# Patient Record
Sex: FEMALE | Race: BLACK OR AFRICAN AMERICAN | NOT HISPANIC OR LATINO | Employment: OTHER | URBAN - METROPOLITAN AREA
[De-identification: names, ages, dates, MRNs, and addresses within clinical notes are randomized per-mention and may not be internally consistent; named-entity substitution may affect disease eponyms.]

---

## 2020-06-01 ENCOUNTER — TELEPHONE (OUTPATIENT)
Dept: HEMATOLOGY/ONCOLOGY | Facility: CLINIC | Age: 75
End: 2020-06-01

## 2020-06-01 NOTE — TELEPHONE ENCOUNTER
Spoke with Ms. Kevin.  Informed her that I would review the referral received and work on obtaining medical records and imaging prior to scheduling an appointment.    ----- Message from Lalita Bird RN sent at 6/1/2020  4:17 PM CDT -----  Contact: Marizol  tel:   [phone]        ----- Message -----  From: Kristen Altman  Sent: 6/1/2020   3:22 PM CDT  To: [facility]        ----- Message -----  From: Kisha Gage MD  Sent: 6/1/2020   3:20 PM CDT  To: Kristen Altman    I was not  Can you loop in the navigators?    ----- Message -----  From: Kristen Altman  Sent: 6/1/2020   2:10 PM CDT  To: Kisha Gage MD    Will loop navigators in- not sure if you were aware of this pt?    ----- Message -----  From: Daya Miller  Sent: 6/1/2020   1:48 PM CDT  To: Merari SHELLEY Staff    Caller says she is waiting for an appt. To come in to see Dr. Gage, would prefer an afternoon appt..    Dr. Alexander Hubbard  Ofc:  [phone] and [phone] at [facility] referring her.   Asking for someone to pls give her an update as to how soon she can be seen.

## 2020-06-01 NOTE — TELEPHONE ENCOUNTER
Message fwd to MD.     ----- Message from Daya Miller sent at 6/1/2020  1:48 PM CDT -----  Contact: Marizol  tel:   [phone]    Caller says she is waiting for an appt. To come in to see Dr. Gage, would prefer an afternoon appt..    Dr. Alexander Hubbard  Ofc:  [phone] and [phone] at [facility] referring her.   Asking for someone to pls give her an update as to how soon she can be seen.

## 2020-06-15 ENCOUNTER — TELEPHONE (OUTPATIENT)
Dept: HEMATOLOGY/ONCOLOGY | Facility: CLINIC | Age: 75
End: 2020-06-15

## 2020-06-15 NOTE — TELEPHONE ENCOUNTER
Called x2, no answer. Unable to leave VM. Message forward to [facility] Schedulers to schedule patient with available provider for appt today.

## 2020-06-15 NOTE — TELEPHONE ENCOUNTER
----- Message from Daya Miller sent at 6/15/2020 10:14 AM CDT -----  Contact: Marizol: tel:  [phone]  Caller says she wants to come in to see you  TODAY.     Pls call to discuss this.   Tel:  [phone].     Has a boil under her breast.   Wants to see you TODAY.   Also need to have her port flushed.   Says she has left ms. Previously w/ no return call.   Will be waiting by the phone today.  Says she is in a lot of pain with the boil.

## 2020-06-17 ENCOUNTER — TELEPHONE (OUTPATIENT)
Dept: HEMATOLOGY/ONCOLOGY | Facility: CLINIC | Age: 75
End: 2020-06-17

## 2020-06-17 NOTE — TELEPHONE ENCOUNTER
Patient notified of the scheduled appointment with Dr. Alcantara on 07/06/20 at 2pm.  Imaging received on 06/16/20.  Records uploaded into Media.  Reminder mailed.

## 2020-06-22 ENCOUNTER — TELEPHONE (OUTPATIENT)
Dept: HEMATOLOGY/ONCOLOGY | Facility: CLINIC | Age: 75
End: 2020-06-22

## 2020-06-22 NOTE — TELEPHONE ENCOUNTER
----- Message from Raymond Cook sent at 6/22/2020  9:47 AM CDT -----  Regarding: patient advice  Contact: ESTEBAN JIMENEZ [[id number]]  Name of Who is Calling: ESTEBAN JIMENEZ [[id number]]      What is the request in detail: Would like to speak with staff in regards to knowing does she still come into the appointment tomorrow. Please advise      Can the clinic reply by [facility]: no    What Number to [facility]: [phone]

## 2020-06-22 NOTE — TELEPHONE ENCOUNTER
Returned call and spoke with Ms Herberth. Ms Kevin questioning her scheduled appointment with Dr Alcantara. Ms Enriquetasusie scheduled for two appointment with Dr Alcantara, tomorrow and on July 7th. She's calling to ask which appointment should she keep. Appointment discussed and Ms Kevin will come into the clinic on July 7th. Appointment information mailed to Ms Kevin home.

## 2020-07-02 ENCOUNTER — OFFICE VISIT (OUTPATIENT)
Dept: URGENT CARE | Facility: CLINIC | Age: 75
End: 2020-07-02
Payer: MEDICARE

## 2020-07-02 VITALS
HEART RATE: 106 BPM | TEMPERATURE: 98 F | SYSTOLIC BLOOD PRESSURE: 111 MMHG | OXYGEN SATURATION: 98 % | DIASTOLIC BLOOD PRESSURE: 74 MMHG

## 2020-07-02 DIAGNOSIS — L02.91 ABSCESS: Primary | ICD-10-CM

## 2020-07-02 PROCEDURE — 99203 OFFICE O/P NEW LOW 30 MIN: CPT | Mod: S$GLB,,, | Performed by: FAMILY MEDICINE

## 2020-07-02 PROCEDURE — 99203 PR OFFICE/OUTPT VISIT, NEW, LEVL III, 30-44 MIN: ICD-10-PCS | Mod: S$GLB,,, | Performed by: FAMILY MEDICINE

## 2020-07-02 RX ORDER — MUPIROCIN 20 MG/G
OINTMENT TOPICAL
Qty: 22 G | Refills: 1 | Status: SHIPPED | OUTPATIENT
Start: 2020-07-02 | End: 2021-03-12

## 2020-07-02 RX ORDER — AMLODIPINE BESYLATE 5 MG/1
5 TABLET ORAL
COMMUNITY
Start: 2020-01-10 | End: 2020-09-02 | Stop reason: SDUPTHER

## 2020-07-02 RX ORDER — ALBUTEROL SULFATE 90 UG/1
2 AEROSOL, METERED RESPIRATORY (INHALATION)
COMMUNITY
Start: 2019-11-21 | End: 2021-01-12 | Stop reason: SDUPTHER

## 2020-07-02 RX ORDER — METFORMIN HYDROCHLORIDE 500 MG/1
500 TABLET ORAL 2 TIMES DAILY
COMMUNITY
End: 2020-09-02 | Stop reason: SDUPTHER

## 2020-07-02 RX ORDER — ATORVASTATIN CALCIUM 40 MG/1
40 TABLET, FILM COATED ORAL
COMMUNITY
Start: 2018-04-09 | End: 2020-09-02 | Stop reason: SDUPTHER

## 2020-07-02 RX ORDER — ALPRAZOLAM 0.5 MG/1
0.5 TABLET ORAL
COMMUNITY
Start: 2019-11-06 | End: 2021-10-01

## 2020-07-02 RX ORDER — TRIAMCINOLONE ACETONIDE 0.25 MG/G
CREAM TOPICAL
COMMUNITY
Start: 2020-01-10 | End: 2021-01-12 | Stop reason: SDUPTHER

## 2020-07-02 RX ORDER — PANTOPRAZOLE SODIUM 40 MG/1
40 TABLET, DELAYED RELEASE ORAL
COMMUNITY
Start: 2020-01-10 | End: 2020-09-02 | Stop reason: SDUPTHER

## 2020-07-02 RX ORDER — MONTELUKAST SODIUM 10 MG/1
10 TABLET ORAL
COMMUNITY
Start: 2018-05-08 | End: 2021-06-01 | Stop reason: SDUPTHER

## 2020-07-02 RX ORDER — ACETAMINOPHEN 500 MG
TABLET ORAL
COMMUNITY
End: 2024-01-25 | Stop reason: SDUPTHER

## 2020-07-02 RX ORDER — MELOXICAM 15 MG/1
15 TABLET ORAL
COMMUNITY
Start: 2018-03-07 | End: 2021-01-12 | Stop reason: SDUPTHER

## 2020-07-02 RX ORDER — RALOXIFENE HYDROCHLORIDE 60 MG/1
60 TABLET, FILM COATED ORAL
COMMUNITY
Start: 2018-05-08 | End: 2021-01-12 | Stop reason: SDUPTHER

## 2020-07-02 RX ORDER — DULOXETIN HYDROCHLORIDE 30 MG/1
30 CAPSULE, DELAYED RELEASE ORAL
COMMUNITY
End: 2021-01-12 | Stop reason: SDUPTHER

## 2020-07-02 RX ORDER — HYDROXYZINE HYDROCHLORIDE 25 MG/1
25 TABLET, FILM COATED ORAL
COMMUNITY
Start: 2018-04-09 | End: 2021-01-12 | Stop reason: SDUPTHER

## 2020-07-02 RX ORDER — FLUTICASONE PROPIONATE 50 MCG
1 SPRAY, SUSPENSION (ML) NASAL
COMMUNITY
End: 2020-09-02 | Stop reason: SDUPTHER

## 2020-07-02 NOTE — PATIENT INSTRUCTIONS
Abscess (Incision & Drainage)  An abscess is sometimes called a boil. It happens when bacteria get trapped under the skin and start to grow. Pus forms inside the abscess as the body responds to the bacteria. An abscess can happen with an insect bite, ingrown hair, blocked oil gland, pimple, cyst, or puncture wound.  Your healthcare provider has drained the pus from your abscess. If the abscess pocket was large, your healthcare provider may have put in gauze packing. Your provider will need to remove it on your next visit. He or she may also replace it at that time. You may not need antibiotics to treat a simple abscess, unless the infection is spreading into the skin around the wound (cellulitis).  The wound will take about 1 to 2 weeks to heal, depending on the size of the abscess. Healthy tissue will grow from the bottom and sides of the opening until it seals over.  Home care  These tips can help your wound heal:  · The wound may drain for the first 2 days. Cover the wound with a clean dry dressing. Change the dressing if it becomes soaked with blood or pus.  · If a gauze packing was placed inside the abscess pocket, you may be told to remove it yourself. You may do this in the shower. Once the packing is removed, you should wash the area in the shower, or clean the area as directed by your provider. Continue to do this until the skin opening has closed. Make sure you wash your hands after changing the packing or cleaning the wound.  · If you were prescribed antibiotics, take them as directed until they are all gone.  · You may use acetaminophen or ibuprofen to control pain, unless another pain medicine was prescribed. If you have liver disease or ever had a stomach ulcer, talk with your doctor before using these medicines.  Follow-up care  Follow up with your healthcare provider, or as advised. If a gauze packing was put in your wound, it should be removed in 1 to 2 days. Check your wound every day for any  signs that the infection is getting worse. The signs are listed below.  When to seek medical advice  Call your healthcare provider right away if any of these occur:  · Increasing redness or swelling  · Red streaks in the skin leading away from the wound  · Increasing local pain or swelling  · Continued pus draining from the wound 2 days after treatment  · Fever of 100.4ºF (38ºC) or higher, or as directed by your healthcare provider  · Boil returns when you are at home  Date Last Reviewed: 9/1/2016  © 4084-9372 [employer]. [address] [place] [address]. All rights reserved. This information is not intended as a substitute for professional medical care. Always follow your healthcare professional's instructions.      WASH THE WOUND TWICE DAILY WITH ANTIBACTERIAL SOAP AND WATER THEN ANTIBIOTIC OINTMENT, THEN COVER.  OTHERWISE TRY TO KEEP THE AREA AS DRY AND CLEAN AS POSSIBLE.    RECHECK IF NOT IMPROVING

## 2020-07-02 NOTE — PROGRESS NOTES
"Subjective:       Patient ID: Marizol Kevin is a 74 y.o. female.    Vitals:  temperature is 97.6 °F (36.4 °C). Her blood pressure is 111/74 and her pulse is 106. Her oxygen saturation is 98%.     Chief Complaint: Abscess    74-year-old female with a draining lesion under her left breast over the past month.  She thought it had finished draining, but this morning noticed more drainage again.  Some associated pain, although not severe.  No fever or systemic symptoms.  History of colon cancer, although last chemo 2014.  Diagnosis of diabetes noted on chart, although she denies full blown diagnosis" although "I'm on metformin to keep my A1c down".  Blood glucose 3 months ago 144.          Abscess  Chronicity:  New  Incident onset: 1 month.  Progression Since Onset: worsening  Associated Symptoms: no fever, no chills  Treatments Tried:  Warm compresses and draining/squeezing  Relieved by:  Warm compresses      Constitution: Negative for chills and fever.   HENT: Negative for facial swelling and sore throat.    Neck: Negative for painful lymph nodes.   Eyes: Negative for eye itching and eyelid swelling.   Respiratory: Negative for cough.    Musculoskeletal: Negative for joint pain and joint swelling.   Skin: Negative for color change, pale, rash, wound, abrasion, laceration, lesion, skin thickening/induration, puncture wound, erythema, bruising, abscess, avulsion and hives.   Allergic/Immunologic: Positive for itching. Negative for environmental allergies, immunocompromised state and hives.   Hematologic/Lymphatic: Negative for swollen lymph nodes.       Objective:      Physical Exam   Constitutional: She is oriented to person, place, and time.  Non-toxic appearance. She does not appear ill. No distress.   HENT:   Head: Normocephalic and atraumatic.   Cardiovascular: Normal rate, regular rhythm and normal heart sounds.   Pulmonary/Chest: Breath sounds normal.   Abdominal: Normal appearance.   Neurological: She is alert " and oriented to person, place, and time.   Skin: Skin is not diaphoretic and no rash.   Under left breast, on left upper abdominal wall is a small superficial abscess that has come to head.  I can see where a was previously draining and now there is a surrounding bulge.    After informed consent verbally, surrounding area was cleansed well with Betadine, area was anesthetized with 1% xylocaine with epinephrine,and #11 blade used to incise the lesion with serous return and also a small amount of purulent return.  Area was nontender with no surrounding erythema.  Area of bulge resolved.  Culture not submitted. erythema   Comments: Under left breast, on left upper abdominal wall is a small superficial abscess that has come to head.  I can see where a was previously draining and now there is a surrounding bulge.    After informed consent verbally, surrounding area was cleansed well with Betadine, area was anesthetized with 1% xylocaine with epinephrine,and #11 blade used to incise the lesion with serous return and also a small amount of purulent return.  Area was nontender with no surrounding erythema.  Area of bulge resolved.  Culture not submitted.     Psychiatric: Her behavior is normal. Mood normal.         Assessment:       1. Abscess        Plan:         Abscess  -     mupirocin (BACTROBAN) 2 % ointment; Apply to affected area 2 times daily  Dispense: 22 g; Refill: 1    WASH THE WOUND TWICE DAILY WITH ANTIBACTERIAL SOAP AND WATER THEN ANTIBIOTIC OINTMENT, THEN COVER.  OTHERWISE TRY TO KEEP THE AREA AS DRY AND CLEAN AS POSSIBLE.    RECHECK IF NOT IMPROVING

## 2020-07-07 ENCOUNTER — TELEPHONE (OUTPATIENT)
Dept: HEMATOLOGY/ONCOLOGY | Facility: CLINIC | Age: 75
End: 2020-07-07

## 2020-07-07 NOTE — TELEPHONE ENCOUNTER
Called and spoke with Ms Kevin. Informed Ms Kvein her appointment scheduled today with Dr Alcantara will have to be rescheduled to another day. Done. Her appointment rescheduled to July 20, 2020.

## 2020-07-07 NOTE — TELEPHONE ENCOUNTER
----- Message from Roxy Fiore RN sent at 7/7/2020 12:53 PM CDT -----  Contact: [phone]    ----- Message -----  From: Roxy Fiore RN  Sent: 7/7/2020  12:17 PM CDT  To: Fariba Campbell consult patient for today.     See below.   ----- Message -----  From: Daya Miller  Sent: 7/7/2020  12:05 PM CDT  To: Quinton Cole Staff    Caller says she needs to come in and get her port flushed, it is raining heavily by her house.  Asking if you can get her in this week for another day and time.    Having problems trying to get transportation to take her today in the heavy rains.     Pls call asap.

## 2020-07-17 NOTE — PROGRESS NOTES
CC:  Follow up for colon cancer    HPI:  Ms Kevin is a 73 yo woman who presents today to establish care for her history of colon cancer. She was followed by Dr Hubbard previously. She underwent a right hemicolectomy on 8/15/2014 for cancer of the ascending colon. Four out of 17 lymph nodes were positive. She had a PET on 1/15/2014 which was negative for liver mets. She did have a hypermetabolic parotid lesion on the PET scan. She underwent adjuvant FOLFOX for 6 months from 2014 to 2015. She had elevated CEA and was last seen by Dr Hubbard on 2020. According to Dr Rosales note, the last colonoscopy was in 2019 at [facility]. As per patient the colonoscopy was good. She was told she is due for colonoscopy in 5 years. She is followed by Dr Cabrera for the parotid lesion. Her last CEA was 7.8 on 2019. She is a chronic smoker. 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 Occasional muscle cramps.       ECO    Past Medical History:   Diagnosis Date    Allergy     Anxiety     Cancer     colon    Cataract     Diabetes mellitus, type 2     Glaucoma     Hyperlipidemia     Hypertension     Insomnia         Past Surgical History:   Procedure Laterality Date    CHOLECYSTECTOMY      HYSTERECTOMY      KNEE SURGERY      LAPAROSCOPIC LEFT COLON RESECTION         No family history on file.    Social History     Socioeconomic History    Marital status:      Spouse name: Not on file    Number of children: Not on file    Years of education: Not on file    Highest education level: Not on file   Occupational History    Not on file   Social Needs    Financial resource strain: Not on file    Food insecurity     Worry: Not on file     Inability: Not on file    Transportation needs     Medical: Not on file     Non-medical: Not on file   Tobacco Use    Smoking status: Current Some Day Smoker    Smokeless tobacco: Never Used   Substance and Sexual Activity    Alcohol use: Yes     Comment: occ     Drug use: Not on file    Sexual activity: Not on file   Lifestyle    Physical activity     Days per week: Not on file     Minutes per session: Not on file    Stress: Not on file   Relationships    Social connections     Talks on phone: Not on file     Gets together: Not on file     Attends Spiritism service: Not on file     Active member of club or organization: Not on file     Attends meetings of clubs or organizations: Not on file     Relationship status: Not on file   Other Topics Concern    Not on file   Social History Narrative    Not on file       Review of Systems   Constitutional: Negative for appetite change, chills, fatigue, fever and unexpected weight change.   HENT: Negative for mouth sores, nosebleeds, tinnitus, trouble swallowing and voice change.    Eyes: Negative for pain, redness and visual disturbance.   Respiratory: Negative for cough, shortness of breath and wheezing.    Cardiovascular: Negative for chest pain, palpitations and leg swelling.   Gastrointestinal: Negative for abdominal distention, abdominal pain, blood in stool, constipation, diarrhea, nausea and vomiting.   Endocrine: Negative for polydipsia, polyphagia and polyuria.   Genitourinary: Negative for flank pain, frequency and hematuria.   Musculoskeletal: Negative for arthralgias, back pain, gait problem, joint swelling, myalgias, neck pain and neck stiffness.   Skin: Negative for color change, pallor, rash and wound.   Neurological: Positive for numbness (neuropathy from oxaliplatin). Negative for tremors, seizures, syncope, speech difficulty, weakness, light-headedness and headaches.   Hematological: Negative for adenopathy. Does not bruise/bleed easily.   Psychiatric/Behavioral: Negative for confusion, dysphoric mood and self-injury. The patient is not nervous/anxious.    All other systems reviewed and are negative.      Objective:  Physical Exam   Constitutional: She is oriented to person, place, and time. She appears  well-developed and well-nourished. No distress.   HENT:   Head: Normocephalic and atraumatic.   Mouth/Throat: Oropharynx is clear and moist. No oropharyngeal exudate.   Eyes: Pupils are equal, round, and reactive to light. Conjunctivae and EOM are normal. No scleral icterus.   Neck: Normal range of motion. Neck supple. No JVD present. No thyromegaly present.   Cardiovascular: Normal rate, regular rhythm, normal heart sounds and intact distal pulses. Exam reveals no gallop and no friction rub.   No murmur heard.  Pulmonary/Chest: Effort normal and breath sounds normal. No respiratory distress. She has no wheezes. She has no rales.   Port site clean   Abdominal: Soft. Bowel sounds are normal. She exhibits no distension and no mass. There is no hepatosplenomegaly. There is no abdominal tenderness. There is no rebound. No hernia.   Musculoskeletal: Normal range of motion.         General: No tenderness, deformity or edema.   Lymphadenopathy:     She has no cervical adenopathy.        Right: No supraclavicular adenopathy present.        Left: No supraclavicular adenopathy present.   Neurological: She is alert and oriented to person, place, and time. No cranial nerve deficit. She exhibits normal muscle tone.   Skin: Skin is warm and dry. No rash noted. She is not diaphoretic. No erythema. No pallor.   Psychiatric: She has a normal mood and affect. Her behavior is normal. Judgment and thought content normal.   Vitals reviewed.      Labs:  Her last CEA was 7.8 on 11/1/2019  Today's labs pending    Assessment and plan:  1. Malignant neoplasm of ascending colon    2. Elevated tumor markers    3. Lesion of parotid gland    4. 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 Four out of 17 lymph nodes were positive. She underwent adjuvant FOLFOX for 6 months from 11/4/2014 to 4/29/2015.  Her CEA was elevated without  radiographic evidence of recurrence. She is a current smoker  - check CBC, CMP, CEA today  - check CT C/A/P given elevated CEA  - flush port today  - had colonoscopy at [facility] in 2019. Next due in 5 years (2024) as per patient  - We discussed colon cancer survivorship, including daily baby aspirin with food, daily OTC vit D and calcium, fresh fruits and vegetables.  - RTC in 6 months for follow up    3.  - followed by Dr Cabrera    4.  - on cymbalta 30 mg daily. Continue    Her multiple questions were answered in the clinic. Encouraged to call should issues arise.

## 2020-07-20 ENCOUNTER — INFUSION (OUTPATIENT)
Dept: INFUSION THERAPY | Facility: OTHER | Age: 75
End: 2020-07-20
Attending: INTERNAL MEDICINE
Payer: MEDICARE

## 2020-07-20 ENCOUNTER — OFFICE VISIT (OUTPATIENT)
Dept: HEMATOLOGY/ONCOLOGY | Facility: CLINIC | Age: 75
End: 2020-07-20
Payer: MEDICARE

## 2020-07-20 VITALS
SYSTOLIC BLOOD PRESSURE: 121 MMHG | WEIGHT: 164.88 LBS | OXYGEN SATURATION: 98 % | TEMPERATURE: 99 F | HEIGHT: 57 IN | RESPIRATION RATE: 12 BRPM | DIASTOLIC BLOOD PRESSURE: 71 MMHG | HEART RATE: 104 BPM | BODY MASS INDEX: 35.57 KG/M2

## 2020-07-20 DIAGNOSIS — C18.2 MALIGNANT NEOPLASM OF ASCENDING COLON: Primary | ICD-10-CM

## 2020-07-20 DIAGNOSIS — G62.9 NEUROPATHY: ICD-10-CM

## 2020-07-20 DIAGNOSIS — K11.9 LESION OF PAROTID GLAND: ICD-10-CM

## 2020-07-20 DIAGNOSIS — R97.8 ELEVATED TUMOR MARKERS: ICD-10-CM

## 2020-07-20 DIAGNOSIS — C18.8 OVERLAPPING MALIGNANT NEOPLASM OF COLON: Primary | ICD-10-CM

## 2020-07-20 PROCEDURE — 99204 OFFICE O/P NEW MOD 45 MIN: CPT | Mod: HCNC,S$GLB,, | Performed by: INTERNAL MEDICINE

## 2020-07-20 PROCEDURE — 1126F PR PAIN SEVERITY QUANTIFIED, NO PAIN PRESENT: ICD-10-PCS | Mod: HCNC,S$GLB,, | Performed by: INTERNAL MEDICINE

## 2020-07-20 PROCEDURE — 99999 PR PBB SHADOW E&M-EST. PATIENT-LVL IV: ICD-10-PCS | Mod: PBBFAC,HCNC,, | Performed by: INTERNAL MEDICINE

## 2020-07-20 PROCEDURE — 99999 PR PBB SHADOW E&M-EST. PATIENT-LVL IV: CPT | Mod: PBBFAC,HCNC,, | Performed by: INTERNAL MEDICINE

## 2020-07-20 PROCEDURE — A4216 STERILE WATER/SALINE, 10 ML: HCPCS | Mod: HCNC | Performed by: INTERNAL MEDICINE

## 2020-07-20 PROCEDURE — 63600175 PHARM REV CODE 636 W HCPCS: Mod: HCNC | Performed by: INTERNAL MEDICINE

## 2020-07-20 PROCEDURE — 99499 RISK ADDL DX/OHS AUDIT: ICD-10-PCS | Mod: HCNC,S$GLB,, | Performed by: INTERNAL MEDICINE

## 2020-07-20 PROCEDURE — 99499 UNLISTED E&M SERVICE: CPT | Mod: HCNC,S$GLB,, | Performed by: INTERNAL MEDICINE

## 2020-07-20 PROCEDURE — 3008F PR BODY MASS INDEX (BMI) DOCUMENTED: ICD-10-PCS | Mod: HCNC,CPTII,S$GLB, | Performed by: INTERNAL MEDICINE

## 2020-07-20 PROCEDURE — 1126F AMNT PAIN NOTED NONE PRSNT: CPT | Mod: HCNC,S$GLB,, | Performed by: INTERNAL MEDICINE

## 2020-07-20 PROCEDURE — 99204 PR OFFICE/OUTPT VISIT, NEW, LEVL IV, 45-59 MIN: ICD-10-PCS | Mod: HCNC,S$GLB,, | Performed by: INTERNAL MEDICINE

## 2020-07-20 PROCEDURE — 1101F PT FALLS ASSESS-DOCD LE1/YR: CPT | Mod: HCNC,CPTII,S$GLB, | Performed by: INTERNAL MEDICINE

## 2020-07-20 PROCEDURE — 1159F PR MEDICATION LIST DOCUMENTED IN MEDICAL RECORD: ICD-10-PCS | Mod: HCNC,S$GLB,, | Performed by: INTERNAL MEDICINE

## 2020-07-20 PROCEDURE — 96523 IRRIG DRUG DELIVERY DEVICE: CPT | Mod: HCNC

## 2020-07-20 PROCEDURE — 1101F PR PT FALLS ASSESS DOC 0-1 FALLS W/OUT INJ PAST YR: ICD-10-PCS | Mod: HCNC,CPTII,S$GLB, | Performed by: INTERNAL MEDICINE

## 2020-07-20 PROCEDURE — 3008F BODY MASS INDEX DOCD: CPT | Mod: HCNC,CPTII,S$GLB, | Performed by: INTERNAL MEDICINE

## 2020-07-20 PROCEDURE — 25000003 PHARM REV CODE 250: Mod: HCNC | Performed by: INTERNAL MEDICINE

## 2020-07-20 PROCEDURE — 1159F MED LIST DOCD IN RCRD: CPT | Mod: HCNC,S$GLB,, | Performed by: INTERNAL MEDICINE

## 2020-07-20 RX ORDER — HEPARIN 100 UNIT/ML
500 SYRINGE INTRAVENOUS
Status: COMPLETED | OUTPATIENT
Start: 2020-07-20 | End: 2020-07-20

## 2020-07-20 RX ORDER — SODIUM CHLORIDE 0.9 % (FLUSH) 0.9 %
10 SYRINGE (ML) INJECTION
Status: CANCELLED | OUTPATIENT
Start: 2020-07-20

## 2020-07-20 RX ORDER — HEPARIN 100 UNIT/ML
500 SYRINGE INTRAVENOUS
Status: CANCELLED | OUTPATIENT
Start: 2020-07-20

## 2020-07-20 RX ORDER — SODIUM CHLORIDE 0.9 % (FLUSH) 0.9 %
10 SYRINGE (ML) INJECTION
Status: COMPLETED | OUTPATIENT
Start: 2020-07-20 | End: 2020-07-20

## 2020-07-20 RX ADMIN — HEPARIN 500 UNITS: 100 SYRINGE at 10:07

## 2020-07-20 RX ADMIN — SODIUM CHLORIDE, PRESERVATIVE FREE 10 ML: 5 INJECTION INTRAVENOUS at 10:07

## 2020-07-20 NOTE — Clinical Note
Check CBC, CMP, CEA today. Schedule for CT C/A/P this week. RTC in 6 months with CBC, CMP, CEA, see me, then flush port

## 2020-07-20 NOTE — PLAN OF CARE
Port A Cath flushed and heparin locked. Port A Cath 20 gauge 3/4 inch needle deaccessed/ removed.  Patient tolerated well. No apparent distress noted. Discharge instructions given to patient. Patient understands instructions. Follow up appointment scheduled.

## 2020-07-20 NOTE — LETTER
July 20, 2020      Alexander Hubbard MD  [address]  [address]  [facility] [address]           [facility] [place] [address]  [address], [address]  [facility] [address]  Phone: [phone]          Patient: Marizol Kevin   MR Number: [id number]   YOB: 1945   Date of Visit: 7/20/2020       Dear Dr. Alexander Hubbard:    Thank you for referring Marizol Kevin to me for evaluation. Attached you will find relevant portions of my assessment and plan of care.    If you have questions, please do not hesitate to call me. I look forward to following Marizol Kevin along with you.    Sincerely,    Kelsey Alcantara MD    Enclosure  CC:  No Recipients    If you would like to receive this communication electronically, please contact [email or web address] or [phone] to request more information on [facility] Link access.    For providers and/or their staff who would like to refer a patient to [facility], please contact us through our one-stop-shop provider referral line, [facility], at [phone].    If you feel you have received this communication in error or would no longer like to receive these types of communications, please e-mail [email or web address]

## 2020-07-29 ENCOUNTER — HOSPITAL ENCOUNTER (OUTPATIENT)
Dept: RADIOLOGY | Facility: OTHER | Age: 75
Discharge: HOME | End: 2020-07-29
Attending: INTERNAL MEDICINE
Payer: MEDICARE

## 2020-07-29 ENCOUNTER — OFFICE VISIT (OUTPATIENT)
Dept: OPTOMETRY | Facility: CLINIC | Age: 75
End: 2020-07-29
Payer: MEDICARE

## 2020-07-29 DIAGNOSIS — Z96.1 PSEUDOPHAKIA OF BOTH EYES: ICD-10-CM

## 2020-07-29 DIAGNOSIS — R97.8 ELEVATED TUMOR MARKERS: ICD-10-CM

## 2020-07-29 DIAGNOSIS — C18.2 MALIGNANT NEOPLASM OF ASCENDING COLON: ICD-10-CM

## 2020-07-29 DIAGNOSIS — E11.9 TYPE 2 DIABETES MELLITUS WITHOUT OPHTHALMIC MANIFESTATIONS: Primary | ICD-10-CM

## 2020-07-29 PROCEDURE — 74177 CT ABD & PELVIS W/CONTRAST: CPT | Mod: 26,HCNC,, | Performed by: RADIOLOGY

## 2020-07-29 PROCEDURE — A9698 NON-RAD CONTRAST MATERIALNOC: HCPCS | Mod: HCNC | Performed by: INTERNAL MEDICINE

## 2020-07-29 PROCEDURE — 25500020 PHARM REV CODE 255: Mod: HCNC | Performed by: INTERNAL MEDICINE

## 2020-07-29 PROCEDURE — 74177 CT ABD & PELVIS W/CONTRAST: CPT | Mod: TC,HCNC

## 2020-07-29 PROCEDURE — 74177 CT CHEST ABDOMEN PELVIS WITH CONTRAST (XPD): ICD-10-PCS | Mod: 26,HCNC,, | Performed by: RADIOLOGY

## 2020-07-29 PROCEDURE — 92004 PR EYE EXAM, NEW PATIENT,COMPREHESV: ICD-10-PCS | Mod: HCNC,S$GLB,, | Performed by: OPTOMETRIST

## 2020-07-29 PROCEDURE — 71260 CT THORAX DX C+: CPT | Mod: 26,HCNC,, | Performed by: RADIOLOGY

## 2020-07-29 PROCEDURE — 92004 COMPRE OPH EXAM NEW PT 1/>: CPT | Mod: HCNC,S$GLB,, | Performed by: OPTOMETRIST

## 2020-07-29 PROCEDURE — 71260 CT CHEST ABDOMEN PELVIS WITH CONTRAST (XPD): ICD-10-PCS | Mod: 26,HCNC,, | Performed by: RADIOLOGY

## 2020-07-29 PROCEDURE — 99999 PR PBB SHADOW E&M-EST. PATIENT-LVL II: CPT | Mod: PBBFAC,HCNC,, | Performed by: OPTOMETRIST

## 2020-07-29 PROCEDURE — 99999 PR PBB SHADOW E&M-EST. PATIENT-LVL II: ICD-10-PCS | Mod: PBBFAC,HCNC,, | Performed by: OPTOMETRIST

## 2020-07-29 RX ADMIN — IOHEXOL 1000 ML: 9 SOLUTION ORAL at 11:07

## 2020-07-29 RX ADMIN — IOHEXOL 75 ML: 350 INJECTION, SOLUTION INTRAVENOUS at 12:07

## 2020-07-29 NOTE — PROGRESS NOTES
HPI     Last eye exam was in march 2020.  Pt here for routine eye exam.    Pt states she may need a new glasses rx, she is having trouble reading and   far away.   Patient denies diplopia, headaches, flashes, and pain.  Pt does have floaters.  Pt had cataract sx.    Last edited by Emeli Zacarias on 7/29/2020  1:20 PM. (History)            Assessment /Plan     For exam results, see Encounter Report.    Type 2 diabetes mellitus without ophthalmic manifestations    Pseudophakia of both eyes            1.  No retinopathy--monitor yearly.  BS control.  Eye health normal OU.  2.  Pt doing well since surgery.  Bifocal rx given.        RTC 1 year for dm exam.

## 2020-09-01 ENCOUNTER — TELEPHONE (OUTPATIENT)
Dept: INTERNAL MEDICINE | Facility: CLINIC | Age: 75
End: 2020-09-01

## 2020-09-01 NOTE — TELEPHONE ENCOUNTER
----- Message from Yue Martinez sent at 9/1/2020 12:29 PM CDT -----  Pt had an appointment on today and it was canceled pt stated no one called to tell her and she needs to be seen today because she got a ride to the appointment please reach out to pt at [phone]

## 2020-09-01 NOTE — TELEPHONE ENCOUNTER
Patient showed up for her appointment today that was previous canceled. Patient stated that no one called her about canceling. Rescheduled appointment with Dr. Fajardo to [facility].

## 2020-09-02 ENCOUNTER — TELEPHONE (OUTPATIENT)
Dept: INTERNAL MEDICINE | Facility: CLINIC | Age: 75
End: 2020-09-02

## 2020-09-02 ENCOUNTER — OFFICE VISIT (OUTPATIENT)
Dept: INTERNAL MEDICINE | Facility: CLINIC | Age: 75
End: 2020-09-02
Payer: MEDICARE

## 2020-09-02 DIAGNOSIS — J31.0 RHINITIS, UNSPECIFIED TYPE: ICD-10-CM

## 2020-09-02 DIAGNOSIS — T45.1X5A NEUROPATHY DUE TO CHEMOTHERAPEUTIC DRUG: ICD-10-CM

## 2020-09-02 DIAGNOSIS — K21.9 GASTROESOPHAGEAL REFLUX DISEASE, ESOPHAGITIS PRESENCE NOT SPECIFIED: ICD-10-CM

## 2020-09-02 DIAGNOSIS — Z85.038 HISTORY OF COLON CANCER IN ADULTHOOD: ICD-10-CM

## 2020-09-02 DIAGNOSIS — G62.0 NEUROPATHY DUE TO CHEMOTHERAPEUTIC DRUG: ICD-10-CM

## 2020-09-02 DIAGNOSIS — F17.210 LIGHT TOBACCO SMOKER <10 CIGARETTES PER DAY: ICD-10-CM

## 2020-09-02 DIAGNOSIS — I10 ESSENTIAL HYPERTENSION: ICD-10-CM

## 2020-09-02 DIAGNOSIS — Z11.59 ENCOUNTER FOR HEPATITIS C SCREENING TEST FOR LOW RISK PATIENT: ICD-10-CM

## 2020-09-02 DIAGNOSIS — J45.20 MILD INTERMITTENT ASTHMA WITHOUT COMPLICATION: ICD-10-CM

## 2020-09-02 DIAGNOSIS — Z00.00 HEALTHCARE MAINTENANCE: ICD-10-CM

## 2020-09-02 DIAGNOSIS — E11.9 TYPE 2 DIABETES MELLITUS WITHOUT COMPLICATION, WITHOUT LONG-TERM CURRENT USE OF INSULIN: Primary | ICD-10-CM

## 2020-09-02 PROCEDURE — 99442 PR PHYSICIAN TELEPHONE EVALUATION 11-20 MIN: CPT | Mod: HCNC,95,, | Performed by: FAMILY MEDICINE

## 2020-09-02 PROCEDURE — 99442 PR PHYSICIAN TELEPHONE EVALUATION 11-20 MIN: ICD-10-PCS | Mod: HCNC,95,, | Performed by: FAMILY MEDICINE

## 2020-09-02 RX ORDER — ATORVASTATIN CALCIUM 40 MG/1
40 TABLET, FILM COATED ORAL DAILY
Qty: 90 TABLET | Refills: 1 | Status: SHIPPED | OUTPATIENT
Start: 2020-09-02 | End: 2021-01-12 | Stop reason: SDUPTHER

## 2020-09-02 RX ORDER — METFORMIN HYDROCHLORIDE 500 MG/1
500 TABLET ORAL 2 TIMES DAILY
Qty: 180 TABLET | Refills: 1 | Status: SHIPPED | OUTPATIENT
Start: 2020-09-02 | End: 2020-09-16 | Stop reason: SDUPTHER

## 2020-09-02 RX ORDER — PANTOPRAZOLE SODIUM 40 MG/1
40 TABLET, DELAYED RELEASE ORAL DAILY
Qty: 90 TABLET | Refills: 1 | Status: SHIPPED | OUTPATIENT
Start: 2020-09-02 | End: 2021-01-12 | Stop reason: SDUPTHER

## 2020-09-02 RX ORDER — AMLODIPINE BESYLATE 5 MG/1
5 TABLET ORAL DAILY
Qty: 90 TABLET | Refills: 1 | Status: SHIPPED | OUTPATIENT
Start: 2020-09-02 | End: 2021-01-12 | Stop reason: SDUPTHER

## 2020-09-02 RX ORDER — FLUTICASONE PROPIONATE 50 MCG
2 SPRAY, SUSPENSION (ML) NASAL DAILY
Qty: 16 G | Refills: 1 | Status: SHIPPED | OUTPATIENT
Start: 2020-09-02 | End: 2021-01-12 | Stop reason: SDUPTHER

## 2020-09-02 NOTE — TELEPHONE ENCOUNTER
Please assist this patient with scheduling fasting blood work and urine to do on September 10th, the day of her appointment with Dr. Fajardo.    Thanks!

## 2020-09-02 NOTE — PROGRESS NOTES
Established Patient - Audio Only Telehealth Visit     The patient location is:  Patient's home in [place]  The chief complaint leading to consultation is:  Medication refills, diabetes follow-up  Visit type: Virtual visit with audio only (telephone)  Total time spent with patient: 18 mins     The reason for the audio only service rather than synchronous audio and video virtual visit was related to technical difficulties or patient preference/necessity.     Each patient to whom I provide medical services by telemedicine is:  (1) informed of the relationship between the physician and patient and the respective role of any other health care provider with respect to management of the patient; and (2) notified that they may decline to receive medical services by telemedicine and may withdraw from such care at any time. Patient verbally consented to receive this service via voice-only telephone call.       HPI: Ms. 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    The patient was previously seen by Dr. Paul at [facility].  Patient is establishing care here at [facility] next week.  In the meantime she reports needing refills of her medications and DM follow-up.     Diabetes mellitus type 2 - reports compliant with metformin 500 mg twice daily.  Unclear what her last hemoglobin A1c was.  No results found for: LABA1C, HGBA1C  Recently had eye exam that revealed no diabetic retinopathy.    History of colon cancer - follows with oncology.  Diagnosed in 2014. S/p right hemicolectomy.  She underwent adjuvent chemotherapy.  Last colonoscopy in 2019 with [facility], per patient.  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  She currently takes Cymbalta, which minimally helps with the neuropathy.  Says she tried gabapentin 300 mg in the past, but it did not help (she is  unsure how many times a day she took 300 mg).     Hypertension - compliant with amlodipine 5 mg daily    Asthma - uses albuterol as needed (a few times/week).  Compliant with Singulair and Flonase.    Patient takes alprazolam as needed when she cannot sleep or with anxiety.  Says on average she takes about 3 tablets per month.    Patient currently takes raloxifene for what sounds like osteoporosis?  She reports never being told that she has osteoporosis, but says that this medicine is for her bones.    Tobacco smoker - has been smoking since age 19.  Currently smokes between 5 and 10 cigarettes a day.    Physical exam:  Speaking in complete sentences, no acute distress    Assessment and plan:      Type 2 diabetes mellitus without complication, without long-term current use of insulin  Labs ordered to evaluate.  Continue metformin at this time.  -     metFORMIN (GLUCOPHAGE) 500 MG tablet; Take 1 tablet (500 mg total) by mouth 2 (two) times a day.  Dispense: 180 tablet; Refill: 1  -     atorvastatin (LIPITOR) 40 MG tablet; Take 1 tablet (40 mg total) by mouth once daily.  Dispense: 90 tablet; Refill: 1  -     CBC auto differential; Future; Expected date: 09/02/2020  -     Comprehensive metabolic panel; Future; Expected date: 09/02/2020  -     Lipid Panel; Future; Expected date: 09/02/2020  -     TSH; Future; Expected date: 09/02/2020  -     Hemoglobin A1C; Future; Expected date: 09/02/2020  -     Microalbumin/creatinine urine ratio; Future; Expected date: 09/02/2020    Essential hypertension  Unable to check blood pressure today.  Patient is establishing care soon and will have blood pressure checked at that time.  Continue current medication.  -     amLODIPine (NORVASC) 5 MG tablet; Take 1 tablet (5 mg total) by mouth once daily.  Dispense: 90 tablet; Refill: 1  -     CBC auto differential; Future; Expected date: 09/02/2020  -     Comprehensive metabolic panel; Future; Expected date: 09/02/2020  -     Lipid Panel;  Future; Expected date: 09/02/2020  -     TSH; Future; Expected date: 09/02/2020  -     Microalbumin/creatinine urine ratio; Future; Expected date: 09/02/2020    Mild intermittent asthma without complication  Continue current medication regimen.    History of colon cancer in adulthood  Neuropathy due to chemotherapeutic drug  Continue current management her oncology and gastroenterology.  Continue duloxetine for neuropathy.  Consider Lyrica versus higher gabapentin dose.    Rhinitis, unspecified type  -     fluticasone propionate (FLONASE) 50 mcg/actuation nasal spray; 2 sprays (100 mcg total) by Each Nostril route once daily.  Dispense: 16 g; Refill: 1    Gastroesophageal reflux disease, esophagitis presence not specified  -     pantoprazole (PROTONIX) 40 MG tablet; Take 1 tablet (40 mg total) by mouth once daily.  Dispense: 90 tablet; Refill: 1    Encounter for hepatitis C screening test for low risk patient  -     Hepatitis C Antibody; Future; Expected date: 09/02/2020    Light tobacco smoker <10 cigarettes per day  Strongly recommended cessation.    Healthcare maintenance  Patient will discuss healthcare maintenance including bone density scan, mammogram, vaccines at her establish care visit.        This service was not originating from a related E/M service provided within the previous 7 days nor will  to an E/M service or procedure within the next 24 hours or my soonest available appointment.  Prevailing standard of care was able to be met in this audio-only visit.

## 2020-09-03 PROBLEM — G62.0 NEUROPATHY DUE TO CHEMOTHERAPEUTIC DRUG: Status: ACTIVE | Noted: 2020-09-03

## 2020-09-03 PROBLEM — K21.9 GASTROESOPHAGEAL REFLUX DISEASE: Status: ACTIVE | Noted: 2020-09-03

## 2020-09-03 PROBLEM — J45.20 MILD INTERMITTENT ASTHMA WITHOUT COMPLICATION: Status: ACTIVE | Noted: 2020-09-03

## 2020-09-03 PROBLEM — F17.210 LIGHT TOBACCO SMOKER <10 CIGARETTES PER DAY: Status: ACTIVE | Noted: 2020-09-03

## 2020-09-03 PROBLEM — Z85.038 HISTORY OF COLON CANCER IN ADULTHOOD: Status: ACTIVE | Noted: 2020-09-03

## 2020-09-03 PROBLEM — E11.9 TYPE 2 DIABETES MELLITUS WITHOUT COMPLICATION, WITHOUT LONG-TERM CURRENT USE OF INSULIN: Status: ACTIVE | Noted: 2020-09-03

## 2020-09-03 PROBLEM — I10 ESSENTIAL HYPERTENSION: Status: ACTIVE | Noted: 2020-09-03

## 2020-09-03 PROBLEM — T45.1X5A NEUROPATHY DUE TO CHEMOTHERAPEUTIC DRUG: Status: ACTIVE | Noted: 2020-09-03

## 2020-09-10 ENCOUNTER — PATIENT OUTREACH (OUTPATIENT)
Dept: ADMINISTRATIVE | Facility: HOSPITAL | Age: 75
End: 2020-09-10

## 2020-09-10 ENCOUNTER — OFFICE VISIT (OUTPATIENT)
Dept: OBSTETRICS AND GYNECOLOGY | Facility: CLINIC | Age: 75
End: 2020-09-10
Payer: MEDICARE

## 2020-09-10 ENCOUNTER — OFFICE VISIT (OUTPATIENT)
Dept: INTERNAL MEDICINE | Facility: CLINIC | Age: 75
End: 2020-09-10
Payer: MEDICARE

## 2020-09-10 ENCOUNTER — LAB VISIT (OUTPATIENT)
Dept: LAB | Facility: OTHER | Age: 75
End: 2020-09-10
Attending: FAMILY MEDICINE
Payer: MEDICARE

## 2020-09-10 VITALS
TEMPERATURE: 99 F | OXYGEN SATURATION: 96 % | HEART RATE: 101 BPM | DIASTOLIC BLOOD PRESSURE: 66 MMHG | HEIGHT: 57 IN | WEIGHT: 162.25 LBS | BODY MASS INDEX: 35.01 KG/M2 | SYSTOLIC BLOOD PRESSURE: 121 MMHG

## 2020-09-10 VITALS
BODY MASS INDEX: 34.91 KG/M2 | DIASTOLIC BLOOD PRESSURE: 62 MMHG | SYSTOLIC BLOOD PRESSURE: 104 MMHG | HEIGHT: 57 IN | WEIGHT: 161.81 LBS

## 2020-09-10 DIAGNOSIS — G62.0 NEUROPATHY DUE TO CHEMOTHERAPEUTIC DRUG: ICD-10-CM

## 2020-09-10 DIAGNOSIS — I10 ESSENTIAL HYPERTENSION: ICD-10-CM

## 2020-09-10 DIAGNOSIS — M81.0 OSTEOPOROSIS, UNSPECIFIED OSTEOPOROSIS TYPE, UNSPECIFIED PATHOLOGICAL FRACTURE PRESENCE: ICD-10-CM

## 2020-09-10 DIAGNOSIS — T45.1X5A NEUROPATHY DUE TO CHEMOTHERAPEUTIC DRUG: ICD-10-CM

## 2020-09-10 DIAGNOSIS — N76.6 GENITAL ULCER, FEMALE: Primary | ICD-10-CM

## 2020-09-10 DIAGNOSIS — E78.2 MIXED HYPERLIPIDEMIA: ICD-10-CM

## 2020-09-10 DIAGNOSIS — F17.210 LIGHT CIGARETTE SMOKER (1-9 CIGS/DAY): ICD-10-CM

## 2020-09-10 DIAGNOSIS — E04.1 THYROID NODULE: ICD-10-CM

## 2020-09-10 DIAGNOSIS — Z11.59 ENCOUNTER FOR HEPATITIS C SCREENING TEST FOR LOW RISK PATIENT: ICD-10-CM

## 2020-09-10 DIAGNOSIS — J45.20 MILD INTERMITTENT ASTHMA WITHOUT COMPLICATION: ICD-10-CM

## 2020-09-10 DIAGNOSIS — E11.9 TYPE 2 DIABETES MELLITUS WITHOUT COMPLICATION, WITHOUT LONG-TERM CURRENT USE OF INSULIN: ICD-10-CM

## 2020-09-10 DIAGNOSIS — J00 ACUTE NASOPHARYNGITIS: Primary | ICD-10-CM

## 2020-09-10 PROBLEM — E78.5 DYSLIPIDEMIA: Status: ACTIVE | Noted: 2018-05-09

## 2020-09-10 PROBLEM — H25.042 POSTERIOR SUBCAPSULAR POLAR AGE-RELATED CATARACT OF LEFT EYE: Status: ACTIVE | Noted: 2020-01-29

## 2020-09-10 PROBLEM — G47.00 INSOMNIA: Status: ACTIVE | Noted: 2020-09-10

## 2020-09-10 PROBLEM — R97.0 ELEVATED CEA: Status: ACTIVE | Noted: 2019-06-24

## 2020-09-10 PROBLEM — H25.12 CATARACT, NUCLEAR SCLEROTIC, LEFT EYE: Status: ACTIVE | Noted: 2020-03-11

## 2020-09-10 PROBLEM — K11.8 MASS OF PAROTID GLAND: Status: ACTIVE | Noted: 2018-05-09

## 2020-09-10 PROBLEM — F41.9 ANXIETY: Status: ACTIVE | Noted: 2020-09-10

## 2020-09-10 PROBLEM — E55.9 VITAMIN D DEFICIENCY: Status: ACTIVE | Noted: 2020-09-10

## 2020-09-10 LAB
ALBUMIN SERPL BCP-MCNC: 3.5 G/DL (ref 3.5–5.2)
ALP SERPL-CCNC: 130 U/L (ref 55–135)
ALT SERPL W/O P-5'-P-CCNC: 15 U/L (ref 10–44)
ANION GAP SERPL CALC-SCNC: 13 MMOL/L (ref 8–16)
AST SERPL-CCNC: 20 U/L (ref 10–40)
BASOPHILS # BLD AUTO: 0.06 K/UL (ref 0–0.2)
BASOPHILS NFR BLD: 0.5 % (ref 0–1.9)
BILIRUB SERPL-MCNC: 0.3 MG/DL (ref 0.1–1)
BUN SERPL-MCNC: 15 MG/DL (ref 8–23)
CALCIUM SERPL-MCNC: 9.5 MG/DL (ref 8.7–10.5)
CHLORIDE SERPL-SCNC: 105 MMOL/L (ref 95–110)
CO2 SERPL-SCNC: 21 MMOL/L (ref 23–29)
CREAT SERPL-MCNC: 0.9 MG/DL (ref 0.5–1.4)
DIFFERENTIAL METHOD: ABNORMAL
EOSINOPHIL # BLD AUTO: 0.2 K/UL (ref 0–0.5)
EOSINOPHIL NFR BLD: 1.2 % (ref 0–8)
ERYTHROCYTE [DISTWIDTH] IN BLOOD BY AUTOMATED COUNT: 14.7 % (ref 11.5–14.5)
EST. GFR  (AFRICAN AMERICAN): >60 ML/MIN/1.73 M^2
EST. GFR  (NON AFRICAN AMERICAN): >60 ML/MIN/1.73 M^2
GLUCOSE SERPL-MCNC: 113 MG/DL (ref 70–110)
HCT VFR BLD AUTO: 43.9 % (ref 37–48.5)
HGB BLD-MCNC: 13.5 G/DL (ref 12–16)
IMM GRANULOCYTES # BLD AUTO: 0.09 K/UL (ref 0–0.04)
IMM GRANULOCYTES NFR BLD AUTO: 0.7 % (ref 0–0.5)
LYMPHOCYTES # BLD AUTO: 2.6 K/UL (ref 1–4.8)
LYMPHOCYTES NFR BLD: 20.6 % (ref 18–48)
MCH RBC QN AUTO: 25.9 PG (ref 27–31)
MCHC RBC AUTO-ENTMCNC: 30.8 G/DL (ref 32–36)
MCV RBC AUTO: 84 FL (ref 82–98)
MONOCYTES # BLD AUTO: 0.7 K/UL (ref 0.3–1)
MONOCYTES NFR BLD: 5.3 % (ref 4–15)
NEUTROPHILS # BLD AUTO: 9 K/UL (ref 1.8–7.7)
NEUTROPHILS NFR BLD: 71.7 % (ref 38–73)
NRBC BLD-RTO: 0 /100 WBC
PLATELET # BLD AUTO: 316 K/UL (ref 150–350)
PMV BLD AUTO: 9.6 FL (ref 9.2–12.9)
POTASSIUM SERPL-SCNC: 4.2 MMOL/L (ref 3.5–5.1)
PROT SERPL-MCNC: 7.4 G/DL (ref 6–8.4)
RBC # BLD AUTO: 5.21 M/UL (ref 4–5.4)
SODIUM SERPL-SCNC: 139 MMOL/L (ref 136–145)
TSH SERPL DL<=0.005 MIU/L-ACNC: 1.06 UIU/ML (ref 0.4–4)
WBC # BLD AUTO: 12.56 K/UL (ref 3.9–12.7)

## 2020-09-10 PROCEDURE — 99999 PR PBB SHADOW E&M-EST. PATIENT-LVL V: CPT | Mod: PBBFAC,HCNC,, | Performed by: INTERNAL MEDICINE

## 2020-09-10 PROCEDURE — U0003 INFECTIOUS AGENT DETECTION BY NUCLEIC ACID (DNA OR RNA); SEVERE ACUTE RESPIRATORY SYNDROME CORONAVIRUS 2 (SARS-COV-2) (CORONAVIRUS DISEASE [COVID-19]), AMPLIFIED PROBE TECHNIQUE, MAKING USE OF HIGH THROUGHPUT TECHNOLOGIES AS DESCRIBED BY CMS-2020-01-R: HCPCS | Mod: HCNC

## 2020-09-10 PROCEDURE — 87529 HSV DNA AMP PROBE: CPT | Mod: HCNC

## 2020-09-10 PROCEDURE — 99999 PR PBB SHADOW E&M-EST. PATIENT-LVL V: ICD-10-PCS | Mod: PBBFAC,HCNC,, | Performed by: INTERNAL MEDICINE

## 2020-09-10 PROCEDURE — 1101F PT FALLS ASSESS-DOCD LE1/YR: CPT | Mod: HCNC,CPTII,S$GLB, | Performed by: INTERNAL MEDICINE

## 2020-09-10 PROCEDURE — 1101F PR PT FALLS ASSESS DOC 0-1 FALLS W/OUT INJ PAST YR: ICD-10-PCS | Mod: HCNC,CPTII,S$GLB, | Performed by: OBSTETRICS & GYNECOLOGY

## 2020-09-10 PROCEDURE — 1126F AMNT PAIN NOTED NONE PRSNT: CPT | Mod: HCNC,S$GLB,, | Performed by: OBSTETRICS & GYNECOLOGY

## 2020-09-10 PROCEDURE — 1126F PR PAIN SEVERITY QUANTIFIED, NO PAIN PRESENT: ICD-10-PCS | Mod: HCNC,S$GLB,, | Performed by: INTERNAL MEDICINE

## 2020-09-10 PROCEDURE — 1126F AMNT PAIN NOTED NONE PRSNT: CPT | Mod: HCNC,S$GLB,, | Performed by: INTERNAL MEDICINE

## 2020-09-10 PROCEDURE — 84443 ASSAY THYROID STIM HORMONE: CPT | Mod: HCNC

## 2020-09-10 PROCEDURE — 80053 COMPREHEN METABOLIC PANEL: CPT | Mod: HCNC

## 2020-09-10 PROCEDURE — 3008F BODY MASS INDEX DOCD: CPT | Mod: HCNC,CPTII,S$GLB, | Performed by: INTERNAL MEDICINE

## 2020-09-10 PROCEDURE — 1101F PR PT FALLS ASSESS DOC 0-1 FALLS W/OUT INJ PAST YR: ICD-10-PCS | Mod: HCNC,CPTII,S$GLB, | Performed by: INTERNAL MEDICINE

## 2020-09-10 PROCEDURE — 99499 UNLISTED E&M SERVICE: CPT | Mod: S$GLB,,, | Performed by: INTERNAL MEDICINE

## 2020-09-10 PROCEDURE — 3008F BODY MASS INDEX DOCD: CPT | Mod: HCNC,CPTII,S$GLB, | Performed by: OBSTETRICS & GYNECOLOGY

## 2020-09-10 PROCEDURE — 80061 LIPID PANEL: CPT | Mod: HCNC

## 2020-09-10 PROCEDURE — 1159F MED LIST DOCD IN RCRD: CPT | Mod: HCNC,S$GLB,, | Performed by: OBSTETRICS & GYNECOLOGY

## 2020-09-10 PROCEDURE — 1159F PR MEDICATION LIST DOCUMENTED IN MEDICAL RECORD: ICD-10-PCS | Mod: HCNC,S$GLB,, | Performed by: OBSTETRICS & GYNECOLOGY

## 2020-09-10 PROCEDURE — 83036 HEMOGLOBIN GLYCOSYLATED A1C: CPT | Mod: HCNC

## 2020-09-10 PROCEDURE — 85025 COMPLETE CBC W/AUTO DIFF WBC: CPT | Mod: HCNC

## 2020-09-10 PROCEDURE — 86803 HEPATITIS C AB TEST: CPT | Mod: HCNC

## 2020-09-10 PROCEDURE — 1126F PR PAIN SEVERITY QUANTIFIED, NO PAIN PRESENT: ICD-10-PCS | Mod: HCNC,S$GLB,, | Performed by: OBSTETRICS & GYNECOLOGY

## 2020-09-10 PROCEDURE — 99999 PR PBB SHADOW E&M-EST. PATIENT-LVL IV: ICD-10-PCS | Mod: PBBFAC,HCNC,, | Performed by: OBSTETRICS & GYNECOLOGY

## 2020-09-10 PROCEDURE — 99406 BEHAV CHNG SMOKING 3-10 MIN: CPT | Mod: HCNC,S$GLB,, | Performed by: INTERNAL MEDICINE

## 2020-09-10 PROCEDURE — 99215 PR OFFICE/OUTPT VISIT, EST, LEVL V, 40-54 MIN: ICD-10-PCS | Mod: HCNC,25,S$GLB, | Performed by: INTERNAL MEDICINE

## 2020-09-10 PROCEDURE — 3008F PR BODY MASS INDEX (BMI) DOCUMENTED: ICD-10-PCS | Mod: HCNC,CPTII,S$GLB, | Performed by: OBSTETRICS & GYNECOLOGY

## 2020-09-10 PROCEDURE — 99203 OFFICE O/P NEW LOW 30 MIN: CPT | Mod: HCNC,S$GLB,, | Performed by: OBSTETRICS & GYNECOLOGY

## 2020-09-10 PROCEDURE — 99203 PR OFFICE/OUTPT VISIT, NEW, LEVL III, 30-44 MIN: ICD-10-PCS | Mod: HCNC,S$GLB,, | Performed by: OBSTETRICS & GYNECOLOGY

## 2020-09-10 PROCEDURE — 1101F PT FALLS ASSESS-DOCD LE1/YR: CPT | Mod: HCNC,CPTII,S$GLB, | Performed by: OBSTETRICS & GYNECOLOGY

## 2020-09-10 PROCEDURE — 99999 PR PBB SHADOW E&M-EST. PATIENT-LVL IV: CPT | Mod: PBBFAC,HCNC,, | Performed by: OBSTETRICS & GYNECOLOGY

## 2020-09-10 PROCEDURE — 99499 RISK ADDL DX/OHS AUDIT: ICD-10-PCS | Mod: S$GLB,,, | Performed by: INTERNAL MEDICINE

## 2020-09-10 PROCEDURE — 3008F PR BODY MASS INDEX (BMI) DOCUMENTED: ICD-10-PCS | Mod: HCNC,CPTII,S$GLB, | Performed by: INTERNAL MEDICINE

## 2020-09-10 PROCEDURE — 1159F PR MEDICATION LIST DOCUMENTED IN MEDICAL RECORD: ICD-10-PCS | Mod: HCNC,S$GLB,, | Performed by: INTERNAL MEDICINE

## 2020-09-10 PROCEDURE — 99406 PR TOBACCO USE CESSATION INTERMEDIATE 3-10 MINUTES: ICD-10-PCS | Mod: HCNC,S$GLB,, | Performed by: INTERNAL MEDICINE

## 2020-09-10 PROCEDURE — 99215 OFFICE O/P EST HI 40 MIN: CPT | Mod: HCNC,25,S$GLB, | Performed by: INTERNAL MEDICINE

## 2020-09-10 PROCEDURE — 1159F MED LIST DOCD IN RCRD: CPT | Mod: HCNC,S$GLB,, | Performed by: INTERNAL MEDICINE

## 2020-09-10 PROCEDURE — 36415 COLL VENOUS BLD VENIPUNCTURE: CPT | Mod: HCNC

## 2020-09-10 RX ORDER — BENZONATATE 100 MG/1
100 CAPSULE ORAL 3 TIMES DAILY PRN
Qty: 30 CAPSULE | Refills: 1 | Status: SHIPPED | OUTPATIENT
Start: 2020-09-10 | End: 2020-09-20

## 2020-09-10 RX ORDER — BETAMETHASONE VALERATE 1.2 MG/G
OINTMENT TOPICAL DAILY
Qty: 15 G | Refills: 1 | Status: SHIPPED | OUTPATIENT
Start: 2020-09-10 | End: 2021-01-12 | Stop reason: SDUPTHER

## 2020-09-10 RX ORDER — CODEINE PHOSPHATE AND GUAIFENESIN 10; 100 MG/5ML; MG/5ML
5-10 SOLUTION ORAL NIGHTLY PRN
Qty: 118 ML | Refills: 0 | Status: SHIPPED | OUTPATIENT
Start: 2020-09-10 | End: 2020-09-20

## 2020-09-10 RX ORDER — ASPIRIN 81 MG/1
81 TABLET ORAL DAILY
COMMUNITY

## 2020-09-10 RX ORDER — AZITHROMYCIN 500 MG/1
1000 TABLET, FILM COATED ORAL ONCE
Qty: 2 TABLET | Refills: 0 | Status: SHIPPED | OUTPATIENT
Start: 2020-09-10 | End: 2020-09-10

## 2020-09-10 NOTE — PROGRESS NOTES
Subjective:       Patient ID: Marizol Kevin is a 74 y.o. female.    Chief Complaint:  Vaginal Itching (for a long time)      History of Present Illness  HPI  Pt is 74 y.o. with hx of hysterectomy for AUB years ago.  Has been having vaginal itching for some time, but got very bad recently.  No d/c.  No trauma.  No new soaps.  Has hx of fever blisters.  Tried some otc medications without relief.    GYN & OB History  No LMP recorded. Patient has had a hysterectomy.   Date of Last Pap: No result found    OB History    Para Term  AB Living   2 2 2     2   SAB TAB Ectopic Multiple Live Births           2      # Outcome Date GA Lbr Harry/2nd Weight Sex Delivery Anes PTL Lv   2 Term     F Vag-Spont      1 Term     M Vag-Spont          Review of Systems  Review of Systems   Constitutional: Negative for activity change, appetite change and fatigue.   HENT: Negative.  Negative for tinnitus.    Eyes: Negative.    Respiratory: Negative for cough and shortness of breath.    Cardiovascular: Negative for chest pain and palpitations.   Gastrointestinal: Negative.  Negative for abdominal pain, blood in stool, constipation, diarrhea and nausea.   Endocrine: Negative.  Negative for hot flashes.   Genitourinary: Positive for genital sores and vaginal pain. Negative for dysmenorrhea, dyspareunia, dysuria, frequency, menstrual problem, pelvic pain, vaginal discharge, urinary incontinence and postcoital bleeding.   Musculoskeletal: Negative for back pain and joint swelling.   Integumentary:  Negative for rash, breast mass, nipple discharge and breast skin changes.   Neurological: Negative.  Negative for headaches.   Hematological: Negative.  Does not bruise/bleed easily.   Psychiatric/Behavioral: The patient is not nervous/anxious.    Breast: negative.  Negative for mass, nipple discharge and skin changes          Objective:    Physical Exam:   Constitutional: She is oriented to person, place, and time. She appears  well-developed and well-nourished. No distress.    HENT:   Head: Normocephalic and atraumatic.    Eyes: Pupils are equal, round, and reactive to light. Conjunctivae and lids are normal.    Neck: Normal range of motion and full passive range of motion without pain. Neck supple.    Cardiovascular: Normal rate and regular rhythm.  Exam reveals no edema.     Pulmonary/Chest: Effort normal and breath sounds normal. She has no wheezes.        Abdominal: Soft. Normal appearance and bowel sounds are normal. She exhibits no distension. There is no abdominal tenderness. There is no rebound and no guarding.     Genitourinary:    Vagina normal.            Pelvic exam was performed with patient supine.   There is no tenderness or lesion on the right labia. There is no tenderness or lesion on the left labia. Uterus is absent. Right adnexum displays no mass and no tenderness. Left adnexum displays no mass and no tenderness. No rectocele, cystocele or unspecified prolapse of vaginal walls in the vagina. Labial bartholins normal.Cervix exhibits absence.    Genitourinary Comments: 2 opposing lesions.  No bleeding/ulceration.  susp for chancroid   negative for vaginal discharge          Musculoskeletal: Normal range of motion and moves all extremeties.       Neurological: She is alert and oriented to person, place, and time. She has normal strength.    Skin: Skin is warm and dry.    Psychiatric: She has a normal mood and affect. Her speech is normal and behavior is normal. Thought content normal. Her mood appears not anxious. She does not exhibit a depressed mood. She expresses no suicidal plans and no homicidal plans.          Assessment:        1. Genital ulcer, female    2. Light cigarette smoker (1-9 cigs/day)                Plan:      Marizol was seen today for vaginal itching.    Diagnoses and all orders for this visit:    Genital ulcer, female  -     HSV by Rapid PCR, Non-Blood [facility]; Vagina; Future  -     azithromycin  (ZITHROMAX) 500 MG tablet; Take 2 tablets (1,000 mg total) by mouth once. for 1 dose  -     betamethasone valerate 0.1% (VALISONE) 0.1 % Oint; Apply topically once daily.  -     HSV by Rapid PCR, Non-Blood [facility]; Vagina  Long discussion today discussing the different etiologies of these vulvar ulcerations.  It does look suspicious for showing choroid versus bechets.  Will try treatment and re-assess in 2-3 weeks.  If not improved, would rec bx +/- intralesion steroids.    Light cigarette smoker (1-9 cigs/day)  Comments:  Counseled for 5 min on tobacco cessation. Tobacco cessation clinic.  Orders:  -     Ambulatory referral/consult to Smoking Cessation Program

## 2020-09-10 NOTE — PROGRESS NOTES
Subjective:       Patient ID: Marizol Kevin is a 74 y.o. female who  has a past medical history of Allergy, Anxiety, Cancer, Cataract, Diabetes mellitus, type 2, Hyperlipidemia, Hypertension, and Insomnia.    Chief Complaint: Establish Care, Diabetes (type 2), and Cough (off and on for 2 weeks)     History was obtained from the patient and supplemented through chart review  She was previously seen by Dr. Ward last week for DM 2. Was previously seeing Dr. Paul at [facility].  -We are requesting records from [facility] for cscope    HPI     Cough:  Intermittent cough x 2 weeks mainly at night.  Cough with difficulty expectorating sputum. Has coughing spells, difficulty sleeping at night.  Sometimes with sputum.  Has postnasal drip after using Flonase.  No LAD, fever, myalgias.  No CP, SOB. Slight chest tightness.  No change in smell, taste, diarrhea.  No sick contacts, recent travel.    Has been on PPI since colectomy in 2014. Has recurrent sx off PPI.    Asthma:    On singular, Flonase.  Uses albuterol p.r.n. 2-3 times a week.  The patient reports a history of asthma since she was a teenager.  She has 0 nocturnal awakenings.      Tobacco use:    She smokes 5-10 cigs/day.  Started smoking since age 19. Quit x 1 year before.  Patches caused hallucinations.     HTN:    Pt's BP is controlled on Norvasc 5. Tolerating meds well. Pt denies CP, SOB, lightheadedness, dizziness, leg edema.    Exercise: walks around the block in the backyard.    DM2:  Unknown prior A1c  Currently pt is taking metformin 500 b.i.d. Denies GI side effects, such as abd cramping, loose stool, nausea.    Ordered microalbumin creatinine ratio.  Not on an ACE/ARB.   Retinal exams: 7/2020 w/o retinopathy  Foot exams:    No results found for: HGBA1C    HLD:  Is currently taking Lipitor 40 and ASA 81 daily.  No results found for: LDLCALC  The ASCVD Risk score (Akash FELIPE Jr., et al., 2013) failed to calculate for the following reasons:    Cannot find a  previous HDL lab    Cannot find a previous total cholesterol lab    Unable to determine if patient is Non-     Thyroid Nodule:    Incidental finding on CT for cancer surveillence with small R thyroid nodule   No results found for: TSH, H1VMOXY, W7ZAJVQ, THYROIDAB, FREET4    ?Osteoporosis, Vitamin D deficiency:   Unclear history.  Is on raloxifene.  +Tobacco.    Exercise: as above  No results found for: [id number]    Polyneuropathy:    Fingertips, toes. H/o chemo.  On Cymbalta with some relief.  Gabapentin 300 did not help in the past.  No results found for: LABA1C, HGBA1C  No results found for: [id number]               Not addressed today.  Sweats every other day.    Anxiety, Insomnia:    Takes about 3 tablets a month.  On Xanax.  reviewed.  She feels this every few months.  Has been prescribed by Dr. Paul, PCP, and Dr. Hubbard, Heme Onc in the past.      Colon cancer:  Dx in 2014. S/p R hemicolectomy.  S/p adjuvant chemotherapy.  C scope in 2019 with [facility]; was told to repeat in 5 years in 2024.  Follows with [facility].     GERD:  On Protonix daily ever since her hemicolectomy.  Has recurrent symptoms off of Protonix.    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  Follows with [facility], Dr. Cabrera.    Review of Systems   Constitutional: Negative for fever and unexpected weight change.   HENT: Positive for postnasal drip. Negative for ear discharge, ear pain and rhinorrhea.    Eyes: Negative for redness and itching.   Respiratory: Positive for cough. Negative for shortness of breath and wheezing.    Cardiovascular: Negative for chest pain and leg swelling.   Gastrointestinal: Negative for abdominal pain and vomiting.   Genitourinary: Negative for dysuria and hematuria.   Musculoskeletal: Negative for gait problem and joint swelling.   Skin: Negative for color change and rash.   Neurological: Positive for numbness. Negative for dizziness and  light-headedness.   Hematological: Negative for adenopathy.   Psychiatric/Behavioral: Negative for confusion. The patient is not nervous/anxious.          Past Medical History:   Diagnosis Date    Allergy     Anxiety     Cancer     colon    Cataract     Diabetes mellitus, type 2     Hyperlipidemia     Hypertension     Insomnia      Past Surgical History:   Procedure Laterality Date    CATARACT EXTRACTION, BILATERAL  03/2020    CHOLECYSTECTOMY      HYSTERECTOMY      KNEE SURGERY      LAPAROSCOPIC LEFT COLON RESECTION       Family History   Problem Relation Age of Onset    Heart attack Mother     Colon cancer Father     Cancer Sister         unknown     Social History     Socioeconomic History    Marital status:      Spouse name: Not on file    Number of children: Not on file    Years of education: Not on file    Highest education level: Not on file   Occupational History    Not on file   Social Needs    Financial resource strain: Not on file    Food insecurity     Worry: Not on file     Inability: Not on file    Transportation needs     Medical: Not on file     Non-medical: Not on file   Tobacco Use    Smoking status: Current Every Day Smoker     Packs/day: 0.50     Types: Cigarettes    Smokeless tobacco: Never Used   Substance and Sexual Activity    Alcohol use: Yes     Comment: occ    Drug use: Never    Sexual activity: Not Currently   Lifestyle    Physical activity     Days per week: Not on file     Minutes per session: Not on file    Stress: Not on file   Relationships    Social connections     Talks on phone: Not on file     Gets together: Not on file     Attends Holiness service: Not on file     Active member of club or organization: Not on file     Attends meetings of clubs or organizations: Not on file     Relationship status: Not on file   Other Topics Concern    Not on file   Social History Narrative    Not on file     Objective:      Vitals:    09/10/20 1238   BP:  "121/66   Pulse: 101   Temp: 98.7 °F (37.1 °C)   SpO2: 96%   Weight: 73.6 kg (162 lb 4.1 oz)   Height: 4' 9" (1.448 m)      Physical Exam  Constitutional:       General: She is not in acute distress.     Appearance: She is well-developed. She is not diaphoretic.   HENT:      Head: Normocephalic and atraumatic.      Right Ear: Tympanic membrane, ear canal and external ear normal.      Left Ear: Tympanic membrane, ear canal and external ear normal.      Nose: Mucosal edema and rhinorrhea present.      Right Sinus: No maxillary sinus tenderness or frontal sinus tenderness.      Left Sinus: No maxillary sinus tenderness or frontal sinus tenderness.      Mouth/Throat:      Pharynx: Uvula midline. Posterior oropharyngeal erythema present. No oropharyngeal exudate.   Eyes:      General: No scleral icterus.        Right eye: No discharge.         Left eye: No discharge.      Conjunctiva/sclera:      Right eye: Right conjunctiva is not injected.      Left eye: Left conjunctiva is not injected.   Neck:      Musculoskeletal: Neck supple.      Thyroid: No thyromegaly.      Trachea: No tracheal deviation.   Cardiovascular:      Rate and Rhythm: Normal rate and regular rhythm.      Heart sounds: Normal heart sounds. No murmur.   Pulmonary:      Effort: Pulmonary effort is normal. No respiratory distress.      Breath sounds: Normal breath sounds. No stridor. No wheezing.   Abdominal:      General: Bowel sounds are normal. There is no distension.      Palpations: Abdomen is soft.      Tenderness: There is no abdominal tenderness.   Musculoskeletal:         General: No deformity.      Right lower leg: No edema.      Left lower leg: No edema.   Lymphadenopathy:      Cervical: No cervical adenopathy.   Skin:     General: Skin is warm and dry.      Findings: No erythema.   Neurological:      Mental Status: She is alert.      Gait: Gait normal.   Psychiatric:         Behavior: Behavior normal.           Lab Results   Component Value Date "    WBC 11.71 07/20/2020    HGB 13.1 07/20/2020    HCT 42.7 07/20/2020     07/20/2020    ALT 19 07/20/2020    AST 19 07/20/2020     07/20/2020    K 4.4 07/20/2020     07/20/2020    CREATININE 1.0 07/20/2020    BUN 11 07/20/2020    CO2 22 (L) 07/20/2020       The ASCVD Risk score (Akash DESTINEE Jr., et al., 2013) failed to calculate for the following reasons:    Cannot find a previous HDL lab    Cannot find a previous total cholesterol lab    Unable to determine if patient is Non-     (Imaging have been independently reviewed)  CT chest w/o evidence of mets.    Assessment:       1. Acute nasopharyngitis    2. Mild intermittent asthma without complication    3. Light cigarette smoker (1-9 cigs/day)    4. Essential hypertension    5. Type 2 diabetes mellitus without complication, without long-term current use of insulin    6. Mixed hyperlipidemia    7. Thyroid nodule    8. Osteoporosis, unspecified osteoporosis type, unspecified pathological fracture presence    9. Neuropathy due to chemotherapeutic drug          Plan:       Marizol was seen today for establish care, diabetes and cough.    Diagnoses and all orders for this visit:    Acute nasopharyngitis  Comments:  Likely viral, but check COVID to be complete. Discussed correct use of Flonase daily. Rx Tessalon during the day, codeine-guaifenesin qHS PRN; discussed SE  Orders:  -     guaifenesin-codeine 100-10 mg/5 ml (TUSSI-ORGANIDIN NR)  mg/5 mL syrup; Take 5-10 mLs by mouth nightly as needed for Cough. Max 60mL/24 hours (Patient not taking: Reported on 9/10/2020)  -     benzonatate (TESSALON) 100 MG capsule; Take 1 capsule (100 mg total) by mouth 3 (three) times daily as needed. (Patient not taking: Reported on 9/10/2020)  -     COVID-19 Routine Screening    Mild intermittent asthma without complication  Comments:  Doing well. Cont Singulair daily, albuterol p.r.n..  Discussed correct use of Flonase.    Light cigarette  smoker (1-9 cigs/day)  Comments:  Counseled for 5 min on tobacco cessation. Tobacco cessation clinic.  Orders:  -     Ambulatory referral/consult to Smoking Cessation Program; Future    Essential hypertension  Comments:  Controlled.  Continue Norvasc 5.  Encouraged exercise.    Type 2 diabetes mellitus without complication, without long-term current use of insulin  Comments:  A1c pending. Schedule foot exams. Cont metformin 500 b.i.d..  Check B12.  Encouraged exercise.  Orders:  -     Vitamin B12; Future  -     Ambulatory referral/consult to Podiatry; Future    Mixed hyperlipidemia  Comments:  FLP pending.  Continue Lipitor 40.    Thyroid nodule  Comments:  Incidental finding.  Check TSH, thyroid ultrasound.  Orders:  -     US Soft Tissue Head Neck Thyroid; Future    Osteoporosis, unspecified osteoporosis type, unspecified pathological fracture presence  Comments:  Unclear history.  Check DEXA, vitamin-D level.  Orders:  -     Vitamin D; Future  -     DXA Bone Density Spine And Hip; Future    Neuropathy due to chemotherapeutic drug  Comments:  2/2 chemo.  Continue Cymbalta.  Check A1C, B12. Could consider Lyrica versus titrating gabapentin  Orders:  -     Vitamin B12; Future         Side effects of medication(s) were discussed in detail and patient voiced understanding.  Patient will call back for any issues or complications.     RTC in 1 month(s) or sooner PRN to discuss results, neuropathy. In person.

## 2020-09-11 ENCOUNTER — TELEPHONE (OUTPATIENT)
Dept: INTERNAL MEDICINE | Facility: CLINIC | Age: 75
End: 2020-09-11

## 2020-09-11 DIAGNOSIS — E11.9 TYPE 2 DIABETES MELLITUS WITHOUT COMPLICATION: ICD-10-CM

## 2020-09-11 LAB
CHOLEST SERPL-MCNC: 130 MG/DL (ref 120–199)
CHOLEST/HDLC SERPL: 3.4 {RATIO} (ref 2–5)
ESTIMATED AVG GLUCOSE: 157 MG/DL (ref 68–131)
HBA1C MFR BLD HPLC: 7.1 % (ref 4–5.6)
HCV AB SERPL QL IA: NEGATIVE
HDLC SERPL-MCNC: 38 MG/DL (ref 40–75)
HDLC SERPL: 29.2 % (ref 20–50)
LDLC SERPL CALC-MCNC: 67.8 MG/DL (ref 63–159)
NONHDLC SERPL-MCNC: 92 MG/DL
TRIGL SERPL-MCNC: 121 MG/DL (ref 30–150)

## 2020-09-13 LAB
HSV1 DNA SPEC QL NAA+PROBE: NEGATIVE
HSV2 DNA SPEC QL NAA+PROBE: NEGATIVE
SPECIMEN SOURCE: NORMAL

## 2020-09-15 ENCOUNTER — TELEPHONE (OUTPATIENT)
Dept: INTERNAL MEDICINE | Facility: CLINIC | Age: 75
End: 2020-09-15

## 2020-09-15 DIAGNOSIS — R05.9 COUGH: ICD-10-CM

## 2020-09-15 NOTE — TELEPHONE ENCOUNTER
Spoke to Pt and told her lab lost her covid test  Pt was very understanding and does not have transportation  Will do covid test for 2nd time at [facility] lab during nurse visit  Pt was told I will do it after she completes her ultrasound

## 2020-09-15 NOTE — TELEPHONE ENCOUNTER
Re: Missing COVID routine specimen test  Called [facility] lab back and they never received the covid specimen  Told her it was collected by me during her visit and I followed the protocol with double bagging it with collect specimen order and placing in a paperbag to be handed off to the   [facility] was told I will call [facility] but by now the specimen is invalid if they have it since it was done on 09/10/2020    [facility] is cancelling the order and states the Pt will have to repeat the test  Called [facility] and they looked it up for tracking and said it was checked in on 09/10 at 1802     Called back [facility] molecular and they confirmed a 2nd time that they did not receive the tube    COVID testing specimen is not found at both locations- [facility] and [facility]     msg forward to PCP

## 2020-09-15 NOTE — TELEPHONE ENCOUNTER
----- Message from Raymond Cook sent at 9/15/2020 10:40 AM CDT -----  Regarding: Covid test  Contact: Mirella (Mariesajit )  Name of Who is Calling: Mirella ([facility] )      What is the request in detail: Would like to speak with staff in regards to never receiving patient's Covid specimen. Please advise      Can the clinic reply by [facility]: no      [facility] to [facility] Back [facility] [phone]

## 2020-09-16 DIAGNOSIS — E11.9 TYPE 2 DIABETES MELLITUS WITHOUT COMPLICATION, WITHOUT LONG-TERM CURRENT USE OF INSULIN: ICD-10-CM

## 2020-09-16 RX ORDER — METFORMIN HYDROCHLORIDE 1000 MG/1
1000 TABLET ORAL 2 TIMES DAILY
Qty: 180 TABLET | Refills: 3 | Status: SHIPPED | OUTPATIENT
Start: 2020-09-16 | End: 2020-11-10 | Stop reason: SDUPTHER

## 2020-09-17 ENCOUNTER — CLINICAL SUPPORT (OUTPATIENT)
Dept: INTERNAL MEDICINE | Facility: CLINIC | Age: 75
End: 2020-09-17
Payer: MEDICARE

## 2020-09-17 ENCOUNTER — HOSPITAL ENCOUNTER (OUTPATIENT)
Dept: RADIOLOGY | Facility: OTHER | Age: 75
Discharge: HOME | End: 2020-09-17
Attending: INTERNAL MEDICINE
Payer: MEDICARE

## 2020-09-17 DIAGNOSIS — E04.1 THYROID NODULE: Primary | ICD-10-CM

## 2020-09-17 DIAGNOSIS — M81.0 OSTEOPOROSIS, UNSPECIFIED OSTEOPOROSIS TYPE, UNSPECIFIED PATHOLOGICAL FRACTURE PRESENCE: ICD-10-CM

## 2020-09-17 DIAGNOSIS — E04.1 THYROID NODULE: ICD-10-CM

## 2020-09-17 DIAGNOSIS — R05.9 COUGH: ICD-10-CM

## 2020-09-17 PROCEDURE — 77080 DXA BONE DENSITY AXIAL: CPT | Mod: TC,HCNC

## 2020-09-17 PROCEDURE — U0003 INFECTIOUS AGENT DETECTION BY NUCLEIC ACID (DNA OR RNA); SEVERE ACUTE RESPIRATORY SYNDROME CORONAVIRUS 2 (SARS-COV-2) (CORONAVIRUS DISEASE [COVID-19]), AMPLIFIED PROBE TECHNIQUE, MAKING USE OF HIGH THROUGHPUT TECHNOLOGIES AS DESCRIBED BY CMS-2020-01-R: HCPCS | Mod: HCNC

## 2020-09-17 PROCEDURE — 76536 US EXAM OF HEAD AND NECK: CPT | Mod: 26,HCNC,, | Performed by: RADIOLOGY

## 2020-09-17 PROCEDURE — 76536 US SOFT TISSUE HEAD NECK THYROID: ICD-10-PCS | Mod: 26,HCNC,, | Performed by: RADIOLOGY

## 2020-09-17 PROCEDURE — 76536 US EXAM OF HEAD AND NECK: CPT | Mod: TC,HCNC

## 2020-09-17 PROCEDURE — 77080 DXA BONE DENSITY AXIAL: CPT | Mod: 26,HCNC,, | Performed by: RADIOLOGY

## 2020-09-17 PROCEDURE — 77080 DEXA BONE DENSITY SPINE HIP: ICD-10-PCS | Mod: 26,HCNC,, | Performed by: RADIOLOGY

## 2020-09-17 NOTE — PROGRESS NOTES
Provider ordered COVID swab to be collected in clinic. PPE was donned and collection supplies were gathered. The procedure was explained to the patient and the patient was given the opportunity to ask any questions they had before the procedure was done. Swab was advanced to the nasopharyngeal area and a specimen was collected, placed in universal transport media and labeled in front of the patient. The patient tolerated the procedure well with no complications, and they were discharged in stable condition.

## 2020-09-18 DIAGNOSIS — E04.1 THYROID NODULE: Primary | ICD-10-CM

## 2020-09-18 LAB — SARS-COV-2 RNA RESP QL NAA+PROBE: NOT DETECTED

## 2020-09-22 ENCOUNTER — TELEPHONE (OUTPATIENT)
Dept: INTERNAL MEDICINE | Facility: CLINIC | Age: 75
End: 2020-09-22

## 2020-09-23 ENCOUNTER — PATIENT MESSAGE (OUTPATIENT)
Dept: INTERNAL MEDICINE | Facility: CLINIC | Age: 75
End: 2020-09-23

## 2020-09-23 ENCOUNTER — PATIENT OUTREACH (OUTPATIENT)
Dept: INTERNAL MEDICINE | Facility: CLINIC | Age: 75
End: 2020-09-23

## 2020-09-29 ENCOUNTER — PATIENT MESSAGE (OUTPATIENT)
Dept: OTHER | Facility: OTHER | Age: 75
End: 2020-09-29

## 2020-10-08 ENCOUNTER — PATIENT OUTREACH (OUTPATIENT)
Dept: ADMINISTRATIVE | Facility: OTHER | Age: 75
End: 2020-10-08

## 2020-10-09 NOTE — PROGRESS NOTES
Health Maintenance Due   Topic Date Due    Foot Exam  12/07/1957    TETANUS VACCINE  12/07/1965    Shingles Vaccine (1 of 2) 12/07/1997    Pneumococcal Vaccine (65+ High/Highest Risk) (1 of 2 - PCV13) 12/07/2012     Updates were requested from care everywhere.  Chart was reviewed for overdue Proactive [facility] Encounters (RUSSELL) topics (CRS, Breast Cancer Screening, Eye exam)  Health Maintenance has been updated.  LINKS immunization registry triggered.  Immunizations were reconciled.

## 2020-10-12 ENCOUNTER — OFFICE VISIT (OUTPATIENT)
Dept: ENDOCRINOLOGY | Facility: CLINIC | Age: 75
End: 2020-10-12
Attending: INTERNAL MEDICINE
Payer: MEDICARE

## 2020-10-12 ENCOUNTER — APPOINTMENT (OUTPATIENT)
Dept: RADIOLOGY | Facility: CLINIC | Age: 75
End: 2020-10-12
Attending: INTERNAL MEDICINE
Payer: MEDICARE

## 2020-10-12 VITALS
HEIGHT: 57 IN | WEIGHT: 164.88 LBS | DIASTOLIC BLOOD PRESSURE: 85 MMHG | BODY MASS INDEX: 35.57 KG/M2 | SYSTOLIC BLOOD PRESSURE: 139 MMHG | HEART RATE: 96 BPM

## 2020-10-12 DIAGNOSIS — E04.1 THYROID NODULE: ICD-10-CM

## 2020-10-12 PROCEDURE — 1126F AMNT PAIN NOTED NONE PRSNT: CPT | Mod: S$GLB,,, | Performed by: INTERNAL MEDICINE

## 2020-10-12 PROCEDURE — 10005 FNA BX W/US GDN 1ST LES: CPT | Mod: S$GLB,,, | Performed by: INTERNAL MEDICINE

## 2020-10-12 PROCEDURE — 1126F PR PAIN SEVERITY QUANTIFIED, NO PAIN PRESENT: ICD-10-PCS | Mod: S$GLB,,, | Performed by: INTERNAL MEDICINE

## 2020-10-12 PROCEDURE — 88173 PR  INTERPRETATION OF FNA SMEAR: ICD-10-PCS | Mod: 26,,, | Performed by: PATHOLOGY

## 2020-10-12 PROCEDURE — 1101F PT FALLS ASSESS-DOCD LE1/YR: CPT | Mod: CPTII,S$GLB,, | Performed by: INTERNAL MEDICINE

## 2020-10-12 PROCEDURE — 1101F PR PT FALLS ASSESS DOC 0-1 FALLS W/OUT INJ PAST YR: ICD-10-PCS | Mod: CPTII,S$GLB,, | Performed by: INTERNAL MEDICINE

## 2020-10-12 PROCEDURE — 99204 PR OFFICE/OUTPT VISIT, NEW, LEVL IV, 45-59 MIN: ICD-10-PCS | Mod: S$GLB,,, | Performed by: INTERNAL MEDICINE

## 2020-10-12 PROCEDURE — 99204 OFFICE O/P NEW MOD 45 MIN: CPT | Mod: S$GLB,,, | Performed by: INTERNAL MEDICINE

## 2020-10-12 PROCEDURE — 88173 CYTOPATH EVAL FNA REPORT: CPT | Mod: 26,,, | Performed by: PATHOLOGY

## 2020-10-12 PROCEDURE — 1159F MED LIST DOCD IN RCRD: CPT | Mod: S$GLB,,, | Performed by: INTERNAL MEDICINE

## 2020-10-12 PROCEDURE — 3008F PR BODY MASS INDEX (BMI) DOCUMENTED: ICD-10-PCS | Mod: CPTII,S$GLB,, | Performed by: INTERNAL MEDICINE

## 2020-10-12 PROCEDURE — 3008F BODY MASS INDEX DOCD: CPT | Mod: CPTII,S$GLB,, | Performed by: INTERNAL MEDICINE

## 2020-10-12 PROCEDURE — 10005 US FINE NEEDLE ASPIRATION THYROID, FIRST LESION: ICD-10-PCS | Mod: S$GLB,,, | Performed by: INTERNAL MEDICINE

## 2020-10-12 PROCEDURE — 1159F PR MEDICATION LIST DOCUMENTED IN MEDICAL RECORD: ICD-10-PCS | Mod: S$GLB,,, | Performed by: INTERNAL MEDICINE

## 2020-10-12 PROCEDURE — 88173 CYTOPATH EVAL FNA REPORT: CPT | Mod: HCNC | Performed by: PATHOLOGY

## 2020-10-12 NOTE — ASSESSMENT & PLAN NOTE
--Patient found to have multiple thyroid nodules in 2020  --No risk factors for thyroid cancer  --She does have some dysphagia to solids which has been present since at least 2012, and less likely related to the thyroid  --TSH WNL  --Independently reviewed ultrasound images with the patient  --Has right superior thyroid nodule that meets FNA criteria  --Discussed FNA procedure in detail with the patient and possible cytology outcomes including those that may necessitate repeat FNA (I.e. FLUS, non-diagnostic)  --Discussed indications for surgery  --Will proceed with FNA of right superior thyroid nodule

## 2020-10-12 NOTE — LETTER
October 12, 2020      Belinda Fajardo MD  [address]  [address]  [facility] [address]           [facility] [address]  [address], [address]  [facility] [address]  Phone: [phone]  Fax: [phone]          Patient: Marizol Kevin   MR Number: [id number]   YOB: 1945   Date of Visit: 10/12/2020       Dear Dr. Belinda Fajardo:    Thank you for referring Marizol Kevin to me for evaluation. Attached you will find relevant portions of my assessment and plan of care.    If you have questions, please do not hesitate to call me. I look forward to following Marizol Kevin along with you.    Sincerely,    Brian Gaines MD    Enclosure  CC:  No Recipients    If you would like to receive this communication electronically, please contact [email or web address] or [phone] to request more information on [facility] [employer] access.    For providers and/or their staff who would like to refer a patient to [facility], please contact us through our one-stop-shop provider referral line, [facility], at [phone].    If you feel you have received this communication in error or would no longer like to receive these types of communications, please e-mail [email or web address]

## 2020-10-12 NOTE — PROGRESS NOTES
Subjective:      Patient ID: Esteban Jimenez is a 74 y.o. female.    Chief Complaint:  Thyroid Nodule    Referral from Dr. Fajardo    History of Present Illness  Ms. Jimenez presents for evaluation and management of thyroid nodules.       Has active history of DMII, osteopenia, HTN, HLP, GERD, asthma and anxiety. Has hx of colon cancer.     First noted to have thyroid nodules on CT scan in 7/2020. Nodules were then confirmed on thyroid ultrasound in 9/2020.     Denies family history of thyroid cancer. Sister with thyroidectomy for nodules but benign.    No personal history of head or neck irradiation.     Results for ESTEBAN JIMENEZ (MRN [id number]) as of 10/12/2020 07:45   Ref. Range 9/10/2020 12:15   TSH Latest Ref Range: 0.400 - 4.000 uIU/mL 1.065     Some intermittent dysphagia since 2012. With solids only.  No hoarseness or voice changes.     Previous thyroid FNA results:  None    Most recent thyroid USS dated 9/17/2020:  Right lobe measures 3.7 x 1.5 x 1.3 cm.  Solid hypoechoic nodule in the upper pole measures 1.3 cm.  Nodule is wider than tall with smooth margins and no internal echogenicities.  Two cystic nodules measuring up to 0.8 cm.     Left lobe measures 3.7 x 1.5 x 1.4 cm.  No solid or cystic nodules identified.     The isthmus measures 0.2 cm.  Hypoechoic nodule with circumscribed margins measures 0.5 cm.  Nodule may be cystic.      For type 2 diabetes she is on single agent metformin at 1000 mg PO BID.   Results for ESTEBAN JIMENEZ (MRN [id number]) as of 10/12/2020 07:45   Ref. Range 9/10/2020 12:15   Hemoglobin A1C External Latest Ref Range: 4.0 - 5.6 % 7.1 (H)       Has osteopenia on recent BMD dated 9/17/2020.  L spine -2.2  R total -1.0  FRAX major 7.2%, hip 2.1%  No history of fragility fracture  On calcium, vitamin D and raloxifene    Review of Systems   Constitutional: Negative for unexpected weight change.   HENT: Negative for voice change.    Eyes: Negative for visual disturbance.   Respiratory: Negative  for shortness of breath.    Cardiovascular: Negative for chest pain.   Gastrointestinal: Negative for abdominal pain.   Endocrine: Negative for cold intolerance.   Genitourinary: Negative for frequency.   Musculoskeletal: Negative for myalgias.   Skin: Negative for rash.       Objective:   Physical Exam  Vitals signs reviewed.   Constitutional:       Appearance: She is well-developed.   HENT:      Head: Normocephalic and atraumatic.      Right Ear: External ear normal.      Left Ear: External ear normal.      Nose: Nose normal.   Neck:      Thyroid: No thyromegaly.      Trachea: No tracheal deviation.   Cardiovascular:      Rate and Rhythm: Normal rate and regular rhythm.      Heart sounds: Normal heart sounds.      Comments: No edema  Pulmonary:      Effort: Pulmonary effort is normal.      Breath sounds: Normal breath sounds.   Abdominal:      General: Bowel sounds are normal.      Palpations: Abdomen is soft.      Tenderness: There is no abdominal tenderness.   Musculoskeletal:      Comments: Normal gait, no cyanosis or clubbing   Skin:     General: Skin is warm and dry.      Findings: No rash.      Comments: No nodules, no ulcers   Neurological:      Mental Status: She is alert and oriented to person, place, and time.      Deep Tendon Reflexes: Reflexes are normal and symmetric.      Comments: Vibration sense intact   Psychiatric:         Judgment: Judgment normal.       BP Readings from Last 3 Encounters:   10/12/20 139/85   09/10/20 104/62   09/10/20 121/66     Wt Readings from Last 1 Encounters:   10/12/20 0952 74.8 kg (164 lb 14.5 oz)       Body mass index is 35.68 kg/m².    Lab Review:   Lab Results   Component Value Date    HGBA1C 7.1 (H) 09/10/2020     Lab Results   Component Value Date    CHOL 130 09/10/2020    HDL 38 (L) 09/10/2020    LDLCALC 67.8 09/10/2020    TRIG 121 09/10/2020    CHOLHDL 29.2 09/10/2020     Lab Results   Component Value Date     09/10/2020    K 4.2 09/10/2020      09/10/2020    CO2 21 (L) 09/10/2020     (H) 09/10/2020    BUN 15 09/10/2020    CREATININE 0.9 09/10/2020    CALCIUM 9.5 09/10/2020    PROT 7.4 09/10/2020    ALBUMIN 3.5 09/10/2020    BILITOT 0.3 09/10/2020    ALKPHOS 130 09/10/2020    AST 20 09/10/2020    ALT 15 09/10/2020    ANIONGAP 13 09/10/2020    ESTGFRAFRICA >60 09/10/2020    EGFRNONAA >60 09/10/2020    TSH 1.065 09/10/2020         Assessment and Plan     Thyroid nodule  --Patient found to have multiple thyroid nodules in 2020  --No risk factors for thyroid cancer  --She does have some dysphagia to solids which has been present since at least 2012, and less likely related to the thyroid  --TSH WNL  --Independently reviewed ultrasound images with the patient  --Has right superior thyroid nodule that meets FNA criteria  --Discussed FNA procedure in detail with the patient and possible cytology outcomes including those that may necessitate repeat FNA (I.e. FLUS, non-diagnostic)  --Discussed indications for surgery  --Will proceed with FNA of right superior thyroid nodule      Brian Gaines M.D. Staff Endocrinology

## 2020-10-14 LAB — FINAL PATHOLOGIC DIAGNOSIS: NORMAL

## 2020-10-15 ENCOUNTER — TELEPHONE (OUTPATIENT)
Dept: ENDOCRINOLOGY | Facility: CLINIC | Age: 75
End: 2020-10-15

## 2020-10-15 DIAGNOSIS — E04.1 THYROID NODULE: Primary | ICD-10-CM

## 2020-10-15 NOTE — TELEPHONE ENCOUNTER
Please advise patient that I reviewed her thyroid biopsy result. The result returned benign. I recommend she follow up with me in one year with an ultrasound prior to her appt.

## 2020-10-15 NOTE — TELEPHONE ENCOUNTER
I have spoke to patient and let them know that Dr Gaines has reviewed her thyroid biopsy result. The result returned benign. Dr Gaines recommend she follow up with him in one year with an ultrasound prior to her appt.

## 2020-10-19 ENCOUNTER — TELEPHONE (OUTPATIENT)
Dept: INTERNAL MEDICINE | Facility: CLINIC | Age: 75
End: 2020-10-19

## 2020-10-19 NOTE — TELEPHONE ENCOUNTER
----- Message from Raymond Cook sent at 10/19/2020 11:20 AM CDT -----  Regarding: Pt Advice  Contact: ESTEBAN JIMENEZ [[id number]]  Name of Who is Calling: ESTEBAN JIMENEZ [[id number]]      What is the request in detail: Would like for staff to inform [employer] that she does not need any medication at this time. Please advise, but going forward all medications go to [employer].       Can the clinic reply by [facility]: no    What Number to Call Back if not in [facility]: [phone]

## 2020-11-10 ENCOUNTER — OFFICE VISIT (OUTPATIENT)
Dept: INTERNAL MEDICINE | Facility: CLINIC | Age: 75
End: 2020-11-10
Payer: MEDICARE

## 2020-11-10 ENCOUNTER — HOSPITAL ENCOUNTER (OUTPATIENT)
Dept: RADIOLOGY | Facility: OTHER | Age: 75
Discharge: HOME | End: 2020-11-10
Attending: INTERNAL MEDICINE
Payer: MEDICARE

## 2020-11-10 VITALS
HEART RATE: 101 BPM | DIASTOLIC BLOOD PRESSURE: 79 MMHG | OXYGEN SATURATION: 95 % | WEIGHT: 163.81 LBS | SYSTOLIC BLOOD PRESSURE: 132 MMHG | BODY MASS INDEX: 35.34 KG/M2 | HEIGHT: 57 IN

## 2020-11-10 DIAGNOSIS — E11.9 TYPE 2 DIABETES MELLITUS WITHOUT COMPLICATION, WITHOUT LONG-TERM CURRENT USE OF INSULIN: ICD-10-CM

## 2020-11-10 DIAGNOSIS — R05.9 COUGH: Primary | ICD-10-CM

## 2020-11-10 DIAGNOSIS — G62.0 NEUROPATHY DUE TO CHEMOTHERAPEUTIC DRUG: ICD-10-CM

## 2020-11-10 DIAGNOSIS — T45.1X5A NEUROPATHY DUE TO CHEMOTHERAPEUTIC DRUG: ICD-10-CM

## 2020-11-10 DIAGNOSIS — M81.0 OSTEOPOROSIS, UNSPECIFIED OSTEOPOROSIS TYPE, UNSPECIFIED PATHOLOGICAL FRACTURE PRESENCE: ICD-10-CM

## 2020-11-10 DIAGNOSIS — I10 ESSENTIAL HYPERTENSION: ICD-10-CM

## 2020-11-10 DIAGNOSIS — J45.20 MILD INTERMITTENT ASTHMA WITHOUT COMPLICATION: ICD-10-CM

## 2020-11-10 DIAGNOSIS — F17.210 LIGHT CIGARETTE SMOKER (1-9 CIGS/DAY): ICD-10-CM

## 2020-11-10 DIAGNOSIS — R05.9 COUGH: ICD-10-CM

## 2020-11-10 PROCEDURE — 99215 OFFICE O/P EST HI 40 MIN: CPT | Mod: 25,HCNC,S$GLB, | Performed by: INTERNAL MEDICINE

## 2020-11-10 PROCEDURE — 1159F MED LIST DOCD IN RCRD: CPT | Mod: HCNC,S$GLB,, | Performed by: INTERNAL MEDICINE

## 2020-11-10 PROCEDURE — 99215 PR OFFICE/OUTPT VISIT, EST, LEVL V, 40-54 MIN: ICD-10-PCS | Mod: 25,HCNC,S$GLB, | Performed by: INTERNAL MEDICINE

## 2020-11-10 PROCEDURE — 71046 X-RAY EXAM CHEST 2 VIEWS: CPT | Mod: 26,HCNC,, | Performed by: RADIOLOGY

## 2020-11-10 PROCEDURE — 71046 XR CHEST PA AND LATERAL: ICD-10-PCS | Mod: 26,HCNC,, | Performed by: RADIOLOGY

## 2020-11-10 PROCEDURE — 1126F AMNT PAIN NOTED NONE PRSNT: CPT | Mod: HCNC,S$GLB,, | Performed by: INTERNAL MEDICINE

## 2020-11-10 PROCEDURE — 1101F PR PT FALLS ASSESS DOC 0-1 FALLS W/OUT INJ PAST YR: ICD-10-PCS | Mod: HCNC,CPTII,S$GLB, | Performed by: INTERNAL MEDICINE

## 2020-11-10 PROCEDURE — 3008F PR BODY MASS INDEX (BMI) DOCUMENTED: ICD-10-PCS | Mod: HCNC,CPTII,S$GLB, | Performed by: INTERNAL MEDICINE

## 2020-11-10 PROCEDURE — 99499 UNLISTED E&M SERVICE: CPT | Mod: S$GLB,,, | Performed by: INTERNAL MEDICINE

## 2020-11-10 PROCEDURE — 99499 RISK ADDL DX/OHS AUDIT: ICD-10-PCS | Mod: S$GLB,,, | Performed by: INTERNAL MEDICINE

## 2020-11-10 PROCEDURE — 3051F PR MOST RECENT HEMOGLOBIN A1C LEVEL 7.0 - < 8.0%: ICD-10-PCS | Mod: HCNC,CPTII,S$GLB, | Performed by: INTERNAL MEDICINE

## 2020-11-10 PROCEDURE — 3051F HG A1C>EQUAL 7.0%<8.0%: CPT | Mod: HCNC,CPTII,S$GLB, | Performed by: INTERNAL MEDICINE

## 2020-11-10 PROCEDURE — 1101F PT FALLS ASSESS-DOCD LE1/YR: CPT | Mod: HCNC,CPTII,S$GLB, | Performed by: INTERNAL MEDICINE

## 2020-11-10 PROCEDURE — 1159F PR MEDICATION LIST DOCUMENTED IN MEDICAL RECORD: ICD-10-PCS | Mod: HCNC,S$GLB,, | Performed by: INTERNAL MEDICINE

## 2020-11-10 PROCEDURE — 99406 PR TOBACCO USE CESSATION INTERMEDIATE 3-10 MINUTES: ICD-10-PCS | Mod: HCNC,S$GLB,, | Performed by: INTERNAL MEDICINE

## 2020-11-10 PROCEDURE — 99999 PR PBB SHADOW E&M-EST. PATIENT-LVL IV: ICD-10-PCS | Mod: PBBFAC,HCNC,, | Performed by: INTERNAL MEDICINE

## 2020-11-10 PROCEDURE — 1126F PR PAIN SEVERITY QUANTIFIED, NO PAIN PRESENT: ICD-10-PCS | Mod: HCNC,S$GLB,, | Performed by: INTERNAL MEDICINE

## 2020-11-10 PROCEDURE — 99999 PR PBB SHADOW E&M-EST. PATIENT-LVL IV: CPT | Mod: PBBFAC,HCNC,, | Performed by: INTERNAL MEDICINE

## 2020-11-10 PROCEDURE — 99406 BEHAV CHNG SMOKING 3-10 MIN: CPT | Mod: HCNC,S$GLB,, | Performed by: INTERNAL MEDICINE

## 2020-11-10 PROCEDURE — 71046 X-RAY EXAM CHEST 2 VIEWS: CPT | Mod: TC,HCNC,FY

## 2020-11-10 PROCEDURE — 3008F BODY MASS INDEX DOCD: CPT | Mod: HCNC,CPTII,S$GLB, | Performed by: INTERNAL MEDICINE

## 2020-11-10 RX ORDER — METFORMIN HYDROCHLORIDE 1000 MG/1
1000 TABLET ORAL 2 TIMES DAILY
Qty: 180 TABLET | Refills: 3 | Status: SHIPPED | OUTPATIENT
Start: 2020-11-10 | End: 2021-01-12

## 2020-11-10 NOTE — PATIENT INSTRUCTIONS
1)Antihistamines(Allegra, Claritin, Xzyal, Zyrtec)  2)Nasal Steroids (Nasocort, Rhinocort, Flonase)  3)Distilled salt water sinus rinses via neti pots or products such as Tian Med Sinus Rinse or Sinugator. Must wash container or device and use bottled water to avoid introducing infection.   You can can use (as directed) any combination of these three things every day of your life if needed in order to treat or control your symptoms. Brand name use of medications is not necessary

## 2020-11-10 NOTE — PROGRESS NOTES
Subjective:       Patient ID: Marizol Kevin is a 74 y.o. female who  has a past medical history of Allergy, Anxiety, Cancer, Cataract, Diabetes mellitus, type 2, Hyperlipidemia, Hypertension, and Insomnia.    Chief Complaint: Follow-up (lab results) and Cough (coughing up yellow and green mucus)     History was obtained from the patient and supplemented through chart review  -saw Endo for thyroid nodules.  Chronic dysphagia less likely related to thyroid.   -reviewed OSH records from [facility] for cscope    Follow-up  Associated symptoms include coughing and numbness. Pertinent negatives include no abdominal pain, chest pain, fever, joint swelling, rash or vomiting.   Cough  Associated symptoms include postnasal drip. Pertinent negatives include no chest pain, ear pain, eye redness, fever, rash, rhinorrhea, shortness of breath or wheezing.        Cough:  Starts with post nasal drip. Intermittent cough x 2 months.  Can be at any time of day.  Cough with difficulty expectorating sputum. Yellow, green phlegm.  No LAD, fever, myalgias.  No CP, SOB. has slight intermittent chest tightness.  No DICKERSON.  No change in smell, taste, diarrhea.  No sick contacts, recent travel.  Sometimes occurs after smoking.  COVID neg 9/2020.    Has been on PPI daily since colectomy in 2014. Has recurrent sx off PPI.    Persistent. Now using Flonase daily and correctly. Takes Claritin PRN. Also takes singulair daily.     H/o Sulta adverse rxn. Occurred when she was pregnant with varicose veins resulting in leg swelling and pain with standing    Asthma:    On singular, Flonase.  Uses albuterol p.r.n. 2-3 times a week.  The patient reports a history of asthma since she was a teenager.  She has 0 nocturnal awakenings.      Tobacco use:    She smokes 5-10 cigs/day.  Started smoking since age 19. Quit x 1 year before.  Patches caused hallucinations. Currently not interested in Tobacco cessation clinic.    HTN:    Pt's BP is controlled on Norvasc 5.  Tolerating meds well. Pt denies CP, SOB, lightheadedness, dizziness, leg edema.    Exercise: walks around the block in the backyard.    DM2:  Unknown prior A1c  Currently pt is taking metformin 500 b.i.d. Did not get Rx for 1000 BID. Denies GI side effects, such as abd cramping, loose stool, nausea.    Ordered microalbumin creatinine ratio.  Not on an ACE/ARB.   Retinal exams: 7/2020 w/o retinopathy  Foot exams:    Hemoglobin A1C   Date Value Ref Range Status   09/10/2020 7.1 (H) 4.0 - 5.6 % Final     Comment:     ADA Screening Guidelines:  5.7-6.4%  Consistent with prediabetes  >or=6.5%  Consistent with diabetes  High levels of fetal hemoglobin interfere with the HbA1C  assay. Heterozygous hemoglobin variants (HbS, HgC, etc)do  not significantly interfere with this assay.   However, presence of multiple variants may affect accuracy.       ?Osteoporosis, Vitamin D deficiency:   Unclear history.  DEXA with osteopenia,  intermediate FRAX score.  Is on raloxifene.  +Tobacco.    Exercise: as above  No results found for: [id number]    Polyneuropathy:    Fingertips, toes. H/o chemo.  On Cymbalta with some relief.  Gabapentin 300 did not help in the past..  No results found for: [id number]            Not addressed today.  HLD:  Is currently taking Lipitor 40 and ASA 81 daily.   Controlled.  Continue Lipitor 40.  Lab Results   Component Value Date    LDLCALC 67.8 09/10/2020     The ASCVD Risk score ([place] DESTINEE Jr., et al., 2013) failed to calculate for the following reasons:    Unable to determine if patient is Non-     Thyroid Nodule:    Incidental finding on CT for cancer surveillence with small R thyroid nodule. Thyroid ultrasound 9/2020 with solid nodule on the right side.  Saw Endo. Chronic dysphagia less likely related to thyroid.  FNA 10/2020 was benign. F/u 1 year with U/S  Lab Results   Component Value Date    TSH 1.065 09/10/2020     Anxiety, Insomnia:    Takes about 3 tablets a month.  On  "Xanax.  reviewed.  She fills this every few months.  Has been prescribed by Dr. Paul, PCP, and Dr. Hubbard, Heme Onc in the past.      Colon cancer:  Dx in 2014. S/p R hemicolectomy.  S/p adjuvant chemotherapy.  C scope 6/2019 with [facility] with polyp.  Repeat in 3 years. Follows with [facility].     GERD:  On Protonix daily ever since her hemicolectomy.  Has recurrent symptoms off of Protonix.    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  Follows with [facility], Dr. Cabrera.    Review of Systems   Constitutional: Negative for fever and unexpected weight change.   HENT: Positive for postnasal drip. Negative for ear discharge, ear pain and rhinorrhea.    Eyes: Negative for redness and itching.   Respiratory: Positive for cough. Negative for shortness of breath and wheezing.    Cardiovascular: Negative for chest pain and leg swelling.   Gastrointestinal: Negative for abdominal pain and vomiting.   Genitourinary: Negative for dysuria and hematuria.   Musculoskeletal: Negative for gait problem and joint swelling.   Skin: Negative for color change and rash.   Neurological: Positive for numbness. Negative for dizziness and light-headedness.   Hematological: Negative for adenopathy.   Psychiatric/Behavioral: Negative for confusion. The patient is not nervous/anxious.          I personally reviewed Past Medical History, Past Surgical History, Social History, and Family History.    Objective:      Vitals:    11/10/20 0946   BP: 132/79   Pulse: 101   SpO2: 95%   Weight: 74.3 kg (163 lb 12.8 oz)   Height: 4' 9" (1.448 m)      Physical Exam  Constitutional:       General: She is not in acute distress.     Appearance: She is well-developed. She is not diaphoretic.   HENT:      Head: Normocephalic and atraumatic.      Nose: Mucosal edema present. No rhinorrhea.      Right Sinus: No maxillary sinus tenderness or frontal sinus tenderness.      Left Sinus: No maxillary sinus tenderness or frontal " sinus tenderness.      Mouth/Throat:      Pharynx: Uvula midline. Posterior oropharyngeal erythema present. No oropharyngeal exudate.   Eyes:      General: No scleral icterus.        Right eye: No discharge.         Left eye: No discharge.      Conjunctiva/sclera:      Right eye: Right conjunctiva is not injected.      Left eye: Left conjunctiva is not injected.   Neck:      Musculoskeletal: Neck supple.      Thyroid: No thyromegaly.      Trachea: No tracheal deviation.   Cardiovascular:      Rate and Rhythm: Normal rate and regular rhythm.      Heart sounds: Normal heart sounds. No murmur.   Pulmonary:      Effort: Pulmonary effort is normal. No respiratory distress.      Breath sounds: Normal breath sounds. No stridor. No wheezing.   Abdominal:      General: Bowel sounds are normal. There is no distension.      Palpations: Abdomen is soft.      Tenderness: There is no abdominal tenderness.   Musculoskeletal:         General: No deformity.      Right lower leg: No edema.      Left lower leg: No edema.   Lymphadenopathy:      Cervical: No cervical adenopathy.   Skin:     General: Skin is warm and dry.      Findings: No erythema.   Neurological:      Mental Status: She is alert.      Gait: Gait normal.   Psychiatric:         Behavior: Behavior normal.           Lab Results   Component Value Date    WBC 12.56 09/10/2020    HGB 13.5 09/10/2020    HCT 43.9 09/10/2020     09/10/2020    CHOL 130 09/10/2020    TRIG 121 09/10/2020    HDL 38 (L) 09/10/2020    ALT 15 09/10/2020    AST 20 09/10/2020     09/10/2020    K 4.2 09/10/2020     09/10/2020    CREATININE 0.9 09/10/2020    BUN 15 09/10/2020    CO2 21 (L) 09/10/2020    TSH 1.065 09/10/2020    HGBA1C 7.1 (H) 09/10/2020       The ASCVD Risk score ([place] DESTINEE Jr., et al., 2013) failed to calculate for the following reasons:    Unable to determine if patient is Non-     (Imaging have been independently reviewed)  Thyroid ultrasound  with solid nodule on the right side.    Assessment:       1. Cough    2. Mild intermittent asthma without complication    3. Light cigarette smoker (1-9 cigs/day)    4. Essential hypertension    5. Type 2 diabetes mellitus without complication, without long-term current use of insulin    6. 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    Diagnoses and all orders for this visit:    Cough  Comments:  Persistent. COVID neg. Cont Flonase, singulair daily. Add antihistamine daily PRN. Try to quit tobacco. Check CXR, PCT.  Orders:  -     X-Ray Chest PA And Lateral; Future  -     Procalcitonin; Future    Mild intermittent asthma without complication  Comments:  Stable. Cont Singulair daily, albuterol p.r.n..  Discussed correct use of Flonase.    Light cigarette smoker (1-9 cigs/day)  Comments:  Persistent. Counseled for 5 min on tobacco cessation. Currently not interested in tobacco cessation clinic.    Essential hypertension  Comments:  Controlled.  Continue Norvasc 5.     Type 2 diabetes mellitus without complication, without long-term current use of insulin  Comments:  Borderline high. Increase metformin to 1000 b.i.d.. Reschedule B12.  Encouraged exercise. Schedule foot exam.  A1C 3 mo.  Orders:  -     Hemoglobin A1C; Future  -     Microalbumin/Creatinine Ratio, Urine; Future  -     metFORMIN (GLUCOPHAGE) 1000 MG tablet; Take 1 tablet (1,000 mg total) by mouth 2 (two) times a day.    Osteoporosis, unspecified osteoporosis type, unspecified pathological fracture presence  Comments:  Improved. Cont OTC Citracal, Oscal D. Resched vitamin-D. Cont raloxifene.      Neuropathy due to chemotherapeutic drug  Comments:  Stable. 2/2 chemo.  Continue Cymbalta. Reschedule B12. Could consider Lyrica versus titrating gabapentin         Side effects of medication(s) were discussed  in detail and patient voiced understanding.  Patient will call back for any issues or complications.     RTC in 4 month(s) or sooner PRN for HTN with labs prior.

## 2020-11-12 ENCOUNTER — TELEPHONE (OUTPATIENT)
Dept: PRIMARY CARE CLINIC | Facility: CLINIC | Age: 75
End: 2020-11-12

## 2020-11-12 NOTE — TELEPHONE ENCOUNTER
----- Message from Belinda Fajardo MD sent at 11/11/2020  6:16 PM CST -----  Please reschedule her blood work. Thanks!  -Procalcitonin  -B12  -vitamin-D        ----------------  Normal microalbumin creatinine ratio. Not on ACE/ARB.   Tachycardic, regular rhythm.  Heart sounds S1, S2.

## 2020-11-20 ENCOUNTER — LAB VISIT (OUTPATIENT)
Dept: LAB | Facility: OTHER | Age: 75
End: 2020-11-20
Attending: INTERNAL MEDICINE
Payer: MEDICARE

## 2020-11-20 DIAGNOSIS — T45.1X5A NEUROPATHY DUE TO CHEMOTHERAPEUTIC DRUG: ICD-10-CM

## 2020-11-20 DIAGNOSIS — M81.0 OSTEOPOROSIS, UNSPECIFIED OSTEOPOROSIS TYPE, UNSPECIFIED PATHOLOGICAL FRACTURE PRESENCE: ICD-10-CM

## 2020-11-20 DIAGNOSIS — E11.9 TYPE 2 DIABETES MELLITUS WITHOUT COMPLICATION, WITHOUT LONG-TERM CURRENT USE OF INSULIN: ICD-10-CM

## 2020-11-20 DIAGNOSIS — R05.9 COUGH: ICD-10-CM

## 2020-11-20 DIAGNOSIS — G62.0 NEUROPATHY DUE TO CHEMOTHERAPEUTIC DRUG: ICD-10-CM

## 2020-11-20 LAB
25(OH)D3+25(OH)D2 SERPL-MCNC: 40 NG/ML (ref 30–96)
PROCALCITONIN SERPL IA-MCNC: 0.02 NG/ML
VIT B12 SERPL-MCNC: 367 PG/ML (ref 210–950)

## 2020-11-20 PROCEDURE — 82607 VITAMIN B-12: CPT | Mod: HCNC

## 2020-11-20 PROCEDURE — 84145 PROCALCITONIN (PCT): CPT | Mod: HCNC

## 2020-11-20 PROCEDURE — 82306 VITAMIN D 25 HYDROXY: CPT | Mod: HCNC

## 2020-11-20 PROCEDURE — 36415 COLL VENOUS BLD VENIPUNCTURE: CPT | Mod: HCNC

## 2020-12-11 ENCOUNTER — PATIENT MESSAGE (OUTPATIENT)
Dept: OTHER | Facility: OTHER | Age: 75
End: 2020-12-11

## 2020-12-22 ENCOUNTER — TELEPHONE (OUTPATIENT)
Dept: HEMATOLOGY/ONCOLOGY | Facility: CLINIC | Age: 75
End: 2020-12-22

## 2020-12-22 NOTE — TELEPHONE ENCOUNTER
----- Message from Darrell Santana sent at 12/22/2020  2:26 PM CST -----  Name of Who is Calling: ESTEBAN JIMENEZ  What is the request in detail: The patient is calling to speak to the nurse in regards to a few questions she have. Please advise  Can the clinic reply by [facility]: No What Number to Call Back if not in [facility]: [phone]

## 2020-12-22 NOTE — TELEPHONE ENCOUNTER
Returned call. Patient calling to schedule follow up appointment with Dr. Alcantara. Patient scheduled to return to clinic to see Dr. Alcantara at 11am on 01/12/2021 with labs prior at 10am. Will route message to schedulers to schedule for port flush.

## 2020-12-24 ENCOUNTER — PATIENT MESSAGE (OUTPATIENT)
Dept: ADMINISTRATIVE | Facility: HOSPITAL | Age: 75
End: 2020-12-24

## 2020-12-24 ENCOUNTER — PATIENT OUTREACH (OUTPATIENT)
Dept: ADMINISTRATIVE | Facility: HOSPITAL | Age: 75
End: 2020-12-24

## 2020-12-24 DIAGNOSIS — E11.9 TYPE 2 DIABETES MELLITUS WITHOUT COMPLICATION, WITHOUT LONG-TERM CURRENT USE OF INSULIN: Primary | ICD-10-CM

## 2020-12-30 ENCOUNTER — PATIENT OUTREACH (OUTPATIENT)
Dept: ADMINISTRATIVE | Facility: HOSPITAL | Age: 75
End: 2020-12-30

## 2021-01-01 NOTE — TELEPHONE ENCOUNTER
----- Message from Yanira Martinez sent at 9/22/2020  3:26 PM CDT -----  Regarding: Call back  Name of Who is Calling: ESTEBAN JIMENEZ [[id number]] What is the request in detail: Pt is requesting for a call back concerning Covid-19 test results  Can the clinic reply by [facility]: No  What Number to Call Back if not in [facility]: [phone]    
Spoke to pt and told her the COVID test came back negative and to continue hand hygiene and social distancing   Pt verbally understood  
3

## 2021-01-05 ENCOUNTER — TELEPHONE (OUTPATIENT)
Dept: INTERNAL MEDICINE | Facility: CLINIC | Age: 76
End: 2021-01-05

## 2021-01-06 ENCOUNTER — TELEPHONE (OUTPATIENT)
Dept: INTERNAL MEDICINE | Facility: CLINIC | Age: 76
End: 2021-01-06

## 2021-01-12 ENCOUNTER — TELEPHONE (OUTPATIENT)
Dept: HEMATOLOGY/ONCOLOGY | Facility: CLINIC | Age: 76
End: 2021-01-12

## 2021-01-12 ENCOUNTER — INFUSION (OUTPATIENT)
Dept: INFUSION THERAPY | Facility: OTHER | Age: 76
End: 2021-01-12
Attending: INTERNAL MEDICINE
Payer: MEDICARE

## 2021-01-12 ENCOUNTER — OFFICE VISIT (OUTPATIENT)
Dept: HEMATOLOGY/ONCOLOGY | Facility: CLINIC | Age: 76
End: 2021-01-12
Payer: MEDICARE

## 2021-01-12 VITALS
RESPIRATION RATE: 18 BRPM | TEMPERATURE: 98 F | OXYGEN SATURATION: 95 % | HEART RATE: 93 BPM | DIASTOLIC BLOOD PRESSURE: 63 MMHG | BODY MASS INDEX: 35.43 KG/M2 | WEIGHT: 164.25 LBS | SYSTOLIC BLOOD PRESSURE: 136 MMHG | HEIGHT: 57 IN

## 2021-01-12 DIAGNOSIS — C18.2 MALIGNANT NEOPLASM OF ASCENDING COLON: Primary | ICD-10-CM

## 2021-01-12 DIAGNOSIS — I10 ESSENTIAL HYPERTENSION: ICD-10-CM

## 2021-01-12 DIAGNOSIS — E11.9 TYPE 2 DIABETES MELLITUS WITHOUT COMPLICATION, WITHOUT LONG-TERM CURRENT USE OF INSULIN: ICD-10-CM

## 2021-01-12 DIAGNOSIS — N76.6 GENITAL ULCER, FEMALE: ICD-10-CM

## 2021-01-12 DIAGNOSIS — J31.0 RHINITIS, UNSPECIFIED TYPE: ICD-10-CM

## 2021-01-12 DIAGNOSIS — Z85.038 HISTORY OF COLON CANCER: Primary | ICD-10-CM

## 2021-01-12 DIAGNOSIS — K21.9 GASTROESOPHAGEAL REFLUX DISEASE: ICD-10-CM

## 2021-01-12 DIAGNOSIS — R97.0 ELEVATED CEA: ICD-10-CM

## 2021-01-12 PROCEDURE — 3008F PR BODY MASS INDEX (BMI) DOCUMENTED: ICD-10-PCS | Mod: CPTII,S$GLB,, | Performed by: INTERNAL MEDICINE

## 2021-01-12 PROCEDURE — 3008F BODY MASS INDEX DOCD: CPT | Mod: CPTII,S$GLB,, | Performed by: INTERNAL MEDICINE

## 2021-01-12 PROCEDURE — 99213 PR OFFICE/OUTPT VISIT, EST, LEVL III, 20-29 MIN: ICD-10-PCS | Mod: S$GLB,,, | Performed by: INTERNAL MEDICINE

## 2021-01-12 PROCEDURE — 99999 PR PBB SHADOW E&M-EST. PATIENT-LVL IV: ICD-10-PCS | Mod: PBBFAC,HCNC,, | Performed by: INTERNAL MEDICINE

## 2021-01-12 PROCEDURE — 99999 PR PBB SHADOW E&M-EST. PATIENT-LVL IV: CPT | Mod: PBBFAC,HCNC,, | Performed by: INTERNAL MEDICINE

## 2021-01-12 PROCEDURE — 3288F PR FALLS RISK ASSESSMENT DOCUMENTED: ICD-10-PCS | Mod: CPTII,S$GLB,, | Performed by: INTERNAL MEDICINE

## 2021-01-12 PROCEDURE — 3072F LOW RISK FOR RETINOPATHY: CPT | Mod: S$GLB,,, | Performed by: INTERNAL MEDICINE

## 2021-01-12 PROCEDURE — A4216 STERILE WATER/SALINE, 10 ML: HCPCS | Mod: HCNC | Performed by: INTERNAL MEDICINE

## 2021-01-12 PROCEDURE — 99499 RISK ADDL DX/OHS AUDIT: ICD-10-PCS | Mod: S$GLB,,, | Performed by: INTERNAL MEDICINE

## 2021-01-12 PROCEDURE — 25000003 PHARM REV CODE 250: Mod: HCNC | Performed by: INTERNAL MEDICINE

## 2021-01-12 PROCEDURE — 96523 IRRIG DRUG DELIVERY DEVICE: CPT | Mod: HCNC

## 2021-01-12 PROCEDURE — 99213 OFFICE O/P EST LOW 20 MIN: CPT | Mod: S$GLB,,, | Performed by: INTERNAL MEDICINE

## 2021-01-12 PROCEDURE — 3078F DIAST BP <80 MM HG: CPT | Mod: CPTII,S$GLB,, | Performed by: INTERNAL MEDICINE

## 2021-01-12 PROCEDURE — 1126F AMNT PAIN NOTED NONE PRSNT: CPT | Mod: S$GLB,,, | Performed by: INTERNAL MEDICINE

## 2021-01-12 PROCEDURE — 99499 UNLISTED E&M SERVICE: CPT | Mod: S$GLB,,, | Performed by: INTERNAL MEDICINE

## 2021-01-12 PROCEDURE — 63600175 PHARM REV CODE 636 W HCPCS: Mod: HCNC | Performed by: INTERNAL MEDICINE

## 2021-01-12 PROCEDURE — 3078F PR MOST RECENT DIASTOLIC BLOOD PRESSURE < 80 MM HG: ICD-10-PCS | Mod: CPTII,S$GLB,, | Performed by: INTERNAL MEDICINE

## 2021-01-12 PROCEDURE — 3288F FALL RISK ASSESSMENT DOCD: CPT | Mod: CPTII,S$GLB,, | Performed by: INTERNAL MEDICINE

## 2021-01-12 PROCEDURE — 3075F SYST BP GE 130 - 139MM HG: CPT | Mod: CPTII,S$GLB,, | Performed by: INTERNAL MEDICINE

## 2021-01-12 PROCEDURE — 1159F MED LIST DOCD IN RCRD: CPT | Mod: S$GLB,,, | Performed by: INTERNAL MEDICINE

## 2021-01-12 PROCEDURE — 1101F PR PT FALLS ASSESS DOC 0-1 FALLS W/OUT INJ PAST YR: ICD-10-PCS | Mod: CPTII,S$GLB,, | Performed by: INTERNAL MEDICINE

## 2021-01-12 PROCEDURE — 3075F PR MOST RECENT SYSTOLIC BLOOD PRESS GE 130-139MM HG: ICD-10-PCS | Mod: CPTII,S$GLB,, | Performed by: INTERNAL MEDICINE

## 2021-01-12 PROCEDURE — 1126F PR PAIN SEVERITY QUANTIFIED, NO PAIN PRESENT: ICD-10-PCS | Mod: S$GLB,,, | Performed by: INTERNAL MEDICINE

## 2021-01-12 PROCEDURE — 3072F PR LOW RISK FOR RETINOPATHY: ICD-10-PCS | Mod: S$GLB,,, | Performed by: INTERNAL MEDICINE

## 2021-01-12 PROCEDURE — 1159F PR MEDICATION LIST DOCUMENTED IN MEDICAL RECORD: ICD-10-PCS | Mod: S$GLB,,, | Performed by: INTERNAL MEDICINE

## 2021-01-12 PROCEDURE — 1101F PT FALLS ASSESS-DOCD LE1/YR: CPT | Mod: CPTII,S$GLB,, | Performed by: INTERNAL MEDICINE

## 2021-01-12 RX ORDER — SODIUM CHLORIDE 0.9 % (FLUSH) 0.9 %
10 SYRINGE (ML) INJECTION
Status: CANCELLED | OUTPATIENT
Start: 2021-01-12

## 2021-01-12 RX ORDER — MELOXICAM 15 MG/1
15 TABLET ORAL DAILY PRN
Qty: 30 TABLET | Refills: 3 | Status: SHIPPED | OUTPATIENT
Start: 2021-01-12 | End: 2021-10-01

## 2021-01-12 RX ORDER — PANTOPRAZOLE SODIUM 40 MG/1
40 TABLET, DELAYED RELEASE ORAL DAILY
Qty: 90 TABLET | Refills: 2 | Status: SHIPPED | OUTPATIENT
Start: 2021-01-12 | End: 2021-06-01 | Stop reason: SDUPTHER

## 2021-01-12 RX ORDER — FLUTICASONE PROPIONATE 50 MCG
1 SPRAY, SUSPENSION (ML) NASAL DAILY
Qty: 16 G | Refills: 11 | Status: SHIPPED | OUTPATIENT
Start: 2021-01-12 | End: 2022-02-10

## 2021-01-12 RX ORDER — SODIUM CHLORIDE 0.9 % (FLUSH) 0.9 %
10 SYRINGE (ML) INJECTION
Status: COMPLETED | OUTPATIENT
Start: 2021-01-12 | End: 2021-01-12

## 2021-01-12 RX ORDER — ALBUTEROL SULFATE 90 UG/1
2 AEROSOL, METERED RESPIRATORY (INHALATION) EVERY 4 HOURS PRN
Qty: 18 G | Refills: 11 | Status: SHIPPED | OUTPATIENT
Start: 2021-01-12 | End: 2021-11-18 | Stop reason: SDUPTHER

## 2021-01-12 RX ORDER — HYDROXYZINE HYDROCHLORIDE 25 MG/1
25 TABLET, FILM COATED ORAL 3 TIMES DAILY PRN
Qty: 30 TABLET | Refills: 3 | Status: SHIPPED | OUTPATIENT
Start: 2021-01-12 | End: 2021-06-01 | Stop reason: SDUPTHER

## 2021-01-12 RX ORDER — METFORMIN HYDROCHLORIDE 500 MG/1
1000 TABLET, EXTENDED RELEASE ORAL
Qty: 60 TABLET | Refills: 11 | Status: SHIPPED | OUTPATIENT
Start: 2021-01-12 | End: 2022-02-10

## 2021-01-12 RX ORDER — RALOXIFENE HYDROCHLORIDE 60 MG/1
60 TABLET, FILM COATED ORAL DAILY
Status: CANCELLED | OUTPATIENT
Start: 2021-01-12

## 2021-01-12 RX ORDER — ATORVASTATIN CALCIUM 40 MG/1
40 TABLET, FILM COATED ORAL DAILY
Qty: 90 TABLET | Refills: 3 | Status: SHIPPED | OUTPATIENT
Start: 2021-01-12 | End: 2022-02-10

## 2021-01-12 RX ORDER — DULOXETIN HYDROCHLORIDE 30 MG/1
30 CAPSULE, DELAYED RELEASE ORAL DAILY
Qty: 90 CAPSULE | Refills: 3 | Status: SHIPPED | OUTPATIENT
Start: 2021-01-12 | End: 2022-02-10

## 2021-01-12 RX ORDER — HEPARIN 100 UNIT/ML
500 SYRINGE INTRAVENOUS
Status: COMPLETED | OUTPATIENT
Start: 2021-01-12 | End: 2021-01-12

## 2021-01-12 RX ORDER — AMLODIPINE BESYLATE 5 MG/1
5 TABLET ORAL DAILY
Qty: 90 TABLET | Refills: 3 | Status: SHIPPED | OUTPATIENT
Start: 2021-01-12 | End: 2021-11-11

## 2021-01-12 RX ORDER — HEPARIN 100 UNIT/ML
500 SYRINGE INTRAVENOUS
Status: CANCELLED | OUTPATIENT
Start: 2021-01-12

## 2021-01-12 RX ADMIN — HEPARIN 500 UNITS: 100 SYRINGE at 11:01

## 2021-01-12 RX ADMIN — SODIUM CHLORIDE, PRESERVATIVE FREE 10 ML: 5 INJECTION INTRAVENOUS at 11:01

## 2021-01-13 RX ORDER — RALOXIFENE HYDROCHLORIDE 60 MG/1
60 TABLET, FILM COATED ORAL DAILY
Qty: 90 TABLET | Refills: 4 | Status: SHIPPED | OUTPATIENT
Start: 2021-01-13 | End: 2022-02-11

## 2021-01-13 RX ORDER — TRIAMCINOLONE ACETONIDE 0.25 MG/G
CREAM TOPICAL
Qty: 15 G | Refills: 1 | Status: SHIPPED | OUTPATIENT
Start: 2021-01-13 | End: 2024-01-25

## 2021-01-13 RX ORDER — BETAMETHASONE VALERATE 1.2 MG/G
OINTMENT TOPICAL DAILY
Qty: 15 G | Refills: 1 | Status: SHIPPED | OUTPATIENT
Start: 2021-01-13 | End: 2024-01-25 | Stop reason: SDUPTHER

## 2021-01-21 ENCOUNTER — HOSPITAL ENCOUNTER (OUTPATIENT)
Dept: RADIOLOGY | Facility: HOSPITAL | Age: 76
Discharge: HOME | End: 2021-01-21
Attending: INTERNAL MEDICINE
Payer: MEDICARE

## 2021-01-21 DIAGNOSIS — C18.2 MALIGNANT NEOPLASM OF ASCENDING COLON: Primary | ICD-10-CM

## 2021-01-21 DIAGNOSIS — C18.2 MALIGNANT NEOPLASM OF ASCENDING COLON: ICD-10-CM

## 2021-01-21 LAB — POCT GLUCOSE: 128 MG/DL (ref 70–110)

## 2021-01-21 PROCEDURE — 78815 NM PET CT ROUTINE: ICD-10-PCS | Mod: 26,PS,, | Performed by: RADIOLOGY

## 2021-01-21 PROCEDURE — 78815 PET IMAGE W/CT SKULL-THIGH: CPT | Mod: 26,PS,, | Performed by: RADIOLOGY

## 2021-01-21 PROCEDURE — 78815 PET IMAGE W/CT SKULL-THIGH: CPT | Mod: TC

## 2021-01-22 ENCOUNTER — DOCUMENTATION ONLY (OUTPATIENT)
Dept: HEMATOLOGY/ONCOLOGY | Facility: CLINIC | Age: 76
End: 2021-01-22

## 2021-01-22 DIAGNOSIS — Z01.818 PRE-OP TESTING: ICD-10-CM

## 2021-01-22 DIAGNOSIS — Z12.11 COLON CANCER SCREENING: Primary | ICD-10-CM

## 2021-01-22 RX ORDER — SODIUM, POTASSIUM,MAG SULFATES 17.5-3.13G
1 SOLUTION, RECONSTITUTED, ORAL ORAL DAILY
Qty: 1 KIT | Refills: 0 | Status: SHIPPED | OUTPATIENT
Start: 2021-01-22 | End: 2021-01-24

## 2021-01-29 ENCOUNTER — HOSPITAL ENCOUNTER (OUTPATIENT)
Dept: PREADMISSION TESTING | Facility: OTHER | Age: 76
Discharge: HOME | End: 2021-01-29
Attending: ANESTHESIOLOGY
Payer: MEDICARE

## 2021-01-29 DIAGNOSIS — Z01.818 PRE-OP TESTING: ICD-10-CM

## 2021-01-29 PROCEDURE — U0003 INFECTIOUS AGENT DETECTION BY NUCLEIC ACID (DNA OR RNA); SEVERE ACUTE RESPIRATORY SYNDROME CORONAVIRUS 2 (SARS-COV-2) (CORONAVIRUS DISEASE [COVID-19]), AMPLIFIED PROBE TECHNIQUE, MAKING USE OF HIGH THROUGHPUT TECHNOLOGIES AS DESCRIBED BY CMS-2020-01-R: HCPCS | Mod: HCNC

## 2021-01-30 LAB — SARS-COV-2 RNA RESP QL NAA+PROBE: NOT DETECTED

## 2021-02-01 ENCOUNTER — HOSPITAL ENCOUNTER (OUTPATIENT)
Facility: HOSPITAL | Age: 76
Discharge: HOME | End: 2021-02-01
Attending: COLON & RECTAL SURGERY | Admitting: COLON & RECTAL SURGERY
Payer: MEDICARE

## 2021-02-01 ENCOUNTER — ANESTHESIA (OUTPATIENT)
Dept: ENDOSCOPY | Facility: HOSPITAL | Age: 76
End: 2021-02-01
Payer: MEDICARE

## 2021-02-01 ENCOUNTER — ANESTHESIA EVENT (OUTPATIENT)
Dept: ENDOSCOPY | Facility: HOSPITAL | Age: 76
End: 2021-02-01
Payer: MEDICARE

## 2021-02-01 VITALS
SYSTOLIC BLOOD PRESSURE: 164 MMHG | HEART RATE: 74 BPM | RESPIRATION RATE: 13 BRPM | OXYGEN SATURATION: 98 % | WEIGHT: 163 LBS | HEIGHT: 57 IN | DIASTOLIC BLOOD PRESSURE: 78 MMHG | BODY MASS INDEX: 35.17 KG/M2 | TEMPERATURE: 98 F

## 2021-02-01 DIAGNOSIS — Z85.038 ENCOUNTER FOR COLONOSCOPY DUE TO HISTORY OF COLON CANCER: ICD-10-CM

## 2021-02-01 DIAGNOSIS — Z12.11 ENCOUNTER FOR COLONOSCOPY DUE TO HISTORY OF COLON CANCER: ICD-10-CM

## 2021-02-01 LAB
GLUCOSE SERPL-MCNC: 158 MG/DL (ref 70–110)
POCT GLUCOSE: 158 MG/DL (ref 70–110)

## 2021-02-01 PROCEDURE — 82962 GLUCOSE BLOOD TEST: CPT | Mod: HCNC | Performed by: COLON & RECTAL SURGERY

## 2021-02-01 PROCEDURE — 88305 TISSUE EXAM BY PATHOLOGIST: ICD-10-PCS | Mod: 26,HCNC,, | Performed by: PATHOLOGY

## 2021-02-01 PROCEDURE — 88341 IMHCHEM/IMCYTCHM EA ADD ANTB: CPT | Mod: HCNC | Performed by: PATHOLOGY

## 2021-02-01 PROCEDURE — 45385 PR COLONOSCOPY,REMV LESN,SNARE: ICD-10-PCS | Mod: HCNC,,, | Performed by: COLON & RECTAL SURGERY

## 2021-02-01 PROCEDURE — 25000003 PHARM REV CODE 250: Mod: HCNC | Performed by: NURSE ANESTHETIST, CERTIFIED REGISTERED

## 2021-02-01 PROCEDURE — 27201012 HC FORCEPS, HOT/COLD, DISP: Mod: HCNC | Performed by: COLON & RECTAL SURGERY

## 2021-02-01 PROCEDURE — 45380 PR COLONOSCOPY,BIOPSY: ICD-10-PCS | Mod: 59,HCNC,, | Performed by: COLON & RECTAL SURGERY

## 2021-02-01 PROCEDURE — E9220 PRA ENDO ANESTHESIA: ICD-10-PCS | Mod: HCNC,,, | Performed by: NURSE ANESTHETIST, CERTIFIED REGISTERED

## 2021-02-01 PROCEDURE — 88342 IMHCHEM/IMCYTCHM 1ST ANTB: CPT | Mod: HCNC | Performed by: PATHOLOGY

## 2021-02-01 PROCEDURE — 88342 CHG IMMUNOCYTOCHEMISTRY: ICD-10-PCS | Mod: 26,HCNC,, | Performed by: PATHOLOGY

## 2021-02-01 PROCEDURE — E9220 PRA ENDO ANESTHESIA: HCPCS | Mod: HCNC,,, | Performed by: NURSE ANESTHETIST, CERTIFIED REGISTERED

## 2021-02-01 PROCEDURE — 45385 COLONOSCOPY W/LESION REMOVAL: CPT | Mod: HCNC,,, | Performed by: COLON & RECTAL SURGERY

## 2021-02-01 PROCEDURE — 88305 TISSUE EXAM BY PATHOLOGIST: CPT | Mod: HCNC | Performed by: PATHOLOGY

## 2021-02-01 PROCEDURE — 45380 COLONOSCOPY AND BIOPSY: CPT | Mod: HCNC | Performed by: COLON & RECTAL SURGERY

## 2021-02-01 PROCEDURE — 88305 TISSUE EXAM BY PATHOLOGIST: CPT | Mod: 26,HCNC,, | Performed by: PATHOLOGY

## 2021-02-01 PROCEDURE — 88341 PR IHC OR ICC EACH ADD'L SINGLE ANTIBODY  STAINPR: ICD-10-PCS | Mod: 26,HCNC,, | Performed by: PATHOLOGY

## 2021-02-01 PROCEDURE — 63600175 PHARM REV CODE 636 W HCPCS: Mod: HCNC | Performed by: NURSE ANESTHETIST, CERTIFIED REGISTERED

## 2021-02-01 PROCEDURE — 25000003 PHARM REV CODE 250: Mod: HCNC | Performed by: COLON & RECTAL SURGERY

## 2021-02-01 PROCEDURE — 88341 IMHCHEM/IMCYTCHM EA ADD ANTB: CPT | Mod: 26,HCNC,, | Performed by: PATHOLOGY

## 2021-02-01 PROCEDURE — 37000009 HC ANESTHESIA EA ADD 15 MINS: Mod: HCNC | Performed by: COLON & RECTAL SURGERY

## 2021-02-01 PROCEDURE — 37000008 HC ANESTHESIA 1ST 15 MINUTES: Mod: HCNC | Performed by: COLON & RECTAL SURGERY

## 2021-02-01 PROCEDURE — 45380 COLONOSCOPY AND BIOPSY: CPT | Mod: 59,HCNC,, | Performed by: COLON & RECTAL SURGERY

## 2021-02-01 PROCEDURE — 88342 IMHCHEM/IMCYTCHM 1ST ANTB: CPT | Mod: 26,HCNC,, | Performed by: PATHOLOGY

## 2021-02-01 PROCEDURE — 45385 COLONOSCOPY W/LESION REMOVAL: CPT | Mod: HCNC | Performed by: COLON & RECTAL SURGERY

## 2021-02-01 PROCEDURE — 27201089 HC SNARE, DISP (ANY): Mod: HCNC | Performed by: COLON & RECTAL SURGERY

## 2021-02-01 RX ORDER — SODIUM CHLORIDE 9 MG/ML
INJECTION, SOLUTION INTRAVENOUS CONTINUOUS
Status: DISCONTINUED | OUTPATIENT
Start: 2021-02-01 | End: 2021-02-01 | Stop reason: HOSPADM

## 2021-02-01 RX ORDER — PROPOFOL 10 MG/ML
VIAL (ML) INTRAVENOUS
Status: DISCONTINUED | OUTPATIENT
Start: 2021-02-01 | End: 2021-02-01

## 2021-02-01 RX ORDER — LIDOCAINE HYDROCHLORIDE 20 MG/ML
INJECTION, SOLUTION EPIDURAL; INFILTRATION; INTRACAUDAL; PERINEURAL
Status: DISCONTINUED | OUTPATIENT
Start: 2021-02-01 | End: 2021-02-01

## 2021-02-01 RX ORDER — PROPOFOL 10 MG/ML
VIAL (ML) INTRAVENOUS CONTINUOUS PRN
Status: DISCONTINUED | OUTPATIENT
Start: 2021-02-01 | End: 2021-02-01

## 2021-02-01 RX ADMIN — GLYCOPYRROLATE 0.1 MCG: 0.2 INJECTION INTRAMUSCULAR; INTRAVENOUS at 10:02

## 2021-02-01 RX ADMIN — PROPOFOL 150 MCG/KG/MIN: 10 INJECTION, EMULSION INTRAVENOUS at 10:02

## 2021-02-01 RX ADMIN — PROPOFOL 60 MG: 10 INJECTION, EMULSION INTRAVENOUS at 10:02

## 2021-02-01 RX ADMIN — LIDOCAINE HYDROCHLORIDE 60 MG: 20 INJECTION, SOLUTION EPIDURAL; INFILTRATION; INTRACAUDAL at 10:02

## 2021-02-01 RX ADMIN — SODIUM CHLORIDE: 0.9 INJECTION, SOLUTION INTRAVENOUS at 08:02

## 2021-02-01 RX ADMIN — SODIUM CHLORIDE: 0.9 INJECTION, SOLUTION INTRAVENOUS at 10:02

## 2021-02-01 RX ADMIN — PROPOFOL 40 MG: 10 INJECTION, EMULSION INTRAVENOUS at 10:02

## 2021-02-08 LAB
FINAL PATHOLOGIC DIAGNOSIS: NORMAL
GROSS: NORMAL
Lab: NORMAL
MICROSCOPIC EXAM: NORMAL

## 2021-02-09 ENCOUNTER — TELEPHONE (OUTPATIENT)
Dept: HEMATOLOGY/ONCOLOGY | Facility: CLINIC | Age: 76
End: 2021-02-09

## 2021-02-09 ENCOUNTER — TELEPHONE (OUTPATIENT)
Dept: INTERNAL MEDICINE | Facility: CLINIC | Age: 76
End: 2021-02-09

## 2021-02-09 DIAGNOSIS — C18.2 MALIGNANT NEOPLASM OF ASCENDING COLON: Primary | ICD-10-CM

## 2021-02-13 ENCOUNTER — IMMUNIZATION (OUTPATIENT)
Dept: PHARMACY | Facility: CLINIC | Age: 76
End: 2021-02-13
Payer: MEDICARE

## 2021-02-13 DIAGNOSIS — Z23 NEED FOR VACCINATION: Primary | ICD-10-CM

## 2021-03-12 ENCOUNTER — OFFICE VISIT (OUTPATIENT)
Dept: INTERNAL MEDICINE | Facility: CLINIC | Age: 76
End: 2021-03-12
Payer: MEDICARE

## 2021-03-12 ENCOUNTER — LAB VISIT (OUTPATIENT)
Dept: LAB | Facility: OTHER | Age: 76
End: 2021-03-12
Attending: INTERNAL MEDICINE
Payer: MEDICARE

## 2021-03-12 VITALS
SYSTOLIC BLOOD PRESSURE: 134 MMHG | OXYGEN SATURATION: 97 % | HEART RATE: 96 BPM | DIASTOLIC BLOOD PRESSURE: 84 MMHG | WEIGHT: 163.81 LBS | BODY MASS INDEX: 35.34 KG/M2 | HEIGHT: 57 IN

## 2021-03-12 DIAGNOSIS — E04.1 THYROID NODULE: ICD-10-CM

## 2021-03-12 DIAGNOSIS — M81.0 OSTEOPOROSIS, UNSPECIFIED OSTEOPOROSIS TYPE, UNSPECIFIED PATHOLOGICAL FRACTURE PRESENCE: ICD-10-CM

## 2021-03-12 DIAGNOSIS — E78.2 MIXED HYPERLIPIDEMIA: ICD-10-CM

## 2021-03-12 DIAGNOSIS — F17.210 LIGHT CIGARETTE SMOKER (1-9 CIGS/DAY): ICD-10-CM

## 2021-03-12 DIAGNOSIS — E11.9 TYPE 2 DIABETES MELLITUS WITHOUT COMPLICATION, WITHOUT LONG-TERM CURRENT USE OF INSULIN: ICD-10-CM

## 2021-03-12 DIAGNOSIS — I10 ESSENTIAL HYPERTENSION: ICD-10-CM

## 2021-03-12 DIAGNOSIS — J45.20 MILD INTERMITTENT ASTHMA WITHOUT COMPLICATION: Primary | ICD-10-CM

## 2021-03-12 PROBLEM — Z85.038 ENCOUNTER FOR COLONOSCOPY DUE TO HISTORY OF COLON CANCER: Status: RESOLVED | Noted: 2021-02-01 | Resolved: 2021-03-12

## 2021-03-12 PROBLEM — Z12.11 ENCOUNTER FOR COLONOSCOPY DUE TO HISTORY OF COLON CANCER: Status: RESOLVED | Noted: 2021-02-01 | Resolved: 2021-03-12

## 2021-03-12 LAB
ESTIMATED AVG GLUCOSE: 169 MG/DL (ref 68–131)
HBA1C MFR BLD: 7.5 % (ref 4–5.6)

## 2021-03-12 PROCEDURE — 1159F PR MEDICATION LIST DOCUMENTED IN MEDICAL RECORD: ICD-10-PCS | Mod: S$GLB,,, | Performed by: INTERNAL MEDICINE

## 2021-03-12 PROCEDURE — 99999 PR PBB SHADOW E&M-EST. PATIENT-LVL V: CPT | Mod: PBBFAC,,, | Performed by: INTERNAL MEDICINE

## 2021-03-12 PROCEDURE — 3079F DIAST BP 80-89 MM HG: CPT | Mod: CPTII,S$GLB,, | Performed by: INTERNAL MEDICINE

## 2021-03-12 PROCEDURE — 3288F PR FALLS RISK ASSESSMENT DOCUMENTED: ICD-10-PCS | Mod: CPTII,S$GLB,, | Performed by: INTERNAL MEDICINE

## 2021-03-12 PROCEDURE — 1159F MED LIST DOCD IN RCRD: CPT | Mod: S$GLB,,, | Performed by: INTERNAL MEDICINE

## 2021-03-12 PROCEDURE — 99406 BEHAV CHNG SMOKING 3-10 MIN: CPT | Mod: S$GLB,,, | Performed by: INTERNAL MEDICINE

## 2021-03-12 PROCEDURE — 1126F PR PAIN SEVERITY QUANTIFIED, NO PAIN PRESENT: ICD-10-PCS | Mod: S$GLB,,, | Performed by: INTERNAL MEDICINE

## 2021-03-12 PROCEDURE — 3051F PR MOST RECENT HEMOGLOBIN A1C LEVEL 7.0 - < 8.0%: ICD-10-PCS | Mod: CPTII,S$GLB,, | Performed by: INTERNAL MEDICINE

## 2021-03-12 PROCEDURE — 1101F PT FALLS ASSESS-DOCD LE1/YR: CPT | Mod: CPTII,S$GLB,, | Performed by: INTERNAL MEDICINE

## 2021-03-12 PROCEDURE — 3075F PR MOST RECENT SYSTOLIC BLOOD PRESS GE 130-139MM HG: ICD-10-PCS | Mod: CPTII,S$GLB,, | Performed by: INTERNAL MEDICINE

## 2021-03-12 PROCEDURE — 99499 UNLISTED E&M SERVICE: CPT | Mod: S$GLB,,, | Performed by: INTERNAL MEDICINE

## 2021-03-12 PROCEDURE — 1126F AMNT PAIN NOTED NONE PRSNT: CPT | Mod: S$GLB,,, | Performed by: INTERNAL MEDICINE

## 2021-03-12 PROCEDURE — 99406 PR TOBACCO USE CESSATION INTERMEDIATE 3-10 MINUTES: ICD-10-PCS | Mod: S$GLB,,, | Performed by: INTERNAL MEDICINE

## 2021-03-12 PROCEDURE — 3288F FALL RISK ASSESSMENT DOCD: CPT | Mod: CPTII,S$GLB,, | Performed by: INTERNAL MEDICINE

## 2021-03-12 PROCEDURE — 3079F PR MOST RECENT DIASTOLIC BLOOD PRESSURE 80-89 MM HG: ICD-10-PCS | Mod: CPTII,S$GLB,, | Performed by: INTERNAL MEDICINE

## 2021-03-12 PROCEDURE — 3072F PR LOW RISK FOR RETINOPATHY: ICD-10-PCS | Mod: S$GLB,,, | Performed by: INTERNAL MEDICINE

## 2021-03-12 PROCEDURE — 3051F HG A1C>EQUAL 7.0%<8.0%: CPT | Mod: CPTII,S$GLB,, | Performed by: INTERNAL MEDICINE

## 2021-03-12 PROCEDURE — 3072F LOW RISK FOR RETINOPATHY: CPT | Mod: S$GLB,,, | Performed by: INTERNAL MEDICINE

## 2021-03-12 PROCEDURE — 99215 PR OFFICE/OUTPT VISIT, EST, LEVL V, 40-54 MIN: ICD-10-PCS | Mod: 25,S$GLB,, | Performed by: INTERNAL MEDICINE

## 2021-03-12 PROCEDURE — 3008F PR BODY MASS INDEX (BMI) DOCUMENTED: ICD-10-PCS | Mod: CPTII,S$GLB,, | Performed by: INTERNAL MEDICINE

## 2021-03-12 PROCEDURE — 99499 RISK ADDL DX/OHS AUDIT: ICD-10-PCS | Mod: S$GLB,,, | Performed by: INTERNAL MEDICINE

## 2021-03-12 PROCEDURE — 1101F PR PT FALLS ASSESS DOC 0-1 FALLS W/OUT INJ PAST YR: ICD-10-PCS | Mod: CPTII,S$GLB,, | Performed by: INTERNAL MEDICINE

## 2021-03-12 PROCEDURE — 36415 COLL VENOUS BLD VENIPUNCTURE: CPT | Performed by: INTERNAL MEDICINE

## 2021-03-12 PROCEDURE — 99215 OFFICE O/P EST HI 40 MIN: CPT | Mod: 25,S$GLB,, | Performed by: INTERNAL MEDICINE

## 2021-03-12 PROCEDURE — 3008F BODY MASS INDEX DOCD: CPT | Mod: CPTII,S$GLB,, | Performed by: INTERNAL MEDICINE

## 2021-03-12 PROCEDURE — 99999 PR PBB SHADOW E&M-EST. PATIENT-LVL V: ICD-10-PCS | Mod: PBBFAC,,, | Performed by: INTERNAL MEDICINE

## 2021-03-12 PROCEDURE — 83036 HEMOGLOBIN GLYCOSYLATED A1C: CPT | Performed by: INTERNAL MEDICINE

## 2021-03-12 PROCEDURE — 3075F SYST BP GE 130 - 139MM HG: CPT | Mod: CPTII,S$GLB,, | Performed by: INTERNAL MEDICINE

## 2021-03-13 ENCOUNTER — IMMUNIZATION (OUTPATIENT)
Dept: PHARMACY | Facility: CLINIC | Age: 76
End: 2021-03-13
Payer: MEDICARE

## 2021-03-13 DIAGNOSIS — Z23 NEED FOR VACCINATION: Primary | ICD-10-CM

## 2021-03-18 DIAGNOSIS — E11.9 TYPE 2 DIABETES MELLITUS WITHOUT COMPLICATION, WITHOUT LONG-TERM CURRENT USE OF INSULIN: Primary | ICD-10-CM

## 2021-03-19 ENCOUNTER — TELEPHONE (OUTPATIENT)
Dept: DIABETES | Facility: CLINIC | Age: 76
End: 2021-03-19

## 2021-03-25 ENCOUNTER — TELEPHONE (OUTPATIENT)
Dept: INTERNAL MEDICINE | Facility: CLINIC | Age: 76
End: 2021-03-25

## 2021-04-07 RX ORDER — ALPRAZOLAM 0.5 MG/1
0.5 TABLET ORAL 3 TIMES DAILY PRN
OUTPATIENT
Start: 2021-04-07 | End: 2022-08-12

## 2021-04-09 ENCOUNTER — TELEPHONE (OUTPATIENT)
Dept: INTERNAL MEDICINE | Facility: CLINIC | Age: 76
End: 2021-04-09

## 2021-06-01 ENCOUNTER — TELEPHONE (OUTPATIENT)
Dept: DIABETES | Facility: CLINIC | Age: 76
End: 2021-06-01

## 2021-06-01 ENCOUNTER — HOSPITAL ENCOUNTER (OUTPATIENT)
Dept: RADIOLOGY | Facility: OTHER | Age: 76
Discharge: HOME | End: 2021-06-01
Attending: INTERNAL MEDICINE
Payer: MEDICARE

## 2021-06-01 ENCOUNTER — OFFICE VISIT (OUTPATIENT)
Dept: INTERNAL MEDICINE | Facility: CLINIC | Age: 76
End: 2021-06-01
Payer: MEDICARE

## 2021-06-01 VITALS
HEART RATE: 99 BPM | DIASTOLIC BLOOD PRESSURE: 78 MMHG | WEIGHT: 157.19 LBS | HEIGHT: 57 IN | BODY MASS INDEX: 33.91 KG/M2 | SYSTOLIC BLOOD PRESSURE: 132 MMHG | OXYGEN SATURATION: 93 %

## 2021-06-01 DIAGNOSIS — J45.20 MILD INTERMITTENT ASTHMA WITHOUT COMPLICATION: Primary | ICD-10-CM

## 2021-06-01 DIAGNOSIS — F17.210 LIGHT CIGARETTE SMOKER (1-9 CIGS/DAY): ICD-10-CM

## 2021-06-01 DIAGNOSIS — F41.9 ANXIETY: ICD-10-CM

## 2021-06-01 DIAGNOSIS — E11.65 TYPE 2 DIABETES MELLITUS WITH HYPERGLYCEMIA, WITHOUT LONG-TERM CURRENT USE OF INSULIN: ICD-10-CM

## 2021-06-01 DIAGNOSIS — Z12.31 ENCOUNTER FOR SCREENING MAMMOGRAM FOR MALIGNANT NEOPLASM OF BREAST: ICD-10-CM

## 2021-06-01 DIAGNOSIS — K21.9 GASTROESOPHAGEAL REFLUX DISEASE: ICD-10-CM

## 2021-06-01 DIAGNOSIS — I10 ESSENTIAL HYPERTENSION: ICD-10-CM

## 2021-06-01 PROCEDURE — 99999 PR PBB SHADOW E&M-EST. PATIENT-LVL IV: ICD-10-PCS | Mod: PBBFAC,,, | Performed by: INTERNAL MEDICINE

## 2021-06-01 PROCEDURE — 1159F MED LIST DOCD IN RCRD: CPT | Mod: S$GLB,,, | Performed by: INTERNAL MEDICINE

## 2021-06-01 PROCEDURE — 99999 PR PBB SHADOW E&M-EST. PATIENT-LVL IV: CPT | Mod: PBBFAC,,, | Performed by: INTERNAL MEDICINE

## 2021-06-01 PROCEDURE — 77067 SCR MAMMO BI INCL CAD: CPT | Mod: 26,,, | Performed by: RADIOLOGY

## 2021-06-01 PROCEDURE — 99406 BEHAV CHNG SMOKING 3-10 MIN: CPT | Mod: S$GLB,,, | Performed by: INTERNAL MEDICINE

## 2021-06-01 PROCEDURE — 99406 PR TOBACCO USE CESSATION INTERMEDIATE 3-10 MINUTES: ICD-10-PCS | Mod: S$GLB,,, | Performed by: INTERNAL MEDICINE

## 2021-06-01 PROCEDURE — 3051F HG A1C>EQUAL 7.0%<8.0%: CPT | Mod: CPTII,S$GLB,, | Performed by: INTERNAL MEDICINE

## 2021-06-01 PROCEDURE — 99499 RISK ADDL DX/OHS AUDIT: ICD-10-PCS | Mod: S$GLB,,, | Performed by: INTERNAL MEDICINE

## 2021-06-01 PROCEDURE — 99215 PR OFFICE/OUTPT VISIT, EST, LEVL V, 40-54 MIN: ICD-10-PCS | Mod: 25,S$GLB,, | Performed by: INTERNAL MEDICINE

## 2021-06-01 PROCEDURE — 3072F PR LOW RISK FOR RETINOPATHY: ICD-10-PCS | Mod: S$GLB,,, | Performed by: INTERNAL MEDICINE

## 2021-06-01 PROCEDURE — 3008F PR BODY MASS INDEX (BMI) DOCUMENTED: ICD-10-PCS | Mod: CPTII,S$GLB,, | Performed by: INTERNAL MEDICINE

## 2021-06-01 PROCEDURE — 3008F BODY MASS INDEX DOCD: CPT | Mod: CPTII,S$GLB,, | Performed by: INTERNAL MEDICINE

## 2021-06-01 PROCEDURE — 3072F LOW RISK FOR RETINOPATHY: CPT | Mod: S$GLB,,, | Performed by: INTERNAL MEDICINE

## 2021-06-01 PROCEDURE — 77067 SCR MAMMO BI INCL CAD: CPT | Mod: TC

## 2021-06-01 PROCEDURE — 99499 UNLISTED E&M SERVICE: CPT | Mod: S$GLB,,, | Performed by: INTERNAL MEDICINE

## 2021-06-01 PROCEDURE — 77063 MAMMO DIGITAL SCREENING BILAT WITH TOMO: ICD-10-PCS | Mod: 26,,, | Performed by: RADIOLOGY

## 2021-06-01 PROCEDURE — 77067 MAMMO DIGITAL SCREENING BILAT WITH TOMO: ICD-10-PCS | Mod: 26,,, | Performed by: RADIOLOGY

## 2021-06-01 PROCEDURE — 99215 OFFICE O/P EST HI 40 MIN: CPT | Mod: 25,S$GLB,, | Performed by: INTERNAL MEDICINE

## 2021-06-01 PROCEDURE — 77063 BREAST TOMOSYNTHESIS BI: CPT | Mod: 26,,, | Performed by: RADIOLOGY

## 2021-06-01 PROCEDURE — 3051F PR MOST RECENT HEMOGLOBIN A1C LEVEL 7.0 - < 8.0%: ICD-10-PCS | Mod: CPTII,S$GLB,, | Performed by: INTERNAL MEDICINE

## 2021-06-01 PROCEDURE — 1159F PR MEDICATION LIST DOCUMENTED IN MEDICAL RECORD: ICD-10-PCS | Mod: S$GLB,,, | Performed by: INTERNAL MEDICINE

## 2021-06-01 RX ORDER — AMLODIPINE BESYLATE 5 MG/1
5 TABLET ORAL DAILY
Qty: 90 TABLET | Refills: 3 | Status: CANCELLED | OUTPATIENT
Start: 2021-06-01

## 2021-06-01 RX ORDER — HYDROXYZINE HYDROCHLORIDE 25 MG/1
25 TABLET, FILM COATED ORAL 3 TIMES DAILY PRN
Qty: 30 TABLET | Refills: 3 | Status: SHIPPED | OUTPATIENT
Start: 2021-06-01 | End: 2022-09-15

## 2021-06-01 RX ORDER — METFORMIN HYDROCHLORIDE 500 MG/1
1000 TABLET, EXTENDED RELEASE ORAL
Qty: 60 TABLET | Refills: 11 | Status: CANCELLED | OUTPATIENT
Start: 2021-06-01 | End: 2022-06-01

## 2021-06-01 RX ORDER — BENZONATATE 100 MG/1
100 CAPSULE ORAL 3 TIMES DAILY PRN
Qty: 30 CAPSULE | Refills: 2 | Status: SHIPPED | OUTPATIENT
Start: 2021-06-01 | End: 2022-03-25 | Stop reason: SDUPTHER

## 2021-06-01 RX ORDER — MONTELUKAST SODIUM 10 MG/1
10 TABLET ORAL NIGHTLY
Qty: 90 TABLET | Refills: 3 | Status: SHIPPED | OUTPATIENT
Start: 2021-06-01 | End: 2022-06-01

## 2021-06-01 RX ORDER — ALBUTEROL SULFATE 90 UG/1
2 AEROSOL, METERED RESPIRATORY (INHALATION) EVERY 4 HOURS PRN
Qty: 18 G | Refills: 11 | Status: CANCELLED | OUTPATIENT
Start: 2021-06-01

## 2021-06-01 RX ORDER — PANTOPRAZOLE SODIUM 40 MG/1
40 TABLET, DELAYED RELEASE ORAL DAILY
Qty: 90 TABLET | Refills: 3 | Status: SHIPPED | OUTPATIENT
Start: 2021-06-01 | End: 2022-07-15

## 2021-06-01 RX ORDER — DULOXETIN HYDROCHLORIDE 30 MG/1
30 CAPSULE, DELAYED RELEASE ORAL DAILY
Qty: 90 CAPSULE | Refills: 3 | Status: CANCELLED | OUTPATIENT
Start: 2021-06-01

## 2021-06-02 ENCOUNTER — TELEPHONE (OUTPATIENT)
Dept: RADIOLOGY | Facility: OTHER | Age: 76
End: 2021-06-02

## 2021-06-04 ENCOUNTER — TELEPHONE (OUTPATIENT)
Dept: ADMINISTRATIVE | Facility: OTHER | Age: 76
End: 2021-06-04

## 2021-06-07 ENCOUNTER — HOSPITAL ENCOUNTER (OUTPATIENT)
Dept: RADIOLOGY | Facility: OTHER | Age: 76
Discharge: HOME | End: 2021-06-07
Attending: INTERNAL MEDICINE
Payer: MEDICARE

## 2021-06-07 DIAGNOSIS — R92.8 ABNORMAL MAMMOGRAM: ICD-10-CM

## 2021-06-07 PROCEDURE — 77062 MAMMO DIGITAL DIAGNOSTIC BILAT WITH TOMO: ICD-10-PCS | Mod: 26,,, | Performed by: RADIOLOGY

## 2021-06-07 PROCEDURE — 77066 MAMMO DIGITAL DIAGNOSTIC BILAT WITH TOMO: ICD-10-PCS | Mod: 26,,, | Performed by: RADIOLOGY

## 2021-06-07 PROCEDURE — 77062 BREAST TOMOSYNTHESIS BI: CPT | Mod: 26,,, | Performed by: RADIOLOGY

## 2021-06-07 PROCEDURE — 77066 DX MAMMO INCL CAD BI: CPT | Mod: 26,,, | Performed by: RADIOLOGY

## 2021-06-07 PROCEDURE — 77062 BREAST TOMOSYNTHESIS BI: CPT | Mod: TC

## 2021-06-15 ENCOUNTER — PATIENT OUTREACH (OUTPATIENT)
Dept: ADMINISTRATIVE | Facility: OTHER | Age: 76
End: 2021-06-15

## 2021-06-16 ENCOUNTER — CLINICAL SUPPORT (OUTPATIENT)
Dept: DIABETES | Facility: CLINIC | Age: 76
End: 2021-06-16
Payer: MEDICARE

## 2021-06-16 VITALS — BODY MASS INDEX: 34.29 KG/M2 | WEIGHT: 158.94 LBS | HEIGHT: 57 IN

## 2021-06-16 DIAGNOSIS — E11.65 TYPE 2 DIABETES MELLITUS WITH HYPERGLYCEMIA, WITHOUT LONG-TERM CURRENT USE OF INSULIN: ICD-10-CM

## 2021-06-16 DIAGNOSIS — E11.69 TYPE 2 DIABETES MELLITUS WITH OTHER SPECIFIED COMPLICATION, UNSPECIFIED WHETHER LONG TERM INSULIN USE: Primary | ICD-10-CM

## 2021-06-16 PROCEDURE — G0108 DIAB MANAGE TRN  PER INDIV: HCPCS | Mod: S$GLB,,, | Performed by: FAMILY MEDICINE

## 2021-06-16 PROCEDURE — G0108 PR DIAB MANAGE TRN  PER INDIV: ICD-10-PCS | Mod: S$GLB,,, | Performed by: FAMILY MEDICINE

## 2021-06-16 PROCEDURE — 99999 PR PBB SHADOW E&M-EST. PATIENT-LVL III: ICD-10-PCS | Mod: PBBFAC,,,

## 2021-06-16 PROCEDURE — 99999 PR PBB SHADOW E&M-EST. PATIENT-LVL III: CPT | Mod: PBBFAC,,,

## 2021-06-17 RX ORDER — INSULIN PUMP SYRINGE, 3 ML
EACH MISCELLANEOUS
Qty: 1 EACH | Refills: 0 | Status: SHIPPED | OUTPATIENT
Start: 2021-06-17

## 2021-06-17 RX ORDER — LANCETS
1 EACH MISCELLANEOUS 2 TIMES DAILY
Qty: 200 EACH | Refills: 11 | Status: SHIPPED | OUTPATIENT
Start: 2021-06-17 | End: 2023-02-08 | Stop reason: SDUPTHER

## 2021-07-01 ENCOUNTER — PATIENT MESSAGE (OUTPATIENT)
Dept: ADMINISTRATIVE | Facility: OTHER | Age: 76
End: 2021-07-01

## 2021-07-14 ENCOUNTER — TELEPHONE (OUTPATIENT)
Dept: INTERNAL MEDICINE | Facility: CLINIC | Age: 76
End: 2021-07-14

## 2021-07-27 ENCOUNTER — PATIENT OUTREACH (OUTPATIENT)
Dept: ADMINISTRATIVE | Facility: OTHER | Age: 76
End: 2021-07-27

## 2021-08-09 ENCOUNTER — TELEPHONE (OUTPATIENT)
Dept: INTERNAL MEDICINE | Facility: CLINIC | Age: 76
End: 2021-08-09
Payer: MEDICARE

## 2021-08-18 ENCOUNTER — OFFICE VISIT (OUTPATIENT)
Dept: INTERNAL MEDICINE | Facility: CLINIC | Age: 76
End: 2021-08-18
Attending: INTERNAL MEDICINE
Payer: MEDICARE

## 2021-08-18 VITALS
OXYGEN SATURATION: 96 % | HEART RATE: 107 BPM | BODY MASS INDEX: 34.1 KG/M2 | DIASTOLIC BLOOD PRESSURE: 78 MMHG | SYSTOLIC BLOOD PRESSURE: 136 MMHG | HEIGHT: 57 IN | WEIGHT: 158.06 LBS

## 2021-08-18 DIAGNOSIS — E04.1 THYROID NODULE: ICD-10-CM

## 2021-08-18 DIAGNOSIS — E78.2 MIXED HYPERLIPIDEMIA: ICD-10-CM

## 2021-08-18 DIAGNOSIS — G62.0 NEUROPATHY DUE TO CHEMOTHERAPEUTIC DRUG: ICD-10-CM

## 2021-08-18 DIAGNOSIS — Z85.038 HISTORY OF COLON CANCER IN ADULTHOOD: ICD-10-CM

## 2021-08-18 DIAGNOSIS — E11.65 TYPE 2 DIABETES MELLITUS WITH HYPERGLYCEMIA, WITHOUT LONG-TERM CURRENT USE OF INSULIN: ICD-10-CM

## 2021-08-18 DIAGNOSIS — T45.1X5A NEUROPATHY DUE TO CHEMOTHERAPEUTIC DRUG: ICD-10-CM

## 2021-08-18 DIAGNOSIS — M81.0 OSTEOPOROSIS, UNSPECIFIED OSTEOPOROSIS TYPE, UNSPECIFIED PATHOLOGICAL FRACTURE PRESENCE: ICD-10-CM

## 2021-08-18 DIAGNOSIS — I10 ESSENTIAL HYPERTENSION: Primary | ICD-10-CM

## 2021-08-18 PROCEDURE — 3075F SYST BP GE 130 - 139MM HG: CPT | Mod: CPTII,S$GLB,, | Performed by: INTERNAL MEDICINE

## 2021-08-18 PROCEDURE — 99999 PR PBB SHADOW E&M-EST. PATIENT-LVL IV: ICD-10-PCS | Mod: PBBFAC,,, | Performed by: INTERNAL MEDICINE

## 2021-08-18 PROCEDURE — 3072F LOW RISK FOR RETINOPATHY: CPT | Mod: CPTII,S$GLB,, | Performed by: INTERNAL MEDICINE

## 2021-08-18 PROCEDURE — 1160F PR REVIEW ALL MEDS BY PRESCRIBER/CLIN PHARMACIST DOCUMENTED: ICD-10-PCS | Mod: CPTII,S$GLB,, | Performed by: INTERNAL MEDICINE

## 2021-08-18 PROCEDURE — 3008F PR BODY MASS INDEX (BMI) DOCUMENTED: ICD-10-PCS | Mod: CPTII,S$GLB,, | Performed by: INTERNAL MEDICINE

## 2021-08-18 PROCEDURE — 1159F MED LIST DOCD IN RCRD: CPT | Mod: CPTII,S$GLB,, | Performed by: INTERNAL MEDICINE

## 2021-08-18 PROCEDURE — 3075F PR MOST RECENT SYSTOLIC BLOOD PRESS GE 130-139MM HG: ICD-10-PCS | Mod: CPTII,S$GLB,, | Performed by: INTERNAL MEDICINE

## 2021-08-18 PROCEDURE — 3066F NEPHROPATHY DOC TX: CPT | Mod: CPTII,S$GLB,, | Performed by: INTERNAL MEDICINE

## 2021-08-18 PROCEDURE — 3078F DIAST BP <80 MM HG: CPT | Mod: CPTII,S$GLB,, | Performed by: INTERNAL MEDICINE

## 2021-08-18 PROCEDURE — 1126F PR PAIN SEVERITY QUANTIFIED, NO PAIN PRESENT: ICD-10-PCS | Mod: CPTII,S$GLB,, | Performed by: INTERNAL MEDICINE

## 2021-08-18 PROCEDURE — 4010F PR ACE/ARB THEARPY RXD/TAKEN: ICD-10-PCS | Mod: CPTII,S$GLB,, | Performed by: INTERNAL MEDICINE

## 2021-08-18 PROCEDURE — 3051F PR MOST RECENT HEMOGLOBIN A1C LEVEL 7.0 - < 8.0%: ICD-10-PCS | Mod: CPTII,S$GLB,, | Performed by: INTERNAL MEDICINE

## 2021-08-18 PROCEDURE — 99397 PER PM REEVAL EST PAT 65+ YR: CPT | Mod: S$GLB,,, | Performed by: INTERNAL MEDICINE

## 2021-08-18 PROCEDURE — 3078F PR MOST RECENT DIASTOLIC BLOOD PRESSURE < 80 MM HG: ICD-10-PCS | Mod: CPTII,S$GLB,, | Performed by: INTERNAL MEDICINE

## 2021-08-18 PROCEDURE — 4010F ACE/ARB THERAPY RXD/TAKEN: CPT | Mod: CPTII,S$GLB,, | Performed by: INTERNAL MEDICINE

## 2021-08-18 PROCEDURE — 3051F HG A1C>EQUAL 7.0%<8.0%: CPT | Mod: CPTII,S$GLB,, | Performed by: INTERNAL MEDICINE

## 2021-08-18 PROCEDURE — 3072F PR LOW RISK FOR RETINOPATHY: ICD-10-PCS | Mod: CPTII,S$GLB,, | Performed by: INTERNAL MEDICINE

## 2021-08-18 PROCEDURE — 3066F PR DOCUMENTATION OF TREATMENT FOR NEPHROPATHY: ICD-10-PCS | Mod: CPTII,S$GLB,, | Performed by: INTERNAL MEDICINE

## 2021-08-18 PROCEDURE — 3061F PR NEG MICROALBUMINURIA RESULT DOCUMENTED/REVIEW: ICD-10-PCS | Mod: CPTII,S$GLB,, | Performed by: INTERNAL MEDICINE

## 2021-08-18 PROCEDURE — 1160F RVW MEDS BY RX/DR IN RCRD: CPT | Mod: CPTII,S$GLB,, | Performed by: INTERNAL MEDICINE

## 2021-08-18 PROCEDURE — 3008F BODY MASS INDEX DOCD: CPT | Mod: CPTII,S$GLB,, | Performed by: INTERNAL MEDICINE

## 2021-08-18 PROCEDURE — 99397 PR PREVENTIVE VISIT,EST,65 & OVER: ICD-10-PCS | Mod: S$GLB,,, | Performed by: INTERNAL MEDICINE

## 2021-08-18 PROCEDURE — 1126F AMNT PAIN NOTED NONE PRSNT: CPT | Mod: CPTII,S$GLB,, | Performed by: INTERNAL MEDICINE

## 2021-08-18 PROCEDURE — 1159F PR MEDICATION LIST DOCUMENTED IN MEDICAL RECORD: ICD-10-PCS | Mod: CPTII,S$GLB,, | Performed by: INTERNAL MEDICINE

## 2021-08-18 PROCEDURE — 3061F NEG MICROALBUMINURIA REV: CPT | Mod: CPTII,S$GLB,, | Performed by: INTERNAL MEDICINE

## 2021-08-18 PROCEDURE — 99999 PR PBB SHADOW E&M-EST. PATIENT-LVL IV: CPT | Mod: PBBFAC,,, | Performed by: INTERNAL MEDICINE

## 2021-08-18 RX ORDER — LOSARTAN POTASSIUM 50 MG/1
50 TABLET ORAL DAILY
Qty: 90 TABLET | Refills: 3 | Status: SHIPPED | OUTPATIENT
Start: 2021-08-18 | End: 2022-06-09

## 2021-09-30 ENCOUNTER — OFFICE VISIT (OUTPATIENT)
Dept: OPTOMETRY | Facility: CLINIC | Age: 76
End: 2021-09-30
Payer: MEDICARE

## 2021-09-30 DIAGNOSIS — Z96.1 PSEUDOPHAKIA OF BOTH EYES: ICD-10-CM

## 2021-09-30 DIAGNOSIS — H01.02B SQUAMOUS BLEPHARITIS OF UPPER AND LOWER EYELIDS OF BOTH EYES: ICD-10-CM

## 2021-09-30 DIAGNOSIS — E11.9 TYPE 2 DIABETES MELLITUS WITHOUT OPHTHALMIC MANIFESTATIONS: Primary | ICD-10-CM

## 2021-09-30 DIAGNOSIS — H01.02A SQUAMOUS BLEPHARITIS OF UPPER AND LOWER EYELIDS OF BOTH EYES: ICD-10-CM

## 2021-09-30 DIAGNOSIS — H04.123 DRY EYE SYNDROME OF BOTH EYES: ICD-10-CM

## 2021-09-30 PROCEDURE — 92014 COMPRE OPH EXAM EST PT 1/>: CPT | Mod: HCNC,S$GLB,, | Performed by: OPTOMETRIST

## 2021-09-30 PROCEDURE — 2023F DILAT RTA XM W/O RTNOPTHY: CPT | Mod: HCNC,CPTII,S$GLB, | Performed by: OPTOMETRIST

## 2021-09-30 PROCEDURE — 1159F PR MEDICATION LIST DOCUMENTED IN MEDICAL RECORD: ICD-10-PCS | Mod: HCNC,CPTII,S$GLB, | Performed by: OPTOMETRIST

## 2021-09-30 PROCEDURE — 3051F HG A1C>EQUAL 7.0%<8.0%: CPT | Mod: HCNC,CPTII,S$GLB, | Performed by: OPTOMETRIST

## 2021-09-30 PROCEDURE — 3061F NEG MICROALBUMINURIA REV: CPT | Mod: HCNC,CPTII,S$GLB, | Performed by: OPTOMETRIST

## 2021-09-30 PROCEDURE — 3066F PR DOCUMENTATION OF TREATMENT FOR NEPHROPATHY: ICD-10-PCS | Mod: HCNC,CPTII,S$GLB, | Performed by: OPTOMETRIST

## 2021-09-30 PROCEDURE — 3061F PR NEG MICROALBUMINURIA RESULT DOCUMENTED/REVIEW: ICD-10-PCS | Mod: HCNC,CPTII,S$GLB, | Performed by: OPTOMETRIST

## 2021-09-30 PROCEDURE — 1126F AMNT PAIN NOTED NONE PRSNT: CPT | Mod: HCNC,CPTII,S$GLB, | Performed by: OPTOMETRIST

## 2021-09-30 PROCEDURE — 99499 UNLISTED E&M SERVICE: CPT | Mod: HCNC,S$GLB,, | Performed by: OPTOMETRIST

## 2021-09-30 PROCEDURE — 99499 RISK ADDL DX/OHS AUDIT: ICD-10-PCS | Mod: HCNC,S$GLB,, | Performed by: OPTOMETRIST

## 2021-09-30 PROCEDURE — 2023F PR DILATED RETINAL EXAM W/O EVID OF RETINOPATHY: ICD-10-PCS | Mod: HCNC,CPTII,S$GLB, | Performed by: OPTOMETRIST

## 2021-09-30 PROCEDURE — 3051F PR MOST RECENT HEMOGLOBIN A1C LEVEL 7.0 - < 8.0%: ICD-10-PCS | Mod: HCNC,CPTII,S$GLB, | Performed by: OPTOMETRIST

## 2021-09-30 PROCEDURE — 3288F FALL RISK ASSESSMENT DOCD: CPT | Mod: HCNC,CPTII,S$GLB, | Performed by: OPTOMETRIST

## 2021-09-30 PROCEDURE — 1101F PT FALLS ASSESS-DOCD LE1/YR: CPT | Mod: HCNC,CPTII,S$GLB, | Performed by: OPTOMETRIST

## 2021-09-30 PROCEDURE — 4010F PR ACE/ARB THEARPY RXD/TAKEN: ICD-10-PCS | Mod: HCNC,CPTII,S$GLB, | Performed by: OPTOMETRIST

## 2021-09-30 PROCEDURE — 4010F ACE/ARB THERAPY RXD/TAKEN: CPT | Mod: HCNC,CPTII,S$GLB, | Performed by: OPTOMETRIST

## 2021-09-30 PROCEDURE — 1101F PR PT FALLS ASSESS DOC 0-1 FALLS W/OUT INJ PAST YR: ICD-10-PCS | Mod: HCNC,CPTII,S$GLB, | Performed by: OPTOMETRIST

## 2021-09-30 PROCEDURE — 3066F NEPHROPATHY DOC TX: CPT | Mod: HCNC,CPTII,S$GLB, | Performed by: OPTOMETRIST

## 2021-09-30 PROCEDURE — 3288F PR FALLS RISK ASSESSMENT DOCUMENTED: ICD-10-PCS | Mod: HCNC,CPTII,S$GLB, | Performed by: OPTOMETRIST

## 2021-09-30 PROCEDURE — 1126F PR PAIN SEVERITY QUANTIFIED, NO PAIN PRESENT: ICD-10-PCS | Mod: HCNC,CPTII,S$GLB, | Performed by: OPTOMETRIST

## 2021-09-30 PROCEDURE — 99999 PR PBB SHADOW E&M-EST. PATIENT-LVL III: CPT | Mod: PBBFAC,HCNC,, | Performed by: OPTOMETRIST

## 2021-09-30 PROCEDURE — 99999 PR PBB SHADOW E&M-EST. PATIENT-LVL III: ICD-10-PCS | Mod: PBBFAC,HCNC,, | Performed by: OPTOMETRIST

## 2021-09-30 PROCEDURE — 92014 PR EYE EXAM, EST PATIENT,COMPREHESV: ICD-10-PCS | Mod: HCNC,S$GLB,, | Performed by: OPTOMETRIST

## 2021-09-30 PROCEDURE — 1159F MED LIST DOCD IN RCRD: CPT | Mod: HCNC,CPTII,S$GLB, | Performed by: OPTOMETRIST

## 2021-09-30 RX ORDER — ERYTHROMYCIN 5 MG/G
OINTMENT OPHTHALMIC NIGHTLY
Qty: 1 TUBE | Refills: 3 | Status: SHIPPED | OUTPATIENT
Start: 2021-09-30 | End: 2021-10-10

## 2021-09-30 RX ORDER — LIDOCAINE HYDROCHLORIDE 20 MG/ML
SOLUTION ORAL; TOPICAL
Status: ON HOLD | COMMUNITY
Start: 2021-08-25 | End: 2022-06-22 | Stop reason: HOSPADM

## 2021-09-30 RX ORDER — HYDROCODONE BITARTRATE AND ACETAMINOPHEN 5; 325 MG/1; MG/1
TABLET ORAL
COMMUNITY
Start: 2021-09-20 | End: 2021-10-01

## 2021-10-01 ENCOUNTER — LAB VISIT (OUTPATIENT)
Dept: LAB | Facility: OTHER | Age: 76
End: 2021-10-01
Attending: INTERNAL MEDICINE
Payer: MEDICARE

## 2021-10-01 ENCOUNTER — OFFICE VISIT (OUTPATIENT)
Dept: INTERNAL MEDICINE | Facility: CLINIC | Age: 76
End: 2021-10-01
Payer: MEDICARE

## 2021-10-01 VITALS
DIASTOLIC BLOOD PRESSURE: 72 MMHG | HEIGHT: 57 IN | HEART RATE: 96 BPM | WEIGHT: 156.06 LBS | BODY MASS INDEX: 33.67 KG/M2 | OXYGEN SATURATION: 99 % | SYSTOLIC BLOOD PRESSURE: 115 MMHG

## 2021-10-01 DIAGNOSIS — R10.13 EPIGASTRIC PAIN: Primary | ICD-10-CM

## 2021-10-01 DIAGNOSIS — E11.65 TYPE 2 DIABETES MELLITUS WITH HYPERGLYCEMIA, WITHOUT LONG-TERM CURRENT USE OF INSULIN: ICD-10-CM

## 2021-10-01 DIAGNOSIS — E04.1 THYROID NODULE: ICD-10-CM

## 2021-10-01 DIAGNOSIS — I10 ESSENTIAL HYPERTENSION: ICD-10-CM

## 2021-10-01 LAB
ESTIMATED AVG GLUCOSE: 160 MG/DL (ref 68–131)
HBA1C MFR BLD: 7.2 % (ref 4–5.6)

## 2021-10-01 PROCEDURE — 3051F HG A1C>EQUAL 7.0%<8.0%: CPT | Mod: HCNC,CPTII,S$GLB, | Performed by: INTERNAL MEDICINE

## 2021-10-01 PROCEDURE — 3074F PR MOST RECENT SYSTOLIC BLOOD PRESSURE < 130 MM HG: ICD-10-PCS | Mod: HCNC,CPTII,S$GLB, | Performed by: INTERNAL MEDICINE

## 2021-10-01 PROCEDURE — 4010F PR ACE/ARB THEARPY RXD/TAKEN: ICD-10-PCS | Mod: HCNC,CPTII,S$GLB, | Performed by: INTERNAL MEDICINE

## 2021-10-01 PROCEDURE — 3008F PR BODY MASS INDEX (BMI) DOCUMENTED: ICD-10-PCS | Mod: HCNC,CPTII,S$GLB, | Performed by: INTERNAL MEDICINE

## 2021-10-01 PROCEDURE — 99215 OFFICE O/P EST HI 40 MIN: CPT | Mod: HCNC,S$GLB,, | Performed by: INTERNAL MEDICINE

## 2021-10-01 PROCEDURE — 3008F BODY MASS INDEX DOCD: CPT | Mod: HCNC,CPTII,S$GLB, | Performed by: INTERNAL MEDICINE

## 2021-10-01 PROCEDURE — 99499 RISK ADDL DX/OHS AUDIT: ICD-10-PCS | Mod: S$GLB,,, | Performed by: INTERNAL MEDICINE

## 2021-10-01 PROCEDURE — 3074F SYST BP LT 130 MM HG: CPT | Mod: HCNC,CPTII,S$GLB, | Performed by: INTERNAL MEDICINE

## 2021-10-01 PROCEDURE — 3072F LOW RISK FOR RETINOPATHY: CPT | Mod: HCNC,CPTII,S$GLB, | Performed by: INTERNAL MEDICINE

## 2021-10-01 PROCEDURE — 3066F NEPHROPATHY DOC TX: CPT | Mod: HCNC,CPTII,S$GLB, | Performed by: INTERNAL MEDICINE

## 2021-10-01 PROCEDURE — 3078F PR MOST RECENT DIASTOLIC BLOOD PRESSURE < 80 MM HG: ICD-10-PCS | Mod: HCNC,CPTII,S$GLB, | Performed by: INTERNAL MEDICINE

## 2021-10-01 PROCEDURE — 1160F PR REVIEW ALL MEDS BY PRESCRIBER/CLIN PHARMACIST DOCUMENTED: ICD-10-PCS | Mod: HCNC,CPTII,S$GLB, | Performed by: INTERNAL MEDICINE

## 2021-10-01 PROCEDURE — 3072F PR LOW RISK FOR RETINOPATHY: ICD-10-PCS | Mod: HCNC,CPTII,S$GLB, | Performed by: INTERNAL MEDICINE

## 2021-10-01 PROCEDURE — 1126F AMNT PAIN NOTED NONE PRSNT: CPT | Mod: HCNC,CPTII,S$GLB, | Performed by: INTERNAL MEDICINE

## 2021-10-01 PROCEDURE — 3288F PR FALLS RISK ASSESSMENT DOCUMENTED: ICD-10-PCS | Mod: HCNC,CPTII,S$GLB, | Performed by: INTERNAL MEDICINE

## 2021-10-01 PROCEDURE — 1101F PR PT FALLS ASSESS DOC 0-1 FALLS W/OUT INJ PAST YR: ICD-10-PCS | Mod: HCNC,CPTII,S$GLB, | Performed by: INTERNAL MEDICINE

## 2021-10-01 PROCEDURE — 3078F DIAST BP <80 MM HG: CPT | Mod: HCNC,CPTII,S$GLB, | Performed by: INTERNAL MEDICINE

## 2021-10-01 PROCEDURE — 3288F FALL RISK ASSESSMENT DOCD: CPT | Mod: HCNC,CPTII,S$GLB, | Performed by: INTERNAL MEDICINE

## 2021-10-01 PROCEDURE — 1159F MED LIST DOCD IN RCRD: CPT | Mod: HCNC,CPTII,S$GLB, | Performed by: INTERNAL MEDICINE

## 2021-10-01 PROCEDURE — 83036 HEMOGLOBIN GLYCOSYLATED A1C: CPT | Mod: HCNC | Performed by: INTERNAL MEDICINE

## 2021-10-01 PROCEDURE — 1101F PT FALLS ASSESS-DOCD LE1/YR: CPT | Mod: HCNC,CPTII,S$GLB, | Performed by: INTERNAL MEDICINE

## 2021-10-01 PROCEDURE — 99499 UNLISTED E&M SERVICE: CPT | Mod: S$GLB,,, | Performed by: INTERNAL MEDICINE

## 2021-10-01 PROCEDURE — 3066F PR DOCUMENTATION OF TREATMENT FOR NEPHROPATHY: ICD-10-PCS | Mod: HCNC,CPTII,S$GLB, | Performed by: INTERNAL MEDICINE

## 2021-10-01 PROCEDURE — 1126F PR PAIN SEVERITY QUANTIFIED, NO PAIN PRESENT: ICD-10-PCS | Mod: HCNC,CPTII,S$GLB, | Performed by: INTERNAL MEDICINE

## 2021-10-01 PROCEDURE — 3061F NEG MICROALBUMINURIA REV: CPT | Mod: HCNC,CPTII,S$GLB, | Performed by: INTERNAL MEDICINE

## 2021-10-01 PROCEDURE — 36415 COLL VENOUS BLD VENIPUNCTURE: CPT | Mod: HCNC | Performed by: INTERNAL MEDICINE

## 2021-10-01 PROCEDURE — 1160F RVW MEDS BY RX/DR IN RCRD: CPT | Mod: HCNC,CPTII,S$GLB, | Performed by: INTERNAL MEDICINE

## 2021-10-01 PROCEDURE — 3051F PR MOST RECENT HEMOGLOBIN A1C LEVEL 7.0 - < 8.0%: ICD-10-PCS | Mod: HCNC,CPTII,S$GLB, | Performed by: INTERNAL MEDICINE

## 2021-10-01 PROCEDURE — 99999 PR PBB SHADOW E&M-EST. PATIENT-LVL III: ICD-10-PCS | Mod: PBBFAC,HCNC,, | Performed by: INTERNAL MEDICINE

## 2021-10-01 PROCEDURE — 4010F ACE/ARB THERAPY RXD/TAKEN: CPT | Mod: HCNC,CPTII,S$GLB, | Performed by: INTERNAL MEDICINE

## 2021-10-01 PROCEDURE — 1159F PR MEDICATION LIST DOCUMENTED IN MEDICAL RECORD: ICD-10-PCS | Mod: HCNC,CPTII,S$GLB, | Performed by: INTERNAL MEDICINE

## 2021-10-01 PROCEDURE — 99999 PR PBB SHADOW E&M-EST. PATIENT-LVL III: CPT | Mod: PBBFAC,HCNC,, | Performed by: INTERNAL MEDICINE

## 2021-10-01 PROCEDURE — 3061F PR NEG MICROALBUMINURIA RESULT DOCUMENTED/REVIEW: ICD-10-PCS | Mod: HCNC,CPTII,S$GLB, | Performed by: INTERNAL MEDICINE

## 2021-10-01 PROCEDURE — 99215 PR OFFICE/OUTPT VISIT, EST, LEVL V, 40-54 MIN: ICD-10-PCS | Mod: HCNC,S$GLB,, | Performed by: INTERNAL MEDICINE

## 2021-11-01 ENCOUNTER — IMMUNIZATION (OUTPATIENT)
Dept: PHARMACY | Facility: CLINIC | Age: 76
End: 2021-11-01
Payer: MEDICARE

## 2021-11-01 DIAGNOSIS — Z23 NEED FOR VACCINATION: Primary | ICD-10-CM

## 2021-11-16 ENCOUNTER — TELEPHONE (OUTPATIENT)
Dept: INTERNAL MEDICINE | Facility: CLINIC | Age: 76
End: 2021-11-16
Payer: MEDICARE

## 2021-11-17 RX ORDER — TIOTROPIUM BROMIDE AND OLODATEROL 3.124; 2.736 UG/1; UG/1
2 SPRAY, METERED RESPIRATORY (INHALATION) DAILY
Qty: 4 G | Refills: 11 | Status: SHIPPED | OUTPATIENT
Start: 2021-11-17 | End: 2021-11-18 | Stop reason: SDUPTHER

## 2021-11-18 ENCOUNTER — OFFICE VISIT (OUTPATIENT)
Dept: INTERNAL MEDICINE | Facility: CLINIC | Age: 76
End: 2021-11-18
Payer: MEDICARE

## 2021-11-18 DIAGNOSIS — Z76.0 ENCOUNTER FOR MEDICATION REFILL: Primary | ICD-10-CM

## 2021-11-18 DIAGNOSIS — Z09 FOLLOW UP: ICD-10-CM

## 2021-11-18 PROCEDURE — 3066F NEPHROPATHY DOC TX: CPT | Mod: HCNC,CPTII,95, | Performed by: PHYSICIAN ASSISTANT

## 2021-11-18 PROCEDURE — 1159F MED LIST DOCD IN RCRD: CPT | Mod: HCNC,CPTII,95, | Performed by: PHYSICIAN ASSISTANT

## 2021-11-18 PROCEDURE — 1101F PR PT FALLS ASSESS DOC 0-1 FALLS W/OUT INJ PAST YR: ICD-10-PCS | Mod: HCNC,CPTII,95, | Performed by: PHYSICIAN ASSISTANT

## 2021-11-18 PROCEDURE — 99442 PR PHYSICIAN TELEPHONE EVALUATION 11-20 MIN: CPT | Mod: HCNC,95,, | Performed by: PHYSICIAN ASSISTANT

## 2021-11-18 PROCEDURE — 1159F PR MEDICATION LIST DOCUMENTED IN MEDICAL RECORD: ICD-10-PCS | Mod: HCNC,CPTII,95, | Performed by: PHYSICIAN ASSISTANT

## 2021-11-18 PROCEDURE — 3051F PR MOST RECENT HEMOGLOBIN A1C LEVEL 7.0 - < 8.0%: ICD-10-PCS | Mod: HCNC,CPTII,95, | Performed by: PHYSICIAN ASSISTANT

## 2021-11-18 PROCEDURE — 3072F LOW RISK FOR RETINOPATHY: CPT | Mod: HCNC,CPTII,95, | Performed by: PHYSICIAN ASSISTANT

## 2021-11-18 PROCEDURE — 3288F PR FALLS RISK ASSESSMENT DOCUMENTED: ICD-10-PCS | Mod: HCNC,CPTII,95, | Performed by: PHYSICIAN ASSISTANT

## 2021-11-18 PROCEDURE — 3288F FALL RISK ASSESSMENT DOCD: CPT | Mod: HCNC,CPTII,95, | Performed by: PHYSICIAN ASSISTANT

## 2021-11-18 PROCEDURE — 3051F HG A1C>EQUAL 7.0%<8.0%: CPT | Mod: HCNC,CPTII,95, | Performed by: PHYSICIAN ASSISTANT

## 2021-11-18 PROCEDURE — 3061F PR NEG MICROALBUMINURIA RESULT DOCUMENTED/REVIEW: ICD-10-PCS | Mod: HCNC,CPTII,95, | Performed by: PHYSICIAN ASSISTANT

## 2021-11-18 PROCEDURE — 3066F PR DOCUMENTATION OF TREATMENT FOR NEPHROPATHY: ICD-10-PCS | Mod: HCNC,CPTII,95, | Performed by: PHYSICIAN ASSISTANT

## 2021-11-18 PROCEDURE — 1101F PT FALLS ASSESS-DOCD LE1/YR: CPT | Mod: HCNC,CPTII,95, | Performed by: PHYSICIAN ASSISTANT

## 2021-11-18 PROCEDURE — 3061F NEG MICROALBUMINURIA REV: CPT | Mod: HCNC,CPTII,95, | Performed by: PHYSICIAN ASSISTANT

## 2021-11-18 PROCEDURE — 4010F ACE/ARB THERAPY RXD/TAKEN: CPT | Mod: HCNC,CPTII,95, | Performed by: PHYSICIAN ASSISTANT

## 2021-11-18 PROCEDURE — 4010F PR ACE/ARB THEARPY RXD/TAKEN: ICD-10-PCS | Mod: HCNC,CPTII,95, | Performed by: PHYSICIAN ASSISTANT

## 2021-11-18 PROCEDURE — 99442 PR PHYSICIAN TELEPHONE EVALUATION 11-20 MIN: ICD-10-PCS | Mod: HCNC,95,, | Performed by: PHYSICIAN ASSISTANT

## 2021-11-18 PROCEDURE — 1160F RVW MEDS BY RX/DR IN RCRD: CPT | Mod: HCNC,CPTII,95, | Performed by: PHYSICIAN ASSISTANT

## 2021-11-18 PROCEDURE — 1160F PR REVIEW ALL MEDS BY PRESCRIBER/CLIN PHARMACIST DOCUMENTED: ICD-10-PCS | Mod: HCNC,CPTII,95, | Performed by: PHYSICIAN ASSISTANT

## 2021-11-18 PROCEDURE — 3072F PR LOW RISK FOR RETINOPATHY: ICD-10-PCS | Mod: HCNC,CPTII,95, | Performed by: PHYSICIAN ASSISTANT

## 2021-11-18 RX ORDER — ALBUTEROL SULFATE 90 UG/1
2 AEROSOL, METERED RESPIRATORY (INHALATION) EVERY 4 HOURS PRN
Qty: 18 G | Refills: 11 | Status: SHIPPED | OUTPATIENT
Start: 2021-11-18 | End: 2023-01-06

## 2021-11-18 RX ORDER — TIOTROPIUM BROMIDE AND OLODATEROL 3.124; 2.736 UG/1; UG/1
2 SPRAY, METERED RESPIRATORY (INHALATION) DAILY
Qty: 4 G | Refills: 11 | Status: ON HOLD | OUTPATIENT
Start: 2021-11-18 | End: 2022-06-22 | Stop reason: HOSPADM

## 2022-01-11 ENCOUNTER — LAB VISIT (OUTPATIENT)
Dept: LAB | Facility: OTHER | Age: 77
End: 2022-01-11
Attending: INTERNAL MEDICINE
Payer: MEDICARE

## 2022-01-11 DIAGNOSIS — C18.2 MALIGNANT NEOPLASM OF ASCENDING COLON: ICD-10-CM

## 2022-01-11 LAB
ALBUMIN SERPL BCP-MCNC: 3.6 G/DL (ref 3.5–5.2)
ALP SERPL-CCNC: 95 U/L (ref 55–135)
ALT SERPL W/O P-5'-P-CCNC: 21 U/L (ref 10–44)
ANION GAP SERPL CALC-SCNC: 8 MMOL/L (ref 8–16)
AST SERPL-CCNC: 19 U/L (ref 10–40)
BASOPHILS # BLD AUTO: 0.04 K/UL (ref 0–0.2)
BASOPHILS NFR BLD: 0.4 % (ref 0–1.9)
BILIRUB SERPL-MCNC: 0.4 MG/DL (ref 0.1–1)
BUN SERPL-MCNC: 12 MG/DL (ref 8–23)
CALCIUM SERPL-MCNC: 9.7 MG/DL (ref 8.7–10.5)
CEA SERPL-MCNC: 14 NG/ML (ref 0–5)
CHLORIDE SERPL-SCNC: 109 MMOL/L (ref 95–110)
CO2 SERPL-SCNC: 23 MMOL/L (ref 23–29)
CREAT SERPL-MCNC: 1.1 MG/DL (ref 0.5–1.4)
DIFFERENTIAL METHOD: ABNORMAL
EOSINOPHIL # BLD AUTO: 0.1 K/UL (ref 0–0.5)
EOSINOPHIL NFR BLD: 1.1 % (ref 0–8)
ERYTHROCYTE [DISTWIDTH] IN BLOOD BY AUTOMATED COUNT: 13.2 % (ref 11.5–14.5)
EST. GFR  (AFRICAN AMERICAN): 57 ML/MIN/1.73 M^2
EST. GFR  (NON AFRICAN AMERICAN): 50 ML/MIN/1.73 M^2
GLUCOSE SERPL-MCNC: 155 MG/DL (ref 70–110)
HCT VFR BLD AUTO: 41.8 % (ref 37–48.5)
HGB BLD-MCNC: 12.9 G/DL (ref 12–16)
IMM GRANULOCYTES # BLD AUTO: 0.04 K/UL (ref 0–0.04)
IMM GRANULOCYTES NFR BLD AUTO: 0.4 % (ref 0–0.5)
LYMPHOCYTES # BLD AUTO: 2.5 K/UL (ref 1–4.8)
LYMPHOCYTES NFR BLD: 23.7 % (ref 18–48)
MCH RBC QN AUTO: 27.4 PG (ref 27–31)
MCHC RBC AUTO-ENTMCNC: 30.9 G/DL (ref 32–36)
MCV RBC AUTO: 89 FL (ref 82–98)
MONOCYTES # BLD AUTO: 0.5 K/UL (ref 0.3–1)
MONOCYTES NFR BLD: 4.9 % (ref 4–15)
NEUTROPHILS # BLD AUTO: 7.4 K/UL (ref 1.8–7.7)
NEUTROPHILS NFR BLD: 69.5 % (ref 38–73)
NRBC BLD-RTO: 0 /100 WBC
PLATELET # BLD AUTO: 286 K/UL (ref 150–450)
PMV BLD AUTO: 9.6 FL (ref 9.2–12.9)
POTASSIUM SERPL-SCNC: 4.5 MMOL/L (ref 3.5–5.1)
PROT SERPL-MCNC: 7 G/DL (ref 6–8.4)
RBC # BLD AUTO: 4.71 M/UL (ref 4–5.4)
SODIUM SERPL-SCNC: 140 MMOL/L (ref 136–145)
WBC # BLD AUTO: 10.66 K/UL (ref 3.9–12.7)

## 2022-01-11 PROCEDURE — 85025 COMPLETE CBC W/AUTO DIFF WBC: CPT | Performed by: INTERNAL MEDICINE

## 2022-01-11 PROCEDURE — 36415 COLL VENOUS BLD VENIPUNCTURE: CPT | Performed by: INTERNAL MEDICINE

## 2022-01-11 PROCEDURE — 80053 COMPREHEN METABOLIC PANEL: CPT | Performed by: INTERNAL MEDICINE

## 2022-01-11 PROCEDURE — 82378 CARCINOEMBRYONIC ANTIGEN: CPT | Performed by: INTERNAL MEDICINE

## 2022-01-13 ENCOUNTER — OFFICE VISIT (OUTPATIENT)
Dept: HEMATOLOGY/ONCOLOGY | Facility: CLINIC | Age: 77
End: 2022-01-13
Payer: MEDICARE

## 2022-01-13 ENCOUNTER — INFUSION (OUTPATIENT)
Dept: INFUSION THERAPY | Facility: OTHER | Age: 77
End: 2022-01-13
Attending: STUDENT IN AN ORGANIZED HEALTH CARE EDUCATION/TRAINING PROGRAM
Payer: MEDICARE

## 2022-01-13 VITALS
DIASTOLIC BLOOD PRESSURE: 59 MMHG | BODY MASS INDEX: 33.67 KG/M2 | WEIGHT: 156.06 LBS | RESPIRATION RATE: 16 BRPM | HEIGHT: 57 IN | OXYGEN SATURATION: 95 % | TEMPERATURE: 99 F | HEART RATE: 98 BPM | SYSTOLIC BLOOD PRESSURE: 122 MMHG

## 2022-01-13 DIAGNOSIS — Z87.891 PERSONAL HISTORY OF NICOTINE DEPENDENCE: ICD-10-CM

## 2022-01-13 DIAGNOSIS — R97.0 ELEVATED CEA: ICD-10-CM

## 2022-01-13 DIAGNOSIS — I10 ESSENTIAL HYPERTENSION: ICD-10-CM

## 2022-01-13 DIAGNOSIS — Z85.038 HISTORY OF COLON CANCER: Primary | ICD-10-CM

## 2022-01-13 DIAGNOSIS — C18.2 MALIGNANT NEOPLASM OF ASCENDING COLON: Primary | ICD-10-CM

## 2022-01-13 DIAGNOSIS — F17.200 NICOTINE DEPENDENCE WITH CURRENT USE: ICD-10-CM

## 2022-01-13 DIAGNOSIS — Z12.2 ENCOUNTER FOR SCREENING FOR LUNG CANCER: ICD-10-CM

## 2022-01-13 PROCEDURE — 99499 UNLISTED E&M SERVICE: CPT | Mod: S$GLB,,, | Performed by: STUDENT IN AN ORGANIZED HEALTH CARE EDUCATION/TRAINING PROGRAM

## 2022-01-13 PROCEDURE — 25000003 PHARM REV CODE 250: Performed by: STUDENT IN AN ORGANIZED HEALTH CARE EDUCATION/TRAINING PROGRAM

## 2022-01-13 PROCEDURE — 1126F AMNT PAIN NOTED NONE PRSNT: CPT | Mod: CPTII,S$GLB,, | Performed by: STUDENT IN AN ORGANIZED HEALTH CARE EDUCATION/TRAINING PROGRAM

## 2022-01-13 PROCEDURE — 3008F PR BODY MASS INDEX (BMI) DOCUMENTED: ICD-10-PCS | Mod: CPTII,S$GLB,, | Performed by: STUDENT IN AN ORGANIZED HEALTH CARE EDUCATION/TRAINING PROGRAM

## 2022-01-13 PROCEDURE — 3078F DIAST BP <80 MM HG: CPT | Mod: CPTII,S$GLB,, | Performed by: STUDENT IN AN ORGANIZED HEALTH CARE EDUCATION/TRAINING PROGRAM

## 2022-01-13 PROCEDURE — 1126F PR PAIN SEVERITY QUANTIFIED, NO PAIN PRESENT: ICD-10-PCS | Mod: CPTII,S$GLB,, | Performed by: STUDENT IN AN ORGANIZED HEALTH CARE EDUCATION/TRAINING PROGRAM

## 2022-01-13 PROCEDURE — 3288F PR FALLS RISK ASSESSMENT DOCUMENTED: ICD-10-PCS | Mod: CPTII,S$GLB,, | Performed by: STUDENT IN AN ORGANIZED HEALTH CARE EDUCATION/TRAINING PROGRAM

## 2022-01-13 PROCEDURE — 63600175 PHARM REV CODE 636 W HCPCS: Performed by: STUDENT IN AN ORGANIZED HEALTH CARE EDUCATION/TRAINING PROGRAM

## 2022-01-13 PROCEDURE — 1159F PR MEDICATION LIST DOCUMENTED IN MEDICAL RECORD: ICD-10-PCS | Mod: CPTII,S$GLB,, | Performed by: STUDENT IN AN ORGANIZED HEALTH CARE EDUCATION/TRAINING PROGRAM

## 2022-01-13 PROCEDURE — 1101F PT FALLS ASSESS-DOCD LE1/YR: CPT | Mod: CPTII,S$GLB,, | Performed by: STUDENT IN AN ORGANIZED HEALTH CARE EDUCATION/TRAINING PROGRAM

## 2022-01-13 PROCEDURE — 1101F PR PT FALLS ASSESS DOC 0-1 FALLS W/OUT INJ PAST YR: ICD-10-PCS | Mod: CPTII,S$GLB,, | Performed by: STUDENT IN AN ORGANIZED HEALTH CARE EDUCATION/TRAINING PROGRAM

## 2022-01-13 PROCEDURE — 3008F BODY MASS INDEX DOCD: CPT | Mod: CPTII,S$GLB,, | Performed by: STUDENT IN AN ORGANIZED HEALTH CARE EDUCATION/TRAINING PROGRAM

## 2022-01-13 PROCEDURE — 3074F PR MOST RECENT SYSTOLIC BLOOD PRESSURE < 130 MM HG: ICD-10-PCS | Mod: CPTII,S$GLB,, | Performed by: STUDENT IN AN ORGANIZED HEALTH CARE EDUCATION/TRAINING PROGRAM

## 2022-01-13 PROCEDURE — 99999 PR PBB SHADOW E&M-EST. PATIENT-LVL V: ICD-10-PCS | Mod: PBBFAC,,, | Performed by: STUDENT IN AN ORGANIZED HEALTH CARE EDUCATION/TRAINING PROGRAM

## 2022-01-13 PROCEDURE — 3288F FALL RISK ASSESSMENT DOCD: CPT | Mod: CPTII,S$GLB,, | Performed by: STUDENT IN AN ORGANIZED HEALTH CARE EDUCATION/TRAINING PROGRAM

## 2022-01-13 PROCEDURE — 99214 PR OFFICE/OUTPT VISIT, EST, LEVL IV, 30-39 MIN: ICD-10-PCS | Mod: S$GLB,,, | Performed by: STUDENT IN AN ORGANIZED HEALTH CARE EDUCATION/TRAINING PROGRAM

## 2022-01-13 PROCEDURE — 99999 PR PBB SHADOW E&M-EST. PATIENT-LVL V: CPT | Mod: PBBFAC,,, | Performed by: STUDENT IN AN ORGANIZED HEALTH CARE EDUCATION/TRAINING PROGRAM

## 2022-01-13 PROCEDURE — 1160F PR REVIEW ALL MEDS BY PRESCRIBER/CLIN PHARMACIST DOCUMENTED: ICD-10-PCS | Mod: CPTII,S$GLB,, | Performed by: STUDENT IN AN ORGANIZED HEALTH CARE EDUCATION/TRAINING PROGRAM

## 2022-01-13 PROCEDURE — 99214 OFFICE O/P EST MOD 30 MIN: CPT | Mod: S$GLB,,, | Performed by: STUDENT IN AN ORGANIZED HEALTH CARE EDUCATION/TRAINING PROGRAM

## 2022-01-13 PROCEDURE — 1160F RVW MEDS BY RX/DR IN RCRD: CPT | Mod: CPTII,S$GLB,, | Performed by: STUDENT IN AN ORGANIZED HEALTH CARE EDUCATION/TRAINING PROGRAM

## 2022-01-13 PROCEDURE — 3072F PR LOW RISK FOR RETINOPATHY: ICD-10-PCS | Mod: CPTII,S$GLB,, | Performed by: STUDENT IN AN ORGANIZED HEALTH CARE EDUCATION/TRAINING PROGRAM

## 2022-01-13 PROCEDURE — 99499 RISK ADDL DX/OHS AUDIT: ICD-10-PCS | Mod: S$GLB,,, | Performed by: STUDENT IN AN ORGANIZED HEALTH CARE EDUCATION/TRAINING PROGRAM

## 2022-01-13 PROCEDURE — 96523 IRRIG DRUG DELIVERY DEVICE: CPT

## 2022-01-13 PROCEDURE — 3074F SYST BP LT 130 MM HG: CPT | Mod: CPTII,S$GLB,, | Performed by: STUDENT IN AN ORGANIZED HEALTH CARE EDUCATION/TRAINING PROGRAM

## 2022-01-13 PROCEDURE — 3078F PR MOST RECENT DIASTOLIC BLOOD PRESSURE < 80 MM HG: ICD-10-PCS | Mod: CPTII,S$GLB,, | Performed by: STUDENT IN AN ORGANIZED HEALTH CARE EDUCATION/TRAINING PROGRAM

## 2022-01-13 PROCEDURE — A4216 STERILE WATER/SALINE, 10 ML: HCPCS | Performed by: STUDENT IN AN ORGANIZED HEALTH CARE EDUCATION/TRAINING PROGRAM

## 2022-01-13 PROCEDURE — 1159F MED LIST DOCD IN RCRD: CPT | Mod: CPTII,S$GLB,, | Performed by: STUDENT IN AN ORGANIZED HEALTH CARE EDUCATION/TRAINING PROGRAM

## 2022-01-13 PROCEDURE — 3072F LOW RISK FOR RETINOPATHY: CPT | Mod: CPTII,S$GLB,, | Performed by: STUDENT IN AN ORGANIZED HEALTH CARE EDUCATION/TRAINING PROGRAM

## 2022-01-13 RX ORDER — SODIUM CHLORIDE 0.9 % (FLUSH) 0.9 %
10 SYRINGE (ML) INJECTION
Status: CANCELLED | OUTPATIENT
Start: 2022-01-13

## 2022-01-13 RX ORDER — HEPARIN 100 UNIT/ML
500 SYRINGE INTRAVENOUS
Status: CANCELLED | OUTPATIENT
Start: 2022-01-13

## 2022-01-13 RX ORDER — HEPARIN 100 UNIT/ML
500 SYRINGE INTRAVENOUS
Status: COMPLETED | OUTPATIENT
Start: 2022-01-13 | End: 2022-01-13

## 2022-01-13 RX ORDER — SODIUM CHLORIDE 0.9 % (FLUSH) 0.9 %
10 SYRINGE (ML) INJECTION
Status: COMPLETED | OUTPATIENT
Start: 2022-01-13 | End: 2022-01-13

## 2022-01-13 RX ADMIN — HEPARIN 500 UNITS: 100 SYRINGE at 10:01

## 2022-01-13 RX ADMIN — Medication 10 ML: at 10:01

## 2022-01-13 NOTE — PLAN OF CARE
Port A Cath accessed and saline flushed, blood return noted. Patient tolerated well. Port A Cath 20 gauge 3/4 inch needle deaccessed/ removed, heparin locked. No apparent distress noted. Discharge instructions given to patient. Patient understands instructions.

## 2022-01-13 NOTE — PROGRESS NOTES
PROGRESS NOTE    Subjective:       Patient ID: Marizol Kevin is a 76 y.o. female.    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 Ms Kevin is a 75 yo woman who initially saw Dr GORDON on 2020 to establish care for her history of colon cancer. She was followed by Dr Hubbard previously. She underwent a right hemicolectomy on 8/15/2014 for cancer of the ascending colon. Four out of 17 lymph nodes were positive. She had a PET on 1/15/2014 which was negative for liver mets. She did have a hypermetabolic parotid lesion on the PET scan. She underwent adjuvant FOLFOX for 6 months from 2014 to 2015. She had elevated CEA and was last seen by Dr Hubbard on 2020. According to Dr Rosales note, the last colonoscopy was in 2019 at [facility]. As per patient the colonoscopy was good. She was told she is due for colonoscopy in 5 years. She is followed by Dr Cabrera for the parotid lesion. Her last CEA was 7.8 on 2019. She is a chronic smoker. 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 Occasional muscle cramps.   2. CEA on 20 elevated at 14.4. CT C/A/P 20: No evidence of metastatic disease. Partially evaluated small right thyroid nodule.  Dedicated thyroid ultrasound may be obtained for further evaluation if there is concern.  3. Biopsy of thyroid lesion on 10/12/20 showed benign pathology.    Interval History:   Ms Kevin returns for follow up. Reports to be in usual state of health. Continues to smoke. No abd pain, change in bowel habits, no weight loss, no n/v or blood in stool.     ECO    ROS:   A ten-point system review is obtained and negative except for what was stated in the Interval History.     Physical Examination:   Vital signs reviewed.   General:  well hydrated, well developed, in no acute distress  HEENT: normocephalic, PERRLA, EOMI, anicteric sclerae, oropharynx clear  Neck: supple, no JVD, thyromegaly, cervical or supraclavicular lymphadenopathy  Lungs: clear breath sounds bilaterally, no wheezing, rales, or rhonchi  Chest: port site clean  Heart: RRR, no M/R/G  Abdomen: soft, no tenderness, non-distended, no hepatosplenomegaly, mass, or hernia. BS present  Extremities: no clubbing, cyanosis, or edema  Skin: no rash, ulcer, or open wounds  Neuro: alert and oriented x 4, no focal neuro deficit  Psych: pleasant and appropriate mood and affect    Objective:     Laboratory Data:  Labs reviewed.   Lab Results   Component Value Date    WBC 10.66 01/11/2022    HGB 12.9 01/11/2022    HCT 41.8 01/11/2022    MCV 89 01/11/2022     01/11/2022     01/11/2022    K 4.5 01/11/2022     01/11/2022    CO2 23 01/11/2022    BUN 12 01/11/2022    CREATININE 1.1 01/11/2022    ALT 21 01/11/2022    AST 19 01/11/2022    ALKPHOS 95 01/11/2022    BILITOT 0.4 01/11/2022           Imaging Data:  CT C/A/P 7/29/20: No evidence of metastatic disease. Partially evaluated small right thyroid nodule.  Dedicated thyroid ultrasound may be obtained for further evaluation if there is concern.    Assessment and Plan:     1. History of colon cancer    2. Elevated CEA    3. Nicotine dependence with current use    4. Encounter for screening for lung cancer    5. Personal history of nicotine dependence     6. Essential hypertension      Colon cancer- history of stage III adenocarcinoma of the ascending colon s/p right hemicolectomy on 8/15/2014. Four out of 17 lymph nodes were positive. She underwent adjuvant FOLFOX for 6 months from 11/4/2014 to 4/29/2015.  Her CEA has been elevated without radiographic evidence of recurrence. She is a smoker  -cea chronically elevated but stable   -pt is worried about financial cost of further imaging for surveillance. US liver vs CT scan .  Financial dept contact info provided.   -CT lung screening ordered    HTN  - BP good  - c/w current meds and f/u with pcp    Her multiple questions were answered in the clinic. Encouraged to call should issues arise.         Electronically signed by Juanita Lebron

## 2022-01-13 NOTE — Clinical Note
US and CT scan ordered Give financial services number  Please schedule port flush per schedule RTC 1 year with cbc, cmp, cea prior

## 2022-01-24 ENCOUNTER — TELEPHONE (OUTPATIENT)
Dept: HEMATOLOGY/ONCOLOGY | Facility: CLINIC | Age: 77
End: 2022-01-24
Payer: MEDICARE

## 2022-01-24 NOTE — TELEPHONE ENCOUNTER
"----- Message from Monica Smith sent at 1/24/2022  9:46 AM CST -----  Regarding: Consult/Advisory:  Name Of Caller: Marizol    Contact Preference?: [phone]    What is the nature of the call?: calling in regards to CT scan and US that she missed           Additional Notes:  "Thank you for all that you do for our patients'"     "

## 2022-01-24 NOTE — TELEPHONE ENCOUNTER
Returned call and spoke with Ms Kevin. Ms Kevin calling to rescheduled her missed appointments for her U/S and her Ct scan. Ms Kevin will have to give her transportation service a three day notice prior. Informed Ms Kevin we will attempt to reschedule her appointment on Friday. Done. Appointments scheduled, patient given appointment information and it is also being mailed to Ms Kevin.

## 2022-01-28 ENCOUNTER — OFFICE VISIT (OUTPATIENT)
Dept: INTERNAL MEDICINE | Facility: CLINIC | Age: 77
End: 2022-01-28
Attending: INTERNAL MEDICINE
Payer: MEDICARE

## 2022-01-28 ENCOUNTER — HOSPITAL ENCOUNTER (OUTPATIENT)
Dept: RADIOLOGY | Facility: OTHER | Age: 77
Discharge: HOME | End: 2022-01-28
Attending: STUDENT IN AN ORGANIZED HEALTH CARE EDUCATION/TRAINING PROGRAM
Payer: MEDICARE

## 2022-01-28 VITALS
HEIGHT: 57 IN | OXYGEN SATURATION: 96 % | HEART RATE: 61 BPM | DIASTOLIC BLOOD PRESSURE: 86 MMHG | SYSTOLIC BLOOD PRESSURE: 118 MMHG | BODY MASS INDEX: 33.29 KG/M2 | WEIGHT: 154.31 LBS

## 2022-01-28 DIAGNOSIS — L50.9 HIVES: Primary | ICD-10-CM

## 2022-01-28 DIAGNOSIS — Z12.2 ENCOUNTER FOR SCREENING FOR LUNG CANCER: ICD-10-CM

## 2022-01-28 DIAGNOSIS — Z87.891 PERSONAL HISTORY OF NICOTINE DEPENDENCE: ICD-10-CM

## 2022-01-28 DIAGNOSIS — E11.65 TYPE 2 DIABETES MELLITUS WITH HYPERGLYCEMIA, WITHOUT LONG-TERM CURRENT USE OF INSULIN: ICD-10-CM

## 2022-01-28 DIAGNOSIS — Z85.038 HISTORY OF COLON CANCER IN ADULTHOOD: ICD-10-CM

## 2022-01-28 DIAGNOSIS — F17.200 NICOTINE DEPENDENCE WITH CURRENT USE: ICD-10-CM

## 2022-01-28 DIAGNOSIS — Z85.038 HISTORY OF COLON CANCER: ICD-10-CM

## 2022-01-28 DIAGNOSIS — I10 ESSENTIAL HYPERTENSION: ICD-10-CM

## 2022-01-28 DIAGNOSIS — R97.0 ELEVATED CEA: ICD-10-CM

## 2022-01-28 PROCEDURE — 1126F AMNT PAIN NOTED NONE PRSNT: CPT | Mod: CPTII,S$GLB,, | Performed by: INTERNAL MEDICINE

## 2022-01-28 PROCEDURE — 1159F MED LIST DOCD IN RCRD: CPT | Mod: CPTII,S$GLB,, | Performed by: INTERNAL MEDICINE

## 2022-01-28 PROCEDURE — 1160F RVW MEDS BY RX/DR IN RCRD: CPT | Mod: CPTII,S$GLB,, | Performed by: INTERNAL MEDICINE

## 2022-01-28 PROCEDURE — 3072F LOW RISK FOR RETINOPATHY: CPT | Mod: CPTII,S$GLB,, | Performed by: INTERNAL MEDICINE

## 2022-01-28 PROCEDURE — 3008F PR BODY MASS INDEX (BMI) DOCUMENTED: ICD-10-PCS | Mod: CPTII,S$GLB,, | Performed by: INTERNAL MEDICINE

## 2022-01-28 PROCEDURE — 1160F PR REVIEW ALL MEDS BY PRESCRIBER/CLIN PHARMACIST DOCUMENTED: ICD-10-PCS | Mod: CPTII,S$GLB,, | Performed by: INTERNAL MEDICINE

## 2022-01-28 PROCEDURE — 3288F FALL RISK ASSESSMENT DOCD: CPT | Mod: CPTII,S$GLB,, | Performed by: INTERNAL MEDICINE

## 2022-01-28 PROCEDURE — 1126F PR PAIN SEVERITY QUANTIFIED, NO PAIN PRESENT: ICD-10-PCS | Mod: CPTII,S$GLB,, | Performed by: INTERNAL MEDICINE

## 2022-01-28 PROCEDURE — 99213 PR OFFICE/OUTPT VISIT, EST, LEVL III, 20-29 MIN: ICD-10-PCS | Mod: S$GLB,,, | Performed by: INTERNAL MEDICINE

## 2022-01-28 PROCEDURE — 99999 PR PBB SHADOW E&M-EST. PATIENT-LVL III: ICD-10-PCS | Mod: PBBFAC,,, | Performed by: INTERNAL MEDICINE

## 2022-01-28 PROCEDURE — 3008F BODY MASS INDEX DOCD: CPT | Mod: CPTII,S$GLB,, | Performed by: INTERNAL MEDICINE

## 2022-01-28 PROCEDURE — 3051F HG A1C>EQUAL 7.0%<8.0%: CPT | Mod: CPTII,S$GLB,, | Performed by: INTERNAL MEDICINE

## 2022-01-28 PROCEDURE — 76705 ECHO EXAM OF ABDOMEN: CPT | Mod: TC

## 2022-01-28 PROCEDURE — 71271 CT CHEST LUNG SCREENING LOW DOSE: ICD-10-PCS | Mod: 26,,, | Performed by: STUDENT IN AN ORGANIZED HEALTH CARE EDUCATION/TRAINING PROGRAM

## 2022-01-28 PROCEDURE — 99213 OFFICE O/P EST LOW 20 MIN: CPT | Mod: S$GLB,,, | Performed by: INTERNAL MEDICINE

## 2022-01-28 PROCEDURE — 76705 US ABDOMEN LIMITED_LIVER: ICD-10-PCS | Mod: 26,,, | Performed by: RADIOLOGY

## 2022-01-28 PROCEDURE — 1101F PR PT FALLS ASSESS DOC 0-1 FALLS W/OUT INJ PAST YR: ICD-10-PCS | Mod: CPTII,S$GLB,, | Performed by: INTERNAL MEDICINE

## 2022-01-28 PROCEDURE — 3072F PR LOW RISK FOR RETINOPATHY: ICD-10-PCS | Mod: CPTII,S$GLB,, | Performed by: INTERNAL MEDICINE

## 2022-01-28 PROCEDURE — 3288F PR FALLS RISK ASSESSMENT DOCUMENTED: ICD-10-PCS | Mod: CPTII,S$GLB,, | Performed by: INTERNAL MEDICINE

## 2022-01-28 PROCEDURE — 71271 CT THORAX LUNG CANCER SCR C-: CPT | Mod: 26,,, | Performed by: STUDENT IN AN ORGANIZED HEALTH CARE EDUCATION/TRAINING PROGRAM

## 2022-01-28 PROCEDURE — 3079F DIAST BP 80-89 MM HG: CPT | Mod: CPTII,S$GLB,, | Performed by: INTERNAL MEDICINE

## 2022-01-28 PROCEDURE — 76705 ECHO EXAM OF ABDOMEN: CPT | Mod: 26,,, | Performed by: RADIOLOGY

## 2022-01-28 PROCEDURE — 1159F PR MEDICATION LIST DOCUMENTED IN MEDICAL RECORD: ICD-10-PCS | Mod: CPTII,S$GLB,, | Performed by: INTERNAL MEDICINE

## 2022-01-28 PROCEDURE — 1101F PT FALLS ASSESS-DOCD LE1/YR: CPT | Mod: CPTII,S$GLB,, | Performed by: INTERNAL MEDICINE

## 2022-01-28 PROCEDURE — 99999 PR PBB SHADOW E&M-EST. PATIENT-LVL III: CPT | Mod: PBBFAC,,, | Performed by: INTERNAL MEDICINE

## 2022-01-28 PROCEDURE — 71271 CT THORAX LUNG CANCER SCR C-: CPT | Mod: TC

## 2022-01-28 PROCEDURE — 3051F PR MOST RECENT HEMOGLOBIN A1C LEVEL 7.0 - < 8.0%: ICD-10-PCS | Mod: CPTII,S$GLB,, | Performed by: INTERNAL MEDICINE

## 2022-01-28 PROCEDURE — 3074F SYST BP LT 130 MM HG: CPT | Mod: CPTII,S$GLB,, | Performed by: INTERNAL MEDICINE

## 2022-01-28 PROCEDURE — 3074F PR MOST RECENT SYSTOLIC BLOOD PRESSURE < 130 MM HG: ICD-10-PCS | Mod: CPTII,S$GLB,, | Performed by: INTERNAL MEDICINE

## 2022-01-28 PROCEDURE — 3079F PR MOST RECENT DIASTOLIC BLOOD PRESSURE 80-89 MM HG: ICD-10-PCS | Mod: CPTII,S$GLB,, | Performed by: INTERNAL MEDICINE

## 2022-01-28 RX ORDER — ERYTHROMYCIN 5 MG/G
OINTMENT OPHTHALMIC
COMMUNITY
Start: 2022-01-27 | End: 2022-05-26 | Stop reason: ALTCHOICE

## 2022-01-28 NOTE — PROGRESS NOTES
"Subjective:       Patient ID: Marizol Kevin is a 76 y.o. female.    Chief Complaint: No chief complaint on file.      ithcing before Thanksgiving. Brother gaver her a cream and it stopped symptoms. 3-4 days prior symptoms started again. Symptoms have improved since onset 3 days prior. Less red and less itching and occurring less often. Has occurred on neck, torso, arms, and legs. No hands or feet involvement. No new lotions or creams. NO new meds ot OTC products. Stress is up. No swelling of lips/tongue/throat, SOB, N/V, F/C, new arthralgias, URI symptoms.       Review of Systems   Constitutional: Negative for chills, fatigue, fever and unexpected weight change.   HENT: Negative for ear pain, hearing loss, postnasal drip, tinnitus, trouble swallowing and voice change.    Respiratory: Negative for cough, chest tightness, shortness of breath and wheezing.    Cardiovascular: Negative for chest pain, palpitations and leg swelling.   Gastrointestinal: Negative for abdominal pain, blood in stool, diarrhea, nausea and vomiting.   Endocrine: Negative for polydipsia, polyphagia and polyuria.   Genitourinary: Negative for difficulty urinating, dysuria, hematuria and vaginal bleeding.   Skin: Positive for rash.   Allergic/Immunologic: Negative for food allergies.   Neurological: Negative for dizziness, numbness and headaches.   Hematological: Does not bruise/bleed easily.   Psychiatric/Behavioral: The patient is not nervous/anxious.        Objective:      Vitals:    01/28/22 1249   BP: 118/86   Pulse: 61   SpO2: 96%   Weight: 70 kg (154 lb 5.2 oz)   Height: 4' 9" (1.448 m)      Physical Exam  Constitutional:       General: She is not in acute distress.     Appearance: She is well-developed and well-nourished.   HENT:      Head: Normocephalic and atraumatic.      Mouth/Throat:      Mouth: Oropharynx is clear and moist.      Pharynx: No oropharyngeal exudate.   Eyes:      General: No scleral icterus.     Extraocular " Movements: EOM normal.      Conjunctiva/sclera: Conjunctivae normal.      Pupils: Pupils are equal, round, and reactive to light.   Neck:      Thyroid: No thyromegaly.   Cardiovascular:      Rate and Rhythm: Normal rate and regular rhythm.      Heart sounds: Normal heart sounds. No murmur heard.      Pulmonary:      Effort: Pulmonary effort is normal.      Breath sounds: Normal breath sounds. No wheezing or rales.   Abdominal:      General: There is no distension.      Palpations: Abdomen is soft.      Tenderness: There is no abdominal tenderness.   Musculoskeletal:         General: No tenderness or edema.   Lymphadenopathy:      Cervical: No cervical adenopathy.   Skin:     General: Skin is warm and dry.   Neurological:      Mental Status: She is alert and oriented to person, place, and time.   Psychiatric:         Mood and Affect: Mood and affect normal.         Behavior: Behavior normal.         Assessment:       1. Pruritus        Plan:       Diagnoses and all orders for this visit:    Pruritus   1)Antihistamines(Allegra, Claritin, Xzyal, Zyrtec) and can use hydroxyzine she already has prn/qhs. Start with hypoallergenic lotions and detergents and drier sheets and go from there. Office and Emergency Department prompts discussed.             Pedro Luis Ferrell MD  Internal Medicine-[facility]        Side effects of medication(s) were discussed in detail and patient voiced understanding.  Patient will call back for any issues or complications.

## 2022-01-31 ENCOUNTER — TELEPHONE (OUTPATIENT)
Dept: HEMATOLOGY/ONCOLOGY | Facility: CLINIC | Age: 77
End: 2022-01-31
Payer: MEDICARE

## 2022-01-31 NOTE — TELEPHONE ENCOUNTER
Called and spoke with Ms Kevin. Discussed about her US abd/liver and her CT of the lungs results per Dr. Lebron. No changes will be need. Treatment plan will remain the same. Verbalized understanding. No other questions at this time.     ----- Message from Juanita Lebron MD sent at 1/28/2022  4:46 PM CST -----  CT scan of lung looks ok, repeat would be needed in 1 year. Please call the patient and let them know.

## 2022-02-09 DIAGNOSIS — E11.9 TYPE 2 DIABETES MELLITUS WITHOUT COMPLICATION, WITHOUT LONG-TERM CURRENT USE OF INSULIN: ICD-10-CM

## 2022-02-09 DIAGNOSIS — J31.0 RHINITIS, UNSPECIFIED TYPE: ICD-10-CM

## 2022-02-09 NOTE — TELEPHONE ENCOUNTER
No new care gaps identified.  Powered by [employer] by [employer]. Reference number: [id number].   2/09/2022 5:12:45 PM CST

## 2022-02-10 RX ORDER — DULOXETIN HYDROCHLORIDE 30 MG/1
CAPSULE, DELAYED RELEASE ORAL
Qty: 90 CAPSULE | Refills: 3 | Status: SHIPPED | OUTPATIENT
Start: 2022-02-10 | End: 2022-11-25

## 2022-02-10 RX ORDER — METFORMIN HYDROCHLORIDE 500 MG/1
TABLET, EXTENDED RELEASE ORAL
Qty: 180 TABLET | Refills: 3 | Status: SHIPPED | OUTPATIENT
Start: 2022-02-10 | End: 2023-02-01

## 2022-02-10 RX ORDER — ATORVASTATIN CALCIUM 40 MG/1
TABLET, FILM COATED ORAL
Qty: 90 TABLET | Refills: 3 | Status: SHIPPED | OUTPATIENT
Start: 2022-02-10 | End: 2022-11-25

## 2022-02-10 RX ORDER — FLUTICASONE PROPIONATE 50 MCG
SPRAY, SUSPENSION (ML) NASAL
Qty: 16 G | Refills: 11 | Status: SHIPPED | OUTPATIENT
Start: 2022-02-10 | End: 2023-04-25

## 2022-02-11 RX ORDER — RALOXIFENE HYDROCHLORIDE 60 MG/1
TABLET, FILM COATED ORAL
Qty: 90 TABLET | Refills: 4 | Status: SHIPPED | OUTPATIENT
Start: 2022-02-11

## 2022-03-24 ENCOUNTER — TELEPHONE (OUTPATIENT)
Dept: INTERNAL MEDICINE | Facility: CLINIC | Age: 77
End: 2022-03-24
Payer: MEDICARE

## 2022-03-24 NOTE — TELEPHONE ENCOUNTER
What is the request in detail:  patient request call back in reference to  last  3 weeks she has been having cough /body aches and weak      Please contact to further discuss and advise        Called and spoke with the patient. States feels bad and her body hurts. States this has been off and on for about 3 weeks. Advised to go to ER or Urgent Care if her condition worsens today. Given appointment with PA in AM at 0730

## 2022-03-25 ENCOUNTER — HOSPITAL ENCOUNTER (OUTPATIENT)
Dept: RADIOLOGY | Facility: OTHER | Age: 77
Discharge: HOME | End: 2022-03-25
Attending: PHYSICIAN ASSISTANT
Payer: MEDICARE

## 2022-03-25 ENCOUNTER — OFFICE VISIT (OUTPATIENT)
Dept: INTERNAL MEDICINE | Facility: CLINIC | Age: 77
End: 2022-03-25
Payer: MEDICARE

## 2022-03-25 ENCOUNTER — TELEPHONE (OUTPATIENT)
Dept: INTERNAL MEDICINE | Facility: CLINIC | Age: 77
End: 2022-03-25
Payer: MEDICARE

## 2022-03-25 VITALS
OXYGEN SATURATION: 97 % | WEIGHT: 155 LBS | SYSTOLIC BLOOD PRESSURE: 104 MMHG | HEART RATE: 94 BPM | DIASTOLIC BLOOD PRESSURE: 60 MMHG | HEIGHT: 57 IN | BODY MASS INDEX: 33.44 KG/M2

## 2022-03-25 DIAGNOSIS — J45.20 MILD INTERMITTENT ASTHMA WITHOUT COMPLICATION: ICD-10-CM

## 2022-03-25 DIAGNOSIS — M79.10 MUSCLE PAIN: ICD-10-CM

## 2022-03-25 DIAGNOSIS — E11.65 TYPE 2 DIABETES MELLITUS WITH HYPERGLYCEMIA, WITHOUT LONG-TERM CURRENT USE OF INSULIN: ICD-10-CM

## 2022-03-25 DIAGNOSIS — R05.9 COUGH: ICD-10-CM

## 2022-03-25 DIAGNOSIS — R53.81 MALAISE: ICD-10-CM

## 2022-03-25 DIAGNOSIS — R53.81 MALAISE: Primary | ICD-10-CM

## 2022-03-25 DIAGNOSIS — F17.210 LIGHT CIGARETTE SMOKER (1-9 CIGS/DAY): ICD-10-CM

## 2022-03-25 LAB
CTP QC/QA: YES
FLUAV AG NPH QL: NEGATIVE
FLUBV AG NPH QL: NEGATIVE

## 2022-03-25 PROCEDURE — 1159F MED LIST DOCD IN RCRD: CPT | Mod: CPTII,S$GLB,, | Performed by: PHYSICIAN ASSISTANT

## 2022-03-25 PROCEDURE — 1160F PR REVIEW ALL MEDS BY PRESCRIBER/CLIN PHARMACIST DOCUMENTED: ICD-10-PCS | Mod: CPTII,S$GLB,, | Performed by: PHYSICIAN ASSISTANT

## 2022-03-25 PROCEDURE — 71046 XR CHEST PA AND LATERAL: ICD-10-PCS | Mod: 26,,, | Performed by: RADIOLOGY

## 2022-03-25 PROCEDURE — 3051F HG A1C>EQUAL 7.0%<8.0%: CPT | Mod: CPTII,S$GLB,, | Performed by: PHYSICIAN ASSISTANT

## 2022-03-25 PROCEDURE — 3078F PR MOST RECENT DIASTOLIC BLOOD PRESSURE < 80 MM HG: ICD-10-PCS | Mod: CPTII,S$GLB,, | Performed by: PHYSICIAN ASSISTANT

## 2022-03-25 PROCEDURE — 3288F PR FALLS RISK ASSESSMENT DOCUMENTED: ICD-10-PCS | Mod: CPTII,S$GLB,, | Performed by: PHYSICIAN ASSISTANT

## 2022-03-25 PROCEDURE — 1160F RVW MEDS BY RX/DR IN RCRD: CPT | Mod: CPTII,S$GLB,, | Performed by: PHYSICIAN ASSISTANT

## 2022-03-25 PROCEDURE — U0003 INFECTIOUS AGENT DETECTION BY NUCLEIC ACID (DNA OR RNA); SEVERE ACUTE RESPIRATORY SYNDROME CORONAVIRUS 2 (SARS-COV-2) (CORONAVIRUS DISEASE [COVID-19]), AMPLIFIED PROBE TECHNIQUE, MAKING USE OF HIGH THROUGHPUT TECHNOLOGIES AS DESCRIBED BY CMS-2020-01-R: HCPCS | Performed by: PHYSICIAN ASSISTANT

## 2022-03-25 PROCEDURE — 1101F PR PT FALLS ASSESS DOC 0-1 FALLS W/OUT INJ PAST YR: ICD-10-PCS | Mod: CPTII,S$GLB,, | Performed by: PHYSICIAN ASSISTANT

## 2022-03-25 PROCEDURE — 3074F SYST BP LT 130 MM HG: CPT | Mod: CPTII,S$GLB,, | Performed by: PHYSICIAN ASSISTANT

## 2022-03-25 PROCEDURE — 1126F AMNT PAIN NOTED NONE PRSNT: CPT | Mod: CPTII,S$GLB,, | Performed by: PHYSICIAN ASSISTANT

## 2022-03-25 PROCEDURE — 4010F ACE/ARB THERAPY RXD/TAKEN: CPT | Mod: CPTII,S$GLB,, | Performed by: PHYSICIAN ASSISTANT

## 2022-03-25 PROCEDURE — 1126F PR PAIN SEVERITY QUANTIFIED, NO PAIN PRESENT: ICD-10-PCS | Mod: CPTII,S$GLB,, | Performed by: PHYSICIAN ASSISTANT

## 2022-03-25 PROCEDURE — 87804 INFLUENZA ASSAY W/OPTIC: CPT | Mod: QW,S$GLB,, | Performed by: PHYSICIAN ASSISTANT

## 2022-03-25 PROCEDURE — 3288F FALL RISK ASSESSMENT DOCD: CPT | Mod: CPTII,S$GLB,, | Performed by: PHYSICIAN ASSISTANT

## 2022-03-25 PROCEDURE — 1159F PR MEDICATION LIST DOCUMENTED IN MEDICAL RECORD: ICD-10-PCS | Mod: CPTII,S$GLB,, | Performed by: PHYSICIAN ASSISTANT

## 2022-03-25 PROCEDURE — U0005 INFEC AGEN DETEC AMPLI PROBE: HCPCS | Performed by: PHYSICIAN ASSISTANT

## 2022-03-25 PROCEDURE — 71046 X-RAY EXAM CHEST 2 VIEWS: CPT | Mod: 26,,, | Performed by: RADIOLOGY

## 2022-03-25 PROCEDURE — 99214 PR OFFICE/OUTPT VISIT, EST, LEVL IV, 30-39 MIN: ICD-10-PCS | Mod: S$GLB,,, | Performed by: PHYSICIAN ASSISTANT

## 2022-03-25 PROCEDURE — 3008F BODY MASS INDEX DOCD: CPT | Mod: CPTII,S$GLB,, | Performed by: PHYSICIAN ASSISTANT

## 2022-03-25 PROCEDURE — 87804 POCT INFLUENZA A/B: ICD-10-PCS | Mod: QW,S$GLB,, | Performed by: PHYSICIAN ASSISTANT

## 2022-03-25 PROCEDURE — 4010F PR ACE/ARB THEARPY RXD/TAKEN: ICD-10-PCS | Mod: CPTII,S$GLB,, | Performed by: PHYSICIAN ASSISTANT

## 2022-03-25 PROCEDURE — 71046 X-RAY EXAM CHEST 2 VIEWS: CPT | Mod: TC,FY

## 2022-03-25 PROCEDURE — 3072F PR LOW RISK FOR RETINOPATHY: ICD-10-PCS | Mod: CPTII,S$GLB,, | Performed by: PHYSICIAN ASSISTANT

## 2022-03-25 PROCEDURE — 3072F LOW RISK FOR RETINOPATHY: CPT | Mod: CPTII,S$GLB,, | Performed by: PHYSICIAN ASSISTANT

## 2022-03-25 PROCEDURE — 3074F PR MOST RECENT SYSTOLIC BLOOD PRESSURE < 130 MM HG: ICD-10-PCS | Mod: CPTII,S$GLB,, | Performed by: PHYSICIAN ASSISTANT

## 2022-03-25 PROCEDURE — 3078F DIAST BP <80 MM HG: CPT | Mod: CPTII,S$GLB,, | Performed by: PHYSICIAN ASSISTANT

## 2022-03-25 PROCEDURE — 99999 PR PBB SHADOW E&M-EST. PATIENT-LVL V: CPT | Mod: PBBFAC,,, | Performed by: PHYSICIAN ASSISTANT

## 2022-03-25 PROCEDURE — 99999 PR PBB SHADOW E&M-EST. PATIENT-LVL V: ICD-10-PCS | Mod: PBBFAC,,, | Performed by: PHYSICIAN ASSISTANT

## 2022-03-25 PROCEDURE — 99214 OFFICE O/P EST MOD 30 MIN: CPT | Mod: S$GLB,,, | Performed by: PHYSICIAN ASSISTANT

## 2022-03-25 PROCEDURE — 3051F PR MOST RECENT HEMOGLOBIN A1C LEVEL 7.0 - < 8.0%: ICD-10-PCS | Mod: CPTII,S$GLB,, | Performed by: PHYSICIAN ASSISTANT

## 2022-03-25 PROCEDURE — 3008F PR BODY MASS INDEX (BMI) DOCUMENTED: ICD-10-PCS | Mod: CPTII,S$GLB,, | Performed by: PHYSICIAN ASSISTANT

## 2022-03-25 PROCEDURE — 1101F PT FALLS ASSESS-DOCD LE1/YR: CPT | Mod: CPTII,S$GLB,, | Performed by: PHYSICIAN ASSISTANT

## 2022-03-25 RX ORDER — AMOXICILLIN AND CLAVULANATE POTASSIUM 875; 125 MG/1; MG/1
1 TABLET, FILM COATED ORAL 2 TIMES DAILY
Qty: 20 TABLET | Refills: 0 | Status: SHIPPED | OUTPATIENT
Start: 2022-03-25 | End: 2022-04-04

## 2022-03-25 RX ORDER — GUAIFENESIN 600 MG/1
600 TABLET, EXTENDED RELEASE ORAL EVERY MORNING
Qty: 10 TABLET | Refills: 0 | Status: SHIPPED | OUTPATIENT
Start: 2022-03-25 | End: 2022-04-04

## 2022-03-25 RX ORDER — BENZONATATE 100 MG/1
100 CAPSULE ORAL 3 TIMES DAILY PRN
Qty: 30 CAPSULE | Refills: 2 | Status: SHIPPED | OUTPATIENT
Start: 2022-03-25 | End: 2022-05-18 | Stop reason: SDUPTHER

## 2022-03-25 RX ORDER — PROMETHAZINE HYDROCHLORIDE AND DEXTROMETHORPHAN HYDROBROMIDE 6.25; 15 MG/5ML; MG/5ML
5 SYRUP ORAL EVERY 4 HOURS PRN
Qty: 118 ML | Refills: 0 | Status: SHIPPED | OUTPATIENT
Start: 2022-03-25 | End: 2022-04-04

## 2022-03-25 NOTE — TELEPHONE ENCOUNTER
----- Message from Lauren Winkler sent at 3/25/2022  7:28 AM CDT -----  Regarding: Running late  Who called:ESTEBAN JIMENEZ [[id number]]    What is the request in detail: Patient is requesting a call back. Patient states she has been calling her cab since 6:45 and they are still not there. She would like to know if there is anyway she can be seen today. She will likely be too late for her 7:30 appt. Next appt is Monday, she declined and states she needs to be seen today.   Please advise.    Can the clinic reply by [facility]? No    Best call back number: [phone]    Additional Information: N/A

## 2022-03-25 NOTE — MEDICAL/APP STUDENT
Subjective:       Patient ID: Marizol Kevin is a 76 y.o. female.    Chief Complaint: Dizziness, Fatigue, Generalized Body Aches, Cough, and Shortness of Breath    75 y/o female is an established pt w/ a significant PMH of allergies, asthma,       2 wks ago - today is 3rd week  myalagia, fatigue,  bilateral otalagia, dizziness, balance issues, purulent productive cough, N/d, smoking, postnasal drip, congestion    Yesterday was worse - voice was hoarse.    She did not try anything for her sxs. Pt smokes cigarettes. She uses an inhaler for her lungs. Denies  CP, SOB, fever, chills, vomiting, melena/hematochezia, dysuria, hematuria, or weakness.         Occasionally checks glucose levels - takes metformin      Review of Systems   Constitutional: Positive for fatigue.   HENT: Positive for nasal congestion, ear pain (bilateral R>L), postnasal drip and sinus pressure/congestion.    Respiratory: Positive for cough (Purulent, productive). Negative for shortness of breath and wheezing.    Cardiovascular: Negative for chest pain and palpitations.   Gastrointestinal: Positive for diarrhea and nausea. Negative for blood in stool, constipation and vomiting.   Genitourinary: Negative for dysuria and hematuria.   Musculoskeletal: Negative for leg pain and neck stiffness.   Integumentary:  Negative for pallor, rash and wound.   Neurological: Positive for dizziness. Negative for weakness.   Psychiatric/Behavioral: Negative for agitation, behavioral problems, confusion and decreased concentration.         Objective:      Physical Exam  Vitals reviewed.   Constitutional:       General: She is awake. She is not in acute distress.     Appearance: Normal appearance. She is obese. She is not ill-appearing, toxic-appearing or diaphoretic.   HENT:      Head: Normocephalic and atraumatic.      Right Ear: Tympanic membrane is erythematous and bulging.      Left Ear: Tympanic membrane is erythematous and bulging.      Ears:      Comments:  Purulent effusion behind right TM     Nose: Congestion present.      Right Turbinates: Enlarged and swollen.      Left Turbinates: Enlarged and swollen.      Comments: Turbinates erythematous     Mouth/Throat:      Pharynx: Posterior oropharyngeal erythema present. No oropharyngeal exudate.      Tonsils: No tonsillar exudate.      Comments: Posterior cobblestoning   Eyes:      General: Lids are normal. Vision grossly intact.      Conjunctiva/sclera: Conjunctivae normal.   Cardiovascular:      Rate and Rhythm: Normal rate and regular rhythm.      Pulses: Normal pulses.      Heart sounds: Normal heart sounds.   Pulmonary:      Effort: Pulmonary effort is normal. No respiratory distress.      Breath sounds: Normal breath sounds. No wheezing.   Musculoskeletal:         General: No swelling.      Cervical back: No rigidity.      Right lower leg: No edema.      Left lower leg: No edema.   Skin:     General: Skin is warm and dry.      Coloration: Skin is not jaundiced or pale.      Findings: No erythema.   Neurological:      General: No focal deficit present.      Mental Status: She is alert and oriented to person, place, and time. Mental status is at baseline.      Motor: No weakness.      Gait: Gait is intact.   Psychiatric:         Attention and Perception: Attention and perception normal.         Mood and Affect: Mood and affect normal.         Speech: Speech normal.         Behavior: Behavior normal. Behavior is cooperative.         Thought Content: Thought content normal.         Cognition and Memory: Cognition normal.         Judgment: Judgment normal.         Assessment:       Problem List Items Addressed This Visit    None     Visit Diagnoses     Malaise    -  Primary    Relevant Orders    POCT Influenza A/B          Plan:     CXR to r/o pneumonia  Augmentin and promethazine  Mucinex  R/o COVID and flu  Probiotic- activia yogurt while on abx  Check glucose regularly   F/u in a wk - next fri   Educated on sxs if  needed to go to ER and not wait on f/u

## 2022-03-25 NOTE — TELEPHONE ENCOUNTER
----- Message from Ashwini Devan sent at 3/25/2022  7:37 AM CDT -----  Contact: ESTEBAN JIMENEZ [[id number]]  Type: Call Back    Who called:ESTEBAN JIMENEZ [[id number]]    What is the request in detail: Patient is requesting a call back. She states that she is in the parking garage and is on her way up to the office. Please advise.     Can the clinic reply by [facility]? No    Would the patient rather a call back or a response via [facility]? Call back     Best call back number: [phone] (mobile)    Additional Information:

## 2022-03-25 NOTE — PROGRESS NOTES
"INTERNAL MEDICINE URGENT VISIT NOTE    CHIEF COMPLAINT     Chief Complaint   Patient presents with    Dizziness    Fatigue    Generalized Body Aches    Cough    Shortness of Breath       HPI     Marizol Kevin is a 76 y.o. female who presents for an urgent visit today.    PCP is Belinda Fajardo MD, patient is known to me.     Patient presents with complaints of malaise "feels bad and my body hurts". This has been going on for about 3 weeks (symptoms wax and wane).     2 wks ago - today is 3rd week  myalagia, fatigue,  bilateral otalagia, dizziness, balance issues, purulent productive cough, N/d, smoking, postnasal drip, congestion     Yesterday was worse - voice was hoarse.     She did not try anything for her sxs. Pt smokes cigarettes. She uses an inhaler for her lungs. Denies  CP, SOB, fever, chills, vomiting, melena/hematochezia, dysuria, hematuria, or weakness    Occasionally checks glucose levels - takes metformin    Past Medical History:  Past Medical History:   Diagnosis Date    Allergy     Anxiety     Cancer     colon    Cataract     Colon cancer     Diabetes mellitus, type 2     Hyperlipidemia     Hypertension     Insomnia        Home Medications:  Prior to Admission medications    Medication Sig Start Date End Date Taking? Authorizing Provider   albuterol (VENTOLIN HFA) 90 mcg/actuation inhaler Inhale 2 puffs into the lungs every 4 (four) hours as needed for Wheezing. 11/18/21   Stacy Arguello PA-C   amLODIPine (NORVASC) 5 MG tablet TAKE 1 TABLET EVERY DAY 11/11/21   Belinda Fajardo MD   aspirin (ECOTRIN) 81 MG EC tablet Take 81 mg by mouth once daily.    Historical Provider   atorvastatin (LIPITOR) 40 MG tablet TAKE 1 TABLET EVERY DAY 2/10/22   Belinda Fajardo MD   benzonatate (TESSALON) 100 MG capsule Take 1 capsule (100 mg total) by mouth 3 (three) times daily as needed for Cough.  Patient not taking: Reported on 1/28/2022 6/1/21   Belinda Fajardo MD   betamethasone valerate 0.1% " (VALISONE) 0.1 % Oint Apply topically once daily. 1/13/21   Glynn Jensen MD   blood sugar diagnostic Strp 1 strip by Misc.(Non-Drug; Combo Route) route 2 (two) times daily. 6/17/21   Belinda Fajardo MD   blood-glucose meter kit Please provide with insurance covered meter 6/17/21   Belinda Fajardo MD   cholecalciferol, vitamin D3, (VITAMIN D3) 50 mcg (2,000 unit) Cap Take by mouth.    Historical Provider   DULoxetine (CYMBALTA) 30 MG capsule TAKE 1 CAPSULE EVERY DAY 2/10/22   Belinda Fjaardo MD   erythromycin (ROMYCIN) ophthalmic ointment  1/27/22   Historical Provider   fluticasone propionate (FLONASE) 50 mcg/actuation nasal spray USE 1 SPRAY IN EACH NOSTRIL ONE TIME DAILY 2/10/22   Belinda Fajardo MD   hydrOXYzine HCL (ATARAX) 25 MG tablet Take 1 tablet (25 mg total) by mouth 3 (three) times daily as needed for Itching or Anxiety. 6/1/21   Belinda Fajardo MD   lancets Misc 1 Device by Misc.(Non-Drug; Combo Route) route 2 (two) times daily. 6/17/21   Belinda Fajardo MD   LIDOCAINE VISCOUS 2 % solution  8/25/21   Historical Provider   losartan (COZAAR) 50 MG tablet Take 1 tablet (50 mg total) by mouth once daily. 8/18/21 8/18/22  Pedro Luis Butterfield MD   metFORMIN (GLUCOPHAGE-XR) 500 MG ER 24hr tablet TAKE 2 TABLETS EVERY DAY WITH BREAKFAST 2/10/22   Belinda Fajardo MD   montelukast (SINGULAIR) 10 mg tablet Take 1 tablet (10 mg total) by mouth every evening.  Patient not taking: Reported on 1/28/2022 6/1/21 6/1/22  Belinda Fajardo MD   multivitamin capsule Take 1 capsule by mouth once daily.    Historical Provider   pantoprazole (PROTONIX) 40 MG tablet Take 1 tablet (40 mg total) by mouth once daily. 6/1/21   Belnida Fajardo MD   raloxifene (EVISTA) 60 mg tablet TAKE 1 TABLET EVERY DAY 2/11/22   Glynn Jensen MD   sars-cov-2, covid-19, (MODERNA COVID-19) 100 mcg/0.5 ml injection Inject into the muscle. 11/1/21   Susan Otero PharmTRISTA   tiotropium-olodateroL (STIOLTO RESPIMAT) 2.5-2.5 mcg/actuation Mist Inhale 2 puffs into  "the lungs once daily. 11/18/21   Stacy Arguello PA-C   triamcinolone acetonide 0.025% (KENALOG) 0.025 % cream APPLY DAILY TO AFFECTED AREA AS NEEDED FOR ITCHING. MAXIMUM 2 TIMES A WEEK 1/13/21   Glynn Jensen MD       Review of Systems:  Review of Systems   Constitutional: Positive for fatigue.   HENT: Positive for nasal congestion, ear pain (bilateral R>L), postnasal drip and sinus pressure/congestion.    Respiratory: Positive for cough (Purulent, productive). Negative for shortness of breath and wheezing.    Cardiovascular: Negative for chest pain and palpitations.   Gastrointestinal: Positive for diarrhea and nausea. Negative for blood in stool, constipation and vomiting.   Genitourinary: Negative for dysuria and hematuria.   Musculoskeletal: Negative for leg pain and neck stiffness.   Integumentary:  Negative for pallor, rash and wound.   Neurological: Positive for dizziness. Negative for weakness.   Psychiatric/Behavioral: Negative for agitation, behavioral problems, confusion and decreased concentration.   Health Maintainence:   Immunizations:  Health Maintenance       Date Due Completion Date    Pneumococcal Vaccines (Age 65+) (1 of 4 - PCV13) Never done ---    TETANUS VACCINE Never done ---    Shingles Vaccine (1 of 2) Never done ---    Hemoglobin A1c 04/01/2022 10/1/2021    Foot Exam 06/01/2022 6/1/2021    Override on 3/12/2021: Done    Lipid Panel 06/01/2022 6/1/2021    Mammogram 06/07/2022 6/7/2021    Eye Exam 09/30/2022 9/30/2021    Diabetes Urine Screening 10/01/2022 10/1/2021    Low Dose Statin 02/10/2023 2/10/2022    DEXA Scan 09/17/2023 9/17/2020    Colorectal Cancer Screening 02/01/2024 2/1/2021           PHYSICAL EXAM     /60   Pulse 94   Ht 4' 9" (1.448 m)   Wt 70.3 kg (154 lb 15.7 oz)   SpO2 97%   BMI 33.54 kg/m²     Physical Exam  Vitals reviewed.   Constitutional:       General: She is awake. She is not in acute distress.     Appearance: Normal appearance. She is obese. She is " not ill-appearing, toxic-appearing or diaphoretic.   HENT:      Head: Normocephalic and atraumatic.      Right Ear: Tympanic membrane is erythematous and bulging.      Left Ear: Tympanic membrane is erythematous and bulging.      Ears:      Comments: Purulent effusion behind right TM     Nose: Congestion present.      Right Turbinates: Enlarged and swollen.      Left Turbinates: Enlarged and swollen.      Comments: Turbinates erythematous     Mouth/Throat:      Pharynx: Posterior oropharyngeal erythema present. No oropharyngeal exudate.      Tonsils: No tonsillar exudate.      Comments: Posterior cobblestoning   Eyes:      General: Lids are normal. Vision grossly intact.      Conjunctiva/sclera: Conjunctivae normal.   Cardiovascular:      Rate and Rhythm: Normal rate and regular rhythm.      Pulses: Normal pulses.      Heart sounds: Normal heart sounds.   Pulmonary:      Effort: Pulmonary effort is normal. No respiratory distress.      Breath sounds: Normal breath sounds. No wheezing.   Musculoskeletal:         General: No swelling.      Cervical back: No rigidity.      Right lower leg: No edema.      Left lower leg: No edema.   Skin:     General: Skin is warm and dry.      Coloration: Skin is not jaundiced or pale.      Findings: No erythema.   Neurological:      General: No focal deficit present.      Mental Status: She is alert and oriented to person, place, and time. Mental status is at baseline.      Motor: No weakness.      Gait: Gait is intact.   Psychiatric:         Attention and Perception: Attention and perception normal.         Mood and Affect: Mood and affect normal.         Speech: Speech normal.         Behavior: Behavior normal. Behavior is cooperative.         Thought Content: Thought content normal.         Cognition and Memory: Cognition normal.         Judgment: Judgment normal.        LABS     Lab Results   Component Value Date    HGBA1C 7.2 (H) 10/01/2021     CMP  Sodium   Date Value Ref Range  Status   01/11/2022 140 136 - 145 mmol/L Final     Potassium   Date Value Ref Range Status   01/11/2022 4.5 3.5 - 5.1 mmol/L Final     Chloride   Date Value Ref Range Status   01/11/2022 109 95 - 110 mmol/L Final     CO2   Date Value Ref Range Status   01/11/2022 23 23 - 29 mmol/L Final     Glucose   Date Value Ref Range Status   01/11/2022 155 (H) 70 - 110 mg/dL Final     BUN   Date Value Ref Range Status   01/11/2022 12 8 - 23 mg/dL Final     Creatinine   Date Value Ref Range Status   01/11/2022 1.1 0.5 - 1.4 mg/dL Final     Calcium   Date Value Ref Range Status   01/11/2022 9.7 8.7 - 10.5 mg/dL Final     Total Protein   Date Value Ref Range Status   01/11/2022 7.0 6.0 - 8.4 g/dL Final     Albumin   Date Value Ref Range Status   01/11/2022 3.6 3.5 - 5.2 g/dL Final     Total Bilirubin   Date Value Ref Range Status   01/11/2022 0.4 0.1 - 1.0 mg/dL Final     Comment:     For infants and newborns, interpretation of results should be based  on gestational age, weight and in agreement with clinical  observations.    Premature Infant recommended reference ranges:  Up to 24 hours.............<8.0 mg/dL  Up to 48 hours............<12.0 mg/dL  3-5 days..................<15.0 mg/dL  6-29 days.................<15.0 mg/dL       Alkaline Phosphatase   Date Value Ref Range Status   01/11/2022 95 55 - 135 U/L Final     AST   Date Value Ref Range Status   01/11/2022 19 10 - 40 U/L Final     ALT   Date Value Ref Range Status   01/11/2022 21 10 - 44 U/L Final     Anion Gap   Date Value Ref Range Status   01/11/2022 8 8 - 16 mmol/L Final     eGFR if    Date Value Ref Range Status   01/11/2022 57 (A) >60 mL/min/1.73 m^2 Final     eGFR if non    Date Value Ref Range Status   01/11/2022 50 (A) >60 mL/min/1.73 m^2 Final     Comment:     Calculation used to obtain the estimated glomerular filtration  rate (eGFR) is the CKD-EPI equation.        Lab Results   Component Value Date    WBC 10.66 01/11/2022     HGB 12.9 01/11/2022    HCT 41.8 01/11/2022    MCV 89 01/11/2022     01/11/2022     Lab Results   Component Value Date    CHOL 138 06/01/2021    CHOL 130 09/10/2020     Lab Results   Component Value Date    HDL 36 (L) 06/01/2021    HDL 38 (L) 09/10/2020     Lab Results   Component Value Date    LDLCALC 81.2 06/01/2021    LDLCALC 67.8 09/10/2020     Lab Results   Component Value Date    TRIG 104 06/01/2021    TRIG 121 09/10/2020     Lab Results   Component Value Date    CHOLHDL 26.1 06/01/2021    CHOLHDL 29.2 09/10/2020     Lab Results   Component Value Date    TSH 0.997 06/01/2021       ASSESSMENT/PLAN     Marizol Kevin is a 76 y.o. female     Marizol was seen today for dizziness, fatigue, generalized body aches, cough and shortness of breath. Will cover with Abx and educate on sx mgmt. CXR pending. ED prompts discussed. Rapid Flu test is negative. Routine COVID test is negative.     Diagnoses and all orders for this visit:    Malaise  -     POCT Influenza A/B  -     X-Ray Chest PA And Lateral; Future    Cough  -     COVID-19 Routine Screening  -     X-Ray Chest PA And Lateral; Future    Muscle pain  -     COVID-19 Routine Screening    Mild intermittent asthma without complication  Comments:  Controlled. Cont Singulair and Flonase daily, albuterol PRN.  Try to quit smoking. GI eval for GERD d/t chronic cough.  Orders:  -     benzonatate (TESSALON) 100 MG capsule; Take 1 capsule (100 mg total) by mouth 3 (three) times daily as needed for Cough.  -     X-Ray Chest PA And Lateral; Future    Light cigarette smoker (1-9 cigs/day)    Type 2 diabetes mellitus with hyperglycemia, without long-term current use of insulin    Other orders  -     promethazine-dextromethorphan (PROMETHAZINE-DM) 6.25-15 mg/5 mL Syrp; Take 5 mLs by mouth every 4 (four) hours as needed (cough).  -     guaiFENesin (MUCINEX) 600 mg 12 hr tablet; Take 1 tablet (600 mg total) by mouth every morning. for 10 days  -     amoxicillin-clavulanate  875-125mg (AUGMENTIN) 875-125 mg per tablet; Take 1 tablet by mouth 2 (two) times daily. for 10 days  -     SARS-COV-2- Cycle Number       Patient was counseled on when and how to seek emergent care.       Stacy Arguello PA-C   Department of Internal Medicine - [facility]   7:25 AM     UPDATE:   CXR shows subsegmental atelectasis or scarring at the lung bases -lungs are otherwise clear.

## 2022-03-26 LAB
SARS-COV-2 RNA RESP QL NAA+PROBE: NOT DETECTED
SARS-COV-2- CYCLE NUMBER: NORMAL

## 2022-03-28 ENCOUNTER — TELEPHONE (OUTPATIENT)
Dept: INTERNAL MEDICINE | Facility: CLINIC | Age: 77
End: 2022-03-28
Payer: MEDICARE

## 2022-03-28 NOTE — TELEPHONE ENCOUNTER
3:19 PM called the patient to check on symptoms. She reports only minimal improvement in symptoms. She is still copugin. No fever. No SOB. No GI symptoms. She is taking meds as directed. We went over COVID Flu and CXR results. She is scheduled to see me on Friday for a re-check. She is aware of ED prompts. STERLING DANIELLE

## 2022-03-28 NOTE — TELEPHONE ENCOUNTER
----- Message from Ashwini Hartman sent at 3/28/2022  2:08 PM CDT -----  Contact: ESTEBAN JIMENEZ [[id number]]  Type: Call Back    Who called: ESTEBAN JIMENEZ [[id number]]    What is the request in detail: Patient is requesting a call back. She would like to discuss the results of her chest xray from 03/25. Please advise.    Can the clinic reply by [facility]? No    Would the patient rather a call back or a response via [facility]? Call back     Best call back number: [phone] (mobile)    Additional Information:

## 2022-04-01 ENCOUNTER — OFFICE VISIT (OUTPATIENT)
Dept: INTERNAL MEDICINE | Facility: CLINIC | Age: 77
End: 2022-04-01
Payer: MEDICARE

## 2022-04-01 VITALS
HEART RATE: 101 BPM | DIASTOLIC BLOOD PRESSURE: 64 MMHG | BODY MASS INDEX: 33.44 KG/M2 | WEIGHT: 154.56 LBS | SYSTOLIC BLOOD PRESSURE: 102 MMHG | OXYGEN SATURATION: 96 %

## 2022-04-01 DIAGNOSIS — J06.9 RECENT URI: ICD-10-CM

## 2022-04-01 DIAGNOSIS — Z09 FOLLOW UP: Primary | ICD-10-CM

## 2022-04-01 DIAGNOSIS — M54.9 UPPER BACK PAIN: ICD-10-CM

## 2022-04-01 PROCEDURE — 1101F PR PT FALLS ASSESS DOC 0-1 FALLS W/OUT INJ PAST YR: ICD-10-PCS | Mod: CPTII,S$GLB,, | Performed by: PHYSICIAN ASSISTANT

## 2022-04-01 PROCEDURE — 1101F PT FALLS ASSESS-DOCD LE1/YR: CPT | Mod: CPTII,S$GLB,, | Performed by: PHYSICIAN ASSISTANT

## 2022-04-01 PROCEDURE — 1126F PR PAIN SEVERITY QUANTIFIED, NO PAIN PRESENT: ICD-10-PCS | Mod: CPTII,S$GLB,, | Performed by: PHYSICIAN ASSISTANT

## 2022-04-01 PROCEDURE — 1159F PR MEDICATION LIST DOCUMENTED IN MEDICAL RECORD: ICD-10-PCS | Mod: CPTII,S$GLB,, | Performed by: PHYSICIAN ASSISTANT

## 2022-04-01 PROCEDURE — 3072F PR LOW RISK FOR RETINOPATHY: ICD-10-PCS | Mod: CPTII,S$GLB,, | Performed by: PHYSICIAN ASSISTANT

## 2022-04-01 PROCEDURE — 3078F DIAST BP <80 MM HG: CPT | Mod: CPTII,S$GLB,, | Performed by: PHYSICIAN ASSISTANT

## 2022-04-01 PROCEDURE — 4010F PR ACE/ARB THEARPY RXD/TAKEN: ICD-10-PCS | Mod: CPTII,S$GLB,, | Performed by: PHYSICIAN ASSISTANT

## 2022-04-01 PROCEDURE — 3008F PR BODY MASS INDEX (BMI) DOCUMENTED: ICD-10-PCS | Mod: CPTII,S$GLB,, | Performed by: PHYSICIAN ASSISTANT

## 2022-04-01 PROCEDURE — 3008F BODY MASS INDEX DOCD: CPT | Mod: CPTII,S$GLB,, | Performed by: PHYSICIAN ASSISTANT

## 2022-04-01 PROCEDURE — 1126F AMNT PAIN NOTED NONE PRSNT: CPT | Mod: CPTII,S$GLB,, | Performed by: PHYSICIAN ASSISTANT

## 2022-04-01 PROCEDURE — 3074F SYST BP LT 130 MM HG: CPT | Mod: CPTII,S$GLB,, | Performed by: PHYSICIAN ASSISTANT

## 2022-04-01 PROCEDURE — 4010F ACE/ARB THERAPY RXD/TAKEN: CPT | Mod: CPTII,S$GLB,, | Performed by: PHYSICIAN ASSISTANT

## 2022-04-01 PROCEDURE — 1159F MED LIST DOCD IN RCRD: CPT | Mod: CPTII,S$GLB,, | Performed by: PHYSICIAN ASSISTANT

## 2022-04-01 PROCEDURE — 99999 PR PBB SHADOW E&M-EST. PATIENT-LVL V: ICD-10-PCS | Mod: PBBFAC,,, | Performed by: PHYSICIAN ASSISTANT

## 2022-04-01 PROCEDURE — 3072F LOW RISK FOR RETINOPATHY: CPT | Mod: CPTII,S$GLB,, | Performed by: PHYSICIAN ASSISTANT

## 2022-04-01 PROCEDURE — 99214 OFFICE O/P EST MOD 30 MIN: CPT | Mod: S$GLB,,, | Performed by: PHYSICIAN ASSISTANT

## 2022-04-01 PROCEDURE — 1160F RVW MEDS BY RX/DR IN RCRD: CPT | Mod: CPTII,S$GLB,, | Performed by: PHYSICIAN ASSISTANT

## 2022-04-01 PROCEDURE — 99999 PR PBB SHADOW E&M-EST. PATIENT-LVL V: CPT | Mod: PBBFAC,,, | Performed by: PHYSICIAN ASSISTANT

## 2022-04-01 PROCEDURE — 1160F PR REVIEW ALL MEDS BY PRESCRIBER/CLIN PHARMACIST DOCUMENTED: ICD-10-PCS | Mod: CPTII,S$GLB,, | Performed by: PHYSICIAN ASSISTANT

## 2022-04-01 PROCEDURE — 3288F PR FALLS RISK ASSESSMENT DOCUMENTED: ICD-10-PCS | Mod: CPTII,S$GLB,, | Performed by: PHYSICIAN ASSISTANT

## 2022-04-01 PROCEDURE — 3074F PR MOST RECENT SYSTOLIC BLOOD PRESSURE < 130 MM HG: ICD-10-PCS | Mod: CPTII,S$GLB,, | Performed by: PHYSICIAN ASSISTANT

## 2022-04-01 PROCEDURE — 99214 PR OFFICE/OUTPT VISIT, EST, LEVL IV, 30-39 MIN: ICD-10-PCS | Mod: S$GLB,,, | Performed by: PHYSICIAN ASSISTANT

## 2022-04-01 PROCEDURE — 3288F FALL RISK ASSESSMENT DOCD: CPT | Mod: CPTII,S$GLB,, | Performed by: PHYSICIAN ASSISTANT

## 2022-04-01 PROCEDURE — 3078F PR MOST RECENT DIASTOLIC BLOOD PRESSURE < 80 MM HG: ICD-10-PCS | Mod: CPTII,S$GLB,, | Performed by: PHYSICIAN ASSISTANT

## 2022-04-01 NOTE — PROGRESS NOTES
INTERNAL MEDICINE PROGRESS NOTE    CHIEF COMPLAINT     Chief Complaint   Patient presents with    Follow-up       HPI     Marizol Kevin is a 76 y.o. female who presents for a follow-up visit today.    PCP is Belinda Fajardo MD, patient is known to me.     Pt was seen by me in clinic on 3/25/2022 for malaise, asthma flare and URI symptoms. She was started on liquid cough suppressant, Augmentin and mucinex. CXR at that time showed some atalectasis.     Today she is feeling much better but is a little fatigued. She reports still has a minor cough. She denies nausea or vomiting of bowel changes. No bleeding. She has been compliant with the medication regimen started last week. She does report that she has some upper back discomfort that is described as stinging and burning -this only is present some of the time. No skin rashes. No painful breathing. She admits it feels like a muscle strain.     Past Medical History:  Past Medical History:   Diagnosis Date    Allergy     Anxiety     Cancer     colon    Cataract     Colon cancer     Diabetes mellitus, type 2     Hyperlipidemia     Hypertension     Insomnia        Home Medications:  Prior to Admission medications    Medication Sig Start Date End Date Taking? Authorizing Provider   albuterol (VENTOLIN HFA) 90 mcg/actuation inhaler Inhale 2 puffs into the lungs every 4 (four) hours as needed for Wheezing. 11/18/21   Stacy Arguello PA-C   amLODIPine (NORVASC) 5 MG tablet TAKE 1 TABLET EVERY DAY 11/11/21   Belinda Fajardo MD   amoxicillin-clavulanate 875-125mg (AUGMENTIN) 875-125 mg per tablet Take 1 tablet by mouth 2 (two) times daily. for 10 days 3/25/22 4/4/22  Stacy Arguello PA-C   aspirin (ECOTRIN) 81 MG EC tablet Take 81 mg by mouth once daily.    Historical Provider   atorvastatin (LIPITOR) 40 MG tablet TAKE 1 TABLET EVERY DAY 2/10/22   Belinda Fajardo MD   benzonatate (TESSALON) 100 MG capsule Take 1 capsule (100 mg total) by mouth 3 (three) times  daily as needed for Cough. 3/25/22   Stacy Arguello PA-C   betamethasone valerate 0.1% (VALISONE) 0.1 % Oint Apply topically once daily. 1/13/21   Glynn Jensen MD   blood sugar diagnostic Strp 1 strip by Misc.(Non-Drug; Combo Route) route 2 (two) times daily. 6/17/21   Belinda Fajardo MD   blood-glucose meter kit Please provide with insurance covered meter 6/17/21   Belinda Fajardo MD   cholecalciferol, vitamin D3, (VITAMIN D3) 50 mcg (2,000 unit) Cap Take by mouth.    Historical Provider   DULoxetine (CYMBALTA) 30 MG capsule TAKE 1 CAPSULE EVERY DAY 2/10/22   Belinda Fajardo MD   erythromycin (ROMYCIN) ophthalmic ointment  1/27/22   Historical Provider   fluticasone propionate (FLONASE) 50 mcg/actuation nasal spray USE 1 SPRAY IN EACH NOSTRIL ONE TIME DAILY 2/10/22   Belinda Fajardo MD   guaiFENesin (MUCINEX) 600 mg 12 hr tablet Take 1 tablet (600 mg total) by mouth every morning. for 10 days 3/25/22 4/4/22  Stacy Arguello PA-C   hydrOXYzine HCL (ATARAX) 25 MG tablet Take 1 tablet (25 mg total) by mouth 3 (three) times daily as needed for Itching or Anxiety. 6/1/21   Belinda Fajardo MD   lancets Misc 1 Device by Misc.(Non-Drug; Combo Route) route 2 (two) times daily. 6/17/21   Belinda Fajardo MD   LIDOCAINE VISCOUS 2 % solution  8/25/21   Historical Provider   losartan (COZAAR) 50 MG tablet Take 1 tablet (50 mg total) by mouth once daily. 8/18/21 8/18/22  Pedro Luis Butterfield MD   metFORMIN (GLUCOPHAGE-XR) 500 MG ER 24hr tablet TAKE 2 TABLETS EVERY DAY WITH BREAKFAST 2/10/22   Belinda Fajardo MD   montelukast (SINGULAIR) 10 mg tablet Take 1 tablet (10 mg total) by mouth every evening.  Patient not taking: No sig reported 6/1/21 6/1/22  Belinda Fajardo MD   multivitamin capsule Take 1 capsule by mouth once daily.    Historical Provider   pantoprazole (PROTONIX) 40 MG tablet Take 1 tablet (40 mg total) by mouth once daily. 6/1/21   Belinda Fajardo MD   promethazine-dextromethorphan (PROMETHAZINE-DM) 6.25-15 mg/5 mL  Syrp Take 5 mLs by mouth every 4 (four) hours as needed (cough). 3/25/22 4/4/22  Stacy Arguello PA-C   raloxifene (EVISTA) 60 mg tablet TAKE 1 TABLET EVERY DAY 2/11/22   Glynn Jensen MD   tiotropium-olodateroL (STIOLTO RESPIMAT) 2.5-2.5 mcg/actuation Mist Inhale 2 puffs into the lungs once daily. 11/18/21   Stacy Arguello PA-C   triamcinolone acetonide 0.025% (KENALOG) 0.025 % cream APPLY DAILY TO AFFECTED AREA AS NEEDED FOR ITCHING. MAXIMUM 2 TIMES A WEEK  Patient not taking: Reported on 3/25/2022 1/13/21   Glynn Jensen MD       Review of Systems:  Review of Systems   Constitutional: Negative for chills and fever.   HENT: Negative for sore throat and trouble swallowing.    Eyes: Negative for visual disturbance.   Respiratory: Positive for cough. Negative for shortness of breath.    Cardiovascular: Negative for chest pain.   Gastrointestinal: Negative for abdominal pain, constipation, diarrhea, nausea and vomiting.   Genitourinary: Negative for dysuria and flank pain.   Musculoskeletal: Negative for back pain, neck pain and neck stiffness.   Skin: Negative for rash.   Neurological: Negative for dizziness, syncope, weakness and headaches.   Psychiatric/Behavioral: Negative for confusion.       Health Maintainence:   Immunizations:  Health Maintenance       Date Due Completion Date    Pneumococcal Vaccines (Age 65+) (1 of 4 - PCV13) Never done ---    TETANUS VACCINE Never done ---    Shingles Vaccine (1 of 2) Never done ---    Hemoglobin A1c 04/01/2022 10/1/2021    Foot Exam 06/01/2022 6/1/2021    Override on 3/12/2021: Done    Lipid Panel 06/01/2022 6/1/2021    Mammogram 06/07/2022 6/7/2021    Eye Exam 09/30/2022 9/30/2021    Diabetes Urine Screening 10/01/2022 10/1/2021    DEXA Scan 09/17/2023 9/17/2020    Colorectal Cancer Screening 02/01/2024 2/1/2021           PHYSICAL EXAM     /64 (BP Location: Right arm, Patient Position: Sitting)   Pulse 101   Wt 70.1 kg (154 lb 8.7 oz)   SpO2 96%   BMI  33.44 kg/m²     Physical Exam  Vitals and nursing note reviewed.   Constitutional:       Appearance: Normal appearance.      Comments: Healthy appearing female in NAD or apparent pain. She makes good eye contact, speaks in clear full sentences and ambulates with ease.      HENT:      Head: Normocephalic and atraumatic.      Nose: Nose normal.      Mouth/Throat:      Pharynx: Oropharynx is clear.   Eyes:      Conjunctiva/sclera: Conjunctivae normal.   Cardiovascular:      Rate and Rhythm: Normal rate and regular rhythm.      Pulses: Normal pulses.   Pulmonary:      Effort: No respiratory distress.      Comments: Lungs are CTA b/l   Abdominal:      Tenderness: There is no abdominal tenderness.   Musculoskeletal:         General: Normal range of motion.      Cervical back: No rigidity.      Comments: There is no C T or L midline bony TTP crepitus or step-offs. There is no overlying skin changes -no rash.    Skin:     General: Skin is warm and dry.      Capillary Refill: Capillary refill takes less than 2 seconds.      Findings: No rash.   Neurological:      General: No focal deficit present.      Mental Status: She is alert.      Gait: Gait normal.   Psychiatric:         Mood and Affect: Mood normal.         LABS     Lab Results   Component Value Date    HGBA1C 7.2 (H) 10/01/2021     CMP  Sodium   Date Value Ref Range Status   01/11/2022 140 136 - 145 mmol/L Final     Potassium   Date Value Ref Range Status   01/11/2022 4.5 3.5 - 5.1 mmol/L Final     Chloride   Date Value Ref Range Status   01/11/2022 109 95 - 110 mmol/L Final     CO2   Date Value Ref Range Status   01/11/2022 23 23 - 29 mmol/L Final     Glucose   Date Value Ref Range Status   01/11/2022 155 (H) 70 - 110 mg/dL Final     BUN   Date Value Ref Range Status   01/11/2022 12 8 - 23 mg/dL Final     Creatinine   Date Value Ref Range Status   01/11/2022 1.1 0.5 - 1.4 mg/dL Final     Calcium   Date Value Ref Range Status   01/11/2022 9.7 8.7 - 10.5 mg/dL Final      Total Protein   Date Value Ref Range Status   01/11/2022 7.0 6.0 - 8.4 g/dL Final     Albumin   Date Value Ref Range Status   01/11/2022 3.6 3.5 - 5.2 g/dL Final     Total Bilirubin   Date Value Ref Range Status   01/11/2022 0.4 0.1 - 1.0 mg/dL Final     Comment:     For infants and newborns, interpretation of results should be based  on gestational age, weight and in agreement with clinical  observations.    Premature Infant recommended reference ranges:  Up to 24 hours.............<8.0 mg/dL  Up to 48 hours............<12.0 mg/dL  3-5 days..................<15.0 mg/dL  6-29 days.................<15.0 mg/dL       Alkaline Phosphatase   Date Value Ref Range Status   01/11/2022 95 55 - 135 U/L Final     AST   Date Value Ref Range Status   01/11/2022 19 10 - 40 U/L Final     ALT   Date Value Ref Range Status   01/11/2022 21 10 - 44 U/L Final     Anion Gap   Date Value Ref Range Status   01/11/2022 8 8 - 16 mmol/L Final     eGFR if    Date Value Ref Range Status   01/11/2022 57 (A) >60 mL/min/1.73 m^2 Final     eGFR if non    Date Value Ref Range Status   01/11/2022 50 (A) >60 mL/min/1.73 m^2 Final     Comment:     Calculation used to obtain the estimated glomerular filtration  rate (eGFR) is the CKD-EPI equation.        Lab Results   Component Value Date    WBC 10.66 01/11/2022    HGB 12.9 01/11/2022    HCT 41.8 01/11/2022    MCV 89 01/11/2022     01/11/2022     Lab Results   Component Value Date    CHOL 138 06/01/2021    CHOL 130 09/10/2020     Lab Results   Component Value Date    HDL 36 (L) 06/01/2021    HDL 38 (L) 09/10/2020     Lab Results   Component Value Date    LDLCALC 81.2 06/01/2021    LDLCALC 67.8 09/10/2020     Lab Results   Component Value Date    TRIG 104 06/01/2021    TRIG 121 09/10/2020     Lab Results   Component Value Date    CHOLHDL 26.1 06/01/2021    CHOLHDL 29.2 09/10/2020     Lab Results   Component Value Date    TSH 0.997 06/01/2021        ASSESSMENT/PLAN     Marizol Kevin is a 76 y.o. female      Marizol was seen today for follow-up. She is doing better, has some upper back pain that is described as a pulled muscle but could be early shingles flare? Will prescribe topical Lidoderm and encourage pt to monitor closely for signs of rash development -if rash develops will treat for shingles. Otherwise continue symptom mgmt and RTC as needed.     Diagnoses and all orders for this visit:    Follow up    Upper back pain  -     LIDOcaine (LIDODERM) 5 %; Place 1 patch onto the skin once daily. Remove & Discard patch within 12 hours or as directed by MD    Recent URI            Follow up with PCP in 3-6 months     Stacy Arguello PA-C   Department of Internal Medicine - [facility]   7:47 AM

## 2022-04-05 ENCOUNTER — TELEPHONE (OUTPATIENT)
Dept: INTERNAL MEDICINE | Facility: CLINIC | Age: 77
End: 2022-04-05
Payer: MEDICARE

## 2022-04-05 RX ORDER — LIDOCAINE 50 MG/G
1 PATCH TOPICAL DAILY
Qty: 15 PATCH | Refills: 0 | Status: ON HOLD | OUTPATIENT
Start: 2022-04-05 | End: 2022-06-22 | Stop reason: HOSPADM

## 2022-04-05 NOTE — TELEPHONE ENCOUNTER
Prior Authorization request initiated  for Lidocaine 5% patch    Aaliyah Kevin (Munoz: [id number]) - [id number]  Lidocaine 5% patches       Status: PA Request    Created: April 5th, 2022 [phone]    Sent: April 5th, 2022

## 2022-04-06 ENCOUNTER — TELEPHONE (OUTPATIENT)
Dept: INTERNAL MEDICINE | Facility: CLINIC | Age: 77
End: 2022-04-06
Payer: MEDICARE

## 2022-04-06 NOTE — TELEPHONE ENCOUNTER
Spoke to Pharmacy and informed that Rx Lidoderm was approved by patient insurance.  States they will inform patient when Rx is ready.

## 2022-04-13 DIAGNOSIS — E11.9 TYPE 2 DIABETES MELLITUS WITHOUT COMPLICATION: ICD-10-CM

## 2022-04-15 ENCOUNTER — HOSPITAL ENCOUNTER (OUTPATIENT)
Facility: HOSPITAL | Age: 77
Discharge: HOME | End: 2022-04-16
Attending: EMERGENCY MEDICINE | Admitting: STUDENT IN AN ORGANIZED HEALTH CARE EDUCATION/TRAINING PROGRAM
Payer: MEDICARE
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
## 2022-04-15 DIAGNOSIS — R55 SYNCOPE: ICD-10-CM

## 2022-04-15 DIAGNOSIS — S09.90XA TRAUMATIC INJURY OF HEAD, INITIAL ENCOUNTER: Primary | ICD-10-CM

## 2022-04-15 LAB
ALBUMIN SERPL BCP-MCNC: 3.3 G/DL (ref 3.5–5.2)
ALP SERPL-CCNC: 87 U/L (ref 55–135)
ALT SERPL W/O P-5'-P-CCNC: 16 U/L (ref 10–44)
ANION GAP SERPL CALC-SCNC: 13 MMOL/L (ref 8–16)
AST SERPL-CCNC: 19 U/L (ref 10–40)
BASOPHILS # BLD AUTO: ABNORMAL K/UL (ref 0–0.2)
BASOPHILS NFR BLD: 0 % (ref 0–1.9)
BILIRUB SERPL-MCNC: 0.2 MG/DL (ref 0.1–1)
BUN SERPL-MCNC: 10 MG/DL (ref 8–23)
CALCIUM SERPL-MCNC: 9.4 MG/DL (ref 8.7–10.5)
CHLORIDE SERPL-SCNC: 106 MMOL/L (ref 95–110)
CO2 SERPL-SCNC: 17 MMOL/L (ref 23–29)
CREAT SERPL-MCNC: 1 MG/DL (ref 0.5–1.4)
DIFFERENTIAL METHOD: ABNORMAL
EOSINOPHIL # BLD AUTO: ABNORMAL K/UL (ref 0–0.5)
EOSINOPHIL NFR BLD: 1.2 % (ref 0–8)
ERYTHROCYTE [DISTWIDTH] IN BLOOD BY AUTOMATED COUNT: 13.5 % (ref 11.5–14.5)
EST. GFR  (AFRICAN AMERICAN): >60 ML/MIN/1.73 M^2
EST. GFR  (NON AFRICAN AMERICAN): 56 ML/MIN/1.73 M^2
ETHANOL SERPL-MCNC: 218 MG/DL
GLUCOSE SERPL-MCNC: 110 MG/DL (ref 70–110)
HCT VFR BLD AUTO: 40.5 % (ref 37–48.5)
HGB BLD-MCNC: 12.7 G/DL (ref 12–16)
IMM GRANULOCYTES # BLD AUTO: ABNORMAL K/UL (ref 0–0.04)
IMM GRANULOCYTES NFR BLD AUTO: ABNORMAL % (ref 0–0.5)
LIPASE SERPL-CCNC: 30 U/L (ref 4–60)
LYMPHOCYTES # BLD AUTO: ABNORMAL K/UL (ref 1–4.8)
LYMPHOCYTES NFR BLD: 38.8 % (ref 18–48)
MCH RBC QN AUTO: 28 PG (ref 27–31)
MCHC RBC AUTO-ENTMCNC: 31.4 G/DL (ref 32–36)
MCV RBC AUTO: 89 FL (ref 82–98)
MONOCYTES # BLD AUTO: ABNORMAL K/UL (ref 0.3–1)
MONOCYTES NFR BLD: 1.2 % (ref 4–15)
NEUTROPHILS NFR BLD: 56.5 % (ref 38–73)
NEUTS BAND NFR BLD MANUAL: 2.3 %
NRBC BLD-RTO: 0 /100 WBC
OVALOCYTES BLD QL SMEAR: ABNORMAL
PLATELET # BLD AUTO: 274 K/UL (ref 150–450)
PLATELET BLD QL SMEAR: ABNORMAL
PMV BLD AUTO: 9.2 FL (ref 9.2–12.9)
POCT GLUCOSE: 96 MG/DL (ref 70–110)
POIKILOCYTOSIS BLD QL SMEAR: SLIGHT
POTASSIUM SERPL-SCNC: 3.6 MMOL/L (ref 3.5–5.1)
PROT SERPL-MCNC: 7 G/DL (ref 6–8.4)
RBC # BLD AUTO: 4.53 M/UL (ref 4–5.4)
SODIUM SERPL-SCNC: 136 MMOL/L (ref 136–145)
WBC # BLD AUTO: 16.3 K/UL (ref 3.9–12.7)

## 2022-04-15 PROCEDURE — 82077 ASSAY SPEC XCP UR&BREATH IA: CPT | Performed by: EMERGENCY MEDICINE

## 2022-04-15 PROCEDURE — 80053 COMPREHEN METABOLIC PANEL: CPT | Performed by: EMERGENCY MEDICINE

## 2022-04-15 PROCEDURE — 83690 ASSAY OF LIPASE: CPT | Performed by: EMERGENCY MEDICINE

## 2022-04-15 PROCEDURE — 93010 ELECTROCARDIOGRAM REPORT: CPT | Mod: ,,, | Performed by: SPECIALIST

## 2022-04-15 PROCEDURE — 25000003 PHARM REV CODE 250: Performed by: EMERGENCY MEDICINE

## 2022-04-15 PROCEDURE — 85007 BL SMEAR W/DIFF WBC COUNT: CPT | Performed by: EMERGENCY MEDICINE

## 2022-04-15 PROCEDURE — 93010 EKG 12-LEAD: ICD-10-PCS | Mod: ,,, | Performed by: SPECIALIST

## 2022-04-15 PROCEDURE — 93005 ELECTROCARDIOGRAM TRACING: CPT

## 2022-04-15 PROCEDURE — 96374 THER/PROPH/DIAG INJ IV PUSH: CPT

## 2022-04-15 PROCEDURE — 96361 HYDRATE IV INFUSION ADD-ON: CPT

## 2022-04-15 PROCEDURE — 82962 GLUCOSE BLOOD TEST: CPT

## 2022-04-15 PROCEDURE — 99285 EMERGENCY DEPT VISIT HI MDM: CPT | Mod: 25,CS

## 2022-04-15 PROCEDURE — U0002 COVID-19 LAB TEST NON-CDC: HCPCS | Performed by: EMERGENCY MEDICINE

## 2022-04-15 PROCEDURE — 36415 COLL VENOUS BLD VENIPUNCTURE: CPT | Performed by: EMERGENCY MEDICINE

## 2022-04-15 PROCEDURE — 85027 COMPLETE CBC AUTOMATED: CPT | Performed by: EMERGENCY MEDICINE

## 2022-04-15 RX ORDER — ACETAMINOPHEN 325 MG/1
650 TABLET ORAL
Status: ACTIVE | OUTPATIENT
Start: 2022-04-15 | End: 2022-04-16

## 2022-04-15 RX ADMIN — SODIUM CHLORIDE 500 ML: 0.9 INJECTION, SOLUTION INTRAVENOUS at 10:04

## 2022-04-16 VITALS
TEMPERATURE: 96 F | HEART RATE: 87 BPM | SYSTOLIC BLOOD PRESSURE: 128 MMHG | DIASTOLIC BLOOD PRESSURE: 72 MMHG | OXYGEN SATURATION: 97 % | BODY MASS INDEX: 33.22 KG/M2 | RESPIRATION RATE: 18 BRPM | WEIGHT: 154 LBS | HEIGHT: 57 IN

## 2022-04-16 PROBLEM — R55 SYNCOPE: Status: ACTIVE | Noted: 2022-04-16

## 2022-04-16 LAB
ALBUMIN SERPL BCP-MCNC: 3 G/DL (ref 3.5–5.2)
ALP SERPL-CCNC: 80 U/L (ref 55–135)
ALT SERPL W/O P-5'-P-CCNC: 15 U/L (ref 10–44)
ANION GAP SERPL CALC-SCNC: 11 MMOL/L (ref 8–16)
AST SERPL-CCNC: 17 U/L (ref 10–40)
BASOPHILS # BLD AUTO: 0.04 K/UL (ref 0–0.2)
BASOPHILS NFR BLD: 0.4 % (ref 0–1.9)
BILIRUB SERPL-MCNC: 0.2 MG/DL (ref 0.1–1)
BUN SERPL-MCNC: 10 MG/DL (ref 8–23)
CALCIUM SERPL-MCNC: 9.1 MG/DL (ref 8.7–10.5)
CHLORIDE SERPL-SCNC: 109 MMOL/L (ref 95–110)
CO2 SERPL-SCNC: 21 MMOL/L (ref 23–29)
CREAT SERPL-MCNC: 0.9 MG/DL (ref 0.5–1.4)
DIFFERENTIAL METHOD: ABNORMAL
EOSINOPHIL # BLD AUTO: 0.3 K/UL (ref 0–0.5)
EOSINOPHIL NFR BLD: 2.6 % (ref 0–8)
ERYTHROCYTE [DISTWIDTH] IN BLOOD BY AUTOMATED COUNT: 13.4 % (ref 11.5–14.5)
EST. GFR  (AFRICAN AMERICAN): >60 ML/MIN/1.73 M^2
EST. GFR  (NON AFRICAN AMERICAN): >60 ML/MIN/1.73 M^2
GLUCOSE SERPL-MCNC: 104 MG/DL (ref 70–110)
HCT VFR BLD AUTO: 38.5 % (ref 37–48.5)
HGB BLD-MCNC: 12.1 G/DL (ref 12–16)
IMM GRANULOCYTES # BLD AUTO: 0.1 K/UL (ref 0–0.04)
IMM GRANULOCYTES NFR BLD AUTO: 0.9 % (ref 0–0.5)
LYMPHOCYTES # BLD AUTO: 3.2 K/UL (ref 1–4.8)
LYMPHOCYTES NFR BLD: 28.1 % (ref 18–48)
MAGNESIUM SERPL-MCNC: 1.5 MG/DL (ref 1.6–2.6)
MCH RBC QN AUTO: 27.8 PG (ref 27–31)
MCHC RBC AUTO-ENTMCNC: 31.4 G/DL (ref 32–36)
MCV RBC AUTO: 89 FL (ref 82–98)
MONOCYTES # BLD AUTO: 0.7 K/UL (ref 0.3–1)
MONOCYTES NFR BLD: 6.1 % (ref 4–15)
NEUTROPHILS # BLD AUTO: 7.1 K/UL (ref 1.8–7.7)
NEUTROPHILS NFR BLD: 61.9 % (ref 38–73)
NRBC BLD-RTO: 0 /100 WBC
PHOSPHATE SERPL-MCNC: 4.1 MG/DL (ref 2.7–4.5)
PLATELET # BLD AUTO: 259 K/UL (ref 150–450)
PMV BLD AUTO: 9 FL (ref 9.2–12.9)
POTASSIUM SERPL-SCNC: 3.9 MMOL/L (ref 3.5–5.1)
PROT SERPL-MCNC: 6.5 G/DL (ref 6–8.4)
RBC # BLD AUTO: 4.35 M/UL (ref 4–5.4)
SARS-COV-2 RDRP RESP QL NAA+PROBE: NEGATIVE
SODIUM SERPL-SCNC: 141 MMOL/L (ref 136–145)
TROPONIN I SERPL DL<=0.01 NG/ML-MCNC: <0.006 NG/ML (ref 0–0.03)
TROPONIN I SERPL DL<=0.01 NG/ML-MCNC: <0.006 NG/ML (ref 0–0.03)
WBC # BLD AUTO: 11.37 K/UL (ref 3.9–12.7)

## 2022-04-16 PROCEDURE — 80053 COMPREHEN METABOLIC PANEL: CPT | Performed by: NURSE PRACTITIONER

## 2022-04-16 PROCEDURE — 84100 ASSAY OF PHOSPHORUS: CPT | Performed by: NURSE PRACTITIONER

## 2022-04-16 PROCEDURE — 36415 COLL VENOUS BLD VENIPUNCTURE: CPT | Performed by: NURSE PRACTITIONER

## 2022-04-16 PROCEDURE — 84484 ASSAY OF TROPONIN QUANT: CPT | Performed by: NURSE PRACTITIONER

## 2022-04-16 PROCEDURE — 63600175 PHARM REV CODE 636 W HCPCS: Performed by: EMERGENCY MEDICINE

## 2022-04-16 PROCEDURE — 25000003 PHARM REV CODE 250: Performed by: NURSE PRACTITIONER

## 2022-04-16 PROCEDURE — 85025 COMPLETE CBC W/AUTO DIFF WBC: CPT | Performed by: NURSE PRACTITIONER

## 2022-04-16 PROCEDURE — G0378 HOSPITAL OBSERVATION PER HR: HCPCS

## 2022-04-16 PROCEDURE — 97161 PT EVAL LOW COMPLEX 20 MIN: CPT

## 2022-04-16 PROCEDURE — 97165 OT EVAL LOW COMPLEX 30 MIN: CPT

## 2022-04-16 PROCEDURE — 83735 ASSAY OF MAGNESIUM: CPT | Performed by: NURSE PRACTITIONER

## 2022-04-16 RX ORDER — NALOXONE HCL 0.4 MG/ML
0.02 VIAL (ML) INJECTION
Status: DISCONTINUED | OUTPATIENT
Start: 2022-04-16 | End: 2022-04-16 | Stop reason: HOSPADM

## 2022-04-16 RX ORDER — LOSARTAN POTASSIUM 25 MG/1
50 TABLET ORAL DAILY
Status: DISCONTINUED | OUTPATIENT
Start: 2022-04-16 | End: 2022-04-16 | Stop reason: HOSPADM

## 2022-04-16 RX ORDER — DULOXETIN HYDROCHLORIDE 30 MG/1
30 CAPSULE, DELAYED RELEASE ORAL DAILY
Status: DISCONTINUED | OUTPATIENT
Start: 2022-04-16 | End: 2022-04-16 | Stop reason: HOSPADM

## 2022-04-16 RX ORDER — SODIUM,POTASSIUM PHOSPHATES 280-250MG
2 POWDER IN PACKET (EA) ORAL
Status: DISCONTINUED | OUTPATIENT
Start: 2022-04-16 | End: 2022-04-16 | Stop reason: HOSPADM

## 2022-04-16 RX ORDER — ONDANSETRON 2 MG/ML
4 INJECTION INTRAMUSCULAR; INTRAVENOUS EVERY 8 HOURS PRN
Status: DISCONTINUED | OUTPATIENT
Start: 2022-04-16 | End: 2022-04-16 | Stop reason: HOSPADM

## 2022-04-16 RX ORDER — ACETAMINOPHEN 500 MG
1000 TABLET ORAL EVERY 6 HOURS PRN
Status: DISCONTINUED | OUTPATIENT
Start: 2022-04-16 | End: 2022-04-16 | Stop reason: HOSPADM

## 2022-04-16 RX ORDER — MAG HYDROX/ALUMINUM HYD/SIMETH 200-200-20
30 SUSPENSION, ORAL (FINAL DOSE FORM) ORAL 4 TIMES DAILY PRN
Status: DISCONTINUED | OUTPATIENT
Start: 2022-04-16 | End: 2022-04-16 | Stop reason: HOSPADM

## 2022-04-16 RX ORDER — SODIUM CHLORIDE 0.9 % (FLUSH) 0.9 %
10 SYRINGE (ML) INJECTION EVERY 8 HOURS PRN
Status: DISCONTINUED | OUTPATIENT
Start: 2022-04-16 | End: 2022-04-16 | Stop reason: HOSPADM

## 2022-04-16 RX ORDER — AMOXICILLIN 250 MG
1 CAPSULE ORAL 2 TIMES DAILY
Status: DISCONTINUED | OUTPATIENT
Start: 2022-04-16 | End: 2022-04-16 | Stop reason: HOSPADM

## 2022-04-16 RX ORDER — INSULIN ASPART 100 [IU]/ML
1-10 INJECTION, SOLUTION INTRAVENOUS; SUBCUTANEOUS
Status: DISCONTINUED | OUTPATIENT
Start: 2022-04-16 | End: 2022-04-16 | Stop reason: HOSPADM

## 2022-04-16 RX ORDER — TALC
9 POWDER (GRAM) TOPICAL NIGHTLY PRN
Status: DISCONTINUED | OUTPATIENT
Start: 2022-04-16 | End: 2022-04-16 | Stop reason: HOSPADM

## 2022-04-16 RX ORDER — IPRATROPIUM BROMIDE AND ALBUTEROL SULFATE 2.5; .5 MG/3ML; MG/3ML
3 SOLUTION RESPIRATORY (INHALATION) EVERY 4 HOURS PRN
Status: DISCONTINUED | OUTPATIENT
Start: 2022-04-16 | End: 2022-04-16 | Stop reason: HOSPADM

## 2022-04-16 RX ORDER — IBUPROFEN 200 MG
24 TABLET ORAL
Status: DISCONTINUED | OUTPATIENT
Start: 2022-04-16 | End: 2022-04-16 | Stop reason: HOSPADM

## 2022-04-16 RX ORDER — MAG HYDROX/ALUMINUM HYD/SIMETH 200-200-20
30 SUSPENSION, ORAL (FINAL DOSE FORM) ORAL
Status: DISCONTINUED | OUTPATIENT
Start: 2022-04-16 | End: 2022-04-16 | Stop reason: HOSPADM

## 2022-04-16 RX ORDER — ACETAMINOPHEN 325 MG/1
650 TABLET ORAL EVERY 6 HOURS PRN
Status: DISCONTINUED | OUTPATIENT
Start: 2022-04-16 | End: 2022-04-16 | Stop reason: HOSPADM

## 2022-04-16 RX ORDER — IBUPROFEN 200 MG
16 TABLET ORAL
Status: DISCONTINUED | OUTPATIENT
Start: 2022-04-16 | End: 2022-04-16 | Stop reason: HOSPADM

## 2022-04-16 RX ORDER — GLUCAGON 1 MG
1 KIT INJECTION
Status: DISCONTINUED | OUTPATIENT
Start: 2022-04-16 | End: 2022-04-16 | Stop reason: HOSPADM

## 2022-04-16 RX ORDER — LANOLIN ALCOHOL/MO/W.PET/CERES
800 CREAM (GRAM) TOPICAL
Status: DISCONTINUED | OUTPATIENT
Start: 2022-04-16 | End: 2022-04-16 | Stop reason: HOSPADM

## 2022-04-16 RX ORDER — ASPIRIN 81 MG/1
81 TABLET ORAL DAILY
Status: DISCONTINUED | OUTPATIENT
Start: 2022-04-16 | End: 2022-04-16 | Stop reason: HOSPADM

## 2022-04-16 RX ORDER — PANTOPRAZOLE SODIUM 40 MG/1
40 TABLET, DELAYED RELEASE ORAL DAILY
Status: DISCONTINUED | OUTPATIENT
Start: 2022-04-16 | End: 2022-04-16 | Stop reason: HOSPADM

## 2022-04-16 RX ORDER — MORPHINE SULFATE 2 MG/ML
2 INJECTION, SOLUTION INTRAMUSCULAR; INTRAVENOUS
Status: COMPLETED | OUTPATIENT
Start: 2022-04-16 | End: 2022-04-16

## 2022-04-16 RX ORDER — SIMETHICONE 80 MG
1 TABLET,CHEWABLE ORAL 4 TIMES DAILY PRN
Status: DISCONTINUED | OUTPATIENT
Start: 2022-04-16 | End: 2022-04-16 | Stop reason: HOSPADM

## 2022-04-16 RX ORDER — ATORVASTATIN CALCIUM 40 MG/1
40 TABLET, FILM COATED ORAL DAILY
Status: DISCONTINUED | OUTPATIENT
Start: 2022-04-16 | End: 2022-04-16 | Stop reason: HOSPADM

## 2022-04-16 RX ORDER — AMLODIPINE BESYLATE 5 MG/1
5 TABLET ORAL DAILY
Status: DISCONTINUED | OUTPATIENT
Start: 2022-04-16 | End: 2022-04-16 | Stop reason: HOSPADM

## 2022-04-16 RX ORDER — ACETAMINOPHEN 325 MG/1
650 TABLET ORAL EVERY 4 HOURS PRN
Status: DISCONTINUED | OUTPATIENT
Start: 2022-04-16 | End: 2022-04-16 | Stop reason: HOSPADM

## 2022-04-16 RX ADMIN — ASPIRIN 81 MG: 81 TABLET, COATED ORAL at 09:04

## 2022-04-16 RX ADMIN — DULOXETINE HYDROCHLORIDE 30 MG: 30 CAPSULE, DELAYED RELEASE ORAL at 09:04

## 2022-04-16 RX ADMIN — AMLODIPINE BESYLATE 5 MG: 5 TABLET ORAL at 09:04

## 2022-04-16 RX ADMIN — PANTOPRAZOLE SODIUM 40 MG: 40 TABLET, DELAYED RELEASE ORAL at 09:04

## 2022-04-16 RX ADMIN — ALUMINUM HYDROXIDE, MAGNESIUM HYDROXIDE, AND SIMETHICONE 30 ML: 200; 200; 20 SUSPENSION ORAL at 11:04

## 2022-04-16 RX ADMIN — LOSARTAN POTASSIUM 50 MG: 25 TABLET, FILM COATED ORAL at 09:04

## 2022-04-16 RX ADMIN — MORPHINE SULFATE 2 MG: 2 INJECTION, SOLUTION INTRAMUSCULAR; INTRAVENOUS at 12:04

## 2022-04-16 RX ADMIN — ATORVASTATIN CALCIUM 40 MG: 40 TABLET, FILM COATED ORAL at 09:04

## 2022-04-16 NOTE — ED PROVIDER NOTES
Encounter Date: 4/15/2022       History     Chief Complaint   Patient presents with    Loss of Consciousness     Patient was dancing and fell, hit her head. Family reports that patient had LOC for 10 minutes.      76-year-old female past medical history of colon cancer, type 2 diabetes, hypertension, hyperlipidemia anxiety presents today with syncope.  Per family patient had a couple margaritas and was dancing earlier this evening.  She then sat down for rest. When she got up from being seated, she passed out and hit her head. Per family she was passed out for at least 10 minutes. No seizure like activity. No bowel or bladder incontinence. Family reports bump to the back of her head but no bleeding. No anticoagulation. Patient denies any slurred speech, facial asymmetry, vision changes, difficulty walking, unilateral weakness, numbness. Denies fevers, chills, cough, CP, SOB, N/V/D, abdominal pain, dysuria, hematuria or any other complaints.          Review of patient's allergies indicates:   Allergen Reactions    Sulfa (sulfonamide antibiotics)      Occurred when she was pregnant with varicose veins resulting in leg swelling and pain with standing     Past Medical History:   Diagnosis Date    Allergy     Anxiety     Cancer     colon    Cataract     Colon cancer     Diabetes mellitus, type 2     Hyperlipidemia     Hypertension     Insomnia      Past Surgical History:   Procedure Laterality Date    CATARACT EXTRACTION, BILATERAL  03/2020    CHOLECYSTECTOMY      COLONOSCOPY N/A 2/1/2021    Procedure: COLONOSCOPY;  Surgeon: Ashwini Duarte MD;  Location: [address]);  Service: Endoscopy;  Laterality: N/A;  medical transportation  covid test 1/29 [facility], instructions mailed-KPvt    HYSTERECTOMY      KNEE SURGERY      LAPAROSCOPIC LEFT COLON RESECTION       Family History   Problem Relation Age of Onset    Heart attack Mother     Colon cancer Father     Cancer Sister         unknown     Hypothyroidism Sister     Thyroid cancer Neg Hx      Social History     Tobacco Use    Smoking status: Current Every Day Smoker     Packs/day: 1.00     Types: Cigarettes    Smokeless tobacco: Never Used    Tobacco comment: smoking cessation information given    Substance Use Topics    Alcohol use: Yes     Alcohol/week: 1.0 standard drink     Types: 1 Shots of liquor per week     Comment: occ    Drug use: Never     Review of Systems   Constitutional: Negative for activity change, diaphoresis and fever.   HENT: Negative for ear pain, rhinorrhea, sore throat and trouble swallowing.    Eyes: Negative for pain and visual disturbance.   Respiratory: Negative for cough, shortness of breath and stridor.    Cardiovascular: Negative for chest pain.   Gastrointestinal: Negative for abdominal pain, blood in stool, diarrhea, nausea and vomiting.   Genitourinary: Negative for dysuria, hematuria, vaginal bleeding and vaginal discharge.   Musculoskeletal: Negative for gait problem.   Skin: Negative for rash and wound.   Neurological: Positive for syncope. Negative for dizziness, tremors, seizures, facial asymmetry, speech difficulty, weakness, light-headedness, numbness and headaches.   Psychiatric/Behavioral: Negative for hallucinations and suicidal ideas.       Physical Exam     Initial Vitals [04/15/22 2158]   BP Pulse Resp Temp SpO2   (!) 97/51 79 16 98 °F (36.7 °C) 100 %      MAP       --         Physical Exam    Nursing note and vitals reviewed.  Constitutional: She is not diaphoretic. No distress.   Elderly appearing.   HENT:   Head: Normocephalic and atraumatic.   Right Ear: External ear normal.   Left Ear: External ear normal.   Nose: Nose normal.   Left parietal scalp hematoma.  No Mitchell signs raccoon eyes or hemotympanum.  No mid face instability.   Eyes: EOM are normal. Pupils are equal, round, and reactive to light. No scleral icterus.   Neck: Neck supple.   Normal range of motion.  Cardiovascular: Normal rate,  regular rhythm, normal heart sounds and intact distal pulses. Exam reveals no gallop and no friction rub.    No murmur heard.  Pulmonary/Chest: Breath sounds normal. No stridor. No respiratory distress. She has no wheezes. She has no rhonchi. She has no rales.   Abdominal: Abdomen is soft. Bowel sounds are normal. She exhibits no distension. There is no abdominal tenderness. There is no rebound and no guarding.   Musculoskeletal:         General: No tenderness or edema. Normal range of motion.      Cervical back: Normal range of motion and neck supple.      Comments: No CT or L-spine tenderness.  No step-offs or deformities.     Neurological: She is alert and oriented to person, place, and time. She has normal strength. No cranial nerve deficit or sensory deficit. GCS score is 15. GCS eye subscore is 4. GCS verbal subscore is 5. GCS motor subscore is 6.   Cranial nerves II through XII grossly intact. Finger to nose normal. Tone normal. Sens intact to light touch. No drift. No disdiadochokinesia. Strength 5/5 bilaterally upper and lower. Gait and romberg deferred. Speech and cognition is normal.  No focal neurologic deficit.     Skin: Skin is warm and dry. Capillary refill takes less than 2 seconds. No rash noted.   Psychiatric: She has a normal mood and affect.         ED Course   Procedures  Labs Reviewed   CBC W/ AUTO DIFFERENTIAL - Abnormal; Notable for the following components:       Result Value    WBC 16.30 (*)     MCHC 31.4 (*)     Mono % 1.2 (*)     All other components within normal limits   COMPREHENSIVE METABOLIC PANEL - Abnormal; Notable for the following components:    CO2 17 (*)     Albumin 3.3 (*)     eGFR if non  56 (*)     All other components within normal limits   ALCOHOL,MEDICAL (ETHANOL) - Abnormal; Notable for the following components:    Alcohol, Serum 218 (*)     All other components within normal limits   LIPASE   SARS-COV-2 RNA AMPLIFICATION, QUAL   POCT GLUCOSE           Imaging Results           MRI Cervical Spine Without Contrast (Final result)  Result time 04/16/22 10:16:58    Final result by Ban Adamson MD (04/16/22 10:16:58)                 Impression:      Grossly negative study for acute findings but note is made that limited evaluation with the amount artifact and if concern for acute cervical spine trauma correlation with CT is recommended and note is made the patient did have CT 04/15/2022 please refer to that report.  Subtle findings as suggested on that CT scan likely would not be apparent on the MRI with the degree of artifact.    If further evaluation or follow-up is to be obtained it is recommended to be by CT    This report was flagged in Epic as abnormal.    Final read      Electronically signed by: Ban Adamson MD  Date:    04/16/2022  Time:    10:16             Narrative:    EXAMINATION:  MRI CERVICAL SPINE WITHOUT CONTRAST    CLINICAL HISTORY:  Neck trauma, ligament injury suspected (Age >= 16y);.    TECHNIQUE:  Multiplanar, multisequence MR images of the cervical spine were acquired without the administration of contrast.    COMPARISON:  None.  Note is made patient had CT cervical spine earlier the same day please refer to that report .    FINDINGS:  large amount of image degradation from artifact.  Straightening of the usual cervical lordosis. Degenerative changes. Disc space narrowing C4-C5, C5-C6. Vertebral body heights and alignment appear maintained without acute compression or subluxation evident.  Although no obvious fracture cannot exclude subtle findings including marrow edema with the large amount of image degradation. Further follow-up or evaluation is to be obtained is suggested by CT. There do appear to be small posterior disc bulges multiple levels, C3 through C 6 without appreciable significant spinal canal narrowing                               X-Ray Chest AP Portable (Final result)  Result time 04/15/22 23:52:02    Final result by  Neymar Mason MD (04/15/22 23:52:02)                 Impression:      No acute abnormality.      Electronically signed by: Neymar Mason  Date:    04/15/2022  Time:    23:52             Narrative:    EXAMINATION:  XR CHEST AP PORTABLE    CLINICAL HISTORY:  Unspecified fall, initial encounter    TECHNIQUE:  Single frontal view of the chest was performed.    COMPARISON:  None    FINDINGS:  The lungs are clear, with normal appearance of pulmonary vasculature and no pleural effusion or pneumothorax.    The cardiac silhouette is normal in size. The hilar and mediastinal contours are unremarkable.    Bones are intact.  Right Port-A-Cath appears stable.                               CT Cervical Spine Without Contrast (Final result)  Result time 04/15/22 23:14:20    Final result by Diana Mason MD (04/15/22 23:14:20)                 Impression:      Subtle lucency involving  the C3 left lateral vertebral body concerning for an acute nondisplaced fracture as described above.  Confirmation can be made with MRI to assess for associated acute edema.    Spondylosis involving the cervical spine as discussed above in detail at each disc level.  No subluxation or evidence of acute fracture otherwise.    Dependent atelectasis versus changes related to pulmonary edema in the lung apices.  Correlate clinically.      Electronically signed by: Diana Mason  Date:    04/15/2022  Time:    23:14             Narrative:    EXAMINATION:  CT CERVICAL SPINE WITHOUT CONTRAST    CLINICAL HISTORY:  Neck trauma (Age >= 65y);    TECHNIQUE:  Low dose axial images, sagittal and coronal reformations were performed though the cervical spine.  Contrast was not administered.    COMPARISON:  None    FINDINGS:  There is normal cervical spine alignment.    There is a faint irregular lucency extending through the left lateral C3 posterior vertebral body in an oblique fashion as seen only on the reformatted coronal image 44 series 601 and  sagittal image 94 series 602 raising question of a nondisplaced acute fracture.    There is no evidence of an acute fracture of the cervical spine otherwise.    There is mild to moderate spondylosis the cervical spine.    C2-C3 level appears normal as imaged.    C3-C4 demonstrates minor dorsal annular bulging and facet hypertrophic changes with mild left neural foraminal stenosis.    C4-C5 demonstrates moderate loss of disc height and dorsal osteophytic ridging with no convincing bony canal stenosis and moderate left neural foraminal stenosis.    C5-C6 level demonstrates moderate loss of disc height and circumferential osteophyte formation.  No convincing disc protrusion or canal or foraminal stenosis.    C6-C7 and C7-T1: No convincing bony canal or neural foraminal stenosis.  Soft tissue evaluation is limited by significant artifact over the canal.    Incidental note is made of medial displacement of both carotid arteries in the oropharynx with heavy calcifications seen involving the carotid arteries.    The paraspinous soft tissues and structures otherwise appear intact.    No suspicious nodules are seen in the lung apices.  There is dependent atelectasis or possibly mild dependent edematous changes in the posterior lungs.                               CT Head Without Contrast (Final result)  Result time 04/15/22 22:33:34    Final result by Diana Mason MD (04/15/22 22:33:34)                 Impression:      No evidence of acute intracranial hemorrhage/hematoma or major arterial infarct in this patient with trauma.    Small focal scalp hematoma over the left parietal bone with no evidence of acute fracture.    Moderate patchy white matter low density as seen with chronic microvascular ischemic changes.      Electronically signed by: Diana Mason  Date:    04/15/2022  Time:    22:33             Narrative:    EXAMINATION:  CT HEAD WITHOUT CONTRAST    CLINICAL HISTORY:  Head trauma, minor (Age >=  65y);    TECHNIQUE:  Low dose axial images were obtained through the head.  Coronal and sagittal reformations were also performed. Contrast was not administered.    COMPARISON:  None.    FINDINGS:  There is no acute intra or extra-axial hemorrhage or hematoma.  Patchy moderate low density in the periventricular white matter is noted as seen with microvascular chronic ischemic changes.    The gray-white matter junction differentiation appears to be intact with no evidence of acute major arterial infarct.    The ventricles, cisterns and sulci are mildly prominent consistent with patient age.    There is a scalp hematoma over the left parietal bone without evidence of overlying scalp foreign body or adjacent bony calvarial fracture.    A few likely incidental opacified ethmoid air cells noted bilaterally.  Otherwise the imaged paranasal sinuses as well as the mastoid air cells and middle ears bilaterally appear clear.    No evidence of suspicious lesion involving the bony calvarium or acute fracture involving the facial bones.  Posterior fossa structures grossly appear to be intact.  Orbits as imaged grossly appear intact.                                 Medications   acetaminophen tablet 650 mg (has no administration in time range)   sodium chloride 0.9% bolus 500 mL (0 mLs Intravenous Stopped 4/16/22 0013)   morphine injection 2 mg (2 mg Intravenous Given 4/16/22 0015)     Medical Decision Making:   ED Management:  Patient without any prodromal symptoms of SOB and no h/o CHF.  No family history of sudden cardiac death.   Pt otherwise in normal state of health.  Do not believe this was a seizure given hx.  Currently at baseline mental status with no cardiac murmurs on exam.  Neuro exam benign and nonfocal.  Low suspicion at this time for ACS, dissection, or malignant arrhythmia.  Unlikely GI bleed.   Pt does admit to drinking alcohol which likely contributed to syncope.        ED Workup:   Will check labs for  electrolyte disturbances.  Will obtain EKG for initial cardiac eval and reassess.  CTH as above discussed in detail with pt and family.     Findings:  EKG: No e/o STEMI. No evidence of Brugadas sign, delta wave, epsilon wave, significantly prolonged QTc, or malignant arrhythmia.  Labs reassuring.  Neg Crawford syncope rules (no hx of CHF, Hct >30%, EKG sinus rhythm with no significant change, no SOB, SBP > 90).               ED Course as of 04/16/22 2357   Fri Apr 15, 2022   2301 CT Head Without Contrast  Impression:     No evidence of acute intracranial hemorrhage/hematoma or major arterial infarct in this patient with trauma.     Small focal scalp hematoma over the left parietal bone with no evidence of acute fracture.     Moderate patchy white matter low density as seen with chronic microvascular ischemic changes.        Electronically signed by: Diana Mason  Date:                                            04/15/2022  Time:                                           22:33 [BD]   2320 Impression:     Subtle lucency involving  the C3 left lateral vertebral body concerning for an acute nondisplaced fracture as described above.  Confirmation can be made with MRI to assess for associated acute edema.     Spondylosis involving the cervical spine as discussed above in detail at each disc level.  No subluxation or evidence of acute fracture otherwise.     Dependent atelectasis versus changes related to pulmonary edema in the lung apices.  Correlate clinically.        Electronically signed by: Diana Mason  Date:                                            04/15/2022  Time:                                           23:14 [BD]   2328 CT C-spine concerning for possible C3 vertebral body fracture.  The patient placed in C-collar and repeat exam with full range of motion all extremities 5/5 strength.  Will get MRI to assess for associated edema or cord injury.  Patient signed out pending MRI and further labs. [BD]    Sat Apr 16, 2022   0300 Patient received in sign-out from Dr. Boyd.  He placed an order for MRI of the cervical spine which is negative for convincing evidence of vertebral fracture or evidence of acute cord compression.  The patient is neuro intact but was markedly hypotensive on arrival with some improvement after IV hydration.  She is acutely intoxicated with a C-collar in place.  I do think she warrants observation until sober to fully assess her neck and to receive further workup for prolonged syncopal episode.  Case discussed with and accepted by the on-call nurse practitioner hospitalist.  The patient and his son are in agreement with this disposition.  She will be transported to an observation guarded condition. [ST]      ED Course User Index  [BD] Steve Boyd MD  [ST] Alexander Gonzalez MD             Clinical Impression:   Final diagnoses:  [R55] Syncope  [W19.XXXA] Fall  [S09.90XA] Traumatic injury of head, initial encounter (Primary)          ED Disposition Condition    Observation               Steve Boyd MD  04/16/22 3153

## 2022-04-16 NOTE — PLAN OF CARE
Pt has been cleared by [facility] for DC.          04/16/22 1334   Final Note   Assessment Type Final Discharge Note   Anticipated Discharge Disposition Home   What phone number can be called within the next 1-3 days to see how you are doing after discharge? [phone]   Post-Acute Status   Discharge Delays None known at this time

## 2022-04-16 NOTE — DISCHARGE SUMMARY
[facility] Medicine  Discharge Summary      Patient Name: Marizol Kevin  MRN: [id number]  Patient Class: OP- Observation  Admission Date: 4/15/2022  Hospital Length of Stay: 0 days  Discharge Date and Time:  04/16/2022 3:03 PM  Attending Physician: No att. providers found   Discharging Provider: Ute Espinosa NP  Primary Care Provider: Belinda Fajardo MD      HPI:   Marizol Kevin is a 76-year-old female who presents emergency room for evaluation of syncope and fall.  She reports while at a party she was dancing.  She sustained a fall and syncope.  She has no independent recollection of the events.  Complains of headache and mild cervical pain.  She admits to drinking several alcoholic beverages tonight.  Denies fever or chills.  No known sick contacts or travel.  She is vaccinated for COVID.  Previous medical history includes anxiety, hyperlipidemia, hypertension, GERD, active smoker, colon cancer, diabetes.  ER workup:  CBC with leukocytosis of 16,000.  CMP with bicarb of 17 otherwise unremarkable.  ETOH level was 218.  CT of the head was negative for any acute findings.  CT the cervical spine demonstrated a lucency involving C3 left lateral vertebral body concerning for acute nondisplaced fracture with recommendations for MRI.  MRI was performed in ER and was negative for any acute fracture involving C3.  Patient be admitted to Hospital Medicine for observation and management.      * No surgery found *      Hospital Course:   Marizol Kevin was is a 76 year old  female who was admitted on 4/16/2022 to Hospital Medicine and followed closely. Pt presented to ED after a syncopal episode. She also had LOC x 10 minutes. ETOH level was 218 on adm.    CT Head wo contrast    No evidence of acute intracranial hemorrhage/hematoma or major arterial infarct in this patient with trauma.  Small focal scalp hematoma over the left parietal bone with no evidence of acute  fracture.  Moderate patchy white matter low density as seen with chronic microvascular ischemic changes.    CT c-spine wo contrast    Subtle lucency involving  the C3 left lateral vertebral body concerning for an acute nondisplaced fracture as described above.  Confirmation can be made with MRI to assess for associated acute edema.  Spondylosis involving the cervical spine as discussed above in detail at each disc level.  No subluxation or evidence of acute fracture otherwise.  Dependent atelectasis versus changes related to pulmonary edema in the lung apices.  Correlate clinically.    MRI cervical spine wo contrast    Grossly negative study for acute findings but note is made that limited evaluation with the amount artifact and if concern for acute cervical spine trauma correlation with CT is recommended and note is made the patient did have CT 04/15/2022 please refer to that report.  Subtle findings as suggested on that CT scan likely would not be apparent on the MRI with the degree of artifact.   If further evaluation or follow-up is to be obtained it is recommended to be by CT  This report was flagged in Epic as abnormal.  Final read    Because of inconsistencies neurosurgery was consulted and recommendations were followed. Pt was cleared by neurosurgery for dc. Pt was instructed to return to an ED immediately if she experiences any changes in LOC, or has AMS, or HA. She verbalized she will do this. Explained the risk of subdural bleed after fall and hitting head in elderly patients and need to watch closely over next week for neuro changes mentioned above.        Goals of Care Treatment Preferences:  Code Status: Full Code      Consults:   Consults (From admission, onward)        Status Ordering Provider     Inpatient consult to Neurosurgery  Once        Provider:  (Not yet assigned)    Acknowledged MONALISA HURLEY     IP consult to dietary  Once        Provider:  (Not yet assigned)    Completed SANTY  ARIANA NESBITT          No new Assessment & Plan notes have been filed under this hospital service since the last note was generated.  Service: Hospital Medicine    Final Active Diagnoses:    Diagnosis Date Noted POA    PRINCIPAL PROBLEM:  Syncope [R55] 04/16/2022 Yes    Type 2 diabetes mellitus with hyperglycemia, without long-term current use of insulin [E11.65] 09/03/2020 Yes    Essential hypertension [I10] 09/03/2020 Yes    Gastroesophageal reflux disease [K21.9] 09/03/2020 Yes    Dependence on nicotine from cigarettes [F17.210] 09/03/2020 Yes      Problems Resolved During this Admission:       Discharged Condition: stable    Disposition: Home or Self Care    Follow Up:   Follow-up Information     Belinda Fajardo MD. Go in 1 week(s).    Specialty: Internal Medicine  Why: Call and schedule a PCP appt in 1 week  Contact information:  [address]  [address]  [facility] [address]  [phone]                       Patient Instructions:      Diet diabetic     Activity as tolerated       Significant Diagnostic Studies: Labs:   CMP   Recent Labs   Lab 04/15/22  2302 04/16/22  0541    141   K 3.6 3.9    109   CO2 17* 21*    104   BUN 10 10   CREATININE 1.0 0.9   CALCIUM 9.4 9.1   PROT 7.0 6.5   ALBUMIN 3.3* 3.0*   BILITOT 0.2 0.2   ALKPHOS 87 80   AST 19 17   ALT 16 15   ANIONGAP 13 11   ESTGFRAFRICA >60 >60   EGFRNONAA 56* >60   , CBC   Recent Labs   Lab 04/15/22  2302 04/16/22  0541   WBC 16.30* 11.37   HGB 12.7 12.1   HCT 40.5 38.5    259    and Troponin   Recent Labs   Lab 04/16/22  0541 04/16/22  0939   TROPONINI <0.006 <0.006       Pending Diagnostic Studies:     None         Medications:  Reconciled Home Medications:      Medication List      CONTINUE taking these medications    albuterol 90 mcg/actuation inhaler  Commonly known as: VENTOLIN HFA  Inhale 2 puffs into the lungs every 4 (four) hours as needed for Wheezing.     amLODIPine 5 MG tablet  Commonly known as: NORVASC  TAKE 1  TABLET EVERY DAY     aspirin 81 MG EC tablet  Commonly known as: ECOTRIN  Take 81 mg by mouth once daily.     atorvastatin 40 MG tablet  Commonly known as: LIPITOR  TAKE 1 TABLET EVERY DAY     benzonatate 100 MG capsule  Commonly known as: TESSALON  Take 1 capsule (100 mg total) by mouth 3 (three) times daily as needed for Cough.     betamethasone valerate 0.1% 0.1 % Oint  Commonly known as: VALISONE  Apply topically once daily.     blood sugar diagnostic Strp  1 strip by Misc.(Non-Drug; Combo Route) route 2 (two) times daily.     blood-glucose meter kit  Please provide with insurance covered meter     cholecalciferol (vitamin D3) 50 mcg (2,000 unit) Cap capsule  Commonly known as: VITAMIN D3  Take by mouth.     DULoxetine 30 MG capsule  Commonly known as: CYMBALTA  TAKE 1 CAPSULE EVERY DAY     erythromycin ophthalmic ointment  Commonly known as: ROMYCIN     fluticasone propionate 50 mcg/actuation nasal spray  Commonly known as: FLONASE  USE 1 SPRAY IN EACH NOSTRIL ONE TIME DAILY     hydrOXYzine HCL 25 MG tablet  Commonly known as: ATARAX  Take 1 tablet (25 mg total) by mouth 3 (three) times daily as needed for Itching or Anxiety.     lancets Misc  1 Device by Misc.(Non-Drug; Combo Route) route 2 (two) times daily.     LIDOcaine 5 %  Commonly known as: LIDODERM  Place 1 patch onto the skin once daily. Remove & Discard patch within 12 hours or as directed by MD     LIDOcaine VISCOUS 2 % Soln  Generic drug: LIDOcaine HCl 2%     losartan 50 MG tablet  Commonly known as: COZAAR  Take 1 tablet (50 mg total) by mouth once daily.     metFORMIN 500 MG ER 24hr tablet  Commonly known as: GLUCOPHAGE-XR  TAKE 2 TABLETS EVERY DAY WITH BREAKFAST     montelukast 10 mg tablet  Commonly known as: SINGULAIR  Take 1 tablet (10 mg total) by mouth every evening.     multivitamin capsule  Take 1 capsule by mouth once daily.     pantoprazole 40 MG tablet  Commonly known as: PROTONIX  Take 1 tablet (40 mg total) by mouth once daily.      raloxifene 60 mg tablet  Commonly known as: EVISTA  TAKE 1 TABLET EVERY DAY     STIOLTO RESPIMAT 2.5-2.5 mcg/actuation Mist  Generic drug: tiotropium-olodateroL  Inhale 2 puffs into the lungs once daily.     triamcinolone acetonide 0.025% 0.025 % cream  Commonly known as: KENALOG  APPLY DAILY TO AFFECTED AREA AS NEEDED FOR ITCHING. MAXIMUM 2 TIMES A WEEK            Indwelling Lines/Drains at time of discharge:   Lines/Drains/Airways     Central Venous Catheter Line  Duration           Port A Cath Single Lumen -- days                Time spent on the discharge of patient: 45 minutes         Ute Espinosa NP  Department of Hospital Medicine  [facility]

## 2022-04-16 NOTE — H&P
[facility] Medicine  History & Physical    Patient Name: Marizol Kevin  MRN: [id number]  Patient Class: OP- Observation  Admission Date: 4/15/2022  Attending Physician: Pat Schultz MD  Primary Care Provider: Belinda Fajardo MD         Patient information was obtained from patient, relative(s), past medical records and ER records.     Subjective:     Principal Problem:Syncope    Chief Complaint:   Chief Complaint   Patient presents with    Loss of Consciousness     Patient was dancing and fell, hit her head. Family reports that patient had LOC for 10 minutes.         HPI: Marizol Kevin is a 76-year-old female who presents emergency room for evaluation of syncope and fall.  She reports while at a party she was dancing.  She sustained a fall and syncope.  She has no independent recollection of the events.  Complains of headache and mild cervical pain.  She admits to drinking several alcoholic beverages tonight.  Denies fever or chills.  No known sick contacts or travel.  She is vaccinated for COVID.  Previous medical history includes anxiety, hyperlipidemia, hypertension, GERD, active smoker, colon cancer, diabetes.  ER workup:  CBC with leukocytosis of 16,000.  CMP with bicarb of 17 otherwise unremarkable.  ETOH level was 218.  CT of the head was negative for any acute findings.  CT the cervical spine demonstrated a lucency involving C3 left lateral vertebral body concerning for acute nondisplaced fracture with recommendations for MRI.  MRI was performed in ER and was negative for any acute fracture involving C3.  Patient be admitted to Hospital Medicine for observation and management.      Past Medical History:   Diagnosis Date    Allergy     Anxiety     Cancer     colon    Cataract     Colon cancer     Diabetes mellitus, type 2     Hyperlipidemia     Hypertension     Insomnia        Past Surgical History:   Procedure Laterality Date    CATARACT EXTRACTION, BILATERAL  03/2020     CHOLECYSTECTOMY      COLONOSCOPY N/A 2/1/2021    Procedure: COLONOSCOPY;  Surgeon: Ashwini Duarte MD;  Location: [facility] ([address] [place] [address]);  Service: Endoscopy;  Laterality: N/A;  medical transportation  covid test 1/29 Margy, enrique mailed-KPvt    HYSTERECTOMY      KNEE SURGERY      LAPAROSCOPIC LEFT COLON RESECTION         Review of patient's allergies indicates:   Allergen Reactions    Sulfa (sulfonamide antibiotics)      Occurred when she was pregnant with varicose veins resulting in leg swelling and pain with standing       No current facility-administered medications on file prior to encounter.     Current Outpatient Medications on File Prior to Encounter   Medication Sig    albuterol (VENTOLIN HFA) 90 mcg/actuation inhaler Inhale 2 puffs into the lungs every 4 (four) hours as needed for Wheezing.    amLODIPine (NORVASC) 5 MG tablet TAKE 1 TABLET EVERY DAY    aspirin (ECOTRIN) 81 MG EC tablet Take 81 mg by mouth once daily.    atorvastatin (LIPITOR) 40 MG tablet TAKE 1 TABLET EVERY DAY    benzonatate (TESSALON) 100 MG capsule Take 1 capsule (100 mg total) by mouth 3 (three) times daily as needed for Cough.    betamethasone valerate 0.1% (VALISONE) 0.1 % Oint Apply topically once daily.    blood sugar diagnostic Strp 1 strip by Misc.(Non-Drug; Combo Route) route 2 (two) times daily.    blood-glucose meter kit Please provide with insurance covered meter    cholecalciferol, vitamin D3, (VITAMIN D3) 50 mcg (2,000 unit) Cap Take by mouth.    DULoxetine (CYMBALTA) 30 MG capsule TAKE 1 CAPSULE EVERY DAY    erythromycin (ROMYCIN) ophthalmic ointment     fluticasone propionate (FLONASE) 50 mcg/actuation nasal spray USE 1 SPRAY IN EACH NOSTRIL ONE TIME DAILY    hydrOXYzine HCL (ATARAX) 25 MG tablet Take 1 tablet (25 mg total) by mouth 3 (three) times daily as needed for Itching or Anxiety.    lancets Misc 1 Device by Misc.(Non-Drug; Combo Route) route 2 (two) times daily.    LIDOcaine  (LIDODERM) 5 % Place 1 patch onto the skin once daily. Remove & Discard patch within 12 hours or as directed by MD    LIDOCAINE VISCOUS 2 % solution     losartan (COZAAR) 50 MG tablet Take 1 tablet (50 mg total) by mouth once daily.    metFORMIN (GLUCOPHAGE-XR) 500 MG ER 24hr tablet TAKE 2 TABLETS EVERY DAY WITH BREAKFAST    montelukast (SINGULAIR) 10 mg tablet Take 1 tablet (10 mg total) by mouth every evening. (Patient not taking: No sig reported)    multivitamin capsule Take 1 capsule by mouth once daily.    pantoprazole (PROTONIX) 40 MG tablet Take 1 tablet (40 mg total) by mouth once daily.    raloxifene (EVISTA) 60 mg tablet TAKE 1 TABLET EVERY DAY    tiotropium-olodateroL (STIOLTO RESPIMAT) 2.5-2.5 mcg/actuation Mist Inhale 2 puffs into the lungs once daily.    triamcinolone acetonide 0.025% (KENALOG) 0.025 % cream APPLY DAILY TO AFFECTED AREA AS NEEDED FOR ITCHING. MAXIMUM 2 TIMES A WEEK (Patient not taking: No sig reported)     Family History       Problem Relation (Age of Onset)    Cancer Sister    Colon cancer Father    Heart attack Mother    Hypothyroidism Sister          Tobacco Use    Smoking status: Current Every Day Smoker     Packs/day: 0.50     Types: Cigarettes    Smokeless tobacco: Never Used    Tobacco comment: smoking cessation information given    Substance and Sexual Activity    Alcohol use: Yes     Comment: occ    Drug use: Never    Sexual activity: Not Currently     Review of Systems   Constitutional:  Negative for activity change, chills, diaphoresis and fever.   HENT:  Negative for congestion, nosebleeds and tinnitus.    Eyes:  Negative for photophobia and visual disturbance.   Respiratory:  Negative for cough, chest tightness, shortness of breath and wheezing.    Cardiovascular:  Negative for chest pain, palpitations and leg swelling.   Gastrointestinal:  Negative for abdominal distention, abdominal pain, constipation, diarrhea, nausea and vomiting.   Endocrine: Negative  for cold intolerance and heat intolerance.   Genitourinary:  Negative for difficulty urinating, dysuria, frequency, hematuria and urgency.   Musculoskeletal:  Positive for arthralgias and neck pain. Negative for back pain and myalgias.   Skin:  Negative for pallor, rash and wound.   Allergic/Immunologic: Negative for immunocompromised state.   Neurological:  Positive for syncope, weakness and headaches. Negative for dizziness, tremors, facial asymmetry and speech difficulty.   Hematological:  Negative for adenopathy. Does not bruise/bleed easily.   Psychiatric/Behavioral:  Negative for confusion and sleep disturbance. The patient is not nervous/anxious.    Objective:     Vital Signs (Most Recent):  Temp: 98 °F (36.7 °C) (04/15/22 2158)  Pulse: 78 (04/15/22 2333)  Resp: 20 (04/16/22 0014)  BP: (!) 121/58 (04/15/22 2333)  SpO2: 98 % (04/15/22 2333)   Vital Signs (24h Range):  Temp:  [98 °F (36.7 °C)] 98 °F (36.7 °C)  Pulse:  [76-79] 78  Resp:  [16-20] 20  SpO2:  [98 %-100 %] 98 %  BP: ()/(51-58) 121/58        Body mass index is 33.44 kg/m².    Physical Exam  Vitals and nursing note reviewed.   Constitutional:       General: She is not in acute distress.     Appearance: She is well-developed. She is not diaphoretic.   HENT:      Head: Normocephalic.      Nose: Nose normal.      Mouth/Throat:      Mouth: Mucous membranes are moist.      Pharynx: Oropharynx is clear.   Eyes:      General: No scleral icterus.     Conjunctiva/sclera: Conjunctivae normal.      Pupils: Pupils are equal, round, and reactive to light.   Neck:      Vascular: No JVD.   Cardiovascular:      Rate and Rhythm: Normal rate and regular rhythm.      Heart sounds: Normal heart sounds. No murmur heard.    No friction rub. No gallop.   Pulmonary:      Effort: Pulmonary effort is normal. No respiratory distress.      Breath sounds: Normal breath sounds. No wheezing or rales.   Abdominal:      General: Bowel sounds are normal. There is no distension.       Palpations: Abdomen is soft.      Tenderness: There is no abdominal tenderness. There is no guarding or rebound.   Musculoskeletal:         General: No tenderness. Normal range of motion.      Cervical back: Normal range of motion and neck supple.   Lymphadenopathy:      Cervical: No cervical adenopathy.   Skin:     General: Skin is warm and dry.      Capillary Refill: Capillary refill takes less than 2 seconds.      Coloration: Skin is not pale.      Findings: No erythema or rash.   Neurological:      Mental Status: She is alert and oriented to person, place, and time.      Cranial Nerves: No cranial nerve deficit.      Sensory: No sensory deficit.      Coordination: Coordination normal.      Deep Tendon Reflexes: Reflexes normal.   Psychiatric:         Behavior: Behavior normal.         Thought Content: Thought content normal.         Judgment: Judgment normal.         CRANIAL NERVES     CN III, IV, VI   Pupils are equal, round, and reactive to light.     Significant Labs: All pertinent labs within the past 24 hours have been reviewed.  CBC:   Recent Labs   Lab 04/15/22  2302   WBC 16.30*   HGB 12.7   HCT 40.5        CMP:   Recent Labs   Lab 04/15/22  2302      K 3.6      CO2 17*      BUN 10   CREATININE 1.0   CALCIUM 9.4   PROT 7.0   ALBUMIN 3.3*   BILITOT 0.2   ALKPHOS 87   AST 19   ALT 16   ANIONGAP 13   EGFRNONAA 56*     Cardiac Markers: No results for input(s): CKMB, MYOGLOBIN, BNP, TROPISTAT in the last 48 hours.    Significant Imaging: I have reviewed all pertinent imaging results/findings within the past 24 hours.    CT head:    Impression:     No evidence of acute intracranial hemorrhage/hematoma or major arterial infarct in this patient with trauma.     Small focal scalp hematoma over the left parietal bone with no evidence of acute fracture.     Moderate patchy white matter low density as seen with chronic microvascular ischemic changes    CT cervical  spine:    Impression:     Subtle lucency involving  the C3 left lateral vertebral body concerning for an acute nondisplaced fracture as described above.  Confirmation can be made with MRI to assess for associated acute edema.     Spondylosis involving the cervical spine as discussed above in detail at each disc level.  No subluxation or evidence of acute fracture otherwise.     Dependent atelectasis versus changes related to pulmonary edema in the lung apices.  Correlate clinically.    MRI cervical spine:  FINDINGS:  Limitations: Study is limited secondary to motion on multiple sequences.  Vertebrae: Vertebral body heights are preserved. No marrow edema or infiltrative change. No acute  fracture. There is straightening of normal cervical lordosis which may be secondary to patient  positioning versus muscle spasm.  Spinal cord: Normal signal. No cord compression.  C2-C3: No significant disc disease. No significant spinal stenosis.  C3-C4: Mild disc osteophyte complex causes mild effacement of the ventral CSF space without  significant adverse effect upon the cervical cord. No high-grade neural foraminal stenosis, motion  limits evaluation for mild stenosis.  C4-C5: Intervertebral disc height loss with disc osteophyte complex. Motion limits evaluation for mild  central canal stenosis, no high-grade stenosis. No high-grade neural foraminal stenosis, motion limits  evaluation for mild stenosis.  C5-C6: Mild intervertebral disc height loss with disc osteophyte complex. Motion limits evaluation for  mild central canal stenosis, no high-grade stenosis. No high-grade neural foraminal stenosis, motion  limits evaluation for mild stenosis.  C6-C7: No significant disc disease. No significant spinal stenosis.  C7-T1: No significant disc disease. No significant spinal stenosis.  Soft tissues: Unremarkable.  Vertebral arteries: Expected flow voids in the vertebral arteries.  IMPRESSION:  1. Motion limited  exam.          Assessment/Plan:     * Syncope  Acute problem  Continuous cardiac monitor  Neurochecks q.4  Monitor closely      Dependence on nicotine from cigarettes  Chronic  Dangers of cigarette smoking were reviewed with patient in detail for 10 minutes and patient was encouraged to quit. Nicotine replacement options were discussed.      Gastroesophageal reflux disease  Chronic  Stable  Protonix      Essential hypertension  Chronic   Chronic, controlled.  Latest blood pressure and vitals reviewed-   Temp:  [98 °F (36.7 °C)]   Pulse:  [76-79]   Resp:  [16-20]   BP: ()/(51-58)   SpO2:  [98 %-100 %] .   Home meds for hypertension were reviewed and noted below.   Hypertension Medications             amLODIPine (NORVASC) 5 MG tablet TAKE 1 TABLET EVERY DAY    losartan (COZAAR) 50 MG tablet Take 1 tablet (50 mg total) by mouth once daily.          While in the hospital, will manage blood pressure as follows; Continue home antihypertensive regimen    Will utilize p.r.n. blood pressure medication only if patient's blood pressure greater than  180/110 and she develops symptoms such as worsening chest pain or shortness of breath.      Type 2 diabetes mellitus with hyperglycemia, without long-term current use of insulin  Chronic problem  Patient's FSGs are controlled on current medication regimen.  Last A1c reviewed-   Lab Results   Component Value Date    HGBA1C 7.2 (H) 10/01/2021     Most recent fingerstick glucose reviewed-   Recent Labs   Lab 04/15/22  2210   POCTGLUCOSE 96     Current correctional scale  Medium  Maintain anti-hyperglycemic dose as follows-   Antihyperglycemics (From admission, onward)            Start     Stop Route Frequency Ordered    04/16/22 0431  insulin aspart U-100 pen 1-10 Units         -- SubQ Before meals & nightly PRN 04/16/22 0332        Hold Oral hypoglycemics while patient is in the hospital.        VTE Risk Mitigation (From admission, onward)         Ordered     Place ROSA hose   Until discontinued         04/16/22 0332     IP VTE HIGH RISK PATIENT  Once         04/16/22 0332     Place sequential compression device  Until discontinued         04/16/22 0332                   Cricket Barraza NP  Department of Hospital Medicine   [facility] - Emergency Dept

## 2022-04-16 NOTE — SUBJECTIVE & OBJECTIVE
Past Medical History:   Diagnosis Date    Allergy     Anxiety     Cancer     colon    Cataract     Colon cancer     Diabetes mellitus, type 2     Hyperlipidemia     Hypertension     Insomnia        Past Surgical History:   Procedure Laterality Date    CATARACT EXTRACTION, BILATERAL  03/2020    CHOLECYSTECTOMY      COLONOSCOPY N/A 2/1/2021    Procedure: COLONOSCOPY;  Surgeon: Ashwini Duarte MD;  Location: [address];  Service: Endoscopy;  Laterality: N/A;  medical transportation  covid test 1/29 [employer], instructions mailed-KPvt    HYSTERECTOMY      KNEE SURGERY      LAPAROSCOPIC LEFT COLON RESECTION         Review of patient's allergies indicates:   Allergen Reactions    Sulfa (sulfonamide antibiotics)      Occurred when she was pregnant with varicose veins resulting in leg swelling and pain with standing       No current facility-administered medications on file prior to encounter.     Current Outpatient Medications on File Prior to Encounter   Medication Sig    albuterol (VENTOLIN HFA) 90 mcg/actuation inhaler Inhale 2 puffs into the lungs every 4 (four) hours as needed for Wheezing.    amLODIPine (NORVASC) 5 MG tablet TAKE 1 TABLET EVERY DAY    aspirin (ECOTRIN) 81 MG EC tablet Take 81 mg by mouth once daily.    atorvastatin (LIPITOR) 40 MG tablet TAKE 1 TABLET EVERY DAY    benzonatate (TESSALON) 100 MG capsule Take 1 capsule (100 mg total) by mouth 3 (three) times daily as needed for Cough.    betamethasone valerate 0.1% (VALISONE) 0.1 % Oint Apply topically once daily.    blood sugar diagnostic Strp 1 strip by Misc.(Non-Drug; Combo Route) route 2 (two) times daily.    blood-glucose meter kit Please provide with insurance covered meter    cholecalciferol, vitamin D3, (VITAMIN D3) 50 mcg (2,000 unit) Cap Take by mouth.    DULoxetine (CYMBALTA) 30 MG capsule TAKE 1 CAPSULE EVERY DAY    erythromycin (ROMYCIN) ophthalmic ointment     fluticasone propionate (FLONASE) 50 mcg/actuation nasal spray USE 1  SPRAY IN EACH NOSTRIL ONE TIME DAILY    hydrOXYzine HCL (ATARAX) 25 MG tablet Take 1 tablet (25 mg total) by mouth 3 (three) times daily as needed for Itching or Anxiety.    lancets Misc 1 Device by Misc.(Non-Drug; Combo Route) route 2 (two) times daily.    LIDOcaine (LIDODERM) 5 % Place 1 patch onto the skin once daily. Remove & Discard patch within 12 hours or as directed by MD    LIDOCAINE VISCOUS 2 % solution     losartan (COZAAR) 50 MG tablet Take 1 tablet (50 mg total) by mouth once daily.    metFORMIN (GLUCOPHAGE-XR) 500 MG ER 24hr tablet TAKE 2 TABLETS EVERY DAY WITH BREAKFAST    montelukast (SINGULAIR) 10 mg tablet Take 1 tablet (10 mg total) by mouth every evening. (Patient not taking: No sig reported)    multivitamin capsule Take 1 capsule by mouth once daily.    pantoprazole (PROTONIX) 40 MG tablet Take 1 tablet (40 mg total) by mouth once daily.    raloxifene (EVISTA) 60 mg tablet TAKE 1 TABLET EVERY DAY    tiotropium-olodateroL (STIOLTO RESPIMAT) 2.5-2.5 mcg/actuation Mist Inhale 2 puffs into the lungs once daily.    triamcinolone acetonide 0.025% (KENALOG) 0.025 % cream APPLY DAILY TO AFFECTED AREA AS NEEDED FOR ITCHING. MAXIMUM 2 TIMES A WEEK (Patient not taking: No sig reported)     Family History       Problem Relation (Age of Onset)    Cancer Sister    Colon cancer Father    Heart attack Mother    Hypothyroidism Sister          Tobacco Use    Smoking status: Current Every Day Smoker     Packs/day: 0.50     Types: Cigarettes    Smokeless tobacco: Never Used    Tobacco comment: smoking cessation information given    Substance and Sexual Activity    Alcohol use: Yes     Comment: occ    Drug use: Never    Sexual activity: Not Currently     Review of Systems   Constitutional:  Negative for activity change, chills, diaphoresis and fever.   HENT:  Negative for congestion, nosebleeds and tinnitus.    Eyes:  Negative for photophobia and visual disturbance.   Respiratory:  Negative for cough, chest  tightness, shortness of breath and wheezing.    Cardiovascular:  Negative for chest pain, palpitations and leg swelling.   Gastrointestinal:  Negative for abdominal distention, abdominal pain, constipation, diarrhea, nausea and vomiting.   Endocrine: Negative for cold intolerance and heat intolerance.   Genitourinary:  Negative for difficulty urinating, dysuria, frequency, hematuria and urgency.   Musculoskeletal:  Positive for arthralgias and neck pain. Negative for back pain and myalgias.   Skin:  Negative for pallor, rash and wound.   Allergic/Immunologic: Negative for immunocompromised state.   Neurological:  Positive for syncope, weakness and headaches. Negative for dizziness, tremors, facial asymmetry and speech difficulty.   Hematological:  Negative for adenopathy. Does not bruise/bleed easily.   Psychiatric/Behavioral:  Negative for confusion and sleep disturbance. The patient is not nervous/anxious.    Objective:     Vital Signs (Most Recent):  Temp: 98 °F (36.7 °C) (04/15/22 2158)  Pulse: 78 (04/15/22 2333)  Resp: 20 (04/16/22 0014)  BP: (!) 121/58 (04/15/22 2333)  SpO2: 98 % (04/15/22 2333)   Vital Signs (24h Range):  Temp:  [98 °F (36.7 °C)] 98 °F (36.7 °C)  Pulse:  [76-79] 78  Resp:  [16-20] 20  SpO2:  [98 %-100 %] 98 %  BP: ()/(51-58) 121/58        Body mass index is 33.44 kg/m².    Physical Exam  Vitals and nursing note reviewed.   Constitutional:       General: She is not in acute distress.     Appearance: She is well-developed. She is not diaphoretic.   HENT:      Head: Normocephalic.      Nose: Nose normal.      Mouth/Throat:      Mouth: Mucous membranes are moist.      Pharynx: Oropharynx is clear.   Eyes:      General: No scleral icterus.     Conjunctiva/sclera: Conjunctivae normal.      Pupils: Pupils are equal, round, and reactive to light.   Neck:      Vascular: No JVD.   Cardiovascular:      Rate and Rhythm: Normal rate and regular rhythm.      Heart sounds: Normal heart sounds. No  murmur heard.    No friction rub. No gallop.   Pulmonary:      Effort: Pulmonary effort is normal. No respiratory distress.      Breath sounds: Normal breath sounds. No wheezing or rales.   Abdominal:      General: Bowel sounds are normal. There is no distension.      Palpations: Abdomen is soft.      Tenderness: There is no abdominal tenderness. There is no guarding or rebound.   Musculoskeletal:         General: No tenderness. Normal range of motion.      Cervical back: Normal range of motion and neck supple.   Lymphadenopathy:      Cervical: No cervical adenopathy.   Skin:     General: Skin is warm and dry.      Capillary Refill: Capillary refill takes less than 2 seconds.      Coloration: Skin is not pale.      Findings: No erythema or rash.   Neurological:      Mental Status: She is alert and oriented to person, place, and time.      Cranial Nerves: No cranial nerve deficit.      Sensory: No sensory deficit.      Coordination: Coordination normal.      Deep Tendon Reflexes: Reflexes normal.   Psychiatric:         Behavior: Behavior normal.         Thought Content: Thought content normal.         Judgment: Judgment normal.         CRANIAL NERVES     CN III, IV, VI   Pupils are equal, round, and reactive to light.     Significant Labs: All pertinent labs within the past 24 hours have been reviewed.  CBC:   Recent Labs   Lab 04/15/22  2302   WBC 16.30*   HGB 12.7   HCT 40.5        CMP:   Recent Labs   Lab 04/15/22  2302      K 3.6      CO2 17*      BUN 10   CREATININE 1.0   CALCIUM 9.4   PROT 7.0   ALBUMIN 3.3*   BILITOT 0.2   ALKPHOS 87   AST 19   ALT 16   ANIONGAP 13   EGFRNONAA 56*     Cardiac Markers: No results for input(s): CKMB, MYOGLOBIN, BNP, TROPISTAT in the last 48 hours.    Significant Imaging: I have reviewed all pertinent imaging results/findings within the past 24 hours.    CT head:    Impression:     No evidence of acute intracranial hemorrhage/hematoma or major arterial  infarct in this patient with trauma.     Small focal scalp hematoma over the left parietal bone with no evidence of acute fracture.     Moderate patchy white matter low density as seen with chronic microvascular ischemic changes    CT cervical spine:    Impression:     Subtle lucency involving  the C3 left lateral vertebral body concerning for an acute nondisplaced fracture as described above.  Confirmation can be made with MRI to assess for associated acute edema.     Spondylosis involving the cervical spine as discussed above in detail at each disc level.  No subluxation or evidence of acute fracture otherwise.     Dependent atelectasis versus changes related to pulmonary edema in the lung apices.  Correlate clinically.    MRI cervical spine:  FINDINGS:  Limitations: Study is limited secondary to motion on multiple sequences.  Vertebrae: Vertebral body heights are preserved. No marrow edema or infiltrative change. No acute  fracture. There is straightening of normal cervical lordosis which may be secondary to patient  positioning versus muscle spasm.  Spinal cord: Normal signal. No cord compression.  C2-C3: No significant disc disease. No significant spinal stenosis.  C3-C4: Mild disc osteophyte complex causes mild effacement of the ventral CSF space without  significant adverse effect upon the cervical cord. No high-grade neural foraminal stenosis, motion  limits evaluation for mild stenosis.  C4-C5: Intervertebral disc height loss with disc osteophyte complex. Motion limits evaluation for mild  central canal stenosis, no high-grade stenosis. No high-grade neural foraminal stenosis, motion limits  evaluation for mild stenosis.  C5-C6: Mild intervertebral disc height loss with disc osteophyte complex. Motion limits evaluation for  mild central canal stenosis, no high-grade stenosis. No high-grade neural foraminal stenosis, motion  limits evaluation for mild stenosis.  C6-C7: No significant disc disease. No  significant spinal stenosis.  C7-T1: No significant disc disease. No significant spinal stenosis.  Soft tissues: Unremarkable.  Vertebral arteries: Expected flow voids in the vertebral arteries.  IMPRESSION:  1. Motion limited exam.

## 2022-04-16 NOTE — CONSULTS
"  [facility]  Adult Nutrition  Consult Note    SUMMARY     Recommendations     1. Consider diet order modification:cardiac consistent carbohydrate 1500 calories   2. RD to follow up to monitor intake, tolerance, and labs.    *Please send consult if acute nutrition needs arise.    Goals:  Pt will tolerate >65% estimated energy and protein needs by RD follow up.    Nutrition Goal Status: new   Communication of RD recs: POC and sticky note    Assessment and Plan  No PES warranted at this time    Reason for Assessment  Reason For Assessment: consult (malnutrition)  Diagnosis: Syncope   Relevant Medical History: DM2, HLD, HTN, GERD, colon cancer (2020)m osteoporosis    General information comments:   04/16/2022: RD assessment completed remotely, consulted for malnutrition. Pt admitted yesterday after fall. Recent weights appear stable, ~ 5 lb wt loss x 6 months, obese with current wt 69.9 kg, BMI=33.33. Labs unremarkable. Will continue to monitor.     Nutrition Discharge Planning - pending medical course, cardiac diabetic diet    Nutrition Risk Screen        Nutrition Risk Screen: no indicators present    Physical Findings/Malnutrition Assessment  Patient does not meet at least 2 ASPEN criteria for malnutrition at this time.   Will continue to monitor.    N/V:   not indicated   Wounds: not indicated   Edema:   not indicated   Last Bowel Movement: 04/15/22      Mouth/Teeth WDL: WDL except, teeth    O2 Device (Oxygen Therapy): room air                                           Priyank Score: 19  NFPE not performed, RD working remotely    Nutrition/Diet History           Food Allergies: NKFA  Factors Affecting Nutritional Intake: None identified at this time     Spiritual, Cultural Beliefs, Anab[employer]ist Practices, Values that Affect Care: no    Anthropometrics  Temp: 96.9 °F (36.1 °C)  Height Method: Stated  Height: 4' 9" (144.8 cm)  Height (inches): 57 in  Weight Method: Stated  Weight: 69.9 kg (154 " lb)  Weight (lb): 154 lb  Ideal Body Weight (IBW), Female: 85 lb  % Ideal Body Weight, Female (lb): 181.18 %  BMI (Calculated): 33.3  BMI Grade: 30 - 34.9- obesity - grade I        Labs  Pertinent Labs: reviewed  Lab Results   Component Value Date    HGB 12.1 04/16/2022    HCT 38.5 04/16/2022     04/16/2022    CALCIUM 9.1 04/16/2022    K 3.9 04/16/2022    PHOS 4.1 04/16/2022    BUN 10 04/16/2022    CREATININE 0.9 04/16/2022    ESTGFRAFRICA >60 04/16/2022    EGFRNONAA >60 04/16/2022    ALBUMIN 3.0 (L) 04/16/2022     Recent Labs   Lab 04/15/22  2210   POCTGLUCOSE 96     Meds  Pertinent Medications: reviewed    acetaminophen  650 mg Oral ED 1 Time    aluminum-magnesium hydroxide-simethicone  30 mL Oral QID (AC & HS)    amLODIPine  5 mg Oral Daily    aspirin  81 mg Oral Daily    atorvastatin  40 mg Oral Daily    DULoxetine  30 mg Oral Daily    losartan  50 mg Oral Daily    pantoprazole  40 mg Oral Daily    senna-docusate 8.6-50 mg  1 tablet Oral BID    sodium chloride 0.9%  500 mL Intravenous ED 1 Time     Continuous Infusions:  PRN Meds:acetaminophen, acetaminophen, acetaminophen, albuterol-ipratropium, aluminum-magnesium hydroxide-simethicone, dextrose 10%, dextrose 10%, glucagon (human recombinant), glucose, glucose, insulin aspart U-100, magnesium oxide, magnesium oxide, melatonin, naloxone, ondansetron, potassium bicarbonate, potassium bicarbonate, potassium bicarbonate, potassium, sodium phosphates, potassium, sodium phosphates, potassium, sodium phosphates, simethicone, sodium chloride 0.9%     Estimated/Assessed Needs  Weight Used For Calorie Calculations: 69.9 kg (154 lb 1.6 oz)  Energy Calorie Requirements (kcal): 9303-3315 (20-25 kcal/kg, obese but age)  Energy Need Method: Kcal/kg  Weight Used For Protein Calculations: 45.4 kg (100 lb) (IBW)  Protein Requirements: 45-54  RDA Method (mL): 1398ml fluid or per MD/NP  CHO Requirement: 174-218g (50% CHO)    Nutrition Prescription Ordered  Current  Diet Order: dm 2000                   Evaluation of Received Nutrients  Energy Calories Required: PARMJIT  Protein Required: PARMJIT  Tolerance: PARMJIT  % Intake of Estimated Energy Needs: Other: PARMJIT  % Meal Intake: PARMJIT    Monitor and Evaluation  Food and Nutrient Intake: food and beverage intake  Food and Nutrient Administration: diet order  Anthropometric Measurements: weight, weight change, body mass index  Biochemical Data, Medical Tests and Procedures: electrolyte and renal panel, lipid profile, gastrointestinal profile, glucose/endocrine profile, Inflammatory profile  Nutrition-Focused Physical Findings: overall appearance     Nutrition Follow-Up  Level of Risk/Frequency of Follow-up: low  Frequency of follow-up: Once weekly   Tentative Next Date to be Seen by RD: 04/22/22  Kassandra Cameron, MS, RD, LDN

## 2022-04-16 NOTE — ASSESSMENT & PLAN NOTE
Chronic  Dangers of cigarette smoking were reviewed with patient in detail for 10 minutes and patient was encouraged to quit. Nicotine replacement options were discussed.

## 2022-04-16 NOTE — PLAN OF CARE
04/16/22 1338   RIBEIRO Message   Medicare Outpatient and Observation Notification regarding financial responsibility Given to patient/caregiver;Explained to patient/caregiver;Signed/date by patient/caregiver   Date RIBEIRO was signed 04/16/22   Time RIBEIRO was signed 1100

## 2022-04-16 NOTE — NURSING
IV and tele removed. Orders reviewed. No new meds. Cleared by neurosurgeon . Pt ride here. Pt escorted to car via w/c. Pt d/c home

## 2022-04-16 NOTE — PLAN OF CARE
Problem: Adult Inpatient Plan of Care  Goal: Absence of Hospital-Acquired Illness or Injury  Outcome: Ongoing, Progressing  Goal: Readiness for Transition of Care  Outcome: Ongoing, Progressing

## 2022-04-16 NOTE — ASSESSMENT & PLAN NOTE
Chronic problem  Patient's FSGs are controlled on current medication regimen.  Last A1c reviewed-   Lab Results   Component Value Date    HGBA1C 7.2 (H) 10/01/2021     Most recent fingerstick glucose reviewed-   Recent Labs   Lab 04/15/22  2210   POCTGLUCOSE 96     Current correctional scale  Medium  Maintain anti-hyperglycemic dose as follows-   Antihyperglycemics (From admission, onward)            Start     Stop Route Frequency Ordered    04/16/22 0431  insulin aspart U-100 pen 1-10 Units         -- SubQ Before meals & nightly PRN 04/16/22 0332        Hold Oral hypoglycemics while patient is in the hospital.

## 2022-04-16 NOTE — ASSESSMENT & PLAN NOTE
Chronic   Chronic, controlled.  Latest blood pressure and vitals reviewed-   Temp:  [98 °F (36.7 °C)]   Pulse:  [76-79]   Resp:  [16-20]   BP: ()/(51-58)   SpO2:  [98 %-100 %] .   Home meds for hypertension were reviewed and noted below.   Hypertension Medications             amLODIPine (NORVASC) 5 MG tablet TAKE 1 TABLET EVERY DAY    losartan (COZAAR) 50 MG tablet Take 1 tablet (50 mg total) by mouth once daily.          While in the hospital, will manage blood pressure as follows; Continue home antihypertensive regimen    Will utilize p.r.n. blood pressure medication only if patient's blood pressure greater than  180/110 and she develops symptoms such as worsening chest pain or shortness of breath.

## 2022-04-16 NOTE — HOSPITAL COURSE
Marizol Kevin was is a 76 year old  female who was admitted on 4/16/2022 to Hospital Medicine and followed closely. Pt presented to ED after a syncopal episode. She also had LOC x 10 minutes. ETOH level was 218 on adm.    CT Head wo contrast    No evidence of acute intracranial hemorrhage/hematoma or major arterial infarct in this patient with trauma.  Small focal scalp hematoma over the left parietal bone with no evidence of acute fracture.  Moderate patchy white matter low density as seen with chronic microvascular ischemic changes.    CT c-spine wo contrast    Subtle lucency involving  the C3 left lateral vertebral body concerning for an acute nondisplaced fracture as described above.  Confirmation can be made with MRI to assess for associated acute edema.  Spondylosis involving the cervical spine as discussed above in detail at each disc level.  No subluxation or evidence of acute fracture otherwise.  Dependent atelectasis versus changes related to pulmonary edema in the lung apices.  Correlate clinically.    MRI cervical spine wo contrast    Grossly negative study for acute findings but note is made that limited evaluation with the amount artifact and if concern for acute cervical spine trauma correlation with CT is recommended and note is made the patient did have CT 04/15/2022 please refer to that report.  Subtle findings as suggested on that CT scan likely would not be apparent on the MRI with the degree of artifact.   If further evaluation or follow-up is to be obtained it is recommended to be by CT  This report was flagged in Epic as abnormal.  Final read    Because of inconsistencies neurosurgery was consulted and recommendations were followed. Pt was cleared by neurosurgery for dc. Pt was instructed to return to an ED immediately if she experiences any changes in LOC, or has AMS, or HA. She verbalized she will do this. Explained the risk of subdural bleed after fall and hitting head in  elderly patients and need to watch closely over next week for neuro changes mentioned above.

## 2022-04-16 NOTE — PT/OT/SLP EVAL
"Physical Therapy Evaluation and Discharge Note    Patient Name:  Marizol Kevin   MRN:  [id number]    Recommendations:     Discharge Recommendations:  home   Discharge Equipment Recommendations: none   Barriers to discharge: None    Assessment:     Marizol Kevin is a 76 y.o. female admitted with a medical diagnosis of Syncope. Patient is agreeable to participation with PT evaluation. She reports left hip/thigh soreness at rest. She requires SBA for supine <> sit <> stand transfer with no AD. She ambulated 250' with no AD, CGA, and no LOB noted. She is agreeable to sit up in chair upon return to room with chair alarm on and RN notified. Patient reports ambulation, balance, and endurance are at baseline. Her only complaint is soreness and being ready for discharge. At this time, patient is functioning at their prior level of function and does not require further acute PT services.     Recent Surgery: * No surgery found *      Plan:     During this hospitalization, patient does not require further acute PT services.  Please re-consult if situation changes.      Subjective     Chief Complaint: sore  Patient/Family Comments/goals: ready to go home   Pain/Comfort:  · Pain Rating 1:  ("soreness")  · Location - Side 1: Left  · Location 1: hip  · Pain Addressed 1: Reposition, Distraction    Patients cultural, spiritual, Confucianism conflicts given the current situation:      Living Environment:  Patient lives in a [facility] with 4-5 MARI and B rails   Prior to admission, patients level of function was independent. She denies any other recent falls other than the one PTA in which patient attributes to drinking at a Good Friday party. She denies any recent PT. She does not drive and uses transportation arranged through her insurance.  Equipment used at home: none.  DME owned (not currently used): rolling walker and single point cane.  Upon discharge, patient will have assistance from daughter.    Objective:     Communicated " with RN Santiago prior to session.  Patient found right sidelying with telemetry upon PT entry to room.    General Precautions: Standard, fall   Orthopedic Precautions:N/A   Braces: N/A   Respiratory Status: Room air    Exams:  · RLE Strength: WFL  · LLE Strength: WFL    Functional Mobility:  · Bed Mobility:     · Supine to Sit: stand by assistance  · Transfers:     · Sit to Stand:  stand by assistance with no AD  · Gait: 250' with no AD, CGA, and no LOB noted    AM-PAC 6 CLICK MOBILITY  Total Score:24       Therapeutic Activities and Exercises:   Patient was educated on the importance of OOB activity and functional mobility to negate negative effects of prolonged bed rest during hospitalization, safe transfers and ambulation, and D/C planning     AM-PAC 6 CLICK MOBILITY  Total Score:24     Patient left up in chair with all lines intact, call button in reach, chair alarm on and RN notified.    GOALS:   Multidisciplinary Problems     Physical Therapy Goals     Not on file                History:     Past Medical History:   Diagnosis Date    Allergy     Anxiety     Cancer     colon    Cataract     Colon cancer     Diabetes mellitus, type 2     Hyperlipidemia     Hypertension     Insomnia        Past Surgical History:   Procedure Laterality Date    CATARACT EXTRACTION, BILATERAL  03/2020    CHOLECYSTECTOMY      COLONOSCOPY N/A 2/1/2021    Procedure: COLONOSCOPY;  Surgeon: Ashwini Duarte MD;  Location: [address]);  Service: Endoscopy;  Laterality: N/A;  medical transportation  covid test 1/29 [employer], instructions mailed-KPvt    HYSTERECTOMY      KNEE SURGERY      LAPAROSCOPIC LEFT COLON RESECTION         Time Tracking:     PT Received On: 04/16/22  PT Start Time: 0939     PT Stop Time: 0958  PT Total Time (min): 19 min     Billable Minutes: Evaluation 19      04/16/2022

## 2022-04-16 NOTE — NURSING
"Report rec'd from ED RN "Lisa" at approx 0345. Pt brought to Room 220 and admitted at approx 0430. Pt brought via wc and one attendant. Pt assisted to bed and made comfortable. Oriented to room and call light system. Answered questions and reviewed POC. Pt in agreement and verblizes understanding. Head to toe assessment complete. Pt is a/ox4 and answers all questions appropriately. Pt is a 75 yo F. She does not recall the events that lead up to hospitalization but remembers she was out celebrating Good Friday with family and that she was "drinking heavily and took several shots of liquor". States her only allergy is Sulfa. Confirms health hx of HTN, HLD, GERD< Colon CA, and DM type 2. Pt endorses being a ppd smoker but declines a nicotine patch. Pt endorses being a social drinker. States that she lives alone. Pt denies pain and SOB at this time. Pt is continent to bathroom. UA ordered but pt "forgets" to leave specimen when she went to bathroom, and flushes toilet prior to nurse entry. Pt sleeps t/o remainder of shift. Neuro check complete with no acute findings. Tele box 8614 in place. Pt states her last BM was yest 4/15/22. Refuses SCD's. Refuses morning maalox. Up and sitting on edge of bed upon shift change, holding purse and States "what time do I get to go home?". Pt expresses "guilt" for what happened in front of her family. Emotional support provided. Safety measures in place. Weight and height obtained. Bed locked in lowest position with call light in reach. VSS Bed alarm activated. Bedside report and all cares transferred at approx 0700 to [facility] nurse. No distress noted at this time.  "

## 2022-04-16 NOTE — PLAN OF CARE
[facility]  Initial Discharge Assessment       Primary Care Provider: Belinda Fajardo MD    Admission Diagnosis: Syncope [R55]  Fall [W19.XXXA]  Traumatic injury of head, initial encounter [S09.90XA]    Admission Date: 4/15/2022  Expected Discharge Date: 4/16/2022    Discharge Barriers Identified: None    Payor: HU[employer] MANAGED MEDICARE / Plan: HU[employer] MEDICARE HMO / Product Type: Capitation /     Extended Emergency Contact Information  Primary Emergency Contact: karri riley  Mobile Phone: [phone]  Relation: Daughter  Preferred language: English   needed? No  Secondary Emergency Contact: tenisha cook  Home Phone: [phone]  Mobile Phone: [phone]  Relation: Sister  Preferred language: English    Discharge Plan A: Home  Discharge Plan B: Home with family      [facility] - [place], [place] - [address]  [address] [facility] [address] [address]  Phone: [phone] Fax: [phone]    [id number] - [place], [place] - [address] [facility] [address]  [address]  [facility] [place] [place] [address]  Phone: [phone] Fax: [phone]    [facility]  [address]  [place] [place] [address]  Phone: [phone] Fax: [phone]      DC Planning assessment completed at bedside with pt. Address and emergency contacts verified.  PCP and Pharmacy verified. Pt states she uses [facility] mostly.  Pt states normally independent with all ADL's. Pt denies any in home services or DME needs. DC plan is to return home, no needs voiced at this time.   CM will follow and update DC needs as needed.     Initial Assessment (most recent)     Adult Discharge Assessment - 04/16/22 1241        Discharge Assessment    Assessment Type Discharge Planning Assessment     Confirmed/corrected address, phone number and insurance Yes     Confirmed Demographics Correct on Facesheet     Source of Information patient     When  was your last doctors appointment? --   2 weeks ago    Does patient/caregiver understand observation status Yes     Communicated ALICE with patient/caregiver Yes     Reason For Admission syncope     Lives With alone     Prior to hospitilization cognitive status: Alert/Oriented     Current cognitive status: Alert/Oriented     Walking or Climbing Stairs Difficulty none     Dressing/Bathing Difficulty none     Home Layout Able to live on 1st floor     Equipment Currently Used at Home none   has cane and walker, but does not use    Readmission within 30 days? No     Patient currently being followed by outpatient case management? No     Do you currently have service(s) that help you manage your care at home? No     Do you take prescription medications? Yes     Do you have prescription coverage? Yes     Coverage [employer]     Do you have any problems affording any of your prescribed medications? TBD     Is the patient taking medications as prescribed? yes     Who is going to help you get home at discharge? daughter     How do you get to doctors appointments? family or friend will provide     Are you on dialysis? No     Do you take coumadin? No     Discharge Plan A Home     Discharge Plan B Home with family     DME Needed Upon Discharge  none     Discharge Plan discussed with: Patient     Discharge Barriers Identified None

## 2022-04-16 NOTE — HPI
Marizol Kevin is a 76-year-old female who presents emergency room for evaluation of syncope and fall.  She reports while at a party she was dancing.  She sustained a fall and syncope.  She has no independent recollection of the events.  Complains of headache and mild cervical pain.  She admits to drinking several alcoholic beverages tonight.  Denies fever or chills.  No known sick contacts or travel.  She is vaccinated for COVID.  Previous medical history includes anxiety, hyperlipidemia, hypertension, GERD, active smoker, colon cancer, diabetes.  ER workup:  CBC with leukocytosis of 16,000.  CMP with bicarb of 17 otherwise unremarkable.  ETOH level was 218.  CT of the head was negative for any acute findings.  CT the cervical spine demonstrated a lucency involving C3 left lateral vertebral body concerning for acute nondisplaced fracture with recommendations for MRI.  MRI was performed in ER and was negative for any acute fracture involving C3.  Patient be admitted to Hospital Medicine for observation and management.

## 2022-04-16 NOTE — PLAN OF CARE
RD Recommendations     1. Consider diet order modification:cardiac consistent carbohydrate 1500 calories   2. RD to follow up to monitor intake, tolerance, and labs.    *Please send consult if acute nutrition needs arise.    Goals:  Pt will tolerate >65% estimated energy and protein needs by RD follow up.      Kassandra Cameron MS, RD, LDN

## 2022-04-19 ENCOUNTER — TELEPHONE (OUTPATIENT)
Dept: MEDSURG UNIT | Facility: HOSPITAL | Age: 77
End: 2022-04-19
Payer: MEDICARE

## 2022-05-09 ENCOUNTER — PATIENT OUTREACH (OUTPATIENT)
Dept: ADMINISTRATIVE | Facility: HOSPITAL | Age: 77
End: 2022-05-09
Payer: MEDICARE

## 2022-05-09 DIAGNOSIS — E11.9 DIABETES MELLITUS WITHOUT COMPLICATION: Primary | ICD-10-CM

## 2022-05-12 ENCOUNTER — PATIENT MESSAGE (OUTPATIENT)
Dept: SMOKING CESSATION | Facility: CLINIC | Age: 77
End: 2022-05-12
Payer: MEDICARE

## 2022-05-18 DIAGNOSIS — J45.20 MILD INTERMITTENT ASTHMA WITHOUT COMPLICATION: ICD-10-CM

## 2022-05-18 RX ORDER — BENZONATATE 100 MG/1
100 CAPSULE ORAL 3 TIMES DAILY PRN
Qty: 30 CAPSULE | Refills: 0 | Status: SHIPPED | OUTPATIENT
Start: 2022-05-18 | End: 2022-11-17

## 2022-05-18 NOTE — TELEPHONE ENCOUNTER
----- Message from Rachael Aguayo sent at 5/18/2022 10:12 AM CDT -----  Regarding: self [phone]  Type: RX Refill Request    Who Called:  self    Have you contacted your pharmacy: yes    Refill or New Rx: refill    RX Name and Strength:benzonatate (TESSALON) 100 MG capsule    Preferred Pharmacy with phone number:   [facility] - [facility] [address]  [address] [address]  Phone: [phone] Fax: [phone]    Local or Mail Order: mail order      Would the patient rather a call back or a response via [facility]? Call back    Best Call Back Number: [phone]

## 2022-05-18 NOTE — TELEPHONE ENCOUNTER
Requested medication has been pended and routed to Dr. Fajardo for approval. LOV 4/1/2022 Upcoming Visits 5/23/2022

## 2022-05-19 ENCOUNTER — LAB VISIT (OUTPATIENT)
Dept: LAB | Facility: OTHER | Age: 77
End: 2022-05-19
Attending: INTERNAL MEDICINE
Payer: MEDICARE

## 2022-05-19 DIAGNOSIS — E11.9 TYPE 2 DIABETES MELLITUS WITHOUT COMPLICATION: ICD-10-CM

## 2022-05-19 LAB
ESTIMATED AVG GLUCOSE: 137 MG/DL (ref 68–131)
HBA1C MFR BLD: 6.4 % (ref 4–5.6)

## 2022-05-19 PROCEDURE — 83036 HEMOGLOBIN GLYCOSYLATED A1C: CPT | Performed by: INTERNAL MEDICINE

## 2022-05-19 PROCEDURE — 36415 COLL VENOUS BLD VENIPUNCTURE: CPT | Performed by: INTERNAL MEDICINE

## 2022-05-24 ENCOUNTER — TELEPHONE (OUTPATIENT)
Dept: OPHTHALMOLOGY | Facility: CLINIC | Age: 77
End: 2022-05-24
Payer: MEDICARE

## 2022-05-24 ENCOUNTER — TELEPHONE (OUTPATIENT)
Dept: OPTOMETRY | Facility: CLINIC | Age: 77
End: 2022-05-24
Payer: MEDICARE

## 2022-05-24 NOTE — TELEPHONE ENCOUNTER
----- Message from Lu Mckeon MA sent at 5/24/2022 12:49 PM CDT -----  Regarding: FW: Appt  Contact: Marizol King pt. Really wants to see Dr Li she has to arrange transportation she has 2 appt. At [facility]. Thurs. I booked her for 5/30/22 with Dr Escudero. I told her I would send this message to you. To see if you could help her out.  ----- Message -----  From: Ishaan Farrell  Sent: 5/24/2022  12:18 PM CDT  To: [facility] Triage  Subject: Appt                                             Pt is trying to see Dr. Li this Thursday.. Pt right eye is constantly running and is red. Pt also states she has thin piece of crust on her eye.      [phone]

## 2022-05-24 NOTE — TELEPHONE ENCOUNTER
Appointment booked 5/30/22 Dr Escudero.  Pt. Requested Dr Li this Thurs. Because she has to arrange transportation, and has 2 other appointments scheduled. I sent the message to Dr Li's staff.

## 2022-05-24 NOTE — TELEPHONE ENCOUNTER
----- Message from Ishaan Farrell sent at 5/24/2022 12:15 PM CDT -----  Regarding: Appt  Contact: Marizol Nava is trying to see Dr. Li this Thursday.. Pt right eye is constantly running and is red. Pt also states she has thin piece of crust on her eye.      [phone]

## 2022-05-26 ENCOUNTER — TELEPHONE (OUTPATIENT)
Dept: INTERNAL MEDICINE | Facility: CLINIC | Age: 77
End: 2022-05-26
Payer: MEDICARE

## 2022-05-26 ENCOUNTER — OFFICE VISIT (OUTPATIENT)
Dept: OPTOMETRY | Facility: CLINIC | Age: 77
End: 2022-05-26
Payer: MEDICARE

## 2022-05-26 DIAGNOSIS — H04.123 DRY EYE SYNDROME OF BOTH EYES: ICD-10-CM

## 2022-05-26 DIAGNOSIS — H01.02B SQUAMOUS BLEPHARITIS OF UPPER AND LOWER EYELIDS OF BOTH EYES: Primary | ICD-10-CM

## 2022-05-26 DIAGNOSIS — H01.02A SQUAMOUS BLEPHARITIS OF UPPER AND LOWER EYELIDS OF BOTH EYES: Primary | ICD-10-CM

## 2022-05-26 PROCEDURE — 92012 INTRM OPH EXAM EST PATIENT: CPT | Mod: S$GLB,,, | Performed by: OPTOMETRIST

## 2022-05-26 PROCEDURE — 3288F PR FALLS RISK ASSESSMENT DOCUMENTED: ICD-10-PCS | Mod: CPTII,S$GLB,, | Performed by: OPTOMETRIST

## 2022-05-26 PROCEDURE — 1159F PR MEDICATION LIST DOCUMENTED IN MEDICAL RECORD: ICD-10-PCS | Mod: CPTII,S$GLB,, | Performed by: OPTOMETRIST

## 2022-05-26 PROCEDURE — 1126F PR PAIN SEVERITY QUANTIFIED, NO PAIN PRESENT: ICD-10-PCS | Mod: CPTII,S$GLB,, | Performed by: OPTOMETRIST

## 2022-05-26 PROCEDURE — 4010F PR ACE/ARB THEARPY RXD/TAKEN: ICD-10-PCS | Mod: CPTII,S$GLB,, | Performed by: OPTOMETRIST

## 2022-05-26 PROCEDURE — 1159F MED LIST DOCD IN RCRD: CPT | Mod: CPTII,S$GLB,, | Performed by: OPTOMETRIST

## 2022-05-26 PROCEDURE — 3044F HG A1C LEVEL LT 7.0%: CPT | Mod: CPTII,S$GLB,, | Performed by: OPTOMETRIST

## 2022-05-26 PROCEDURE — 99999 PR PBB SHADOW E&M-EST. PATIENT-LVL III: ICD-10-PCS | Mod: PBBFAC,,, | Performed by: OPTOMETRIST

## 2022-05-26 PROCEDURE — 1126F AMNT PAIN NOTED NONE PRSNT: CPT | Mod: CPTII,S$GLB,, | Performed by: OPTOMETRIST

## 2022-05-26 PROCEDURE — 3044F PR MOST RECENT HEMOGLOBIN A1C LEVEL <7.0%: ICD-10-PCS | Mod: CPTII,S$GLB,, | Performed by: OPTOMETRIST

## 2022-05-26 PROCEDURE — 1101F PT FALLS ASSESS-DOCD LE1/YR: CPT | Mod: CPTII,S$GLB,, | Performed by: OPTOMETRIST

## 2022-05-26 PROCEDURE — 3288F FALL RISK ASSESSMENT DOCD: CPT | Mod: CPTII,S$GLB,, | Performed by: OPTOMETRIST

## 2022-05-26 PROCEDURE — 92012 PR EYE EXAM, EST PATIENT,INTERMED: ICD-10-PCS | Mod: S$GLB,,, | Performed by: OPTOMETRIST

## 2022-05-26 PROCEDURE — 99999 PR PBB SHADOW E&M-EST. PATIENT-LVL III: CPT | Mod: PBBFAC,,, | Performed by: OPTOMETRIST

## 2022-05-26 PROCEDURE — 4010F ACE/ARB THERAPY RXD/TAKEN: CPT | Mod: CPTII,S$GLB,, | Performed by: OPTOMETRIST

## 2022-05-26 PROCEDURE — 1101F PR PT FALLS ASSESS DOC 0-1 FALLS W/OUT INJ PAST YR: ICD-10-PCS | Mod: CPTII,S$GLB,, | Performed by: OPTOMETRIST

## 2022-05-26 RX ORDER — NEOMYCIN SULFATE, POLYMYXIN B SULFATE, AND DEXAMETHASONE 3.5; 10000; 1 MG/G; [USP'U]/G; MG/G
OINTMENT OPHTHALMIC 3 TIMES DAILY
Qty: 3.5 G | Refills: 2 | Status: SHIPPED | OUTPATIENT
Start: 2022-05-26 | End: 2022-06-02

## 2022-05-26 NOTE — PROGRESS NOTES
HPI     Last eye exam was 9/30/21 with Dr. Li.  Patient states for the past 2 days the skin around OD as well as BUL have   been red, swollen, itchy, and scaly. OS is starting to become irritated as   well. Was given an ointment a while ago but only helps with itching   temporarily-hasn't use it this time. Pataday is also not helpful.    Pataday prn OU      Last edited by Mili Bridges MA on 5/26/2022  2:10 PM. (History)            Assessment /Plan     For exam results, see Encounter Report.    Squamous blepharitis of upper and lower eyelids of both eyes  -     neomycin-polymyxin-dexamethasone (DEXACINE) 3.5 mg/g-10,000 unit/g-0.1 % Oint; Place into both eyes 3 (three) times daily. for 7 days  Dispense: 3.5 g; Refill: 2    Dry eye syndrome of both eyes  -     neomycin-polymyxin-dexamethasone (DEXACINE) 3.5 mg/g-10,000 unit/g-0.1 % Oint; Place into both eyes 3 (three) times daily. for 7 days  Dispense: 3.5 g; Refill: 2            1-2.  Apply Maxitrol ointment to eyelids tid x 1 week.  Patient took some Zyrtec which helped.  Most likely a contact dermatitis--redness, scaly skin.  RTC 1 month for glasses check.

## 2022-05-30 ENCOUNTER — TELEPHONE (OUTPATIENT)
Dept: INTERNAL MEDICINE | Facility: CLINIC | Age: 77
End: 2022-05-30
Payer: MEDICARE

## 2022-05-30 NOTE — TELEPHONE ENCOUNTER
----- Message from Tea Cordova sent at 5/30/2022 12:33 PM CDT -----  Who Called: PT        Refill or New Rx:REFILL        RX Name and Strength:meloxicam (MOBIC) 15 MG tablet         How is the patient currently taking it? (ex. 1XDay):1X DAILY        Is this a 30 day or 90 day RX:30 day        Preferred Pharmacy with phone number:[facility] [address]   Phone:  [phone]  Fax:  [phone]        Local or Mail Order:Local        Ordering Provider:Belinda Fajardo MD        Best Call Back Number:[phone]        Additional Information:

## 2022-05-31 RX ORDER — MELOXICAM 15 MG/1
TABLET ORAL
Qty: 30 TABLET | Refills: 3 | OUTPATIENT
Start: 2022-05-31

## 2022-06-01 DIAGNOSIS — E11.9 TYPE 2 DIABETES MELLITUS WITHOUT COMPLICATION: ICD-10-CM

## 2022-06-09 ENCOUNTER — OFFICE VISIT (OUTPATIENT)
Dept: INTERNAL MEDICINE | Facility: CLINIC | Age: 77
End: 2022-06-09
Payer: MEDICARE

## 2022-06-09 ENCOUNTER — HOSPITAL ENCOUNTER (OUTPATIENT)
Dept: RADIOLOGY | Facility: OTHER | Age: 77
Discharge: HOME | End: 2022-06-09
Attending: INTERNAL MEDICINE
Payer: MEDICARE

## 2022-06-09 VITALS
HEART RATE: 89 BPM | OXYGEN SATURATION: 95 % | BODY MASS INDEX: 32.63 KG/M2 | SYSTOLIC BLOOD PRESSURE: 122 MMHG | WEIGHT: 150.81 LBS | DIASTOLIC BLOOD PRESSURE: 82 MMHG

## 2022-06-09 DIAGNOSIS — Z76.0 MEDICATION REFILL: ICD-10-CM

## 2022-06-09 DIAGNOSIS — J32.9 RHINOSINUSITIS: ICD-10-CM

## 2022-06-09 DIAGNOSIS — R92.8 ABNORMAL MAMMOGRAM: ICD-10-CM

## 2022-06-09 DIAGNOSIS — R09.82 POST-NASAL DRIP: ICD-10-CM

## 2022-06-09 DIAGNOSIS — L98.9 SKIN LESION: Primary | ICD-10-CM

## 2022-06-09 PROCEDURE — 1100F PTFALLS ASSESS-DOCD GE2>/YR: CPT | Mod: CPTII,S$GLB,, | Performed by: PHYSICIAN ASSISTANT

## 2022-06-09 PROCEDURE — 1126F AMNT PAIN NOTED NONE PRSNT: CPT | Mod: CPTII,S$GLB,, | Performed by: PHYSICIAN ASSISTANT

## 2022-06-09 PROCEDURE — 1160F PR REVIEW ALL MEDS BY PRESCRIBER/CLIN PHARMACIST DOCUMENTED: ICD-10-PCS | Mod: CPTII,S$GLB,, | Performed by: PHYSICIAN ASSISTANT

## 2022-06-09 PROCEDURE — 3079F DIAST BP 80-89 MM HG: CPT | Mod: CPTII,S$GLB,, | Performed by: PHYSICIAN ASSISTANT

## 2022-06-09 PROCEDURE — 4010F ACE/ARB THERAPY RXD/TAKEN: CPT | Mod: CPTII,S$GLB,, | Performed by: PHYSICIAN ASSISTANT

## 2022-06-09 PROCEDURE — 99999 PR PBB SHADOW E&M-EST. PATIENT-LVL IV: ICD-10-PCS | Mod: PBBFAC,,, | Performed by: PHYSICIAN ASSISTANT

## 2022-06-09 PROCEDURE — 3044F PR MOST RECENT HEMOGLOBIN A1C LEVEL <7.0%: ICD-10-PCS | Mod: CPTII,S$GLB,, | Performed by: PHYSICIAN ASSISTANT

## 2022-06-09 PROCEDURE — 3072F PR LOW RISK FOR RETINOPATHY: ICD-10-PCS | Mod: CPTII,S$GLB,, | Performed by: PHYSICIAN ASSISTANT

## 2022-06-09 PROCEDURE — 1159F MED LIST DOCD IN RCRD: CPT | Mod: CPTII,S$GLB,, | Performed by: PHYSICIAN ASSISTANT

## 2022-06-09 PROCEDURE — 1159F PR MEDICATION LIST DOCUMENTED IN MEDICAL RECORD: ICD-10-PCS | Mod: CPTII,S$GLB,, | Performed by: PHYSICIAN ASSISTANT

## 2022-06-09 PROCEDURE — 1160F RVW MEDS BY RX/DR IN RCRD: CPT | Mod: CPTII,S$GLB,, | Performed by: PHYSICIAN ASSISTANT

## 2022-06-09 PROCEDURE — 77062 BREAST TOMOSYNTHESIS BI: CPT | Mod: TC

## 2022-06-09 PROCEDURE — 1100F PR PT FALLS ASSESS DOC 2+ FALLS/FALL W/INJURY/YR: ICD-10-PCS | Mod: CPTII,S$GLB,, | Performed by: PHYSICIAN ASSISTANT

## 2022-06-09 PROCEDURE — 99214 PR OFFICE/OUTPT VISIT, EST, LEVL IV, 30-39 MIN: ICD-10-PCS | Mod: S$GLB,,, | Performed by: PHYSICIAN ASSISTANT

## 2022-06-09 PROCEDURE — 3008F BODY MASS INDEX DOCD: CPT | Mod: CPTII,S$GLB,, | Performed by: PHYSICIAN ASSISTANT

## 2022-06-09 PROCEDURE — 77066 DX MAMMO INCL CAD BI: CPT | Mod: 26,,, | Performed by: RADIOLOGY

## 2022-06-09 PROCEDURE — 4010F PR ACE/ARB THEARPY RXD/TAKEN: ICD-10-PCS | Mod: CPTII,S$GLB,, | Performed by: PHYSICIAN ASSISTANT

## 2022-06-09 PROCEDURE — 3288F FALL RISK ASSESSMENT DOCD: CPT | Mod: CPTII,S$GLB,, | Performed by: PHYSICIAN ASSISTANT

## 2022-06-09 PROCEDURE — 3072F LOW RISK FOR RETINOPATHY: CPT | Mod: CPTII,S$GLB,, | Performed by: PHYSICIAN ASSISTANT

## 2022-06-09 PROCEDURE — 99214 OFFICE O/P EST MOD 30 MIN: CPT | Mod: S$GLB,,, | Performed by: PHYSICIAN ASSISTANT

## 2022-06-09 PROCEDURE — 3074F SYST BP LT 130 MM HG: CPT | Mod: CPTII,S$GLB,, | Performed by: PHYSICIAN ASSISTANT

## 2022-06-09 PROCEDURE — 3008F PR BODY MASS INDEX (BMI) DOCUMENTED: ICD-10-PCS | Mod: CPTII,S$GLB,, | Performed by: PHYSICIAN ASSISTANT

## 2022-06-09 PROCEDURE — 99999 PR PBB SHADOW E&M-EST. PATIENT-LVL IV: CPT | Mod: PBBFAC,,, | Performed by: PHYSICIAN ASSISTANT

## 2022-06-09 PROCEDURE — 3074F PR MOST RECENT SYSTOLIC BLOOD PRESSURE < 130 MM HG: ICD-10-PCS | Mod: CPTII,S$GLB,, | Performed by: PHYSICIAN ASSISTANT

## 2022-06-09 PROCEDURE — 1126F PR PAIN SEVERITY QUANTIFIED, NO PAIN PRESENT: ICD-10-PCS | Mod: CPTII,S$GLB,, | Performed by: PHYSICIAN ASSISTANT

## 2022-06-09 PROCEDURE — 77062 MAMMO DIGITAL DIAGNOSTIC BILAT WITH TOMO: ICD-10-PCS | Mod: 26,,, | Performed by: RADIOLOGY

## 2022-06-09 PROCEDURE — 3044F HG A1C LEVEL LT 7.0%: CPT | Mod: CPTII,S$GLB,, | Performed by: PHYSICIAN ASSISTANT

## 2022-06-09 PROCEDURE — 77062 BREAST TOMOSYNTHESIS BI: CPT | Mod: 26,,, | Performed by: RADIOLOGY

## 2022-06-09 PROCEDURE — 3079F PR MOST RECENT DIASTOLIC BLOOD PRESSURE 80-89 MM HG: ICD-10-PCS | Mod: CPTII,S$GLB,, | Performed by: PHYSICIAN ASSISTANT

## 2022-06-09 PROCEDURE — 77066 MAMMO DIGITAL DIAGNOSTIC BILAT WITH TOMO: ICD-10-PCS | Mod: 26,,, | Performed by: RADIOLOGY

## 2022-06-09 PROCEDURE — 3288F PR FALLS RISK ASSESSMENT DOCUMENTED: ICD-10-PCS | Mod: CPTII,S$GLB,, | Performed by: PHYSICIAN ASSISTANT

## 2022-06-09 RX ORDER — MELOXICAM 15 MG/1
15 TABLET ORAL DAILY
Qty: 30 TABLET | Refills: 0 | Status: SHIPPED | OUTPATIENT
Start: 2022-06-09 | End: 2022-06-20

## 2022-06-09 NOTE — PROGRESS NOTES
INTERNAL MEDICINE URGENT VISIT NOTE    CHIEF COMPLAINT     Chief Complaint   Patient presents with    Otalgia       HPI     Marizol Kevin is a 76 y.o. female who presents for an urgent visit today.    PCP is Belinda Fajardo MD, patient is known to me.     Patient presents with complaints of right ear fullness for the past few weeks, slightly improving. Just wants to get her ear checked out.     She also has skin lesion on her left sided lower back that she is concerned about. Finally a second skin lesion on the right hand that has been present for years and is starting to enlarge. There is some TTP associated with the lesion on the hand.     The lesion on the left lower back is dry and scaly. Hyperpigmented and irregularly shaped. She has not had this evaluated by a dermatologist.       Past Medical History:  Past Medical History:   Diagnosis Date    Allergy     Anxiety     Cancer     colon    Cataract     Colon cancer     Diabetes mellitus, type 2     Dry eye syndrome     Hyperlipidemia     Hypertension     Insomnia     Squamous blepharitis        Home Medications:  Prior to Admission medications    Medication Sig Start Date End Date Taking? Authorizing Provider   albuterol (VENTOLIN HFA) 90 mcg/actuation inhaler Inhale 2 puffs into the lungs every 4 (four) hours as needed for Wheezing. 11/18/21  Yes Stacy Arguello PA-C   amLODIPine (NORVASC) 5 MG tablet TAKE 1 TABLET EVERY DAY 11/11/21  Yes Belinda Fajardo MD   aspirin (ECOTRIN) 81 MG EC tablet Take 81 mg by mouth once daily.   Yes Historical Provider   atorvastatin (LIPITOR) 40 MG tablet TAKE 1 TABLET EVERY DAY 2/10/22  Yes Belinda Fajardo MD   benzonatate (TESSALON) 100 MG capsule Take 1 capsule (100 mg total) by mouth 3 (three) times daily as needed for Cough. 5/18/22  Yes Belinda Fajardo MD   betamethasone valerate 0.1% (VALISONE) 0.1 % Oint Apply topically once daily. 1/13/21  Yes Glynn Jensen MD   blood sugar diagnostic Strp 1 strip by  Misc.(Non-Drug; Combo Route) route 2 (two) times daily. 6/17/21  Yes Belinda Fajardo MD   blood-glucose meter kit Please provide with insurance covered meter 6/17/21  Yes Belinda Fajardo MD   cholecalciferol, vitamin D3, (VITAMIN D3) 50 mcg (2,000 unit) Cap Take by mouth.   Yes Historical Provider   DULoxetine (CYMBALTA) 30 MG capsule TAKE 1 CAPSULE EVERY DAY 2/10/22  Yes Bleinda Fajardo MD   fluticasone propionate (FLONASE) 50 mcg/actuation nasal spray USE 1 SPRAY IN EACH NOSTRIL ONE TIME DAILY 2/10/22  Yes Belinda Fajardo MD   hydrOXYzine HCL (ATARAX) 25 MG tablet Take 1 tablet (25 mg total) by mouth 3 (three) times daily as needed for Itching or Anxiety. 6/1/21  Yes Belinda Fajardo MD   lancets Misc 1 Device by Misc.(Non-Drug; Combo Route) route 2 (two) times daily. 6/17/21  Yes Belinda Fajardo MD   LIDOcaine (LIDODERM) 5 % Place 1 patch onto the skin once daily. Remove & Discard patch within 12 hours or as directed by MD 4/5/22  Yes Stacy Arguello PA-C   LIDOCAINE VISCOUS 2 % solution  8/25/21  Yes Historical Provider   metFORMIN (GLUCOPHAGE-XR) 500 MG ER 24hr tablet TAKE 2 TABLETS EVERY DAY WITH BREAKFAST 2/10/22  Yes Belinda Fajardo MD   multivitamin capsule Take 1 capsule by mouth once daily.   Yes Historical Provider   pantoprazole (PROTONIX) 40 MG tablet Take 1 tablet (40 mg total) by mouth once daily. 6/1/21  Yes Belinda Fajardo MD   raloxifene (EVISTA) 60 mg tablet TAKE 1 TABLET EVERY DAY 2/11/22  Yes Glynn Jensen MD   tiotropium-olodateroL (STIOLTO RESPIMAT) 2.5-2.5 mcg/actuation Mist Inhale 2 puffs into the lungs once daily. 11/18/21  Yes Stacy Arguello PA-C   triamcinolone acetonide 0.025% (KENALOG) 0.025 % cream APPLY DAILY TO AFFECTED AREA AS NEEDED FOR ITCHING. MAXIMUM 2 TIMES A WEEK 1/13/21  Yes Glynn Jensen MD   meloxicam (MOBIC) 15 MG tablet Take 1 tablet (15 mg total) by mouth once daily. 6/9/22 7/9/22  Stacy Arguello PA-C   sodium chloride 0.9 % SprA 1 spray by Each Nostril route every  evening. 6/9/22   Stacy Arguello PA-C   losartan (COZAAR) 50 MG tablet Take 1 tablet (50 mg total) by mouth once daily.  Patient not taking: Reported on 6/9/2022 8/18/21 6/9/22  Pedro Luis Butterfield MD       Review of Systems:  Review of Systems   Constitutional: Negative for chills and fever.   HENT: Negative for sore throat and trouble swallowing.         Ear fullness     Eyes: Negative for visual disturbance.   Respiratory: Negative for cough and shortness of breath.    Cardiovascular: Negative for chest pain.   Gastrointestinal: Negative for abdominal pain, constipation, diarrhea, nausea and vomiting.   Genitourinary: Negative for dysuria and flank pain.   Musculoskeletal: Negative for back pain, neck pain and neck stiffness.   Skin: Negative for rash.        Skin lesion      Neurological: Negative for dizziness, syncope, weakness and headaches.   Psychiatric/Behavioral: Negative for confusion.       Health Maintainence:   Immunizations:  Health Maintenance       Date Due Completion Date    Shingles Vaccine (1 of 2) Never done ---    COVID-19 Vaccine (4 - Booster for Moderna series) 03/01/2022 11/1/2021    Foot Exam 06/01/2022 6/1/2021    Override on 3/12/2021: Done    Lipid Panel 06/01/2022 6/1/2021    Mammogram 06/07/2022 6/7/2021    Diabetes Urine Screening 10/01/2022 10/1/2021    Hemoglobin A1c 11/19/2022 5/19/2022    Eye Exam 05/26/2023 5/26/2022    DEXA Scan 09/17/2023 9/17/2020    Colorectal Cancer Screening 02/01/2024 2/1/2021    TETANUS VACCINE 05/26/2032 5/26/2022           PHYSICAL EXAM     /82 (BP Location: Right arm, Patient Position: Sitting)   Pulse 89   Wt 68.4 kg (150 lb 12.7 oz)   SpO2 95%   BMI 32.63 kg/m²     Physical Exam  Vitals and nursing note reviewed.   Constitutional:       Appearance: Normal appearance.      Comments: Healthy appearing female in NAD or apparent pain. She makes good eye contact, speaks in clear full sentences and ambulates with ease.      HENT:      Head:  Normocephalic and atraumatic.      Nose: Nose normal.      Mouth/Throat:      Pharynx: Oropharynx is clear.   Eyes:      Conjunctiva/sclera: Conjunctivae normal.   Cardiovascular:      Rate and Rhythm: Normal rate and regular rhythm.      Pulses: Normal pulses.   Pulmonary:      Effort: No respiratory distress.   Abdominal:      Tenderness: There is no abdominal tenderness.   Musculoskeletal:         General: Normal range of motion.      Cervical back: No rigidity.   Skin:     General: Skin is warm and dry.      Capillary Refill: Capillary refill takes less than 2 seconds.      Findings: No rash.   Neurological:      General: No focal deficit present.      Mental Status: She is alert.      Gait: Gait normal.   Psychiatric:         Mood and Affect: Mood normal.         LABS     Lab Results   Component Value Date    HGBA1C 6.4 (H) 05/19/2022     CMP  Sodium   Date Value Ref Range Status   04/16/2022 141 136 - 145 mmol/L Final     Potassium   Date Value Ref Range Status   04/16/2022 3.9 3.5 - 5.1 mmol/L Final     Chloride   Date Value Ref Range Status   04/16/2022 109 95 - 110 mmol/L Final     CO2   Date Value Ref Range Status   04/16/2022 21 (L) 23 - 29 mmol/L Final     Glucose   Date Value Ref Range Status   04/16/2022 104 70 - 110 mg/dL Final     BUN   Date Value Ref Range Status   04/16/2022 10 8 - 23 mg/dL Final     Creatinine   Date Value Ref Range Status   04/16/2022 0.9 0.5 - 1.4 mg/dL Final     Calcium   Date Value Ref Range Status   04/16/2022 9.1 8.7 - 10.5 mg/dL Final     Total Protein   Date Value Ref Range Status   04/16/2022 6.5 6.0 - 8.4 g/dL Final     Albumin   Date Value Ref Range Status   04/16/2022 3.0 (L) 3.5 - 5.2 g/dL Final     Total Bilirubin   Date Value Ref Range Status   04/16/2022 0.2 0.1 - 1.0 mg/dL Final     Comment:     For infants and newborns, interpretation of results should be based  on gestational age, weight and in agreement with clinical  observations.    Premature Infant  recommended reference ranges:  Up to 24 hours.............<8.0 mg/dL  Up to 48 hours............<12.0 mg/dL  3-5 days..................<15.0 mg/dL  6-29 days.................<15.0 mg/dL       Alkaline Phosphatase   Date Value Ref Range Status   04/16/2022 80 55 - 135 U/L Final     AST   Date Value Ref Range Status   04/16/2022 17 10 - 40 U/L Final     ALT   Date Value Ref Range Status   04/16/2022 15 10 - 44 U/L Final     Anion Gap   Date Value Ref Range Status   04/16/2022 11 8 - 16 mmol/L Final     eGFR if    Date Value Ref Range Status   04/16/2022 >60 >60 mL/min/1.73 m^2 Final     eGFR if non    Date Value Ref Range Status   04/16/2022 >60 >60 mL/min/1.73 m^2 Final     Comment:     Calculation used to obtain the estimated glomerular filtration  rate (eGFR) is the CKD-EPI equation.        Lab Results   Component Value Date    WBC 11.37 04/16/2022    HGB 12.1 04/16/2022    HCT 38.5 04/16/2022    MCV 89 04/16/2022     04/16/2022     Lab Results   Component Value Date    CHOL 138 06/01/2021    CHOL 130 09/10/2020     Lab Results   Component Value Date    HDL 36 (L) 06/01/2021    HDL 38 (L) 09/10/2020     Lab Results   Component Value Date    LDLCALC 81.2 06/01/2021    LDLCALC 67.8 09/10/2020     Lab Results   Component Value Date    TRIG 104 06/01/2021    TRIG 121 09/10/2020     Lab Results   Component Value Date    CHOLHDL 26.1 06/01/2021    CHOLHDL 29.2 09/10/2020     Lab Results   Component Value Date    TSH 0.997 06/01/2021       ASSESSMENT/PLAN     Marizol Kevin is a 76 y.o. female     Marizol was seen today for otalgia, medication refill (meloxicam) and skin lesions. Will encourage nightly saline nasal spray and daily zyrtec. Continue meloxicam PRN - most recent renal function is in normal range. Will RTC as needed. Will refer to derm for eval of atypical skin lesions on left lower back and right hand.     Diagnoses and all orders for this visit:    Skin lesion  -      Ambulatory referral/consult to Dermatology; Future    Rhinosinusitis    Post-nasal drip    Other orders  -     meloxicam (MOBIC) 15 MG tablet; Take 1 tablet (15 mg total) by mouth once daily.  -     sodium chloride 0.9 % SprA; 1 spray by Each Nostril route every evening.          Patient was counseled on when and how to seek emergent care.       Stacy Arguello PA-C   Department of Internal Medicine - [facility]   10:52 AM

## 2022-06-20 ENCOUNTER — HOSPITAL ENCOUNTER (INPATIENT)
Facility: OTHER | Age: 77
LOS: 1 days | Discharge: HOME | DRG: 189 | End: 2022-06-22
Attending: EMERGENCY MEDICINE | Admitting: HOSPITALIST
Payer: MEDICARE

## 2022-06-20 ENCOUNTER — NURSE TRIAGE (OUTPATIENT)
Dept: ADMINISTRATIVE | Facility: CLINIC | Age: 77
End: 2022-06-20
Payer: MEDICARE

## 2022-06-20 DIAGNOSIS — J44.1 COPD EXACERBATION: Primary | ICD-10-CM

## 2022-06-20 DIAGNOSIS — R06.02 SOB (SHORTNESS OF BREATH): ICD-10-CM

## 2022-06-20 DIAGNOSIS — R09.02 HYPOXIA: ICD-10-CM

## 2022-06-20 DIAGNOSIS — J96.21 ACUTE ON CHRONIC RESPIRATORY FAILURE WITH HYPOXIA AND HYPERCAPNIA: ICD-10-CM

## 2022-06-20 DIAGNOSIS — J96.22 ACUTE ON CHRONIC RESPIRATORY FAILURE WITH HYPOXIA AND HYPERCAPNIA: ICD-10-CM

## 2022-06-20 LAB
ALBUMIN SERPL BCP-MCNC: 3 G/DL (ref 3.5–5.2)
ALLENS TEST: ABNORMAL
ALP SERPL-CCNC: 125 U/L (ref 55–135)
ALT SERPL W/O P-5'-P-CCNC: 19 U/L (ref 10–44)
ANION GAP SERPL CALC-SCNC: 13 MMOL/L (ref 8–16)
AST SERPL-CCNC: 23 U/L (ref 10–40)
BASOPHILS # BLD AUTO: 0.03 K/UL (ref 0–0.2)
BASOPHILS NFR BLD: 0.2 % (ref 0–1.9)
BILIRUB SERPL-MCNC: 0.7 MG/DL (ref 0.1–1)
BUN SERPL-MCNC: 13 MG/DL (ref 8–23)
CALCIUM SERPL-MCNC: 10.5 MG/DL (ref 8.7–10.5)
CHLORIDE SERPL-SCNC: 104 MMOL/L (ref 95–110)
CO2 SERPL-SCNC: 23 MMOL/L (ref 23–29)
CREAT SERPL-MCNC: 0.9 MG/DL (ref 0.5–1.4)
CTP QC/QA: YES
CTP QC/QA: YES
DELSYS: ABNORMAL
DIFFERENTIAL METHOD: ABNORMAL
EOSINOPHIL # BLD AUTO: 0.1 K/UL (ref 0–0.5)
EOSINOPHIL NFR BLD: 0.5 % (ref 0–8)
ERYTHROCYTE [DISTWIDTH] IN BLOOD BY AUTOMATED COUNT: 13.2 % (ref 11.5–14.5)
EST. GFR  (AFRICAN AMERICAN): >60 ML/MIN/1.73 M^2
EST. GFR  (NON AFRICAN AMERICAN): >60 ML/MIN/1.73 M^2
FIO2: 36
FLOW: 4
GLUCOSE SERPL-MCNC: 129 MG/DL (ref 70–110)
HCO3 UR-SCNC: 26 MMOL/L (ref 24–28)
HCT VFR BLD AUTO: 46 % (ref 37–48.5)
HGB BLD-MCNC: 14.5 G/DL (ref 12–16)
IMM GRANULOCYTES # BLD AUTO: 0.09 K/UL (ref 0–0.04)
IMM GRANULOCYTES NFR BLD AUTO: 0.5 % (ref 0–0.5)
LACTATE SERPL-SCNC: 1 MMOL/L (ref 0.5–2.2)
LACTATE SERPL-SCNC: 3.1 MMOL/L (ref 0.5–2.2)
LACTATE SERPL-SCNC: 3.8 MMOL/L (ref 0.5–2.2)
LYMPHOCYTES # BLD AUTO: 2.6 K/UL (ref 1–4.8)
LYMPHOCYTES NFR BLD: 15.3 % (ref 18–48)
MCH RBC QN AUTO: 27.5 PG (ref 27–31)
MCHC RBC AUTO-ENTMCNC: 31.5 G/DL (ref 32–36)
MCV RBC AUTO: 87 FL (ref 82–98)
MODE: ABNORMAL
MONOCYTES # BLD AUTO: 1.1 K/UL (ref 0.3–1)
MONOCYTES NFR BLD: 6.5 % (ref 4–15)
NEUTROPHILS # BLD AUTO: 13 K/UL (ref 1.8–7.7)
NEUTROPHILS NFR BLD: 77 % (ref 38–73)
NRBC BLD-RTO: 0 /100 WBC
PCO2 BLDA: 48.2 MMHG (ref 35–45)
PH SMN: 7.34 [PH] (ref 7.35–7.45)
PLATELET # BLD AUTO: 258 K/UL (ref 150–450)
PMV BLD AUTO: 9.4 FL (ref 9.2–12.9)
PO2 BLDA: 25 MMHG (ref 40–60)
POC BE: 0 MMOL/L
POC MOLECULAR INFLUENZA A AGN: NEGATIVE
POC MOLECULAR INFLUENZA B AGN: NEGATIVE
POC SATURATED O2: 41 % (ref 95–100)
POC TCO2: 27 MMOL/L (ref 24–29)
POCT GLUCOSE: 141 MG/DL (ref 70–110)
POCT GLUCOSE: 295 MG/DL (ref 70–110)
POTASSIUM SERPL-SCNC: 4.2 MMOL/L (ref 3.5–5.1)
PROCALCITONIN SERPL IA-MCNC: 0.04 NG/ML
PROT SERPL-MCNC: 8.2 G/DL (ref 6–8.4)
RBC # BLD AUTO: 5.28 M/UL (ref 4–5.4)
SAMPLE: ABNORMAL
SARS-COV-2 RDRP RESP QL NAA+PROBE: NEGATIVE
SITE: ABNORMAL
SODIUM SERPL-SCNC: 140 MMOL/L (ref 136–145)
SP02: 93
WBC # BLD AUTO: 16.85 K/UL (ref 3.9–12.7)

## 2022-06-20 PROCEDURE — 99285 EMERGENCY DEPT VISIT HI MDM: CPT | Mod: 25

## 2022-06-20 PROCEDURE — 99220 PR INITIAL OBSERVATION CARE,LEVL III: CPT | Mod: ,,, | Performed by: NURSE PRACTITIONER

## 2022-06-20 PROCEDURE — 99220 PR INITIAL OBSERVATION CARE,LEVL III: ICD-10-PCS | Mod: ,,, | Performed by: NURSE PRACTITIONER

## 2022-06-20 PROCEDURE — 36415 COLL VENOUS BLD VENIPUNCTURE: CPT | Performed by: NURSE PRACTITIONER

## 2022-06-20 PROCEDURE — 96366 THER/PROPH/DIAG IV INF ADDON: CPT

## 2022-06-20 PROCEDURE — 82962 GLUCOSE BLOOD TEST: CPT

## 2022-06-20 PROCEDURE — 96375 TX/PRO/DX INJ NEW DRUG ADDON: CPT

## 2022-06-20 PROCEDURE — 80053 COMPREHEN METABOLIC PANEL: CPT | Performed by: PHYSICIAN ASSISTANT

## 2022-06-20 PROCEDURE — 96365 THER/PROPH/DIAG IV INF INIT: CPT

## 2022-06-20 PROCEDURE — 25000242 PHARM REV CODE 250 ALT 637 W/ HCPCS: Performed by: PHYSICIAN ASSISTANT

## 2022-06-20 PROCEDURE — 87040 BLOOD CULTURE FOR BACTERIA: CPT | Performed by: PHYSICIAN ASSISTANT

## 2022-06-20 PROCEDURE — G0378 HOSPITAL OBSERVATION PER HR: HCPCS

## 2022-06-20 PROCEDURE — 25000003 PHARM REV CODE 250: Performed by: PHYSICIAN ASSISTANT

## 2022-06-20 PROCEDURE — 85025 COMPLETE CBC W/AUTO DIFF WBC: CPT | Performed by: PHYSICIAN ASSISTANT

## 2022-06-20 PROCEDURE — 93005 ELECTROCARDIOGRAM TRACING: CPT

## 2022-06-20 PROCEDURE — 83605 ASSAY OF LACTIC ACID: CPT | Performed by: PHYSICIAN ASSISTANT

## 2022-06-20 PROCEDURE — U0002 COVID-19 LAB TEST NON-CDC: HCPCS | Performed by: PHYSICIAN ASSISTANT

## 2022-06-20 PROCEDURE — 93010 EKG 12-LEAD: ICD-10-PCS | Mod: ,,, | Performed by: INTERNAL MEDICINE

## 2022-06-20 PROCEDURE — 96368 THER/DIAG CONCURRENT INF: CPT

## 2022-06-20 PROCEDURE — 84145 PROCALCITONIN (PCT): CPT | Performed by: PHYSICIAN ASSISTANT

## 2022-06-20 PROCEDURE — 94761 N-INVAS EAR/PLS OXIMETRY MLT: CPT

## 2022-06-20 PROCEDURE — 94640 AIRWAY INHALATION TREATMENT: CPT

## 2022-06-20 PROCEDURE — 93010 ELECTROCARDIOGRAM REPORT: CPT | Mod: ,,, | Performed by: INTERNAL MEDICINE

## 2022-06-20 PROCEDURE — 63600175 PHARM REV CODE 636 W HCPCS: Performed by: PHYSICIAN ASSISTANT

## 2022-06-20 PROCEDURE — 27000221 HC OXYGEN, UP TO 24 HOURS

## 2022-06-20 PROCEDURE — 83605 ASSAY OF LACTIC ACID: CPT | Mod: 91 | Performed by: NURSE PRACTITIONER

## 2022-06-20 RX ORDER — IBUPROFEN 200 MG
16 TABLET ORAL
Status: DISCONTINUED | OUTPATIENT
Start: 2022-06-20 | End: 2022-06-22 | Stop reason: HOSPADM

## 2022-06-20 RX ORDER — LIDOCAINE 50 MG/G
1 PATCH TOPICAL
Status: DISCONTINUED | OUTPATIENT
Start: 2022-06-20 | End: 2022-06-22 | Stop reason: HOSPADM

## 2022-06-20 RX ORDER — DULOXETIN HYDROCHLORIDE 30 MG/1
30 CAPSULE, DELAYED RELEASE ORAL DAILY
Status: DISCONTINUED | OUTPATIENT
Start: 2022-06-21 | End: 2022-06-22 | Stop reason: HOSPADM

## 2022-06-20 RX ORDER — BENZONATATE 100 MG/1
100 CAPSULE ORAL 3 TIMES DAILY PRN
Status: DISCONTINUED | OUTPATIENT
Start: 2022-06-20 | End: 2022-06-22 | Stop reason: HOSPADM

## 2022-06-20 RX ORDER — GLUCAGON 1 MG
1 KIT INJECTION
Status: DISCONTINUED | OUTPATIENT
Start: 2022-06-20 | End: 2022-06-22 | Stop reason: HOSPADM

## 2022-06-20 RX ORDER — ASPIRIN 81 MG/1
81 TABLET ORAL DAILY
Status: DISCONTINUED | OUTPATIENT
Start: 2022-06-21 | End: 2022-06-22 | Stop reason: HOSPADM

## 2022-06-20 RX ORDER — ACETAMINOPHEN 325 MG/1
650 TABLET ORAL EVERY 6 HOURS PRN
Status: DISCONTINUED | OUTPATIENT
Start: 2022-06-20 | End: 2022-06-22 | Stop reason: HOSPADM

## 2022-06-20 RX ORDER — AMLODIPINE BESYLATE 5 MG/1
5 TABLET ORAL DAILY
Status: DISCONTINUED | OUTPATIENT
Start: 2022-06-20 | End: 2022-06-22 | Stop reason: HOSPADM

## 2022-06-20 RX ORDER — METHYLPREDNISOLONE SOD SUCC 125 MG
125 VIAL (EA) INJECTION
Status: COMPLETED | OUTPATIENT
Start: 2022-06-20 | End: 2022-06-20

## 2022-06-20 RX ORDER — MELOXICAM 7.5 MG/1
15 TABLET ORAL DAILY
Status: DISCONTINUED | OUTPATIENT
Start: 2022-06-21 | End: 2022-06-22 | Stop reason: HOSPADM

## 2022-06-20 RX ORDER — ONDANSETRON 2 MG/ML
4 INJECTION INTRAMUSCULAR; INTRAVENOUS EVERY 6 HOURS PRN
Status: DISCONTINUED | OUTPATIENT
Start: 2022-06-20 | End: 2022-06-22 | Stop reason: HOSPADM

## 2022-06-20 RX ORDER — FLUTICASONE PROPIONATE 50 MCG
1 SPRAY, SUSPENSION (ML) NASAL DAILY
Status: DISCONTINUED | OUTPATIENT
Start: 2022-06-21 | End: 2022-06-22 | Stop reason: HOSPADM

## 2022-06-20 RX ORDER — PANTOPRAZOLE SODIUM 40 MG/1
40 TABLET, DELAYED RELEASE ORAL DAILY
Status: DISCONTINUED | OUTPATIENT
Start: 2022-06-21 | End: 2022-06-22 | Stop reason: HOSPADM

## 2022-06-20 RX ORDER — IBUPROFEN 200 MG
24 TABLET ORAL
Status: DISCONTINUED | OUTPATIENT
Start: 2022-06-20 | End: 2022-06-22 | Stop reason: HOSPADM

## 2022-06-20 RX ORDER — IPRATROPIUM BROMIDE AND ALBUTEROL SULFATE 2.5; .5 MG/3ML; MG/3ML
3 SOLUTION RESPIRATORY (INHALATION)
Status: COMPLETED | OUTPATIENT
Start: 2022-06-20 | End: 2022-06-20

## 2022-06-20 RX ORDER — MELOXICAM 15 MG/1
15 TABLET ORAL DAILY
Status: ON HOLD | COMMUNITY
End: 2022-06-22 | Stop reason: HOSPADM

## 2022-06-20 RX ORDER — ATORVASTATIN CALCIUM 20 MG/1
40 TABLET, FILM COATED ORAL DAILY
Status: DISCONTINUED | OUTPATIENT
Start: 2022-06-21 | End: 2022-06-22 | Stop reason: HOSPADM

## 2022-06-20 RX ORDER — INSULIN ASPART 100 [IU]/ML
0-5 INJECTION, SOLUTION INTRAVENOUS; SUBCUTANEOUS
Status: DISCONTINUED | OUTPATIENT
Start: 2022-06-20 | End: 2022-06-22 | Stop reason: HOSPADM

## 2022-06-20 RX ORDER — PROMETHAZINE HYDROCHLORIDE AND CODEINE PHOSPHATE 6.25; 1 MG/5ML; MG/5ML
5 SOLUTION ORAL EVERY 8 HOURS PRN
Status: DISCONTINUED | OUTPATIENT
Start: 2022-06-20 | End: 2022-06-22 | Stop reason: HOSPADM

## 2022-06-20 RX ORDER — IPRATROPIUM BROMIDE AND ALBUTEROL SULFATE 2.5; .5 MG/3ML; MG/3ML
3 SOLUTION RESPIRATORY (INHALATION) EVERY 4 HOURS PRN
Status: DISCONTINUED | OUTPATIENT
Start: 2022-06-20 | End: 2022-06-21

## 2022-06-20 RX ORDER — PREDNISONE 20 MG/1
40 TABLET ORAL DAILY
Status: DISCONTINUED | OUTPATIENT
Start: 2022-06-21 | End: 2022-06-22 | Stop reason: HOSPADM

## 2022-06-20 RX ORDER — AZITHROMYCIN 250 MG/1
500 TABLET, FILM COATED ORAL DAILY
Status: DISCONTINUED | OUTPATIENT
Start: 2022-06-21 | End: 2022-06-22 | Stop reason: HOSPADM

## 2022-06-20 RX ORDER — FLUTICASONE FUROATE AND VILANTEROL 200; 25 UG/1; UG/1
1 POWDER RESPIRATORY (INHALATION) DAILY
Status: DISCONTINUED | OUTPATIENT
Start: 2022-06-21 | End: 2022-06-22 | Stop reason: HOSPADM

## 2022-06-20 RX ORDER — HYDROXYZINE HYDROCHLORIDE 25 MG/1
25 TABLET, FILM COATED ORAL 3 TIMES DAILY PRN
Status: DISCONTINUED | OUTPATIENT
Start: 2022-06-20 | End: 2022-06-22 | Stop reason: HOSPADM

## 2022-06-20 RX ORDER — ENOXAPARIN SODIUM 100 MG/ML
40 INJECTION SUBCUTANEOUS EVERY 24 HOURS
Status: DISCONTINUED | OUTPATIENT
Start: 2022-06-21 | End: 2022-06-22 | Stop reason: HOSPADM

## 2022-06-20 RX ADMIN — SODIUM CHLORIDE, SODIUM LACTATE, POTASSIUM CHLORIDE, AND CALCIUM CHLORIDE 1365 ML: .6; .31; .03; .02 INJECTION, SOLUTION INTRAVENOUS at 01:06

## 2022-06-20 RX ADMIN — CEFTRIAXONE 2 G: 2 INJECTION, SOLUTION INTRAVENOUS at 02:06

## 2022-06-20 RX ADMIN — METHYLPREDNISOLONE SODIUM SUCCINATE 125 MG: 125 INJECTION, POWDER, LYOPHILIZED, FOR SOLUTION INTRAMUSCULAR; INTRAVENOUS at 01:06

## 2022-06-20 RX ADMIN — AZITHROMYCIN MONOHYDRATE 500 MG: 500 INJECTION, POWDER, LYOPHILIZED, FOR SOLUTION INTRAVENOUS at 02:06

## 2022-06-20 RX ADMIN — IPRATROPIUM BROMIDE AND ALBUTEROL SULFATE 3 ML: 2.5; .5 SOLUTION RESPIRATORY (INHALATION) at 01:06

## 2022-06-20 NOTE — ED NOTES
Pt awake and alert; resting quietly on stretcher.  Pt remains on continuous cardiac and pulse ox monitoring with non-invasive blood pressure to cycle every 30 minutes. Pt slightly hypoxic, placed on 3L O2 via nasal cannula. Pt denies pain at this time; no acute distress or discomfort reported or observed.  Pt denies restroom needs at this time; is able to reposition self on stretcher. Bed locked in lowest position; side rails up and locked x 2; call light, bedside table, and personal belongings within reach. Room assessed for safety measures and cleanliness; no action needed at this time. Plan of care discussed. Pt instructed to alert nurse for assistance and before attempting to get out of bed; verbalizes understanding. Pt denies needs or complaints at this time; will continue to monitor. Visitor at bedside.

## 2022-06-20 NOTE — ED NOTES
Pt to ED c/o Sob worsening since Saturday. States productive cough. Denies fever at home. Pt diaphoretic, unable to speak in full sentences. Tachypnea noted. 94% on 4L NC at this time. Sinus tach on CM. BP stable. Side rails raised x 2 with call light within reach.

## 2022-06-20 NOTE — TELEPHONE ENCOUNTER
[place]    PCP:  Dr. Belinda Fajardo    Pt C/O confusion, severe SOB, sweating profusely, and unable to walk too far.  Per protocol, care advised is call  now.  Pt refused care advice.  Advice reinforced but pt continues to refuse care advised.  NOC offered to call EMS for pt but she refused that as well.  Pt does not want to call for ambulance d/t cost.  She reports that she will get someone to take her to Dr. Fajardo's office.  Instructed pt that she does not need to go to Dr. Fajardo's office that if she refuses to call for an ambulance then she needs to go to the ED now.  Pt VU of all instruction.  NOC is concerned d/t symptoms and confusion therefore  called to go evaluate the pt.  Pt contacted and notified that EMS is en route.  She has already used her Albuterol inhaler as directed and reports that it is not effective.  She is alone.  NOC stayed on the phone with the pt until EMS arrived.  Care deferred to EMS.  Instructed to call for questions/concerns.  VU.    Reason for Disposition   SEVERE difficulty breathing (e.g., struggling for each breath, speaks in single words, pulse > 120)    Protocols used: BREATHING DIFFICULTY-A-OH

## 2022-06-20 NOTE — ED PROVIDER NOTES
Encounter Date: 6/20/2022       History     Chief Complaint   Patient presents with    Shortness of Breath     Sob with a productive cough for several days. Original O2 was 89%     Afebrile 76-year-old female with past medical history of allergy, anxiety, previous colon cancer, cataract, DM type 2, dry eye syndrome, HLD, hypertension, insomnia, blepharitis and daily smoker presents to the ED for evaluation of shortness of breath.  Patient states that symptoms began over the past several days.  She reports occasionally productive cough.  She does report wheezing at home.  She has tried inhaler with no improvement symptoms.  She also reports some right-sided chest pain that is exacerbated with coughing and deep inspiration.  Denies any fever, vomiting, diarrhea rash.  Denies any known sick contacts or COVID exposure.    The history is provided by the patient.     Review of patient's allergies indicates:   Allergen Reactions    Sulfa (sulfonamide antibiotics)      Occurred when she was pregnant with varicose veins resulting in leg swelling and pain with standing     Past Medical History:   Diagnosis Date    Allergy     Anxiety     Cancer     colon    Cataract     Colon cancer     Diabetes mellitus, type 2     Dry eye syndrome     Hyperlipidemia     Hypertension     Insomnia     Squamous blepharitis      Past Surgical History:   Procedure Laterality Date    CATARACT EXTRACTION, BILATERAL  03/2020    CHOLECYSTECTOMY      COLONOSCOPY N/A 2/1/2021    Procedure: COLONOSCOPY;  Surgeon: Ashwini Duarte MD;  Location: [facility] ([address] [place] [address]);  Service: Endoscopy;  Laterality: N/A;  medical transportation  covid test 1/29 enrique Ji mailed-KPvt    HYSTERECTOMY      KNEE SURGERY      LAPAROSCOPIC LEFT COLON RESECTION       Family History   Problem Relation Age of Onset    Heart attack Mother     Colon cancer Father     Cancer Sister         unknown    Hypothyroidism Sister     Thyroid cancer  Neg Hx      Social History     Tobacco Use    Smoking status: Current Every Day Smoker     Packs/day: 1.00     Types: Cigarettes    Smokeless tobacco: Never Used    Tobacco comment: smoking cessation information given    Substance Use Topics    Alcohol use: Yes     Alcohol/week: 1.0 standard drink     Types: 1 Shots of liquor per week     Comment: occ    Drug use: Never     Review of Systems   Constitutional: Positive for activity change, appetite change and fatigue. Negative for fever.   HENT: Negative for congestion and sore throat.    Respiratory: Positive for chest tightness, shortness of breath and wheezing.    Cardiovascular: Positive for chest pain. Negative for palpitations.   Gastrointestinal: Negative for abdominal pain, nausea and vomiting.   Genitourinary: Negative for dysuria and flank pain.   Musculoskeletal: Negative for arthralgias, back pain and myalgias.   Skin: Negative for rash.   Neurological: Positive for weakness. Negative for headaches.   Hematological: Does not bruise/bleed easily.       Physical Exam     Initial Vitals   BP Pulse Resp Temp SpO2   06/20/22 1251 06/20/22 1251 06/20/22 1251 06/20/22 1258 06/20/22 1251   106/64 101 18 98.5 °F (36.9 °C) (!) 93 %      MAP       --                Physical Exam    Nursing note and vitals reviewed.  Constitutional: Vital signs are normal. She appears well-developed and well-nourished. She is cooperative.  Non-toxic appearance. She does not appear ill. She appears distressed.   HENT:   Head: Normocephalic and atraumatic.   Eyes: Conjunctivae and lids are normal.   Neck: Neck supple.   Cardiovascular: Normal rate and regular rhythm.   Pulmonary/Chest: Accessory muscle usage present. Tachypnea noted. No respiratory distress. She has wheezes. She has no rhonchi.   Abdominal: Abdomen is soft. Bowel sounds are normal. There is no abdominal tenderness. There is no rigidity and no guarding.   Musculoskeletal:         General: Normal range of motion.       Cervical back: Neck supple. No rigidity.     Neurological: She is alert and oriented to person, place, and time. She has normal strength. No sensory deficit. GCS score is 15. GCS eye subscore is 4. GCS verbal subscore is 5. GCS motor subscore is 6.   Skin: Skin is warm, dry and intact. No rash noted.   Psychiatric: She has a normal mood and affect. Her speech is normal and behavior is normal. Thought content normal.         ED Course   Procedures  Labs Reviewed   CBC W/ AUTO DIFFERENTIAL - Abnormal; Notable for the following components:       Result Value    WBC 16.85 (*)     MCHC 31.5 (*)     Gran # (ANC) 13.0 (*)     Immature Grans (Abs) 0.09 (*)     Mono # 1.1 (*)     Gran % 77.0 (*)     Lymph % 15.3 (*)     All other components within normal limits   POCT GLUCOSE - Abnormal; Notable for the following components:    POCT Glucose 141 (*)     All other components within normal limits   ISTAT PROCEDURE - Abnormal; Notable for the following components:    POC PH 7.340 (*)     POC PCO2 48.2 (*)     POC PO2 25 (*)     POC SATURATED O2 41 (*)     All other components within normal limits   CULTURE, BLOOD   CULTURE, BLOOD   COMPREHENSIVE METABOLIC PANEL   LACTIC ACID, PLASMA   PROCALCITONIN   SARS-COV-2 RDRP GENE     EKG Readings: (Independently Interpreted)   Initial Reading: No STEMI. Previous EKG: Compared with most recent EKG Rhythm: Sinus Tachycardia. Heart Rate: 102. Other Impression: nonspecific ST changes       Imaging Results          X-Ray Chest AP Portable (SOB) (Final result)  Result time 06/20/22 14:16:40    Final result by Pillo Billingsley MD (06/20/22 14:16:40)                 Impression:      No convincing evidence of acute detrimental change relative to prior examination performed 04/15/2022.      Electronically signed by: Pillo Billingsley  Date:    06/20/2022  Time:    14:16             Narrative:    EXAMINATION:  XR CHEST AP PORTABLE    CLINICAL HISTORY:  SOB;    TECHNIQUE:  Single frontal view of  the chest was performed.    COMPARISON:  Chest radiograph 04/15/2022.    FINDINGS:  Monitoring leads are noted.  Grossly unchanged position of tunneled right port catheter.  Cardiomediastinal contours grossly unchanged, noting limitations imposed by differences in patient positioning and rotation.    Chronic interstitial coarsening, as before.  No definite pneumothorax or large volume pleural effusion.  No acute findings in the visualized abdomen osseous and soft tissue structures appear without definite acute change.                                 Medications   albuterol-ipratropium 2.5 mg-0.5 mg/3 mL nebulizer solution 3 mL (3 mLs Nebulization Given 6/20/22 1340)   albuterol-ipratropium 2.5 mg-0.5 mg/3 mL nebulizer solution 3 mL (3 mLs Nebulization Given 6/20/22 1320)   methylPREDNISolone sodium succinate injection 125 mg (125 mg Intravenous Given 6/20/22 1315)     Medical Decision Making:   History:   Old Medical Records: I decided to obtain old medical records.  Initial Assessment:   Emergent evaluation 76-year-old female presenting with shortness of breath.  Symptoms began few days ago gradually worsening.  She appears in acute distress.  Noted tachypnea.  Noted accessory muscle usage.  Diffuse wheezing throughout.  Mucous membranes mildly dry.  Heart regular rate rhythm.  Abdomen soft and nontender.  TTP of the right lateral ribs with no ecchymosis or crepitus  Differential Diagnosis:   Differential Diagnosis includes, but is not limited to:  PE, MI/ACS, pneumothorax, pericardial effusion/tamonade, pneumonia, lung abscess, pericarditis/myocarditis, pleural effusion, lung mass, CHF exacerbation, asthma exacerbation, COPD exacerbation, aspirated/ingested foreign body, airway obstruction, CO poisoning, anemia, metabolic derangement, allergy/atopy, influenza, viral URI, viral syndrome.    Clinical Tests:   Lab Tests: Reviewed and Ordered  Radiological Study: Reviewed and Ordered  Medical Tests: Reviewed and  Ordered  ED Management:  Plan for EKG, DuoNeb, VBG, labs and chest x-ray.  VBG with mild acidosis and hypercapnia.  Chest x-ray with no focal consolidation.  All E modest improvement after initial DuoNeb.  Solu-Medrol and to subsequent DuoNebs ordered.  Labs notable for leukocytosis at 16 with left shift.  Given this finding and suspicion of lower respiratory source sepsis workup initiated including fluid bolus so she had some mild hypotension as per her ideal body weight.  Will also obtain cultures, lactic and procalcitonin.  Initial lactic normal.  Procalcitonin unremarkable.  Discuss risk of MDR however lower suspicion for myself and my attending and hence will start on empiric Rocephin and azithromycin.  Patient does have some mild improvement shortness of breath however was continuing to need 3 L nasal cannula.  Still continues with some mild tachypnea.  Believe she would benefit from hospitalization with continued nebulizer, steroids and antibiotics.  Discussed with patient she is agreeable.                      Clinical Impression:   Final diagnoses:  [R06.02] SOB (shortness of breath)  [J44.1] COPD exacerbation (Primary)  [R09.02] Hypoxia                      RYANN Caceres  06/20/22 [id number]

## 2022-06-21 PROBLEM — J44.1 COPD EXACERBATION: Status: ACTIVE | Noted: 2022-06-21

## 2022-06-21 PROBLEM — Z72.0 TOBACCO USE: Status: ACTIVE | Noted: 2022-06-21

## 2022-06-21 LAB
ANION GAP SERPL CALC-SCNC: 14 MMOL/L (ref 8–16)
BUN SERPL-MCNC: 16 MG/DL (ref 8–23)
CALCIUM SERPL-MCNC: 10.1 MG/DL (ref 8.7–10.5)
CHLORIDE SERPL-SCNC: 104 MMOL/L (ref 95–110)
CO2 SERPL-SCNC: 22 MMOL/L (ref 23–29)
CREAT SERPL-MCNC: 0.9 MG/DL (ref 0.5–1.4)
ERYTHROCYTE [DISTWIDTH] IN BLOOD BY AUTOMATED COUNT: 13.3 % (ref 11.5–14.5)
EST. GFR  (AFRICAN AMERICAN): >60 ML/MIN/1.73 M^2
EST. GFR  (NON AFRICAN AMERICAN): >60 ML/MIN/1.73 M^2
GLUCOSE SERPL-MCNC: 222 MG/DL (ref 70–110)
HCT VFR BLD AUTO: 41.5 % (ref 37–48.5)
HGB BLD-MCNC: 12.7 G/DL (ref 12–16)
LACTATE SERPL-SCNC: 2.5 MMOL/L (ref 0.5–2.2)
MCH RBC QN AUTO: 27.2 PG (ref 27–31)
MCHC RBC AUTO-ENTMCNC: 30.6 G/DL (ref 32–36)
MCV RBC AUTO: 89 FL (ref 82–98)
PLATELET # BLD AUTO: 242 K/UL (ref 150–450)
PMV BLD AUTO: 9.6 FL (ref 9.2–12.9)
POCT GLUCOSE: 139 MG/DL (ref 70–110)
POCT GLUCOSE: 188 MG/DL (ref 70–110)
POCT GLUCOSE: 241 MG/DL (ref 70–110)
POCT GLUCOSE: 256 MG/DL (ref 70–110)
POTASSIUM SERPL-SCNC: 5 MMOL/L (ref 3.5–5.1)
RBC # BLD AUTO: 4.67 M/UL (ref 4–5.4)
SODIUM SERPL-SCNC: 140 MMOL/L (ref 136–145)
WBC # BLD AUTO: 15.88 K/UL (ref 3.9–12.7)

## 2022-06-21 PROCEDURE — 11000001 HC ACUTE MED/SURG PRIVATE ROOM

## 2022-06-21 PROCEDURE — 83605 ASSAY OF LACTIC ACID: CPT | Performed by: NURSE PRACTITIONER

## 2022-06-21 PROCEDURE — 25000003 PHARM REV CODE 250: Performed by: NURSE PRACTITIONER

## 2022-06-21 PROCEDURE — 94761 N-INVAS EAR/PLS OXIMETRY MLT: CPT

## 2022-06-21 PROCEDURE — 99233 PR SUBSEQUENT HOSPITAL CARE,LEVL III: ICD-10-PCS | Mod: ,,, | Performed by: INTERNAL MEDICINE

## 2022-06-21 PROCEDURE — 25000242 PHARM REV CODE 250 ALT 637 W/ HCPCS: Performed by: INTERNAL MEDICINE

## 2022-06-21 PROCEDURE — 94640 AIRWAY INHALATION TREATMENT: CPT

## 2022-06-21 PROCEDURE — 63600175 PHARM REV CODE 636 W HCPCS: Performed by: NURSE PRACTITIONER

## 2022-06-21 PROCEDURE — 87070 CULTURE OTHR SPECIMN AEROBIC: CPT | Performed by: NURSE PRACTITIONER

## 2022-06-21 PROCEDURE — 87205 SMEAR GRAM STAIN: CPT | Performed by: NURSE PRACTITIONER

## 2022-06-21 PROCEDURE — 80048 BASIC METABOLIC PNL TOTAL CA: CPT | Performed by: NURSE PRACTITIONER

## 2022-06-21 PROCEDURE — 99900035 HC TECH TIME PER 15 MIN (STAT)

## 2022-06-21 PROCEDURE — 96372 THER/PROPH/DIAG INJ SC/IM: CPT | Performed by: NURSE PRACTITIONER

## 2022-06-21 PROCEDURE — 85027 COMPLETE CBC AUTOMATED: CPT | Performed by: NURSE PRACTITIONER

## 2022-06-21 PROCEDURE — 25000242 PHARM REV CODE 250 ALT 637 W/ HCPCS: Performed by: NURSE PRACTITIONER

## 2022-06-21 PROCEDURE — 25000003 PHARM REV CODE 250: Performed by: INTERNAL MEDICINE

## 2022-06-21 PROCEDURE — 63700000 PHARM REV CODE 250 ALT 637 W/O HCPCS: Performed by: NURSE PRACTITIONER

## 2022-06-21 PROCEDURE — 36415 COLL VENOUS BLD VENIPUNCTURE: CPT | Performed by: NURSE PRACTITIONER

## 2022-06-21 PROCEDURE — 99233 SBSQ HOSP IP/OBS HIGH 50: CPT | Mod: ,,, | Performed by: INTERNAL MEDICINE

## 2022-06-21 PROCEDURE — 25000003 PHARM REV CODE 250: Performed by: PHYSICIAN ASSISTANT

## 2022-06-21 PROCEDURE — 27000221 HC OXYGEN, UP TO 24 HOURS

## 2022-06-21 RX ORDER — IPRATROPIUM BROMIDE AND ALBUTEROL SULFATE 2.5; .5 MG/3ML; MG/3ML
3 SOLUTION RESPIRATORY (INHALATION)
Status: DISCONTINUED | OUTPATIENT
Start: 2022-06-21 | End: 2022-06-22 | Stop reason: HOSPADM

## 2022-06-21 RX ORDER — SODIUM CHLORIDE 9 MG/ML
INJECTION, SOLUTION INTRAVENOUS
Status: DISCONTINUED | OUTPATIENT
Start: 2022-06-21 | End: 2022-06-22 | Stop reason: HOSPADM

## 2022-06-21 RX ORDER — BUPROPION HYDROCHLORIDE 150 MG/1
150 TABLET, EXTENDED RELEASE ORAL DAILY
Status: DISCONTINUED | OUTPATIENT
Start: 2022-06-22 | End: 2022-06-22 | Stop reason: HOSPADM

## 2022-06-21 RX ADMIN — DULOXETINE 30 MG: 30 CAPSULE, DELAYED RELEASE ORAL at 09:06

## 2022-06-21 RX ADMIN — AZITHROMYCIN MONOHYDRATE 500 MG: 250 TABLET ORAL at 09:06

## 2022-06-21 RX ADMIN — ENOXAPARIN SODIUM 40 MG: 100 INJECTION SUBCUTANEOUS at 09:06

## 2022-06-21 RX ADMIN — FLUTICASONE PROPIONATE 50 MCG: 50 SPRAY, METERED NASAL at 09:06

## 2022-06-21 RX ADMIN — ASPIRIN 81 MG: 81 TABLET, COATED ORAL at 09:06

## 2022-06-21 RX ADMIN — CEFTRIAXONE 1 G: 1 INJECTION, SOLUTION INTRAVENOUS at 03:06

## 2022-06-21 RX ADMIN — INSULIN ASPART 1 UNITS: 100 INJECTION, SOLUTION INTRAVENOUS; SUBCUTANEOUS at 09:06

## 2022-06-21 RX ADMIN — ATORVASTATIN CALCIUM 40 MG: 20 TABLET, FILM COATED ORAL at 09:06

## 2022-06-21 RX ADMIN — LIDOCAINE 1 PATCH: 50 PATCH CUTANEOUS at 03:06

## 2022-06-21 RX ADMIN — PANTOPRAZOLE SODIUM 40 MG: 40 TABLET, DELAYED RELEASE ORAL at 09:06

## 2022-06-21 RX ADMIN — AMLODIPINE BESYLATE 5 MG: 5 TABLET ORAL at 09:06

## 2022-06-21 RX ADMIN — INSULIN ASPART 3 UNITS: 100 INJECTION, SOLUTION INTRAVENOUS; SUBCUTANEOUS at 06:06

## 2022-06-21 RX ADMIN — MELOXICAM 15 MG: 7.5 TABLET ORAL at 09:06

## 2022-06-21 RX ADMIN — IPRATROPIUM BROMIDE AND ALBUTEROL SULFATE 3 ML: 2.5; .5 SOLUTION RESPIRATORY (INHALATION) at 07:06

## 2022-06-21 RX ADMIN — SODIUM CHLORIDE: 0.9 INJECTION, SOLUTION INTRAVENOUS at 03:06

## 2022-06-21 RX ADMIN — PREDNISONE 40 MG: 20 TABLET ORAL at 09:06

## 2022-06-21 RX ADMIN — FLUTICASONE FUROATE AND VILANTEROL TRIFENATATE 1 PUFF: 200; 25 POWDER RESPIRATORY (INHALATION) at 07:06

## 2022-06-21 NOTE — H&P
[facility] Medicine  History & Physical    Patient Name: Marizol Kevin  MRN: [id number]  Patient Class: OP- Observation  Admission Date: 6/20/2022  Attending Physician: Chloe Covarrubias MD   Primary Care Provider: Belinda Fajardo MD         Patient information was obtained from patient and ER records.     Subjective:     Principal Problem:Acute on chronic respiratory failure with hypoxia and hypercapnia    Chief Complaint:   Chief Complaint   Patient presents with    Shortness of Breath     Sob with a productive cough for several days. Original O2 was 89%        HPI: Ms Fontana is a 76 yr old female with a PMH that includes colon cancer, COPD, tobacco use, and DB2.  She came to the ED for dyspnea that began Saturday. Pt reports having congestions and upper respiratory symptoms over the past week. She began having difficulty breathing on Saturday. Pt does not wear oxygen at home. She does smoke cigarettes. Pt denies chest pain and fevers.  Daughter at bedside to contribute to history. Pt admitted to hospital medicine for further management.       Past Medical History:   Diagnosis Date    Allergy     Anxiety     Cancer     colon    Cataract     Colon cancer     Diabetes mellitus, type 2     Dry eye syndrome     Hyperlipidemia     Hypertension     Insomnia     Squamous blepharitis        Past Surgical History:   Procedure Laterality Date    CATARACT EXTRACTION, BILATERAL  03/2020    CHOLECYSTECTOMY      COLONOSCOPY N/A 2/1/2021    Procedure: COLONOSCOPY;  Surgeon: Ashwini Duarte MD;  Location: [address]);  Service: Endoscopy;  Laterality: N/A;  medical transportation  covid test 1/29 Margy, enrique mailed-KPvt    HYSTERECTOMY      KNEE SURGERY      LAPAROSCOPIC LEFT COLON RESECTION         Review of patient's allergies indicates:   Allergen Reactions    Sulfa (sulfonamide antibiotics)      Occurred when she was pregnant with varicose veins resulting in  leg swelling and pain with standing       No current facility-administered medications on file prior to encounter.     Current Outpatient Medications on File Prior to Encounter   Medication Sig    albuterol (VENTOLIN HFA) 90 mcg/actuation inhaler Inhale 2 puffs into the lungs every 4 (four) hours as needed for Wheezing.    amLODIPine (NORVASC) 5 MG tablet TAKE 1 TABLET EVERY DAY    aspirin (ECOTRIN) 81 MG EC tablet Take 81 mg by mouth once daily.    atorvastatin (LIPITOR) 40 MG tablet TAKE 1 TABLET EVERY DAY    benzonatate (TESSALON) 100 MG capsule Take 1 capsule (100 mg total) by mouth 3 (three) times daily as needed for Cough.    betamethasone valerate 0.1% (VALISONE) 0.1 % Oint Apply topically once daily.    blood sugar diagnostic Strp 1 strip by Misc.(Non-Drug; Combo Route) route 2 (two) times daily.    blood-glucose meter kit Please provide with insurance covered meter    cholecalciferol, vitamin D3, (VITAMIN D3) 50 mcg (2,000 unit) Cap Take by mouth.    DULoxetine (CYMBALTA) 30 MG capsule TAKE 1 CAPSULE EVERY DAY    fluticasone propionate (FLONASE) 50 mcg/actuation nasal spray USE 1 SPRAY IN EACH NOSTRIL ONE TIME DAILY    hydrOXYzine HCL (ATARAX) 25 MG tablet Take 1 tablet (25 mg total) by mouth 3 (three) times daily as needed for Itching or Anxiety.    lancets Misc 1 Device by Misc.(Non-Drug; Combo Route) route 2 (two) times daily.    meloxicam (MOBIC) 15 MG tablet Take 15 mg by mouth once daily.    metFORMIN (GLUCOPHAGE-XR) 500 MG ER 24hr tablet TAKE 2 TABLETS EVERY DAY WITH BREAKFAST    multivitamin capsule Take 1 capsule by mouth once daily.    pantoprazole (PROTONIX) 40 MG tablet Take 1 tablet (40 mg total) by mouth once daily.    raloxifene (EVISTA) 60 mg tablet TAKE 1 TABLET EVERY DAY    tiotropium-olodateroL (STIOLTO RESPIMAT) 2.5-2.5 mcg/actuation Mist Inhale 2 puffs into the lungs once daily.    LIDOcaine (LIDODERM) 5 % Place 1 patch onto the skin once daily. Remove & Discard  patch within 12 hours or as directed by MD    LIDOCAINE VISCOUS 2 % solution     sodium chloride 0.9 % SprA 1 spray by Each Nostril route every evening.    triamcinolone acetonide 0.025% (KENALOG) 0.025 % cream APPLY DAILY TO AFFECTED AREA AS NEEDED FOR ITCHING. MAXIMUM 2 TIMES A WEEK    [DISCONTINUED] meloxicam (MOBIC) 15 MG tablet Take 1 tablet (15 mg total) by mouth once daily.     Family History       Problem Relation (Age of Onset)    Cancer Sister    Colon cancer Father    Heart attack Mother    Hypothyroidism Sister          Tobacco Use    Smoking status: Current Every Day Smoker     Packs/day: 1.00     Types: Cigarettes    Smokeless tobacco: Never Used    Tobacco comment: smoking cessation information given    Substance and Sexual Activity    Alcohol use: Yes     Alcohol/week: 1.0 standard drink     Types: 1 Shots of liquor per week     Comment: occ    Drug use: Never    Sexual activity: Not Currently     Birth control/protection: Abstinence     Review of Systems   Constitutional:  Negative for activity change, chills and fever.   HENT:  Positive for congestion. Negative for trouble swallowing.    Eyes:  Negative for photophobia and visual disturbance.   Respiratory:  Positive for cough, shortness of breath and wheezing.    Cardiovascular:  Negative for chest pain, palpitations and leg swelling.   Gastrointestinal:  Negative for abdominal pain, diarrhea, nausea and vomiting.   Endocrine: Negative for polydipsia and polyuria.   Genitourinary:  Negative for dysuria and frequency.   Musculoskeletal:  Negative for arthralgias and gait problem.   Skin:  Negative for rash and wound.   Neurological:  Negative for dizziness, speech difficulty, weakness and headaches.        Chronic neuropathy   Psychiatric/Behavioral:  Negative for confusion and sleep disturbance.    Objective:     Vital Signs (Most Recent):  Temp: 98 °F (36.7 °C) (06/20/22 2030)  Pulse: 97 (06/20/22 2030)  Resp: 18 (06/20/22 2030)  BP:  137/85 (06/20/22 2030)  SpO2: (!) 94 % (06/20/22 2030)   Vital Signs (24h Range):  Temp:  [98 °F (36.7 °C)-99.3 °F (37.4 °C)] 98 °F (36.7 °C)  Pulse:  [] 97  Resp:  [18-32] 18  SpO2:  [91 %-95 %] 94 %  BP: (106-137)/(53-85) 137/85     Weight: 68 kg (150 lb)  Body mass index is 32.46 kg/m².    Physical Exam  Vitals reviewed.   Constitutional:       General: She is not in acute distress.     Appearance: She is not ill-appearing.   HENT:      Head: Normocephalic and atraumatic.      Nose: No congestion.      Mouth/Throat:      Mouth: Mucous membranes are moist.      Pharynx: Oropharynx is clear.   Eyes:      General:         Right eye: No discharge.         Left eye: No discharge.      Extraocular Movements: Extraocular movements intact.      Pupils: Pupils are equal, round, and reactive to light.   Cardiovascular:      Rate and Rhythm: Normal rate and regular rhythm.      Heart sounds: Normal heart sounds. No murmur heard.  Pulmonary:      Effort: No respiratory distress.      Breath sounds: Wheezing present. No rhonchi.      Comments: tachypnea  Abdominal:      General: Bowel sounds are normal. There is no distension.      Palpations: Abdomen is soft.      Tenderness: There is no abdominal tenderness. There is no guarding.   Musculoskeletal:         General: No swelling or tenderness. Normal range of motion.      Cervical back: Normal range of motion. No rigidity or tenderness.   Skin:     General: Skin is warm.      Findings: No lesion or rash.   Neurological:      General: No focal deficit present.      Mental Status: She is alert and oriented to person, place, and time.      Sensory: No sensory deficit.      Gait: Gait normal.   Psychiatric:         Mood and Affect: Mood normal.         Behavior: Behavior normal.         CRANIAL NERVES     CN III, IV, VI   Pupils are equal, round, and reactive to light.     Significant Labs: All pertinent labs within the past 24 hours have been reviewed.  A1C:   Recent Labs    Lab 05/19/22  1017   HGBA1C 6.4*     ABGs:   Recent Labs   Lab 06/20/22  1330   PH 7.340*   PCO2 48.2*   HCO3 26.0   POCSATURATED 41*   BE 0   PO2 25*     Blood Culture: No results for input(s): LABBLOO in the last 48 hours.  BMP:   Recent Labs   Lab 06/20/22  1328   *      K 4.2      CO2 23   BUN 13   CREATININE 0.9   CALCIUM 10.5     CBC:   Recent Labs   Lab 06/20/22  1328   WBC 16.85*   HGB 14.5   HCT 46.0        CMP:   Recent Labs   Lab 06/20/22  1328      K 4.2      CO2 23   *   BUN 13   CREATININE 0.9   CALCIUM 10.5   PROT 8.2   ALBUMIN 3.0*   BILITOT 0.7   ALKPHOS 125   AST 23   ALT 19   ANIONGAP 13   EGFRNONAA >60     Cardiac Markers: No results for input(s): CKMB, MYOGLOBIN, BNP, TROPISTAT in the last 48 hours.  Lactic Acid:   Recent Labs   Lab 06/20/22  1328 06/20/22  1800   LACTATE 1.0 3.8*     Magnesium: No results for input(s): MG in the last 48 hours.  POCT Glucose:   Recent Labs   Lab 06/20/22  1306 06/20/22  2041   POCTGLUCOSE 141* 295*     Respiratory Culture: No results for input(s): GSRESP, RESPIRATORYC in the last 48 hours.  Troponin: No results for input(s): TROPONINI in the last 48 hours.  TSH: No results for input(s): TSH in the last 4320 hours.  Urine Culture: No results for input(s): LABURIN in the last 48 hours.    Significant Imaging: I have reviewed all pertinent imaging results/findings within the past 24 hours.  X-Ray Chest AP Portable (SOB)  Narrative: EXAMINATION:  XR CHEST AP PORTABLE    CLINICAL HISTORY:  SOB;    TECHNIQUE:  Single frontal view of the chest was performed.    COMPARISON:  Chest radiograph 04/15/2022.    FINDINGS:  Monitoring leads are noted.  Grossly unchanged position of tunneled right port catheter.  Cardiomediastinal contours grossly unchanged, noting limitations imposed by differences in patient positioning and rotation.    Chronic interstitial coarsening, as before.  No definite pneumothorax or large volume pleural  effusion.  No acute findings in the visualized abdomen osseous and soft tissue structures appear without definite acute change.  Impression: No convincing evidence of acute detrimental change relative to prior examination performed 04/15/2022.    Electronically signed by: Pillo Billingsley  Date:    06/20/2022  Time:    14:16      Assessment/Plan:     * Acute on chronic respiratory failure with hypoxia and hypercapnia  COPD Exacerbation   Pt with O2 sats in the low 90s and tacyhpnea. T max 99.3. WBC 16.85. Procalcitonin 0.04. No acute consolidation or pleural effusions on chest radiograph. ABG results respiratory acidosis with pH 7.34, CO2 48, O2 25, and HCO3  26.    -start 5 days oral steroids and empiric antibiotics  -Continue with as needed bronchodilator breathing treatments, inhaled steroids, and supplemental oxygen.    -Check respiratory infection panel -- pending          Essential hypertension  Continue amlodipine 5 mg daily      Type 2 diabetes mellitus with hyperglycemia, without long-term current use of insulin  Most recent A1c is 6.4. Pt takes metformin at home. Start low dose SSI while inpatient.         VTE Risk Mitigation (From admission, onward)    None             Gia King DNP  Department of Hospital Medicine   [facility] ([place])

## 2022-06-21 NOTE — ASSESSMENT & PLAN NOTE
COPD Exacerbation   Pt with O2 sats in the low 90s and tacyhpnea. T max 99.3. WBC 16.85. Procalcitonin 0.04. No acute consolidation or pleural effusions on chest radiograph. ABG results respiratory acidosis with pH 7.34, CO2 48, O2 25, and HCO3  26.    -start 5 days oral steroids and empiric antibiotics  -Continue with as needed bronchodilator breathing treatments, inhaled steroids, and supplemental oxygen.    -Check respiratory infection panel -- pending    - follow sputum cx

## 2022-06-21 NOTE — PLAN OF CARE
[facility] ([place])  Initial Discharge Assessment       Primary Care Provider: Stacy Arguello PA-C    Admission Diagnosis: SOB (shortness of breath) [R06.02]  Hypoxia [R09.02]  COPD exacerbation [J44.1]    Admission Date: 6/20/2022  Expected Discharge Date:     Discharge Barriers Identified: (P) None    Payor: [employer] MANAGED MEDICARE / Plan: [employer] MEDICARE HMO / Product Type: Capitation /     Extended Emergency Contact Information  Primary Emergency Contact: karri riley  Mobile Phone: [phone]  Relation: Daughter  Preferred language: English   needed? No  Secondary Emergency Contact: tenisha cook  Home Phone: [phone]  Mobile Phone: [phone]  Relation: Sister  Preferred language: English    Discharge Plan A: (P)  (TBD)         [facility] (Now [facility]) - [place], [place] - [address]  [address] [address]  Phone: [phone] Fax: [phone]    [id number] - [place], [place] - [address] AT [facility] [address]  [address]  [place] [address]  Phone: [phone] Fax: [phone]    [facility]  [address]  [place] [address]  Phone: [phone] Fax: [phone]      Initial Assessment (most recent)       Adult Discharge Assessment - 06/21/22 1649          Discharge Assessment    Assessment Type Discharge Planning Assessment (P)      Confirmed/corrected address, phone number and insurance Yes (P)      Confirmed Demographics Correct on Facesheet (P)      Source of Information patient (P)      Lives With alone (P)      Do you expect to return to your current living situation? Yes (P)      Do you have help at home or someone to help you manage your care at home? No (P)      Prior to hospitilization cognitive status: Alert/Oriented (P)      Current cognitive status: Alert/Oriented (P)      Walking or Climbing Stairs Difficulty none (P)       Dressing/Bathing Difficulty none (P)      Equipment Currently Used at Home none (P)      Readmission within 30 days? No (P)      Patient currently being followed by outpatient case management? No (P)      Do you currently have service(s) that help you manage your care at home? Yes (P)    Patient can't remember name of HH company.    Is the pt/caregiver preference to resume services with current agency Yes (P)      Do you take prescription medications? Yes (P)      Do you have prescription coverage? Yes (P)      Do you have any problems affording any of your prescribed medications? No (P)      Is the patient taking medications as prescribed? yes (P)      Who is going to help you get home at discharge? Patient will need a ride home at dischargel. (P)      How do you get to doctors appointments? family or friend will provide (P)      Are you on dialysis? No (P)      Do you take coumadin? No (P)      Discharge Plan A -- (P)    TBD    DME Needed Upon Discharge  none (P)      Discharge Plan discussed with: Patient (P)      Discharge Barriers Identified None (P)                       CM met with the patient at the bedside.     Patient is alert and oriented with no communication barriers.     Prior to admission patient is independent. Patient denies the use of HH or DME.     Patients PCP is correct on the face sheet. Patient choice pharmacy is bedside delivery.    Advance directives: Unknown    Patient states she will need a ride home at discharge.    No CM needs identified at this time.     CM team will continue to follow.

## 2022-06-21 NOTE — HOSPITAL COURSE
Continued steroids, breathing treatments  with improvement.She was able to be weaned off o2.Patient will need outpatient PFT.Discharged on steroid taper.Advised about smoking cessation.

## 2022-06-21 NOTE — PROGRESS NOTES
[facility] ([place])  Wound Care    Patient Name:  Marizol Kevin   MRN:  [id number]  Date: 6/21/2022  Diagnosis: Acute on chronic respiratory failure with hypoxia and hypercapnia    History:     Past Medical History:   Diagnosis Date    Allergy     Anxiety     Cancer     colon    Cataract     Colon cancer     Diabetes mellitus, type 2     Dry eye syndrome     Hyperlipidemia     Hypertension     Insomnia     Squamous blepharitis        Social History     Socioeconomic History    Marital status:    Tobacco Use    Smoking status: Current Every Day Smoker     Packs/day: 1.00     Types: Cigarettes    Smokeless tobacco: Never Used    Tobacco comment: smoking cessation information given    Substance and Sexual Activity    Alcohol use: Yes     Alcohol/week: 1.0 standard drink     Types: 1 Shots of liquor per week     Comment: occ    Drug use: Never    Sexual activity: Not Currently     Birth control/protection: Abstinence       Precautions:     Allergies as of 06/20/2022 - Reviewed 06/20/2022   Allergen Reaction Noted    Sulfa (sulfonamide antibiotics)  09/03/2014       WOC Assessment Details/Treatment     Patient identified as being at increased risk for pressure related skin breakdown. Pressure injury prevention interventions ordered.       Priyank Risk Assessment   Sensory Perception 3-->slightly limited   Moisture 3-->occasionally moist   Activity 3-->walks occasionally   Mobility 3-->slightly limited   Nutrition 3-->adequate   Friction and Shear 3-->no apparent problem   Priyank Score 18       06/21/2022

## 2022-06-21 NOTE — SUBJECTIVE & OBJECTIVE
Interval History: breathing better, coughing up less yellow phlegm.    Review of Systems   Constitutional:  Negative for chills and fever.   HENT:  Negative for trouble swallowing.    Respiratory:  Positive for cough and shortness of breath.    Cardiovascular:  Negative for chest pain and leg swelling.   Gastrointestinal:  Negative for abdominal pain, blood in stool, nausea and vomiting.   Genitourinary:  Negative for dysuria and hematuria.   Musculoskeletal:  Negative for gait problem.   Skin:  Negative for rash.   Neurological:  Negative for headaches.   Psychiatric/Behavioral:  Negative for confusion.    Objective:     Vital Signs (Most Recent):  Temp: 98.3 °F (36.8 °C) (06/21/22 1212)  Pulse: 98 (06/21/22 1212)  Resp: 18 (06/21/22 1212)  BP: (!) 116/57 (06/21/22 1212)  SpO2: (!) 93 % (06/21/22 1212)   Vital Signs (24h Range):  Temp:  [98 °F (36.7 °C)-98.6 °F (37 °C)] 98.3 °F (36.8 °C)  Pulse:  [] 98  Resp:  [16-20] 18  SpO2:  [92 %-97 %] 93 %  BP: (106-137)/(53-85) 116/57     Weight: 68 kg (150 lb)  Body mass index is 32.46 kg/m².    Intake/Output Summary (Last 24 hours) at 6/21/2022 1613  Last data filed at 6/20/2022 1655  Gross per 24 hour   Intake 1615 ml   Output --   Net 1615 ml      Physical Exam  Vitals reviewed.   Constitutional:       General: She is not in acute distress.     Appearance: She is well-developed.   HENT:      Head: Normocephalic and atraumatic.   Eyes:      Extraocular Movements: Extraocular movements intact.      Pupils: Pupils are equal, round, and reactive to light.   Cardiovascular:      Rate and Rhythm: Normal rate and regular rhythm.   Pulmonary:      Effort: Pulmonary effort is normal. No respiratory distress.      Comments: Bilateral coarse breath sounds  Abdominal:      General: Bowel sounds are normal. There is no distension.      Palpations: Abdomen is soft.      Tenderness: There is no abdominal tenderness.   Musculoskeletal:         General: No swelling. Normal range of  motion.      Cervical back: Normal range of motion and neck supple.   Skin:     General: Skin is warm.      Findings: No rash.   Neurological:      General: No focal deficit present.      Mental Status: She is alert and oriented to person, place, and time.   Psychiatric:         Mood and Affect: Mood normal.         Behavior: Behavior normal.       Significant Labs: All pertinent labs within the past 24 hours have been reviewed.    Significant Imaging: I have reviewed all pertinent imaging results/findings within the past 24 hours.

## 2022-06-21 NOTE — PLAN OF CARE
Problem: Adult Inpatient Plan of Care  Goal: Plan of Care Review  Outcome: Ongoing, Progressing  Goal: Patient-Specific Goal (Individualized)  Outcome: Ongoing, Progressing  Goal: Absence of Hospital-Acquired Illness or Injury  Outcome: Ongoing, Progressing  Goal: Optimal Comfort and Wellbeing  Outcome: Ongoing, Progressing  Goal: Readiness for Transition of Care  Outcome: Ongoing, Progressing     Problem: Diabetes Comorbidity  Goal: Blood Glucose Level Within Targeted Range  Outcome: Ongoing, Progressing     Problem: Infection  Goal: Absence of Infection Signs and Symptoms  Outcome: Ongoing, Progressing     Problem: Skin Injury Risk Increased  Goal: Skin Health and Integrity  Outcome: Ongoing, Progressing     Problem: Gas Exchange Impaired  Goal: Optimal Gas Exchange  Outcome: Ongoing, Progressing     Problem: Adjustment to Illness COPD (Chronic Obstructive Pulmonary Disease)  Goal: Optimal Chronic Illness Coping  Outcome: Ongoing, Progressing

## 2022-06-21 NOTE — ASSESSMENT & PLAN NOTE
Patient wants to quit but states need help with meds  Does not want nicotine patch or chantix  Will try wellpatn

## 2022-06-21 NOTE — ASSESSMENT & PLAN NOTE
COPD Exacerbation   Pt with O2 sats in the low 90s and tacyhpnea. T max 99.3. WBC 16.85. Procalcitonin 0.04. No acute consolidation or pleural effusions on chest radiograph. ABG results respiratory acidosis with pH 7.34, CO2 48, O2 25, and HCO3  26.    -start 5 days oral steroids and empiric antibiotics  -Continue with as needed bronchodilator breathing treatments, inhaled steroids, and supplemental oxygen.    -Check respiratory infection panel -- pending

## 2022-06-21 NOTE — HPI
Ms Fontana is a 76 yr old female with a PMH that includes colon cancer, COPD, tobacco use, and DB2.  She came to the ED for dyspnea that began Saturday. Pt reports having congestions and upper respiratory symptoms over the past week. She began having difficulty breathing on Saturday. Pt does not wear oxygen at home. She does smoke cigarettes. Pt denies chest pain and fevers.  Daughter at bedside to contribute to history. Pt admitted to hospital medicine for further management.

## 2022-06-21 NOTE — ASSESSMENT & PLAN NOTE
likely copd exacerbation with tobacco use, never been diagnosed before  Will need pft and pulm follow up outpatient

## 2022-06-21 NOTE — PLAN OF CARE
POC reviewed with patient. AAOx4. VS stable on 4l/min nasal cannula. No complaints verbalized during shift.  Breath sounds with course crackles and expiratory wheezes, respirations are now even, unlabored. Productive cough with yellow sputum, occasionally.  No injuries, falls, or trauma occurred during shift. Purposeful rounding completed. Bed low and locked with side rails up x 3 and call light within reach. Will continue to monitor.

## 2022-06-21 NOTE — SUBJECTIVE & OBJECTIVE
Past Medical History:   Diagnosis Date    Allergy     Anxiety     Cancer     colon    Cataract     Colon cancer     Diabetes mellitus, type 2     Dry eye syndrome     Hyperlipidemia     Hypertension     Insomnia     Squamous blepharitis        Past Surgical History:   Procedure Laterality Date    CATARACT EXTRACTION, BILATERAL  03/2020    CHOLECYSTECTOMY      COLONOSCOPY N/A 2/1/2021    Procedure: COLONOSCOPY;  Surgeon: Ashwini Duarte MD;  Location: [facility] ([address] [place] [address]);  Service: Endoscopy;  Laterality: N/A;  medical transportation  covid test 1/29 [facility], instructions mailed-KPvt    HYSTERECTOMY      KNEE SURGERY      LAPAROSCOPIC LEFT COLON RESECTION         Review of patient's allergies indicates:   Allergen Reactions    Sulfa (sulfonamide antibiotics)      Occurred when she was pregnant with varicose veins resulting in leg swelling and pain with standing       No current facility-administered medications on file prior to encounter.     Current Outpatient Medications on File Prior to Encounter   Medication Sig    albuterol (VENTOLIN HFA) 90 mcg/actuation inhaler Inhale 2 puffs into the lungs every 4 (four) hours as needed for Wheezing.    amLODIPine (NORVASC) 5 MG tablet TAKE 1 TABLET EVERY DAY    aspirin (ECOTRIN) 81 MG EC tablet Take 81 mg by mouth once daily.    atorvastatin (LIPITOR) 40 MG tablet TAKE 1 TABLET EVERY DAY    benzonatate (TESSALON) 100 MG capsule Take 1 capsule (100 mg total) by mouth 3 (three) times daily as needed for Cough.    betamethasone valerate 0.1% (VALISONE) 0.1 % Oint Apply topically once daily.    blood sugar diagnostic Strp 1 strip by Misc.(Non-Drug; Combo Route) route 2 (two) times daily.    blood-glucose meter kit Please provide with insurance covered meter    cholecalciferol, vitamin D3, (VITAMIN D3) 50 mcg (2,000 unit) Cap Take by mouth.    DULoxetine (CYMBALTA) 30 MG capsule TAKE 1 CAPSULE EVERY DAY    fluticasone propionate (FLONASE) 50 mcg/actuation nasal spray USE 1  SPRAY IN EACH NOSTRIL ONE TIME DAILY    hydrOXYzine HCL (ATARAX) 25 MG tablet Take 1 tablet (25 mg total) by mouth 3 (three) times daily as needed for Itching or Anxiety.    lancets Misc 1 Device by Misc.(Non-Drug; Combo Route) route 2 (two) times daily.    meloxicam (MOBIC) 15 MG tablet Take 15 mg by mouth once daily.    metFORMIN (GLUCOPHAGE-XR) 500 MG ER 24hr tablet TAKE 2 TABLETS EVERY DAY WITH BREAKFAST    multivitamin capsule Take 1 capsule by mouth once daily.    pantoprazole (PROTONIX) 40 MG tablet Take 1 tablet (40 mg total) by mouth once daily.    raloxifene (EVISTA) 60 mg tablet TAKE 1 TABLET EVERY DAY    tiotropium-olodateroL (STIOLTO RESPIMAT) 2.5-2.5 mcg/actuation Mist Inhale 2 puffs into the lungs once daily.    LIDOcaine (LIDODERM) 5 % Place 1 patch onto the skin once daily. Remove & Discard patch within 12 hours or as directed by MD    LIDOCAINE VISCOUS 2 % solution     sodium chloride 0.9 % SprA 1 spray by Each Nostril route every evening.    triamcinolone acetonide 0.025% (KENALOG) 0.025 % cream APPLY DAILY TO AFFECTED AREA AS NEEDED FOR ITCHING. MAXIMUM 2 TIMES A WEEK    [DISCONTINUED] meloxicam (MOBIC) 15 MG tablet Take 1 tablet (15 mg total) by mouth once daily.     Family History       Problem Relation (Age of Onset)    Cancer Sister    Colon cancer Father    Heart attack Mother    Hypothyroidism Sister          Tobacco Use    Smoking status: Current Every Day Smoker     Packs/day: 1.00     Types: Cigarettes    Smokeless tobacco: Never Used    Tobacco comment: smoking cessation information given    Substance and Sexual Activity    Alcohol use: Yes     Alcohol/week: 1.0 standard drink     Types: 1 Shots of liquor per week     Comment: occ    Drug use: Never    Sexual activity: Not Currently     Birth control/protection: Abstinence     Review of Systems   Constitutional:  Negative for activity change, chills and fever.   HENT:  Positive for congestion. Negative for trouble swallowing.    Eyes:   Negative for photophobia and visual disturbance.   Respiratory:  Positive for cough, shortness of breath and wheezing.    Cardiovascular:  Negative for chest pain, palpitations and leg swelling.   Gastrointestinal:  Negative for abdominal pain, diarrhea, nausea and vomiting.   Endocrine: Negative for polydipsia and polyuria.   Genitourinary:  Negative for dysuria and frequency.   Musculoskeletal:  Negative for arthralgias and gait problem.   Skin:  Negative for rash and wound.   Neurological:  Negative for dizziness, speech difficulty, weakness and headaches.        Chronic neuropathy   Psychiatric/Behavioral:  Negative for confusion and sleep disturbance.    Objective:     Vital Signs (Most Recent):  Temp: 98 °F (36.7 °C) (06/20/22 2030)  Pulse: 97 (06/20/22 2030)  Resp: 18 (06/20/22 2030)  BP: 137/85 (06/20/22 2030)  SpO2: (!) 94 % (06/20/22 2030)   Vital Signs (24h Range):  Temp:  [98 °F (36.7 °C)-99.3 °F (37.4 °C)] 98 °F (36.7 °C)  Pulse:  [] 97  Resp:  [18-32] 18  SpO2:  [91 %-95 %] 94 %  BP: (106-137)/(53-85) 137/85     Weight: 68 kg (150 lb)  Body mass index is 32.46 kg/m².    Physical Exam  Vitals reviewed.   Constitutional:       General: She is not in acute distress.     Appearance: She is not ill-appearing.   HENT:      Head: Normocephalic and atraumatic.      Nose: No congestion.      Mouth/Throat:      Mouth: Mucous membranes are moist.      Pharynx: Oropharynx is clear.   Eyes:      General:         Right eye: No discharge.         Left eye: No discharge.      Extraocular Movements: Extraocular movements intact.      Pupils: Pupils are equal, round, and reactive to light.   Cardiovascular:      Rate and Rhythm: Normal rate and regular rhythm.      Heart sounds: Normal heart sounds. No murmur heard.  Pulmonary:      Effort: No respiratory distress.      Breath sounds: Wheezing present. No rhonchi.      Comments: tachypnea  Abdominal:      General: Bowel sounds are normal. There is no distension.       Palpations: Abdomen is soft.      Tenderness: There is no abdominal tenderness. There is no guarding.   Musculoskeletal:         General: No swelling or tenderness. Normal range of motion.      Cervical back: Normal range of motion. No rigidity or tenderness.   Skin:     General: Skin is warm.      Findings: No lesion or rash.   Neurological:      General: No focal deficit present.      Mental Status: She is alert and oriented to person, place, and time.      Sensory: No sensory deficit.      Gait: Gait normal.   Psychiatric:         Mood and Affect: Mood normal.         Behavior: Behavior normal.         CRANIAL NERVES     CN III, IV, VI   Pupils are equal, round, and reactive to light.     Significant Labs: All pertinent labs within the past 24 hours have been reviewed.  A1C:   Recent Labs   Lab 05/19/22  1017   HGBA1C 6.4*     ABGs:   Recent Labs   Lab 06/20/22  1330   PH 7.340*   PCO2 48.2*   HCO3 26.0   POCSATURATED 41*   BE 0   PO2 25*     Blood Culture: No results for input(s): LABBLOO in the last 48 hours.  BMP:   Recent Labs   Lab 06/20/22  1328   *      K 4.2      CO2 23   BUN 13   CREATININE 0.9   CALCIUM 10.5     CBC:   Recent Labs   Lab 06/20/22  1328   WBC 16.85*   HGB 14.5   HCT 46.0        CMP:   Recent Labs   Lab 06/20/22  1328      K 4.2      CO2 23   *   BUN 13   CREATININE 0.9   CALCIUM 10.5   PROT 8.2   ALBUMIN 3.0*   BILITOT 0.7   ALKPHOS 125   AST 23   ALT 19   ANIONGAP 13   EGFRNONAA >60     Cardiac Markers: No results for input(s): CKMB, MYOGLOBIN, BNP, TROPISTAT in the last 48 hours.  Lactic Acid:   Recent Labs   Lab 06/20/22  1328 06/20/22  1800   LACTATE 1.0 3.8*     Magnesium: No results for input(s): MG in the last 48 hours.  POCT Glucose:   Recent Labs   Lab 06/20/22  1306 06/20/22  2041   POCTGLUCOSE 141* 295*     Respiratory Culture: No results for input(s): GSRESP, RESPIRATORYC in the last 48 hours.  Troponin: No results for  input(s): TROPONINI in the last 48 hours.  TSH: No results for input(s): TSH in the last 4320 hours.  Urine Culture: No results for input(s): LABURIN in the last 48 hours.    Significant Imaging: I have reviewed all pertinent imaging results/findings within the past 24 hours.  X-Ray Chest AP Portable (SOB)  Narrative: EXAMINATION:  XR CHEST AP PORTABLE    CLINICAL HISTORY:  SOB;    TECHNIQUE:  Single frontal view of the chest was performed.    COMPARISON:  Chest radiograph 04/15/2022.    FINDINGS:  Monitoring leads are noted.  Grossly unchanged position of tunneled right port catheter.  Cardiomediastinal contours grossly unchanged, noting limitations imposed by differences in patient positioning and rotation.    Chronic interstitial coarsening, as before.  No definite pneumothorax or large volume pleural effusion.  No acute findings in the visualized abdomen osseous and soft tissue structures appear without definite acute change.  Impression: No convincing evidence of acute detrimental change relative to prior examination performed 04/15/2022.    Electronically signed by: Pillo Billingsley  Date:    06/20/2022  Time:    14:16

## 2022-06-21 NOTE — ASSESSMENT & PLAN NOTE
Most recent A1c is 6.4. Pt takes metformin at home. Start low dose SSI while inpatient and being on steroids.

## 2022-06-21 NOTE — PROGRESS NOTES
[facility] Medicine  Progress Note    Patient Name: Marizol Kevin  MRN: [id number]  Patient Class: IP- Inpatient   Admission Date: 6/20/2022  Length of Stay: 0 days  Attending Physician: Abigail Mendieta MD  Primary Care Provider: Belinda Fajardo MD        Subjective:     Principal Problem:Acute on chronic respiratory failure with hypoxia and hypercapnia        HPI:  Ms Fontana is a 76 yr old female with a PMH that includes colon cancer, COPD, tobacco use, and DB2.  She came to the ED for dyspnea that began Saturday. Pt reports having congestions and upper respiratory symptoms over the past week. She began having difficulty breathing on Saturday. Pt does not wear oxygen at home. She does smoke cigarettes. Pt denies chest pain and fevers.  Daughter at bedside to contribute to history. Pt admitted to hospital medicine for further management.       Overview/Hospital Course:  Continued steroids, breathing treatments  with improvement.      Interval History: breathing better, coughing up less yellow phlegm.    Review of Systems   Constitutional:  Negative for chills and fever.   HENT:  Negative for trouble swallowing.    Respiratory:  Positive for cough and shortness of breath.    Cardiovascular:  Negative for chest pain and leg swelling.   Gastrointestinal:  Negative for abdominal pain, blood in stool, nausea and vomiting.   Genitourinary:  Negative for dysuria and hematuria.   Musculoskeletal:  Negative for gait problem.   Skin:  Negative for rash.   Neurological:  Negative for headaches.   Psychiatric/Behavioral:  Negative for confusion.    Objective:     Vital Signs (Most Recent):  Temp: 98.3 °F (36.8 °C) (06/21/22 1212)  Pulse: 98 (06/21/22 1212)  Resp: 18 (06/21/22 1212)  BP: (!) 116/57 (06/21/22 1212)  SpO2: (!) 93 % (06/21/22 1212)   Vital Signs (24h Range):  Temp:  [98 °F (36.7 °C)-98.6 °F (37 °C)] 98.3 °F (36.8 °C)  Pulse:  [] 98  Resp:  [16-20] 18  SpO2:  [92 %-97 %] 93 %  BP:  (106-137)/(53-85) 116/57     Weight: 68 kg (150 lb)  Body mass index is 32.46 kg/m².    Intake/Output Summary (Last 24 hours) at 6/21/2022 1613  Last data filed at 6/20/2022 1655  Gross per 24 hour   Intake 1615 ml   Output --   Net 1615 ml      Physical Exam  Vitals reviewed.   Constitutional:       General: She is not in acute distress.     Appearance: She is well-developed.   HENT:      Head: Normocephalic and atraumatic.   Eyes:      Extraocular Movements: Extraocular movements intact.      Pupils: Pupils are equal, round, and reactive to light.   Cardiovascular:      Rate and Rhythm: Normal rate and regular rhythm.   Pulmonary:      Effort: Pulmonary effort is normal. No respiratory distress.      Comments: Bilateral coarse breath sounds  Abdominal:      General: Bowel sounds are normal. There is no distension.      Palpations: Abdomen is soft.      Tenderness: There is no abdominal tenderness.   Musculoskeletal:         General: No swelling. Normal range of motion.      Cervical back: Normal range of motion and neck supple.   Skin:     General: Skin is warm.      Findings: No rash.   Neurological:      General: No focal deficit present.      Mental Status: She is alert and oriented to person, place, and time.   Psychiatric:         Mood and Affect: Mood normal.         Behavior: Behavior normal.       Significant Labs: All pertinent labs within the past 24 hours have been reviewed.    Significant Imaging: I have reviewed all pertinent imaging results/findings within the past 24 hours.      Assessment/Plan:      * Acute on chronic respiratory failure with hypoxia and hypercapnia  COPD Exacerbation   Pt with O2 sats in the low 90s and tacyhpnea. T max 99.3. WBC 16.85. Procalcitonin 0.04. No acute consolidation or pleural effusions on chest radiograph. ABG results respiratory acidosis with pH 7.34, CO2 48, O2 25, and HCO3  26.    -start 5 days oral steroids and empiric antibiotics  -Continue with as needed  bronchodilator breathing treatments, inhaled steroids, and supplemental oxygen.    -Check respiratory infection panel -- pending    - follow sputum cx        Tobacco use  Patient wants to quit but states need help with meds  Does not want nicotine patch or chantix  Will try wellbutryn      COPD exacerbation  likely copd exacerbation with tobacco use, never been diagnosed before  Will need pft and pulm follow up outpatient      Essential hypertension  Continue amlodipine 5 mg daily      Type 2 diabetes mellitus with hyperglycemia, without long-term current use of insulin  Most recent A1c is 6.4. Pt takes metformin at home. Start low dose SSI while inpatient and being on steroids.        VTE Risk Mitigation (From admission, onward)         Ordered     enoxaparin injection 40 mg  Daily         06/20/22 2213                Discharge Planning   ALICE:      Code Status: Prior   Is the patient medically ready for discharge?:     Reason for patient still in hospital (select all that apply): Patient trending condition and Treatment                     Abigail Mendieta MD  Department of Hospital Medicine   [facility] ([place])

## 2022-06-22 VITALS
OXYGEN SATURATION: 93 % | SYSTOLIC BLOOD PRESSURE: 114 MMHG | WEIGHT: 150 LBS | TEMPERATURE: 99 F | HEIGHT: 57 IN | RESPIRATION RATE: 16 BRPM | HEART RATE: 98 BPM | BODY MASS INDEX: 32.36 KG/M2 | DIASTOLIC BLOOD PRESSURE: 55 MMHG

## 2022-06-22 LAB
BNP SERPL-MCNC: 30 PG/ML (ref 0–99)
POCT GLUCOSE: 168 MG/DL (ref 70–110)
POCT GLUCOSE: 202 MG/DL (ref 70–110)

## 2022-06-22 PROCEDURE — 94640 AIRWAY INHALATION TREATMENT: CPT

## 2022-06-22 PROCEDURE — 99239 HOSP IP/OBS DSCHRG MGMT >30: CPT | Mod: ,,, | Performed by: INTERNAL MEDICINE

## 2022-06-22 PROCEDURE — 1111F PR DISCHARGE MEDS RECONCILED W/ CURRENT OUTPATIENT MED LIST: ICD-10-PCS | Mod: CPTII,,, | Performed by: INTERNAL MEDICINE

## 2022-06-22 PROCEDURE — 99900031 HC PATIENT EDUCATION (STAT)

## 2022-06-22 PROCEDURE — 83880 ASSAY OF NATRIURETIC PEPTIDE: CPT | Performed by: INTERNAL MEDICINE

## 2022-06-22 PROCEDURE — 63700000 PHARM REV CODE 250 ALT 637 W/O HCPCS: Performed by: NURSE PRACTITIONER

## 2022-06-22 PROCEDURE — 63600175 PHARM REV CODE 636 W HCPCS: Performed by: NURSE PRACTITIONER

## 2022-06-22 PROCEDURE — 99239 PR HOSPITAL DISCHARGE DAY,>30 MIN: ICD-10-PCS | Mod: ,,, | Performed by: INTERNAL MEDICINE

## 2022-06-22 PROCEDURE — 36415 COLL VENOUS BLD VENIPUNCTURE: CPT | Performed by: INTERNAL MEDICINE

## 2022-06-22 PROCEDURE — 25000003 PHARM REV CODE 250: Performed by: INTERNAL MEDICINE

## 2022-06-22 PROCEDURE — 25000242 PHARM REV CODE 250 ALT 637 W/ HCPCS: Performed by: NURSE PRACTITIONER

## 2022-06-22 PROCEDURE — 1111F DSCHRG MED/CURRENT MED MERGE: CPT | Mod: CPTII,,, | Performed by: INTERNAL MEDICINE

## 2022-06-22 PROCEDURE — 94761 N-INVAS EAR/PLS OXIMETRY MLT: CPT

## 2022-06-22 PROCEDURE — 25000242 PHARM REV CODE 250 ALT 637 W/ HCPCS: Performed by: INTERNAL MEDICINE

## 2022-06-22 PROCEDURE — 25000003 PHARM REV CODE 250: Performed by: NURSE PRACTITIONER

## 2022-06-22 RX ORDER — PREDNISONE 20 MG/1
40 TABLET ORAL DAILY
Qty: 6 TABLET | Refills: 0 | Status: SHIPPED | OUTPATIENT
Start: 2022-06-23 | End: 2022-06-26

## 2022-06-22 RX ORDER — TIOTROPIUM BROMIDE 18 UG/1
18 CAPSULE ORAL; RESPIRATORY (INHALATION) DAILY
Qty: 30 CAPSULE | Refills: 1 | Status: SHIPPED | OUTPATIENT
Start: 2022-06-22 | End: 2022-09-14 | Stop reason: SDUPTHER

## 2022-06-22 RX ORDER — IPRATROPIUM BROMIDE AND ALBUTEROL SULFATE 2.5; .5 MG/3ML; MG/3ML
3 SOLUTION RESPIRATORY (INHALATION)
Qty: 180 ML | Refills: 1 | Status: SHIPPED | OUTPATIENT
Start: 2022-06-22 | End: 2022-08-23 | Stop reason: SDUPTHER

## 2022-06-22 RX ORDER — BUPROPION HYDROCHLORIDE 150 MG/1
150 TABLET, EXTENDED RELEASE ORAL 2 TIMES DAILY
Qty: 60 TABLET | Refills: 0 | Status: SHIPPED | OUTPATIENT
Start: 2022-06-22 | End: 2022-09-14

## 2022-06-22 RX ADMIN — PREDNISONE 40 MG: 20 TABLET ORAL at 09:06

## 2022-06-22 RX ADMIN — ASPIRIN 81 MG: 81 TABLET, COATED ORAL at 09:06

## 2022-06-22 RX ADMIN — AZITHROMYCIN MONOHYDRATE 500 MG: 250 TABLET ORAL at 09:06

## 2022-06-22 RX ADMIN — AMLODIPINE BESYLATE 5 MG: 5 TABLET ORAL at 09:06

## 2022-06-22 RX ADMIN — BUPROPION HYDROCHLORIDE 150 MG: 150 TABLET, EXTENDED RELEASE ORAL at 09:06

## 2022-06-22 RX ADMIN — DULOXETINE 30 MG: 30 CAPSULE, DELAYED RELEASE ORAL at 09:06

## 2022-06-22 RX ADMIN — IPRATROPIUM BROMIDE AND ALBUTEROL SULFATE 3 ML: 2.5; .5 SOLUTION RESPIRATORY (INHALATION) at 07:06

## 2022-06-22 RX ADMIN — MELOXICAM 15 MG: 7.5 TABLET ORAL at 09:06

## 2022-06-22 RX ADMIN — FLUTICASONE PROPIONATE 50 MCG: 50 SPRAY, METERED NASAL at 09:06

## 2022-06-22 RX ADMIN — FLUTICASONE FUROATE AND VILANTEROL TRIFENATATE 1 PUFF: 200; 25 POWDER RESPIRATORY (INHALATION) at 07:06

## 2022-06-22 RX ADMIN — ATORVASTATIN CALCIUM 40 MG: 20 TABLET, FILM COATED ORAL at 09:06

## 2022-06-22 RX ADMIN — PANTOPRAZOLE SODIUM 40 MG: 40 TABLET, DELAYED RELEASE ORAL at 09:06

## 2022-06-22 NOTE — PROGRESS NOTES
MIKE met with the patient prior to her discharge. She needs a ride home and can use a Lyft. MIKE told her that her MD appointments were arranged and that Johanna steinberg  [facility] Smoking Cessation will call her tomorrow morning about that program. MIKE informed Stacy, her RN about this.

## 2022-06-23 LAB
BACTERIA SPEC AEROBE CULT: NORMAL
BACTERIA SPEC AEROBE CULT: NORMAL
GRAM STN SPEC: NORMAL

## 2022-06-25 LAB
BACTERIA BLD CULT: NORMAL
BACTERIA BLD CULT: NORMAL

## 2022-06-27 ENCOUNTER — TELEPHONE (OUTPATIENT)
Dept: INTERNAL MEDICINE | Facility: CLINIC | Age: 77
End: 2022-06-27
Payer: MEDICARE

## 2022-06-27 DIAGNOSIS — F17.210 CIGARETTE NICOTINE DEPENDENCE WITHOUT COMPLICATION: Primary | ICD-10-CM

## 2022-06-27 DIAGNOSIS — Z72.0 TOBACCO USE: ICD-10-CM

## 2022-06-27 NOTE — TELEPHONE ENCOUNTER
----- Message from Agata Horner sent at 6/27/2022  9:13 AM CDT -----   Pt would like to be called back regarding medication not working    Pt was in hospital  Pt can be reached at  [phone]

## 2022-06-27 NOTE — TELEPHONE ENCOUNTER
Returned pt's call.  Pt states the hospitalist put her on bupropion for smoking cessation after having pneumonia and she can not take it due to side effects. Would like another option.    Pt states she has extremely bad taste in her mouth, headaches, and dizziness from taking bupropion.    Pt denies SOB or issues from pneumonia at this time, but states she does want to quit smoking.    Pt has hosfu appt with Dr. Butterfield coming up.

## 2022-06-28 NOTE — TELEPHONE ENCOUNTER
I'm referring her to tobacco cessation.  They are great and can help with medications to quit smoking. They will call her. The most important thing is that she needs to enroll with them by July 11th to be able to use their services for free.

## 2022-06-28 NOTE — TELEPHONE ENCOUNTER
Spoke to Ms. Kevin and informed her per Dr. Fajardo that I'm referring her to tobacco cessation.  They are great and can help with medications to quit smoking. They will call her. The most important thing is that she needs to enroll with them by July 11th to be able to use their services for free.    Instructed patient to call the office if she do not hear from tobacco cessation.  Patient states understanding.

## 2022-06-30 ENCOUNTER — OFFICE VISIT (OUTPATIENT)
Dept: INTERNAL MEDICINE | Facility: CLINIC | Age: 77
End: 2022-06-30
Attending: INTERNAL MEDICINE
Payer: MEDICARE

## 2022-06-30 ENCOUNTER — IMMUNIZATION (OUTPATIENT)
Dept: PHARMACY | Facility: CLINIC | Age: 77
End: 2022-06-30
Payer: MEDICARE

## 2022-06-30 VITALS
HEART RATE: 96 BPM | WEIGHT: 146.38 LBS | OXYGEN SATURATION: 94 % | BODY MASS INDEX: 31.58 KG/M2 | DIASTOLIC BLOOD PRESSURE: 76 MMHG | HEIGHT: 57 IN | SYSTOLIC BLOOD PRESSURE: 110 MMHG

## 2022-06-30 DIAGNOSIS — T45.1X5A NEUROPATHY DUE TO CHEMOTHERAPEUTIC DRUG: ICD-10-CM

## 2022-06-30 DIAGNOSIS — C18.2 MALIGNANT NEOPLASM OF ASCENDING COLON: ICD-10-CM

## 2022-06-30 DIAGNOSIS — Z23 NEED FOR VACCINATION: Primary | ICD-10-CM

## 2022-06-30 DIAGNOSIS — I10 ESSENTIAL HYPERTENSION: ICD-10-CM

## 2022-06-30 DIAGNOSIS — E11.65 TYPE 2 DIABETES MELLITUS WITH HYPERGLYCEMIA, WITHOUT LONG-TERM CURRENT USE OF INSULIN: ICD-10-CM

## 2022-06-30 DIAGNOSIS — G62.0 NEUROPATHY DUE TO CHEMOTHERAPEUTIC DRUG: ICD-10-CM

## 2022-06-30 DIAGNOSIS — F41.9 ANXIETY: ICD-10-CM

## 2022-06-30 DIAGNOSIS — Z72.0 TOBACCO ABUSE: ICD-10-CM

## 2022-06-30 DIAGNOSIS — J44.1 COPD EXACERBATION: Primary | ICD-10-CM

## 2022-06-30 PROCEDURE — 3078F DIAST BP <80 MM HG: CPT | Mod: CPTII,S$GLB,, | Performed by: INTERNAL MEDICINE

## 2022-06-30 PROCEDURE — 3008F BODY MASS INDEX DOCD: CPT | Mod: CPTII,S$GLB,, | Performed by: INTERNAL MEDICINE

## 2022-06-30 PROCEDURE — 3044F HG A1C LEVEL LT 7.0%: CPT | Mod: CPTII,S$GLB,, | Performed by: INTERNAL MEDICINE

## 2022-06-30 PROCEDURE — 3078F PR MOST RECENT DIASTOLIC BLOOD PRESSURE < 80 MM HG: ICD-10-PCS | Mod: CPTII,S$GLB,, | Performed by: INTERNAL MEDICINE

## 2022-06-30 PROCEDURE — 3008F PR BODY MASS INDEX (BMI) DOCUMENTED: ICD-10-PCS | Mod: CPTII,S$GLB,, | Performed by: INTERNAL MEDICINE

## 2022-06-30 PROCEDURE — 99999 PR PBB SHADOW E&M-EST. PATIENT-LVL IV: ICD-10-PCS | Mod: PBBFAC,,, | Performed by: INTERNAL MEDICINE

## 2022-06-30 PROCEDURE — 99999 PR PBB SHADOW E&M-EST. PATIENT-LVL IV: CPT | Mod: PBBFAC,,, | Performed by: INTERNAL MEDICINE

## 2022-06-30 PROCEDURE — 1160F RVW MEDS BY RX/DR IN RCRD: CPT | Mod: CPTII,S$GLB,, | Performed by: INTERNAL MEDICINE

## 2022-06-30 PROCEDURE — 1101F PT FALLS ASSESS-DOCD LE1/YR: CPT | Mod: CPTII,S$GLB,, | Performed by: INTERNAL MEDICINE

## 2022-06-30 PROCEDURE — 1126F AMNT PAIN NOTED NONE PRSNT: CPT | Mod: CPTII,S$GLB,, | Performed by: INTERNAL MEDICINE

## 2022-06-30 PROCEDURE — 3072F PR LOW RISK FOR RETINOPATHY: ICD-10-PCS | Mod: CPTII,S$GLB,, | Performed by: INTERNAL MEDICINE

## 2022-06-30 PROCEDURE — 3288F PR FALLS RISK ASSESSMENT DOCUMENTED: ICD-10-PCS | Mod: CPTII,S$GLB,, | Performed by: INTERNAL MEDICINE

## 2022-06-30 PROCEDURE — 3044F PR MOST RECENT HEMOGLOBIN A1C LEVEL <7.0%: ICD-10-PCS | Mod: CPTII,S$GLB,, | Performed by: INTERNAL MEDICINE

## 2022-06-30 PROCEDURE — 3074F SYST BP LT 130 MM HG: CPT | Mod: CPTII,S$GLB,, | Performed by: INTERNAL MEDICINE

## 2022-06-30 PROCEDURE — 1159F PR MEDICATION LIST DOCUMENTED IN MEDICAL RECORD: ICD-10-PCS | Mod: CPTII,S$GLB,, | Performed by: INTERNAL MEDICINE

## 2022-06-30 PROCEDURE — 99214 PR OFFICE/OUTPT VISIT, EST, LEVL IV, 30-39 MIN: ICD-10-PCS | Mod: S$GLB,,, | Performed by: INTERNAL MEDICINE

## 2022-06-30 PROCEDURE — 1159F MED LIST DOCD IN RCRD: CPT | Mod: CPTII,S$GLB,, | Performed by: INTERNAL MEDICINE

## 2022-06-30 PROCEDURE — 3074F PR MOST RECENT SYSTOLIC BLOOD PRESSURE < 130 MM HG: ICD-10-PCS | Mod: CPTII,S$GLB,, | Performed by: INTERNAL MEDICINE

## 2022-06-30 PROCEDURE — 99214 OFFICE O/P EST MOD 30 MIN: CPT | Mod: S$GLB,,, | Performed by: INTERNAL MEDICINE

## 2022-06-30 PROCEDURE — 3072F LOW RISK FOR RETINOPATHY: CPT | Mod: CPTII,S$GLB,, | Performed by: INTERNAL MEDICINE

## 2022-06-30 PROCEDURE — 1126F PR PAIN SEVERITY QUANTIFIED, NO PAIN PRESENT: ICD-10-PCS | Mod: CPTII,S$GLB,, | Performed by: INTERNAL MEDICINE

## 2022-06-30 PROCEDURE — 3288F FALL RISK ASSESSMENT DOCD: CPT | Mod: CPTII,S$GLB,, | Performed by: INTERNAL MEDICINE

## 2022-06-30 PROCEDURE — 1101F PR PT FALLS ASSESS DOC 0-1 FALLS W/OUT INJ PAST YR: ICD-10-PCS | Mod: CPTII,S$GLB,, | Performed by: INTERNAL MEDICINE

## 2022-06-30 PROCEDURE — 1160F PR REVIEW ALL MEDS BY PRESCRIBER/CLIN PHARMACIST DOCUMENTED: ICD-10-PCS | Mod: CPTII,S$GLB,, | Performed by: INTERNAL MEDICINE

## 2022-06-30 PROCEDURE — 4010F PR ACE/ARB THEARPY RXD/TAKEN: ICD-10-PCS | Mod: CPTII,S$GLB,, | Performed by: INTERNAL MEDICINE

## 2022-06-30 PROCEDURE — 4010F ACE/ARB THERAPY RXD/TAKEN: CPT | Mod: CPTII,S$GLB,, | Performed by: INTERNAL MEDICINE

## 2022-06-30 RX ORDER — PREDNISONE 20 MG/1
40 TABLET ORAL DAILY
Qty: 10 TABLET | Refills: 0 | Status: SHIPPED | OUTPATIENT
Start: 2022-06-30 | End: 2022-07-05

## 2022-06-30 RX ORDER — VARENICLINE TARTRATE 1 MG/1
TABLET, FILM COATED ORAL
Qty: 56 TABLET | Refills: 0 | Status: SHIPPED | OUTPATIENT
Start: 2022-06-30 | End: 2022-08-16 | Stop reason: SDUPTHER

## 2022-06-30 RX ORDER — VARENICLINE TARTRATE 0.5 (11)-1
KIT ORAL
Qty: 1 EACH | Refills: 0 | Status: SHIPPED | OUTPATIENT
Start: 2022-06-30 | End: 2022-06-30 | Stop reason: SDUPTHER

## 2022-06-30 NOTE — PROGRESS NOTES
"Subjective:       Patient ID: Marizol Kevin is a 76 y.o. female.    Chief Complaint: hospital f/u (Wellbutrin gives pt sour taste(not taking at this time))    Here for hospital f/u    Pt normally cared for by my colleague Dr. Fajardo. I have reviewed patient's past medical, surgical, and social history in addition to MAR and allergies.     77 yo F with PMHx of colon CA, COPD, tobacco abuse, DM, HTN, HLD osteoporosis, neuropathy s/p chemo,    Admitted 6/20/22 with 1 week of cough. In ED pt with O2 sats in the low 90s and tacyhpnea. T max 99.3. WBC 16.85. Procalcitonin 0.04. No acute consolidation or pleural effusions on chest radiograph. Cx and Tx for   COPD Exacerbation and Tx c/  Prednisone and     ### tobacco abuse ###  hospitalist put her on bupropion for smoking cessation after having pneumonia and she can not take it due to side effects. Would like another option.   Pt states she has extremely bad taste in her mouth, headaches, and dizziness from taking bupropion   Thyroid nodule              Path benign 10/2020/ rec f/u endocrine to determine frequency and duration of U/s monitoring. Dr Gaines has surveillance u/s ordered for next month.        6/30/22 Nebulizer and breo and spiriva and wellbutrin are new. She stopped wellbutrin and the daily bitter metallic taste in her mouth stopped. Amenable to Chantix. Wants to quit. Breathing much improved since hospitalization.       Review of Systems    Objective:      Vitals:    06/30/22 0925   BP: 110/76   Pulse: 96   SpO2: (!) 94%   Weight: 66.4 kg (146 lb 6.2 oz)   Height: 4' 9" (1.448 m)      Physical Exam  Constitutional:       General: She is not in acute distress.     Appearance: She is well-developed.   HENT:      Head: Normocephalic and atraumatic.      Mouth/Throat:      Pharynx: No oropharyngeal exudate.   Eyes:      General: No scleral icterus.     Conjunctiva/sclera: Conjunctivae normal.      Pupils: Pupils are equal, round, and reactive to light. "   Neck:      Thyroid: No thyromegaly.   Cardiovascular:      Rate and Rhythm: Normal rate and regular rhythm.      Heart sounds: Normal heart sounds. No murmur heard.  Pulmonary:      Effort: Pulmonary effort is normal. No tachypnea, accessory muscle usage, prolonged expiration or respiratory distress.      Breath sounds: Examination of the left-upper field reveals wheezing. Examination of the right-middle field reveals wheezing. Examination of the left-middle field reveals wheezing. Examination of the left-lower field reveals wheezing. Wheezing present. No rales.   Abdominal:      General: There is no distension.      Palpations: Abdomen is soft.      Tenderness: There is no abdominal tenderness.   Musculoskeletal:         General: No tenderness.   Lymphadenopathy:      Cervical: No cervical adenopathy.   Skin:     General: Skin is warm and dry.   Neurological:      Mental Status: She is alert and oriented to person, place, and time.   Psychiatric:         Behavior: Behavior normal.         Assessment:       1. COPD exacerbation    2. Anxiety    3. Malignant neoplasm of ascending colon    4. Neuropathy due to chemotherapeutic drug    5. Type 2 diabetes mellitus with hyperglycemia, without long-term current use of insulin    6. Essential hypertension    7. Tobacco abuse        Plan:       Marizol was seen today for hospital f/u.    Diagnoses and all orders for this visit:    COPD exacerbation   Has never had formal Dx or PFTs but Hx and exam suggest reactive airways that are most likely COPD. PFTs ordered. Still has a bitt of wheezing on exam. Extend steroids five days. If lung exam does not cleat at f/u rec CT chest due to some asymmetry on exam to r/o post obstructive and CA. Office and Emergency Department prompts discussed.       Complete PFT without bronchodilator; Future    Anxiety    Malignant neoplasm of ascending colon    Neuropathy due to chemotherapeutic drug    Type 2 diabetes mellitus with hyperglycemia,  without long-term current use of insulin    Essential hypertension    Tobacco abuse  START   varenicline (CHANTIX) 1 mg Tab; Take 1/2 tablet by mouth once daily X3 days,then increase to 1/2 tab twice daily X4 days, then increase to one 1mg tab twice daily  Patient counseled extensively on need for tobacco cessation and the risk associated with continuing, including but not limited to head/neck cancer, lung cancer, bladder cancer, increased risk of stroke and heart attack, development of chronic lung disease, etc.  Patient has been made aware of cessation assistance including medications, nicotine replacement, personal support.      -     predniSONE (DELTASONE) 20 MG tablet; Take 2 tablets (40 mg total) by mouth once daily. for 5 days       Transitional Care Note    Family and/or Caretaker present at visit?  No.  Diagnostic tests reviewed/disposition: I have reviewed all completed as well as pending diagnostic tests at the time of discharge.  Disease/illness education: COPD and tobacco use.   Home health/community services discussion/referrals: Patient does not have home health established from hospital visit.  They do not need home health.  If needed, we will set up home health for the patient.   Establishment or re-establishment of referral orders for community resources: \.   Discussion with other health care providers: No discussion with other health care providers necessary.             Pedro Luis Ferrell MD  Internal Medicine-[facility]        Side effects of medication(s) were discussed in detail and patient voiced understanding.  Patient will call back for any issues or complications.

## 2022-07-05 ENCOUNTER — HOSPITAL ENCOUNTER (OUTPATIENT)
Dept: PULMONOLOGY | Facility: OTHER | Age: 77
Discharge: HOME | End: 2022-07-05
Attending: INTERNAL MEDICINE
Payer: MEDICARE

## 2022-07-05 DIAGNOSIS — J44.1 COPD EXACERBATION: ICD-10-CM

## 2022-07-05 PROCEDURE — 94010 BREATHING CAPACITY TEST: ICD-10-PCS | Mod: 26,,, | Performed by: INTERNAL MEDICINE

## 2022-07-05 PROCEDURE — 94729 DIFFUSING CAPACITY: CPT

## 2022-07-05 PROCEDURE — 94729 PR C02/MEMBANE DIFFUSE CAPACITY: ICD-10-PCS | Mod: 26,,, | Performed by: INTERNAL MEDICINE

## 2022-07-05 PROCEDURE — 94010 BREATHING CAPACITY TEST: CPT

## 2022-07-05 PROCEDURE — 94727 GAS DIL/WSHOT DETER LNG VOL: CPT | Mod: 26,,, | Performed by: INTERNAL MEDICINE

## 2022-07-05 PROCEDURE — 94010 BREATHING CAPACITY TEST: CPT | Mod: 26,,, | Performed by: INTERNAL MEDICINE

## 2022-07-05 PROCEDURE — 94727 PR PULM FUNCTION TEST BY GAS: ICD-10-PCS | Mod: 26,,, | Performed by: INTERNAL MEDICINE

## 2022-07-05 PROCEDURE — 94727 GAS DIL/WSHOT DETER LNG VOL: CPT

## 2022-07-05 PROCEDURE — 94729 DIFFUSING CAPACITY: CPT | Mod: 26,,, | Performed by: INTERNAL MEDICINE

## 2022-07-05 NOTE — ASSESSMENT & PLAN NOTE
Most recent A1c is 6.4. Pt takes metformin at home. Start low dose SSI while inpatient and being on steroids.  Resume metformin on dc.

## 2022-07-05 NOTE — ASSESSMENT & PLAN NOTE
Patient wants to quit but states need help with meds  Does not want nicotine patch or chantix  Will try wellbutryn started on 150 mg daily in hospital and given prescription on discharge  Follow with smoking cessation program

## 2022-07-05 NOTE — DISCHARGE SUMMARY
[facility] Medicine  Discharge Summary      Patient Name: Marizol Kevin  MRN: [id number]  Patient Class: IP- Inpatient  Admission Date: 6/20/2022  Hospital Length of Stay: 1 days  Discharge Date and Time: 6/22/2022  2:01 PM  Attending Physician: No att. providers found   Discharging Provider: Abigail Mendieta MD  Primary Care Provider: Belinda Fajardo MD      HPI:   Ms Fontana is a 76 yr old female with a PMH that includes colon cancer, COPD, tobacco use, and DB2.  She came to the ED for dyspnea that began Saturday. Pt reports having congestions and upper respiratory symptoms over the past week. She began having difficulty breathing on Saturday. Pt does not wear oxygen at home. She does smoke cigarettes. Pt denies chest pain and fevers.  Daughter at bedside to contribute to history. Pt admitted to hospital medicine for further management.       * No surgery found *      Hospital Course:   Continued steroids, breathing treatments  with improvement.She was able to be weaned off o2.Patient will need outpatient PFT.Discharged on steroid taper.Advised about smoking cessation.       Goals of Care Treatment Preferences:  Code Status: Full Code      Consults:     * Acute on chronic respiratory failure with hypoxia and hypercapnia  COPD Exacerbation   Pt with O2 sats in the low 90s and tacyhpnea. T max 99.3. WBC 16.85. Procalcitonin 0.04. No acute consolidation or pleural effusions on chest radiograph. ABG results respiratory acidosis with pH 7.34, CO2 48, O2 25, and HCO3  26.    -start 5 days oral steroids and empiric antibiotics  -Continue with as needed bronchodilator breathing treatments, inhaled steroids, and supplemental oxygen.    -Check respiratory infection panel -- pending    - follow sputum cx          Tobacco use  Patient wants to quit but states need help with meds  Does not want nicotine patch or chantix  Will try wellbutryn started on 150 mg daily in hospital and given prescription on  "discharge  Follow with smoking cessation program      COPD exacerbation  likely copd exacerbation with tobacco use, never been diagnosed before  Will need pft and pulm follow up outpatient  Was given nebulizer and started on spiriva on discharge    Essential hypertension  Continue amlodipine 5 mg daily      Type 2 diabetes mellitus with hyperglycemia, without long-term current use of insulin  Most recent A1c is 6.4. Pt takes metformin at home. Start low dose SSI while inpatient and being on steroids.  Resume metformin on dc.        Final Active Diagnoses:    Diagnosis Date Noted POA    PRINCIPAL PROBLEM:  Acute on chronic respiratory failure with hypoxia and hypercapnia [J96.21, J96.22] 06/20/2022 Yes    COPD exacerbation [J44.1] 06/21/2022 Yes    Tobacco use [Z72.0] 06/21/2022 Yes    Type 2 diabetes mellitus with hyperglycemia, without long-term current use of insulin [E11.65] 09/03/2020 Yes    Essential hypertension [I10] 09/03/2020 Yes      Problems Resolved During this Admission:       Discharged Condition: stable    Disposition: Home or Self Care    Follow Up:   Follow-up Information     Stacy Arguello PA-C Follow up in 1 week(s).    Specialty: Internal Medicine  Contact information:  [id number] [id number]  [phone]                       Patient Instructions:      NEBULIZER FOR HOME USE     Order Specific Question Answer Comments   Height: 4' 9" (1.448 m)    Weight: 68 kg (150 lb)    Does patient have medical equipment at home? none    Length of need (1-99 months): 99      NEBULIZER KIT (SUPPLIES) FOR HOME USE     Order Specific Question Answer Comments   Height: 4' 9" (1.448 m)    Weight: 68 kg (150 lb)    Does patient have medical equipment at home? none    Length of need (1-99 months): 99    Mask or Mouthpiece? Mouthpiece      Ambulatory referral/consult to Smoking Cessation Program   Standing Status: Future   Referral Priority: Routine Referral Type: Consultation   Referral " Reason: Specialty Services Required   Requested Specialty: CTTS   Number of Visits Requested: 1     Diet diabetic     Activity as tolerated       Significant Diagnostic Studies:  Lab Results   Component Value Date    WBC 15.88 (H) 06/21/2022    HGB 12.7 06/21/2022    HCT 41.5 06/21/2022    MCV 89 06/21/2022     06/21/2022       BMP  Lab Results   Component Value Date     06/21/2022    K 5.0 06/21/2022     06/21/2022    CO2 22 (L) 06/21/2022    BUN 16 06/21/2022    CREATININE 0.9 06/21/2022    CALCIUM 10.1 06/21/2022    ANIONGAP 14 06/21/2022    ESTGFRAFRICA >60 06/21/2022    EGFRNONAA >60 06/21/2022     X-Ray Chest AP Portable (SOB)  Narrative: EXAMINATION:  XR CHEST AP PORTABLE    CLINICAL HISTORY:  SOB;    TECHNIQUE:  Single frontal view of the chest was performed.    COMPARISON:  Chest radiograph 04/15/2022.    FINDINGS:  Monitoring leads are noted.  Grossly unchanged position of tunneled right port catheter.  Cardiomediastinal contours grossly unchanged, noting limitations imposed by differences in patient positioning and rotation.    Chronic interstitial coarsening, as before.  No definite pneumothorax or large volume pleural effusion.  No acute findings in the visualized abdomen osseous and soft tissue structures appear without definite acute change.  Impression: No convincing evidence of acute detrimental change relative to prior examination performed 04/15/2022.    Electronically signed by: Pillo Billingsley  Date:    06/20/2022  Time:    14:16        Pending Diagnostic Studies:     None         Medications:  Reconciled Home Medications:      Medication List      START taking these medications    albuterol-ipratropium 2.5 mg-0.5 mg/3 mL nebulizer solution  Commonly known as: DUO-NEB  Take 3 mLs by nebulization every 6 (six) hours while awake. Rescue     buPROPion 150 MG TBSR 12 hr tablet  Commonly known as: WELLBUTRIN SR  Take 1 tablet (150 mg total) by mouth 2 (two) times daily. Take 1  tablet by mouth once a day  for 2 days , after that  take 1 tablet by mouth 2 times a day.  For smoking cessation.     SPIRIVA WITH HANDIHALER 18 mcg inhalation capsule  Generic drug: tiotropium  Inhale 1 capsule (18 mcg total) into the lungs once daily. Controller        CONTINUE taking these medications    albuterol 90 mcg/actuation inhaler  Commonly known as: VENTOLIN HFA  Inhale 2 puffs into the lungs every 4 (four) hours as needed for Wheezing.     amLODIPine 5 MG tablet  Commonly known as: NORVASC  TAKE 1 TABLET EVERY DAY     aspirin 81 MG EC tablet  Commonly known as: ECOTRIN  Take 81 mg by mouth once daily.     atorvastatin 40 MG tablet  Commonly known as: LIPITOR  TAKE 1 TABLET EVERY DAY     benzonatate 100 MG capsule  Commonly known as: TESSALON  Take 1 capsule (100 mg total) by mouth 3 (three) times daily as needed for Cough.     betamethasone valerate 0.1% 0.1 % Oint  Commonly known as: VALISONE  Apply topically once daily.     blood sugar diagnostic Strp  1 strip by Misc.(Non-Drug; Combo Route) route 2 (two) times daily.     blood-glucose meter kit  Please provide with insurance covered meter     cholecalciferol (vitamin D3) 50 mcg (2,000 unit) Cap capsule  Commonly known as: VITAMIN D3  Take by mouth.     DULoxetine 30 MG capsule  Commonly known as: CYMBALTA  TAKE 1 CAPSULE EVERY DAY     fluticasone propionate 50 mcg/actuation nasal spray  Commonly known as: FLONASE  USE 1 SPRAY IN EACH NOSTRIL ONE TIME DAILY     hydrOXYzine HCL 25 MG tablet  Commonly known as: ATARAX  Take 1 tablet (25 mg total) by mouth 3 (three) times daily as needed for Itching or Anxiety.     lancets Misc  1 Device by Misc.(Non-Drug; Combo Route) route 2 (two) times daily.     metFORMIN 500 MG ER 24hr tablet  Commonly known as: GLUCOPHAGE-XR  TAKE 2 TABLETS EVERY DAY WITH BREAKFAST     multivitamin capsule  Take 1 capsule by mouth once daily.     pantoprazole 40 MG tablet  Commonly known as: PROTONIX  Take 1 tablet (40 mg total)  by mouth once daily.     raloxifene 60 mg tablet  Commonly known as: EVISTA  TAKE 1 TABLET EVERY DAY     sodium chloride 0.9 % Spra  1 spray by Each Nostril route every evening.     triamcinolone acetonide 0.025% 0.025 % cream  Commonly known as: KENALOG  APPLY DAILY TO AFFECTED AREA AS NEEDED FOR ITCHING. MAXIMUM 2 TIMES A WEEK        STOP taking these medications    LIDOcaine 5 %  Commonly known as: LIDODERM     LIDOcaine VISCOUS 2 % Soln  Generic drug: LIDOcaine HCl 2%     meloxicam 15 MG tablet  Commonly known as: MOBIC     STIOLTO RESPIMAT 2.5-2.5 mcg/actuation Mist  Generic drug: tiotropium-olodateroL        ASK your doctor about these medications    predniSONE 20 MG tablet  Commonly known as: DELTASONE  Take 2 tablets (40 mg total) by mouth once daily. for 3 days  Ask about: Should I take this medication?            Indwelling Lines/Drains at time of discharge:   Lines/Drains/Airways     Central Venous Catheter Line  Duration           Port A Cath Single Lumen -- days                Time spent on the discharge of patient: 40 minutes         Abigail Mendieta MD  Department of Hospital Medicine  [facility] ([place])

## 2022-07-08 ENCOUNTER — TELEPHONE (OUTPATIENT)
Dept: INTERNAL MEDICINE | Facility: CLINIC | Age: 77
End: 2022-07-08
Payer: MEDICARE

## 2022-07-08 NOTE — TELEPHONE ENCOUNTER
----- Message from Raymond Cook sent at 7/8/2022  8:35 AM CDT -----  Regarding: Pt Advice/Results  Contact: ESTEBAN JIMENEZ [[id number]]    Name of Who is Calling: ESTEBAN JIMENEZ [[id number]]      What is the request in detail: Would like to speak with staff in regards to PFT results. How long does she need to take Spiriva and to be on breathing machine 3xday. Please advise      Can the clinic reply by [facility]: no      What Number to Call Back [facility]: [phone]

## 2022-07-08 NOTE — TELEPHONE ENCOUNTER
----- Message from Swapnil Campbell sent at 7/8/2022  4:21 PM CDT -----  Name of Who is Calling: ESTEBAN JIMENEZ [[id number]]           What is the request in detail: Patient is requesting a call back to discuss PFT results. Please assist.           Can the clinic reply by [facility]: No           [facility]: [phone]

## 2022-07-08 NOTE — TELEPHONE ENCOUNTER
Patient informed of message below and verbalize understanding. No further questions and or concerns at this time. Thanks.

## 2022-07-13 ENCOUNTER — CLINICAL SUPPORT (OUTPATIENT)
Dept: SMOKING CESSATION | Facility: CLINIC | Age: 77
End: 2022-07-13
Payer: COMMERCIAL

## 2022-07-13 ENCOUNTER — OFFICE VISIT (OUTPATIENT)
Dept: OPTOMETRY | Facility: CLINIC | Age: 77
End: 2022-07-13
Payer: COMMERCIAL

## 2022-07-13 DIAGNOSIS — F17.200 NICOTINE DEPENDENCE: Primary | ICD-10-CM

## 2022-07-13 DIAGNOSIS — Z96.1 PSEUDOPHAKIA OF BOTH EYES: ICD-10-CM

## 2022-07-13 DIAGNOSIS — H52.4 MYOPIA WITH ASTIGMATISM AND PRESBYOPIA, BILATERAL: ICD-10-CM

## 2022-07-13 DIAGNOSIS — H04.123 DRY EYE SYNDROME OF BOTH EYES: ICD-10-CM

## 2022-07-13 DIAGNOSIS — H01.02A SQUAMOUS BLEPHARITIS OF UPPER AND LOWER EYELIDS OF BOTH EYES: ICD-10-CM

## 2022-07-13 DIAGNOSIS — H52.203 MYOPIA WITH ASTIGMATISM AND PRESBYOPIA, BILATERAL: ICD-10-CM

## 2022-07-13 DIAGNOSIS — E11.9 TYPE 2 DIABETES MELLITUS WITHOUT OPHTHALMIC MANIFESTATIONS: ICD-10-CM

## 2022-07-13 DIAGNOSIS — H01.02B SQUAMOUS BLEPHARITIS OF UPPER AND LOWER EYELIDS OF BOTH EYES: ICD-10-CM

## 2022-07-13 DIAGNOSIS — H52.13 MYOPIA WITH ASTIGMATISM AND PRESBYOPIA, BILATERAL: ICD-10-CM

## 2022-07-13 DIAGNOSIS — Z01.00 ROUTINE EYE EXAM: Primary | ICD-10-CM

## 2022-07-13 PROCEDURE — 92014 COMPRE OPH EXAM EST PT 1/>: CPT | Mod: S$GLB,,, | Performed by: OPTOMETRIST

## 2022-07-13 PROCEDURE — 99499 RISK ADDL DX/OHS AUDIT: ICD-10-PCS | Mod: S$GLB,,, | Performed by: OPTOMETRIST

## 2022-07-13 PROCEDURE — 99404 PREV MED CNSL INDIV APPRX 60: CPT | Mod: S$GLB,,,

## 2022-07-13 PROCEDURE — 99999 PR PBB SHADOW E&M-EST. PATIENT-LVL I: CPT | Mod: PBBFAC,,,

## 2022-07-13 PROCEDURE — 92015 DETERMINE REFRACTIVE STATE: CPT | Mod: S$GLB,,, | Performed by: OPTOMETRIST

## 2022-07-13 PROCEDURE — 99499 UNLISTED E&M SERVICE: CPT | Mod: S$GLB,,, | Performed by: OPTOMETRIST

## 2022-07-13 PROCEDURE — 92015 PR REFRACTION: ICD-10-PCS | Mod: S$GLB,,, | Performed by: OPTOMETRIST

## 2022-07-13 PROCEDURE — 99404 PR PREVENT COUNSEL,INDIV,60 MIN: ICD-10-PCS | Mod: S$GLB,,,

## 2022-07-13 PROCEDURE — 99999 PR PBB SHADOW E&M-EST. PATIENT-LVL III: ICD-10-PCS | Mod: PBBFAC,,, | Performed by: OPTOMETRIST

## 2022-07-13 PROCEDURE — 99999 PR PBB SHADOW E&M-EST. PATIENT-LVL III: CPT | Mod: PBBFAC,,, | Performed by: OPTOMETRIST

## 2022-07-13 PROCEDURE — 99999 PR PBB SHADOW E&M-EST. PATIENT-LVL I: ICD-10-PCS | Mod: PBBFAC,,,

## 2022-07-13 PROCEDURE — 92014 PR EYE EXAM, EST PATIENT,COMPREHESV: ICD-10-PCS | Mod: S$GLB,,, | Performed by: OPTOMETRIST

## 2022-07-13 NOTE — PROGRESS NOTES
HPI     Last eye exam was 5/26/22 with Dr. Li.  Patient states ointment helped 100% after last exam. Wanted to get an   updated glasses rx today. Forgot glasses at home.   Patient denies diplopia, headaches, flashes/floaters, and pain.    Hemoglobin A1C       Date                     Value               Ref Range             Status                05/19/2022               6.4 (H)             4.0 - 5.6 %           Final                  Last edited by Mili Bridges MA on 7/13/2022  1:55 PM. (History)            Assessment /Plan     For exam results, see Encounter Report.    Routine eye exam    Type 2 diabetes mellitus without ophthalmic manifestations    Pseudophakia of both eyes    Squamous blepharitis of upper and lower eyelids of both eyes    Dry eye syndrome of both eyes    Myopia with astigmatism and presbyopia, bilateral            1-2.  No retinopathy--monitor yearly.  BS control.  Eye health normal OU.  3.  Patient feeling much better--resolved.  Monitor.   4-6.  Bifocal rx given.  Artificial tears 2x/day OU.  Retina flat and intact OU--no holes, tears, breaks, or RDs.

## 2022-07-13 NOTE — Clinical Note
Patient will be participating in biweekly tobacco cessation sessions and has started the precsribe tobacco cessation medication of Chantix from her PCP.

## 2022-08-16 ENCOUNTER — TELEPHONE (OUTPATIENT)
Dept: INTERNAL MEDICINE | Facility: CLINIC | Age: 77
End: 2022-08-16
Payer: MEDICARE

## 2022-08-16 ENCOUNTER — CLINICAL SUPPORT (OUTPATIENT)
Dept: SMOKING CESSATION | Facility: CLINIC | Age: 77
End: 2022-08-16
Payer: COMMERCIAL

## 2022-08-16 DIAGNOSIS — F17.200 NICOTINE DEPENDENCE: Primary | ICD-10-CM

## 2022-08-16 PROCEDURE — 99999 PR PBB SHADOW E&M-EST. PATIENT-LVL I: CPT | Mod: PBBFAC,,,

## 2022-08-16 PROCEDURE — 99402 PREV MED CNSL INDIV APPRX 30: CPT | Mod: S$GLB,,,

## 2022-08-16 PROCEDURE — 99999 PR PBB SHADOW E&M-EST. PATIENT-LVL I: ICD-10-PCS | Mod: PBBFAC,,,

## 2022-08-16 PROCEDURE — 99402 PR PREVENT COUNSEL,INDIV,30 MIN: ICD-10-PCS | Mod: S$GLB,,,

## 2022-08-16 RX ORDER — VARENICLINE TARTRATE 1 MG/1
TABLET, FILM COATED ORAL
Qty: 56 TABLET | Refills: 0 | Status: SHIPPED | OUTPATIENT
Start: 2022-08-16 | End: 2022-09-26 | Stop reason: SDUPTHER

## 2022-08-16 NOTE — TELEPHONE ENCOUNTER
08/16 1005 Attempted to call Ms. Kevin, but she is not available.    What is the request in detail: Pt called said she's coughing up beige mucus and would like to speak with the Dr.Please contact to further discuss and advise.

## 2022-08-16 NOTE — PROGRESS NOTES
Individual Follow-Up Form    8/16/2022    Quit Date:     Clinical Status of Patient: Outpatient      Continuing Medication: yes  Chantix    Other Medications:      Target Symptoms: Withdrawal and medication side effects. The following were  rated moderate (3) to severe (4) on TCRS:  · Moderate (3): none  · Severe (4): none    Comments: Patient seen in clinic today, she states one pack of cigarettes has last her 2 weeks. Patient states having only 2 cigarettes today thus far. Commended patient on the accomplishment thus far. The patient remains on the prescribed tobacco cessation medication regimen of 1 mg Chantix BID without any negative side effects at this time. Refill sent to pharmacy. Reviewed strategies, cues, and triggers. Introduced the negative impact of tobacco on health, the health advantages of discontinuing the use of tobacco, time line improved health changes after a quit, withdrawal issues to expect from nicotine and habit, and ways to achieve the goal of a quit. The patient will continue with  therapy sessions and medication monitoring by CTTS. Prescribed medication management will be by physician.      Diagnosis: F17.200    Next Visit: 2 weeks

## 2022-08-16 NOTE — TELEPHONE ENCOUNTER
----- Message from Elizabeth Kaba sent at 8/16/2022  9:36 AM CDT -----      Name of Who is Calling: ESTEBAN JIMENEZ [[id number]]      What is the request in detail: Pt called said she's coughing up beige mucus and would like to speak with the Dr.Please contact to further discuss and advise.          Can the clinic reply by [facility]: N      What Number [facility] Back [facility]: [phone]

## 2022-08-16 NOTE — TELEPHONE ENCOUNTER
Returned pt's call.  Pt states she does not have a cough, but has a cold and is coughing up beige mucous. Pt states she doesn't feel well and has a cold. She is unsure if she has fever, but knows she's been feeling hot and cold. Also mentioned that she has a sore throat.    Pt wanted audio appt only at first, but then changed mind to in person appointment. Urged pt to please test for Covid as per her symptoms.    Pt changed mind about both appointments and decided that she will go to Urgent Care today and wanted both audio and in-person apppt for tomorrow cancelled.

## 2022-08-16 NOTE — Clinical Note
Patient seen in clinic today, she states one pack of cigarettes has last her 2 weeks. Patient states having only 2 cigarettes today thus far. Commended patient on the accomplishment thus far. The patient remains on the prescribed tobacco cessation medication regimen of 1 mg Chantix BID without any negative side effects at this time. Refill sent to pharmacy. Reviewed strategies, cues, and triggers. Introduced the negative impact of tobacco on health, the health advantages of discontinuing the use of tobacco, time line improved health changes after a quit, withdrawal issues to expect from nicotine and habit, and ways to achieve the goal of a quit. The patient will continue with  therapy sessions and medication monitoring by CTTS. Prescribed medication management will be by physician.

## 2022-08-23 DIAGNOSIS — J44.1 COPD EXACERBATION: Primary | ICD-10-CM

## 2022-08-23 NOTE — TELEPHONE ENCOUNTER
Care Due:                  Date            Visit Type   Department     Provider  --------------------------------------------------------------------------------                                HOSPITAL     [facility] INTERNAL  Last Visit: 06-      FOLLOW UP    MEDICINE       Pedro Luis Butterfield                              EP -                              PRIMARY      [facility] INTERNAL  Next Visit: 09-      CARE ([facility])   MEDICINE       Belinda Fajardo                                                            Last  Test          Frequency    Reason                     Performed    Due Date  --------------------------------------------------------------------------------    HBA1C.......  6 months...  metFORMIN................  05-   11-    [employer] Embedded Care Gaps. Reference number: [id number]. 8/23/2022   4:30:16 PM CDT

## 2022-08-24 ENCOUNTER — PATIENT MESSAGE (OUTPATIENT)
Dept: ADMINISTRATIVE | Facility: HOSPITAL | Age: 77
End: 2022-08-24
Payer: MEDICARE

## 2022-08-24 RX ORDER — IPRATROPIUM BROMIDE AND ALBUTEROL SULFATE 2.5; .5 MG/3ML; MG/3ML
3 SOLUTION RESPIRATORY (INHALATION) EVERY 6 HOURS PRN
Qty: 15 ML | Refills: 5 | Status: SHIPPED | OUTPATIENT
Start: 2022-08-24

## 2022-09-14 ENCOUNTER — OFFICE VISIT (OUTPATIENT)
Dept: INTERNAL MEDICINE | Facility: CLINIC | Age: 77
End: 2022-09-14
Payer: MEDICARE

## 2022-09-14 ENCOUNTER — CLINICAL SUPPORT (OUTPATIENT)
Dept: SMOKING CESSATION | Facility: CLINIC | Age: 77
End: 2022-09-14
Payer: COMMERCIAL

## 2022-09-14 ENCOUNTER — HOSPITAL ENCOUNTER (OUTPATIENT)
Dept: RADIOLOGY | Facility: OTHER | Age: 77
Discharge: HOME | End: 2022-09-14
Attending: INTERNAL MEDICINE
Payer: MEDICARE

## 2022-09-14 DIAGNOSIS — E04.1 THYROID NODULE: ICD-10-CM

## 2022-09-14 DIAGNOSIS — J44.1 CHRONIC OBSTRUCTIVE PULMONARY DISEASE WITH ACUTE EXACERBATION: Primary | ICD-10-CM

## 2022-09-14 DIAGNOSIS — J44.9 CHRONIC OBSTRUCTIVE PULMONARY DISEASE, UNSPECIFIED COPD TYPE: ICD-10-CM

## 2022-09-14 DIAGNOSIS — K21.9 GASTROESOPHAGEAL REFLUX DISEASE, UNSPECIFIED WHETHER ESOPHAGITIS PRESENT: ICD-10-CM

## 2022-09-14 DIAGNOSIS — M25.50 ARTHRALGIA, UNSPECIFIED JOINT: ICD-10-CM

## 2022-09-14 DIAGNOSIS — E78.2 MIXED HYPERLIPIDEMIA: ICD-10-CM

## 2022-09-14 DIAGNOSIS — I10 ESSENTIAL HYPERTENSION: ICD-10-CM

## 2022-09-14 DIAGNOSIS — Z00.00 ANNUAL PHYSICAL EXAM: ICD-10-CM

## 2022-09-14 DIAGNOSIS — F17.200 NICOTINE DEPENDENCE: Primary | ICD-10-CM

## 2022-09-14 DIAGNOSIS — E11.65 TYPE 2 DIABETES MELLITUS WITH HYPERGLYCEMIA, WITHOUT LONG-TERM CURRENT USE OF INSULIN: ICD-10-CM

## 2022-09-14 PROBLEM — F17.210 LIGHT CIGARETTE SMOKER (1-9 CIGS/DAY): Status: ACTIVE | Noted: 2022-06-21

## 2022-09-14 LAB
CTP QC/QA: YES
FLUAV AG NPH QL: NEGATIVE
FLUBV AG NPH QL: NEGATIVE

## 2022-09-14 PROCEDURE — 4010F ACE/ARB THERAPY RXD/TAKEN: CPT | Mod: CPTII,S$GLB,, | Performed by: INTERNAL MEDICINE

## 2022-09-14 PROCEDURE — 1101F PT FALLS ASSESS-DOCD LE1/YR: CPT | Mod: CPTII,S$GLB,, | Performed by: INTERNAL MEDICINE

## 2022-09-14 PROCEDURE — 1160F PR REVIEW ALL MEDS BY PRESCRIBER/CLIN PHARMACIST DOCUMENTED: ICD-10-PCS | Mod: CPTII,S$GLB,, | Performed by: INTERNAL MEDICINE

## 2022-09-14 PROCEDURE — 99499 UNLISTED E&M SERVICE: CPT | Mod: S$GLB,,, | Performed by: INTERNAL MEDICINE

## 2022-09-14 PROCEDURE — U0005 INFEC AGEN DETEC AMPLI PROBE: HCPCS | Performed by: INTERNAL MEDICINE

## 2022-09-14 PROCEDURE — 99999 PR PBB SHADOW E&M-EST. PATIENT-LVL I: ICD-10-PCS | Mod: PBBFAC,,,

## 2022-09-14 PROCEDURE — 3072F LOW RISK FOR RETINOPATHY: CPT | Mod: CPTII,S$GLB,, | Performed by: INTERNAL MEDICINE

## 2022-09-14 PROCEDURE — 99215 OFFICE O/P EST HI 40 MIN: CPT | Mod: S$GLB,,, | Performed by: INTERNAL MEDICINE

## 2022-09-14 PROCEDURE — 99499 RISK ADDL DX/OHS AUDIT: ICD-10-PCS | Mod: S$GLB,,, | Performed by: INTERNAL MEDICINE

## 2022-09-14 PROCEDURE — 99999 PR PBB SHADOW E&M-EST. PATIENT-LVL V: CPT | Mod: PBBFAC,,, | Performed by: INTERNAL MEDICINE

## 2022-09-14 PROCEDURE — 1159F PR MEDICATION LIST DOCUMENTED IN MEDICAL RECORD: ICD-10-PCS | Mod: CPTII,S$GLB,, | Performed by: INTERNAL MEDICINE

## 2022-09-14 PROCEDURE — 71046 XR CHEST PA AND LATERAL: ICD-10-PCS | Mod: 26,,, | Performed by: RADIOLOGY

## 2022-09-14 PROCEDURE — 71046 X-RAY EXAM CHEST 2 VIEWS: CPT | Mod: 26,,, | Performed by: RADIOLOGY

## 2022-09-14 PROCEDURE — 99999 PR PBB SHADOW E&M-EST. PATIENT-LVL I: CPT | Mod: PBBFAC,,,

## 2022-09-14 PROCEDURE — 99402 PR PREVENT COUNSEL,INDIV,30 MIN: ICD-10-PCS | Mod: S$GLB,,,

## 2022-09-14 PROCEDURE — 87804 INFLUENZA ASSAY W/OPTIC: CPT | Mod: QW,S$GLB,, | Performed by: INTERNAL MEDICINE

## 2022-09-14 PROCEDURE — 71046 X-RAY EXAM CHEST 2 VIEWS: CPT | Mod: TC,FY

## 2022-09-14 PROCEDURE — 99999 PR PBB SHADOW E&M-EST. PATIENT-LVL V: ICD-10-PCS | Mod: PBBFAC,,, | Performed by: INTERNAL MEDICINE

## 2022-09-14 PROCEDURE — 87804 POCT INFLUENZA A/B: ICD-10-PCS | Mod: 59,QW,S$GLB, | Performed by: INTERNAL MEDICINE

## 2022-09-14 PROCEDURE — 3288F PR FALLS RISK ASSESSMENT DOCUMENTED: ICD-10-PCS | Mod: CPTII,S$GLB,, | Performed by: INTERNAL MEDICINE

## 2022-09-14 PROCEDURE — 99215 PR OFFICE/OUTPT VISIT, EST, LEVL V, 40-54 MIN: ICD-10-PCS | Mod: S$GLB,,, | Performed by: INTERNAL MEDICINE

## 2022-09-14 PROCEDURE — 3044F PR MOST RECENT HEMOGLOBIN A1C LEVEL <7.0%: ICD-10-PCS | Mod: CPTII,S$GLB,, | Performed by: INTERNAL MEDICINE

## 2022-09-14 PROCEDURE — 4010F PR ACE/ARB THEARPY RXD/TAKEN: ICD-10-PCS | Mod: CPTII,S$GLB,, | Performed by: INTERNAL MEDICINE

## 2022-09-14 PROCEDURE — 1159F MED LIST DOCD IN RCRD: CPT | Mod: CPTII,S$GLB,, | Performed by: INTERNAL MEDICINE

## 2022-09-14 PROCEDURE — 3072F PR LOW RISK FOR RETINOPATHY: ICD-10-PCS | Mod: CPTII,S$GLB,, | Performed by: INTERNAL MEDICINE

## 2022-09-14 PROCEDURE — 3044F HG A1C LEVEL LT 7.0%: CPT | Mod: CPTII,S$GLB,, | Performed by: INTERNAL MEDICINE

## 2022-09-14 PROCEDURE — 1101F PR PT FALLS ASSESS DOC 0-1 FALLS W/OUT INJ PAST YR: ICD-10-PCS | Mod: CPTII,S$GLB,, | Performed by: INTERNAL MEDICINE

## 2022-09-14 PROCEDURE — 1160F RVW MEDS BY RX/DR IN RCRD: CPT | Mod: CPTII,S$GLB,, | Performed by: INTERNAL MEDICINE

## 2022-09-14 PROCEDURE — U0003 INFECTIOUS AGENT DETECTION BY NUCLEIC ACID (DNA OR RNA); SEVERE ACUTE RESPIRATORY SYNDROME CORONAVIRUS 2 (SARS-COV-2) (CORONAVIRUS DISEASE [COVID-19]), AMPLIFIED PROBE TECHNIQUE, MAKING USE OF HIGH THROUGHPUT TECHNOLOGIES AS DESCRIBED BY CMS-2020-01-R: HCPCS | Performed by: INTERNAL MEDICINE

## 2022-09-14 PROCEDURE — 99402 PREV MED CNSL INDIV APPRX 30: CPT | Mod: S$GLB,,,

## 2022-09-14 PROCEDURE — 3288F FALL RISK ASSESSMENT DOCD: CPT | Mod: CPTII,S$GLB,, | Performed by: INTERNAL MEDICINE

## 2022-09-14 RX ORDER — AZELASTINE 1 MG/ML
1 SPRAY, METERED NASAL 2 TIMES DAILY
Qty: 30 ML | Refills: 11 | Status: SHIPPED | OUTPATIENT
Start: 2022-09-14 | End: 2022-12-20 | Stop reason: SDUPTHER

## 2022-09-14 RX ORDER — FLUTICASONE FUROATE AND VILANTEROL 100; 25 UG/1; UG/1
1 POWDER RESPIRATORY (INHALATION) DAILY
Qty: 30 EACH | Refills: 11 | Status: SHIPPED | OUTPATIENT
Start: 2022-09-14 | End: 2023-06-07

## 2022-09-14 RX ORDER — CODEINE PHOSPHATE AND GUAIFENESIN 10; 100 MG/5ML; MG/5ML
5-10 SOLUTION ORAL EVERY 4 HOURS PRN
Qty: 118 ML | Refills: 0 | Status: SHIPPED | OUTPATIENT
Start: 2022-09-14 | End: 2022-09-24

## 2022-09-14 RX ORDER — PREDNISONE 20 MG/1
40 TABLET ORAL DAILY
Qty: 10 TABLET | Refills: 0 | Status: SHIPPED | OUTPATIENT
Start: 2022-09-14 | End: 2022-09-19

## 2022-09-14 RX ORDER — TIOTROPIUM BROMIDE 18 UG/1
18 CAPSULE ORAL; RESPIRATORY (INHALATION) DAILY
Qty: 90 CAPSULE | Refills: 3 | Status: SHIPPED | OUTPATIENT
Start: 2022-09-14 | End: 2023-06-07

## 2022-09-14 NOTE — PROGRESS NOTES
Individual Follow-Up Form    9/14/2022    Quit Date:     Clinical Status of Patient: Outpatient    Continuing Medication: yes  Chantix    Other Medications:      Target Symptoms: Withdrawal and medication side effects. The following were  rated moderate (3) to severe (4) on TCRS:  Moderate (3): none  Severe (4): none    Comments: Patient seen in clinic today, she states 1 pack of cigarettes last her for 10 days. Commended patient on the accomplishment thus far. The patient remains on the prescribed tobacco cessation medication regimen of 1 mg Chantix BID without any negative side effects at this time. No refill needed at this time. The patient denies any abnormal behavioral or mental changes at this time. Patient states she is confident in her quit journey at this time but has not set a quit date. Encouraged patient to continue to use the strategies discussed and have a quit date in mind. The patient will continue with  therapy sessions and medication monitoring by CTTS. Prescribed medication management will be by physician.            Diagnosis: F17.200    Next Visit: 2 weeks

## 2022-09-14 NOTE — PROGRESS NOTES
Subjective:       Patient ID: Marizol Kevin is a 76 y.o. female who  has a past medical history of Allergy, Anxiety, Cancer, Cataract, Colon cancer, Diabetes mellitus, type 2, Dry eye syndrome, Hyperlipidemia, Hypertension, Insomnia, and Squamous blepharitis.    Chief Complaint: Fatigue and Cough     History was obtained from the patient and supplemented through chart review  -had eye exam for DM.    Has difficulty with transportation.  Her neighbor drives her to appointments.  Also gets rides from [employer] (has to call 3 days in advance). Likes plants.     HPI      COPD:  H/o asthma, but tobacco use as below.  Was admitted for COPD exacerbation .  Was rx Breo, but no longer has this. Taking Spiriva. Has been using albuterol qOD to TID since 6/2022 (unclear of acute vs chronic sx).    Had a cold recently.  New productive cough with green sputum.   + difficulty expectorating sputum, difficulty sleeping at night. Some chest tightness 2 days ago.  No fever. Reports chronic myalgias qAM.  Generalized weakness. No falls.  No rhinorrhea, postnasal drip.  No relief with tessalon.    GERD as below. Currently no GERD sx.  PFT 7/2022 was normal.  DLCO was also normal.     GERD:  On Protonix daily ever since her hemicolectomy in 2014.  Has recurrent symptoms off of Protonix. Takes PPI daily for several years. +tobacco. Currently no GERD sx, although she notes abd discomfort improved with belching.  Unclear h/o of EGD.    HTN:    Pt's BP is controlled on Norvasc 5. +DM w/o microalbuminuria.  Tolerating meds well.   Home BP:      DM2:    Currently pt is taking metformin XR 1000 daily.  Was treated for COPD exacerbation with prednisone .     Diet: Stopped late night cakes, ice cream.     Exercise: walks around the block in the backyard. 100 steps in the [place] on rainy days.      Normal microalbumin creatinine ratio.  Not on an ACE/ARB.   Retinal exams: 7/2022  Foot exams:  3/2021 by me  DM edu 6/2021  Lab Results    Component Value Date/Time    HGBA1C 6.4 (H) 05/19/2022 10:17 AM    HGBA1C 7.2 (H) 10/01/2021 02:55 PM    HGBA1C 7.2 (H) 06/01/2021 02:40 PM     Lab Results   Component Value Date    ISRSEKOJ08 367 11/20/2020     Thyroid Nodule:    Incidental finding on CT for cancer surveillence with small R thyroid nodule. Thyroid ultrasound 9/2020 with solid nodule on the right side.  Saw Endo. Chronic dysphagia less likely related to thyroid.  FNA 10/2020 was benign.  Was planning on F/u 1 year with U/S.  Lab Results   Component Value Date    TSH 0.997 06/01/2021     HLD:  Is currently taking Lipitor 40 and ASA 81 daily.  Lab Results   Component Value Date    LDLCALC 81.2 06/01/2021     The 10-year ASCVD risk score (Kirk DUDLEY, et al., 2019) is: 34%    Values used to calculate the score:      Age: 76 years      Sex: Female      Is Non- : Yes      Diabetic: Yes      Tobacco smoker: Yes      Systolic Blood Pressure: 120 mmHg      Is BP treated: Yes      HDL Cholesterol: 36 mg/dL      Total Cholesterol: 138 mg/dL                  Not addressed today.  Asthma:    The patient reports a history of asthma since she was a teenager.    On singular, Flonase.  Takes Claritin PRN.   Controlled. Cont Singulair and Flonase daily, albuterol PRN.  Try to quit smoking. GI eval for GERD d/t chronic cough.    Tobacco use:    She smokes 5-10 cigs/day.  Started smoking since age 19. Has quit x 1 year before.    Patches caused hallucinations. Didn't like the taste of gums.    Started Wellbutrin to assist with tobacco cessation, but was unable to tolerate d/t SE, altered taste, HA, dizziness.   Started Chantix.  Following with tobacco cessation Clinic.  Counseled for 5 min on tobacco cessation. Cont Chantix. F/u c tobacco cessation clinic.     ?Osteoporosis, Vitamin D deficiency:   Unclear history.  DEXA  with osteopenia, intermediate/borderline high FRAX score.  Is on raloxifene.  +Tobacco.    Exercise: as above          "     DEXA with intermediate FRAX score. Vitamin-D wnl. Cont OTC Citracal, Oscal D. Cont raloxifene.  Encouraged exercise and tobacco cessation.  Lab Results   Component Value Date    KNXMENDT24EX 56 06/01/2021    [id number] 40 11/20/2020     Polyneuropathy:    Fingertips, toes. H/o chemo.  On Cymbalta with some relief.  Gabapentin 300 did not help in the past.              Stable. 2/2 chemo.  Continue Cymbalta. B12 wnl. Could consider Lyrica, titrating gabapentin  Lab Results   Component Value Date    [id number] 367 11/20/2020     Colon cancer:  Dx in 2014. S/p R hemicolectomy.  S/p adjuvant chemotherapy.  Follows with [facility]; monitoring imaging.  PET scan 1-2021 suspicious for recurrence.  C scope 2-2021 with polyp.  Pathology with colitis.  Negative for dysplasia.     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  Follows with [facility], Dr. Cabrera.      Anxiety, Insomnia:    Takes about 3 tablets of Xanax a month.   reviewed.  She fills this every few months.  Has been prescribed by Dr. Paul, PCP, and Dr. Hubbard, Heme Onc in the past.    Refilled Vistaril p.r.n..    Review of Systems   Constitutional:  Positive for fatigue. Negative for fever.   HENT:  Negative for postnasal drip and rhinorrhea.    Respiratory:  Positive for cough and chest tightness.    Cardiovascular:  Negative for leg swelling.   Gastrointestinal:  Negative for abdominal pain.   Musculoskeletal:  Positive for myalgias. Negative for gait problem.   Skin:  Negative for rash and wound.   Hematological:  Negative for adenopathy.   Psychiatric/Behavioral:  Positive for sleep disturbance. Negative for confusion. The patient is not nervous/anxious.        I personally reviewed Past Medical History, Past Surgical History, Social History, and Family History.    Objective:      Vitals:    09/14/22 0852   BP: (P) 120/70   Pulse: (P) 95   SpO2: (P) 97%   Weight: (P) 68.4 kg (150 lb 12.7 oz)   Height: (P) 4' 9" (1.448 " m)        Physical Exam  Constitutional:       General: She is not in acute distress.     Appearance: She is well-developed. She is not diaphoretic.   HENT:      Head: Normocephalic and atraumatic.      Nose: Mucosal edema present.      Mouth/Throat:      Pharynx: Uvula midline. Posterior oropharyngeal erythema present. No oropharyngeal exudate.   Eyes:      General: No scleral icterus.        Right eye: No discharge.         Left eye: No discharge.      Conjunctiva/sclera:      Right eye: Right conjunctiva is not injected.      Left eye: Left conjunctiva is not injected.   Neck:      Thyroid: No thyromegaly.      Trachea: No tracheal deviation.   Cardiovascular:      Rate and Rhythm: Normal rate and regular rhythm.      Heart sounds: Normal heart sounds. No murmur heard.  Pulmonary:      Effort: Pulmonary effort is normal. No respiratory distress.      Breath sounds: Normal breath sounds. No stridor. No wheezing or rhonchi.      Comments: Productive cough  Abdominal:      General: Bowel sounds are normal. There is no distension.      Palpations: Abdomen is soft.      Tenderness: There is no abdominal tenderness.   Musculoskeletal:         General: No deformity.      Cervical back: Neck supple.      Right lower leg: No edema.      Left lower leg: No edema.   Lymphadenopathy:      Cervical: No cervical adenopathy.   Skin:     General: Skin is warm and dry.      Findings: No erythema.   Neurological:      Mental Status: She is alert.      Gait: Gait normal.   Psychiatric:         Behavior: Behavior normal.         Lab Results   Component Value Date    WBC 15.88 (H) 06/21/2022    HGB 12.7 06/21/2022    HCT 41.5 06/21/2022     06/21/2022    CHOL 138 06/01/2021    TRIG 104 06/01/2021    HDL 36 (L) 06/01/2021    ALT 19 06/20/2022    AST 23 06/20/2022     06/21/2022    K 5.0 06/21/2022     06/21/2022    CREATININE 0.9 06/21/2022    BUN 16 06/21/2022    CO2 22 (L) 06/21/2022    TSH 0.997 06/01/2021     HGBA1C 6.4 (H) 05/19/2022       The 10-year ASCVD risk score (Kirk DUDLEY, et al., 2019) is: 34%    Values used to calculate the score:      Age: 76 years      Sex: Female      Is Non- : Yes      Diabetic: Yes      Tobacco smoker: Yes      Systolic Blood Pressure: 120 mmHg      Is BP treated: Yes      HDL Cholesterol: 36 mg/dL      Total Cholesterol: 138 mg/dL    (Imaging have been independently reviewed)  CXR without acute abnormality.    Assessment:       1. Chronic obstructive pulmonary disease with acute exacerbation    2. Gastroesophageal reflux disease, unspecified whether esophagitis present    3. Arthralgia, unspecified joint    4. Essential hypertension    5. Type 2 diabetes mellitus with hyperglycemia, without long-term current use of insulin    6. Thyroid nodule    7. Mixed hyperlipidemia    8. Annual physical exam            Plan:       Marizol was seen today for fatigue and cough.    Diagnoses and all orders for this visit:    Chronic obstructive pulmonary disease with acute exacerbation  Comments:  Prednisone, restart Breo. Flu neg. COVID swab. CXR, PCT to r/o PNA. Hydration, nasal spray, cough syrup; counseled on potential sedation. Refer to Pulm.  Orders:  -     tiotropium (SPIRIVA) 18 mcg inhalation capsule; Inhale 1 capsule (18 mcg total) into the lungs once daily. Controller  -     Ambulatory referral/consult to Pulmonology; Future  -     fluticasone furoate-vilanteroL (BREO ELLIPTA) 100-25 mcg/dose diskus inhaler; Inhale 1 puff into the lungs once daily. Controller  -     guaiFENesin-codeine 100-10 mg/5 ml (TUSSI-ORGANIDIN NR)  mg/5 mL syrup; Take 5-10 mLs by mouth every 4 (four) hours as needed for Cough. Max 60mL/24 hours  -     X-Ray Chest PA And Lateral; Future  -     Procalcitonin; Future  -     COVID-19 Routine Screening  -     POCT Influenza A/B  -     predniSONE (DELTASONE) 20 MG tablet; Take 2 tablets (40 mg total) by mouth once daily. for 5 days  -      azelastine (ASTELIN) 137 mcg (0.1 %) nasal spray; 1 spray (137 mcg total) by Nasal route 2 (two) times daily.    Gastroesophageal reflux disease, unspecified whether esophagitis present  Comments:  Controlled. Cont PPI. Previously referred to [facility] to consider EGD.    Arthralgia, unspecified joint  Comments:  Chronic. May take Glucosamine Chrondroitin over the counter for joint pain. Flu neg. COVID swab pending.    Essential hypertension  Comments:  Controlled.  Continue Norvasc 5. H/o DM but no microalbuminuria    Type 2 diabetes mellitus with hyperglycemia, without long-term current use of insulin  Comments:  A1C controlled; repeat. Cont metformin 1000 XR daily. Consider adding Trulicity to help c weight, appetite.  Will discuss during next visit and resched DM edu.  Orders:  -     Hemoglobin A1C; Future  -     Microalbumin/Creatinine Ratio, Urine; Future  -     Vitamin B12; Future    Thyroid nodule  Comments:  FNA  benign.  Will need to reorder thyroid ultrasound. Following with endocrinology.  TSH WNL; monitor annually.  Orders:  -     TSH; Future    Mixed hyperlipidemia  Comments:  FLP controlled.  Continue Lipitor 40. Monitor FLP annually.  Orders:  -     Lipid Panel; Future    Annual physical exam  -     Hemoglobin A1C; Future  -     Microalbumin/Creatinine Ratio, Urine; Future  -     TSH; Future  -     Lipid Panel; Future  -     Vitamin B12; Future         Side effects of medication(s) were discussed in detail and patient voiced understanding.  Patient will call back for any issues or complications.     I have spent a total of 55 minutes with the patient as well as reviewing the chart/medical record and placing orders on the day of the visit. Discussed COPD.     RTC in 1 month or sooner PRN for DM with labs prior. Virtual or in person.

## 2022-09-14 NOTE — Clinical Note
Patient seen in clinic today, she states 1 pack of cigarettes last her for 10 days. Commended patient on the accomplishment thus far. The patient remains on the prescribed tobacco cessation medication regimen of 1 mg Chantix BID without any negative side effects at this time. No refill needed at this time. The patient denies any abnormal behavioral or mental changes at this time. Patient states she is confident in her quit journey at this time but has not set a quit date. Encouraged patient to continue to use the strategies discussed and have a quit date in mind. The patient will continue with  therapy sessions and medication monitoring by CTTS. Prescribed medication management will be by physician.

## 2022-09-15 ENCOUNTER — TELEPHONE (OUTPATIENT)
Dept: INTERNAL MEDICINE | Facility: CLINIC | Age: 77
End: 2022-09-15

## 2022-09-15 DIAGNOSIS — E11.69 TYPE 2 DIABETES MELLITUS WITH OTHER SPECIFIED COMPLICATION, UNSPECIFIED WHETHER LONG TERM INSULIN USE: ICD-10-CM

## 2022-09-15 DIAGNOSIS — F41.9 ANXIETY: ICD-10-CM

## 2022-09-15 LAB — SARS-COV-2 RNA RESP QL NAA+PROBE: NOT DETECTED

## 2022-09-15 RX ORDER — HYDROXYZINE HYDROCHLORIDE 25 MG/1
25 TABLET, FILM COATED ORAL 3 TIMES DAILY PRN
Qty: 30 TABLET | Refills: 3 | Status: SHIPPED | OUTPATIENT
Start: 2022-09-15 | End: 2022-09-16 | Stop reason: SDUPTHER

## 2022-09-15 NOTE — TELEPHONE ENCOUNTER
----- Message from Belinda Fajardo MD sent at 9/14/2022  7:27 PM CDT -----  -Please reschedule with DM education. Thanks!            -------------------------  -Will discuss results in clinic.    DM2:  A1C elevated again. Has been tx'd c steroids 3 times in the last 2 months.   Cont metformin 1000 XR daily. B12 wnl. Consider adding Trulicity to help c weight, appetite.  Will discuss during next visit. Resched DM edu now.    Thyroid nodule  FNA  benign.  Will need to reorder thyroid ultrasound. Following with endocrinology.  TSH WNL; monitor annually.    Mixed hyperlipidemia  FLP controlled.  Continue Lipitor 40. Monitor FLP annually    The 10-year ASCVD risk score (Kirk DUDLEY, et al., 2019) is: 33.3%    Values used to calculate the score:      Age: 76 years      Sex: Female      Is Non- : Yes      Diabetic: Yes      Tobacco smoker: Yes      Systolic Blood Pressure: 120 mmHg      Is BP treated: Yes      HDL Cholesterol: 37 mg/dL      Total Cholesterol: 133 mg/dL

## 2022-09-15 NOTE — TELEPHONE ENCOUNTER
Refill Routing Note   Medication(s) are not appropriate for processing by [facility] for the following reason(s):      - Outside of protocol (non-delegated)    [facility] action(s):  Route  Approve          Medication reconciliation completed: No     Appointments  past 12m or future 3m with PCP    Date Provider   Last Visit   9/14/2022 Belinda Fajardo MD   Next Visit   11/25/2022 Belinda Fajardo MD   ED visits in past 90 days: 0        Note composed:3:53 PM 09/15/2022

## 2022-09-15 NOTE — TELEPHONE ENCOUNTER
No new care gaps identified.  [facility] Embedded Care Gaps. Reference number: [id number]. 9/15/2022   3:43:51 PM CDT

## 2022-09-16 NOTE — TELEPHONE ENCOUNTER
----- Message from Rachael Aguayo sent at 9/16/2022 10:22 AM CDT -----  Regarding: self [phone]  Type: Patient Call Back    Who called: self    What is the request in detail: pt stated the provider has to call   [facility] ([facility]) - [place], [place] - [address]  [address] [address]  Phone: [phone] Fax: [phone]  That need an authorization on is is cough medication and she isn't sure the name of the other. She said the provider will know.        Can the clinic reply by [facility]? no    Would the patient rather a call back or a response via [facility]? Call back    Best call back number [phone]

## 2022-09-16 NOTE — TELEPHONE ENCOUNTER
Started PA for Spiriva inhaler - Key: MIGUEL ÁNGEL    Called [facility] to see which other Rx needed PA, they informed me that no other PA is needed. They did let me know that they didn't receive Rx's for hydroxyzine and test strips and recommended we resend.

## 2022-09-19 ENCOUNTER — TELEPHONE (OUTPATIENT)
Dept: INTERNAL MEDICINE | Facility: CLINIC | Age: 77
End: 2022-09-19
Payer: MEDICARE

## 2022-09-19 RX ORDER — HYDROXYZINE HYDROCHLORIDE 25 MG/1
25 TABLET, FILM COATED ORAL 3 TIMES DAILY PRN
Qty: 30 TABLET | Refills: 3 | Status: SHIPPED | OUTPATIENT
Start: 2022-09-19

## 2022-09-19 NOTE — TELEPHONE ENCOUNTER
Patient declined scheduling appointment with DM. She states she already has pamphlet from last appointment. Patient informed of results below.   Patient has upcoming appointment with pulmonary on 11/2/22. Thanks    Patient informed of information below and verbalize understanding nor further questions and or concerns at this time.   -Please reschedule with DM education. Thanks!                 -------------------------  -Will discuss results in clinic.     DM2:  A1C elevated again. Has been tx'd c steroids 3 times in the last 2 months.   Cont metformin 1000 XR daily. B12 wnl. Consider adding Trulicity to help c weight, appetite.  Will discuss during next visit. Resched DM edu now.     Thyroid nodule  FNA  benign.  Will need to reorder thyroid ultrasound. Following with endocrinology.  TSH WNL; monitor annually.     Mixed hyperlipidemia  FLP controlled.  Continue Lipitor 40. Monitor FLP annually     The 10-year ASCVD risk score (Kirk DUDLEY, et al., 2019) is: 33.3%    Values used to calculate the score:      Age: 76 years      Sex: Female      Is Non- : Yes      Diabetic: Yes      Tobacco smoker: Yes      Systolic Blood Pressure: 120 mmHg      Is BP treated: Yes      HDL Cholesterol: 37 mg/dL      Total Cholesterol: 133 mg/dL        All of your labs look good, but your A1c is elevated again. We will talk about this during your next visit, but for now, please reschedule with diabetes education.     Sincerely, ...          Chronic obstructive pulmonary disease with acute exacerbation  Prednisone, restart Breo. Flu neg. COVID swab. CXR, PCT s PNA. Hydration, nasal spray, cough syrup; counseled on potential sedation. Refer to Pulm.

## 2022-09-19 NOTE — TELEPHONE ENCOUNTER
----- Message from Lauren Winkler sent at 9/19/2022 12:43 PM CDT -----  Regarding: Results  Type:  Test Results    Who Called: ESTEBAN JIMENEZ [[id number]]    Name of Test (Lab/Mammo/Etc): labs, xray    Date of Test:  9/14     Ordering Provider:  Scotty     Where the test was performed:  [facility]     Best Call Back Number:  [phone]    Additional Information:

## 2022-09-26 DIAGNOSIS — F17.200 NICOTINE DEPENDENCE: ICD-10-CM

## 2022-09-26 RX ORDER — VARENICLINE TARTRATE 1 MG/1
TABLET, FILM COATED ORAL
Qty: 56 TABLET | Refills: 0 | Status: SHIPPED | OUTPATIENT
Start: 2022-09-26 | End: 2022-11-09 | Stop reason: SDUPTHER

## 2022-09-28 ENCOUNTER — CLINICAL SUPPORT (OUTPATIENT)
Dept: SMOKING CESSATION | Facility: CLINIC | Age: 77
End: 2022-09-28
Payer: COMMERCIAL

## 2022-09-28 DIAGNOSIS — F17.200 NICOTINE DEPENDENCE: Primary | ICD-10-CM

## 2022-09-28 PROCEDURE — 99402 PREV MED CNSL INDIV APPRX 30: CPT | Mod: S$GLB,,,

## 2022-09-28 PROCEDURE — 99402 PR PREVENT COUNSEL,INDIV,30 MIN: ICD-10-PCS | Mod: S$GLB,,,

## 2022-09-28 NOTE — Clinical Note
Spoke with patient for a length of time via telephone, she states tobacco free for the last 7 days. Commended patient on the accomplishment thus far. The patient remains on the prescribed tobacco cessation medication regimen of 1 mg Chantix BID without any negative side effects at this time.  No refill needed at this time. The patient denies any abnormal behavioral or mental changes at this time.  Discussed and reviewed strategies, habitual behavior, high risks situations, understanding urges and cravings, stress and relaxation with open discussion and additional interventions, Introduced lapses, relapses, understanding them and analyzing the situation of a lapse, conflict issues that may be linked to a lapse. The patient will continue with therapy sessions and medication monitoring by CTTS. Prescribed medication management will be by physician.

## 2022-09-29 NOTE — PROGRESS NOTES
Individual Follow-Up Form    9/28/2022    Quit Date: 9/18/2022    Clinical Status of Patient: Outpatient    Continuing Medication: yes  Chantix    Other Medications:      Target Symptoms: Withdrawal and medication side effects. The following were  rated moderate (3) to severe (4) on TCRS:  Moderate (3): none  Severe (4): none    Comments: Spoke with patient for a length of time via telephone, she states tobacco free for the last 7 days. Commended patient on the accomplishment thus far. The patient remains on the prescribed tobacco cessation medication regimen of 1 mg Chantix BID without any negative side effects at this time.   No refill needed at this time. The patient denies any abnormal behavioral or mental changes at this time.   Discussed and reviewed strategies, habitual behavior, high risks situations, understanding urges and cravings, stress and relaxation with open discussion and additional interventions, Introduced lapses, relapses, understanding them and analyzing the situation of a lapse, conflict issues that may be linked to a lapse. The patient will continue with therapy sessions and medication monitoring by CTTS. Prescribed medication management will be by physician.          Diagnosis: F17.200    Next Visit: 10/19/2022

## 2022-10-14 ENCOUNTER — TELEPHONE (OUTPATIENT)
Dept: INTERNAL MEDICINE | Facility: CLINIC | Age: 77
End: 2022-10-14
Payer: MEDICARE

## 2022-10-14 NOTE — TELEPHONE ENCOUNTER
----- Message from Lauren Winkler sent at 10/14/2022  1:26 PM CDT -----  Regarding: Patient call back  Who called:ESTEBAN JIMENEZ [[id number]]    What is the request in detail: Patient is requesting a call back. She states she has some questions on how long to take her tiotropium (SPIRIVA) 18 mcg inhalation capsule. She would like to further discuss.   Please advise.    Can the clinic reply by [facility]? No    Best call back number: [phone]    Additional Information: N/A

## 2022-10-14 NOTE — TELEPHONE ENCOUNTER
Spoke to Ms. Kevin and patient had question on how to take Spiriva.  Instructed patient to inhale one capsule daily.   Patient states understanding with no further questions or concerns.

## 2022-10-26 ENCOUNTER — CLINICAL SUPPORT (OUTPATIENT)
Dept: SMOKING CESSATION | Facility: CLINIC | Age: 77
End: 2022-10-26
Payer: COMMERCIAL

## 2022-10-26 DIAGNOSIS — F17.200 NICOTINE DEPENDENCE: Primary | ICD-10-CM

## 2022-10-26 PROCEDURE — 99407 PR TOBACCO USE CESSATION INTENSIVE >10 MINUTES: ICD-10-PCS | Mod: S$GLB,,, | Performed by: FAMILY MEDICINE

## 2022-10-26 PROCEDURE — 99999 PR PBB SHADOW E&M-EST. PATIENT-LVL III: CPT | Mod: PBBFAC,,,

## 2022-10-26 PROCEDURE — 99407 BEHAV CHNG SMOKING > 10 MIN: CPT | Mod: S$GLB,,, | Performed by: FAMILY MEDICINE

## 2022-10-26 PROCEDURE — 99999 PR PBB SHADOW E&M-EST. PATIENT-LVL III: ICD-10-PCS | Mod: PBBFAC,,,

## 2022-10-26 NOTE — PROGRESS NOTES
Called patient to follow-up on cancelled appointment, spoke with patient for a length of time. Patient reports that she is tobacco free and has been since August. Patient states that she had a lapse but put cigarette out right away because she did not want the cigarette. Patient reports that she was just getting back in town and had not felt well. Patient continues to use Chantix and scheduled a follow-up appointment in two weeks. Patient reports that Chantix has really assisted her in quitting tobacco and would like to continue Chantix for another month. No refills were needed at this time. The patient will continue with therapy sessions and medication monitoring by CTTS. Prescribed medication management will be by physician.

## 2022-10-31 ENCOUNTER — CLINICAL SUPPORT (OUTPATIENT)
Dept: SMOKING CESSATION | Facility: CLINIC | Age: 77
End: 2022-10-31
Payer: COMMERCIAL

## 2022-10-31 DIAGNOSIS — F17.200 NICOTINE DEPENDENCE: Primary | ICD-10-CM

## 2022-10-31 PROCEDURE — 99407 BEHAV CHNG SMOKING > 10 MIN: CPT | Mod: S$GLB,,,

## 2022-10-31 PROCEDURE — 99407 PR TOBACCO USE CESSATION INTENSIVE >10 MINUTES: ICD-10-PCS | Mod: S$GLB,,,

## 2022-10-31 NOTE — PROGRESS NOTES
Spoke with patient today in regard to smoking cessation progress for 3 month telephone follow up, she states tobacco free. Commended patient on the accomplishment thus far. Patient states no longer using any prescribed tobacco cessation medication at this time and has a scheduled follow up. Informed patient of benefit period, future follow up, and contact information if any further help or support is needed. Will complete smart form for 3 month follow up on Quit attempt #1.

## 2022-11-01 NOTE — PROGRESS NOTES
Subjective:       Patient ID: Marizol Kevin is a 76 y.o. female.    Chief Complaint: No chief complaint on file.    Pt is a 75 yo AAF pmh HTN, colon cancer s/p surgical excision and chemo 2012, DM2, GERD, previous tobacco use disorder, HLD, peripheral neuropathy who presents for evaluation of shortness of breath. Pt recently quit smoking with use of chantix and tobacco cessation classes. 6/2022 admission for acute hypoxemic/hypercapnic respiratory failure treated with steroids and breathing treatments. Had congestion, rhinorrhea for the week prior suggestive of upper respiratory viral infection.     Smoking hx: ppd 57 yrs  Inhalers: Breo Ellipta intermittently, spiriva once a day    PRN: albuterol, nebulizer once to twice.   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   Dust/down material: none, no carpet  Poultry/pets: 1 dog  Exposures: none  Hx of lung disease:Asthma as a child  Hospitalization/urgent care:06/2022  Intubated: never  Allergies: allegra  Sinus: flonase/azelastine   Sister with bad asthma    Eos: nml    Review of Systems   Constitutional:  Negative for activity change, appetite change and weakness.   HENT:  Positive for congestion. Negative for postnasal drip and sinus pressure.    Respiratory:  Positive for sputum production (clear in AM), previous hospitalization due to pulmonary problems (6/22) and use of rescue inhaler. Negative for cough, shortness of breath, wheezing and dyspnea on extertion.    Musculoskeletal:  Negative for arthralgias and joint swelling.   Skin:  Negative for rash.   Neurological:  Negative for dizziness, syncope, weakness and light-headedness.   Psychiatric/Behavioral:  Negative for confusion and sleep disturbance. The patient is not nervous/anxious.      Objective:      Physical Exam   Constitutional: She is oriented to person, place, and time. She appears well-developed and well-nourished. No distress. She is obese.   HENT:   Head:  Normocephalic.   Cardiovascular: Regular rhythm and normal heart sounds. Exam reveals no gallop and no friction rub.   No murmur heard.  Tachycardic     Pulmonary/Chest: Normal expansion, symmetric chest wall expansion, effort normal and breath sounds normal. No stridor. She has no decreased breath sounds. She has no wheezes. She has no rhonchi. She has no rales.   Musculoskeletal:         General: No edema.      Cervical back: Normal range of motion and neck supple.   Neurological: She is alert and oriented to person, place, and time. Gait normal.   Skin: Skin is warm. No cyanosis. Nails show no clubbing.   Psychiatric: She has a normal mood and affect. Her behavior is normal. Judgment and thought content normal.   Nursing note and vitals reviewed.    Personal Diagnostic Review  Chest x-ray: 22  No acute cardiopulmonary process.     CT of chest performed on 22 LDCT revealed mild centrilobular and paraseptal emphysema. Multiple subcentimeter pulmonary nodules largest pleural based measuring 0.6cm right lung. Largest left lung 0.4cm.     Pulmonary function tests:   FEV1: 1.61L  (114.6 % predicted)   FVC:  2.30L (128.6 % predicted)   FEV1/FVC:  70   T.44L (117.5 % predicted)   DLCO: 13.01 (79.3 % predicted)    No obstruction, borderline hyperinflation, no decreased in diffusion capacity.     No flowsheet data found.      Assessment:       No diagnosis found.    Outpatient Encounter Medications as of 2022   Medication Sig Dispense Refill    albuterol (VENTOLIN HFA) 90 mcg/actuation inhaler Inhale 2 puffs into the lungs every 4 (four) hours as needed for Wheezing. 18 g 11    albuterol-ipratropium (DUO-NEB) 2.5 mg-0.5 mg/3 mL nebulizer solution Take 3 mLs by nebulization every 6 (six) hours as needed for Wheezing or Shortness of Breath. Rescue 15 mL 5    amLODIPine (NORVASC) 5 MG tablet TAKE 1 TABLET EVERY DAY 90 tablet 3    aspirin (ECOTRIN) 81 MG EC tablet Take 81 mg by mouth once daily.       atorvastatin (LIPITOR) 40 MG tablet TAKE 1 TABLET EVERY DAY 90 tablet 3    azelastine (ASTELIN) 137 mcg (0.1 %) nasal spray 1 spray (137 mcg total) by Nasal route 2 (two) times daily. 30 mL 11    benzonatate (TESSALON) 100 MG capsule Take 1 capsule (100 mg total) by mouth 3 (three) times daily as needed for Cough. 30 capsule 0    betamethasone valerate 0.1% (VALISONE) 0.1 % Oint Apply topically once daily. 15 g 1    blood sugar diagnostic (TRUE METRIX GLUCOSE TEST STRIP) Strp Use to test blood glucose two (2) times daily; to be used with insurance-preferred brand of glucometer/supplies 200 strip 3    blood-glucose meter kit Please provide with insurance covered meter 1 each 0    cholecalciferol, vitamin D3, (VITAMIN D3) 50 mcg (2,000 unit) Cap Take by mouth.      DULoxetine (CYMBALTA) 30 MG capsule TAKE 1 CAPSULE EVERY DAY 90 capsule 3    fluticasone furoate-vilanteroL (BREO ELLIPTA) 100-25 mcg/dose diskus inhaler Inhale 1 puff into the lungs once daily. Controller 30 each 11    fluticasone propionate (FLONASE) 50 mcg/actuation nasal spray USE 1 SPRAY IN EACH NOSTRIL ONE TIME DAILY 16 g 11    hydrOXYzine HCL (ATARAX) 25 MG tablet Take 1 tablet (25 mg total) by mouth 3 (three) times daily as needed for Itching or Anxiety. 30 tablet 3    lancets Misc 1 Device by Misc.(Non-Drug; Combo Route) route 2 (two) times daily. 200 each 11    metFORMIN (GLUCOPHAGE-XR) 500 MG ER 24hr tablet TAKE 2 TABLETS EVERY DAY WITH BREAKFAST 180 tablet 3    multivitamin capsule Take 1 capsule by mouth once daily.      pantoprazole (PROTONIX) 40 MG tablet TAKE 1 TABLET (40 MG TOTAL) BY MOUTH ONCE DAILY. 90 tablet 3    raloxifene (EVISTA) 60 mg tablet TAKE 1 TABLET EVERY DAY 90 tablet 4    sodium chloride 0.9 % SprA 1 spray by Each Nostril route every evening. 1 each 0    tiotropium (SPIRIVA) 18 mcg inhalation capsule Inhale 1 capsule (18 mcg total) into the lungs once daily. Controller 90 capsule 3    triamcinolone acetonide 0.025% (KENALOG)  0.025 % cream APPLY DAILY TO AFFECTED AREA AS NEEDED FOR ITCHING. MAXIMUM 2 TIMES A WEEK 15 g 1    varenicline (CHANTIX) 0.5 MG Tab Take 1 tablet by mouth everyday for 3 days, then 1 tablet twice daily for 4 days. Then increase to 1 mg tablets 11 tablet 0    varenicline (CHANTIX) 1 mg Tab Take 1 tablet by mouth twice daily 56 tablet 0     No facility-administered encounter medications on file as of 11/2/2022.     No orders of the defined types were placed in this encounter.      Plan:       Problem List Items Addressed This Visit          Pulmonary    Mild intermittent asthma without complication    Current Assessment & Plan     Pt with childhood asthma with hospitalization 6/2022 for shortness of breath, with reported wheezing and tachypnea after several days of URI symptoms. PFTs do not show fixed obstruction or small airways disease that would be associated with COPD. Minimal emphysema noted on CT chest lung cancer screen. Is on triple therapy, but only taking spiriva daily and Breo on occasion. Uses duoneb qAM to help clear mucous in AM.   - continue triple therapy, if does well over the next three months will stop Spiriva as not a mainstay treatment for Asthma  - continue treatment of upper airway sinus issues  - nml eosinophil levels  - continue smoking cessation as likely a trigger  - has flu shot, covid x4 (make sure has omicron variant booster)         RESOLVED: COPD (chronic obstructive pulmonary disease) - Primary       Other    Tobacco use disorder, moderate, in early remission    Current Assessment & Plan     Pt with >50 py smoking history, quit smoking ~ 3 months ago. Pt and family very proud of her accomplishment as they should be. Continue smoking cessation.            Kain Durant MD  [facility]

## 2022-11-02 ENCOUNTER — OFFICE VISIT (OUTPATIENT)
Dept: PULMONOLOGY | Facility: CLINIC | Age: 77
End: 2022-11-02
Payer: MEDICARE

## 2022-11-02 VITALS
HEART RATE: 106 BPM | HEIGHT: 57 IN | SYSTOLIC BLOOD PRESSURE: 138 MMHG | DIASTOLIC BLOOD PRESSURE: 78 MMHG | BODY MASS INDEX: 33.44 KG/M2 | WEIGHT: 155 LBS

## 2022-11-02 DIAGNOSIS — J45.20 MILD INTERMITTENT ASTHMA WITHOUT COMPLICATION: ICD-10-CM

## 2022-11-02 DIAGNOSIS — F17.201 TOBACCO USE DISORDER, MODERATE, IN EARLY REMISSION: ICD-10-CM

## 2022-11-02 DIAGNOSIS — J45.20 MILD INTERMITTENT ASTHMA IN ADULT WITHOUT COMPLICATION: Primary | ICD-10-CM

## 2022-11-02 PROBLEM — J44.9 COPD (CHRONIC OBSTRUCTIVE PULMONARY DISEASE): Status: RESOLVED | Noted: 2022-06-21 | Resolved: 2022-11-02

## 2022-11-02 PROBLEM — J96.22 ACUTE ON CHRONIC RESPIRATORY FAILURE WITH HYPOXIA AND HYPERCAPNIA: Status: RESOLVED | Noted: 2022-06-20 | Resolved: 2022-11-02

## 2022-11-02 PROBLEM — J96.21 ACUTE ON CHRONIC RESPIRATORY FAILURE WITH HYPOXIA AND HYPERCAPNIA: Status: RESOLVED | Noted: 2022-06-20 | Resolved: 2022-11-02

## 2022-11-02 PROCEDURE — 3066F PR DOCUMENTATION OF TREATMENT FOR NEPHROPATHY: ICD-10-PCS | Mod: CPTII,S$GLB,, | Performed by: INTERNAL MEDICINE

## 2022-11-02 PROCEDURE — 99999 PR PBB SHADOW E&M-EST. PATIENT-LVL IV: CPT | Mod: PBBFAC,,, | Performed by: INTERNAL MEDICINE

## 2022-11-02 PROCEDURE — 3060F PR POS MICROALBUMINURIA RESULT DOCUMENTED/REVIEW: ICD-10-PCS | Mod: CPTII,S$GLB,, | Performed by: INTERNAL MEDICINE

## 2022-11-02 PROCEDURE — 3008F BODY MASS INDEX DOCD: CPT | Mod: CPTII,S$GLB,, | Performed by: INTERNAL MEDICINE

## 2022-11-02 PROCEDURE — 3075F SYST BP GE 130 - 139MM HG: CPT | Mod: CPTII,S$GLB,, | Performed by: INTERNAL MEDICINE

## 2022-11-02 PROCEDURE — 3060F POS MICROALBUMINURIA REV: CPT | Mod: CPTII,S$GLB,, | Performed by: INTERNAL MEDICINE

## 2022-11-02 PROCEDURE — 99999 PR PBB SHADOW E&M-EST. PATIENT-LVL IV: ICD-10-PCS | Mod: PBBFAC,,, | Performed by: INTERNAL MEDICINE

## 2022-11-02 PROCEDURE — 3051F HG A1C>EQUAL 7.0%<8.0%: CPT | Mod: CPTII,S$GLB,, | Performed by: INTERNAL MEDICINE

## 2022-11-02 PROCEDURE — 3078F DIAST BP <80 MM HG: CPT | Mod: CPTII,S$GLB,, | Performed by: INTERNAL MEDICINE

## 2022-11-02 PROCEDURE — 3075F PR MOST RECENT SYSTOLIC BLOOD PRESS GE 130-139MM HG: ICD-10-PCS | Mod: CPTII,S$GLB,, | Performed by: INTERNAL MEDICINE

## 2022-11-02 PROCEDURE — 1126F PR PAIN SEVERITY QUANTIFIED, NO PAIN PRESENT: ICD-10-PCS | Mod: CPTII,S$GLB,, | Performed by: INTERNAL MEDICINE

## 2022-11-02 PROCEDURE — 1126F AMNT PAIN NOTED NONE PRSNT: CPT | Mod: CPTII,S$GLB,, | Performed by: INTERNAL MEDICINE

## 2022-11-02 PROCEDURE — 4010F ACE/ARB THERAPY RXD/TAKEN: CPT | Mod: CPTII,S$GLB,, | Performed by: INTERNAL MEDICINE

## 2022-11-02 PROCEDURE — 99204 OFFICE O/P NEW MOD 45 MIN: CPT | Mod: S$GLB,,, | Performed by: INTERNAL MEDICINE

## 2022-11-02 PROCEDURE — 3288F PR FALLS RISK ASSESSMENT DOCUMENTED: ICD-10-PCS | Mod: CPTII,S$GLB,, | Performed by: INTERNAL MEDICINE

## 2022-11-02 PROCEDURE — 3288F FALL RISK ASSESSMENT DOCD: CPT | Mod: CPTII,S$GLB,, | Performed by: INTERNAL MEDICINE

## 2022-11-02 PROCEDURE — 3051F PR MOST RECENT HEMOGLOBIN A1C LEVEL 7.0 - < 8.0%: ICD-10-PCS | Mod: CPTII,S$GLB,, | Performed by: INTERNAL MEDICINE

## 2022-11-02 PROCEDURE — 4010F PR ACE/ARB THEARPY RXD/TAKEN: ICD-10-PCS | Mod: CPTII,S$GLB,, | Performed by: INTERNAL MEDICINE

## 2022-11-02 PROCEDURE — 3072F PR LOW RISK FOR RETINOPATHY: ICD-10-PCS | Mod: CPTII,S$GLB,, | Performed by: INTERNAL MEDICINE

## 2022-11-02 PROCEDURE — 99204 PR OFFICE/OUTPT VISIT, NEW, LEVL IV, 45-59 MIN: ICD-10-PCS | Mod: S$GLB,,, | Performed by: INTERNAL MEDICINE

## 2022-11-02 PROCEDURE — 1159F PR MEDICATION LIST DOCUMENTED IN MEDICAL RECORD: ICD-10-PCS | Mod: CPTII,S$GLB,, | Performed by: INTERNAL MEDICINE

## 2022-11-02 PROCEDURE — 3078F PR MOST RECENT DIASTOLIC BLOOD PRESSURE < 80 MM HG: ICD-10-PCS | Mod: CPTII,S$GLB,, | Performed by: INTERNAL MEDICINE

## 2022-11-02 PROCEDURE — 1159F MED LIST DOCD IN RCRD: CPT | Mod: CPTII,S$GLB,, | Performed by: INTERNAL MEDICINE

## 2022-11-02 PROCEDURE — 3072F LOW RISK FOR RETINOPATHY: CPT | Mod: CPTII,S$GLB,, | Performed by: INTERNAL MEDICINE

## 2022-11-02 PROCEDURE — 1101F PT FALLS ASSESS-DOCD LE1/YR: CPT | Mod: CPTII,S$GLB,, | Performed by: INTERNAL MEDICINE

## 2022-11-02 PROCEDURE — 3066F NEPHROPATHY DOC TX: CPT | Mod: CPTII,S$GLB,, | Performed by: INTERNAL MEDICINE

## 2022-11-02 PROCEDURE — 3008F PR BODY MASS INDEX (BMI) DOCUMENTED: ICD-10-PCS | Mod: CPTII,S$GLB,, | Performed by: INTERNAL MEDICINE

## 2022-11-02 PROCEDURE — 1101F PR PT FALLS ASSESS DOC 0-1 FALLS W/OUT INJ PAST YR: ICD-10-PCS | Mod: CPTII,S$GLB,, | Performed by: INTERNAL MEDICINE

## 2022-11-02 NOTE — ASSESSMENT & PLAN NOTE
Pt with childhood asthma with hospitalization 6/2022 for shortness of breath, with reported wheezing and tachypnea after several days of URI symptoms. PFTs do not show fixed obstruction or small airways disease that would be associated with COPD. Minimal emphysema noted on CT chest lung cancer screen. Is on triple therapy, but only taking spiriva daily and Breo on occasion. Uses duoneb qAM to help clear mucous in AM.   - continue triple therapy, if does well over the next three months will stop Spiriva as not a mainstay treatment for Asthma  - continue treatment of upper airway sinus issues  - nml eosinophil levels  - continue smoking cessation as likely a trigger  - has flu shot, covid x4 (make sure has omicron variant booster)

## 2022-11-02 NOTE — ASSESSMENT & PLAN NOTE
Pt with >50 py smoking history, quit smoking ~ 3 months ago. Pt and family very proud of her accomplishment as they should be. Continue smoking cessation.

## 2022-11-09 ENCOUNTER — CLINICAL SUPPORT (OUTPATIENT)
Dept: SMOKING CESSATION | Facility: CLINIC | Age: 77
End: 2022-11-09
Payer: COMMERCIAL

## 2022-11-09 DIAGNOSIS — F17.200 NICOTINE DEPENDENCE: ICD-10-CM

## 2022-11-09 PROCEDURE — 99999 PR PBB SHADOW E&M-EST. PATIENT-LVL I: ICD-10-PCS | Mod: PBBFAC,,,

## 2022-11-09 PROCEDURE — 99404 PREV MED CNSL INDIV APPRX 60: CPT | Mod: S$GLB,,, | Performed by: FAMILY MEDICINE

## 2022-11-09 PROCEDURE — 99404 PR PREVENT COUNSEL,INDIV,60 MIN: ICD-10-PCS | Mod: S$GLB,,, | Performed by: FAMILY MEDICINE

## 2022-11-09 PROCEDURE — 99999 PR PBB SHADOW E&M-EST. PATIENT-LVL I: CPT | Mod: PBBFAC,,,

## 2022-11-09 RX ORDER — VARENICLINE TARTRATE 1 MG/1
TABLET, FILM COATED ORAL
Qty: 56 TABLET | Refills: 0 | Status: SHIPPED | OUTPATIENT
Start: 2022-11-09 | End: 2023-06-07

## 2022-11-09 NOTE — Clinical Note
Patient was seen in clinic today and reports remaining tobacco free. Patient is on a tobacco cessation medication regimen of 1mg Chantix. She reports no abnormal behaviors or mental changes at this time. Refill was sent to the pharmacy. Patient states that she really enjoys the cinnamon candy during quit. Discussed and reviewed strategies, cues, triggers, high risk situations, lapses, relapses, diet, exercise, stress, relaxation, sleep, habitual behavior, and life style changes. The patient will continue with  therapy sessions and medication monitoring by CTTS. Prescribed medication management will be by physician.

## 2022-11-09 NOTE — PROGRESS NOTES
Individual Follow-Up Form    11/9/2022    Quit Date: 9/18/2022    Clinical Status of Patient: Outpatient      Continuing Medication: yes  Chantix    Other Medications: none     Target Symptoms: Withdrawal and medication side effects. The following were  rated moderate (3) to severe (4) on TCRS:  Moderate (3): ---  Severe (4): ---    Comments: Patient was seen in clinic today and reports remaining tobacco free. Patient is on a tobacco cessation medication regimen of 1mg Chantix. She reports no abnormal behaviors or mental changes at this time. Refill was sent to the pharmacy. Patient states that she really enjoys the cinnamon candy during quit. Discussed and reviewed strategies, cues, triggers, high risk situations, lapses, relapses, diet, exercise, stress, relaxation, sleep, habitual behavior, and life style changes. The patient will continue with  therapy sessions and medication monitoring by CTTS. Prescribed medication management will be by physician.     Diagnosis: F17.200    Next Visit: 1/11/2023

## 2022-11-10 ENCOUNTER — OFFICE VISIT (OUTPATIENT)
Dept: INTERNAL MEDICINE | Facility: CLINIC | Age: 77
End: 2022-11-10
Payer: MEDICARE

## 2022-11-10 ENCOUNTER — IMMUNIZATION (OUTPATIENT)
Dept: PHARMACY | Facility: CLINIC | Age: 77
End: 2022-11-10
Payer: MEDICARE

## 2022-11-10 VITALS
OXYGEN SATURATION: 96 % | HEART RATE: 91 BPM | HEIGHT: 57 IN | SYSTOLIC BLOOD PRESSURE: 128 MMHG | BODY MASS INDEX: 33.16 KG/M2 | DIASTOLIC BLOOD PRESSURE: 62 MMHG | WEIGHT: 153.69 LBS

## 2022-11-10 DIAGNOSIS — L30.4 INTERTRIGO: Primary | ICD-10-CM

## 2022-11-10 DIAGNOSIS — E78.2 MIXED HYPERLIPIDEMIA: ICD-10-CM

## 2022-11-10 DIAGNOSIS — N90.89 LESION OF LABIA: ICD-10-CM

## 2022-11-10 DIAGNOSIS — Z87.891 HISTORY OF TOBACCO USE: ICD-10-CM

## 2022-11-10 DIAGNOSIS — B35.4 TINEA CORPORIS: ICD-10-CM

## 2022-11-10 DIAGNOSIS — E11.65 TYPE 2 DIABETES MELLITUS WITH HYPERGLYCEMIA, WITHOUT LONG-TERM CURRENT USE OF INSULIN: ICD-10-CM

## 2022-11-10 DIAGNOSIS — G89.29 CHRONIC LEFT SHOULDER PAIN: ICD-10-CM

## 2022-11-10 DIAGNOSIS — I10 ESSENTIAL HYPERTENSION: ICD-10-CM

## 2022-11-10 DIAGNOSIS — M25.512 CHRONIC LEFT SHOULDER PAIN: ICD-10-CM

## 2022-11-10 PROCEDURE — 3288F FALL RISK ASSESSMENT DOCD: CPT | Mod: CPTII,S$GLB,, | Performed by: INTERNAL MEDICINE

## 2022-11-10 PROCEDURE — 3074F PR MOST RECENT SYSTOLIC BLOOD PRESSURE < 130 MM HG: ICD-10-PCS | Mod: CPTII,S$GLB,, | Performed by: INTERNAL MEDICINE

## 2022-11-10 PROCEDURE — 3078F DIAST BP <80 MM HG: CPT | Mod: CPTII,S$GLB,, | Performed by: INTERNAL MEDICINE

## 2022-11-10 PROCEDURE — 4010F PR ACE/ARB THEARPY RXD/TAKEN: ICD-10-PCS | Mod: CPTII,S$GLB,, | Performed by: INTERNAL MEDICINE

## 2022-11-10 PROCEDURE — 1126F AMNT PAIN NOTED NONE PRSNT: CPT | Mod: CPTII,S$GLB,, | Performed by: INTERNAL MEDICINE

## 2022-11-10 PROCEDURE — 3060F POS MICROALBUMINURIA REV: CPT | Mod: CPTII,S$GLB,, | Performed by: INTERNAL MEDICINE

## 2022-11-10 PROCEDURE — 99215 OFFICE O/P EST HI 40 MIN: CPT | Mod: S$GLB,,, | Performed by: INTERNAL MEDICINE

## 2022-11-10 PROCEDURE — 3072F LOW RISK FOR RETINOPATHY: CPT | Mod: CPTII,S$GLB,, | Performed by: INTERNAL MEDICINE

## 2022-11-10 PROCEDURE — 99499 RISK ADDL DX/OHS AUDIT: ICD-10-PCS | Mod: HCNC,S$GLB,, | Performed by: INTERNAL MEDICINE

## 2022-11-10 PROCEDURE — 99999 PR PBB SHADOW E&M-EST. PATIENT-LVL V: ICD-10-PCS | Mod: PBBFAC,,, | Performed by: INTERNAL MEDICINE

## 2022-11-10 PROCEDURE — 3072F PR LOW RISK FOR RETINOPATHY: ICD-10-PCS | Mod: CPTII,S$GLB,, | Performed by: INTERNAL MEDICINE

## 2022-11-10 PROCEDURE — 3078F PR MOST RECENT DIASTOLIC BLOOD PRESSURE < 80 MM HG: ICD-10-PCS | Mod: CPTII,S$GLB,, | Performed by: INTERNAL MEDICINE

## 2022-11-10 PROCEDURE — 1159F MED LIST DOCD IN RCRD: CPT | Mod: CPTII,S$GLB,, | Performed by: INTERNAL MEDICINE

## 2022-11-10 PROCEDURE — 3051F PR MOST RECENT HEMOGLOBIN A1C LEVEL 7.0 - < 8.0%: ICD-10-PCS | Mod: CPTII,S$GLB,, | Performed by: INTERNAL MEDICINE

## 2022-11-10 PROCEDURE — 1101F PT FALLS ASSESS-DOCD LE1/YR: CPT | Mod: CPTII,S$GLB,, | Performed by: INTERNAL MEDICINE

## 2022-11-10 PROCEDURE — 1126F PR PAIN SEVERITY QUANTIFIED, NO PAIN PRESENT: ICD-10-PCS | Mod: CPTII,S$GLB,, | Performed by: INTERNAL MEDICINE

## 2022-11-10 PROCEDURE — 3066F PR DOCUMENTATION OF TREATMENT FOR NEPHROPATHY: ICD-10-PCS | Mod: CPTII,S$GLB,, | Performed by: INTERNAL MEDICINE

## 2022-11-10 PROCEDURE — 3288F PR FALLS RISK ASSESSMENT DOCUMENTED: ICD-10-PCS | Mod: CPTII,S$GLB,, | Performed by: INTERNAL MEDICINE

## 2022-11-10 PROCEDURE — 1159F PR MEDICATION LIST DOCUMENTED IN MEDICAL RECORD: ICD-10-PCS | Mod: CPTII,S$GLB,, | Performed by: INTERNAL MEDICINE

## 2022-11-10 PROCEDURE — 3051F HG A1C>EQUAL 7.0%<8.0%: CPT | Mod: CPTII,S$GLB,, | Performed by: INTERNAL MEDICINE

## 2022-11-10 PROCEDURE — 1160F PR REVIEW ALL MEDS BY PRESCRIBER/CLIN PHARMACIST DOCUMENTED: ICD-10-PCS | Mod: CPTII,S$GLB,, | Performed by: INTERNAL MEDICINE

## 2022-11-10 PROCEDURE — 99499 UNLISTED E&M SERVICE: CPT | Mod: HCNC,S$GLB,, | Performed by: INTERNAL MEDICINE

## 2022-11-10 PROCEDURE — 3074F SYST BP LT 130 MM HG: CPT | Mod: CPTII,S$GLB,, | Performed by: INTERNAL MEDICINE

## 2022-11-10 PROCEDURE — 4010F ACE/ARB THERAPY RXD/TAKEN: CPT | Mod: CPTII,S$GLB,, | Performed by: INTERNAL MEDICINE

## 2022-11-10 PROCEDURE — 1101F PR PT FALLS ASSESS DOC 0-1 FALLS W/OUT INJ PAST YR: ICD-10-PCS | Mod: CPTII,S$GLB,, | Performed by: INTERNAL MEDICINE

## 2022-11-10 PROCEDURE — 99215 PR OFFICE/OUTPT VISIT, EST, LEVL V, 40-54 MIN: ICD-10-PCS | Mod: S$GLB,,, | Performed by: INTERNAL MEDICINE

## 2022-11-10 PROCEDURE — 1160F RVW MEDS BY RX/DR IN RCRD: CPT | Mod: CPTII,S$GLB,, | Performed by: INTERNAL MEDICINE

## 2022-11-10 PROCEDURE — 3008F BODY MASS INDEX DOCD: CPT | Mod: CPTII,S$GLB,, | Performed by: INTERNAL MEDICINE

## 2022-11-10 PROCEDURE — 3066F NEPHROPATHY DOC TX: CPT | Mod: CPTII,S$GLB,, | Performed by: INTERNAL MEDICINE

## 2022-11-10 PROCEDURE — 3060F PR POS MICROALBUMINURIA RESULT DOCUMENTED/REVIEW: ICD-10-PCS | Mod: CPTII,S$GLB,, | Performed by: INTERNAL MEDICINE

## 2022-11-10 PROCEDURE — 99999 PR PBB SHADOW E&M-EST. PATIENT-LVL V: CPT | Mod: PBBFAC,,, | Performed by: INTERNAL MEDICINE

## 2022-11-10 PROCEDURE — 3008F PR BODY MASS INDEX (BMI) DOCUMENTED: ICD-10-PCS | Mod: CPTII,S$GLB,, | Performed by: INTERNAL MEDICINE

## 2022-11-10 RX ORDER — KETOCONAZOLE 20 MG/G
CREAM TOPICAL 2 TIMES DAILY
Qty: 15 G | Refills: 2 | Status: SHIPPED | OUTPATIENT
Start: 2022-11-10 | End: 2023-06-07

## 2022-11-10 RX ORDER — DULAGLUTIDE 0.75 MG/.5ML
0.75 INJECTION, SOLUTION SUBCUTANEOUS WEEKLY
Qty: 4 PEN | Refills: 11 | Status: CANCELLED | OUTPATIENT
Start: 2022-11-10 | End: 2023-11-10

## 2022-11-10 NOTE — PATIENT INSTRUCTIONS
You may have a yeast infection.  It occurs commonly in moist areas within skin folds.  Please try to keep the area clean and dry with a mild cleanser and towel dry the area.  You can also use a hair dryer on a cool setting.  Try to wear absorbent clothing in the meantime, like cotton.        All of your core healthy metrics are met.      Sami Fontana,     If you are due for any health screening(s) below please notify me so we can arrange them to be ordered and scheduled to maintain your health. Most healthy patients complete it. Don't lose out on improving your health.     All of your core healthy metrics are met.

## 2022-11-10 NOTE — PROGRESS NOTES
Subjective:       Patient ID: Marizol Kevin is a 76 y.o. female who  has a past medical history of Allergy, Anxiety, Cancer, Cataract, Colon cancer, Diabetes mellitus, type 2, Dry eye syndrome, Hyperlipidemia, Hypertension, Insomnia, and Squamous blepharitis.    Chief Complaint: Rash     History was obtained from the patient and supplemented through chart review  Saw Pulm for asthma.    Has difficulty with transportation.  Her neighbor drives her to appointments.  Also gets rides from [employer] (has to call 3 days in advance). Likes plants.     HPI      Rash:  Noticed odor of her L axilla. Washed it c H2O2. Then started itching. No pain, but very uncomfortable pruritis.  Erythematous patch. Washed it. Felt slimy at the L axilla.  Sx flared. Then developed hyperpigmentation mid thoracic spine at the bra line that is also itchy.  Currently no involvement of her pannus, breast.    No fever.  Never happened before.  No change in soap, detergent.  No family members/contacts with similar rash. Her dog is fine.    Has been taking Benadryl, hydroxyzine s much relief.     Reports recurrent boils between her legs, recently resolved.    L shoulder pain:  Chronic arthralgias. Sore, difficulty abducting. L elbow also feels sore.    HTN:    Pt's BP is controlled on Norvasc 5. Tolerating meds well. +DM with new slightly elevated microalbuminuria.    Home BP:      DM2:    Currently pt is taking metformin XR 1000 daily.  Was treated for asthma exacerbation with prednisone .     Diet: Stopped late night cakes, ice cream.     Exercise: walks around the block in the backyard. 100 steps in the [place] on rainy days.      New slightly elevated microalbumin creatinine ratio.  Not on an ACE/ARB.   Retinal exams: 7/2022  Foot exams:  3/2021 by me  DM [facility] 6/2021  Lab Results   Component Value Date/Time    HGBA1C 7.4 (H) 09/14/2022 10:32 AM    HGBA1C 6.4 (H) 05/19/2022 10:17 AM    HGBA1C 7.2 (H) 10/01/2021 02:55 PM     Lab Results    Component Value Date    [id number] 435 09/14/2022     HLD:  Is currently taking Lipitor 40 and ASA 81 daily.  Lab Results   Component Value Date    LDLCALC 75.0 09/14/2022     The 10-year ASCVD risk score (Kirk DUDLEY, et al., 2019) is: 20.2%    Values used to calculate the score:      Age: 76 years      Sex: Female      Is Non- : Yes      Diabetic: Yes      Tobacco smoker: No      Systolic Blood Pressure: 128 mmHg      Is BP treated: Yes      HDL Cholesterol: 37 mg/dL      Total Cholesterol: 133 mg/dL    H/o Tobacco use:    She smokes 5-10 cigs/day.  Started smoking since age 19. Has quit x 1 year before.    Patches caused hallucinations. Didn't like the taste of gums.    Started Wellbutrin to assist with tobacco cessation, but was unable to tolerate d/t SE, altered taste, HA, dizziness.   On Chantix.  Following with tobacco cessation Clinic. Quit since 9/2022!                Not addressed today.  Asthma:  H/o childhood asthma. H/o tobacco use as above.    Was admitted for exacerbation .      GERD as below.   PFT 7/2022 was normal.  DLCO was also normal.  Minimal emphysema on CT chest.   Eosinophils wnl.     Saw Pulm. Thought to be asthma rather than COPD.  Is on triple therapy, but only taking Spiriva daily and Breo on occasion. Continue triple therapy. Consider stopping Spiriva in 3 mo if she does well as it is not a mainstay treatment for Asthma  F/u c Pulm. Cont controller meds. Consider stopping Spiriva if sx are controlled. She quit smoking!     GERD:  On Protonix daily ever since her hemicolectomy in 2014.  Has recurrent symptoms off of Protonix. Takes PPI daily for several years. +tobacco. Currently no GERD sx, although she notes abd discomfort improved with belching.  Unclear h/o of EGD.  Controlled. Cont PPI. Referred to [facility] to consider EGD.     Colon cancer:  Dx in 2014. S/p R hemicolectomy.  S/p adjuvant chemotherapy.  Follows with [facility]; monitoring  imaging.  PET scan 1-2021 suspicious for recurrence.  C scope 2-2021 with polyp.  Pathology with colitis.  Negative for dysplasia.    Thyroid Nodule:    Incidental finding on CT for cancer surveillence with small R thyroid nodule. Thyroid ultrasound 9/2020 with solid nodule on the right side.  Saw Endo. Chronic dysphagia less likely related to thyroid.  FNA 10/2020 was benign.  Was planning on F/u 1 year with U/S.  FNA  benign.  Will need to reorder thyroid ultrasound. Following with endocrinology.  TSH WNL; monitor annually.  Lab Results   Component Value Date    TSH 0.792 09/14/2022     ?Osteoporosis, Vitamin D deficiency:   Unclear history.  DEXA  with osteopenia, intermediate/borderline high FRAX score.  Is on raloxifene.  +Tobacco.    Exercise: as above              DEXA with intermediate FRAX score. Vitamin-D wnl. Cont OTC Citracal, Oscal D. Cont raloxifene.  Encouraged exercise and tobacco cessation.  Lab Results   Component Value Date    [id number] 56 06/01/2021    [id number] 40 11/20/2020     Polyneuropathy:    Fingertips, toes. H/o chemo.  On Cymbalta with some relief.  Gabapentin 300 did not help in the past.              Stable. 2/2 chemo.  Continue Cymbalta. B12 wnl. 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  Follows with [facility], Dr. Cabrera.      Anxiety, Insomnia:    Takes about 3 tablets of Xanax a month.   reviewed.  She fills this every few months.  Has been prescribed by Dr. Paul, PCP, and Dr. Hubbard, [facility] in the past.    Refilled Vistaril p.r.n..    Review of Systems   Constitutional:  Negative for activity change.   Respiratory:  Negative for wheezing.    Cardiovascular:  Negative for leg swelling.   Musculoskeletal:  Positive for arthralgias. Negative for gait problem.   Skin:  Positive for rash. Negative for wound.  "  Psychiatric/Behavioral:  Negative for confusion. The patient is not nervous/anxious.        I personally reviewed Past Medical History, Past Surgical History, Social History, and Family History.    Objective:      Vitals:    11/10/22 1339   BP: 128/62   Pulse: 91   SpO2: 96%   Weight: 69.7 kg (153 lb 10.6 oz)   Height: 4' 9" (1.448 m)          Physical Exam  Constitutional:       General: She is not in acute distress.     Appearance: She is not diaphoretic.   HENT:      Head: Normocephalic and atraumatic.   Eyes:      General: No scleral icterus.        Right eye: No discharge.         Left eye: No discharge.   Cardiovascular:      Rate and Rhythm: Normal rate and regular rhythm.      Heart sounds: Normal heart sounds. No murmur heard.  Pulmonary:      Effort: Pulmonary effort is normal. No respiratory distress.      Breath sounds: Normal breath sounds. No wheezing.   Abdominal:      General: Bowel sounds are normal. There is no distension.      Palpations: Abdomen is soft.      Tenderness: There is no abdominal tenderness.   Musculoskeletal:         General: No deformity.      Right lower leg: No edema.      Left lower leg: No edema.   Skin:     General: Skin is warm and dry.      Findings: Erythema and rash present.          Neurological:      Mental Status: She is alert.      Gait: Gait normal.   Psychiatric:         Behavior: Behavior normal.         Lab Results   Component Value Date    WBC 15.88 (H) 06/21/2022    HGB 12.7 06/21/2022    HCT 41.5 06/21/2022     06/21/2022    CHOL 133 09/14/2022    TRIG 105 09/14/2022    HDL 37 (L) 09/14/2022    ALT 19 06/20/2022    AST 23 06/20/2022     06/21/2022    K 5.0 06/21/2022     06/21/2022    CREATININE 0.9 06/21/2022    BUN 16 06/21/2022    CO2 22 (L) 06/21/2022    TSH 0.792 09/14/2022    HGBA1C 7.4 (H) 09/14/2022       The 10-year ASCVD risk score (Kirk DUDLEY, et al., 2019) is: 20.2%    Values used to calculate the score:      Age: 76 years      " Sex: Female      Is Non- : Yes      Diabetic: Yes      Tobacco smoker: No      Systolic Blood Pressure: 128 mmHg      Is BP treated: Yes      HDL Cholesterol: 37 mg/dL      Total Cholesterol: 133 mg/dL    (Imaging have been independently reviewed)  CXR without acute abnormality.    Assessment:       1. Intertrigo    2. Tinea corporis    3. Lesion of labia    4. Chronic left shoulder pain    5. Essential hypertension    6. Type 2 diabetes mellitus with hyperglycemia, without long-term current use of insulin    7. Mixed hyperlipidemia    8. History of tobacco use              Plan:       Marizol was seen today for rash.    Diagnoses and all orders for this visit:    Intertrigo  Comments:  Rx topical antifungal. Keep area dry, breathable clothing. May take PO antihistamine PRN qHS for pruritis.  Orders:  -     ketoconazole (NIZORAL) 2 % cream; Apply topically 2 (two) times daily. Use for minimum of 2 weeks    Tinea corporis  Comments:  Topical antifungal as above for her back as well.  Orders:  -     ketoconazole (NIZORAL) 2 % cream; Apply topically 2 (two) times daily. Use for minimum of 2 weeks    Lesion of labia  Comments:  Recurrent boils. Refer to OBGYN for pelvic exam. ?bartholin cyst, HSV  Orders:  -     Ambulatory referral/consult to Obstetrics / Gynecology; Future    Chronic left shoulder pain  Comments:  Chronic. Possible frozen shoulder. Provided HEP. Refer to Ortho.  Orders:  -     Ambulatory referral/consult to Orthopedics; Future    Essential hypertension  Comments:  Controlled.  Continue Norvasc 5. New slightly elevated microalbumin creatinine ratio. Consider switching CCB to ARB if persistent.    Type 2 diabetes mellitus with hyperglycemia, without long-term current use of insulin  Comments:  A1C elevated. Cont metformin 1000 XR daily. B12 wnl. Consider adding Trulicity to help c weight, appetite. Will discuss during visit. Resched DM edu, podiatry  Orders:  -     Ambulatory  referral/consult to Podiatry; Future    Mixed hyperlipidemia  Comments:  FLP controlled.  Continue Lipitor 40. Monitor FLP annually    History of tobacco use  Comments:  Congratulated and encouraged continued cessation! F/u c tobacco cessation clinic.    Other orders  The following orders have not been finalized:  -     Cancel: dulaglutide (TRULICITY) 0.75 mg/0.5 mL pen injector           Side effects of medication(s) were discussed in detail and patient voiced understanding.  Patient will call back for any issues or complications.     I have spent a total of 40 minutes with the patient as well as reviewing the chart/medical record and placing orders on the day of the visit. Discussed rash, DM, labial lesion, shoulder pain.     RTC in 1 month or sooner PRN for DM. Virtual or in person.

## 2022-11-16 ENCOUNTER — OFFICE VISIT (OUTPATIENT)
Dept: FAMILY MEDICINE | Facility: CLINIC | Age: 77
End: 2022-11-16
Payer: MEDICARE

## 2022-11-16 ENCOUNTER — TELEPHONE (OUTPATIENT)
Dept: INTERNAL MEDICINE | Facility: CLINIC | Age: 77
End: 2022-11-16
Payer: MEDICARE

## 2022-11-16 DIAGNOSIS — J06.9 ACUTE URI OF MULTIPLE SITES: Primary | ICD-10-CM

## 2022-11-16 PROCEDURE — 3060F POS MICROALBUMINURIA REV: CPT | Mod: CPTII,95,, | Performed by: NURSE PRACTITIONER

## 2022-11-16 PROCEDURE — 3051F PR MOST RECENT HEMOGLOBIN A1C LEVEL 7.0 - < 8.0%: ICD-10-PCS | Mod: CPTII,95,, | Performed by: NURSE PRACTITIONER

## 2022-11-16 PROCEDURE — 1159F MED LIST DOCD IN RCRD: CPT | Mod: CPTII,95,, | Performed by: NURSE PRACTITIONER

## 2022-11-16 PROCEDURE — 99214 OFFICE O/P EST MOD 30 MIN: CPT | Mod: 95,,, | Performed by: NURSE PRACTITIONER

## 2022-11-16 PROCEDURE — 3072F LOW RISK FOR RETINOPATHY: CPT | Mod: CPTII,95,, | Performed by: NURSE PRACTITIONER

## 2022-11-16 PROCEDURE — 1159F PR MEDICATION LIST DOCUMENTED IN MEDICAL RECORD: ICD-10-PCS | Mod: CPTII,95,, | Performed by: NURSE PRACTITIONER

## 2022-11-16 PROCEDURE — 3060F PR POS MICROALBUMINURIA RESULT DOCUMENTED/REVIEW: ICD-10-PCS | Mod: CPTII,95,, | Performed by: NURSE PRACTITIONER

## 2022-11-16 PROCEDURE — 1160F RVW MEDS BY RX/DR IN RCRD: CPT | Mod: CPTII,95,, | Performed by: NURSE PRACTITIONER

## 2022-11-16 PROCEDURE — 4010F PR ACE/ARB THEARPY RXD/TAKEN: ICD-10-PCS | Mod: CPTII,95,, | Performed by: NURSE PRACTITIONER

## 2022-11-16 PROCEDURE — 3066F NEPHROPATHY DOC TX: CPT | Mod: CPTII,95,, | Performed by: NURSE PRACTITIONER

## 2022-11-16 PROCEDURE — 3072F PR LOW RISK FOR RETINOPATHY: ICD-10-PCS | Mod: CPTII,95,, | Performed by: NURSE PRACTITIONER

## 2022-11-16 PROCEDURE — 1160F PR REVIEW ALL MEDS BY PRESCRIBER/CLIN PHARMACIST DOCUMENTED: ICD-10-PCS | Mod: CPTII,95,, | Performed by: NURSE PRACTITIONER

## 2022-11-16 PROCEDURE — 4010F ACE/ARB THERAPY RXD/TAKEN: CPT | Mod: CPTII,95,, | Performed by: NURSE PRACTITIONER

## 2022-11-16 PROCEDURE — 3051F HG A1C>EQUAL 7.0%<8.0%: CPT | Mod: CPTII,95,, | Performed by: NURSE PRACTITIONER

## 2022-11-16 PROCEDURE — 3066F PR DOCUMENTATION OF TREATMENT FOR NEPHROPATHY: ICD-10-PCS | Mod: CPTII,95,, | Performed by: NURSE PRACTITIONER

## 2022-11-16 PROCEDURE — 99214 PR OFFICE/OUTPT VISIT, EST, LEVL IV, 30-39 MIN: ICD-10-PCS | Mod: 95,,, | Performed by: NURSE PRACTITIONER

## 2022-11-16 RX ORDER — PREDNISONE 20 MG/1
TABLET ORAL
Qty: 15 TABLET | Refills: 0 | Status: SHIPPED | OUTPATIENT
Start: 2022-11-16 | End: 2022-11-26

## 2022-11-16 NOTE — PROGRESS NOTES
The patient location is: in [place], [place]   The chief complaint leading to consultation is: URI    Visit type: audiovisual    Face to Face time with patient: 15  25 minutes of total time spent on the encounter, which includes face to face time and non-face to face time preparing to see the patient (eg, review of tests), Obtaining and/or reviewing separately obtained history, Documenting clinical information in the electronic or other health record, Independently interpreting results (not separately reported) and communicating results to the patient/family/caregiver, or Care coordination (not separately reported).         Each patient to whom he or she provides medical services by telemedicine is:  (1) informed of the relationship between the physician and patient and the respective role of any other health care provider with respect to management of the patient; and (2) notified that he or she may decline to receive medical services by telemedicine and may withdraw from such care at any time.    Subjective:      Patient ID: Marizol Kevin is a 76 y.o. female.  Pt new to [facility], presents virtually with acute URI symptoms that began 4 days ago after being out in public in the rain. Has hx of asthma/COPD on breo and spiriva daily and is immunosuppressed on chronic raloxifene therapy.      Sore Throat   This is a recurrent problem. The current episode started in the past 7 days. The problem has been gradually worsening. The pain is worse on the right side. There has been no fever. The pain is at a severity of 5/10. The pain is moderate. Associated symptoms include abdominal pain, coughing, ear pain, headaches, a hoarse voice, neck pain, swollen glands and trouble swallowing. Pertinent negatives include no congestion, diarrhea, drooling, ear discharge, plugged ear sensation, shortness of breath, stridor or vomiting. She has had no exposure to strep or mono. She has tried NSAIDs for the  symptoms. The treatment provided mild relief.   Review of Systems   Constitutional:  Negative for activity change, appetite change, fever and unexpected weight change.   HENT:  Positive for ear pain, hoarse voice, postnasal drip, sinus pressure/congestion, sore throat and trouble swallowing. Negative for nasal congestion, drooling, ear discharge and rhinorrhea.    Respiratory:  Positive for cough. Negative for chest tightness, shortness of breath, wheezing and stridor.    Cardiovascular:  Negative for chest pain and palpitations.   Gastrointestinal:  Positive for abdominal pain. Negative for change in bowel habit, constipation, diarrhea, nausea, vomiting and change in bowel habit.   Genitourinary: Negative.  Negative for difficulty urinating and dysuria.   Musculoskeletal:  Positive for neck pain. Negative for myalgias.   Integumentary:  Negative for rash.   Neurological:  Positive for headaches.   All other systems reviewed and are negative.      Objective:   There were no vitals filed for this visit.  Physical Exam  Vitals and nursing note reviewed.   Constitutional:       General: She is not in acute distress.     Appearance: Normal appearance. She is well-developed and well-groomed. She is obese. She is not ill-appearing.   HENT:      Head: Normocephalic and atraumatic.      Right Ear: External ear normal.      Left Ear: External ear normal.      Nose: Nose normal.      Mouth/Throat:      Lips: Pink.      Mouth: Mucous membranes are moist.   Eyes:      General: Lids are normal. Vision grossly intact. Gaze aligned appropriately.      Conjunctiva/sclera: Conjunctivae normal.   Neck:      Trachea: Phonation normal.   Pulmonary:      Effort: Pulmonary effort is normal. No accessory muscle usage or respiratory distress.   Musculoskeletal:      Cervical back: Neck supple.   Skin:     General: Skin is warm and dry.      Findings: No rash.   Neurological:      General: No focal deficit present.      Mental Status: She  is alert and oriented to person, place, and time. Mental status is at baseline.   Psychiatric:         Attention and Perception: Attention normal.         Mood and Affect: Mood and affect normal.         Speech: Speech normal.         Behavior: Behavior normal. Behavior is cooperative.         Thought Content: Thought content normal.         Cognition and Memory: Cognition normal.     Assessment and Plan:     1. Acute URI of multiple sites  Pt with flu like symptoms, recommend flu and covid testing for definitive treatment  Symptomatic therapy suggested: rest, increase fluids, gargle prn for sore throat, apply heat to sinuses prn, use mist of vaporizer prn, OTC acetaminophen, ibuprofen, antihistamine-decongestant of choice, cough suppressant of choice, and call prn if symptoms persist or worsen. Call or return to clinic prn if these symptoms worsen or fail to improve as anticipated.  - predniSONE (DELTASONE) 20 MG tablet; Take 2 tablets (40 mg total) by mouth once daily for 5 days, THEN 1 tablet (20 mg total) once daily for 5 days.  Dispense: 15 tablet; Refill: 0           ELIZABETH Almanzar, FNP-C  Family/Internal Medicine  [facility]

## 2022-11-16 NOTE — TELEPHONE ENCOUNTER
"Returned pt's call.  Pt states she went out in the rain on Saturday and got wet. Began feeling bad afterwards with hoarseness, thick productive cough with "grey" mucous, like mere.Unsure if she has fever as she has not taken her temp. Also has some abd discomfort on her side which also makes her back sore. Pt c/o body aches.    Made VV as discussed as pt shows she's active on portal. Sent VV instructions as well. Pt's daughter is present and can assist.    Pt has not tested for Covid at home recently.    In case of any issues, also gave info for [facility].    Pt verbalized understanding and had no further questions/all questions answered.    Encouraged pt to call back for any needs.    "

## 2022-11-16 NOTE — TELEPHONE ENCOUNTER
----- Message from Cindy Babcock sent at 11/16/2022 10:57 AM CST -----  Regarding: [id number]  Type:  Sooner Appointment Request    Patient is requesting a sooner appointment.  Patient declined first available appointment listed as well as another facility and provider .  Patient will not accept being placed on the waitlist and is requesting a message be sent to doctor.    Name of Caller: self    When is the first available appointment? 2/7    Symptoms: stomach ache, coughing up grey thick mucus, cant talk .    Would the patient rather a call back or a response via [facility]?     Best Call Back Number: [phone]        Additional Information: pt states she got caught in the rain Saturday and will likea sooner appt

## 2022-11-17 ENCOUNTER — TELEPHONE (OUTPATIENT)
Dept: INTERNAL MEDICINE | Facility: CLINIC | Age: 77
End: 2022-11-17
Payer: MEDICARE

## 2022-11-17 ENCOUNTER — OFFICE VISIT (OUTPATIENT)
Dept: URGENT CARE | Facility: CLINIC | Age: 77
End: 2022-11-17
Payer: MEDICARE

## 2022-11-17 VITALS
TEMPERATURE: 98 F | HEART RATE: 89 BPM | WEIGHT: 153 LBS | HEIGHT: 57 IN | SYSTOLIC BLOOD PRESSURE: 139 MMHG | BODY MASS INDEX: 33.01 KG/M2 | DIASTOLIC BLOOD PRESSURE: 78 MMHG | OXYGEN SATURATION: 98 % | RESPIRATION RATE: 18 BRPM

## 2022-11-17 DIAGNOSIS — M54.32 BILATERAL SCIATICA: ICD-10-CM

## 2022-11-17 DIAGNOSIS — J06.9 VIRAL URI WITH COUGH: Primary | ICD-10-CM

## 2022-11-17 DIAGNOSIS — M54.31 BILATERAL SCIATICA: ICD-10-CM

## 2022-11-17 LAB
CTP QC/QA: YES
CTP QC/QA: YES
POC MOLECULAR INFLUENZA A AGN: NEGATIVE
POC MOLECULAR INFLUENZA B AGN: NEGATIVE
SARS-COV-2 RDRP RESP QL NAA+PROBE: NEGATIVE

## 2022-11-17 PROCEDURE — 96372 PR INJECTION,THERAP/PROPH/DIAG2ST, IM OR SUBCUT: ICD-10-PCS | Mod: S$GLB,,, | Performed by: NURSE PRACTITIONER

## 2022-11-17 PROCEDURE — 99214 PR OFFICE/OUTPT VISIT, EST, LEVL IV, 30-39 MIN: ICD-10-PCS | Mod: 25,S$GLB,, | Performed by: NURSE PRACTITIONER

## 2022-11-17 PROCEDURE — 96372 THER/PROPH/DIAG INJ SC/IM: CPT | Mod: S$GLB,,, | Performed by: NURSE PRACTITIONER

## 2022-11-17 PROCEDURE — 87502 POCT INFLUENZA A/B MOLECULAR: ICD-10-PCS | Mod: QW,S$GLB,, | Performed by: NURSE PRACTITIONER

## 2022-11-17 PROCEDURE — 4010F PR ACE/ARB THEARPY RXD/TAKEN: ICD-10-PCS | Mod: CPTII,S$GLB,, | Performed by: NURSE PRACTITIONER

## 2022-11-17 PROCEDURE — 3051F PR MOST RECENT HEMOGLOBIN A1C LEVEL 7.0 - < 8.0%: ICD-10-PCS | Mod: CPTII,S$GLB,, | Performed by: NURSE PRACTITIONER

## 2022-11-17 PROCEDURE — 3008F PR BODY MASS INDEX (BMI) DOCUMENTED: ICD-10-PCS | Mod: CPTII,S$GLB,, | Performed by: NURSE PRACTITIONER

## 2022-11-17 PROCEDURE — 3051F HG A1C>EQUAL 7.0%<8.0%: CPT | Mod: CPTII,S$GLB,, | Performed by: NURSE PRACTITIONER

## 2022-11-17 PROCEDURE — 3008F BODY MASS INDEX DOCD: CPT | Mod: CPTII,S$GLB,, | Performed by: NURSE PRACTITIONER

## 2022-11-17 PROCEDURE — 3066F NEPHROPATHY DOC TX: CPT | Mod: CPTII,S$GLB,, | Performed by: NURSE PRACTITIONER

## 2022-11-17 PROCEDURE — 3075F SYST BP GE 130 - 139MM HG: CPT | Mod: CPTII,S$GLB,, | Performed by: NURSE PRACTITIONER

## 2022-11-17 PROCEDURE — 3066F PR DOCUMENTATION OF TREATMENT FOR NEPHROPATHY: ICD-10-PCS | Mod: CPTII,S$GLB,, | Performed by: NURSE PRACTITIONER

## 2022-11-17 PROCEDURE — 1159F PR MEDICATION LIST DOCUMENTED IN MEDICAL RECORD: ICD-10-PCS | Mod: CPTII,S$GLB,, | Performed by: NURSE PRACTITIONER

## 2022-11-17 PROCEDURE — U0002 COVID-19 LAB TEST NON-CDC: HCPCS | Mod: QW,S$GLB,, | Performed by: NURSE PRACTITIONER

## 2022-11-17 PROCEDURE — 3078F DIAST BP <80 MM HG: CPT | Mod: CPTII,S$GLB,, | Performed by: NURSE PRACTITIONER

## 2022-11-17 PROCEDURE — 3072F PR LOW RISK FOR RETINOPATHY: ICD-10-PCS | Mod: CPTII,S$GLB,, | Performed by: NURSE PRACTITIONER

## 2022-11-17 PROCEDURE — 3072F LOW RISK FOR RETINOPATHY: CPT | Mod: CPTII,S$GLB,, | Performed by: NURSE PRACTITIONER

## 2022-11-17 PROCEDURE — 87502 INFLUENZA DNA AMP PROBE: CPT | Mod: QW,S$GLB,, | Performed by: NURSE PRACTITIONER

## 2022-11-17 PROCEDURE — 99214 OFFICE O/P EST MOD 30 MIN: CPT | Mod: 25,S$GLB,, | Performed by: NURSE PRACTITIONER

## 2022-11-17 PROCEDURE — 3078F PR MOST RECENT DIASTOLIC BLOOD PRESSURE < 80 MM HG: ICD-10-PCS | Mod: CPTII,S$GLB,, | Performed by: NURSE PRACTITIONER

## 2022-11-17 PROCEDURE — 3060F POS MICROALBUMINURIA REV: CPT | Mod: CPTII,S$GLB,, | Performed by: NURSE PRACTITIONER

## 2022-11-17 PROCEDURE — 4010F ACE/ARB THERAPY RXD/TAKEN: CPT | Mod: CPTII,S$GLB,, | Performed by: NURSE PRACTITIONER

## 2022-11-17 PROCEDURE — 3075F PR MOST RECENT SYSTOLIC BLOOD PRESS GE 130-139MM HG: ICD-10-PCS | Mod: CPTII,S$GLB,, | Performed by: NURSE PRACTITIONER

## 2022-11-17 PROCEDURE — 1160F PR REVIEW ALL MEDS BY PRESCRIBER/CLIN PHARMACIST DOCUMENTED: ICD-10-PCS | Mod: CPTII,S$GLB,, | Performed by: NURSE PRACTITIONER

## 2022-11-17 PROCEDURE — 1160F RVW MEDS BY RX/DR IN RCRD: CPT | Mod: CPTII,S$GLB,, | Performed by: NURSE PRACTITIONER

## 2022-11-17 PROCEDURE — U0002: ICD-10-PCS | Mod: QW,S$GLB,, | Performed by: NURSE PRACTITIONER

## 2022-11-17 PROCEDURE — 1159F MED LIST DOCD IN RCRD: CPT | Mod: CPTII,S$GLB,, | Performed by: NURSE PRACTITIONER

## 2022-11-17 PROCEDURE — 3060F PR POS MICROALBUMINURIA RESULT DOCUMENTED/REVIEW: ICD-10-PCS | Mod: CPTII,S$GLB,, | Performed by: NURSE PRACTITIONER

## 2022-11-17 RX ORDER — TIZANIDINE 4 MG/1
4 TABLET ORAL EVERY 8 HOURS
Qty: 30 TABLET | Refills: 0 | Status: SHIPPED | OUTPATIENT
Start: 2022-11-17 | End: 2022-11-27

## 2022-11-17 RX ORDER — BENZONATATE 200 MG/1
200 CAPSULE ORAL 3 TIMES DAILY PRN
Qty: 30 CAPSULE | Refills: 0 | Status: SHIPPED | OUTPATIENT
Start: 2022-11-17 | End: 2022-11-27

## 2022-11-17 RX ORDER — KETOROLAC TROMETHAMINE 30 MG/ML
30 INJECTION, SOLUTION INTRAMUSCULAR; INTRAVENOUS
Status: COMPLETED | OUTPATIENT
Start: 2022-11-17 | End: 2022-11-17

## 2022-11-17 RX ADMIN — KETOROLAC TROMETHAMINE 30 MG: 30 INJECTION, SOLUTION INTRAMUSCULAR; INTRAVENOUS at 01:11

## 2022-11-17 NOTE — TELEPHONE ENCOUNTER
----- Message from Anni Gillis sent at 11/17/2022 11:51 AM CST -----  Regarding: questions  Name of Who is Calling: Marizol           What is the request in detail: Patient is requesting a call back to find out if she can come to the office to be swab for COVID and FLU. She just left [facility] and was told they only do COVID injections.           Can the clinic reply by [facility]: No           What Number to Call Back if not in [facility]: [phone]

## 2022-11-17 NOTE — PROGRESS NOTES
"Subjective:       Patient ID: Marizol Kevin is a 76 y.o. female.    Vitals:  height is 4' 9" (1.448 m) and weight is 69.4 kg (153 lb). Her temperature is 98.1 °F (36.7 °C). Her blood pressure is 139/78 and her pulse is 89. Her respiration is 18 and oxygen saturation is 98%.     Chief Complaint: Cough    75 y/o female presents to  today with c/o cough and body aches x3 days. Body aches include significant pain to bilateral buttock areas-over sciatic nerves. Pt reports a h/o back pain. Denies dysuria. Reports in past she got a morphine shot for her pain, along with an "injection to her spine."  Patient did a virtual visit with a provider, and was prescribed prednisone which she has not started. Patient requests a flu and covid test in clinic. No fever. Denies radiating pain down her legs.           Cough  This is a new problem. Episode onset: Monday. The problem has been unchanged. The problem occurs every few minutes. The cough is Productive of sputum. Associated symptoms include myalgias. Pertinent negatives include no fever. Treatments tried: prednisone.     Constitution: Negative for fever.   Respiratory:  Positive for cough.    Musculoskeletal:  Positive for muscle ache.     Objective:      Physical Exam   Constitutional: She is oriented to person, place, and time. She appears well-developed. She is cooperative. No distress.   HENT:   Head: Normocephalic and atraumatic.   Ears:   Right Ear: Tympanic membrane, external ear and ear canal normal.   Left Ear: Tympanic membrane, external ear and ear canal normal.   Nose: Congestion present.   Mouth/Throat: Oropharynx is clear and moist and mucous membranes are normal. Mucous membranes are moist. No oropharyngeal exudate or posterior oropharyngeal erythema. Oropharynx is clear.   Eyes: Lids are normal. Right eye exhibits no discharge. Left eye exhibits no discharge.   Neck: Trachea normal and phonation normal. Neck supple. No neck rigidity present. "   Cardiovascular: Normal rate, regular rhythm and normal heart sounds.   Pulmonary/Chest: Effort normal and breath sounds normal.   Abdominal: Normal appearance. She exhibits no distension and no mass. Soft. flat abdomen There is no abdominal tenderness. There is no left CVA tenderness and no right CVA tenderness.   Musculoskeletal: Normal range of motion.         General: Tenderness present. No swelling, deformity or signs of injury. Normal range of motion.      Cervical back: She exhibits no tenderness.      Right lower leg: No edema.      Left lower leg: No edema.      Comments: Tenderness over bilateral sciatic areas (at buttocks)    Neurological: no focal deficit. She is alert and oriented to person, place, and time. She has normal strength and normal reflexes. She displays no weakness. No sensory deficit. Coordination and gait normal.   Skin: Skin is warm, dry, intact and not diaphoretic.   Psychiatric: Her speech is normal and behavior is normal. Judgment and thought content normal.   Nursing note and vitals reviewed.      Assessment:       1. Viral URI with cough    2. Bilateral sciatica          Plan:         Viral URI with cough  -     POCT Influenza A/B MOLECULAR  -     POCT COVID-19 Rapid Screening  -     benzonatate (TESSALON) 200 MG capsule; Take 1 capsule (200 mg total) by mouth 3 (three) times daily as needed for Cough.  Dispense: 30 capsule; Refill: 0    Bilateral sciatica  -     ketorolac injection 30 mg  -     tiZANidine (ZANAFLEX) 4 MG tablet; Take 1 tablet (4 mg total) by mouth every 8 (eight) hours. for 10 days  Dispense: 30 tablet; Refill: 0

## 2022-11-21 ENCOUNTER — HOSPITAL ENCOUNTER (EMERGENCY)
Facility: OTHER | Age: 77
Discharge: HOME | End: 2022-11-21
Attending: EMERGENCY MEDICINE
Payer: MEDICARE

## 2022-11-21 VITALS
BODY MASS INDEX: 33.01 KG/M2 | RESPIRATION RATE: 18 BRPM | WEIGHT: 153 LBS | OXYGEN SATURATION: 96 % | DIASTOLIC BLOOD PRESSURE: 71 MMHG | SYSTOLIC BLOOD PRESSURE: 129 MMHG | HEIGHT: 57 IN | HEART RATE: 88 BPM | TEMPERATURE: 98 F

## 2022-11-21 DIAGNOSIS — M54.32 SCIATICA OF LEFT SIDE: ICD-10-CM

## 2022-11-21 DIAGNOSIS — M54.42 ACUTE BILATERAL LOW BACK PAIN WITH LEFT-SIDED SCIATICA: Primary | ICD-10-CM

## 2022-11-21 PROCEDURE — 25000003 PHARM REV CODE 250: Performed by: PHYSICIAN ASSISTANT

## 2022-11-21 PROCEDURE — 25000003 PHARM REV CODE 250

## 2022-11-21 PROCEDURE — 63600175 PHARM REV CODE 636 W HCPCS: Performed by: PHYSICIAN ASSISTANT

## 2022-11-21 PROCEDURE — 96372 THER/PROPH/DIAG INJ SC/IM: CPT | Performed by: PHYSICIAN ASSISTANT

## 2022-11-21 PROCEDURE — 99284 EMERGENCY DEPT VISIT MOD MDM: CPT | Mod: 25

## 2022-11-21 RX ORDER — LIDOCAINE 50 MG/G
1 PATCH TOPICAL
Status: DISCONTINUED | OUTPATIENT
Start: 2022-11-21 | End: 2022-11-21 | Stop reason: HOSPADM

## 2022-11-21 RX ORDER — KETOROLAC TROMETHAMINE 10 MG/1
10 TABLET, FILM COATED ORAL EVERY 6 HOURS
Qty: 12 TABLET | Refills: 0 | Status: SHIPPED | OUTPATIENT
Start: 2022-11-21 | End: 2022-11-21 | Stop reason: SDUPTHER

## 2022-11-21 RX ORDER — ORPHENADRINE CITRATE 100 MG/1
100 TABLET, EXTENDED RELEASE ORAL
Status: COMPLETED | OUTPATIENT
Start: 2022-11-21 | End: 2022-11-21

## 2022-11-21 RX ORDER — ACETAMINOPHEN 500 MG
1000 TABLET ORAL
Status: COMPLETED | OUTPATIENT
Start: 2022-11-21 | End: 2022-11-21

## 2022-11-21 RX ORDER — KETOROLAC TROMETHAMINE 30 MG/ML
15 INJECTION, SOLUTION INTRAMUSCULAR; INTRAVENOUS
Status: COMPLETED | OUTPATIENT
Start: 2022-11-21 | End: 2022-11-21

## 2022-11-21 RX ORDER — METHOCARBAMOL 750 MG/1
750 TABLET, FILM COATED ORAL 4 TIMES DAILY
Qty: 40 TABLET | Refills: 0 | Status: SHIPPED | OUTPATIENT
Start: 2022-11-21 | End: 2022-12-01

## 2022-11-21 RX ORDER — KETOROLAC TROMETHAMINE 10 MG/1
10 TABLET, FILM COATED ORAL EVERY 6 HOURS
Qty: 12 TABLET | Refills: 0 | Status: SHIPPED | OUTPATIENT
Start: 2022-11-21 | End: 2022-11-24

## 2022-11-21 RX ADMIN — ACETAMINOPHEN 1000 MG: 500 TABLET ORAL at 01:11

## 2022-11-21 RX ADMIN — ORPHENADRINE CITRATE 100 MG: 100 TABLET, EXTENDED RELEASE ORAL at 01:11

## 2022-11-21 RX ADMIN — LIDOCAINE 1 PATCH: 50 PATCH CUTANEOUS at 01:11

## 2022-11-21 RX ADMIN — KETOROLAC TROMETHAMINE 15 MG: 30 INJECTION, SOLUTION INTRAMUSCULAR; INTRAVENOUS at 01:11

## 2022-11-21 NOTE — ED PROVIDER NOTES
Source of History:  Patient    Chief complaint:  Back Pain (Pt c/o back spasms since Thursday. Pt stated she was treated at urgent care on Thursday for the same problem. Pt had to be treated with epidural injections in the back years ago. )      HPI:  Marizol Kevin is a 76 y.o. female presenting with lower back pain x 1 week. Patient describes the pain as sharp and radiates down her left buttock and leg. She endorses moments of weakness in her left leg when the pain is shooting while she is standing.  Patient was seen for her symptoms at urgent care last week and given a Toradol shot and Zanaflex.  She states that it helped temporarily but the pain is continuing.  She reports a history of this pain for which she had been seen by pain management and given multiple epidural steroid injections, but last was a few years ago.  Additionally, patient is currently on a course of prednisone for upper respiratory infection, which is the only medication she is currently taking for her back pain.  She stopped taking the Zanaflex yesterday because it was not working.  Denies fever, chills, shortness of breath, chest pain, bowel or bladder incontinence.      This is the extent to the patients complaints today here in the emergency department.    ROS: As per HPI and below:  Constitutional: No fever.  No chills.  Eyes: No visual changes.   ENT: No sore throat. No ear pain.  Urinary: No abnormal urination.  MSK: + lower back pain  Integument: No rashes or lesions.    Review of patient's allergies indicates:   Allergen Reactions    Sulfa (sulfonamide antibiotics)      Occurred when she was pregnant with varicose veins resulting in leg swelling and pain with standing       PMH:  As per HPI and below:  Past Medical History:   Diagnosis Date    Allergy     Anxiety     Cancer     colon    Cataract     Colon cancer     Diabetes mellitus, type 2     Dry eye syndrome     Hyperlipidemia     Hypertension     Insomnia     Squamous  "blepharitis      Past Surgical History:   Procedure Laterality Date    CATARACT EXTRACTION, BILATERAL  2020    CHOLECYSTECTOMY      COLONOSCOPY N/A 2021    Procedure: COLONOSCOPY;  Surgeon: Ashwini Duarte MD;  Location: [address]);  Service: Endoscopy;  Laterality: N/A;  medical transportation  covid test  Margy, enrique mailed-KPvt    HYSTERECTOMY      KNEE SURGERY      LAPAROSCOPIC LEFT COLON RESECTION         Social History     Tobacco Use    Smoking status: Former     Packs/day: 1.00     Years: 51.00     Pack years: 51.00     Types: Cigarettes     Quit date: 2022     Years since quittin.1    Smokeless tobacco: Never    Tobacco comments:     smoking cessation information given    Substance Use Topics    Alcohol use: Yes     Alcohol/week: 1.0 standard drink     Types: 1 Shots of liquor per week     Comment: occ    Drug use: Never       Physical Exam:    /76 (BP Location: Left arm, Patient Position: Sitting)   Pulse 93   Temp 98.2 °F (36.8 °C) (Oral)   Resp 17   Ht 4' 9" (1.448 m)   Wt 69.4 kg (153 lb)   SpO2 95%   BMI 33.11 kg/m²   Nursing note and vital signs reviewed.  Constitutional: No acute distress.  Eyes: No conjunctival injection.  Extraocular muscles are intact.  ENT: Normal phonation.  Musculoskeletal:  Tenderness to palpation to bilateral lumbar paraspinal muscles with spasm present in left lumbar paraspinal muscle.  Normal flexion and extension of back.  No midline tenderness, crepitus, or step-offs.  Sensation intact.  Strength 5/5 to bilateral upper and lower extremities.  Skin: No rashes seen.  Good turgor.  No abrasions.  No ecchymoses.  Psych: Appropriate, conversant.      I decided to obtain the patient's medical records.    Results for orders placed or performed in visit on 22   POCT Influenza A/B MOLECULAR   Result Value Ref Range    POC Molecular Influenza A Ag Negative Negative, Not Reported    POC Molecular Influenza B Ag Negative " Negative, Not Reported     Acceptable Yes    POCT COVID-19 Rapid Screening   Result Value Ref Range    POC Rapid COVID Negative Negative     Acceptable Yes      Imaging Results    None         MDM:    Urgent evaluation of a 76-year-old female back pain.  Patient appears uncomfortable and is slow to change positions however patient ambulates normally and FROM, full flexion and extension of all extremities and back. Patient's pain is localized and reproducible with palpation of  lumbar paraspinal area.  No midline tenderness, step-offs or deformities of the cervical, thoracic or lumbar spine. Patient neurovascularly intact, strength 5/5 equal bilaterally. I do not feel imaging at this time is necessary as low suspicion for acute bony deformity.  Will administer supportive treatment with anti-inflammatories and muscle relaxants for back spasm and strain and reassess    Differential diagnosis includes but is not limited to:  Muscle spasm, muscle strain, herniated disc, lumbar radiculopathy, fracture, dislocation, degenerative disc disease    ED management  No red flags on presentation or physical exam. Unlikely to be spinal stenosis as there are no neurologic findings, does not appear to be infectious given no fever, no point tenderness. Likely musculoskeletal pain. I will d/c home with anti-inflammatories and muscle relaxer. Will instruct pt to follow up with PCP in one week if symptoms do not improve.  Given that patient has a history of this and prior epidural injections, will refer to pain management clinic and [facility] therapy.  We disscused at length warning signs for return including but not limited to increased pain, change in gait, sensation changes around the rectum or perineum, bowel or bladder issues, fever and the pt understands. The pt is comfortable going home at this time. After taking into careful account the historical factors and physical exam  findings of the patient's presentation today, in conjunction with the empirical and objective data obtained on ED workup, no acute emergent medical condition requiring admission has been identified. The patient appears to be low risk for an emergent medical condition and I feel it is safe and appropriate at this time for the patient to be discharged to follow-up as detailed in their discharge instructions for reevaluation and possible continued outpatient workup and management. I have discussed the specifics of the workup with the patient and the patient has verbalized understanding of the details of the workup, the diagnosis, the treatment plan, and the need for outpatient follow-up.  Although the patient has no emergent etiology today this does not preclude the development of an emergent condition so in addition, I have advised the patient that they can return to the ED and/or activate EMS at any time with worsening of their symptoms, change of their symptoms, or with any other medical complaint.  The patient remained comfortable and stable during their visit in the ED.  Discharge and follow-up instructions discussed with the patient who expressed understanding and willingness to comply with my recommendations.     This medical record was prepared using voice dictation software. There may be phonetic errors.      Diagnostic Impression:    1. Acute bilateral low back pain with left-sided sciatica    2. Sciatica of left side         ED Disposition Condition    Discharge Stable            ED Prescriptions       Medication Sig Dispense Start Date End Date Auth. Provider    methocarbamoL (ROBAXIN) 750 MG Tab Take 1 tablet (750 mg total) by mouth 4 (four) times daily. for 10 days 40 tablet 11/21/2022 12/1/2022 Berta Lang PA-C    ketorolac (TORADOL) 10 mg tablet  (Status: Discontinued) Take 1 tablet (10 mg total) by mouth every 6 (six) hours. for 3 days 12 tablet 11/21/2022 11/21/2022 Berta Lang PA-C     ketorolac (TORADOL) 10 mg tablet Take 1 tablet (10 mg total) by mouth every 6 (six) hours. for 3 days 12 tablet 11/21/2022 11/24/2022 Berta Lang PA-C          Follow-up Information       Follow up With Specialties Details Why Contact Info Additional Information    Belinda Fajardo MD Internal Medicine   [address]  [address]  [facility] [address]  [phone]       [facility] Emergency Dept Emergency Medicine  If symptoms worsen [address] [id number] [phone]     [facility] Pain Management Pain Medicine Schedule an appointment as soon as possible for a visit in 1 week  [address] [id number] [phone] Pain Management Clinic - [facility], 9th Floor, [address] Please park in [place] and use [address] elevators            Please pardon typos or dictation errors, as this note was transcribed using M*Modal fluency direct dictation software.      Berta Lang PA-C  11/21/22 [id number]

## 2022-11-21 NOTE — FIRST PROVIDER EVALUATION
Emergency Department TeleTriage Encounter Note      CHIEF COMPLAINT    Chief Complaint   Patient presents with    Back Pain     Pt c/o back spasms since Thursday. Pt stated she was treated at urgent care on Thursday for the same problem. Pt had to be treated with epidural injections in the back years ago.        VITAL SIGNS   Initial Vitals [11/21/22 1022]   BP Pulse Resp Temp SpO2   136/76 93 17 98.2 °F (36.8 °C) 95 %      MAP       --            ALLERGIES    Review of patient's allergies indicates:   Allergen Reactions    Sulfa (sulfonamide antibiotics)      Occurred when she was pregnant with varicose veins resulting in leg swelling and pain with standing       PROVIDER TRIAGE NOTE  This is a teletriage evaluation of a 76 y.o. female presenting to the ED with c/o low back pain x 2 weeks, intermittent rad down LLE without numbness/weakness. No urinary s/s. Taking zanaflex from urgent care, just finished prednisone for recent URI. No meds today.    PE:. Non-toxic/well-appearing. No respiratory distress, speaks in full sentences without issue. No active emesis nor cough. Normal eye contact and mentation.     Plan: meds. Further/augmented workup at discretion of examining provider.     All ED beds are full at present; patient notified of this status.  Patient seen and medically screened by IRMA via teletriage. Orders initiated at triage to expedite care.  Patient is stable and will be placed in an ED bed when available.  Care will be transferred to an alternate provider when patient has been placed in an Exam Room further exam, additional orders, and disposition.         ORDERS  Labs Reviewed - No data to display    ED Orders (720h ago, onward)      None              Virtual Visit Note: The provider triage portion of this emergency department evaluation and documentation was performed via PowerReviews, a HIPAA-compliant telemedicine application, in concert with a tele-presenter in the room. A face to face patient  evaluation with one of my colleagues will occur once the patient is placed in an emergency department room.      DISCLAIMER: This note was prepared with [employer] voice recognition transcription software. Garbled syntax, mangled pronouns, and other bizarre constructions may be attributed to that software system.

## 2022-11-25 ENCOUNTER — PES CALL (OUTPATIENT)
Dept: ADMINISTRATIVE | Facility: CLINIC | Age: 77
End: 2022-11-25
Payer: MEDICARE

## 2022-12-01 ENCOUNTER — TELEPHONE (OUTPATIENT)
Dept: ORTHOPEDICS | Facility: CLINIC | Age: 77
End: 2022-12-01
Payer: MEDICARE

## 2022-12-01 DIAGNOSIS — G89.29 CHRONIC LEFT SHOULDER PAIN: Primary | ICD-10-CM

## 2022-12-01 DIAGNOSIS — M25.512 CHRONIC LEFT SHOULDER PAIN: Primary | ICD-10-CM

## 2022-12-01 NOTE — TELEPHONE ENCOUNTER
LVM c pt. Attempting to confirm appt location & time c Dr. Philippe 12/06/22  with XR. Requested a call back to the [facility] at [phone] with any questions, concerns or need for a different appointment time.  My O message sent

## 2022-12-06 ENCOUNTER — HOSPITAL ENCOUNTER (OUTPATIENT)
Dept: RADIOLOGY | Facility: OTHER | Age: 77
Discharge: HOME | End: 2022-12-06
Attending: ORTHOPAEDIC SURGERY
Payer: MEDICARE

## 2022-12-06 ENCOUNTER — OFFICE VISIT (OUTPATIENT)
Dept: ORTHOPEDICS | Facility: CLINIC | Age: 77
End: 2022-12-06
Payer: MEDICARE

## 2022-12-06 VITALS
BODY MASS INDEX: 33.01 KG/M2 | HEIGHT: 57 IN | SYSTOLIC BLOOD PRESSURE: 129 MMHG | DIASTOLIC BLOOD PRESSURE: 84 MMHG | HEART RATE: 100 BPM | WEIGHT: 153 LBS

## 2022-12-06 DIAGNOSIS — G89.29 CHRONIC LEFT SHOULDER PAIN: ICD-10-CM

## 2022-12-06 DIAGNOSIS — M79.642 LEFT HAND PAIN: Primary | ICD-10-CM

## 2022-12-06 DIAGNOSIS — M25.512 CHRONIC LEFT SHOULDER PAIN: ICD-10-CM

## 2022-12-06 PROCEDURE — 3008F BODY MASS INDEX DOCD: CPT | Mod: CPTII,S$GLB,, | Performed by: STUDENT IN AN ORGANIZED HEALTH CARE EDUCATION/TRAINING PROGRAM

## 2022-12-06 PROCEDURE — 3060F PR POS MICROALBUMINURIA RESULT DOCUMENTED/REVIEW: ICD-10-PCS | Mod: CPTII,S$GLB,, | Performed by: STUDENT IN AN ORGANIZED HEALTH CARE EDUCATION/TRAINING PROGRAM

## 2022-12-06 PROCEDURE — 3288F PR FALLS RISK ASSESSMENT DOCUMENTED: ICD-10-PCS | Mod: CPTII,S$GLB,, | Performed by: STUDENT IN AN ORGANIZED HEALTH CARE EDUCATION/TRAINING PROGRAM

## 2022-12-06 PROCEDURE — 3074F SYST BP LT 130 MM HG: CPT | Mod: CPTII,S$GLB,, | Performed by: STUDENT IN AN ORGANIZED HEALTH CARE EDUCATION/TRAINING PROGRAM

## 2022-12-06 PROCEDURE — 3066F NEPHROPATHY DOC TX: CPT | Mod: CPTII,S$GLB,, | Performed by: STUDENT IN AN ORGANIZED HEALTH CARE EDUCATION/TRAINING PROGRAM

## 2022-12-06 PROCEDURE — 73030 XR SHOULDER TRAUMA 3 VIEW LEFT: ICD-10-PCS | Mod: 26,LT,, | Performed by: RADIOLOGY

## 2022-12-06 PROCEDURE — 99999 PR PBB SHADOW E&M-EST. PATIENT-LVL III: ICD-10-PCS | Mod: PBBFAC,,, | Performed by: ORTHOPAEDIC SURGERY

## 2022-12-06 PROCEDURE — 1125F PR PAIN SEVERITY QUANTIFIED, PAIN PRESENT: ICD-10-PCS | Mod: CPTII,S$GLB,, | Performed by: STUDENT IN AN ORGANIZED HEALTH CARE EDUCATION/TRAINING PROGRAM

## 2022-12-06 PROCEDURE — 3051F PR MOST RECENT HEMOGLOBIN A1C LEVEL 7.0 - < 8.0%: ICD-10-PCS | Mod: CPTII,S$GLB,, | Performed by: STUDENT IN AN ORGANIZED HEALTH CARE EDUCATION/TRAINING PROGRAM

## 2022-12-06 PROCEDURE — 99999 PR PBB SHADOW E&M-EST. PATIENT-LVL III: CPT | Mod: PBBFAC,,, | Performed by: ORTHOPAEDIC SURGERY

## 2022-12-06 PROCEDURE — 3074F PR MOST RECENT SYSTOLIC BLOOD PRESSURE < 130 MM HG: ICD-10-PCS | Mod: CPTII,S$GLB,, | Performed by: STUDENT IN AN ORGANIZED HEALTH CARE EDUCATION/TRAINING PROGRAM

## 2022-12-06 PROCEDURE — 20610 DRAIN/INJ JOINT/BURSA W/O US: CPT | Mod: LT,S$GLB,, | Performed by: STUDENT IN AN ORGANIZED HEALTH CARE EDUCATION/TRAINING PROGRAM

## 2022-12-06 PROCEDURE — 3072F LOW RISK FOR RETINOPATHY: CPT | Mod: CPTII,S$GLB,, | Performed by: STUDENT IN AN ORGANIZED HEALTH CARE EDUCATION/TRAINING PROGRAM

## 2022-12-06 PROCEDURE — 3072F PR LOW RISK FOR RETINOPATHY: ICD-10-PCS | Mod: CPTII,S$GLB,, | Performed by: STUDENT IN AN ORGANIZED HEALTH CARE EDUCATION/TRAINING PROGRAM

## 2022-12-06 PROCEDURE — 3060F POS MICROALBUMINURIA REV: CPT | Mod: CPTII,S$GLB,, | Performed by: STUDENT IN AN ORGANIZED HEALTH CARE EDUCATION/TRAINING PROGRAM

## 2022-12-06 PROCEDURE — 73030 X-RAY EXAM OF SHOULDER: CPT | Mod: TC,FY,LT

## 2022-12-06 PROCEDURE — 99204 OFFICE O/P NEW MOD 45 MIN: CPT | Mod: 25,S$GLB,, | Performed by: STUDENT IN AN ORGANIZED HEALTH CARE EDUCATION/TRAINING PROGRAM

## 2022-12-06 PROCEDURE — 4010F PR ACE/ARB THEARPY RXD/TAKEN: ICD-10-PCS | Mod: CPTII,S$GLB,, | Performed by: STUDENT IN AN ORGANIZED HEALTH CARE EDUCATION/TRAINING PROGRAM

## 2022-12-06 PROCEDURE — 4010F ACE/ARB THERAPY RXD/TAKEN: CPT | Mod: CPTII,S$GLB,, | Performed by: STUDENT IN AN ORGANIZED HEALTH CARE EDUCATION/TRAINING PROGRAM

## 2022-12-06 PROCEDURE — 73030 X-RAY EXAM OF SHOULDER: CPT | Mod: 26,LT,, | Performed by: RADIOLOGY

## 2022-12-06 PROCEDURE — 3066F PR DOCUMENTATION OF TREATMENT FOR NEPHROPATHY: ICD-10-PCS | Mod: CPTII,S$GLB,, | Performed by: STUDENT IN AN ORGANIZED HEALTH CARE EDUCATION/TRAINING PROGRAM

## 2022-12-06 PROCEDURE — 99204 PR OFFICE/OUTPT VISIT, NEW, LEVL IV, 45-59 MIN: ICD-10-PCS | Mod: 25,S$GLB,, | Performed by: STUDENT IN AN ORGANIZED HEALTH CARE EDUCATION/TRAINING PROGRAM

## 2022-12-06 PROCEDURE — 1100F PR PT FALLS ASSESS DOC 2+ FALLS/FALL W/INJURY/YR: ICD-10-PCS | Mod: CPTII,S$GLB,, | Performed by: STUDENT IN AN ORGANIZED HEALTH CARE EDUCATION/TRAINING PROGRAM

## 2022-12-06 PROCEDURE — 3079F PR MOST RECENT DIASTOLIC BLOOD PRESSURE 80-89 MM HG: ICD-10-PCS | Mod: CPTII,S$GLB,, | Performed by: STUDENT IN AN ORGANIZED HEALTH CARE EDUCATION/TRAINING PROGRAM

## 2022-12-06 PROCEDURE — 3008F PR BODY MASS INDEX (BMI) DOCUMENTED: ICD-10-PCS | Mod: CPTII,S$GLB,, | Performed by: STUDENT IN AN ORGANIZED HEALTH CARE EDUCATION/TRAINING PROGRAM

## 2022-12-06 PROCEDURE — 1125F AMNT PAIN NOTED PAIN PRSNT: CPT | Mod: CPTII,S$GLB,, | Performed by: STUDENT IN AN ORGANIZED HEALTH CARE EDUCATION/TRAINING PROGRAM

## 2022-12-06 PROCEDURE — 3051F HG A1C>EQUAL 7.0%<8.0%: CPT | Mod: CPTII,S$GLB,, | Performed by: STUDENT IN AN ORGANIZED HEALTH CARE EDUCATION/TRAINING PROGRAM

## 2022-12-06 PROCEDURE — 3288F FALL RISK ASSESSMENT DOCD: CPT | Mod: CPTII,S$GLB,, | Performed by: STUDENT IN AN ORGANIZED HEALTH CARE EDUCATION/TRAINING PROGRAM

## 2022-12-06 PROCEDURE — 3079F DIAST BP 80-89 MM HG: CPT | Mod: CPTII,S$GLB,, | Performed by: STUDENT IN AN ORGANIZED HEALTH CARE EDUCATION/TRAINING PROGRAM

## 2022-12-06 PROCEDURE — 20610 LARGE JOINT ASPIRATION/INJECTION: L SUBACROMIAL BURSA: ICD-10-PCS | Mod: LT,S$GLB,, | Performed by: STUDENT IN AN ORGANIZED HEALTH CARE EDUCATION/TRAINING PROGRAM

## 2022-12-06 PROCEDURE — 1100F PTFALLS ASSESS-DOCD GE2>/YR: CPT | Mod: CPTII,S$GLB,, | Performed by: STUDENT IN AN ORGANIZED HEALTH CARE EDUCATION/TRAINING PROGRAM

## 2022-12-06 RX ADMIN — TRIAMCINOLONE ACETONIDE 80 MG: 40 INJECTION, SUSPENSION INTRA-ARTICULAR; INTRAMUSCULAR at 02:12

## 2022-12-06 NOTE — PROGRESS NOTES
Clinic Note  Orthopedics    SUBJECTIVE:     History of Present Illness:  Marizol Kevin is a 76 y.o. female who presents left shoulder pain for many years, acutely worse over the last month. Patient had previously been diagnosed with subacromial bursitis, but hasn't had previous injections or therapy. She also complains of left first CMC pain as well. Reports constant numbness in hands and feet since chemotherapy for colon cancer.     Review of patient's allergies indicates:   Allergen Reactions    Sulfa (sulfonamide antibiotics)      Occurred when she was pregnant with varicose veins resulting in leg swelling and pain with standing       Past Medical History:   Diagnosis Date    Allergy     Anxiety     Cancer     colon    Cataract     Colon cancer     Diabetes mellitus, type 2     Dry eye syndrome     Hyperlipidemia     Hypertension     Insomnia     Squamous blepharitis      Past Surgical History:   Procedure Laterality Date    CATARACT EXTRACTION, BILATERAL  2020    CHOLECYSTECTOMY      COLONOSCOPY N/A 2021    Procedure: COLONOSCOPY;  Surgeon: Ashwini Duarte MD;  Location: [address]);  Service: Endoscopy;  Laterality: N/A;  medical transportation  covid test  Religion, instructions mailed-KPvt    HYSTERECTOMY      KNEE SURGERY      LAPAROSCOPIC LEFT COLON RESECTION       Family History   Problem Relation Age of Onset    Heart attack Mother     Colon cancer Father     Cancer Sister         unknown    Hypothyroidism Sister     Thyroid cancer Neg Hx      Social History     Tobacco Use    Smoking status: Former     Packs/day: 1.00     Years: 51.00     Pack years: 51.00     Types: Cigarettes     Quit date: 2022     Years since quittin.2    Smokeless tobacco: Never    Tobacco comments:     smoking cessation information given    Substance Use Topics    Alcohol use: Yes     Alcohol/week: 1.0 standard drink     Types: 1 Shots of liquor per week     Comment: occ    Drug use: Never         Review of Systems:  Patient denies constitutional symptoms, cardiac symptoms, respiratory symptoms, GI symptoms.  The remainder of the musculoskeletal ROS is included in the HPI.      OBJECTIVE:     Physical Exam:  Gen:  No acute distress  CV:  Peripherally well-perfused.  Pulses 2+ bilaterally.  Lungs:  Normal respiratory effort.  Abdomen:  Soft, non-tender, non-distended  Head/Neck:  Normocephalic.  Atraumatic. No TTP, AROM and PROM intact without pain  Neuro:  CN intact without deficit, SILT throughout B/L Upper & Lower Extremities    MSK:    LUE:  - skin intact  - mild TTP bicep tendon in bicipital groove  - Positive stahl, positive Neer.   - ROM , ER 50, IR Buttocks  - AIN/PIN/Radial/Median/Ulnar Nerves assessed in isolation without deficit  - SILT throughout  - Radial & Ulnar arteries palpated 2+  - Capillary Refill <3s        Diagnostic Results:  X-rays of the Left shoulder were ordered and images reviewed by me.  These showed mild AC joint degnerative changes    ASSESSMENT/PLAN:     A/P: Marizol Kevin is a 76 y.o. female with Left shoulder subacromial bursitis    Plan:    - Discussed with the patient extensively about the different management options both conservative and surgical options.    - Injection left shoulder sub acromial space  - Therapy ordered  - Xray left wrist ordered for suspected first CMC arthritis   - FU 6 weeks

## 2022-12-07 RX ORDER — TRIAMCINOLONE ACETONIDE 40 MG/ML
80 INJECTION, SUSPENSION INTRA-ARTICULAR; INTRAMUSCULAR
Status: DISCONTINUED | OUTPATIENT
Start: 2022-12-06 | End: 2022-12-07 | Stop reason: HOSPADM

## 2022-12-07 NOTE — PROGRESS NOTES
I have personally taken the history and examined this patient. I agree with the resident's note as stated above.     A/P: Marizol Kevin is a 76 y.o. female with Left shoulder subacromial bursitis     Plan:     - Discussed with the patient extensively about the different management options both conservative and surgical options.    - Injection left shoulder sub acromial space  - Therapy ordered  - Xray left wrist ordered for suspected first CMC arthritis   - FU 6 weeks

## 2022-12-07 NOTE — PROCEDURES
Large Joint Aspiration/Injection: L subacromial bursa    Date/Time: 12/6/2022 2:00 PM  Performed by: Caitlin Ryan MD  Authorized by: Caitlin Ryan MD     Consent Done?:  Yes (Verbal)  Indications:  Pain  Timeout: prior to procedure the correct patient, procedure, and site was verified    Prep: patient was prepped and draped in usual sterile fashion      Local anesthesia used?: Yes    Anesthesia:  Local infiltration  Local anesthetic:  Lidocaine 1% without epinephrine    Details:  Needle Size:  22 G  Approach:  Posterior  Location:  Shoulder  Site:  L subacromial bursa  Medications:  80 mg triamcinolone acetonide 40 mg/mL

## 2022-12-20 ENCOUNTER — LAB VISIT (OUTPATIENT)
Dept: LAB | Facility: OTHER | Age: 77
End: 2022-12-20
Attending: STUDENT IN AN ORGANIZED HEALTH CARE EDUCATION/TRAINING PROGRAM
Payer: MEDICARE

## 2022-12-20 ENCOUNTER — OFFICE VISIT (OUTPATIENT)
Dept: INTERNAL MEDICINE | Facility: CLINIC | Age: 77
End: 2022-12-20
Payer: MEDICARE

## 2022-12-20 VITALS
SYSTOLIC BLOOD PRESSURE: 120 MMHG | BODY MASS INDEX: 34.87 KG/M2 | DIASTOLIC BLOOD PRESSURE: 82 MMHG | OXYGEN SATURATION: 98 % | HEIGHT: 57 IN | WEIGHT: 161.63 LBS | HEART RATE: 100 BPM

## 2022-12-20 DIAGNOSIS — E04.1 THYROID NODULE: ICD-10-CM

## 2022-12-20 DIAGNOSIS — M25.512 CHRONIC LEFT SHOULDER PAIN: ICD-10-CM

## 2022-12-20 DIAGNOSIS — I10 ESSENTIAL HYPERTENSION: ICD-10-CM

## 2022-12-20 DIAGNOSIS — J00 ACUTE NASOPHARYNGITIS: Primary | ICD-10-CM

## 2022-12-20 DIAGNOSIS — Z00.00 ANNUAL PHYSICAL EXAM: ICD-10-CM

## 2022-12-20 DIAGNOSIS — R25.2 MUSCLE CRAMPS: ICD-10-CM

## 2022-12-20 DIAGNOSIS — R13.10 DYSPHAGIA, UNSPECIFIED TYPE: ICD-10-CM

## 2022-12-20 DIAGNOSIS — K21.9 GASTROESOPHAGEAL REFLUX DISEASE, UNSPECIFIED WHETHER ESOPHAGITIS PRESENT: ICD-10-CM

## 2022-12-20 DIAGNOSIS — R79.9 ABNORMAL FINDING OF BLOOD CHEMISTRY, UNSPECIFIED: ICD-10-CM

## 2022-12-20 DIAGNOSIS — J44.1 CHRONIC OBSTRUCTIVE PULMONARY DISEASE WITH ACUTE EXACERBATION: ICD-10-CM

## 2022-12-20 DIAGNOSIS — G89.29 CHRONIC LEFT SHOULDER PAIN: ICD-10-CM

## 2022-12-20 DIAGNOSIS — M81.0 OSTEOPOROSIS, UNSPECIFIED OSTEOPOROSIS TYPE, UNSPECIFIED PATHOLOGICAL FRACTURE PRESENCE: ICD-10-CM

## 2022-12-20 DIAGNOSIS — E11.65 TYPE 2 DIABETES MELLITUS WITH HYPERGLYCEMIA, WITHOUT LONG-TERM CURRENT USE OF INSULIN: ICD-10-CM

## 2022-12-20 DIAGNOSIS — I70.0 AORTIC ATHEROSCLEROSIS: ICD-10-CM

## 2022-12-20 LAB
25(OH)D3+25(OH)D2 SERPL-MCNC: 49 NG/ML (ref 30–96)
ALBUMIN SERPL BCP-MCNC: 3.6 G/DL (ref 3.5–5.2)
ALBUMIN/CREAT UR: 53.5 UG/MG (ref 0–30)
ALP SERPL-CCNC: 95 U/L (ref 55–135)
ALT SERPL W/O P-5'-P-CCNC: 15 U/L (ref 10–44)
ANION GAP SERPL CALC-SCNC: 11 MMOL/L (ref 8–16)
ANION GAP SERPL CALC-SCNC: 11 MMOL/L (ref 8–16)
AST SERPL-CCNC: 18 U/L (ref 10–40)
BILIRUB SERPL-MCNC: 0.5 MG/DL (ref 0.1–1)
BUN SERPL-MCNC: 17 MG/DL (ref 8–23)
BUN SERPL-MCNC: 17 MG/DL (ref 8–23)
CALCIUM SERPL-MCNC: 10 MG/DL (ref 8.7–10.5)
CALCIUM SERPL-MCNC: 10 MG/DL (ref 8.7–10.5)
CHLORIDE SERPL-SCNC: 103 MMOL/L (ref 95–110)
CHLORIDE SERPL-SCNC: 103 MMOL/L (ref 95–110)
CO2 SERPL-SCNC: 26 MMOL/L (ref 23–29)
CO2 SERPL-SCNC: 26 MMOL/L (ref 23–29)
CREAT SERPL-MCNC: 1 MG/DL (ref 0.5–1.4)
CREAT SERPL-MCNC: 1 MG/DL (ref 0.5–1.4)
CREAT UR-MCNC: 172 MG/DL (ref 15–325)
EST. GFR  (NO RACE VARIABLE): 59 ML/MIN/1.73 M^2
EST. GFR  (NO RACE VARIABLE): 59 ML/MIN/1.73 M^2
FERRITIN SERPL-MCNC: 22 NG/ML (ref 20–300)
GLUCOSE SERPL-MCNC: 121 MG/DL (ref 70–110)
GLUCOSE SERPL-MCNC: 121 MG/DL (ref 70–110)
IRON SERPL-MCNC: 53 UG/DL (ref 30–160)
MICROALBUMIN UR DL<=1MG/L-MCNC: 92 UG/ML
POTASSIUM SERPL-SCNC: 4.9 MMOL/L (ref 3.5–5.1)
POTASSIUM SERPL-SCNC: 4.9 MMOL/L (ref 3.5–5.1)
PROT SERPL-MCNC: 7.8 G/DL (ref 6–8.4)
SATURATED IRON: 12 % (ref 20–50)
SODIUM SERPL-SCNC: 140 MMOL/L (ref 136–145)
SODIUM SERPL-SCNC: 140 MMOL/L (ref 136–145)
TOTAL IRON BINDING CAPACITY: 447 UG/DL (ref 250–450)
TRANSFERRIN SERPL-MCNC: 302 MG/DL (ref 200–375)

## 2022-12-20 PROCEDURE — 99215 PR OFFICE/OUTPT VISIT, EST, LEVL V, 40-54 MIN: ICD-10-PCS | Mod: HCNC,S$GLB,, | Performed by: INTERNAL MEDICINE

## 2022-12-20 PROCEDURE — 1100F PR PT FALLS ASSESS DOC 2+ FALLS/FALL W/INJURY/YR: ICD-10-PCS | Mod: HCNC,CPTII,S$GLB, | Performed by: INTERNAL MEDICINE

## 2022-12-20 PROCEDURE — 1100F PTFALLS ASSESS-DOCD GE2>/YR: CPT | Mod: HCNC,CPTII,S$GLB, | Performed by: INTERNAL MEDICINE

## 2022-12-20 PROCEDURE — 1159F MED LIST DOCD IN RCRD: CPT | Mod: HCNC,CPTII,S$GLB, | Performed by: INTERNAL MEDICINE

## 2022-12-20 PROCEDURE — 3079F DIAST BP 80-89 MM HG: CPT | Mod: HCNC,CPTII,S$GLB, | Performed by: INTERNAL MEDICINE

## 2022-12-20 PROCEDURE — 99499 UNLISTED E&M SERVICE: CPT | Mod: S$GLB,,, | Performed by: INTERNAL MEDICINE

## 2022-12-20 PROCEDURE — 3066F PR DOCUMENTATION OF TREATMENT FOR NEPHROPATHY: ICD-10-PCS | Mod: HCNC,CPTII,S$GLB, | Performed by: INTERNAL MEDICINE

## 2022-12-20 PROCEDURE — 3074F SYST BP LT 130 MM HG: CPT | Mod: HCNC,CPTII,S$GLB, | Performed by: INTERNAL MEDICINE

## 2022-12-20 PROCEDURE — 3288F FALL RISK ASSESSMENT DOCD: CPT | Mod: HCNC,CPTII,S$GLB, | Performed by: INTERNAL MEDICINE

## 2022-12-20 PROCEDURE — 4010F ACE/ARB THERAPY RXD/TAKEN: CPT | Mod: HCNC,CPTII,S$GLB, | Performed by: INTERNAL MEDICINE

## 2022-12-20 PROCEDURE — 3079F PR MOST RECENT DIASTOLIC BLOOD PRESSURE 80-89 MM HG: ICD-10-PCS | Mod: HCNC,CPTII,S$GLB, | Performed by: INTERNAL MEDICINE

## 2022-12-20 PROCEDURE — 1160F PR REVIEW ALL MEDS BY PRESCRIBER/CLIN PHARMACIST DOCUMENTED: ICD-10-PCS | Mod: HCNC,CPTII,S$GLB, | Performed by: INTERNAL MEDICINE

## 2022-12-20 PROCEDURE — 3072F LOW RISK FOR RETINOPATHY: CPT | Mod: HCNC,CPTII,S$GLB, | Performed by: INTERNAL MEDICINE

## 2022-12-20 PROCEDURE — 99215 OFFICE O/P EST HI 40 MIN: CPT | Mod: HCNC,S$GLB,, | Performed by: INTERNAL MEDICINE

## 2022-12-20 PROCEDURE — 3074F PR MOST RECENT SYSTOLIC BLOOD PRESSURE < 130 MM HG: ICD-10-PCS | Mod: HCNC,CPTII,S$GLB, | Performed by: INTERNAL MEDICINE

## 2022-12-20 PROCEDURE — 80053 COMPREHEN METABOLIC PANEL: CPT | Mod: HCNC | Performed by: INTERNAL MEDICINE

## 2022-12-20 PROCEDURE — 83036 HEMOGLOBIN GLYCOSYLATED A1C: CPT | Mod: HCNC | Performed by: INTERNAL MEDICINE

## 2022-12-20 PROCEDURE — 3051F PR MOST RECENT HEMOGLOBIN A1C LEVEL 7.0 - < 8.0%: ICD-10-PCS | Mod: HCNC,CPTII,S$GLB, | Performed by: INTERNAL MEDICINE

## 2022-12-20 PROCEDURE — 1160F RVW MEDS BY RX/DR IN RCRD: CPT | Mod: HCNC,CPTII,S$GLB, | Performed by: INTERNAL MEDICINE

## 2022-12-20 PROCEDURE — 36415 COLL VENOUS BLD VENIPUNCTURE: CPT | Mod: HCNC | Performed by: INTERNAL MEDICINE

## 2022-12-20 PROCEDURE — 99499 RISK ADDL DX/OHS AUDIT: ICD-10-PCS | Mod: S$GLB,,, | Performed by: INTERNAL MEDICINE

## 2022-12-20 PROCEDURE — 1126F PR PAIN SEVERITY QUANTIFIED, NO PAIN PRESENT: ICD-10-PCS | Mod: HCNC,CPTII,S$GLB, | Performed by: INTERNAL MEDICINE

## 2022-12-20 PROCEDURE — 82728 ASSAY OF FERRITIN: CPT | Mod: HCNC | Performed by: INTERNAL MEDICINE

## 2022-12-20 PROCEDURE — 3072F PR LOW RISK FOR RETINOPATHY: ICD-10-PCS | Mod: HCNC,CPTII,S$GLB, | Performed by: INTERNAL MEDICINE

## 2022-12-20 PROCEDURE — 3060F POS MICROALBUMINURIA REV: CPT | Mod: HCNC,CPTII,S$GLB, | Performed by: INTERNAL MEDICINE

## 2022-12-20 PROCEDURE — 4010F PR ACE/ARB THEARPY RXD/TAKEN: ICD-10-PCS | Mod: HCNC,CPTII,S$GLB, | Performed by: INTERNAL MEDICINE

## 2022-12-20 PROCEDURE — 82570 ASSAY OF URINE CREATININE: CPT | Mod: HCNC | Performed by: INTERNAL MEDICINE

## 2022-12-20 PROCEDURE — 84466 ASSAY OF TRANSFERRIN: CPT | Mod: HCNC | Performed by: INTERNAL MEDICINE

## 2022-12-20 PROCEDURE — 82043 UR ALBUMIN QUANTITATIVE: CPT | Mod: HCNC | Performed by: INTERNAL MEDICINE

## 2022-12-20 PROCEDURE — 1126F AMNT PAIN NOTED NONE PRSNT: CPT | Mod: HCNC,CPTII,S$GLB, | Performed by: INTERNAL MEDICINE

## 2022-12-20 PROCEDURE — 1159F PR MEDICATION LIST DOCUMENTED IN MEDICAL RECORD: ICD-10-PCS | Mod: HCNC,CPTII,S$GLB, | Performed by: INTERNAL MEDICINE

## 2022-12-20 PROCEDURE — 3288F PR FALLS RISK ASSESSMENT DOCUMENTED: ICD-10-PCS | Mod: HCNC,CPTII,S$GLB, | Performed by: INTERNAL MEDICINE

## 2022-12-20 PROCEDURE — 99999 PR PBB SHADOW E&M-EST. PATIENT-LVL V: ICD-10-PCS | Mod: PBBFAC,HCNC,, | Performed by: INTERNAL MEDICINE

## 2022-12-20 PROCEDURE — 82306 VITAMIN D 25 HYDROXY: CPT | Mod: HCNC | Performed by: INTERNAL MEDICINE

## 2022-12-20 PROCEDURE — 3066F NEPHROPATHY DOC TX: CPT | Mod: HCNC,CPTII,S$GLB, | Performed by: INTERNAL MEDICINE

## 2022-12-20 PROCEDURE — 3051F HG A1C>EQUAL 7.0%<8.0%: CPT | Mod: HCNC,CPTII,S$GLB, | Performed by: INTERNAL MEDICINE

## 2022-12-20 PROCEDURE — 99999 PR PBB SHADOW E&M-EST. PATIENT-LVL V: CPT | Mod: PBBFAC,HCNC,, | Performed by: INTERNAL MEDICINE

## 2022-12-20 PROCEDURE — 3060F PR POS MICROALBUMINURIA RESULT DOCUMENTED/REVIEW: ICD-10-PCS | Mod: HCNC,CPTII,S$GLB, | Performed by: INTERNAL MEDICINE

## 2022-12-20 RX ORDER — CYCLOBENZAPRINE HCL 5 MG
5 TABLET ORAL 3 TIMES DAILY PRN
Qty: 30 TABLET | Refills: 2 | Status: SHIPPED | OUTPATIENT
Start: 2022-12-20 | End: 2022-12-20 | Stop reason: SDUPTHER

## 2022-12-20 RX ORDER — AZELASTINE 1 MG/ML
1 SPRAY, METERED NASAL 2 TIMES DAILY
Qty: 30 ML | Refills: 11 | Status: SHIPPED | OUTPATIENT
Start: 2022-12-20 | End: 2022-12-20 | Stop reason: SDUPTHER

## 2022-12-20 RX ORDER — AZELASTINE 1 MG/ML
1 SPRAY, METERED NASAL 2 TIMES DAILY
Qty: 30 ML | Refills: 11 | Status: SHIPPED | OUTPATIENT
Start: 2022-12-20 | End: 2023-04-25

## 2022-12-20 RX ORDER — CYCLOBENZAPRINE HCL 5 MG
5 TABLET ORAL 3 TIMES DAILY PRN
Qty: 30 TABLET | Refills: 2 | Status: SHIPPED | OUTPATIENT
Start: 2022-12-20 | End: 2023-06-07

## 2022-12-20 RX ORDER — DULAGLUTIDE 0.75 MG/.5ML
0.75 INJECTION, SOLUTION SUBCUTANEOUS WEEKLY
Qty: 4 PEN | Refills: 11 | Status: CANCELLED | OUTPATIENT
Start: 2022-12-20 | End: 2023-12-20

## 2022-12-20 NOTE — PROGRESS NOTES
Subjective:       Patient ID: Marizol Kevin is a 77 y.o. female who  has a past medical history of Allergy, Anxiety, Cancer, Cataract, Colon cancer, Diabetes mellitus, type 2, Dry eye syndrome, Hyperlipidemia, Hypertension, Insomnia, and Squamous blepharitis.    Chief Complaint: Follow-up (Diabetes), Sinus Problem, Nasal Congestion, Headache, and Dysphagia    History was obtained from the patient and supplemented through chart review  Following c Ortho for hand pain.    Has difficulty with transportation.  Her neighbor drives her to appointments.  Also gets rides from [employer] (has to call 3 days in advance). Likes plants.     HPI      Sinus HA, congestion. Eyes, nose dripping.  Pressure of eyes, frontal area, maxillary area. No cough. Took allegra 3 days ago with improvement, but recurrent sx. Is requesting injection.    Dysphagia:  At the R side of the mid throat. No odynophagia.   Avoids eating. Triggers are rice, gravy, meat, veggies.  Water doesn't help. Does ok with gumbo.  She will stick her fingers down her throat to vomit. Neighbor had to do the heimlich.     GERD:  On Protonix daily ever since her hemicolectomy in 2014.  Has recurrent symptoms off of Protonix. Takes PPI daily for several years. +tobacco. Currently no GERD sx, although she notes abd discomfort improved with belching.  Unclear h/o of EGD.    Thyroid Nodule:    Incidental finding on CT for cancer surveillence with small R thyroid nodule. Thyroid ultrasound 9/2020 with solid nodule on the right side.  Saw Endo. Chronic dysphagia less likely related to thyroid.  FNA 10/2020 was benign.  Was planning on F/u 1 year with U/S.  Reports significant dysphagia as above.  Lab Results   Component Value Date    TSH 0.792 09/14/2022     Reports leg cramps that can occur at night.  Has a m rexalant for back pain. Inquiring of potassium level.   No results found for: FERRITIN  No results found for: UIBC, IRON, IRON, TRANS, TRANSFERRIN, TIBC, TIBC,  LABIRON, FESATURATED   Lab Results   Component Value Date    [id number] 435 09/14/2022     No results found for: FOLATE    HTN:    Pt's BP is controlled on Norvasc 5. Tolerating meds well. +DM with new slightly elevated microalbuminuria.    Home BP:      DM2:    Currently pt is taking metformin XR 1000 daily.       Diet: Stopped late night cakes, ice cream.     Exercise: walks around the block in the backyard. 100 steps in the [place] on rainy days.      New slightly elevated microalbumin creatinine ratio.  Not on an ACE/ARB.   Retinal exams: 7/2022  Foot exams:  3/2021 by me  DM edu 6/2021  Lab Results   Component Value Date/Time    HGBA1C 7.4 (H) 09/14/2022 10:32 AM    HGBA1C 6.4 (H) 05/19/2022 10:17 AM    HGBA1C 7.2 (H) 10/01/2021 02:55 PM     Lab Results   Component Value Date    [id number] 435 09/14/2022      Aortic atherosclerosis, HLD:  Is currently taking Lipitor 40 and ASA 81 daily.  Lab Results   Component Value Date    LDLCALC 75.0 09/14/2022     The 10-year ASCVD risk score (Kirk DK, et al., 2019) is: 18.9%    Values used to calculate the score:      Age: 77 years      Sex: Female      Is Non- : Yes      Diabetic: Yes      Tobacco smoker: No      Systolic Blood Pressure: 120 mmHg      Is BP treated: Yes      HDL Cholesterol: 37 mg/dL      Total Cholesterol: 133 mg/dL    ?Osteoporosis, Vitamin D deficiency:   Unclear history.  DEXA  with osteopenia, intermediate/borderline high FRAX score.  Is on raloxifene.  +Tobacco.    Exercise: as above  Lab Results   Component Value Date    [id number] 56 06/01/2021    [id number] 40 11/20/2020                 Not addressed today.  H/o Tobacco use:    She smokes 5-10 cigs/day.  Started smoking since age 19. Has quit x 1 year before.    Patches caused hallucinations. Didn't like the taste of gums.    Started Wellbutrin to assist with tobacco cessation, but was unable to tolerate d/t SE, altered taste, HA, dizziness.   On Chantix.   Following with tobacco cessation Clinic. Quit since 9/2022!  Congratulated and encouraged continued cessation! F/u c tobacco cessation clinic.     Asthma:  H/o childhood asthma. H/o tobacco use as above.    Was admitted for exacerbation .      GERD as below.   PFT 7/2022 was normal.  DLCO was also normal.  Minimal emphysema on CT chest.   Eosinophils wnl.     Saw Pulm. Thought to be asthma rather than COPD.  Is on triple therapy, but only taking Spiriva daily and Breo on occasion. Continue triple therapy. Consider stopping Spiriva in 3 mo if she does well as it is not a mainstay treatment for Asthma  F/u c Pulm. Cont controller meds. Consider stopping Spiriva if sx are controlled. She quit smoking!      Colon cancer:  Dx in 2014. S/p R hemicolectomy.  S/p adjuvant chemotherapy.  Follows with [facility]; monitoring imaging.  PET scan 1-2021 suspicious for recurrence.  C scope 2-2021 with polyp.  Pathology with colitis.  Negative for dysplasia.    Lesion of labia  Reports recurrent boils. Referred to OBGYN for pelvic exam. ?bartholin cyst, HSV    Polyneuropathy:    Fingertips, toes. H/o chemo.  On Cymbalta with some relief.  Gabapentin 300 did not help in the past.              Stable. 2/2 chemo.  Continue Cymbalta. B12 wnl. Could consider Lyrica, titrating gabapentin  Lab Results   Component Value Date    [id number] 435 09/14/2022     L shoulder subacromial bursitis:  Chronic arthralgias. Mild DJD on xray. Follow c Ortho. Ordered PT.    Has m relaxant for back pain.    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  Follows with OSH ENT, Dr. Cabrera.      Anxiety, Insomnia:    Takes about 3 tablets of Xanax a month.   reviewed.  She fills this every few months.  Has been prescribed by Dr. Paul, PCP, and Dr. Hubbard, Heme Onc in the past.    Refilled Vistaril p.r.n..    Intertrigo, Tinea corporis:  Rx topical antifungal. Keep area dry, breathable clothing. May take PO  "antihistamine PRN qHS for pruritis.    Review of Systems   Constitutional:  Positive for appetite change. Negative for activity change.   HENT:  Positive for rhinorrhea and trouble swallowing.    Eyes:  Positive for discharge.   Respiratory:  Negative for cough and wheezing.    Cardiovascular:  Negative for leg swelling.   Musculoskeletal:  Positive for arthralgias and back pain. Negative for gait problem.   Skin:  Negative for wound.   Hematological:  Negative for adenopathy.   Psychiatric/Behavioral:  Positive for sleep disturbance. Negative for confusion.        I personally reviewed Past Medical History, Past Surgical History, Social History, and Family History.    Objective:      Vitals:    12/20/22 1421   BP: 120/82   BP Location: Right arm   Patient Position: Sitting   BP Method: Large (Manual)   Pulse: 100   SpO2: 98%   Weight: 73.3 kg (161 lb 9.6 oz)   Height: 4' 9" (1.448 m)      Physical Exam  Constitutional:       General: She is not in acute distress.     Appearance: She is well-developed. She is not diaphoretic.   HENT:      Head: Normocephalic and atraumatic.      Nose: Mucosal edema present. No rhinorrhea.      Right Sinus: No maxillary sinus tenderness or frontal sinus tenderness.      Left Sinus: No maxillary sinus tenderness or frontal sinus tenderness.      Mouth/Throat:      Pharynx: Uvula midline. Posterior oropharyngeal erythema (mild) present. No oropharyngeal exudate.   Eyes:      General: No scleral icterus.        Right eye: No discharge.         Left eye: No discharge.      Conjunctiva/sclera:      Right eye: Right conjunctiva is not injected.      Left eye: Left conjunctiva is not injected.   Neck:      Thyroid: No thyromegaly.      Trachea: No tracheal deviation.   Cardiovascular:      Rate and Rhythm: Normal rate and regular rhythm.      Heart sounds: Normal heart sounds. No murmur heard.  Pulmonary:      Effort: Pulmonary effort is normal. No respiratory distress.      Breath sounds: " Normal breath sounds. No stridor. No wheezing.   Abdominal:      General: Bowel sounds are normal. There is no distension.      Palpations: Abdomen is soft.      Tenderness: There is no abdominal tenderness.   Musculoskeletal:         General: No deformity.      Cervical back: Neck supple.      Right lower leg: No edema.      Left lower leg: No edema.   Lymphadenopathy:      Cervical: No cervical adenopathy.   Skin:     General: Skin is warm and dry.      Findings: No erythema.   Neurological:      Mental Status: She is alert.      Gait: Gait normal.   Psychiatric:         Behavior: Behavior normal.         Lab Results   Component Value Date    WBC 15.88 (H) 06/21/2022    HGB 12.7 06/21/2022    HCT 41.5 06/21/2022     06/21/2022    CHOL 133 09/14/2022    TRIG 105 09/14/2022    HDL 37 (L) 09/14/2022    ALT 19 06/20/2022    AST 23 06/20/2022     06/21/2022    K 5.0 06/21/2022     06/21/2022    CREATININE 0.9 06/21/2022    BUN 16 06/21/2022    CO2 22 (L) 06/21/2022    TSH 0.792 09/14/2022    HGBA1C 7.4 (H) 09/14/2022       The 10-year ASCVD risk score (Kirk DUDLEY, et al., 2019) is: 18.9%    Values used to calculate the score:      Age: 77 years      Sex: Female      Is Non- : Yes      Diabetic: Yes      Tobacco smoker: No      Systolic Blood Pressure: 120 mmHg      Is BP treated: Yes      HDL Cholesterol: 37 mg/dL      Total Cholesterol: 133 mg/dL    (Imaging have been independently reviewed)  Shoulder xray c mild DJD.    Assessment:       1. Acute nasopharyngitis    2. Dysphagia, unspecified type    3. Gastroesophageal reflux disease, unspecified whether esophagitis present    4. Thyroid nodule    5. Muscle cramps    6. Essential hypertension    7. Type 2 diabetes mellitus with hyperglycemia, without long-term current use of insulin    8. Aortic atherosclerosis    9. Osteoporosis, unspecified osteoporosis type, unspecified pathological fracture presence    10. Annual physical  exam                Plan:       Marizol was seen today for follow-up, sinus problem, nasal congestion, headache and dysphagia.    Diagnoses and all orders for this visit:    Acute nasopharyngitis  Comments:  Likely viral. Supportive tx. Rec antihistamines, Astelin.    Dysphagia, unspecified type  Comments:  New. Cont PPI. Refer to [facility] for EGD.  Repeat thyroid ultrasound.  Orders:  -     US Soft Tissue Head Neck Thyroid; Future  -     Ambulatory referral/consult to Gastroenterology; Future    Gastroesophageal reflux disease, unspecified whether esophagitis present  Comments:  Controlled. Cont PPI. Referred to [facility] for EGD.  Orders:  -     Ambulatory referral/consult to Gastroenterology; Future    Thyroid nodule  Comments:  FNA  benign.  Has significant dysphagia.  Repeat thyroid ultrasound. Following with endocrinology.  TSH WNL; monitor annually.  Orders:  -     US Soft Tissue Head Neck Thyroid; Future    Muscle cramps  Comments:  Previous CMP wnl. Can recheck K, Vit D, iron panel for possible RLS. Rx low dose m. relaxant q.h.s..  Discussed potential sedation.  Orders:  -     Vitamin D; Future  -     Comprehensive Metabolic Panel; Future  -     Ferritin; Future  -     Iron and TIBC; Future    Essential hypertension  Comments:  Controlled.  Continue Norvasc 5. New slightly elevated microalbumin creatinine ratio. Consider switching CCB to ARB if persistent.    Type 2 diabetes mellitus with hyperglycemia, without long-term current use of insulin  Comments:  A1C elevated; repeat. Cont metformin 1000 XR daily. Consider adding Trulicity to help c weight, appetite. Need to resched with DM edu, podiatry  Orders:  -     Hemoglobin A1C; Future  -     Microalbumin/Creatinine Ratio, Urine; Future    Aortic atherosclerosis  Comments:  FLP controlled.  Continue Lipitor 40. Quit tobacco. Monitor FLP annually    Osteoporosis, unspecified osteoporosis type, unspecified pathological fracture presence  Comments:  DEXA  with intermediate FRAX score.  Check Vitamin-D level. Cont OTC Citracal, Oscal D. Cont raloxifene.   Orders:  -     Vitamin D; Future    Annual physical exam  -     Hemoglobin A1C; Future  -     Microalbumin/Creatinine Ratio, Urine; Future  -     US Soft Tissue Head Neck Thyroid; Future  -     Vitamin D; Future  -     Comprehensive Metabolic Panel; Future  -     Ferritin; Future  -     Iron and TIBC; Future    Other orders  -     Discontinue: azelastine (ASTELIN) 137 mcg (0.1 %) nasal spray; 1 spray (137 mcg total) by Nasal route 2 (two) times daily.  -     Discontinue: cyclobenzaprine (FLEXERIL) 5 MG tablet; Take 1 tablet (5 mg total) by mouth 3 (three) times daily as needed for Muscle spasms.  The following orders have not been finalized:  -     Cancel: dulaglutide (TRULICITY) 0.75 mg/0.5 mL pen injector               Side effects of medication(s) were discussed in detail and patient voiced understanding.  Patient will call back for any issues or complications.     I have spent a total of 50 minutes with the patient as well as reviewing the chart/medical record and placing orders on the day of the visit. Discussed URI, dysphagia, muscle cramps.     RTC in 1-2 months or sooner PRN for DM.

## 2022-12-20 NOTE — PATIENT INSTRUCTIONS
I recommend plenty of rest and drinking plenty of fluids.  Tylenol and NSAIDs, such as ibuprofen, Motrin, naproxen can be helpful for sore throat, headache, ear pain, muscle and joint pain, sneezing, fatigue.  Chloroseptic spray, lozenges can also soothe a sore throat.  Over-the-counter antihistamines (Allegra, Claritin, Zyrtec) for runny nose.  Nasal saline once to twice a day.  Hand washing can help prevent the spread of respiratory illnesses, especially from younger children.    If you have high blood pressure you can not take Sudafed, pseudoephedrine, or any medications with DM (dextromethorphan) because it will elevate your blood pressure. You may take Coricidin HBP over the counter.      All of your core healthy metrics are met.      Sami Fontana,     If you are due for any health screening(s) below please notify me so we can arrange them to be ordered and scheduled to maintain your health. Most healthy patients complete it. Don't lose out on improving your health.     All of your core healthy metrics are met.

## 2022-12-20 NOTE — TELEPHONE ENCOUNTER
Care Due:                  Date            Visit Type   Department     Provider  --------------------------------------------------------------------------------                                EP -                              PRIMARY      [facility] INTERNAL  Last Visit: 12-      CARE ([facility])   MIRELLA Fajardo                              EP -                              PRIMARY      [facility] INTERNAL  Next Visit: 03-      CARE ([facility])   MIRELLA Fajardo                                                            Last  Test          Frequency    Reason                     Performed    Due Date  --------------------------------------------------------------------------------    HBA1C.......  6 months...  metFORMIN................  09- 03-    [facility] Embedded Care Gaps. Reference number: [id number]. 12/20/2022   2:55:33 PM CST

## 2022-12-21 DIAGNOSIS — E11.65 TYPE 2 DIABETES MELLITUS WITH HYPERGLYCEMIA, WITHOUT LONG-TERM CURRENT USE OF INSULIN: Primary | ICD-10-CM

## 2022-12-21 DIAGNOSIS — R25.2 MUSCLE CRAMPS: ICD-10-CM

## 2022-12-21 LAB
ESTIMATED AVG GLUCOSE: 197 MG/DL (ref 68–131)
HBA1C MFR BLD: 8.5 % (ref 4–5.6)

## 2022-12-21 RX ORDER — FERROUS SULFATE 325(65) MG
325 TABLET, DELAYED RELEASE (ENTERIC COATED) ORAL EVERY OTHER DAY
Qty: 45 TABLET | Refills: 3 | Status: ON HOLD | OUTPATIENT
Start: 2022-12-21 | End: 2023-12-22 | Stop reason: HOSPADM

## 2022-12-21 NOTE — TELEPHONE ENCOUNTER
Informed pt of lab results. Pt verbalized understanding. Schedule pt with Scotty on 2/8/23 and DM educator on 1/10/23. Pt verbalized understanding.

## 2022-12-21 NOTE — TELEPHONE ENCOUNTER
----- Message from Belinda Fajardo MD sent at 12/21/2022 11:40 AM CST -----  -Please call the patient with lab results.  -Ordered referral to DM education.  Please assist the patient with scheduling.    -reschedule an earlier appt with me for DM. Virtual/telephone or in person.  Thank you!     Your potassium level and electrolytes are normal. Your iron level is a little low, which might be contributing to the muscle cramps. I will prescribe iron to be taken every other day.  It may cause your stools to become dark or green.  You may also experience constipation.  If this occurs, you may take an over the counter stool softener.  Be sure to stay hydrated as well.      Your vitamin-D level is normal.  You may continue taking your over-the-counter supplement.    Your A1c, a marker for long-term control of diabetes, is even higher. Please try to check your blood glucose before meals and nightly, or at least every morning and before bedtime.  Write down your blood glucose numbers and bring it with you to your visits.  We will talk about this more during your visit.  Please also see diabetes education.              -------------------------  Muscle cramps  Vit D lytes wnl.   Rx low dose m. relaxant q.h.s..  Discussed potential sedation.  Possible RLS. Ferritin low normal. Start iron qOD.    DM2:  A1C uncontrolled, worsened. Cont metformin 1000 XR daily.   Currently having dysphagia.  RTC c BG log; schedule earlier visit. Consider adding Trulicity to help c weight, appetite. Resched with DM edu.  Need to resched c podiatry    Essential hypertension  Controlled.  Continue Norvasc 5. New slightly elevated microalbumin creatinine ratio. Will discuss switching CCB to ARB.    GFR borderline low.    Osteoporosis  DEXA with intermediate FRAX score.  Vitamin-D wnl. Cont OTC Citracal, Oscal D. Cont raloxifene.

## 2022-12-22 ENCOUNTER — PES CALL (OUTPATIENT)
Dept: ADMINISTRATIVE | Facility: CLINIC | Age: 77
End: 2022-12-22
Payer: MEDICARE

## 2022-12-28 ENCOUNTER — HOSPITAL ENCOUNTER (OUTPATIENT)
Dept: RADIOLOGY | Facility: OTHER | Age: 77
Discharge: HOME | End: 2022-12-28
Attending: INTERNAL MEDICINE
Payer: MEDICARE

## 2022-12-28 DIAGNOSIS — Z00.00 ANNUAL PHYSICAL EXAM: ICD-10-CM

## 2022-12-28 DIAGNOSIS — R13.10 DYSPHAGIA, UNSPECIFIED TYPE: ICD-10-CM

## 2022-12-28 DIAGNOSIS — E04.1 THYROID NODULE: ICD-10-CM

## 2022-12-28 PROCEDURE — 76536 US EXAM OF HEAD AND NECK: CPT | Mod: 26,HCNC,, | Performed by: RADIOLOGY

## 2022-12-28 PROCEDURE — 76536 US SOFT TISSUE HEAD NECK THYROID: ICD-10-PCS | Mod: 26,HCNC,, | Performed by: RADIOLOGY

## 2022-12-28 PROCEDURE — 76536 US EXAM OF HEAD AND NECK: CPT | Mod: TC,HCNC

## 2023-01-05 DIAGNOSIS — J45.20 MILD INTERMITTENT ASTHMA WITHOUT COMPLICATION: Primary | ICD-10-CM

## 2023-01-06 RX ORDER — ALBUTEROL SULFATE 90 UG/1
2 AEROSOL, METERED RESPIRATORY (INHALATION) EVERY 4 HOURS PRN
Qty: 54 G | Refills: 3 | Status: SHIPPED | OUTPATIENT
Start: 2023-01-06 | End: 2024-02-01

## 2023-01-06 NOTE — TELEPHONE ENCOUNTER
Refill Decision Note   Marizol Kevin  is requesting a refill authorization.  Brief Assessment and Rationale for Refill:  Approve     Medication Therapy Plan:  Acceptable use per ORC protocol    Medication Reconciliation Completed: No   Comments:     No Care Gaps recommended.     Note composed:9:12 AM 01/06/2023

## 2023-01-06 NOTE — TELEPHONE ENCOUNTER
No new care gaps identified.  [facility] Embedded Care Gaps. Reference number: [id number]. 1/06/2023   8:39:23 AM CST

## 2023-01-09 ENCOUNTER — TELEPHONE (OUTPATIENT)
Dept: HEMATOLOGY/ONCOLOGY | Facility: CLINIC | Age: 78
End: 2023-01-09
Payer: MEDICARE

## 2023-01-09 ENCOUNTER — TELEPHONE (OUTPATIENT)
Dept: INTERNAL MEDICINE | Facility: CLINIC | Age: 78
End: 2023-01-09
Payer: MEDICARE

## 2023-01-09 NOTE — TELEPHONE ENCOUNTER
Patient was calling to cancel appointment on 2/8/23 patient has already been previously rescheduled 3/16/2023. Appointment was canceled per request. Thanks.

## 2023-01-09 NOTE — TELEPHONE ENCOUNTER
----- Message from Igor Robins sent at 1/9/2023  8:48 AM CST -----  Type: Patient Call Back    Who called: Self     What is the request in detail: PT asking for a call back     Can the clinic reply by [facility]? No     Would the patient rather a call back or a response via [facility]? Call     Best call back number: [phone] (home)

## 2023-01-09 NOTE — TELEPHONE ENCOUNTER
Returned call and spoke with Ms Kevin. Ms Kevin calling to schedule a port flush on tomorrow. Ms Kevin will be here at [facility] for other appointment and she requesting if possible to appointment to have her port flushed. Ms Kevin first appointment is scheduled at 2:00. She will come in before her 2:00 appointment.

## 2023-01-09 NOTE — TELEPHONE ENCOUNTER
----- Message from Igor Robins sent at 1/9/2023  8:54 AM CST -----  Type: Patient Call Back    Who called: Self     What is the request in detail: Asking for a call back     Can the clinic reply by [facility]? No     Would the patient rather a call back or a response via [facility]? Call     Best call back number: [phone] (home)

## 2023-01-10 ENCOUNTER — INFUSION (OUTPATIENT)
Dept: INFUSION THERAPY | Facility: OTHER | Age: 78
End: 2023-01-10
Attending: STUDENT IN AN ORGANIZED HEALTH CARE EDUCATION/TRAINING PROGRAM
Payer: MEDICARE

## 2023-01-10 ENCOUNTER — CLINICAL SUPPORT (OUTPATIENT)
Dept: DIABETES | Facility: CLINIC | Age: 78
End: 2023-01-10
Payer: MEDICARE

## 2023-01-10 ENCOUNTER — CLINICAL SUPPORT (OUTPATIENT)
Dept: SMOKING CESSATION | Facility: CLINIC | Age: 78
End: 2023-01-10
Payer: COMMERCIAL

## 2023-01-10 VITALS
TEMPERATURE: 99 F | HEART RATE: 83 BPM | OXYGEN SATURATION: 98 % | DIASTOLIC BLOOD PRESSURE: 72 MMHG | RESPIRATION RATE: 16 BRPM | SYSTOLIC BLOOD PRESSURE: 141 MMHG

## 2023-01-10 DIAGNOSIS — E11.65 TYPE 2 DIABETES MELLITUS WITH HYPERGLYCEMIA, WITHOUT LONG-TERM CURRENT USE OF INSULIN: ICD-10-CM

## 2023-01-10 DIAGNOSIS — F17.200 NICOTINE DEPENDENCE: Primary | ICD-10-CM

## 2023-01-10 DIAGNOSIS — C18.2 MALIGNANT NEOPLASM OF ASCENDING COLON: Primary | ICD-10-CM

## 2023-01-10 PROCEDURE — 99999 PR PBB SHADOW E&M-EST. PATIENT-LVL I: ICD-10-PCS | Mod: PBBFAC,,,

## 2023-01-10 PROCEDURE — 96523 IRRIG DRUG DELIVERY DEVICE: CPT | Mod: HCNC

## 2023-01-10 PROCEDURE — A4216 STERILE WATER/SALINE, 10 ML: HCPCS | Mod: HCNC | Performed by: STUDENT IN AN ORGANIZED HEALTH CARE EDUCATION/TRAINING PROGRAM

## 2023-01-10 PROCEDURE — 99404 PR PREVENT COUNSEL,INDIV,60 MIN: ICD-10-PCS | Mod: S$GLB,,, | Performed by: FAMILY MEDICINE

## 2023-01-10 PROCEDURE — 99999 PR PBB SHADOW E&M-EST. PATIENT-LVL I: CPT | Mod: PBBFAC,HCNC,, | Performed by: DIETITIAN, REGISTERED

## 2023-01-10 PROCEDURE — 25000003 PHARM REV CODE 250: Mod: HCNC | Performed by: STUDENT IN AN ORGANIZED HEALTH CARE EDUCATION/TRAINING PROGRAM

## 2023-01-10 PROCEDURE — G0108 DIAB MANAGE TRN  PER INDIV: HCPCS | Mod: HCNC,S$GLB,, | Performed by: DIETITIAN, REGISTERED

## 2023-01-10 PROCEDURE — G0108 PR DIAB MANAGE TRN  PER INDIV: ICD-10-PCS | Mod: HCNC,S$GLB,, | Performed by: DIETITIAN, REGISTERED

## 2023-01-10 PROCEDURE — 99999 PR PBB SHADOW E&M-EST. PATIENT-LVL I: ICD-10-PCS | Mod: PBBFAC,HCNC,, | Performed by: DIETITIAN, REGISTERED

## 2023-01-10 PROCEDURE — 63600175 PHARM REV CODE 636 W HCPCS: Mod: HCNC | Performed by: STUDENT IN AN ORGANIZED HEALTH CARE EDUCATION/TRAINING PROGRAM

## 2023-01-10 PROCEDURE — 99999 PR PBB SHADOW E&M-EST. PATIENT-LVL I: CPT | Mod: PBBFAC,,,

## 2023-01-10 PROCEDURE — 99404 PREV MED CNSL INDIV APPRX 60: CPT | Mod: S$GLB,,, | Performed by: FAMILY MEDICINE

## 2023-01-10 RX ORDER — SODIUM CHLORIDE 0.9 % (FLUSH) 0.9 %
10 SYRINGE (ML) INJECTION
Status: CANCELLED | OUTPATIENT
Start: 2023-01-10

## 2023-01-10 RX ORDER — SODIUM CHLORIDE 0.9 % (FLUSH) 0.9 %
10 SYRINGE (ML) INJECTION
Status: COMPLETED | OUTPATIENT
Start: 2023-01-10 | End: 2023-01-10

## 2023-01-10 RX ORDER — HEPARIN 100 UNIT/ML
500 SYRINGE INTRAVENOUS
Status: CANCELLED | OUTPATIENT
Start: 2023-01-10

## 2023-01-10 RX ORDER — HEPARIN 100 UNIT/ML
500 SYRINGE INTRAVENOUS
Status: COMPLETED | OUTPATIENT
Start: 2023-01-10 | End: 2023-01-10

## 2023-01-10 RX ADMIN — HEPARIN 500 UNITS: 100 SYRINGE at 03:01

## 2023-01-10 RX ADMIN — Medication 10 ML: at 03:01

## 2023-01-10 NOTE — PROGRESS NOTES
Diabetes Care Specialist Progress Note  Author: Kera Chery, MELISSA  Date: 1/10/2023    Pt was 20 minutes late for her appointment. Upon sitting in my office she c/o pain in her chest. Nurses came in my office and took her BP which was slightly elevated. She drank a glass of water and stated that she felt better and that she thought it was a gas pain that passed. Pt felt like she was fine to continue with her diabetes education appointment. She did not complain of any further chest pain.     Program Intake  Reason for Diabetes Program Visit:: Initial Diabetes Assessment  Current diabetes risk level:: moderate  In the last 12 months, have you:: used emergency room services  Was the ER or hospital admission related to diabetes?: No    Lab Results   Component Value Date    HGBA1C 8.5 (H) 12/20/2022       Clinical    Patient Health Rating  Compared to other people your age, how would you rate your health?: Excellent    Problem Review  Reviewed Problem List with Patient: no (see comments)  Active comorbidities affecting diabetes self-care.: yes  Comorbidities: Cardiovascular Disease, Hypertension, Cancer  Reviewed health maintenance: no (see comments)    Clinical Assessment  Current Diabetes Treatment: Oral Medication  Have you ever experienced hypoglycemia (low blood sugar)?: no  Have you ever experienced hyperglycemia (high blood sugar)?: no    Medication Information  How do you obtain your medications?: Pharmacy delivery  How many days a week do you miss your medications?: Never  Do you use a pill box or medication chart to help you manage your medications?: Medication chart  Do you sometimes have difficulty refilling your medications?: No  Medication adherence impacting ability to self-manage diabetes?: No    Labs  Do you have regular lab work to monitor your medications?: Yes  Type of Regular Lab Work: CBC, BMP, A1c, Cholesterol  Where do you get your labs drawn?: [facility]  Lab Compliance Barriers:  No    Nutritional Status  Diet: Regular (cooks most foods at home, likes to cook beans and rice, vegetables, smothered and fried chicken and pork chops, cabbage and turky necks, tries to eat something green with her foods)  Meal Plan 24 Hour Recall: Breakfast, Lunch, Dinner, Snack (may eat only once a day, does not eat after 7pm, beverages: rootbeer, water sometimes)  Meal Plan 24 Hour Recall - Breakfast: ham and a muffin with nuts with water, usually eats eggs, grits and breakfast meat or skipps breakfast  Meal Plan 24 Hour Recall - Lunch: gumbo at 3 pm, nothing afterwards  Meal Plan 24 Hour Recall - Snack: stop smoking in september so has been eating sweets to avoid smoking late at night, likes cakes,cookies, fruit chews,does not elaborate but does state that she eats a lot at one time, lately has been eating large bags of potato chips  Change in appetite?: No  Dentation:: Wears Dentures (bottom dentures dont fit well is working with dentist)  Recent Changes in Weight: Weight Gain  Current nutritional status an area of need that is impacting patient's ability to self-manage diabetes?: No    Additional Social History    Support  Does anyone support you with your diabetes care?: no  Comment: : lives alone, has a daughter  Who takes you to your medical appointments?: friend, son/daughter (Human provids rides)  Does the current support meet the patient's needs?: Yes  Is Support an area impacting ability to self-manage diabetes?: No    Access to Mass Media & Technology  Does the patient have access to any of the following devices or technologies?: Internet Access, Tablet, Smart phone  Media or technology needs impacting ability to self-manage diabetes?: No    Cognitive/Behavioral Health  Alert and Oriented: Yes  Difficulty Thinking: No  Requires Prompting: No  Requires assistance for routine expression?: No  Cognitive or behavioral barriers impacting ability to self-manage diabetes?: No    Culture/Moravian  Culture or  Faith beliefs that may impact ability to access healthcare: No    Communication  Language preference: English  Hearing Problems: No  Vision Problems: Yes  Vision problem type:: Decreased Vision  Vision Assistance: Glasses  Communication needs impacting ability to self-manage diabetes?: No    Health Literacy  Preferred Learning Method: Face to Face, Demonstration  How often do you need to have someone help you read instructions, pamphlets, or written material from your doctor or pharmacy?: Rarely  Health literacy needs impacting ability to self-manage diabetes?: No      Diabetes Self-Management Skills Assessment    Diabetes Disease Process/Treatment Options  Patient/caregiver able to state what happens when someone has diabetes.: no  Patient/caregiver able to identify at least three signs and symptoms of diabetes.: no  Patient able to identify at least three risk factors for diabetes.: no  Diabetes Disease Process/Treatment Options: Skills Assessment Completed: Yes  Assessment indicates:: Knowledge deficit, Instruction Needed  Area of need?: Yes    Nutrition/Healthy Eating  Challenges to healthy eating:: lack of will power, snacking between meals and at night  Method of carbohydrate measurement:: no method  Patient can identify foods that impact blood sugar.: no (see comments)  Nutrition/Healthy Eating Skills Assessment Completed:: Yes  Assessment indicates:: Knowledge deficit, Instruction Needed  Area of need?: Yes    Physical Activity/Exercise  Physical Activity/Exercise Skills Assessment Completed: : No  Deffered due to:: Time    Medications  Patient is able to describe current diabetes management routine.: yes  Diabetes management routine:: oral medications  Patient is able to identify current diabetes medications, dosages, and appropriate timing of medications.: yes  Patient understands the purpose of the medications taken for diabetes.: no  Patient reports problems or concerns with current medication  regimen.: no  Medication Skills Assessment Completed:: Yes  Assessment indicates:: Knowledge deficit, Instruction Needed  Area of need?: Yes    Home Blood Glucose Monitoring  Patient states that blood sugar is checked at home daily.: no  Reasons for not monitoring:: other (see comments) (lack of supplies)  Home Blood Glucose Monitoring Skills Assessment Completed: : Yes  Assessment indicates:: Knowledge deficit, Instruction Needed  Area of need?: Yes    Acute Complications  Patient is able to identify types of acute complications: No  Acute Complications Skills Assessment Completed: : Yes  Assessment indicates:: Knowledge deficit, Instruction Needed  Area of need?: Yes    Chronic Complications  Patient can identify major chronic complications of diabetes.: no  Patient can identify ways to prevent or delay diabetes complications.: no  Patient is aware that having diabetes increases risk of heart disease?: No  Patient is aware that heart disease is the leading cause of death and disability in people with diabetes?: No  Patient able to state risk factors for heart disease?: No  Patient is taking statin?: Yes  Chronic Complications Skills Assessment Completed: : Yes  Assessment indicates:: Knowledge deficit, Instruction Needed  Area of need?: Yes    Psychosocial/Coping  Patient can identify ways of coping with chronic disease.: yes  Patient-stated ways of coping with chronic disease:: spiritual/Shinto practices (Pt states that she believes that you cannot extend your life and that God has predetermined you date of death. She feels that her actions do not affect her life span.)  Psychosocial/Coping Skills Assessment Completed: : Yes  Assessment indicates:: Knowledge deficit, Instruction Needed  Area of need?: Yes      Diabetes Self Support Plan         Assessment Summary and Plan    Based on today's diabetes care assessment, the following areas of need were identified:      Social 1/10/2023   Support No   Access to  Mass Media/Tech No   Cognitive/Behavioral Health No   Culture/Congregation No   Communication No   Health Literacy No        Clinical 1/10/2023   Medication Adherence No   Lab Compliance No   Nutritional Status No        Diabetes Self-Management Skills 1/10/2023   Diabetes Disease Process/Treatment Options Yes-time deferred   Nutrition/Healthy Eating Yes-see care plan   Medication Yes-time deferred   Home Blood Glucose Monitoring Yes-see care plan   Acute Complications Yes-time deferred   Chronic Complications Yes-time deferred   Psychosocial/Coping Yes-time deferred          Today's interventions were provided through individual discussion, instruction, and written materials were provided.      Patient verbalized understanding of instruction and written materials.  Pt was able to return back demonstration of instructions today. Patient understood key points, needs reinforcement and further instruction.     Diabetes Self-Management Care Plan:    Today's Diabetes Self-Management Care Plan was developed with Marizol's input. Marizol has agreed to work toward the following goal(s) to improve his/her overall diabetes control.      There are no recently modified care plans to display for this patient.      Follow Up Plan     No follow-ups on file.    Today's care plan and follow up schedule was discussed with patient.  Marizol verbalized understanding of the care plan, goals, and agrees to follow up plan.        The patient was encouraged to communicate with his/her health care provider/physician and care team regarding his/her condition(s) and treatment.  I provided the patient with my contact information today and encouraged to contact me via phone or [facility] Patient Portal as needed.     Length of Visit   Total Time: 30 Minutes

## 2023-01-10 NOTE — Clinical Note
Patient was seen in clinic today and reports remaining tobacco free. Patient was previously on a tobacco cessation medication regimen of 1mg Chantix BID. No refills needed at this time. Patient doesn't report any abnormal behaviors or mental changes at this time. Patient states that she is now annoyed by the smell of cigarettes and has no desire to smoke. Reviewed strategies, cues, and triggers. Introduced the negative impact of tobacco on health, the health advantages of discontinuing the use of tobacco, time line improved health changes after a quit, withdrawal issues to expect from nicotine and habit, and ways to remain quit The patient will continue with  therapy sessions and monitoring by CTTS.

## 2023-01-10 NOTE — PLAN OF CARE
Port A Cath accessed, normal saline flushed, blood return present. Patient tolerated well.  Port A Cath 20 gauge 3/4 inch needle deaccessed/ removed, blood return present and heparin locked. No apparent distress noted. Discharge instructions given to patient. Patient understands instructions.

## 2023-01-17 ENCOUNTER — CLINICAL SUPPORT (OUTPATIENT)
Dept: SMOKING CESSATION | Facility: CLINIC | Age: 78
End: 2023-01-17
Payer: COMMERCIAL

## 2023-01-17 ENCOUNTER — PES CALL (OUTPATIENT)
Dept: ADMINISTRATIVE | Facility: CLINIC | Age: 78
End: 2023-01-17
Payer: MEDICARE

## 2023-01-17 DIAGNOSIS — F17.200 NICOTINE DEPENDENCE: Primary | ICD-10-CM

## 2023-01-17 PROCEDURE — 99407 BEHAV CHNG SMOKING > 10 MIN: CPT | Mod: S$GLB,,, | Performed by: FAMILY MEDICINE

## 2023-01-17 PROCEDURE — 99999 PR PBB SHADOW E&M-EST. PATIENT-LVL I: CPT | Mod: PBBFAC,,,

## 2023-01-17 PROCEDURE — 99407 PR TOBACCO USE CESSATION INTENSIVE >10 MINUTES: ICD-10-PCS | Mod: S$GLB,,, | Performed by: FAMILY MEDICINE

## 2023-01-17 PROCEDURE — 99999 PR PBB SHADOW E&M-EST. PATIENT-LVL I: ICD-10-PCS | Mod: PBBFAC,,,

## 2023-01-17 NOTE — PROGRESS NOTES
Spoke with patient today in regard to smoking cessation progress, she states not tobacco free. Pt is still in program. Informed patient of benefit period and contact information if any further help or support is needed. Will complete smart form for Quit attempt #1.

## 2023-01-18 NOTE — PROGRESS NOTES
Individual Follow-Up Form    1/10/2023    Quit Date: 9/18/2023    Clinical Status of Patient: Outpatient    Continuing Medication: no    Other Medications: none     Target Symptoms: Withdrawal and medication side effects. The following were  rated moderate (3) to severe (4) on TCRS:  Moderate (3): none  Severe (4): none    Comments: Patient was seen in clinic today and reports remaining tobacco free. Patient was previously on a tobacco cessation medication regimen of 1mg Chantix BID. No refills needed at this time. Patient doesn't report any abnormal behaviors or mental changes at this time. Patient states that she is now annoyed by the smell of cigarettes and has no desire to smoke. Reviewed strategies, cues, and triggers. Introduced the negative impact of tobacco on health, the health advantages of discontinuing the use of tobacco, time line improved health changes after a quit, withdrawal issues to expect from nicotine and habit, and ways to remain quit The patient will continue with  therapy sessions and monitoring by CTTS.     Diagnosis: F17.200    Next Visit: 1/23/2023

## 2023-01-23 ENCOUNTER — NUTRITION (OUTPATIENT)
Dept: DIABETES | Facility: CLINIC | Age: 78
End: 2023-01-23
Payer: MEDICARE

## 2023-01-23 VITALS — BODY MASS INDEX: 35.11 KG/M2 | WEIGHT: 162.25 LBS

## 2023-01-23 DIAGNOSIS — E11.65 TYPE 2 DIABETES MELLITUS WITH HYPERGLYCEMIA, WITHOUT LONG-TERM CURRENT USE OF INSULIN: ICD-10-CM

## 2023-01-23 DIAGNOSIS — R97.8 ELEVATED TUMOR MARKERS: ICD-10-CM

## 2023-01-23 DIAGNOSIS — C18.2 MALIGNANT NEOPLASM OF ASCENDING COLON: Primary | ICD-10-CM

## 2023-01-23 PROCEDURE — G0108 PR DIAB MANAGE TRN  PER INDIV: ICD-10-PCS | Mod: HCNC,S$GLB,, | Performed by: DIETITIAN, REGISTERED

## 2023-01-23 PROCEDURE — G0108 DIAB MANAGE TRN  PER INDIV: HCPCS | Mod: HCNC,S$GLB,, | Performed by: DIETITIAN, REGISTERED

## 2023-01-23 NOTE — PROGRESS NOTES
Diabetes Care Specialist Progress Note  Author: Kera Chery RD  Date: 1/23/2023    Program Intake  Reason for Diabetes Program Visit:: Intervention  Type of Intervention:: Individual  Individual: Education  Education: Nutrition and Meal Planning, Self-Management Skill Review    Lab Results   Component Value Date    HGBA1C 8.5 (H) 12/20/2022       Clinical                                  Additional Social History                                    Diabetes Self-Management Skills Assessment         Nutrition/Healthy Eating  Area of need?: Yes              Home Blood Glucose Monitoring  Area of need?: Yes                     Diabetes Self Support Plan         Assessment Summary and Plan    Based on today's diabetes care assessment, the following areas of need were identified:      Social 1/10/2023   Support No   Access to Mass Media/Tech No   Cognitive/Behavioral Health No   Culture/Jainism No   Communication No   Health Literacy No        Clinical 1/10/2023   Medication Adherence No   Lab Compliance No   Nutritional Status No        Diabetes Self-Management Skills 1/23/2023   Diabetes Disease Process/Treatment Options -   Nutrition/Healthy Eating Yes-see care plan   Medication -   Home Blood Glucose Monitoring Yes-see care plan   Acute Complications -   Chronic Complications -   Psychosocial/Coping -          Today's interventions were provided through individual discussion, instruction, and written materials were provided.      Patient verbalized understanding of instruction and written materials.  Pt was able to return back demonstration of instructions today. Patient understood key points, needs reinforcement and further instruction.     Diabetes Self-Management Care Plan:    Today's Diabetes Self-Management Care Plan was developed with Marizol's input. Marizol has agreed to work toward the following goal(s) to improve his/her overall diabetes control.      Care Plan: Diabetes Management   Updates made  since 12/24/2022 12:00 AM        Problem: Healthy Eating         Goal: Pt agrees to limit rootbeer to twice a day    Start Date: 1/10/2023   Expected End Date: 1/23/2023   This Visit's Progress: No change   Priority: High   Barriers: No Barriers Identified   Note:    1/10/23-pt states that she has been eating large party sized bags of potato chips. She drinks many root beers during the day. She eats cakes cookies and candy when she feel like it. She quit smoking in September of 2022 and feels like she just starting eating everything. She is no longer eating large amts of chips which she feels has contributed to her recent elevated A1c.  She has decided to limit her root beer to 2 servings per day.     1/23/23-only had 2 root beers since her last appointment but is now drinking sweet tea. Agrees to drink only one root beer per day and water(no other sweet beverages including juice). Breakfast:none this morning, yesterday ate 1 small sausage biscuit with jelly and scrambled eggs orange juice. Lunch:no lunch today, yesterday ate chicken wings, rice and gravy, potatoes and green beans. Has eaten less chips since her last appointment. Eating peanuts instead.Ed on plate method. Encouraged her to increase her intake of protein and free vegetables and limit her carb intake to 1 cup or 2 slices of bread per meal. Encouraged her to eat 3 meals a day to avoid excessive snacking. Ed on healthy snack options.        Task: Reviewed the sources and role of Carbohydrate, Protein, and Fat and how each nutrient impacts blood sugar. Completed 1/23/2023        Task: Provided visual examples using dry measuring cups, food models, and other familiar objects such as computer mouse, deck or cards, tennis ball etc. to help with visualization of portions. Completed 1/23/2023        Task: Explained how to count carbohydrates using the food label and the use of dry measuring cups for accurate carb counting.         Task: Discussed strategies  for choosing healthier menu options when dining out.         Task: Recommended replacing beverages containing high sugar content with noncaloric/sugar free options and/or water. Completed 1/23/2023        Task: Review the importance of balancing carbohydrates with each meal using portion control techniques to count servings of carbohydrate and label reading to identify serving size and amount of total carbs per serving.         Task: Provided Sample plate method and reviewed the use of the plate to estimate amounts of carbohydrate per meal. Completed 1/23/2023        Problem: Blood Glucose Self-Monitoring         Goal: Bring all blood sugar testing supplies to next Dm education appointment.    Start Date: 1/10/2023   Expected End Date: 1/23/2023   This Visit's Progress: On track   Barriers: No Barriers Identified   Note:    1/10/23-She has not set schedule to SMBG. She reports that her lancets do not match her finger stick device and is running out of old lancets. Her last 2 BG fasting results were 255 and 183. She took these last week. She agrees to bring her testing supplies to her next appointment.    1/23/23-presents with 2 meters today. Trained on the True Metrix meter which she had updated supplies for. Bg is 143 today at 1:30 pm. She has not had anything to eat yet today. Ed on target BG ranges. Logs provided. Agrees to take BG fasting and 2 hours PP dinner.       Task: Provided patient with a meter today and sent Rx request to provider to send to patients pharmacy.         Task: Reviewed the importance of self-monitoring blood glucose and keeping logs.         Task: Instructed on how to self-monitor blood glucose using a home glucometer, how to properly dispose of used strips and lancets after use, and how to appropriately store meter and supplies.         Task: Provided patient with blood glucose logs, reviewed appropriate timing and frequency to SMBG, education on parameters on when to notify provider and  advised patient to bring logs to all appts with PCP/Endocrinologist/Diabetes Care Specialist.         Task: Discussed ways to minimize pain when monitoring blood glucose.           Follow Up Plan     No follow-ups on file.    Today's care plan and follow up schedule was discussed with patient.  Marizol verbalized understanding of the care plan, goals, and agrees to follow up plan.        The patient was encouraged to communicate with his/her health care provider/physician and care team regarding his/her condition(s) and treatment.  I provided the patient with my contact information today and encouraged to contact me via phone or [facility] Patient Portal as needed.     Length of Visit   Total Time: 60 Minutes

## 2023-01-27 ENCOUNTER — IMMUNIZATION (OUTPATIENT)
Dept: PHARMACY | Facility: CLINIC | Age: 78
End: 2023-01-27
Payer: MEDICARE

## 2023-01-30 ENCOUNTER — PES CALL (OUTPATIENT)
Dept: ADMINISTRATIVE | Facility: CLINIC | Age: 78
End: 2023-01-30
Payer: MEDICARE

## 2023-02-08 DIAGNOSIS — E11.69 TYPE 2 DIABETES MELLITUS WITH OTHER SPECIFIED COMPLICATION, UNSPECIFIED WHETHER LONG TERM INSULIN USE: ICD-10-CM

## 2023-02-08 RX ORDER — LANCETS
1 EACH MISCELLANEOUS 2 TIMES DAILY
Qty: 200 EACH | Refills: 11 | Status: CANCELLED | OUTPATIENT
Start: 2023-02-08

## 2023-02-08 RX ORDER — LANCETS
1 EACH MISCELLANEOUS 2 TIMES DAILY
Qty: 200 EACH | Refills: 11 | Status: SHIPPED | OUTPATIENT
Start: 2023-02-08

## 2023-02-08 NOTE — TELEPHONE ENCOUNTER
----- Message from Jack Leonard sent at 2/8/2023 11:18 AM CST -----  Regarding: CALL BACK  .Type: Patient Call Back    Who called: ESTEBAN JIMENEZ [[id number]]    What is the request in detail: PT stated she had an missed call from Carine . She stated that she would rather speak with the office in regards to her concerns.Please contact to further discuss and advise.     Can the clinic reply by [facility]? NO    Would the patient rather a call back or a response via [facility]? CALL BACK    Best call back number: [phone]    Additional Information:N/A

## 2023-02-08 NOTE — TELEPHONE ENCOUNTER
Tried calling pt to get clarification on what pt needs filled, and which pharmacy to send to, but no answer/no voicemail set up.

## 2023-02-08 NOTE — TELEPHONE ENCOUNTER
----- Message from Belinda Fajardo MD sent at 2/8/2023 10:59 AM CST -----  Regarding: RE: Syringes  In the future, please send an Epic inbox message to VERONICA Fajardo (P for pool, then provider's last name).  My staff can pend the order faster than I can get to it.    She is not on insulin or injectable medications.  Does she need lancets to prick her finger to check her blood sugar?  Please pend the order or refill if needed.  Thanks!  ----- Message -----  From: Tanya Quintero  Sent: 2/8/2023   9:26 AM CST  To: Belinda Fajardo MD  Subject: Syringes                                         Great Morning Staff!      Hi Mrs. Marizol Kevin is requesting a refill for syringe needles patient stated, she only have two left may someone from the office reach out to the patient.          Thanks   Mesha, PES      Have a Fantastic Day!!!

## 2023-02-08 NOTE — TELEPHONE ENCOUNTER
No new care gaps identified.  [facility] Embedded Care Gaps. Reference number: [id number]. 2/08/2023   11:31:09 AM CST

## 2023-02-09 ENCOUNTER — CLINICAL SUPPORT (OUTPATIENT)
Dept: SMOKING CESSATION | Facility: CLINIC | Age: 78
End: 2023-02-09
Payer: COMMERCIAL

## 2023-02-09 DIAGNOSIS — F17.200 NICOTINE DEPENDENCE: Primary | ICD-10-CM

## 2023-02-09 DIAGNOSIS — Z00.00 ENCOUNTER FOR MEDICARE ANNUAL WELLNESS EXAM: ICD-10-CM

## 2023-02-09 PROCEDURE — 99401 PR PREVENT COUNSEL,INDIV,15 MIN: ICD-10-PCS | Mod: S$GLB,,, | Performed by: FAMILY MEDICINE

## 2023-02-09 PROCEDURE — 99401 PREV MED CNSL INDIV APPRX 15: CPT | Mod: S$GLB,,, | Performed by: FAMILY MEDICINE

## 2023-02-09 NOTE — Clinical Note
Patient was seen in clinic today and reports remaining tobacco free. Patient has discontinued the use of any tobacco cessation medication at this time. Patient states that she not going back to smoking and is thankful for this program. Commended patient on her hard work and dedication to this quit. Reviewed strategies, cues, and triggers. Introduced the negative impact of tobacco on health, the health advantages of discontinuing the use of tobacco, time line improved health changes after a quit, withdrawal issues to expect from nicotine and habit, and ways to achieve the goal of remaining quit. Patient has CTTS contact information and understand that she can reach out if needed.

## 2023-02-09 NOTE — PROGRESS NOTES
Individual Follow-Up Form    2/9/2023    Quit Date: 9/18/2022    Clinical Status of Patient: Outpatient    Continuing Medication: no    Other Medications: none     Target Symptoms: Withdrawal and medication side effects. The following were  rated moderate (3) to severe (4) on TCRS:  Moderate (3): none  Severe (4): none    Comments: Patient was seen in clinic today and reports remaining tobacco free. Patient has discontinued the use of any tobacco cessation medication at this time. Patient states that she not going back to smoking and is thankful for this program. Commended patient on her hard work and dedication to this quit. Reviewed strategies, cues, and triggers. Introduced the negative impact of tobacco on health, the health advantages of discontinuing the use of tobacco, time line improved health changes after a quit, withdrawal issues to expect from nicotine and habit, and ways to achieve the goal of remaining quit. Patient has CTTS contact information and understand that she can reach out if needed.     Diagnosis: F17.200    Next Visit:

## 2023-02-10 ENCOUNTER — IMMUNIZATION (OUTPATIENT)
Dept: PHARMACY | Facility: CLINIC | Age: 78
End: 2023-02-10
Payer: MEDICARE

## 2023-02-14 PROBLEM — Z87.891 HISTORY OF TOBACCO USE: Status: ACTIVE | Noted: 2023-02-14

## 2023-02-14 PROBLEM — E66.01 SEVERE OBESITY (BMI 35.0-39.9) WITH COMORBIDITY: Status: ACTIVE | Noted: 2023-02-14

## 2023-02-27 ENCOUNTER — TELEPHONE (OUTPATIENT)
Dept: INTERNAL MEDICINE | Facility: CLINIC | Age: 78
End: 2023-02-27
Payer: MEDICARE

## 2023-03-01 ENCOUNTER — LAB VISIT (OUTPATIENT)
Dept: LAB | Facility: OTHER | Age: 78
End: 2023-03-01
Attending: STUDENT IN AN ORGANIZED HEALTH CARE EDUCATION/TRAINING PROGRAM
Payer: MEDICARE

## 2023-03-01 DIAGNOSIS — R97.8 ELEVATED TUMOR MARKERS: ICD-10-CM

## 2023-03-01 DIAGNOSIS — C18.2 MALIGNANT NEOPLASM OF ASCENDING COLON: ICD-10-CM

## 2023-03-01 LAB
BASOPHILS # BLD AUTO: 0.04 K/UL (ref 0–0.2)
BASOPHILS NFR BLD: 0.4 % (ref 0–1.9)
CEA SERPL-MCNC: 10.1 NG/ML (ref 0–5)
DIFFERENTIAL METHOD: ABNORMAL
EOSINOPHIL # BLD AUTO: 0.2 K/UL (ref 0–0.5)
EOSINOPHIL NFR BLD: 1.6 % (ref 0–8)
ERYTHROCYTE [DISTWIDTH] IN BLOOD BY AUTOMATED COUNT: 14.8 % (ref 11.5–14.5)
HCT VFR BLD AUTO: 46.1 % (ref 37–48.5)
HGB BLD-MCNC: 14.3 G/DL (ref 12–16)
IMM GRANULOCYTES # BLD AUTO: 0.05 K/UL (ref 0–0.04)
IMM GRANULOCYTES NFR BLD AUTO: 0.5 % (ref 0–0.5)
LYMPHOCYTES # BLD AUTO: 2.3 K/UL (ref 1–4.8)
LYMPHOCYTES NFR BLD: 24.5 % (ref 18–48)
MCH RBC QN AUTO: 26.8 PG (ref 27–31)
MCHC RBC AUTO-ENTMCNC: 31 G/DL (ref 32–36)
MCV RBC AUTO: 86 FL (ref 82–98)
MONOCYTES # BLD AUTO: 0.7 K/UL (ref 0.3–1)
MONOCYTES NFR BLD: 7.1 % (ref 4–15)
NEUTROPHILS # BLD AUTO: 6.3 K/UL (ref 1.8–7.7)
NEUTROPHILS NFR BLD: 65.9 % (ref 38–73)
NRBC BLD-RTO: 0 /100 WBC
PLATELET # BLD AUTO: 281 K/UL (ref 150–450)
PMV BLD AUTO: 8.6 FL (ref 9.2–12.9)
RBC # BLD AUTO: 5.34 M/UL (ref 4–5.4)
WBC # BLD AUTO: 9.56 K/UL (ref 3.9–12.7)

## 2023-03-01 PROCEDURE — 85025 COMPLETE CBC W/AUTO DIFF WBC: CPT | Mod: HCNC | Performed by: STUDENT IN AN ORGANIZED HEALTH CARE EDUCATION/TRAINING PROGRAM

## 2023-03-01 PROCEDURE — 82378 CARCINOEMBRYONIC ANTIGEN: CPT | Mod: HCNC | Performed by: STUDENT IN AN ORGANIZED HEALTH CARE EDUCATION/TRAINING PROGRAM

## 2023-03-01 PROCEDURE — 36415 COLL VENOUS BLD VENIPUNCTURE: CPT | Mod: HCNC | Performed by: STUDENT IN AN ORGANIZED HEALTH CARE EDUCATION/TRAINING PROGRAM

## 2023-03-03 ENCOUNTER — OFFICE VISIT (OUTPATIENT)
Dept: HEMATOLOGY/ONCOLOGY | Facility: CLINIC | Age: 78
End: 2023-03-03
Payer: MEDICARE

## 2023-03-03 ENCOUNTER — TELEPHONE (OUTPATIENT)
Dept: ORTHOPEDICS | Facility: CLINIC | Age: 78
End: 2023-03-03
Payer: MEDICARE

## 2023-03-03 ENCOUNTER — HOSPITAL ENCOUNTER (OUTPATIENT)
Dept: RADIOLOGY | Facility: OTHER | Age: 78
Discharge: HOME | End: 2023-03-03
Attending: ORTHOPAEDIC SURGERY
Payer: MEDICARE

## 2023-03-03 VITALS
SYSTOLIC BLOOD PRESSURE: 134 MMHG | WEIGHT: 165.81 LBS | TEMPERATURE: 99 F | BODY MASS INDEX: 35.77 KG/M2 | DIASTOLIC BLOOD PRESSURE: 70 MMHG | HEART RATE: 85 BPM | OXYGEN SATURATION: 95 % | HEIGHT: 57 IN | RESPIRATION RATE: 16 BRPM

## 2023-03-03 DIAGNOSIS — R13.10 DYSPHAGIA, UNSPECIFIED TYPE: ICD-10-CM

## 2023-03-03 DIAGNOSIS — I10 ESSENTIAL HYPERTENSION: ICD-10-CM

## 2023-03-03 DIAGNOSIS — Z85.038 HISTORY OF COLON CANCER: Primary | ICD-10-CM

## 2023-03-03 DIAGNOSIS — M79.642 LEFT HAND PAIN: ICD-10-CM

## 2023-03-03 DIAGNOSIS — M79.642 LEFT HAND PAIN: Primary | ICD-10-CM

## 2023-03-03 DIAGNOSIS — E61.1 IRON DEFICIENCY: ICD-10-CM

## 2023-03-03 DIAGNOSIS — F17.211 CIGARETTE NICOTINE DEPENDENCE IN REMISSION: ICD-10-CM

## 2023-03-03 PROCEDURE — 3288F PR FALLS RISK ASSESSMENT DOCUMENTED: ICD-10-PCS | Mod: HCNC,CPTII,S$GLB, | Performed by: STUDENT IN AN ORGANIZED HEALTH CARE EDUCATION/TRAINING PROGRAM

## 2023-03-03 PROCEDURE — 1159F PR MEDICATION LIST DOCUMENTED IN MEDICAL RECORD: ICD-10-PCS | Mod: HCNC,CPTII,S$GLB, | Performed by: STUDENT IN AN ORGANIZED HEALTH CARE EDUCATION/TRAINING PROGRAM

## 2023-03-03 PROCEDURE — 99999 PR PBB SHADOW E&M-EST. PATIENT-LVL III: CPT | Mod: PBBFAC,HCNC,, | Performed by: STUDENT IN AN ORGANIZED HEALTH CARE EDUCATION/TRAINING PROGRAM

## 2023-03-03 PROCEDURE — 3078F DIAST BP <80 MM HG: CPT | Mod: HCNC,CPTII,S$GLB, | Performed by: STUDENT IN AN ORGANIZED HEALTH CARE EDUCATION/TRAINING PROGRAM

## 2023-03-03 PROCEDURE — 3072F PR LOW RISK FOR RETINOPATHY: ICD-10-PCS | Mod: HCNC,CPTII,S$GLB, | Performed by: STUDENT IN AN ORGANIZED HEALTH CARE EDUCATION/TRAINING PROGRAM

## 2023-03-03 PROCEDURE — 1160F RVW MEDS BY RX/DR IN RCRD: CPT | Mod: HCNC,CPTII,S$GLB, | Performed by: STUDENT IN AN ORGANIZED HEALTH CARE EDUCATION/TRAINING PROGRAM

## 2023-03-03 PROCEDURE — 3072F LOW RISK FOR RETINOPATHY: CPT | Mod: HCNC,CPTII,S$GLB, | Performed by: STUDENT IN AN ORGANIZED HEALTH CARE EDUCATION/TRAINING PROGRAM

## 2023-03-03 PROCEDURE — 1125F AMNT PAIN NOTED PAIN PRSNT: CPT | Mod: HCNC,CPTII,S$GLB, | Performed by: STUDENT IN AN ORGANIZED HEALTH CARE EDUCATION/TRAINING PROGRAM

## 2023-03-03 PROCEDURE — 1125F PR PAIN SEVERITY QUANTIFIED, PAIN PRESENT: ICD-10-PCS | Mod: HCNC,CPTII,S$GLB, | Performed by: STUDENT IN AN ORGANIZED HEALTH CARE EDUCATION/TRAINING PROGRAM

## 2023-03-03 PROCEDURE — 99499 UNLISTED E&M SERVICE: CPT | Mod: HCNC,S$GLB,, | Performed by: STUDENT IN AN ORGANIZED HEALTH CARE EDUCATION/TRAINING PROGRAM

## 2023-03-03 PROCEDURE — 3075F SYST BP GE 130 - 139MM HG: CPT | Mod: HCNC,CPTII,S$GLB, | Performed by: STUDENT IN AN ORGANIZED HEALTH CARE EDUCATION/TRAINING PROGRAM

## 2023-03-03 PROCEDURE — 99215 OFFICE O/P EST HI 40 MIN: CPT | Mod: HCNC,S$GLB,, | Performed by: STUDENT IN AN ORGANIZED HEALTH CARE EDUCATION/TRAINING PROGRAM

## 2023-03-03 PROCEDURE — 3288F FALL RISK ASSESSMENT DOCD: CPT | Mod: HCNC,CPTII,S$GLB, | Performed by: STUDENT IN AN ORGANIZED HEALTH CARE EDUCATION/TRAINING PROGRAM

## 2023-03-03 PROCEDURE — 1160F PR REVIEW ALL MEDS BY PRESCRIBER/CLIN PHARMACIST DOCUMENTED: ICD-10-PCS | Mod: HCNC,CPTII,S$GLB, | Performed by: STUDENT IN AN ORGANIZED HEALTH CARE EDUCATION/TRAINING PROGRAM

## 2023-03-03 PROCEDURE — 1101F PR PT FALLS ASSESS DOC 0-1 FALLS W/OUT INJ PAST YR: ICD-10-PCS | Mod: HCNC,CPTII,S$GLB, | Performed by: STUDENT IN AN ORGANIZED HEALTH CARE EDUCATION/TRAINING PROGRAM

## 2023-03-03 PROCEDURE — 1101F PT FALLS ASSESS-DOCD LE1/YR: CPT | Mod: HCNC,CPTII,S$GLB, | Performed by: STUDENT IN AN ORGANIZED HEALTH CARE EDUCATION/TRAINING PROGRAM

## 2023-03-03 PROCEDURE — 73130 X-RAY EXAM OF HAND: CPT | Mod: 26,HCNC,LT, | Performed by: RADIOLOGY

## 2023-03-03 PROCEDURE — 73130 X-RAY EXAM OF HAND: CPT | Mod: TC,HCNC,FY,LT

## 2023-03-03 PROCEDURE — 99999 PR PBB SHADOW E&M-EST. PATIENT-LVL III: ICD-10-PCS | Mod: PBBFAC,HCNC,, | Performed by: STUDENT IN AN ORGANIZED HEALTH CARE EDUCATION/TRAINING PROGRAM

## 2023-03-03 PROCEDURE — 3075F PR MOST RECENT SYSTOLIC BLOOD PRESS GE 130-139MM HG: ICD-10-PCS | Mod: HCNC,CPTII,S$GLB, | Performed by: STUDENT IN AN ORGANIZED HEALTH CARE EDUCATION/TRAINING PROGRAM

## 2023-03-03 PROCEDURE — 99499 RISK ADDL DX/OHS AUDIT: ICD-10-PCS | Mod: HCNC,S$GLB,, | Performed by: STUDENT IN AN ORGANIZED HEALTH CARE EDUCATION/TRAINING PROGRAM

## 2023-03-03 PROCEDURE — 1159F MED LIST DOCD IN RCRD: CPT | Mod: HCNC,CPTII,S$GLB, | Performed by: STUDENT IN AN ORGANIZED HEALTH CARE EDUCATION/TRAINING PROGRAM

## 2023-03-03 PROCEDURE — 99215 PR OFFICE/OUTPT VISIT, EST, LEVL V, 40-54 MIN: ICD-10-PCS | Mod: HCNC,S$GLB,, | Performed by: STUDENT IN AN ORGANIZED HEALTH CARE EDUCATION/TRAINING PROGRAM

## 2023-03-03 PROCEDURE — 73130 XR HAND COMPLETE 3 VIEW LEFT: ICD-10-PCS | Mod: 26,HCNC,LT, | Performed by: RADIOLOGY

## 2023-03-03 PROCEDURE — 3078F PR MOST RECENT DIASTOLIC BLOOD PRESSURE < 80 MM HG: ICD-10-PCS | Mod: HCNC,CPTII,S$GLB, | Performed by: STUDENT IN AN ORGANIZED HEALTH CARE EDUCATION/TRAINING PROGRAM

## 2023-03-03 NOTE — PROGRESS NOTES
PROGRESS NOTE    Subjective:       Patient ID: Marizol Kevin is a 77 y.o. female.    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 Ms Kevin is a 73 yo woman who initially saw Dr GORDON on 2020 to establish care for her history of colon cancer. She was followed by Dr Hubbard previously. She underwent a right hemicolectomy on 8/15/2014 for cancer of the ascending colon. Four out of 17 lymph nodes were positive. She had a PET on 1/15/2014 which was negative for liver mets. She did have a hypermetabolic parotid lesion on the PET scan. She underwent adjuvant FOLFOX for 6 months from 2014 to 2015. She had elevated CEA and was last seen by Dr Hubbard on 2020. According to Dr Rosales note, the last colonoscopy was in  at [facility]. As per patient the colonoscopy was good. She was told she is due for colonoscopy in 5 years. She is followed by Dr Cabrera for the parotid lesion. Her last CEA was 7.8 on 2019. She is a chronic smoker. 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 Occasional muscle cramps.   2. CEA on 20 elevated at 14.4. CT C/A/P 20: No evidence of metastatic disease. Partially evaluated small right thyroid nodule.  Dedicated thyroid ultrasound may be obtained for further evaluation if there is concern.  3. Biopsy of thyroid lesion on 10/12/20 showed benign pathology.    Interval History:   Ms Kevin returns for follow up. Last cig in 2022. quit smoking.     feeling tired and weak for last 3 weeks or so, not everyday. dysphagia, had ?dilation done with gi which helped. (Dr Soto)  Will obtain records.    No abd pain, change in bowel habits, no weight loss, no n/v or blood in stool.     ECO    ROS:   A ten-point system review is  obtained and negative except for what was stated in the Interval History.     Physical Examination:   Vital signs reviewed.   General: well hydrated, well developed, in no acute distress  HEENT: normocephalic, PERRLA, EOMI, anicteric sclerae, oropharynx clear  Neck: supple, no JVD, thyromegaly, cervical or supraclavicular lymphadenopathy  Lungs: clear breath sounds bilaterally, no wheezing, rales, or rhonchi  Chest: port site clean  Heart: RRR, no M/R/G  Abdomen: soft, no tenderness, non-distended, no hepatosplenomegaly, mass, or hernia. BS present  Extremities: no clubbing, cyanosis, or edema  Skin: no rash, ulcer, or open wounds  Neuro: alert and oriented x 4, no focal neuro deficit  Psych: pleasant and appropriate mood and affect    Objective:     Laboratory Data:  Labs reviewed.   Lab Results   Component Value Date    WBC 9.56 03/01/2023    HGB 14.3 03/01/2023    HCT 46.1 03/01/2023    MCV 86 03/01/2023     03/01/2023     12/20/2022     12/20/2022    K 4.9 12/20/2022    K 4.9 12/20/2022     12/20/2022     12/20/2022    CO2 26 12/20/2022    CO2 26 12/20/2022    BUN 17 12/20/2022    BUN 17 12/20/2022    CREATININE 1.0 12/20/2022    CREATININE 1.0 12/20/2022    ALT 15 12/20/2022    AST 18 12/20/2022    ALKPHOS 95 12/20/2022    BILITOT 0.5 12/20/2022    MG 1.5 (L) 04/16/2022           Imaging Data:  CT C/A/P 7/29/20: No evidence of metastatic disease. Partially evaluated small right thyroid nodule.  Dedicated thyroid ultrasound may be obtained for further evaluation if there is concern.    Assessment and Plan:     1. History of colon cancer    2. Essential hypertension    3. Cigarette nicotine dependence in remission    4. Iron deficiency      Colon cancer- history of stage III adenocarcinoma of the ascending colon s/p right hemicolectomy on 8/15/2014. Four out of 17 lymph nodes were positive. She underwent adjuvant FOLFOX for 6 months from 11/4/2014 to 4/29/2015.  Her CEA has been  elevated without radiographic evidence of recurrence.   -cea chronically elevated but stable / lower than prev  -monitor for now     Dysphagia- s/p ?dilation, will obtain records [facility]     Iron def - not anemic. Iron rich diet advised.     Cigarette smoking in remission  Pt congratulated and encouraged to refrain. CT lung cancer screening 2022- negative. Repeat annually. ordered      HTN  - BP good  - c/w current meds and f/u with pcp    Her multiple questions were answered in the clinic. Encouraged to call should issues arise.       I spent >40 mins on reviewing epic chart notes, reviewing tests, nursing concerns,obtaining history, performing physical exam, counseling and educating patient/family/caregiver, documentation, independently interpreting results and discussing them with patient/family/caregiver, care coordination, ordering medications/ tests/ procedures and referring and communicating with other health care professionals.     Electronically signed by Juanita Lebron

## 2023-03-07 DIAGNOSIS — Z87.891 FORMER CIGARETTE SMOKER: Primary | ICD-10-CM

## 2023-03-14 ENCOUNTER — PES CALL (OUTPATIENT)
Dept: ADMINISTRATIVE | Facility: CLINIC | Age: 78
End: 2023-03-14
Payer: MEDICARE

## 2023-03-15 ENCOUNTER — TELEPHONE (OUTPATIENT)
Dept: INTERNAL MEDICINE | Facility: CLINIC | Age: 78
End: 2023-03-15
Payer: MEDICARE

## 2023-03-15 NOTE — TELEPHONE ENCOUNTER
----- Message from Swapnil Campbell sent at 3/15/2023 12:33 PM CDT -----  Regarding: Urgent Call Back  Name of Who is Calling: ESTEBAN JIMENEZ [[id number]]           What is the request in detail: Patient is requesting a call back.  Patient would like to reschedule for a later date, later time but not too far off.  Please assist.           Can the clinic reply by [facility]: No           What Number to Call Back if not in [facility]: [phone]

## 2023-03-16 ENCOUNTER — IMMUNIZATION (OUTPATIENT)
Dept: PHARMACY | Facility: CLINIC | Age: 78
End: 2023-03-16
Payer: MEDICARE

## 2023-03-16 DIAGNOSIS — Z23 NEED FOR VACCINATION: Primary | ICD-10-CM

## 2023-03-20 ENCOUNTER — HOSPITAL ENCOUNTER (OUTPATIENT)
Dept: RADIOLOGY | Facility: OTHER | Age: 78
Discharge: HOME | End: 2023-03-20
Attending: INTERNAL MEDICINE
Payer: MEDICARE

## 2023-03-20 DIAGNOSIS — Z87.891 FORMER CIGARETTE SMOKER: ICD-10-CM

## 2023-03-20 PROCEDURE — 71271 CT THORAX LUNG CANCER SCR C-: CPT | Mod: 26,HCNC,, | Performed by: RADIOLOGY

## 2023-03-20 PROCEDURE — 71271 CT CHEST LUNG SCREENING LOW DOSE: ICD-10-PCS | Mod: 26,HCNC,, | Performed by: RADIOLOGY

## 2023-03-20 PROCEDURE — 71271 CT THORAX LUNG CANCER SCR C-: CPT | Mod: TC,HCNC

## 2023-03-27 ENCOUNTER — TELEPHONE (OUTPATIENT)
Dept: ORTHOPEDICS | Facility: CLINIC | Age: 78
End: 2023-03-27
Payer: MEDICARE

## 2023-03-27 NOTE — TELEPHONE ENCOUNTER
I attempted to call the patient to remind them of their appointment with Dr Ryan but their mailbox was either full or not set up

## 2023-03-30 ENCOUNTER — OFFICE VISIT (OUTPATIENT)
Dept: ORTHOPEDICS | Facility: CLINIC | Age: 78
End: 2023-03-30
Payer: MEDICARE

## 2023-03-30 VITALS — BODY MASS INDEX: 35.6 KG/M2 | WEIGHT: 165 LBS | HEIGHT: 57 IN

## 2023-03-30 DIAGNOSIS — E11.9 TYPE 2 DIABETES MELLITUS WITHOUT COMPLICATION: ICD-10-CM

## 2023-03-30 DIAGNOSIS — M75.52 BURSITIS OF LEFT SHOULDER: Primary | ICD-10-CM

## 2023-03-30 DIAGNOSIS — M19.049 CMC ARTHRITIS: ICD-10-CM

## 2023-03-30 PROCEDURE — 20605 DRAIN/INJ JOINT/BURSA W/O US: CPT | Mod: HCNC,51,XS,LT | Performed by: ORTHOPAEDIC SURGERY

## 2023-03-30 PROCEDURE — 1159F PR MEDICATION LIST DOCUMENTED IN MEDICAL RECORD: ICD-10-PCS | Mod: HCNC,CPTII,S$GLB, | Performed by: ORTHOPAEDIC SURGERY

## 2023-03-30 PROCEDURE — 99214 OFFICE O/P EST MOD 30 MIN: CPT | Mod: 25,HCNC,S$GLB, | Performed by: ORTHOPAEDIC SURGERY

## 2023-03-30 PROCEDURE — 3072F PR LOW RISK FOR RETINOPATHY: ICD-10-PCS | Mod: HCNC,CPTII,S$GLB, | Performed by: ORTHOPAEDIC SURGERY

## 2023-03-30 PROCEDURE — 99214 PR OFFICE/OUTPT VISIT, EST, LEVL IV, 30-39 MIN: ICD-10-PCS | Mod: 25,HCNC,S$GLB, | Performed by: ORTHOPAEDIC SURGERY

## 2023-03-30 PROCEDURE — 99999 PR PBB SHADOW E&M-EST. PATIENT-LVL IV: ICD-10-PCS | Mod: PBBFAC,HCNC,, | Performed by: ORTHOPAEDIC SURGERY

## 2023-03-30 PROCEDURE — 3072F LOW RISK FOR RETINOPATHY: CPT | Mod: HCNC,CPTII,S$GLB, | Performed by: ORTHOPAEDIC SURGERY

## 2023-03-30 PROCEDURE — 1159F MED LIST DOCD IN RCRD: CPT | Mod: HCNC,CPTII,S$GLB, | Performed by: ORTHOPAEDIC SURGERY

## 2023-03-30 PROCEDURE — 20610 LARGE JOINT ASPIRATION/INJECTION: L SUBACROMIAL BURSA: ICD-10-PCS | Mod: HCNC,LT,S$GLB, | Performed by: ORTHOPAEDIC SURGERY

## 2023-03-30 PROCEDURE — 99999 PR PBB SHADOW E&M-EST. PATIENT-LVL IV: CPT | Mod: PBBFAC,HCNC,, | Performed by: ORTHOPAEDIC SURGERY

## 2023-03-30 PROCEDURE — 20605 INTERMEDIATE JOINT ASPIRATION/INJECTION: ICD-10-PCS | Mod: HCNC,51,XS,LT | Performed by: ORTHOPAEDIC SURGERY

## 2023-03-30 PROCEDURE — 20610 DRAIN/INJ JOINT/BURSA W/O US: CPT | Mod: HCNC,LT,S$GLB, | Performed by: ORTHOPAEDIC SURGERY

## 2023-03-30 RX ORDER — TRIAMCINOLONE ACETONIDE 40 MG/ML
80 INJECTION, SUSPENSION INTRA-ARTICULAR; INTRAMUSCULAR
Status: DISCONTINUED | OUTPATIENT
Start: 2023-03-30 | End: 2023-03-30 | Stop reason: HOSPADM

## 2023-03-30 RX ORDER — DEXAMETHASONE SODIUM PHOSPHATE 4 MG/ML
4 INJECTION, SOLUTION INTRA-ARTICULAR; INTRALESIONAL; INTRAMUSCULAR; INTRAVENOUS; SOFT TISSUE
Status: DISCONTINUED | OUTPATIENT
Start: 2023-03-30 | End: 2023-03-30 | Stop reason: HOSPADM

## 2023-03-30 RX ADMIN — TRIAMCINOLONE ACETONIDE 80 MG: 40 INJECTION, SUSPENSION INTRA-ARTICULAR; INTRAMUSCULAR at 08:03

## 2023-03-30 RX ADMIN — DEXAMETHASONE SODIUM PHOSPHATE 4 MG: 4 INJECTION, SOLUTION INTRA-ARTICULAR; INTRALESIONAL; INTRAMUSCULAR; INTRAVENOUS; SOFT TISSUE at 08:03

## 2023-03-30 NOTE — PROCEDURES
Large Joint Aspiration/Injection: L subacromial bursa    Date/Time: 3/30/2023 8:00 AM  Performed by: Caitlin Ryan MD  Authorized by: Caitlin Ryan MD     Consent Done?:  Yes (Verbal)  Indications:  Pain  Timeout: prior to procedure the correct patient, procedure, and site was verified      Local anesthesia used?: Yes    Anesthesia:  Local infiltration  Local anesthetic:  Lidocaine 1% without epinephrine    Details:  Needle Size:  22 G  Approach:  Posterior  Location:  Shoulder  Site:  L subacromial bursa  Medications:  80 mg triamcinolone acetonide 40 mg/mL

## 2023-03-30 NOTE — PROGRESS NOTES
Clinic Note  Orthopedics    SUBJECTIVE:     History of Present Illness:  Marizol Kevin is a 77 y.o. female who presents left shoulder pain for many years, acutely worse over the last month. Patient had previously been diagnosed with subacromial bursitis, but hasn't had previous injections or therapy. She also complains of left first CMC pain as well. Reports constant numbness in hands and feet since chemotherapy for colon cancer.     Patient states her left shoulder continues to bother her and it radiates to her thumb. Her pain starts in the upper trap of her left shoulder.     Patient has significant pain in her left thumb she has pain in her shoulder she states she had a sac that was placed on it it felt good she also wants a medicine for her shoulder        Review of patient's allergies indicates:   Allergen Reactions    Sulfa (sulfonamide antibiotics)      Occurred when she was pregnant with varicose veins resulting in leg swelling and pain with standing       Past Medical History:   Diagnosis Date    Allergy     Anxiety     Cancer     colon    Cataract     Colon cancer     Diabetes mellitus, type 2     Dry eye syndrome     Hyperlipidemia     Hypertension     Insomnia     Squamous blepharitis      Past Surgical History:   Procedure Laterality Date    CATARACT EXTRACTION, BILATERAL  03/2020    CHOLECYSTECTOMY      COLONOSCOPY N/A 2/1/2021    Procedure: COLONOSCOPY;  Surgeon: Ashwini Duarte MD;  Location: [address]);  Service: Endoscopy;  Laterality: N/A;  medical transportation  covid test 1/29 [employer], [employer] mailed-[employer]    HYSTERECTOMY      KNEE SURGERY      LAPAROSCOPIC LEFT COLON RESECTION       Family History   Problem Relation Age of Onset    Heart attack Mother     Colon cancer Father     Cancer Sister         unknown    Hypothyroidism Sister     Thyroid cancer Neg Hx      Social History     Tobacco Use    Smoking status: Former     Packs/day: 1.00     Years: 51.00     Pack years:  51.00     Types: Cigarettes     Quit date: 2022     Years since quittin.5    Smokeless tobacco: Never    Tobacco comments:     smoking cessation information given    Substance Use Topics    Alcohol use: Yes     Alcohol/week: 1.0 standard drink     Types: 1 Shots of liquor per week     Comment: occ    Drug use: Never        Review of Systems:  Patient denies constitutional symptoms, cardiac symptoms, respiratory symptoms, GI symptoms.  The remainder of the musculoskeletal ROS is included in the HPI.      OBJECTIVE:     Physical Exam:  Gen:  No acute distress  CV:  Peripherally well-perfused.  Pulses 2+ bilaterally.  Lungs:  Normal respiratory effort.  Abdomen:  Soft, non-tender, non-distended  Head/Neck:  Normocephalic.  Atraumatic. No TTP, AROM and PROM intact without pain  Neuro:  CN intact without deficit, SILT throughout B/L Upper & Lower Extremities    MSK:    LUE:  - skin intact  - mild TTP bicep tendon in bicipital groove  - Positive stahl, positive Neer.   - ROM , ER 50, IR mid back  - AIN/PIN/Radial/Median/Ulnar Nerves assessed in isolation without deficit  - SILT throughout  - Radial & Ulnar arteries palpated 2+  - Capillary Refill <3s  - postive empty can      Diagnostic Results:  X-rays of the Left shoulder were ordered and images reviewed by me.  These showed mild AC joint degnerative changes    ASSESSMENT/PLAN:     A/P: Marizol Kevin is a 77 y.o. female with Left shoulder subacromial bursitis    Plan:    Injection CMC joint we will also do education on bracing anti-inflammatory diet compound cream    Reference to her shoulder I would like her to start physical therapy not just do home exercise we will also do an injection today

## 2023-03-30 NOTE — PROCEDURES
Intermediate Joint Aspiration/Injection    Date/Time: 3/30/2023 8:00 AM  Performed by: Caitlin Ryan MD  Authorized by: Caitlin Ryan MD     Consent Done?:  Yes (Verbal)  Indications:  Arthritis  Timeout: Prior to procedure the correct patient, procedure, and site was verified      Location:  Wrist  Wrist joint: left cmc thumb.  Prep: Patient was prepped and draped in usual sterile fashion    Needle size:  25 G  Medications:  4 mg dexAMETHasone 4 mg/mL

## 2023-04-05 ENCOUNTER — CLINICAL SUPPORT (OUTPATIENT)
Dept: REHABILITATION | Facility: OTHER | Age: 78
End: 2023-04-05
Attending: ORTHOPAEDIC SURGERY
Payer: MEDICARE

## 2023-04-05 DIAGNOSIS — G89.29 CHRONIC LEFT SHOULDER PAIN: ICD-10-CM

## 2023-04-05 DIAGNOSIS — R29.898 WEAKNESS OF LEFT ARM: ICD-10-CM

## 2023-04-05 DIAGNOSIS — M25.512 CHRONIC LEFT SHOULDER PAIN: ICD-10-CM

## 2023-04-05 DIAGNOSIS — M75.52 BURSITIS OF LEFT SHOULDER: ICD-10-CM

## 2023-04-05 PROCEDURE — 97110 THERAPEUTIC EXERCISES: CPT | Mod: HCNC,PN | Performed by: INTERNAL MEDICINE

## 2023-04-05 PROCEDURE — 97161 PT EVAL LOW COMPLEX 20 MIN: CPT | Mod: HCNC,PN | Performed by: INTERNAL MEDICINE

## 2023-04-10 PROBLEM — G89.29 CHRONIC LEFT SHOULDER PAIN: Status: ACTIVE | Noted: 2023-04-10

## 2023-04-10 PROBLEM — M75.52 BURSITIS OF LEFT SHOULDER: Status: ACTIVE | Noted: 2023-04-10

## 2023-04-10 PROBLEM — R29.898 WEAKNESS OF LEFT ARM: Status: ACTIVE | Noted: 2023-04-10

## 2023-04-10 PROBLEM — M25.512 CHRONIC LEFT SHOULDER PAIN: Status: ACTIVE | Noted: 2023-04-10

## 2023-04-10 NOTE — PROGRESS NOTES
"[facility]   Physical Therapy Treatment Note     Name: Marizol Kevin  Clinic Number: [id number]    Therapy Diagnosis:   Encounter Diagnoses   Name Primary?    Chronic left shoulder pain Yes    Weakness of left arm      Physician: Caitlin Ryan, *    Visit Date: 4/11/2023    Physician Orders: PT Eval and Treat    Medical Diagnosis: M75.52 (ICD-10-CM) - Bursitis of left shoulder   Date of Surgery: NA  Evaluation Date: 4/5/2023  Authorization Period Expiration: 03/29/2024   Plan of Care Certification Period: 7/1/23  Visit # / Visits authorized: 2 (1/ 20)  FOTO: 1/ 5 (4/5/2023)  PTA Visit #: 1/ 5    Precautions: Immunosuppressionm HTN , CA ( in remission but still has port due to watching L brain).     Time In: 1:47 pm  Time Out: 2:30 pm  Total Billable Time: 43 minutes    SUBJECTIVE   Pt reports: "I feel fine". Says she has some soreness when performing HEP, but is able to complete them    She was compliant with home exercise program.  Response to previous treatment: "Slightly sore"  Functional change: None today    Pain: 0/10  Location: L shoulder    OBJECTIVE     4/5/2023:  Postural examination in standing:  forward shoulders, pleasant      Functional assessment:   - walking:  I          - splint wrist     ROM:      Seated shoulder flex AROM approx 120 B   Seated shoulder abd AROM B 155  Supine Shoulder flex PROM R 140 ,  140   Supine Shoulder abduction PROM  R 170, L 160   Shoulder MR PROM  R 48, L 60   Shoulder ER PROM R 85     Cerv arom  NA        MMT:  Shoulder flex  B 4+  Shoulder abduction R 5, L 4  Shoulder er  L 4   Shoulder mr B 4+  scaption R 5, L 4+  Biceps B 5   Triceps B 5  Wrist flex B 5  Wrist ext B 5        Low traps  4-   Mid traps R 4- , L  4-   Lats R 4, L 4     Special Tests:   Magan Lukas R neg, L neg  Neer Impingement R neg, L neg    Palpation/ joint mob:   tightness B Upper traps        CMS Impairment/Limitation/Restriction for FOTO shoulder Survey   " "  Therapist reviewed FOTO scores for Marizol Kevin on 4/5/2023.   FOTO documents entered into [employer] - see Media section.     Category: Carrying     Current : 55  Goal: 39  Discharge: NA        TREATMENT     Marizol received the bolded treatments listed below:      Patient received therapeutic exercises for 43 minutes for improved strength and AROM including:  Scapular retraction x 10  10" hold  Sidelying ER x 15  +Sidelying shoulder abd x 15  +Sidelying shoulder flex x 15  Rows YTB x 15  B shoulder ext  YTB x 15  +Supine shoulder flex 2# dowel x 15      Patient received manual therapeutic technique for 00 minutes for improved soft tissue and joint mobility including:        Patient received neuromuscular reeducation for 00 minutes for improved proprioception and balance including:        Patient received therapeutic activities for 00 minutes for improved tolerance to functional activities including:        PATIENT EDUCATION AND HOME EXERCISES     Home Exercises Provided and Patient Education Provided     Education provided:   -Continuance of HEP    Written Home Exercises Provided: Patient instructed to cont prior HEP. Exercises were reviewed and Marizol was able to demonstrate them prior to the end of the session.  Marizol demonstrated good  understanding of the education provided. See EMR under Patient Instructions for exercises provided during therapy sessions    ASSESSMENT   Pt completed treatment with no c/o pain. Pt noted a stretching in L posterior shoulder and bicep with all OH exercises and shoulder ER. Minor cuing required to correct form with rows and shoulder extension. Pt experienced appropriate muscular fatigue in periscapular musculature.      Marizol Is progressing well towards her goals.   Pt prognosis is Good.     Pt will continue to benefit from skilled outpatient physical therapy to address the deficits listed in the problem list box on initial evaluation, provide pt/family education and to " maximize pt's level of independence in the home and community environment.     Pt's spiritual, cultural and educational needs considered and pt agreeable to plan of care and goals.     Anticipated barriers to physical therapy: None    Goals:   Short Term Goals: 3 weeks        1. Decreased L shoulder pain to 8 Progressing, not met       2. Improved L foto score. Progressing, not met     Long Term Goals: 12 weeks   Independent with HEP. Progressing, not met  Report decreased    L  shoulder   pain  <   / =  4  /10 with adls such as reaching into cabinet above 90 deg  Progressing, not met  Increased MMT  for  L UE for shoulder scap ms > 4+/5  Progressing, not met  Increased arom  for  B flex shoulder arom > 130 Progressing, not met  Improved FOTO score to 39 Progressing, not met    PLAN   Certification Period/Plan of care expiration: see above    Improve L shoulder strength and B shoulder ROM     Outpatient Physical Therapy 1 times weekly for 12 weeks to include the following interventions: Manual Therapy, Moist Heat/ Ice, Neuromuscular Re-ed, Patient Education, Self Care, Therapeutic Activities, and Therapeutic Exercise, dry needling.     Vandana Babcock III, PTA

## 2023-04-10 NOTE — PLAN OF CARE
Please sign and return plan of care. Thank you for this referral to the [facility].      [facility]  Physical Therapy Initial Evaluation    Name: Marizol Kevin  Clinic Number: [id number]    Therapy Diagnosis:   Encounter Diagnoses   Name Primary?    Bursitis of left shoulder     Chronic left shoulder pain     Weakness of left arm      Physician: Caitlin Ryan, *    Physician Orders: PT Eval and Treat    Medical Diagnosis: M75.52 (ICD-10-CM) - Bursitis of left shoulder   Date of Surgery: NA  Evaluation Date: 4/5/2023  Authorization Period Expiration: 03/29/2024   Plan of Care Certification Period: 7/1/23  Visit # / Visits authorized: 1/ 1    Time In: 12pm   Time Out: 1245 pm   Total Billable Time: 45 minutes    Precautions: Immunosuppressionm HTN , CA ( in remission but still has port due to watching L brain).     Subjective   Date of onset: First had shoulder bursitis during a pregnancy. Feb this year had throbbing in shoulder/ increased sx's. Reports nsmoi.     History of current condition - Marizol reports: having 2 injections in shoulder. First one seemed to work better than most recent one. Has diff sleeping due to L shoulder pain. Also some referred L chest pain. Also L OA in thumb.  Diff lifting glass in L hand, needs to support with R hand. Whole arm hurts. Had L UE pain down whole arm prior to injection and cont with L UE pain.       Past Medical History:   Diagnosis Date    Allergy     Anxiety     Cancer     colon    Cataract     Colon cancer     Diabetes mellitus, type 2     Dry eye syndrome     Hyperlipidemia     Hypertension     Insomnia     Squamous blepharitis      Marizol Kevin  has a past surgical history that includes Laparoscopic left colon resection; Hysterectomy; Cholecystectomy; Knee surgery; Cataract extraction, bilateral (03/2020); and Colonoscopy (N/A, 2/1/2021).    Marizol has a current medication list which includes  the following prescription(s): albuterol, albuterol-ipratropium, amlodipine, aspirin, atorvastatin, azelastine, betamethasone valerate 0.1%, blood sugar diagnostic, blood-glucose meter, cholecalciferol (vitamin d3), cyclobenzaprine, duloxetine, ferrous sulfate, fluticasone furoate-vilanterol, fluticasone propionate, hydroxyzine hcl, ketoconazole, lancets, lansoprazole, lansoprazole, metformin, multivitamin, pantoprazole, raloxifene, sars-cov-2 (covid-19 omicron), sodium chloride, tiotropium, triamcinolone acetonide 0.025%, varenicline, and varenicline.    Review of patient's allergies indicates:   Allergen Reactions    Sulfa (sulfonamide antibiotics)      Occurred when she was pregnant with varicose veins resulting in leg swelling and pain with standing        Imaging  : xray Three views left shoulder: There is aortic plaque. No fracture dislocation bone destruction seen. There is mild DJD.          Prior Therapy: neuropathy , LBP  Social History:  lives alone. No stairs but walks neighbor's steps for exercise. Daughter is our .    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 Also worked at [employer], [employer]. Retired.    Prior Level of Function: Cont with prior activities but with more discomfort. I washing dishes, walking her dog.   Current Level of Function: walks dog 20 min- 1 hour. Usually lies on R side. Okay with washing dishes and ADLs. Occas lifting arm to ex.     UE dominance : R  Dizziness/ HA's:  yes feels off balance and has HA occipital to frontal area.    Pain:  Current 0/10, worst 10/10    Location: left      Aggravating Factors: abduction  Easing Factors: rest    Pts goals: decrease pain     Objective         Postural examination in standing:  forward shoulders, pleasant     Functional assessment:   - walking:  I          -  splint wrist    ROM:     Seated shoulder flex AROM approx 120 B   Seated shoulder abd AROM B 155  Supine Shoulder flex PROM R 140 ,  140   Supine Shoulder abduction PROM  R 170, L 160   Shoulder MR PROM  R 48, L 60   Shoulder ER PROM R 85      Cerv arom  NA       MMT:  Shoulder flex  B 4+  Shoulder abduction R 5, L 4  Shoulder er  L 4   Shoulder mr B 4+  scaption R 5, L 4+  Biceps B 5   Triceps B 5  Wrist flex B 5  Wrist ext B 5       Low traps  4-   Mid traps R 4- , L  4-   Lats R 4, L 4    Special Tests:   Carrero Lukas R neg, L neg  Neer Impingement R neg, L neg           Palpation/ joint mob:   tightness B Upper traps      CMS Impairment/Limitation/Restriction for FOTO shoulder Survey    Therapist reviewed FOTO scores for Marizol Kevin on 4/5/2023.   FOTO documents entered into [employer] - see Media section.    Category: Carrying    Current : 55  Goal: 39  Discharge: NA         TREATMENT   Treatment Time In: 1240p  Treatment Time Out: 1255p  Total Treatment time separate from Evaluation time:15 min     Marizol received therapeutic exercises to develop strength, endurance, ROM, flexibility, posture, and core stabilization for 15 minutes including:     Scapular retraction 10 sec x 10  sidelying ext rotation 15x   Rows ytb 15x  B shd ext 15x ytb    Marizol received the following manual therapy techniques: Joint mobilizations were applied to the: L shoulder  for 0 minutes, including:   NA    Marizol participated in neuromuscular re-education activities to improve: Balance, Coordination, Sense, Proprioception, and Posture for 0 minutes. The following activities were included:  NA            Education provided re: ex as taurus, ice 10-20 minutes prn. Posture ed.     Written Home Exercises Provided: see above.  Exercises were reviewed and Marizol was able to demonstrate them prior to the end of the session.   Pt received a written copy of exercises to perform at home. Marizol demonstrated good  understanding of the  education provided.     See EMR under MEDIA for exercises given.   Assessment   Marizol is a 77 y.o. female referred to outpatient Physical Therapy with a medical diagnosis of L shoulder pain . Pt presents with pain, weakness, decreased flexibility, physical impairments.     Pt prognosis is Good.   Pt will benefit from skilled outpatient Physical Therapy to address the deficits stated above and in the chart below, provide pt/family education, and to maximize pt's level of independence.     Plan of care discussed with patient: YES  Pt's spiritual, cultural and educational needs considered and patient is agreeable to the plan of care and goals as stated below:     Anticipated Barriers for therapy: chronic pain     Goals:  Short Term Goals: 3 weeks          1. Decreased L shoulder pain to 8 Progressing, not met       2. Improved L foto score. Progressing, not met      Long Term Goals: 12 weeks     Independent with HEP. Progressing, not met  Report decreased    L  shoulder   pain  <   / =  4  /10 with adls such as reaching into cabinet above 90 deg  Progressing, not met  Increased MMT  for  L UE for shoulder scap ms > 4+/5  Progressing, not met  Increased arom  for  B flex shoulder arom > 130 Progressing, not met  Improved FOTO score to 39 Progressing, not met      Medical Necessity is demonstrated by the following  History  Co-morbidities and personal factors that may impact the plan of care Co-morbidities:   history of cancer, HTN    Personal Factors:   no deficits     low   Examination  Body Structures and Functions, activity limitations and participation restrictions that may impact the plan of care Body Regions:   neck  upper extremities    Body Systems:    ROM  strength       Participation Restrictions:   Pain with adls    Activity limitations:   Learning and applying knowledge  no deficits    General Tasks and Commands  no deficits    Communication  no deficits    Mobility  See participation restrictions    Self  care  washing oneself (bathing, drying, washing hands)  caring for body parts (brushing teeth, shaving, grooming)  dressing    Domestic Life  shopping  doing house work (cleaning house, washing dishes, laundry)    Interactions/Relationships  no deficits    Life Areas  no deficits    Community and Social Life  no deficits         low   Clinical Presentation stable and uncomplicated low   Decision Making/ Complexity Score: low     Mod- 1-2  personal factors/ comorbidities, address 3+ elements  High 3+ personal factors/ comorbidities, address 4+ elements, unstable clinical presentation    Plan   Certification Period/Plan of care expiration: see above    Outpatient Physical Therapy 1 times weekly for 12 weeks to include the following interventions: Manual Therapy, Moist Heat/ Ice, Neuromuscular Re-ed, Patient Education, Self Care, Therapeutic Activities, and Therapeutic Exercise, dry needling.     Amee Rizzo, PT

## 2023-04-11 ENCOUNTER — DOCUMENTATION ONLY (OUTPATIENT)
Dept: REHABILITATION | Facility: OTHER | Age: 78
End: 2023-04-11

## 2023-04-11 ENCOUNTER — CLINICAL SUPPORT (OUTPATIENT)
Dept: REHABILITATION | Facility: OTHER | Age: 78
End: 2023-04-11
Attending: ORTHOPAEDIC SURGERY
Payer: MEDICARE

## 2023-04-11 DIAGNOSIS — G89.29 CHRONIC LEFT SHOULDER PAIN: Primary | ICD-10-CM

## 2023-04-11 DIAGNOSIS — M25.512 CHRONIC LEFT SHOULDER PAIN: Primary | ICD-10-CM

## 2023-04-11 DIAGNOSIS — R29.898 WEAKNESS OF LEFT ARM: ICD-10-CM

## 2023-04-11 PROCEDURE — 97110 THERAPEUTIC EXERCISES: CPT | Mod: HCNC,PN,CQ

## 2023-04-11 NOTE — PROGRESS NOTES
PT/PTA met face to face to discuss pt's treatment plan and progress towards established goals. Pt will be seen by a physical therapist minimally every 6th visit or every 30 days.    Vandana Babcock III, PTA

## 2023-04-13 ENCOUNTER — PES CALL (OUTPATIENT)
Dept: ADMINISTRATIVE | Facility: CLINIC | Age: 78
End: 2023-04-13
Payer: MEDICARE

## 2023-04-17 ENCOUNTER — CLINICAL SUPPORT (OUTPATIENT)
Dept: REHABILITATION | Facility: OTHER | Age: 78
End: 2023-04-17
Attending: ORTHOPAEDIC SURGERY
Payer: MEDICARE

## 2023-04-17 DIAGNOSIS — G89.29 CHRONIC LEFT SHOULDER PAIN: Primary | ICD-10-CM

## 2023-04-17 DIAGNOSIS — R29.898 WEAKNESS OF LEFT ARM: ICD-10-CM

## 2023-04-17 DIAGNOSIS — M25.512 CHRONIC LEFT SHOULDER PAIN: Primary | ICD-10-CM

## 2023-04-17 PROCEDURE — 97112 NEUROMUSCULAR REEDUCATION: CPT | Mod: HCNC,PN | Performed by: INTERNAL MEDICINE

## 2023-04-17 PROCEDURE — 97110 THERAPEUTIC EXERCISES: CPT | Mod: HCNC,PN | Performed by: INTERNAL MEDICINE

## 2023-04-17 NOTE — PROGRESS NOTES
"[facility]   Physical Therapy Treatment Note     Name: Marizol Kevin  Clinic Number: [id number]    Therapy Diagnosis:   Encounter Diagnoses   Name Primary?    Chronic left shoulder pain Yes    Weakness of left arm      Physician: Caitlin Ryan, *    Visit Date: 4/17/2023    Physician Orders: PT Eval and Treat    Medical Diagnosis: M75.52 (ICD-10-CM) - Bursitis of left shoulder   Date of Surgery: NA  Evaluation Date: 4/5/2023  Authorization Period Expiration: 03/29/2024   Plan of Care Certification Period: 7/1/23  Visit # / Visits authorized: 3/20   FOTO: 1/ 5 (4/5/2023)  PTA Visit #: 1/ 5    Precautions: Immunosuppressionm HTN , CA ( in remission but still has port due to watching L brain).     Time In: 12 pm  Time Out: 1240  pm  Total Billable Time: 38  minutes    SUBJECTIVE   Pt reports: doing hep, thumb pain    She was compliant with home exercise program.  Response to previous treatment: "Slightly sore"  Functional change: None today    Pain: 0/10  Location: L shoulder    OBJECTIVE     4/5/2023:  Postural examination in standing:  forward shoulders, pleasant      Functional assessment:   - walking:  SC MI    - splint wrist     ROM:    4/17  Seated shoulder flex AROM appox 135 B   Seated shoulder abd AROM B 160    4/5  Supine Shoulder flex PROM R 140 ,  140   Supine Shoulder abduction PROM  R 170, L 160   Shoulder MR PROM  R 54, L 60   Shoulder ER PROM R 85     Cerv arom  NA      4/5 MMT:  Shoulder flex  B 4+  Shoulder abduction R 5, L 4  Shoulder er  L 4   Shoulder mr B 4+  scaption R 5, L 4+  Biceps B 5   Triceps B 5  Wrist flex B 5  Wrist ext B 5        Low traps  4-   Mid traps R 4- , L  4-   Lats R 4, L 4     Special Tests:   Carrero Lukas R neg, L neg  Neer Impingement R neg, L neg    Palpation/ joint mob:   tightness B Upper traps        CMS Impairment/Limitation/Restriction for FOTO shoulder Survey     Therapist reviewed FOTO scores for Marizol Kevin on " "4/17/2023.   FOTO documents entered into [employer] - see Media section.     Category: Carrying     Current :43  Goal: 39  Discharge: NA        TREATMENT     Marizol received the bolded treatments listed below:      Patient received therapeutic exercises for 30 minutes for improved strength and AROM including:   FOTO  Sidelying ER  2 x 15  Sidelying shoulder abd x 15  Sidelying shoulder flex x 15  Rows rTB  2x 15  B shoulder ext  YTB  2 x 15  + prone ext 15x  + prone ha 15x   UBE NV    Patient received manual therapeutic technique for 0  minutes for improved soft tissue and joint mobility including:        Patient received neuromuscular reeducation for 8  minutes for improved proprioception and balance including:  D2 supine 15x   Scapular retraction x 10  10" hold    Patient received therapeutic activities for 00 minutes for improved tolerance to functional activities including:        PATIENT EDUCATION AND HOME EXERCISES     Home Exercises Provided and Patient Education Provided yes- prone HA, ext    Education provided:   -Continuance of HEP    Written Home Exercises Provided: see pt instructions Exercises were reviewed and Marizol was able to demonstrate them prior to the end of the session.  Marizol demonstrated good  understanding of the education provided. See EMR under Patient Instructions for exercises provided during therapy sessions    ASSESSMENT   Pt completed treatment with no c/o pain. Improved FOTO and arom flex.    Marizol Is progressing well towards her goals.   Pt prognosis is Good.     Pt will continue to benefit from skilled outpatient physical therapy to address the deficits listed in the problem list box on initial evaluation, provide pt/family education and to maximize pt's level of independence in the home and community environment.     Pt's spiritual, cultural and educational needs considered and pt agreeable to plan of care and goals.     Anticipated barriers to physical therapy: None    Goals:   Short " Term Goals: 3 weeks        1. Decreased L shoulder pain to 8  met       2. Improved L foto score. met     Long Term Goals: 12 weeks   Independent with HEP. Progressing, not met  Report decreased    L  shoulder   pain  <   / =  4  /10 with adls such as reaching into cabinet above 90 deg  Progressing, not met  Increased MMT  for  L UE for shoulder scap ms > 4+/5  Progressing, not met  Increased arom  for  B flex shoulder arom > 130  met  Improved FOTO score to 39 Progressing, not met    PLAN   Certification Period/Plan of care expiration: see above    Improve L shoulder strength and B shoulder ROM     Outpatient Physical Therapy 1 times weekly for 7 weeks to include the following interventions: Manual Therapy, Moist Heat/ Ice, Neuromuscular Re-ed, Patient Education, Self Care, Therapeutic Activities, and Therapeutic Exercise, dry needling.     Amee Rizzo, PT

## 2023-04-18 ENCOUNTER — TELEPHONE (OUTPATIENT)
Dept: ORTHOPEDICS | Facility: CLINIC | Age: 78
End: 2023-04-18
Payer: MEDICARE

## 2023-04-18 ENCOUNTER — PATIENT MESSAGE (OUTPATIENT)
Dept: ADMINISTRATIVE | Facility: HOSPITAL | Age: 78
End: 2023-04-18
Payer: MEDICARE

## 2023-04-18 ENCOUNTER — PATIENT OUTREACH (OUTPATIENT)
Dept: ADMINISTRATIVE | Facility: HOSPITAL | Age: 78
End: 2023-04-18
Payer: MEDICARE

## 2023-04-18 ENCOUNTER — DOCUMENTATION ONLY (OUTPATIENT)
Dept: ORTHOPEDICS | Facility: CLINIC | Age: 78
End: 2023-04-18
Payer: MEDICARE

## 2023-04-18 NOTE — TELEPHONE ENCOUNTER
Spoke to patient and informed her that we refaxed over the orders to the pharmacy----- Message from Jack Leonard sent at 4/18/2023 12:15 PM CDT -----  Name of Who is Calling: ESTEBAN JIMENEZ [[id number]]    What is the request in detail: Pt stated that she has yet  to receive cream in regards to her hand.Please contact to further discuss and advise.      [facility] - [place], [place] - [id number]      Can the clinic reply by [facility]: NO           What Number to Call Back if not in [facility]:[phone]

## 2023-04-18 NOTE — PROGRESS NOTES
Care Everywhere updates requested and reviewed.  Immunizations reconciled. Media reports reviewed.  Duplicate HM overrides and  orders removed.  Overdue HM topic chart audit and/or requested.  Overdue lab testing linked to upcoming lab appointments if applies.            Health Maintenance Due   Topic Date Due    Foot Exam  2022    Hemoglobin A1c  2023

## 2023-04-19 ENCOUNTER — TELEPHONE (OUTPATIENT)
Dept: ORTHOPEDICS | Facility: CLINIC | Age: 78
End: 2023-04-19
Payer: MEDICARE

## 2023-04-19 NOTE — TELEPHONE ENCOUNTER
Spoke with pt informing her that [facility] will call her to verify her information and will mail the pain cream to her.  Pt was given the phone number to call to check the status.

## 2023-04-24 ENCOUNTER — CLINICAL SUPPORT (OUTPATIENT)
Dept: REHABILITATION | Facility: OTHER | Age: 78
End: 2023-04-24
Attending: ORTHOPAEDIC SURGERY
Payer: MEDICARE

## 2023-04-24 DIAGNOSIS — G89.29 CHRONIC LEFT SHOULDER PAIN: Primary | ICD-10-CM

## 2023-04-24 DIAGNOSIS — M25.512 CHRONIC LEFT SHOULDER PAIN: Primary | ICD-10-CM

## 2023-04-24 DIAGNOSIS — R29.898 WEAKNESS OF LEFT ARM: ICD-10-CM

## 2023-04-24 PROCEDURE — 97110 THERAPEUTIC EXERCISES: CPT | Mod: HCNC,PN | Performed by: INTERNAL MEDICINE

## 2023-04-24 PROCEDURE — 97530 THERAPEUTIC ACTIVITIES: CPT | Mod: HCNC,PN | Performed by: INTERNAL MEDICINE

## 2023-04-24 NOTE — PROGRESS NOTES
"[facility]   Physical Therapy Treatment Note     Name: Marizol Kevin  Clinic Number: [id number]    Therapy Diagnosis:   Encounter Diagnoses   Name Primary?    Chronic left shoulder pain Yes    Weakness of left arm      Physician: Caitlin Ryan, *    Visit Date: 04/23/2023    Physician Orders: PT Eval and Treat    Medical Diagnosis: M75.52 (ICD-10-CM) - Bursitis of left shoulder   Date of Surgery: NA  Evaluation Date: 4/5/2023  Authorization Period Expiration: 03/29/2024   Plan of Care Certification Period: 7/1/23  Visit # / Visits authorized: 4 (3/20)   FOTO: 3/ 5 (4/5/2023)  PTA Visit #: 1/ 5    Precautions: Immunosuppressionm HTN , CA ( in remission but still has port due to watching L brain).     Time In: 12 pm  Time Out: 1240  pm  Total Billable Time: 38  minutes    SUBJECTIVE   Pt reports: doing hep, thumb pain    She was compliant with home exercise program.  Response to previous treatment: "Slightly sore"  Functional change: None today    Pain: 0/10  Location: L shoulder    OBJECTIVE     4/5/2023:  Postural examination in standing:  forward shoulders, pleasant      Functional assessment:   - walking:  SC MI    - splint wrist     ROM:    4/17  Seated shoulder flex AROM appox 135 B   Seated shoulder abd AROM B 160    4/5  Supine Shoulder flex PROM R 140 ,  140   Supine Shoulder abduction PROM  R 170, L 160   Shoulder MR PROM  R 54, L 60   Shoulder ER PROM R 85     Cerv arom  NA      4/5 MMT:  Shoulder flex  B 4+  Shoulder abduction R 5, L 4  Shoulder er  L 4   Shoulder mr B 4+  scaption R 5, L 4+  Biceps B 5   Triceps B 5  Wrist flex B 5  Wrist ext B 5        Low traps  4-   Mid traps R 4- , L  4-   Lats R 4, L 4     Special Tests:   Carrero Lukas R neg, L neg  Neer Impingement R neg, L neg    Palpation/ joint mob:   tightness B Upper traps        CMS Impairment/Limitation/Restriction for FOTO shoulder Survey     Therapist reviewed FOTO scores for Marizol Kevin on " "4/17/2023.   FOTO documents entered into [employer] - see Media section.     Category: Carrying     Current :43  Goal: 39  Discharge: NA        TREATMENT     Marizol received the bolded treatments listed below:      Patient received therapeutic exercises for 30 minutes for improved strength and AROM including:   FOTO  Sidelying ER  2 x 15  Sidelying shoulder abd x 15  Sidelying shoulder flex x 15  Rows rTB  2x 15  B shoulder ext  YTB  2 x 15  + prone ext 15x  + prone ha 15x   UBE NV    Patient received manual therapeutic technique for 0  minutes for improved soft tissue and joint mobility including:        Patient received neuromuscular reeducation for 8  minutes for improved proprioception and balance including:  D2 supine 15x   Scapular retraction x 10  10" hold    Patient received therapeutic activities for 00 minutes for improved tolerance to functional activities including:        PATIENT EDUCATION AND HOME EXERCISES     Home Exercises Provided and Patient Education Provided yes- prone HA, ext    Education provided:   -Continuance of HEP    Written Home Exercises Provided: see pt instructions Exercises were reviewed and Marizol was able to demonstrate them prior to the end of the session.  Marizol demonstrated good  understanding of the education provided. See EMR under Patient Instructions for exercises provided during therapy sessions    ASSESSMENT   Pt completed treatment with no c/o pain. Improved FOTO and arom flex.    Marizol Is progressing well towards her goals.   Pt prognosis is Good.     Pt will continue to benefit from skilled outpatient physical therapy to address the deficits listed in the problem list box on initial evaluation, provide pt/family education and to maximize pt's level of independence in the home and community environment.     Pt's spiritual, cultural and educational needs considered and pt agreeable to plan of care and goals.     Anticipated barriers to physical therapy: None    Goals:   Short " Term Goals: 3 weeks        1. Decreased L shoulder pain to 8  met       2. Improved L foto score. met     Long Term Goals: 12 weeks   Independent with HEP. Progressing, not met  Report decreased    L  shoulder   pain  <   / =  4  /10 with adls such as reaching into cabinet above 90 deg  Progressing, not met  Increased MMT  for  L UE for shoulder scap ms > 4+/5  Progressing, not met  Increased arom  for  B flex shoulder arom > 130  met  Improved FOTO score to 39 Progressing, not met    PLAN   Certification Period/Plan of care expiration: see above    Improve L shoulder strength and B shoulder ROM     Outpatient Physical Therapy 1 times weekly for 7 weeks to include the following interventions: Manual Therapy, Moist Heat/ Ice, Neuromuscular Re-ed, Patient Education, Self Care, Therapeutic Activities, and Therapeutic Exercise, dry needling.     Vandana Babcock III, PTA

## 2023-04-24 NOTE — PROGRESS NOTES
"[facility]   Physical Therapy Treatment Note     Name: Marizol Kevin  Clinic Number: [id number]    Therapy Diagnosis:   Encounter Diagnoses   Name Primary?    Chronic left shoulder pain Yes    Weakness of left arm      Physician: Caitlin Ryan, *  4/24/2023       Physician Orders: PT Eval and Treat    Medical Diagnosis: M75.52 (ICD-10-CM) - Bursitis of left shoulder   Date of Surgery: NA  Evaluation Date: 4/5/2023  Authorization Period Expiration: 03/29/2024   Plan of Care Certification Period: 7/1/23  Visit # / Visits authorized:4/20   FOTO: 2/ 5 (4/17/2023)  PTA Visit #: 0/ 5    Precautions: Immunosuppressionm HTN , CA ( in remission but still has port due to watching L brain).     Time In: 12:05  pm  Time Out: 1246 pm  Total Billable Time: 41 minutes    SUBJECTIVE   Pt reports: shoulder improving, not feeling well due to sinus issues.     She was compliant with home exercise program.  Response to previous treatment: "Slightly sore"  Functional change: improved overhead reach    Pain: 0/10  Location: L shoulder    OBJECTIVE     4/5/2023:  Postural examination in standing:  forward shoulders, pleasant      Functional assessment:   - walking:  I   - splint wrist     ROM:    4/24  Seated shoulder flex AROM appox 135 B   Seated shoulder abd AROM B 160    4/5  Supine Shoulder flex PROM R 140 ,  140   Supine Shoulder abduction PROM  R 170, L 160   Shoulder MR PROM  R 54, L 60   Shoulder ER PROM R 85     4/24 MR  L T 8, R T 9   Cerv arom  NA      4/5 MMT:  Shoulder flex  B 4+  Shoulder abduction R 5, L 4  Shoulder er  L 4   Shoulder mr B 4+  scaption R 5, L 4+  Biceps B 5   Triceps B 5  Wrist flex B 5  Wrist ext B 5        Low traps B  4-   Mid traps R 4- , L  4-   Lats R 4, L 4     4/17 Special Tests:   Carrero Lukas R neg, L neg  Neer Impingement R neg, L neg    Palpation/ joint mob:   tightness B Upper traps        CMS Impairment/Limitation/Restriction for FOTO shoulder " "Survey     Therapist reviewed FOTO scores for Marizol Kevin on 4/17/2023.   FOTO documents entered into [employer] - see Media section.     Category: Carrying     Current :43  Goal: 39  Discharge: NA        TREATMENT     Marizol received the bolded treatments listed below:      Patient received therapeutic exercises for 27 minutes for improved strength and AROM including:     Sidelying ER  2 x 15 1 #  Sidelying shoulder abd x 15  Sidelying shoulder flex x 15  Rows rTB  2x 15  B shoulder ext  YTB  2 x 15  B  prone ext 15x 2 1#   B  prone ha 15x  2  + B Prone flex 15x       Patient received manual therapeutic technique for 0  minutes for improved soft tissue and joint mobility including:  Prom L shoulder 10 min with emphasis      Patient received neuromuscular reeducation for 8  minutes for improved proprioception and balance including:  D2 supine 15x   Scapular retraction x 10  10" hold    Patient received therapeutic activities for 6 minutes for improved tolerance to functional activities including:  + scaption 30x   UBE 3 min     PATIENT EDUCATION AND HOME EXERCISES     Home Exercises Provided and Patient Education Provided yes-     Education provided:   -prone shoulder flex    Written Home Exercises Provided: see pt instructions 424 Exercises were reviewed and Marizol was able to demonstrate them prior to the end of the session.  Marizol demonstrated good  understanding of the education provided. See EMR under Patient Instructions for exercises provided during therapy sessions    ASSESSMENT   Pt completed treatment with no c/o pain and improved reach overhead.  Improved FOTO and arom flex.    Marizol Is progressing well towards her goals.   Pt prognosis is Good.     Pt will continue to benefit from skilled outpatient physical therapy to address the deficits listed in the problem list box on initial evaluation, provide pt/family education and to maximize pt's level of independence in the home and community environment. "     Pt's spiritual, cultural and educational needs considered and pt agreeable to plan of care and goals.     Anticipated barriers to physical therapy: None    Goals:   Short Term Goals: 3 weeks        1. Decreased L shoulder pain to 8  met       2. Improved L foto score. met     Long Term Goals: 12 weeks   Independent with HEP. Progressing, not met  Report decreased    L  shoulder   pain  <   / =  4  /10 with adls such as reaching into cabinet above 90 deg    met  Increased MMT  for  L UE for shoulder scap ms > 4+/5  Progressing, not met  Increased arom  for  B flex shoulder arom > 130  met  Improved FOTO score to 39 Progressing, not met    PLAN   Certification Period/Plan of care expiration: see above    Improve L shoulder strength and B shoulder ROM     Outpatient Physical Therapy 1 times weekly for 4 weeks to include the following interventions: Manual Therapy, Moist Heat/ Ice, Neuromuscular Re-ed, Patient Education, Self Care, Therapeutic Activities, and Therapeutic Exercise, dry needling.     Amee Rizzo, PT

## 2023-04-25 ENCOUNTER — OFFICE VISIT (OUTPATIENT)
Dept: INTERNAL MEDICINE | Facility: CLINIC | Age: 78
End: 2023-04-25
Payer: MEDICARE

## 2023-04-25 ENCOUNTER — LAB VISIT (OUTPATIENT)
Dept: LAB | Facility: OTHER | Age: 78
End: 2023-04-25
Attending: INTERNAL MEDICINE
Payer: MEDICARE

## 2023-04-25 ENCOUNTER — NUTRITION (OUTPATIENT)
Dept: DIABETES | Facility: CLINIC | Age: 78
End: 2023-04-25
Payer: MEDICARE

## 2023-04-25 VITALS
DIASTOLIC BLOOD PRESSURE: 70 MMHG | HEIGHT: 57 IN | BODY MASS INDEX: 35.72 KG/M2 | WEIGHT: 165.56 LBS | HEART RATE: 88 BPM | SYSTOLIC BLOOD PRESSURE: 114 MMHG | OXYGEN SATURATION: 97 %

## 2023-04-25 VITALS — WEIGHT: 165.38 LBS | BODY MASS INDEX: 35.78 KG/M2

## 2023-04-25 DIAGNOSIS — Z87.891 HISTORY OF TOBACCO USE: ICD-10-CM

## 2023-04-25 DIAGNOSIS — I70.0 AORTIC ATHEROSCLEROSIS: ICD-10-CM

## 2023-04-25 DIAGNOSIS — C18.2 MALIGNANT NEOPLASM OF ASCENDING COLON: ICD-10-CM

## 2023-04-25 DIAGNOSIS — E11.65 TYPE 2 DIABETES MELLITUS WITH HYPERGLYCEMIA, WITHOUT LONG-TERM CURRENT USE OF INSULIN: ICD-10-CM

## 2023-04-25 DIAGNOSIS — Z00.00 ANNUAL PHYSICAL EXAM: ICD-10-CM

## 2023-04-25 DIAGNOSIS — E66.01 SEVERE OBESITY (BMI 35.0-39.9) WITH COMORBIDITY: ICD-10-CM

## 2023-04-25 DIAGNOSIS — J30.9 ALLERGIC RHINITIS, UNSPECIFIED SEASONALITY, UNSPECIFIED TRIGGER: ICD-10-CM

## 2023-04-25 DIAGNOSIS — E11.65 TYPE 2 DIABETES MELLITUS WITH HYPERGLYCEMIA, WITHOUT LONG-TERM CURRENT USE OF INSULIN: Primary | ICD-10-CM

## 2023-04-25 DIAGNOSIS — I10 ESSENTIAL HYPERTENSION: Primary | ICD-10-CM

## 2023-04-25 DIAGNOSIS — G62.0 DRUG-INDUCED POLYNEUROPATHY: ICD-10-CM

## 2023-04-25 LAB
ESTIMATED AVG GLUCOSE: 177 MG/DL (ref 68–131)
HBA1C MFR BLD: 7.8 % (ref 4–5.6)

## 2023-04-25 PROCEDURE — 3078F PR MOST RECENT DIASTOLIC BLOOD PRESSURE < 80 MM HG: ICD-10-PCS | Mod: HCNC,CPTII,S$GLB, | Performed by: INTERNAL MEDICINE

## 2023-04-25 PROCEDURE — G0108 DIAB MANAGE TRN  PER INDIV: HCPCS | Mod: HCNC,S$GLB,, | Performed by: DIETITIAN, REGISTERED

## 2023-04-25 PROCEDURE — 3072F LOW RISK FOR RETINOPATHY: CPT | Mod: HCNC,CPTII,S$GLB, | Performed by: INTERNAL MEDICINE

## 2023-04-25 PROCEDURE — 36415 COLL VENOUS BLD VENIPUNCTURE: CPT | Mod: HCNC | Performed by: INTERNAL MEDICINE

## 2023-04-25 PROCEDURE — 3074F PR MOST RECENT SYSTOLIC BLOOD PRESSURE < 130 MM HG: ICD-10-PCS | Mod: HCNC,CPTII,S$GLB, | Performed by: INTERNAL MEDICINE

## 2023-04-25 PROCEDURE — 1126F AMNT PAIN NOTED NONE PRSNT: CPT | Mod: HCNC,CPTII,S$GLB, | Performed by: INTERNAL MEDICINE

## 2023-04-25 PROCEDURE — 1126F PR PAIN SEVERITY QUANTIFIED, NO PAIN PRESENT: ICD-10-PCS | Mod: HCNC,CPTII,S$GLB, | Performed by: INTERNAL MEDICINE

## 2023-04-25 PROCEDURE — 1159F PR MEDICATION LIST DOCUMENTED IN MEDICAL RECORD: ICD-10-PCS | Mod: HCNC,CPTII,S$GLB, | Performed by: INTERNAL MEDICINE

## 2023-04-25 PROCEDURE — 1160F RVW MEDS BY RX/DR IN RCRD: CPT | Mod: HCNC,CPTII,S$GLB, | Performed by: INTERNAL MEDICINE

## 2023-04-25 PROCEDURE — 3288F FALL RISK ASSESSMENT DOCD: CPT | Mod: HCNC,CPTII,S$GLB, | Performed by: INTERNAL MEDICINE

## 2023-04-25 PROCEDURE — 1101F PR PT FALLS ASSESS DOC 0-1 FALLS W/OUT INJ PAST YR: ICD-10-PCS | Mod: HCNC,CPTII,S$GLB, | Performed by: INTERNAL MEDICINE

## 2023-04-25 PROCEDURE — 3288F PR FALLS RISK ASSESSMENT DOCUMENTED: ICD-10-PCS | Mod: HCNC,CPTII,S$GLB, | Performed by: INTERNAL MEDICINE

## 2023-04-25 PROCEDURE — 1101F PT FALLS ASSESS-DOCD LE1/YR: CPT | Mod: HCNC,CPTII,S$GLB, | Performed by: INTERNAL MEDICINE

## 2023-04-25 PROCEDURE — 3078F DIAST BP <80 MM HG: CPT | Mod: HCNC,CPTII,S$GLB, | Performed by: INTERNAL MEDICINE

## 2023-04-25 PROCEDURE — 1159F MED LIST DOCD IN RCRD: CPT | Mod: HCNC,CPTII,S$GLB, | Performed by: INTERNAL MEDICINE

## 2023-04-25 PROCEDURE — 99215 PR OFFICE/OUTPT VISIT, EST, LEVL V, 40-54 MIN: ICD-10-PCS | Mod: HCNC,S$GLB,, | Performed by: INTERNAL MEDICINE

## 2023-04-25 PROCEDURE — 99999 PR PBB SHADOW E&M-EST. PATIENT-LVL V: CPT | Mod: PBBFAC,HCNC,, | Performed by: INTERNAL MEDICINE

## 2023-04-25 PROCEDURE — 1160F PR REVIEW ALL MEDS BY PRESCRIBER/CLIN PHARMACIST DOCUMENTED: ICD-10-PCS | Mod: HCNC,CPTII,S$GLB, | Performed by: INTERNAL MEDICINE

## 2023-04-25 PROCEDURE — 3074F SYST BP LT 130 MM HG: CPT | Mod: HCNC,CPTII,S$GLB, | Performed by: INTERNAL MEDICINE

## 2023-04-25 PROCEDURE — 99215 OFFICE O/P EST HI 40 MIN: CPT | Mod: HCNC,S$GLB,, | Performed by: INTERNAL MEDICINE

## 2023-04-25 PROCEDURE — 3072F PR LOW RISK FOR RETINOPATHY: ICD-10-PCS | Mod: HCNC,CPTII,S$GLB, | Performed by: INTERNAL MEDICINE

## 2023-04-25 PROCEDURE — G0108 PR DIAB MANAGE TRN  PER INDIV: ICD-10-PCS | Mod: HCNC,S$GLB,, | Performed by: DIETITIAN, REGISTERED

## 2023-04-25 PROCEDURE — 83036 HEMOGLOBIN GLYCOSYLATED A1C: CPT | Mod: HCNC | Performed by: INTERNAL MEDICINE

## 2023-04-25 PROCEDURE — 99999 PR PBB SHADOW E&M-EST. PATIENT-LVL V: ICD-10-PCS | Mod: PBBFAC,HCNC,, | Performed by: INTERNAL MEDICINE

## 2023-04-25 RX ORDER — AZELASTINE 1 MG/ML
1 SPRAY, METERED NASAL 2 TIMES DAILY
Qty: 30 ML | Refills: 11 | Status: SHIPPED | OUTPATIENT
Start: 2023-04-25

## 2023-04-25 RX ORDER — DULAGLUTIDE 0.75 MG/.5ML
0.75 INJECTION, SOLUTION SUBCUTANEOUS WEEKLY
Qty: 4 PEN | Refills: 11 | Status: SHIPPED | OUTPATIENT
Start: 2023-04-25 | End: 2023-06-22 | Stop reason: DRUGHIGH

## 2023-04-25 NOTE — PATIENT INSTRUCTIONS
I recommend plenty of rest and drinking plenty of fluids.  Tylenol and NSAIDs, such as ibuprofen, Motrin, naproxen can be helpful for sore throat, headache, ear pain, muscle and joint pain, sneezing, fatigue.  Chloroseptic spray, lozenges can also soothe a sore throat.  Over-the-counter antihistamines (Allegra, Claritin, Zyrtec) for runny nose.  Nasal saline once to twice a day.  Hand washing can help prevent the spread of respiratory illnesses, especially from younger children.    If you have high blood pressure you can not take Sudafed, pseudoephedrine, Zyrtec-D because it will elevate your blood pressure. You may take Coricidin HBP over the counter.      Tests to Keep You Healthy    Eye Exam: Met on 7/13/2022  Colon Cancer Screening: Met on 2/1/2021  Last Blood Pressure <= 139/89 (4/25/2023): Yes  Last HbA1c < 8 (12/20/2022): NO      Sami Fnotana,     If you are due for any health screening(s) below please notify me so we can arrange them to be ordered and scheduled to maintain your health. Most healthy patients complete it. Don't lose out on improving your health.     Tests to Keep You Healthy    Eye Exam: Met on 7/13/2022  Colon Cancer Screening: Met on 2/1/2021  Last Blood Pressure <= 139/89 (4/25/2023): Yes  Last HbA1c < 8 (12/20/2022): NO                    Diabetic A1c Testing    Your chart identifies you as having diabetes. It is recommended that all diabetes patients have an A1c test done at least once a year, but [facility] recommends twice a year for most patients. This helps your doctor to better help you manage your diabetes. This is a non-fasting lab test which can be completed at any time. Your result will be sent to your Primary Care Provider for review and that office will contact you with the results.  A1c every 3-6 months, lipid panel every year, microalbumin (urine test) once per year, comprehensive foot exam once per year, dilated eye exam at least once per year, dental exam every 6  months, flu vaccination every year, and pneumonia vaccination(s) as recommended

## 2023-04-25 NOTE — PROGRESS NOTES
Subjective:       Patient ID: Marizol Kevin is a 77 y.o. female who  has a past medical history of Allergy, Anxiety, Cancer, Cataract, Colon cancer, Diabetes mellitus, type 2, Dry eye syndrome, Hyperlipidemia, Hypertension, Insomnia, and Squamous blepharitis.    Chief Complaint: Diabetes    History was obtained from the patient and supplemented through chart review  Following c Ortho for left shoulder bursitis.    Has difficulty with transportation.  Her neighbor drives her to appointments.  Also gets rides from [facility] [employer] (has to call 3 days in advance). Likes plants.     HPI      HTN:    Pt's BP is controlled on Norvasc 5. Tolerating meds well. +DM with new slightly elevated microalbuminuria.    Home BP:     DM2 is uncontrolled. Pt is taking metformin XR 1000 daily.  Misses dose 1-2/week.     Diet: Veggies, meat, potatoes. Turkey. Not much fried food lately. Medley veggies. Rice.     Snacks: chips--increased after she quit smoking. Decreased.  Fruit-cicles. Stopped ice cream.    Drinks: soft drinks.  Does not feel like she can stop this entirely.     Exercise: walks around the block in the backyard. 100 steps in the [place] on rainy days.     Hasn't been checking BG. Has testing supplies.       New slightly elevated microalbumin creatinine ratio.  Not on an ACE/ARB.   Retinal exams: 7/2022  Foot exams:  3/2021 by me. She declines seeing podiatry d/t neuropathy.  DM edu 1/2023. Has appt today.  Lab Results   Component Value Date/Time    HGBA1C 8.5 (H) 12/20/2022 03:19 PM    HGBA1C 7.4 (H) 09/14/2022 10:32 AM    HGBA1C 6.4 (H) 05/19/2022 10:17 AM     Lab Results   Component Value Date    [id number] 435 09/14/2022      H/o Tobacco use:    She smokes 5-10 cigs/day.  Started smoking since age 19.   Patches caused hallucinations. Didn't like the taste of gums.    Started Wellbutrin to assist with tobacco cessation, but was unable to tolerate d/t SE, altered taste, HA, dizziness.   On Chantix.  Following with tobacco  cessation Clinic. Quit since 9/2022!  Has brief cravings, not often. Caused increase eating as above.  Spiral CT .  0.4 cm nodule.  Repeat 1 year.    Aortic atherosclerosis, HLD:  Is taking Lipitor 40 and ASA 81 daily.  Lab Results   Component Value Date    LDLCALC 75.0 09/14/2022     The 10-year ASCVD risk score (Kirk DUDLEY, et al., 2019) is: 17.6%    Values used to calculate the score:      Age: 77 years      Sex: Female      Is Non- : Yes      Diabetic: Yes      Tobacco smoker: No      Systolic Blood Pressure: 114 mmHg      Is BP treated: Yes      HDL Cholesterol: 37 mg/dL      Total Cholesterol: 133 mg/dL    Obesity:  Diet, exercise as above.  BMI Readings from Last 3 Encounters:   04/25/23 35.83 kg/m²   03/30/23 35.71 kg/m²   03/03/23 35.88 kg/m²     AR:   Seasonal. Causes hoarseness, frontal HA.  Has been using Flonase, but p.r.n..    Colon cancer:  Dx in 2014. S/p R hemicolectomy.  S/p adjuvant chemotherapy.  Follows with [facility]; monitoring imaging.  PET scan 1-2021 suspicious for recurrence.  C scope 2-2021 with polyp.  Pathology with colitis.  Negative for dysplasia.     Polyneuropathy:    Fingertips, toes. H/o chemo.  On Cymbalta with some relief.  Gabapentin 300 did not help in the past.  Lab Results   Component Value Date    [id number] 435 09/14/2022                   Not addressed today.    Asthma:  H/o childhood asthma. H/o tobacco use.    Was admitted for exacerbation .       GERD as below.   PFT 7/2022 was normal.  DLCO was also normal.  Minimal emphysema on CT chest.   Eosinophils wnl.      Saw Pulm. Thought to be asthma rather than COPD.  Is on triple therapy, but only taking Spiriva daily and Breo on occasion. Continue triple therapy. Consider stopping Spiriva in 3 mo if she does well as it is not a mainstay treatment for Asthma  F/u c Pulm. Cont controller meds. Consider stopping Spiriva if sx are controlled. She quit smoking!    Dysphagia:  At the R  side of the mid throat. No odynophagia.   Avoids eating. Triggers are rice, gravy, meat, veggies.  Water doesn't help. Does ok with gumbo.  She will stick her fingers down her throat to vomit. Neighbor had to do the heimlich.   Cont PPI. Referred to [facility] for EGD.       GERD:  On Protonix daily ever since her hemicolectomy in 2014.  Has recurrent symptoms off of Protonix. Takes PPI daily for several years. +tobacco. Currently no GERD sx, although she notes abd discomfort improved with belching.  Unclear h/o of EGD.  Controlled. Cont PPI. Referred to [facility] for EGD.     Thyroid Nodule:    Incidental finding on CT for cancer surveillence with small R thyroid nodule. Thyroid ultrasound 9/2020 with solid nodule on the right side.  Saw Endo. Chronic dysphagia less likely related to thyroid.  FNA 10/2020 was benign.    Reports significant dysphagia as above.  Repeat thyroid ultrasound  with stable nodules.  Does not meet criteria for follow-up or FNA.  FNA  benign.  Repeat thyroid ultrasound  with stable nodules.  Does not meet criteria for follow-up or FNA. Following with endocrinology.  TSH WNL; monitor annually.  Lab Results   Component Value Date    TSH 0.792 09/14/2022     Reports leg cramps that can occur at night.  Has a m relaxant for back pain.               Vit D, lytes wnl. Rx low dose m. relaxant q.h.s..  Discussed potential sedation.  Possible RLS. Ferritin low normal. Start iron qOD for possible RLS.  Lab Results   Component Value Date    FERRITIN 22 12/20/2022     Lab Results   Component Value Date    IRON 53 12/20/2022    TRANSFERRIN 302 12/20/2022    TIBC 447 12/20/2022    FESATURATED 12 (L) 12/20/2022      Lab Results   Component Value Date    [id number] 435 09/14/2022     No results found for: FOLATE    GFR borderline low.    ?Osteoporosis, Vitamin D deficiency:   Unclear history.  DEXA  with osteopenia, intermediate/borderline high FRAX score.  Is on raloxifene.   "+Tobacco.    Exercise: as above              DEXA with intermediate FRAX score.  Vitamin-D wnl. Cont OTC Citracal, Oscal D. Cont raloxifene.  Lab Results   Component Value Date    [id number] 49 12/20/2022    [id number] 56 06/01/2021    [id number] 40 11/20/2020     Lesion of labia  Reports recurrent boils. Referred to OBGYN for pelvic exam. ?bartholin cyst, HSV     L shoulder subacromial bursitis:  Chronic arthralgias. Mild DJD on xray. Follow c Ortho. Performed steroid injection.  Ordered PT.     Has m relaxant for back pain.     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  Follows with [facility] ENT, Dr. Cabrera.      Anxiety, Insomnia:    Takes about 3 tablets of Xanax a month.   reviewed.  She fills this every few months.  Has been prescribed by Dr. Paul, PCP, and Dr. Hubbard, Heme Onc in the past.    Refilled Vistaril p.r.n..     Review of Systems   Constitutional:  Negative for activity change and appetite change.   HENT:  Positive for voice change. Negative for postnasal drip and rhinorrhea.    Respiratory:  Negative for wheezing.    Cardiovascular:  Negative for leg swelling.   Musculoskeletal:  Positive for arthralgias. Negative for gait problem.   Skin:  Negative for rash and wound.   Hematological:  Negative for adenopathy.   Psychiatric/Behavioral:  Negative for confusion. The patient is not nervous/anxious.        I personally reviewed Past Medical History, Past Surgical History, Social History, and Family History.    Objective:      Vitals:    04/25/23 1018   BP: 114/70   Pulse: 88   SpO2: 97%   Weight: 75.1 kg (165 lb 9.1 oz)   Height: 4' 9" (1.448 m)        Physical Exam  Constitutional:       General: She is not in acute distress.     Appearance: She is well-developed. She is not diaphoretic.   HENT:      Head: Normocephalic and atraumatic.      Nose: Mucosal edema (mild) present. No rhinorrhea.      Mouth/Throat:      Pharynx: Uvula midline. Posterior oropharyngeal " erythema present. No oropharyngeal exudate.   Eyes:      General: No scleral icterus.        Right eye: No discharge.         Left eye: No discharge.      Conjunctiva/sclera:      Right eye: Right conjunctiva is not injected.      Left eye: Left conjunctiva is not injected.   Neck:      Thyroid: No thyromegaly.      Trachea: No tracheal deviation.   Cardiovascular:      Rate and Rhythm: Normal rate and regular rhythm.      Heart sounds: Normal heart sounds. No murmur heard.  Pulmonary:      Effort: Pulmonary effort is normal. No respiratory distress.      Breath sounds: Normal breath sounds. No stridor. No wheezing.      Comments: Hoarseness  Abdominal:      General: Bowel sounds are normal. There is no distension.      Palpations: Abdomen is soft.      Tenderness: There is no abdominal tenderness.   Musculoskeletal:         General: No deformity.      Cervical back: Neck supple.      Right lower leg: No edema.      Left lower leg: No edema.   Lymphadenopathy:      Cervical: No cervical adenopathy.   Skin:     General: Skin is warm and dry.      Findings: No erythema.   Neurological:      Mental Status: She is alert.      Gait: Gait normal.   Psychiatric:         Behavior: Behavior normal.         Lab Results   Component Value Date    WBC 9.56 03/01/2023    HGB 14.3 03/01/2023    HCT 46.1 03/01/2023     03/01/2023    CHOL 133 09/14/2022    TRIG 105 09/14/2022    HDL 37 (L) 09/14/2022    ALT 15 12/20/2022    AST 18 12/20/2022     12/20/2022     12/20/2022    K 4.9 12/20/2022    K 4.9 12/20/2022     12/20/2022     12/20/2022    CREATININE 1.0 12/20/2022    CREATININE 1.0 12/20/2022    BUN 17 12/20/2022    BUN 17 12/20/2022    CO2 26 12/20/2022    CO2 26 12/20/2022    TSH 0.792 09/14/2022    HGBA1C 8.5 (H) 12/20/2022       The 10-year ASCVD risk score (Kirk DUDLEY, et al., 2019) is: 17.6%    Values used to calculate the score:      Age: 77 years      Sex: Female      Is Non-   American: Yes      Diabetic: Yes      Tobacco smoker: No      Systolic Blood Pressure: 114 mmHg      Is BP treated: Yes      HDL Cholesterol: 37 mg/dL      Total Cholesterol: 133 mg/dL    (Imaging have been independently reviewed)  CT chest c small nodules    Assessment:       1. Essential hypertension    2. Type 2 diabetes mellitus with hyperglycemia, without long-term current use of insulin    3. History of tobacco use    4. Aortic atherosclerosis    5. Severe obesity (BMI 35.0-39.9) with comorbidity    6. Allergic rhinitis, unspecified seasonality, unspecified trigger    7. Malignant neoplasm of ascending colon    8. Drug-induced polyneuropathy    9. Annual physical exam            Plan:       Marizol was seen today for diabetes.    Diagnoses and all orders for this visit:    Essential hypertension  Comments:  Controlled.  Continue Norvasc 5. New slightly elevated microalbumin creatinine ratio. Consider switching CCB to ARB if persistent.    Type 2 diabetes mellitus with hyperglycemia, without long-term current use of insulin  Comments:  A1C uncontrolled, repeat. Cont metformin. Add Trulicity; discussed SE. Start BG log. Discussed diet, reduce soda. F/u c DM edu. Resched eye exam.  Orders:  -     Hemoglobin A1C; Future  -     Microalbumin/Creatinine Ratio, Urine; Future  -     dulaglutide (TRULICITY) 0.75 mg/0.5 mL pen injector; Inject 0.75 mg into the skin once a week.    History of tobacco use  Comments:  Congratulated on continued cessation.  F/u c tobacco cessation clinic. If recurrent cravings, try gum, toothpicks. Annual spiral CT until age 82.    Aortic atherosclerosis  Comments:  FLP controlled.  Continue Lipitor 40. Discussed low fat diet. Quit tobacco. Monitor FLP annually    Severe obesity (BMI 35.0-39.9) with comorbidity  Comments:  Stable. Cont Metformin. Add Trulicity. Discussed well balanced diet, decreasing soft drinks. F/u c DM edu.  Orders:  -     dulaglutide (TRULICITY) 0.75 mg/0.5 mL pen  injector; Inject 0.75 mg into the skin once a week.    Allergic rhinitis, unspecified seasonality, unspecified trigger  Comments:  Intermittent. Rec Astelin, Antihistamine PRN.  Provided instructions on OTC supportive treatment.  Orders:  -     azelastine (ASTELIN) 137 mcg (0.1 %) nasal spray; 1 spray (137 mcg total) by Nasal route 2 (two) times daily.    Malignant neoplasm of ascending colon  Comments:  No acute issues. Following c Oncology.    Drug-induced polyneuropathy  Comments:  Persistent. 2/2 chemo.  Continue Cymbalta. B12 wnl. Could consider Lyrica, titrating gabapentin    Annual physical exam  -     Hemoglobin A1C; Future  -     Microalbumin/Creatinine Ratio, Urine; Future           Side effects of medication(s) were discussed in detail and patient voiced understanding.  Patient will call back for any issues or complications.     I have spent a total of 50 minutes with the patient as well as reviewing the chart/medical record and placing orders on the day of the visit. Discussed DM, diet, AR, tobacco use.     RTC in 1 months or sooner PRN for DM c BG log. Establish care with a new PCP. May alternate visits virtually/telephone and in person.

## 2023-04-25 NOTE — PROGRESS NOTES
Diabetes Care Specialist Progress Note  Author: Kera Chery RD  Date: 4/25/2023    Program Intake  Reason for Diabetes Program Visit:: Intervention  Type of Intervention:: Individual  Individual: Education  Education: Self-Management Skill Review    Lab Results   Component Value Date    HGBA1C 8.5 (H) 12/20/2022       Clinical                                  Additional Social History                                    Diabetes Self-Management Skills Assessment         Nutrition/Healthy Eating  Area of need?: Yes         Medications  Area of need?: Yes    Home Blood Glucose Monitoring  Area of need?: Yes                     Diabetes Self Support Plan         Assessment Summary and Plan    Based on today's diabetes care assessment, the following areas of need were identified:      Social 1/10/2023   Support No   Access to Mass Media/Tech No   Cognitive/Behavioral Health No   Culture/Gnosticist No   Communication No   Health Literacy No        Clinical 1/10/2023   Medication Adherence No   Lab Compliance No   Nutritional Status No        Diabetes Self-Management Skills 4/25/2023   Diabetes Disease Process/Treatment Options -   Nutrition/Healthy Eating Yes-see care plan   Medication Yes-see care plan   Home Blood Glucose Monitoring Yes-see care plan   Acute Complications -   Chronic Complications -   Psychosocial/Coping -          Today's interventions were provided through individual discussion, instruction, and written materials were provided.      Patient verbalized understanding of instruction and written materials.  Pt was able to return back demonstration of instructions today. Patient understood key points, needs reinforcement and further instruction.     Diabetes Self-Management Care Plan:    Today's Diabetes Self-Management Care Plan was developed with Marizol's input. Marizol has agreed to work toward the following goal(s) to improve his/her overall diabetes control.      Care Plan: Diabetes Management    Updates made since 3/26/2023 12:00 AM        Problem: Healthy Eating         Goal: Pt will decrease root beer consumption to 1 can per day.    Start Date: 6/16/2021   Expected End Date: 7/16/2021   This Visit's Progress: Deferred   Priority: High   Note:    6/16/21 - discussed importance of eliminating sweet drinks - discussed ways to start cutting back on sweet drinks and importance of finding SF alternative option.        Problem: Healthy Eating         Goal: Pt agrees to limit rootbeer to twice a day    Start Date: 1/10/2023   Expected End Date: 1/23/2023   This Visit's Progress: No change   Recent Progress: No change   Priority: High   Barriers: No Barriers Identified   Note:    1/10/23-pt states that she has been eating large party sized bags of potato chips. She drinks many root beers during the day. She eats cakes cookies and candy when she feel like it. She quit smoking in September of 2022 and feels like she just starting eating everything. She is no longer eating large amts of chips which she feels has contributed to her recent elevated A1c.  She has decided to limit her root beer to 2 servings per day.     1/23/23-only had 2 root beers since her last appointment but is now drinking sweet tea. Agrees to drink only one root beer per day and water(no other sweet beverages including juice). Breakfast:none this morning, yesterday ate 1 small sausage biscuit with jelly and scrambled eggs orange juice. Lunch:no lunch today, yesterday ate chicken wings, rice and gravy, potatoes and green beans. Has eaten less chips since her last appointment. Eating peanuts instead.Ed on plate method. Encouraged her to increase her intake of protein and free vegetables and limit her carb intake to 1 cup or 2 slices of bread per meal. Encouraged her to eat 3 meals a day to avoid excessive snacking. Ed on healthy snack options.     4/25/23-B:does not eat breakfast, usually stays in bed in the morning L:turkey necks, rice and peas  Dinner:stewed pork, mac and cheese. C/O snacking on potato chips often, states she can not resist the chips. Her family and neighbors cook often for her and she cooks as well. She is happy to start Trulicity to control cravings and lose weight.       Problem: Blood Glucose Self-Monitoring         Goal: Bring all blood sugar testing supplies to next Dm education appointment.    Start Date: 1/10/2023   Expected End Date: 1/23/2023   This Visit's Progress: On track   Recent Progress: On track   Barriers: No Barriers Identified   Note:    1/10/23-She has not set schedule to SMBG. She reports that her lancets do not match her finger stick device and is running out of old lancets. Her last 2 BG fasting results were 255 and 183. She took these last week. She agrees to bring her testing supplies to her next appointment.    1/23/23-presents with 2 meters today. Trained on the True Metrix meter which she had updated supplies for. Bg is 143 today at 1:30 pm. She has not had anything to eat yet today. Ed on target BG ranges. Logs provided. Agrees to take BG fasting and 2 hours PP dinner.    4/25/23-Has log but forgot at home. Is SMBG fasting, verbal recall(158,201,200,140's) Does not want to SMBG twice a day. Some error readings. Reviewed proper SMBG technique including hanging her arm at her side for 10 seconds prior to pricking her finger. Agrees to bring log to all clinic visits       Task: Reviewed the importance of self-monitoring blood glucose and keeping logs. Completed 4/25/2023        Task: Instructed on how to self-monitor blood glucose using a home glucometer, how to properly dispose of used strips and lancets after use, and how to appropriately store meter and supplies. Completed 4/25/2023        Task: Discussed ways to minimize pain when monitoring blood glucose. Completed 4/25/2023        Problem: Medications         Goal: Patient Agrees to start Trulicity as prescribed.    Start Date: 4/25/2023   Expected End  Date: 6/12/2023   Priority: High   Barriers: No Barriers Identified   Note:    4/25/23-Pt saw PCP today and Trulicity was prescribed. Instructed pt on Trulicity mech, SE, storage, timing and injection technique. She verbalized and demonstrated understanding.       Task: Reviewed with patient all current diabetes medications and provided basic review of the purpose, dosage, frequency, side effects, and storage of both oral and injectable diabetes medications. Completed 4/25/2023        Task: Reviewed possible resources for acquiring cost prohibitive medication.         Task: Instructed patient on how to self-administer Trulicity Completed 4/25/2023        Task: Discussed guidelines for preventing, detecting and treating hypoglycemia and hyperglycemia and reviewed the importance of meal and medication timing with diabetes mediations for prevention of hypoglycemia and maximum drug benefit.           Follow Up Plan     No follow-ups on file.    Today's care plan and follow up schedule was discussed with patient.  Marizol verbalized understanding of the care plan, goals, and agrees to follow up plan.        The patient was encouraged to communicate with his/her health care provider/physician and care team regarding his/her condition(s) and treatment.  I provided the patient with my contact information today and encouraged to contact me via phone or [facility] Patient Portal as needed.     Length of Visit   Total Time: 60 Minutes

## 2023-04-27 ENCOUNTER — PES CALL (OUTPATIENT)
Dept: ADMINISTRATIVE | Facility: CLINIC | Age: 78
End: 2023-04-27
Payer: MEDICARE

## 2023-05-18 ENCOUNTER — TELEPHONE (OUTPATIENT)
Dept: PRIMARY CARE CLINIC | Facility: CLINIC | Age: 78
End: 2023-05-18
Payer: MEDICARE

## 2023-05-18 NOTE — TELEPHONE ENCOUNTER
----- Message from Rula Chauhan sent at 5/18/2023 11:55 AM CDT -----    Patient Returning Call        Who Called:pt  Does the patient know what this is regarding?:asking to have her acupuncture done at this location also please call pt  Would the patient rather a call back or a response via [facility]? call  Best Call Back Number:[phone]  Additional Information: call back

## 2023-05-23 NOTE — PROGRESS NOTES
[facility]   Physical Therapy Treatment Note     Name: Marizol Kevin  Clinic Number: [id number]    Therapy Diagnosis:   Encounter Diagnoses   Name Primary?    Chronic left shoulder pain Yes    Weakness of left arm        Physician: Caitlin Ryan, *    Visit Date: 05/24/2023    Physician Orders: PT Eval and Treat    Medical Diagnosis: M75.52 (ICD-10-CM) - Bursitis of left shoulder   Date of Surgery: NA  Evaluation Date: 4/5/2023  Authorization Period Expiration: 03/29/2024   Plan of Care Certification Period: 7/1/23  Visit # / Visits authorized: 4 (3/20)   FOTO: 3/ 5 (4/5/2023)  PTA Visit #: 1/ 5    Precautions: Immunosuppressionm HTN , CA ( in remission but still has port due to watching L brain).     Time In: 0844  Time Out: 1000  Total Billable Time: 38  minutes    SUBJECTIVE   Pt reports: she feels like therapy is helping her shoulder. She is able to reach a little higher.  States she has been trying to use her arm more while at home and has been practicing some of her exercises.     She was compliant with home exercise program.  Response to previous treatment: felt some soreness in her shoulder afterwards.   Functional change: able to reach a little higher    Pain: 1/10  Location: L shoulder    OBJECTIVE     4/5/2023:  Postural examination in standing:  forward shoulders, pleasant      Functional assessment:   - walking:  SC MI    - splint wrist     ROM:    4/17  Seated shoulder flex AROM appox 135 B   Seated shoulder abd AROM B 160    4/5  Supine Shoulder flex PROM R 140 ,  140   Supine Shoulder abduction PROM  R 170, L 160   Shoulder MR PROM  R 54, L 60   Shoulder ER PROM R 85     Cerv arom  NA      4/5 MMT:  Shoulder flex  B 4+  Shoulder abduction R 5, L 4  Shoulder er  L 4   Shoulder mr B 4+  scaption R 5, L 4+  Biceps B 5   Triceps B 5  Wrist flex B 5  Wrist ext B 5        Low traps  4-   Mid traps R 4- , L  4-   Lats R 4, L 4     Special Tests:   Magan MARIANO  "neg, L neg  Neer Impingement R neg, L neg    Palpation/ joint mob:   tightness B Upper traps        CMS Impairment/Limitation/Restriction for FOTO shoulder Survey     Therapist reviewed FOTO scores for Marizol Kevin on 4/17/2023.   FOTO documents entered into [facility] - see Media section.     Category: Carrying     Current :43  Goal: 39  Discharge: NA        TREATMENT     Marizol received the bolded treatments listed below:      Patient received therapeutic exercises for 30 minutes for improved strength and AROM including:     +UBE 5 min lvl 1.0 - to improve shoulder lubrication/joint mobility and for musculare endurance    Sidelying ER  2 x 15  Sidelying shoulder abd x 15  Sidelying shoulder flex x 15  Rows RTB  2x 15  B shoulder ext  YTB  2 x 15  Prone ext 15x  Prone ha 15x   +Wall slides with towel roll, 20x  +Stand wand flexion 1# 2x10      Patient received manual therapeutic technique for 0  minutes for improved soft tissue and joint mobility including:        Patient received neuromuscular reeducation for 8 minutes for improved proprioception and balance including:  D2 supine 15x   Scapular retraction 10x10" hold  +Seated slouch corrections with 1/2 foam, 20x YTB    Patient received therapeutic activities for 00 minutes for improved tolerance to functional activities including:        PATIENT EDUCATION AND HOME EXERCISES     Home Exercises Provided and Patient Education Provided yes- prone HA, ext    Education provided:   -Continuance of HEP    Written Home Exercises Provided: see pt instructions Exercises were reviewed and Marizol was able to demonstrate them prior to the end of the session.  Marizol demonstrated good  understanding of the education provided. See EMR under Patient Instructions for exercises provided during therapy sessions    ASSESSMENT   Pt tolerated exercise well this visit with minimal increase in pain reported. Muscle weakness in deltoids/scapular muscles remain notable during resistive " exercises. ROM deficits continues to remain evident with flexion/IR motions actively however she appears to be making improvements.       Marizol Is progressing well towards her goals.   Pt prognosis is Good.     Pt will continue to benefit from skilled outpatient physical therapy to address the deficits listed in the problem list box on initial evaluation, provide pt/family education and to maximize pt's level of independence in the home and community environment.     Pt's spiritual, cultural and educational needs considered and pt agreeable to plan of care and goals.     Anticipated barriers to physical therapy: None    Goals:   Short Term Goals: 3 weeks        1. Decreased L shoulder pain to 8  met       2. Improved L foto score. met     Long Term Goals: 12 weeks   Independent with HEP. Progressing, not met  Report decreased    L  shoulder   pain  <   / =  4  /10 with adls such as reaching into cabinet above 90 deg  Progressing, not met  Increased MMT  for  L UE for shoulder scap ms > 4+/5  Progressing, not met  Increased arom  for  B flex shoulder arom > 130  met  Improved FOTO score to 39 Progressing, not met    PLAN   Certification Period/Plan of care expiration: see above    Improve L shoulder strength and B shoulder ROM     Outpatient Physical Therapy 1 times weekly for 7 weeks to include the following interventions: Manual Therapy, Moist Heat/ Ice, Neuromuscular Re-ed, Patient Education, Self Care, Therapeutic Activities, and Therapeutic Exercise, dry needling.     David Lieberman, PTA

## 2023-05-24 ENCOUNTER — CLINICAL SUPPORT (OUTPATIENT)
Dept: REHABILITATION | Facility: OTHER | Age: 78
End: 2023-05-24
Payer: MEDICARE

## 2023-05-24 ENCOUNTER — DOCUMENTATION ONLY (OUTPATIENT)
Dept: REHABILITATION | Facility: OTHER | Age: 78
End: 2023-05-24

## 2023-05-24 ENCOUNTER — CLINICAL SUPPORT (OUTPATIENT)
Dept: REHABILITATION | Facility: HOSPITAL | Age: 78
End: 2023-05-24
Attending: INTERNAL MEDICINE
Payer: MEDICARE

## 2023-05-24 DIAGNOSIS — R29.898 WEAKNESS OF LEFT ARM: ICD-10-CM

## 2023-05-24 DIAGNOSIS — M25.512 CHRONIC LEFT SHOULDER PAIN: Primary | ICD-10-CM

## 2023-05-24 DIAGNOSIS — T45.1X5A NEUROPATHY DUE TO CHEMOTHERAPEUTIC DRUG: Primary | ICD-10-CM

## 2023-05-24 DIAGNOSIS — G89.29 CHRONIC LEFT SHOULDER PAIN: Primary | ICD-10-CM

## 2023-05-24 DIAGNOSIS — G62.0 NEUROPATHY DUE TO CHEMOTHERAPEUTIC DRUG: Primary | ICD-10-CM

## 2023-05-24 PROCEDURE — 97110 THERAPEUTIC EXERCISES: CPT | Mod: PN,CQ

## 2023-05-24 PROCEDURE — 97112 NEUROMUSCULAR REEDUCATION: CPT | Mod: PN,CQ

## 2023-05-24 PROCEDURE — 97810 ACUP 1/> WO ESTIM 1ST 15 MIN: CPT | Mod: PN

## 2023-05-24 PROCEDURE — 97811 ACUP 1/> W/O ESTIM EA ADD 15: CPT | Mod: PN

## 2023-05-24 NOTE — PROGRESS NOTES
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  Patient has difficulty walking, standing, and performing daily activities and cannot drive at all due to the sensation in hands and feet.    Medical necessity is demonstrated by the following IMPAIRMENTS:     Medical Necessity: Decreased mobility limits day to day activities, social, and emergent situations and Decreased quality of life              Aggravating Factors:  movement, standing, changing positions, and weather changes       Relieving Factors:  nothing    Symptom Description:     Quality:  Tingling  Severity:  10  Frequency:  continuously    Previous Treatments Tried:  Medication and Therapy     Digestion:     Diet: in general, a "healthy" diet  , on average, 2-3 meals per day   Fluids: denies use of coffee, is drinking moderate amounts of fluids, social drinker  Taste/Appetite: Sometimes poor     Sleep: Okay    Objective     Observation: Appearance: alert, well appearing, and in no distress, oriented to person, place, and time, and normal appearing weight.  Walks slowly.  Talks clearly.  No discoloration or scars on the skin of both hands and feet.    Tongue:      Body:   Tooth marks on both sides, swollen and red tip   Color:  red   Coating:  thin,  and white,    Pulse:  deep, rapid, fast       Treatment     Treatment Principles:  To invigorate qi and blood.    Acupuncture points used:    Bilateral points:  Ba Kory x 4, Ba Palmira x 4, LI3, LI4, SI3, LV3, SP6, KI3, [place] x 4 on foot    Needles In: 36  Needles Out: " 36  [place] W/O STIM placed: 10:18  Needles W/O STIM removed: 10:40    Assessment     After treatment, patient felt better.     Patient prognosis is Good.     Patient will continue to benefit from acupuncture treatment to address the deficits listed in the problem list box on initial evaluation, provide patient family education and to maximize pt's level of independence in the home and community environment.     Patient's spiritual, cultural and educational needs considered and pt agreeable to plan of care and goals.     Anticipated barriers to treatment: None.    Plan     Recommend 1 /week for 11 sessions then re-assess.      Education:  Patient is aware of cumulative benefit of acupuncture

## 2023-05-24 NOTE — PROGRESS NOTES
Physical Therapist and Physical Therapist Assistant met face to face to discuss patient's treatment plan and progress towards established goals. Pt will be seen by a physical therapist minimally every 6th visit or every 30 days.    David Lieberman, PTA  5/24/2023

## 2023-05-26 ENCOUNTER — TELEPHONE (OUTPATIENT)
Dept: OPTOMETRY | Facility: CLINIC | Age: 78
End: 2023-05-26
Payer: MEDICARE

## 2023-05-26 NOTE — TELEPHONE ENCOUNTER
----- Message from Allison Paz MA sent at 5/25/2023  4:47 PM CDT -----  Contact: pt @ [phone]    ----- Message -----  From: Luisa Del Real  Sent: 5/25/2023  11:09 AM CDT  To: Angela Lester Staff    Marizol Kevin calling regarding Appointment Access  (message) for #pt is calling to see if possible can be been before 8/31 due to eye lids itching really bad and burning. Asking for call back

## 2023-05-31 ENCOUNTER — CLINICAL SUPPORT (OUTPATIENT)
Dept: REHABILITATION | Facility: OTHER | Age: 78
End: 2023-05-31
Payer: MEDICARE

## 2023-05-31 DIAGNOSIS — M25.512 CHRONIC LEFT SHOULDER PAIN: Primary | ICD-10-CM

## 2023-05-31 DIAGNOSIS — R29.898 WEAKNESS OF LEFT ARM: ICD-10-CM

## 2023-05-31 DIAGNOSIS — G89.29 CHRONIC LEFT SHOULDER PAIN: Primary | ICD-10-CM

## 2023-05-31 PROCEDURE — 97110 THERAPEUTIC EXERCISES: CPT | Mod: PN | Performed by: INTERNAL MEDICINE

## 2023-05-31 NOTE — PLAN OF CARE
Please sign and return plan of care. Thank you for this referral to the [facility].        [facility]   Physical Therapy Treatment Note     Name: Marizol Kevin  Clinic Number: [id number]    Therapy Diagnosis:   Encounter Diagnoses   Name Primary?    Chronic left shoulder pain Yes    Weakness of left arm        Physician: Caitlin Ryan, *    Visit Date: 5/31/2023      Physician Orders: PT Eval and Treat    Medical Diagnosis: M75.52 (ICD-10-CM) - Bursitis of left shoulder   Date of Surgery: NA  Evaluation Date: 4/5/2023  Authorization Period Expiration: 03/29/2024   Plan of Care Certification Period: 7/1/23  Visit # / Visits authorized: 5 (4/20)   FOTO: 3/ 5 (5/31/23)  PTA Visit #: 0/ 5    Precautions: Immunosuppressionm HTN , CA ( in remission but still has port due to watching L brain).     Time In: 940 am   Time Out: 1000  Total Billable Time: 38  minutes    SUBJECTIVE   Pt reports:  she feels nauseated today. New med for her L shoulder and used it yesterday when rain started which helped. Sleeping is diff but not due to reaching. TIghtness with reaching overhead     She was compliant with home exercise program.  Response to previous treatment: felt some soreness in her shoulder afterwards.   Functional change: able to reach a little higher    Pain: 1-2 /10  Location: L shoulder    OBJECTIVE   5/31 /89        Postural examination in standing:  forward shoulders, pleasant      Functional assessment:   - walking:  MI no ad   - splint wrist     ROM:   5/31/2023    Seated shoulder flex AROM appox R 150, L 160   Seated shoulder abd AROM B 140    4/5  Supine Shoulder flex PROM R 140 ,  140   Supine Shoulder abduction PROM  R 170, L 160   Shoulder MR PROM  R 54, L 60   Shoulder ER PROM R 85     Cerv arom  NA     5/31/2023  MMT:  Shoulder flex  R 5, L 4  Shoulder abduction R 5, L 4  Shoulder er  L 4 +  Shoulder mr B 4+  scaption R 5, L 4+  Biceps  "B 5   Triceps B 5  Wrist flex B 5  Wrist ext B 5        Low traps R 4+,   L 4  Mid traps R 4- , L  4-   Lats R 4+, L 4     Special Tests:   Carrero Lukas R neg, L neg  Neer Impingement R neg, L neg    Palpation/ joint mob:   tightness B Upper traps        CMS Impairment/Limitation/Restriction for FOTO shoulder Survey     Therapist reviewed FOTO scores for Marizol Kevin on 5/31/2023     FOTO documents entered into [employer] - see Media section.     Category: Carrying     Current 35  Goal: 39  Discharge: NA        TREATMENT     Marizol received the bolded treatments listed below:      Patient received therapeutic exercises for 20  minutes for improved strength and AROM including:   Reassessment only     +UBE 5 min lvl 1.0 - to improve shoulder lubrication/joint mobility and for musculare endurance    Sidelying ER  2 x 15  Sidelying shoulder abd x 15  Sidelying shoulder flex x 15  Rows RTB  2x 15  B shoulder ext  YTB  2 x 15  Prone ext 15x  Prone ha 15x   +Wall slides with towel roll, 20x  +Stand wand flexion 1# 2x10      Patient received manual therapeutic technique for 0  minutes for improved soft tissue and joint mobility including:        Patient received neuromuscular reeducation for 8 minutes for improved proprioception and balance including:  D2 supine 15x   Scapular retraction 10x10" hold  +Seated slouch corrections with 1/2 foam, 20x YTB    Patient received therapeutic activities for 00 minutes for improved tolerance to functional activities including:        PATIENT EDUCATION AND HOME EXERCISES     Home Exercises Provided and Patient Education Provided yes- prone HA, ext    Education provided:   -Continuance of HEP    Written Home Exercises Provided: see pt instructions Exercises were reviewed and Marizol was able to demonstrate them prior to the end of the session.  Marizol demonstrated good  understanding of the education provided. See EMR under Patient Instructions for exercises provided during therapy " sessions    ASSESSMENT   Progressing well with improved FOTO, rom, strength, decreased pain.    Marizol Is progressing well towards her goals.   Pt prognosis is Good.     Pt will continue to benefit from skilled outpatient physical therapy to address the deficits listed in the problem list box on initial evaluation, provide pt/family education and to maximize pt's level of independence in the home and community environment.     Pt's spiritual, cultural and educational needs considered and pt agreeable to plan of care and goals.     Anticipated barriers to physical therapy: None    Goals:   Short Term Goals: 3 weeks        1. Decreased L shoulder pain to 8  met       2. Improved L foto score. met     Long Term Goals: 12 weeks   Independent with HEP. Progressing, not met  Report decreased    L  shoulder   pain  <   / =  4  /10 with adls such as reaching into cabinet above 90 deg    met  Increased MMT  for  L UE for shoulder scap ms > 4+/5  Progressing, not met  Increased arom  for  B flex shoulder arom > 130  met  Improved FOTO score to 39  met    PLAN   Certification Period/Plan of care expiration: see above    Improve L shoulder strength and B shoulder ROM     Outpatient Physical Therapy 1 times weekly for 2  weeks to include the following interventions: Manual Therapy, Moist Heat/ Ice, Neuromuscular Re-ed, Patient Education, Self Care, Therapeutic Activities, and Therapeutic Exercise, dry needling.     Pt will take a few days to see if additional visits are needed.   Amee Rizzo, PT      I certify the need for these services furnished under this plan of treatment and while under my care    ____________________________________  Physician/Referring Practitioner    _______________  Date of Signature

## 2023-06-05 ENCOUNTER — TELEPHONE (OUTPATIENT)
Dept: PAIN MEDICINE | Facility: CLINIC | Age: 78
End: 2023-06-05
Payer: MEDICARE

## 2023-06-06 ENCOUNTER — OFFICE VISIT (OUTPATIENT)
Dept: PAIN MEDICINE | Facility: CLINIC | Age: 78
End: 2023-06-06
Attending: INTERNAL MEDICINE
Payer: MEDICARE

## 2023-06-06 VITALS
HEIGHT: 57 IN | SYSTOLIC BLOOD PRESSURE: 138 MMHG | WEIGHT: 163 LBS | BODY MASS INDEX: 35.17 KG/M2 | HEART RATE: 98 BPM | DIASTOLIC BLOOD PRESSURE: 89 MMHG

## 2023-06-06 DIAGNOSIS — M54.2 CERVICALGIA: ICD-10-CM

## 2023-06-06 DIAGNOSIS — T45.1X5A NEUROPATHY DUE TO CHEMOTHERAPEUTIC DRUG: ICD-10-CM

## 2023-06-06 DIAGNOSIS — M25.812 IMPINGEMENT OF LEFT SHOULDER: ICD-10-CM

## 2023-06-06 DIAGNOSIS — M12.812 ROTATOR CUFF ARTHROPATHY OF LEFT SHOULDER: ICD-10-CM

## 2023-06-06 DIAGNOSIS — M75.42 SUBACROMIAL IMPINGEMENT, LEFT: ICD-10-CM

## 2023-06-06 DIAGNOSIS — M47.812 CERVICAL SPONDYLOSIS: ICD-10-CM

## 2023-06-06 DIAGNOSIS — G62.0 NEUROPATHY DUE TO CHEMOTHERAPEUTIC DRUG: ICD-10-CM

## 2023-06-06 DIAGNOSIS — E11.42 DIABETIC POLYNEUROPATHY ASSOCIATED WITH TYPE 2 DIABETES MELLITUS: ICD-10-CM

## 2023-06-06 DIAGNOSIS — M50.30 DDD (DEGENERATIVE DISC DISEASE), CERVICAL: Primary | ICD-10-CM

## 2023-06-06 DIAGNOSIS — G62.9 PERIPHERAL POLYNEUROPATHY: ICD-10-CM

## 2023-06-06 PROCEDURE — 1159F PR MEDICATION LIST DOCUMENTED IN MEDICAL RECORD: ICD-10-PCS | Mod: CPTII,S$GLB,, | Performed by: ANESTHESIOLOGY

## 2023-06-06 PROCEDURE — 3288F FALL RISK ASSESSMENT DOCD: CPT | Mod: CPTII,S$GLB,, | Performed by: ANESTHESIOLOGY

## 2023-06-06 PROCEDURE — 3079F DIAST BP 80-89 MM HG: CPT | Mod: CPTII,S$GLB,, | Performed by: ANESTHESIOLOGY

## 2023-06-06 PROCEDURE — 99204 OFFICE O/P NEW MOD 45 MIN: CPT | Mod: GC,S$GLB,, | Performed by: ANESTHESIOLOGY

## 2023-06-06 PROCEDURE — 1125F PR PAIN SEVERITY QUANTIFIED, PAIN PRESENT: ICD-10-PCS | Mod: CPTII,S$GLB,, | Performed by: ANESTHESIOLOGY

## 2023-06-06 PROCEDURE — 3075F SYST BP GE 130 - 139MM HG: CPT | Mod: CPTII,S$GLB,, | Performed by: ANESTHESIOLOGY

## 2023-06-06 PROCEDURE — 3288F PR FALLS RISK ASSESSMENT DOCUMENTED: ICD-10-PCS | Mod: CPTII,S$GLB,, | Performed by: ANESTHESIOLOGY

## 2023-06-06 PROCEDURE — 3075F PR MOST RECENT SYSTOLIC BLOOD PRESS GE 130-139MM HG: ICD-10-PCS | Mod: CPTII,S$GLB,, | Performed by: ANESTHESIOLOGY

## 2023-06-06 PROCEDURE — 1101F PR PT FALLS ASSESS DOC 0-1 FALLS W/OUT INJ PAST YR: ICD-10-PCS | Mod: CPTII,S$GLB,, | Performed by: ANESTHESIOLOGY

## 2023-06-06 PROCEDURE — 99999 PR PBB SHADOW E&M-EST. PATIENT-LVL III: ICD-10-PCS | Mod: PBBFAC,,, | Performed by: ANESTHESIOLOGY

## 2023-06-06 PROCEDURE — 3051F HG A1C>EQUAL 7.0%<8.0%: CPT | Mod: CPTII,S$GLB,, | Performed by: ANESTHESIOLOGY

## 2023-06-06 PROCEDURE — 99999 PR PBB SHADOW E&M-EST. PATIENT-LVL III: CPT | Mod: PBBFAC,,, | Performed by: ANESTHESIOLOGY

## 2023-06-06 PROCEDURE — 1125F AMNT PAIN NOTED PAIN PRSNT: CPT | Mod: CPTII,S$GLB,, | Performed by: ANESTHESIOLOGY

## 2023-06-06 PROCEDURE — 1101F PT FALLS ASSESS-DOCD LE1/YR: CPT | Mod: CPTII,S$GLB,, | Performed by: ANESTHESIOLOGY

## 2023-06-06 PROCEDURE — 1159F MED LIST DOCD IN RCRD: CPT | Mod: CPTII,S$GLB,, | Performed by: ANESTHESIOLOGY

## 2023-06-06 PROCEDURE — 1160F PR REVIEW ALL MEDS BY PRESCRIBER/CLIN PHARMACIST DOCUMENTED: ICD-10-PCS | Mod: CPTII,S$GLB,, | Performed by: ANESTHESIOLOGY

## 2023-06-06 PROCEDURE — 3051F PR MOST RECENT HEMOGLOBIN A1C LEVEL 7.0 - < 8.0%: ICD-10-PCS | Mod: CPTII,S$GLB,, | Performed by: ANESTHESIOLOGY

## 2023-06-06 PROCEDURE — 3072F PR LOW RISK FOR RETINOPATHY: ICD-10-PCS | Mod: CPTII,S$GLB,, | Performed by: ANESTHESIOLOGY

## 2023-06-06 PROCEDURE — 99204 PR OFFICE/OUTPT VISIT, NEW, LEVL IV, 45-59 MIN: ICD-10-PCS | Mod: GC,S$GLB,, | Performed by: ANESTHESIOLOGY

## 2023-06-06 PROCEDURE — 1160F RVW MEDS BY RX/DR IN RCRD: CPT | Mod: CPTII,S$GLB,, | Performed by: ANESTHESIOLOGY

## 2023-06-06 PROCEDURE — 3072F LOW RISK FOR RETINOPATHY: CPT | Mod: CPTII,S$GLB,, | Performed by: ANESTHESIOLOGY

## 2023-06-06 PROCEDURE — 3079F PR MOST RECENT DIASTOLIC BLOOD PRESSURE 80-89 MM HG: ICD-10-PCS | Mod: CPTII,S$GLB,, | Performed by: ANESTHESIOLOGY

## 2023-06-06 RX ORDER — PREGABALIN 50 MG/1
50 CAPSULE ORAL 2 TIMES DAILY
Qty: 60 CAPSULE | Refills: 0 | Status: SHIPPED | OUTPATIENT
Start: 2023-06-06 | End: 2023-07-14 | Stop reason: SDUPTHER

## 2023-06-06 NOTE — PROGRESS NOTES
Chronic Pain - New Consult    Referring Physician: Pedro Luis Butterfield MD    Chief Complaint:   Chief Complaint   Patient presents with    Arm Pain     left    Neck Pain     Left side        SUBJECTIVE:    Marizol Kevin has a PMHx of HTN, HLD, DMII, osteoporosis, colon cancer s/p chemothearpy. She presents to the clinic for the evaluation of neuropathic pain along with neck pain that radiates into the left arm. The pain started 5-6 months ago, no specific inciting event. Symptoms have been worsening.The pain is located in the left side of the neck area and radiates to the left periscapular area with further distal radiation to the whole arm.  The pain is described as aching, stabbing, and sqaueezing  and is rated as 9/10. The pain is rated with a score of  5/10 on the BEST day and a score of 9/10 on the WORST day.  Symptoms interfere with daily activity, sleeping, and enjoyment of life. The pain is exacerbated by Laying and Lifting.  The pain is mitigated by nothing.  Pt also endorses severe neuropathic pain in BL hands and feet. She has used gabapentinin the past without any relief. Currently on Cymbalta. Recently started Acupuncture for neuropathy, which she reports has provided significant relief.     Patient denies night fever/night sweats, urinary incontinence, bowel incontinence, significant weight loss, and loss of sensations.    Physical Therapy/Home Exercise: yes  . Recently started PT for left shoulder subacromial bursitis.    Pain Disability Index Review:  Last 3 PDI Scores 6/6/2023   Pain Disability Index (PDI) 35       Pain Medications:    - Adjuvant Medications: Cyclobenzaprine + Cymbalta     report:  Not applicable    Pain Procedures:   SAB injection 3/30/23    Imaging:   MRI L Spine 4/15/22  FINDINGS:  large amount of image degradation from artifact.  Straightening of the usual cervical lordosis. Degenerative changes. Disc space narrowing C4-C5, C5-C6. Vertebral body heights and alignment  appear maintained without acute compression or subluxation evident.  Although no obvious fracture cannot exclude subtle findings including marrow edema with the large amount of image degradation. Further follow-up or evaluation is to be obtained is suggested by CT. There do appear to be small posterior disc bulges multiple levels, C3 through C 6 without appreciable significant spinal canal narrowing     Impression:     Grossly negative study for acute findings but note is made that limited evaluation with the amount artifact and if concern for acute cervical spine trauma correlation with CT is recommended and note is made the patient did have CT 04/15/2022 please refer to that report.  Subtle findings as suggested on that CT scan likely would not be apparent on the MRI with the degree of artifact.     If further evaluation or follow-up is to be obtained it is recommended to be by CT     This report was flagged in Epic as abnormal.     Final read    Past Medical History:   Diagnosis Date    Allergy     Anxiety     Cancer     colon    Cataract     Colon cancer     Diabetes mellitus, type 2     Dry eye syndrome     Hyperlipidemia     Hypertension     Insomnia     Squamous blepharitis      Past Surgical History:   Procedure Laterality Date    CATARACT EXTRACTION, BILATERAL  03/2020    CHOLECYSTECTOMY      COLON SURGERY      Colon cancer    COLONOSCOPY N/A 02/01/2021    Procedure: COLONOSCOPY;  Surgeon: Ashwini Duarte MD;  Location: [address]);  Service: Endoscopy;  Laterality: N/A;  medical transportation  covid test 1/29 [employer], instructions mailed-hospitals    EYE SURGERY      Cataract    HYSTERECTOMY      JOINT REPLACEMENT      Knee    KNEE SURGERY      LAPAROSCOPIC LEFT COLON RESECTION       Social History     Socioeconomic History    Marital status:    Tobacco Use    Smoking status: Former     Packs/day: 1.00     Years: 51.00     Pack years: 51.00     Types: Cigarettes     Quit date: 9/22/2022      Years since quittin.7    Smokeless tobacco: Never    Tobacco comments:     smoking cessation information given    Substance and Sexual Activity    Alcohol use: Yes     Alcohol/week: 1.0 standard drink     Types: 1 Shots of liquor per week     Comment: occ    Drug use: Never    Sexual activity: Not Currently     Birth control/protection: Abstinence     Social Determinants of Health     Financial Resource Strain: Unknown    Difficulty of Paying Living Expenses: Patient refused   Food Insecurity: Unknown    Worried About Running Out of Food in the Last Year: Patient refused    Ran Out of Food in the Last Year: Patient refused   Transportation Needs: Unknown    Lack of Transportation (Medical): Patient refused    Lack of Transportation (Non-Medical): Patient refused   Physical Activity: Unknown    Days of Exercise per Week: Patient refused    Minutes of Exercise per Session: Patient refused   Stress: Unknown    Feeling of Stress : Patient refused   Social Connections: Unknown    Frequency of Communication with Friends and Family: Patient refused    Frequency of Social Gatherings with Friends and Family: Patient refused    Attends Mu-ism Services: Patient refused    Active Member of Clubs or Organizations: Patient refused    Attends Club or Organization Meetings: Patient refused    Marital Status: Patient refused   Housing Stability: Unknown    Unable to Pay for Housing in the Last Year: Patient refused    Unstable Housing in the Last Year: Patient refused     Family History   Problem Relation Age of Onset    Heart attack Mother     Heart disease Mother     Colon cancer Father     Diabetes Father     Cancer Sister         unknown    Hypothyroidism Sister     Asthma Sister     Thyroid cancer Neg Hx        Review of patient's allergies indicates:   Allergen Reactions    Sulfa (sulfonamide antibiotics)      Occurred when she was pregnant with varicose veins resulting in leg swelling and pain with standing        Current Outpatient Medications   Medication Sig    albuterol (PROVENTIL/VENTOLIN HFA) 90 mcg/actuation inhaler INHALE 2 PUFFS INTO THE LUNGS EVERY 4 (FOUR) HOURS AS NEEDED FOR WHEEZING.    albuterol-ipratropium (DUO-NEB) 2.5 mg-0.5 mg/3 mL nebulizer solution Take 3 mLs by nebulization every 6 (six) hours as needed for Wheezing or Shortness of Breath. Rescue    amLODIPine (NORVASC) 5 MG tablet TAKE 1 TABLET EVERY DAY    aspirin (ECOTRIN) 81 MG EC tablet Take 81 mg by mouth once daily.    atorvastatin (LIPITOR) 40 MG tablet TAKE 1 TABLET EVERY DAY    azelastine (ASTELIN) 137 mcg (0.1 %) nasal spray 1 spray (137 mcg total) by Nasal route 2 (two) times daily.    betamethasone valerate 0.1% (VALISONE) 0.1 % Oint Apply topically once daily.    blood sugar diagnostic (TRUE METRIX GLUCOSE TEST STRIP) Strp Use to test blood glucose two (2) times daily; to be used with insurance-preferred brand of glucometer/supplies    blood-glucose meter kit Please provide with insurance covered meter    cholecalciferol, vitamin D3, (VITAMIN D3) 50 mcg (2,000 unit) Cap Take by mouth.    cyclobenzaprine (FLEXERIL) 5 MG tablet Take 1 tablet (5 mg total) by mouth 3 (three) times daily as needed for Muscle spasms.    dulaglutide (TRULICITY) 0.75 mg/0.5 mL pen injector Inject 0.75 mg into the skin once a week.    DULoxetine (CYMBALTA) 30 MG capsule TAKE 1 CAPSULE EVERY DAY    ferrous sulfate 325 (65 FE) MG EC tablet Take 1 tablet (325 mg total) by mouth every other day.    fluticasone furoate-vilanteroL (BREO ELLIPTA) 100-25 mcg/dose diskus inhaler Inhale 1 puff into the lungs once daily. Controller    hydrOXYzine HCL (ATARAX) 25 MG tablet Take 1 tablet (25 mg total) by mouth 3 (three) times daily as needed for Itching or Anxiety.    ketoconazole (NIZORAL) 2 % cream Apply topically 2 (two) times daily. Use for minimum of 2 weeks    lancets Misc 1 Device by Misc.(Non-Drug; Combo Route) route 2 (two) times daily.    lansoprazole (PREVACID)  "30 MG capsule Take 1 capsule by mouth once a day 30 min before breakfast    metFORMIN (GLUCOPHAGE-XR) 500 MG ER 24hr tablet TAKE 2 TABLETS EVERY DAY WITH BREAKFAST    multivitamin capsule Take 1 capsule by mouth once daily.    pantoprazole (PROTONIX) 40 MG tablet TAKE 1 TABLET (40 MG TOTAL) BY MOUTH ONCE DAILY.    raloxifene (EVISTA) 60 mg tablet TAKE 1 TABLET EVERY DAY    sodium chloride 0.9 % SprA 1 spray by Each Nostril route every evening.    tiotropium (SPIRIVA) 18 mcg inhalation capsule Inhale 1 capsule (18 mcg total) into the lungs once daily. Controller    triamcinolone acetonide 0.025% (KENALOG) 0.025 % cream APPLY DAILY TO AFFECTED AREA AS NEEDED FOR ITCHING. MAXIMUM 2 TIMES A WEEK    varenicline (CHANTIX) 0.5 MG Tab Take 1 tablet by mouth everyday for 3 days, then 1 tablet twice daily for 4 days. Then increase to 1 mg tablets    varenicline (CHANTIX) 1 mg Tab Take 1 tablet by mouth twice daily     No current facility-administered medications for this visit.       REVIEW OF SYSTEMS:    GENERAL:  No weight loss, malaise or fevers.  HEENT:  Negative for frequent or significant headaches.  NECK:  Negative for lumps, goiter, pain and significant neck swelling.  RESPIRATORY:  Negative for cough, wheezing or shortness of breath.  CARDIOVASCULAR:  Negative for chest pain, leg swelling or palpitations.  GI:  Negative for abdominal discomfort, blood in stools or black stools or change in bowel habits.  MUSCULOSKELETAL:  See HPI.  SKIN:  Negative for lesions, rash, and itching.  PSYCH:  +sleep disturbance, depression .  HEMATOLOGY/LYMPHOLOGY:  Negative for prolonged bleeding, bruising easily or swollen nodes.  NEURO:   No history of headaches, syncope, paralysis, seizures or tremors.  All other reviewed and negative other than HPI.    OBJECTIVE:    /89 (BP Location: Right arm, Patient Position: Sitting, BP Method: Medium (Automatic))   Pulse 98   Ht 4' 9" (1.448 m)   Wt 73.9 kg (163 lb)   BMI 35.27 kg/m² "     PHYSICAL EXAMINATION:    General appearance: Well appearing, in no acute distress, alert and oriented x3.  Psych:  Mood and affect appropriate.  Skin: Skin color, texture, turgor normal, no rashes or lesions, in both upper and lower body.  Head/face:  Normocephalic, atraumatic. No palpable lymph nodes.  Neck: No gross deformity nor asymetry. Diffusely TTP about the C spine, most notably over facet joints L>R, trapezius BL, rhomboids BL. Limited ROM s/t pain, pain worse with lateral bending. Facet loading positive on left. Spurling negative, but did reproduce axial neck apin. Pt endorsed HA symptoms after neck exam.   Cor: RRR  Pulm: CTA  GI:  Soft and non-tender.  Extremities: Peripheral joint ROM is full and pain free without obvious instability or laxity in all four extremities. No deformities, edema, or skin discoloration. Good capillary refill.  Musculoskeletal:   Left shoulder: No gross deformities, asymetry. TTP over subacromial region on Left shoulder, palpation reproduced some radiating pain distally. ROM limited to approx 90 degrees Abduction and 100 forawrd flexion d/t pain. + Neer, Carrero, Empty can.     Neuro: Bilateral upper and lower extremity coordination and muscle stretch reflexes are reduced and symmetric.  Plantar response are downgoing. No loss of sensation is noted.  Gait: normal.    ASSESSMENT: 77 y.o. year old female with widespread pain, consistent with the followin. DDD (degenerative disc disease), cervical  Procedure Order to Pain Management      2. Cervical spondylosis  Procedure Order to Pain Management      3. Neuropathy due to chemotherapeutic drug  Ambulatory referral/consult to Pain Clinic      4. Impingement of left shoulder        5. Rotator cuff arthropathy of left shoulder        6. Subacromial impingement, left        7. Peripheral polyneuropathy        8. Cervicalgia  MRI Cervical Spine Without Contrast      9. Diabetic polyneuropathy associated with type 2 diabetes  mellitus  Prior authorization Order            PLAN:     Neck Pain: Pain is likely multifactorial with components related to cervical spondylosis, DDD, and myofascial pain syndrome. Pt also has evidence of cervicogenic headaches.   MRI C spine ordered today.   Continue PT.     Left Shoulder Pain: Pt's primary complaint of left shoulder and arm pain is most likely related to left shoulder rotator cuff arthropathy, impingement. Pt previously poorly responseive to SAB injections and PT.   Will Schedule patient for left shoulder articular branch block with intent to proceed with RFA if block is successful.   Continue with PT.     Peripheral Neuropathy: Secondary to chemo and DMII. Failed gabapentin.  Rx Lyrica. Start with 50 mg nighlty x 7 days, then increase to 50 mg BID.   Rx Qutenza.   Continue Acupuncture.         - RTC 2 wks following procedure. We can assess response to above interventions. Consider cervical MBB/RFA in future to address axial neck pain.   - Counseled patient regarding the importance of activity modification, constant sleeping habits, and physical therapy.    The above plan and management options were discussed at length with patient. Patient is in agreement with the above and verbalized understanding. It will be communicated with the referring physician via electronic record, fax, or mail.    Simon Chávez  06/06/2023      I have reviewed and concur with the resident's history, physical, assessment, and plan.  I have personally interviewed and examined the patient at bedside.  See below addendum for my evaluation and additional findings.    Abdi Del Toro MD

## 2023-06-07 ENCOUNTER — OFFICE VISIT (OUTPATIENT)
Dept: HOME HEALTH SERVICES | Facility: CLINIC | Age: 78
End: 2023-06-07
Payer: MEDICARE

## 2023-06-07 ENCOUNTER — PATIENT MESSAGE (OUTPATIENT)
Dept: INTERNAL MEDICINE | Facility: CLINIC | Age: 78
End: 2023-06-07
Payer: MEDICARE

## 2023-06-07 VITALS
OXYGEN SATURATION: 98 % | DIASTOLIC BLOOD PRESSURE: 80 MMHG | SYSTOLIC BLOOD PRESSURE: 128 MMHG | HEIGHT: 57 IN | HEART RATE: 100 BPM | WEIGHT: 163 LBS | RESPIRATION RATE: 16 BRPM | TEMPERATURE: 97 F | BODY MASS INDEX: 35.17 KG/M2

## 2023-06-07 DIAGNOSIS — G62.0 NEUROPATHY DUE TO CHEMOTHERAPEUTIC DRUG: ICD-10-CM

## 2023-06-07 DIAGNOSIS — Z00.00 ENCOUNTER FOR PREVENTIVE HEALTH EXAMINATION: ICD-10-CM

## 2023-06-07 DIAGNOSIS — K21.9 GASTROESOPHAGEAL REFLUX DISEASE, UNSPECIFIED WHETHER ESOPHAGITIS PRESENT: ICD-10-CM

## 2023-06-07 DIAGNOSIS — E11.22 TYPE 2 DIABETES MELLITUS WITH STAGE 3A CHRONIC KIDNEY DISEASE, WITHOUT LONG-TERM CURRENT USE OF INSULIN: ICD-10-CM

## 2023-06-07 DIAGNOSIS — E11.69 HYPERLIPIDEMIA ASSOCIATED WITH TYPE 2 DIABETES MELLITUS: ICD-10-CM

## 2023-06-07 DIAGNOSIS — Z00.00 ENCOUNTER FOR MEDICARE ANNUAL WELLNESS EXAM: ICD-10-CM

## 2023-06-07 DIAGNOSIS — E11.9 NO HISTORY OF DIABETIC RETINOPATHY IN PAST YEAR: ICD-10-CM

## 2023-06-07 DIAGNOSIS — J45.20 MILD INTERMITTENT ASTHMA WITHOUT COMPLICATION: ICD-10-CM

## 2023-06-07 DIAGNOSIS — T45.1X5A NEUROPATHY DUE TO CHEMOTHERAPEUTIC DRUG: ICD-10-CM

## 2023-06-07 DIAGNOSIS — Z87.891 HISTORY OF TOBACCO USE: ICD-10-CM

## 2023-06-07 DIAGNOSIS — I70.0 AORTIC ATHEROSCLEROSIS: Primary | ICD-10-CM

## 2023-06-07 DIAGNOSIS — C18.2 MALIGNANT NEOPLASM OF ASCENDING COLON: ICD-10-CM

## 2023-06-07 DIAGNOSIS — E78.5 HYPERLIPIDEMIA ASSOCIATED WITH TYPE 2 DIABETES MELLITUS: ICD-10-CM

## 2023-06-07 DIAGNOSIS — G47.00 INSOMNIA, UNSPECIFIED TYPE: ICD-10-CM

## 2023-06-07 DIAGNOSIS — M81.0 OSTEOPOROSIS, UNSPECIFIED OSTEOPOROSIS TYPE, UNSPECIFIED PATHOLOGICAL FRACTURE PRESENCE: ICD-10-CM

## 2023-06-07 DIAGNOSIS — N18.31 TYPE 2 DIABETES MELLITUS WITH STAGE 3A CHRONIC KIDNEY DISEASE, WITHOUT LONG-TERM CURRENT USE OF INSULIN: ICD-10-CM

## 2023-06-07 DIAGNOSIS — I15.2 HYPERTENSION ASSOCIATED WITH DIABETES: ICD-10-CM

## 2023-06-07 DIAGNOSIS — N18.31 STAGE 3A CHRONIC KIDNEY DISEASE: ICD-10-CM

## 2023-06-07 DIAGNOSIS — E11.59 HYPERTENSION ASSOCIATED WITH DIABETES: ICD-10-CM

## 2023-06-07 DIAGNOSIS — E66.01 SEVERE OBESITY (BMI 35.0-39.9) WITH COMORBIDITY: ICD-10-CM

## 2023-06-07 PROBLEM — F17.210 LIGHT CIGARETTE SMOKER (1-9 CIGS/DAY): Status: RESOLVED | Noted: 2022-06-21 | Resolved: 2023-06-07

## 2023-06-07 PROBLEM — F17.210 DEPENDENCE ON NICOTINE FROM CIGARETTES: Status: RESOLVED | Noted: 2020-09-03 | Resolved: 2023-06-07

## 2023-06-07 PROBLEM — F17.201 TOBACCO USE DISORDER, MODERATE, IN EARLY REMISSION: Status: RESOLVED | Noted: 2022-11-02 | Resolved: 2023-06-07

## 2023-06-07 PROCEDURE — 3079F PR MOST RECENT DIASTOLIC BLOOD PRESSURE 80-89 MM HG: ICD-10-PCS | Mod: CPTII,S$GLB,,

## 2023-06-07 PROCEDURE — 1159F PR MEDICATION LIST DOCUMENTED IN MEDICAL RECORD: ICD-10-PCS | Mod: CPTII,S$GLB,,

## 2023-06-07 PROCEDURE — 1160F PR REVIEW ALL MEDS BY PRESCRIBER/CLIN PHARMACIST DOCUMENTED: ICD-10-PCS | Mod: CPTII,S$GLB,,

## 2023-06-07 PROCEDURE — 3074F SYST BP LT 130 MM HG: CPT | Mod: CPTII,S$GLB,,

## 2023-06-07 PROCEDURE — 3072F LOW RISK FOR RETINOPATHY: CPT | Mod: CPTII,S$GLB,,

## 2023-06-07 PROCEDURE — 3079F DIAST BP 80-89 MM HG: CPT | Mod: CPTII,S$GLB,,

## 2023-06-07 PROCEDURE — 1170F PR FUNCTIONAL STATUS ASSESSED: ICD-10-PCS | Mod: CPTII,S$GLB,,

## 2023-06-07 PROCEDURE — 3072F PR LOW RISK FOR RETINOPATHY: ICD-10-PCS | Mod: CPTII,S$GLB,,

## 2023-06-07 PROCEDURE — G0439 PPPS, SUBSEQ VISIT: HCPCS | Mod: S$GLB,,,

## 2023-06-07 PROCEDURE — 3074F PR MOST RECENT SYSTOLIC BLOOD PRESSURE < 130 MM HG: ICD-10-PCS | Mod: CPTII,S$GLB,,

## 2023-06-07 PROCEDURE — 3288F PR FALLS RISK ASSESSMENT DOCUMENTED: ICD-10-PCS | Mod: CPTII,S$GLB,,

## 2023-06-07 PROCEDURE — 1170F FXNL STATUS ASSESSED: CPT | Mod: CPTII,S$GLB,,

## 2023-06-07 PROCEDURE — 3051F PR MOST RECENT HEMOGLOBIN A1C LEVEL 7.0 - < 8.0%: ICD-10-PCS | Mod: CPTII,S$GLB,,

## 2023-06-07 PROCEDURE — 1101F PR PT FALLS ASSESS DOC 0-1 FALLS W/OUT INJ PAST YR: ICD-10-PCS | Mod: CPTII,S$GLB,,

## 2023-06-07 PROCEDURE — 3288F FALL RISK ASSESSMENT DOCD: CPT | Mod: CPTII,S$GLB,,

## 2023-06-07 PROCEDURE — 1159F MED LIST DOCD IN RCRD: CPT | Mod: CPTII,S$GLB,,

## 2023-06-07 PROCEDURE — G0439 PR MEDICARE ANNUAL WELLNESS SUBSEQUENT VISIT: ICD-10-PCS | Mod: S$GLB,,,

## 2023-06-07 PROCEDURE — 1126F PR PAIN SEVERITY QUANTIFIED, NO PAIN PRESENT: ICD-10-PCS | Mod: CPTII,S$GLB,,

## 2023-06-07 PROCEDURE — 1126F AMNT PAIN NOTED NONE PRSNT: CPT | Mod: CPTII,S$GLB,,

## 2023-06-07 PROCEDURE — 1160F RVW MEDS BY RX/DR IN RCRD: CPT | Mod: CPTII,S$GLB,,

## 2023-06-07 PROCEDURE — 1101F PT FALLS ASSESS-DOCD LE1/YR: CPT | Mod: CPTII,S$GLB,,

## 2023-06-07 PROCEDURE — 3051F HG A1C>EQUAL 7.0%<8.0%: CPT | Mod: CPTII,S$GLB,,

## 2023-06-07 NOTE — PROGRESS NOTES
"Marizol Kevin presented for a  Medicare AWV and comprehensive Health Risk Assessment today. The following components were reviewed and updated:    Medical history  Family History  Social history  Allergies and Current Medications  Health Risk Assessment  Health Maintenance  Care Team         ** See Completed Assessments for Annual Wellness Visit within the encounter summary.**         The following assessments were completed:  Living Situation  CAGE  Depression Screening  Timed Get Up and Go  Whisper Test: Deferred, hearing impaired  Cognitive Function Screening    Nutrition Screening  ADL Screening  PAQ Screening        Vitals:    06/07/23 1046   Resp: 16   Weight: 73.9 kg (163 lb)   Height: 4' 9" (1.448 m)     Body mass index is 35.27 kg/m².    Physical Exam  Vitals reviewed.   Constitutional:       General: She is not in acute distress.     Appearance: Normal appearance. She is obese.   Cardiovascular:      Rate and Rhythm: Normal rate and regular rhythm.      Pulses: Normal pulses.      Heart sounds: No murmur heard.  Pulmonary:      Effort: Pulmonary effort is normal. No respiratory distress.      Breath sounds: Normal breath sounds.   Abdominal:      General: Bowel sounds are normal.   Musculoskeletal:      Right lower leg: No edema.      Left lower leg: No edema.   Neurological:      General: No focal deficit present.      Mental Status: She is alert and oriented to person, place, and time.   Psychiatric:         Mood and Affect: Mood normal.         Behavior: Behavior normal.             Diagnoses and health risks identified today and associated recommendations/orders:    1. Encounter for preventive health examination  Assessments completed.  HM recommendations reviewed.  Establish with PCP as instructed.     Patient not on chronic opioids.   Risk factors reviewed for any potential opioid use disorder   Pain evaluated during visit.  Current treatment plan documented.  Will refer to specialist, as " appropriate.      2. Encounter for Medicare annual wellness exam  Does not have PCP. Order placed to establish care.   - Ambulatory Referral/Consult to Enhanced Annual Wellness Visit (eAWV)  - Ambulatory referral/consult to Internal Medicine; Future    3. Stage 3a chronic kidney disease  Chronic, stable on current regimen. Not currently followed by physician. Referral placed to establish with PCP.     4. Type 2 diabetes mellitus with stage 3a chronic kidney disease, without long-term current use of insulin  Chronic, stable on current Metformin and Trulicity regimen. Not currently followed by physician. Referral placed to establish with PCP.   Lab Results   Component Value Date    HGBA1C 7.8 (H) 04/25/2023       5. Aortic atherosclerosis  Chronic, stable on current statin regimen. Not currently followed by physician. Referral placed to establish with PCP.     6. Neuropathy due to chemotherapeutic drug  Chronic, stable on current Lyrica regimen. Not currently followed by physician. Referral placed to establish with PCP.     7. Severe obesity (BMI 35.0-39.9) with comorbidity  Chronic, stable on current regimen. Not currently followed by physician. Referral placed to establish with PCP.     8. Hypertension associated with diabetes  Chronic, stable on current Amlodipine regimen. Not currently followed by physician. Referral placed to establish with PCP.     9. Hyperlipidemia associated with type 2 diabetes mellitus  Chronic, stable on current  statin regimen. Not currently followed by physician. Referral placed to establish with PCP.     10. No history of diabetic retinopathy in past year  Chronic, stable on current regimen. Not currently followed by physician. Referral placed to establish with PCP.     11. Malignant neoplasm of ascending colon  Chronic, stable on current regimen. Not currently followed by physician. Referral placed to establish with PCP.     12. Mild intermittent asthma without complication  Chronic,  stable on current regimen. Not currently followed by physician. Referral placed to establish with PCP.     13. Gastroesophageal reflux disease, unspecified whether esophagitis present  Chronic, stable on current regimen. Not currently followed by physician. Referral placed to establish with PCP.     14. Osteoporosis, unspecified osteoporosis type, unspecified pathological fracture presence  Chronic, stable on current Calcium/Vit D regimen. Not currently followed by physician. Referral placed to establish with PCP.     15. Insomnia, unspecified type  Chronic, stable on current regimen. Not currently followed by physician. Referral placed to establish with PCP.     16. History of tobacco use  Chronic, stable on current regimen. Not currently followed by physician. Referral placed to establish with PCP.       Provided Marizol with a 5-10 year written screening schedule and personal prevention plan. Recommendations were developed using the USPSTF age appropriate recommendations. Education, counseling, and referrals were provided as needed. After Visit Summary printed and given to patient which includes a list of additional screenings\tests needed.    Follow up in about 1 year (around 6/7/2024) for Medicare AWV.    Lilly Walters NP    I offered to discuss advanced care planning, including how to pick a person who would make decisions for you if you were unable to make them for yourself, called a health care power of , and what kind of decisions you might make such as use of life sustaining treatments such as ventilators and tube feeding when faced with a life limiting illness recorded on a living will that they will need to know. (How you want to be cared for as you near the end of your natural life)     X Patient is interested in learning more about how to make advanced directives.  I provided them paperwork and offered to discuss this with them.

## 2023-06-07 NOTE — PATIENT INSTRUCTIONS
Counseling and Referral of Other Preventative  (Italic type indicates deductible and co-insurance are waived)    Patient Name: Marizol Kevin  Today's Date: 6/7/2023    Health Maintenance       Date Due Completion Date    Foot Exam 06/01/2022 6/1/2021    Override on 3/12/2021: Done    Eye Exam 07/13/2023 7/13/2022    COVID-19 Vaccine (6 - Moderna series) 07/16/2023 3/16/2023    Lipid Panel 09/14/2023 9/14/2022    DEXA Scan 09/17/2023 9/17/2020    Hemoglobin A1c 10/25/2023 4/25/2023    Colorectal Cancer Screening 02/01/2024 2/1/2021    LDCT Lung Screen 03/20/2024 3/20/2023    Diabetes Urine Screening 04/25/2024 4/25/2023    High Dose Statin 06/06/2024 6/6/2023    TETANUS VACCINE 05/26/2032 5/26/2022        Orders Placed This Encounter   Procedures    Ambulatory referral/consult to Internal Medicine     The following information is provided to all patients.  This information is to help you find resources for any of the problems found today that may be affecting your health:                Living healthy guide: www.[facility].[place].gov      Understanding Diabetes: www.diabetes.org      Eating healthy: www.cdc.gov/healthyweight      CDC home safety checklist: www.cdc.gov/steadi/patient.html      Agency on Aging: www.goea.[place].[facility]      Alcoholics anonymous (AA): www.aa.org      Physical Activity: www.jack.nih.gov/lz8iwzy      Tobacco use: www.quitwithusla.org

## 2023-06-08 ENCOUNTER — TELEPHONE (OUTPATIENT)
Dept: PAIN MEDICINE | Facility: CLINIC | Age: 78
End: 2023-06-08
Payer: MEDICARE

## 2023-06-08 NOTE — TELEPHONE ENCOUNTER
----- Message from Dorita Jeter MA sent at 6/8/2023 10:44 AM CDT -----  Name of Who is Calling: ESTEBAN JIMENEZ [[id number]]                What is the request in detail: Pt would like a call back to see if she needs to have lab work before the MRI. Please assist.                Can the clinic reply by [facility]: No                What Number to Call Back if not in [facility]: [phone]

## 2023-06-08 NOTE — TELEPHONE ENCOUNTER
----- Message from Jack Leonard sent at 6/8/2023 10:27 AM CDT -----  Name of Who is Calling:ESTEBAN JIMENEZ [[id number]]           What is the request in detail: pt stated that she would like an appt with this office after her MRI 6/14.Please contact to further discuss and advise.            Can the clinic reply by [facility]:[phone]           [facility][phone]

## 2023-06-08 NOTE — TELEPHONE ENCOUNTER
Staff reached out to pt in regards to pt requesting a callback regarding her mri pt wanted to tyrese teran the doctor had order labs staff informed pt that their was no labs on just the mri pt verbalized understanding and thanked staff for reaching out to her

## 2023-06-14 ENCOUNTER — HOSPITAL ENCOUNTER (OUTPATIENT)
Dept: RADIOLOGY | Facility: OTHER | Age: 78
Discharge: HOME | End: 2023-06-14
Attending: STUDENT IN AN ORGANIZED HEALTH CARE EDUCATION/TRAINING PROGRAM
Payer: MEDICARE

## 2023-06-14 DIAGNOSIS — M54.2 CERVICALGIA: ICD-10-CM

## 2023-06-14 PROCEDURE — 72141 MRI NECK SPINE W/O DYE: CPT | Mod: TC

## 2023-06-14 PROCEDURE — 72141 MRI CERVICAL SPINE WITHOUT CONTRAST: ICD-10-PCS | Mod: 26,,, | Performed by: RADIOLOGY

## 2023-06-14 PROCEDURE — 72141 MRI NECK SPINE W/O DYE: CPT | Mod: 26,,, | Performed by: RADIOLOGY

## 2023-06-21 ENCOUNTER — PATIENT MESSAGE (OUTPATIENT)
Dept: PAIN MEDICINE | Facility: OTHER | Age: 78
End: 2023-06-21
Payer: MEDICARE

## 2023-06-21 PROBLEM — I15.2 HYPERTENSION ASSOCIATED WITH TYPE 2 DIABETES MELLITUS: Status: ACTIVE | Noted: 2020-09-03

## 2023-06-21 PROBLEM — E11.59 HYPERTENSION ASSOCIATED WITH TYPE 2 DIABETES MELLITUS: Status: ACTIVE | Noted: 2020-09-03

## 2023-06-21 PROBLEM — R55 SYNCOPE: Status: RESOLVED | Noted: 2022-04-16 | Resolved: 2023-06-21

## 2023-06-21 PROBLEM — E55.9 VITAMIN D DEFICIENCY: Status: RESOLVED | Noted: 2020-09-10 | Resolved: 2023-06-21

## 2023-06-21 PROBLEM — E78.5 HYPERLIPIDEMIA ASSOCIATED WITH TYPE 2 DIABETES MELLITUS: Status: ACTIVE | Noted: 2020-09-10

## 2023-06-21 PROBLEM — F17.211 CIGARETTE NICOTINE DEPENDENCE IN REMISSION: Status: ACTIVE | Noted: 2023-02-14

## 2023-06-21 PROBLEM — E11.69 HYPERLIPIDEMIA ASSOCIATED WITH TYPE 2 DIABETES MELLITUS: Status: ACTIVE | Noted: 2020-09-10

## 2023-06-21 NOTE — PROGRESS NOTES
FAMILY MEDICINE  [facility]    Reason for visit:   Chief Complaint   Patient presents with    Establish Care    Annual Exam         SUBJECTIVE: Marizol Kevin is a 77 y.o. female  -  former smoker (quit 2021) with obesity, hypertension, hyperlipidemia, type 2 diabetes, chronic pain secondary to degenerative disc disease, chronic kidney disease stage 3, aortic atherosclerosis, history of colon cancer 20114 s/p right hemicolectomy, GERD, anxiety, osteoporosis and vitamin-D deficiency presents as a new patient to establish care and discuss diabetes. Last PCP Dr. Belinda Fajardo    Pain management:  Dr. Abdi Del Toro MD  Oncology:  Dr. Juanita Lebron MD  Gastroenterology: Lorenzo Malcolm  Orthopedist Dr. Caitlin Ryan MD    She continues to suffer from pain and neuropathy for which she is seeing Pain Management. She is scheduled for a nerve block and is hopeful    1. 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     Lipid profile : 09/14/2022 Annually Yes  LDL control Lab Results       Component                Value               Date                       LDLCALC                  75.0                09/14/2022           Annually/Less than 100 mg/dl  Yes  Nephropathy  screening Lab Results       Component                Value               Date                       LABMICR                  64.0                04/25/2023            Blood pressure BP Readings from Last 1 Encounters:  124/80   Less than 140/90 Yes  Dilated retinal exam : 07/13/2022 Annually Yes and scheduled for 8/31/23  Foot exam   : 06/01/2021 Annually- repeat today 6/22/23     Eye exam: scheduled 8/31/23      Review of Systems   All other systems reviewed and are negative.    HEALTH MAINTENANCE:   Health Maintenance   Topic Date Due    Eye Exam  07/13/2023    Lipid Panel  09/14/2023    DEXA Scan  09/17/2023    Hemoglobin A1c  10/25/2023    LDCT Lung Screen  03/20/2024    High Dose Statin  06/22/2024    Foot Exam  06/22/2024    TETANUS VACCINE  05/26/2032    Hepatitis C Screening  Completed     Health Maintenance Topics with due status: Not Due       Topic Last Completion Date    DEXA Scan 09/17/2020    Colorectal Cancer Screening 02/01/2021    TETANUS VACCINE 05/26/2022    Lipid Panel 09/14/2022    LDCT Lung Screen 03/20/2023    Diabetes Urine Screening 04/25/2023    Hemoglobin A1c 04/25/2023    High Dose Statin 06/22/2023    Foot Exam 06/22/2023     Health Maintenance Due   Topic Date Due    Eye Exam  07/13/2023    COVID-19 Vaccine (6 - Moderna series) 07/16/2023       HISTORY:   Past Medical History:   Diagnosis Date    Allergy     Anxiety     Cancer     colon    Cataract     Colon cancer     Dependence on nicotine from cigarettes 9/3/2020    Diabetes mellitus, type 2     Dry eye syndrome     Hyperlipidemia     Hypertension     Insomnia     Light cigarette smoker (1-9 cigs/day) 6/21/2022    Malignant neoplasm of ascending colon 7/20/2020    Squamous blepharitis     Syncope 4/16/2022    Tobacco use disorder, moderate, in early remission 11/2/2022    Vitamin D deficiency 9/10/2020       Past Surgical History:   Procedure Laterality Date    CATARACT EXTRACTION, BILATERAL  03/2020    CHOLECYSTECTOMY      COLON  SURGERY      Colon cancer    COLONOSCOPY N/A 2021    Procedure: COLONOSCOPY;  Surgeon: Ashwini Duarte MD;  Location: [facility] ([address] [place] [address]);  Service: Endoscopy;  Laterality: N/A;  medical transportation  covid test  enrique Ji mailed-KPvt    EYE SURGERY      Cataract    HYSTERECTOMY      JOINT REPLACEMENT      Knee    KNEE SURGERY      LAPAROSCOPIC LEFT COLON RESECTION         Family History   Problem Relation Age of Onset    Heart attack Mother     Heart disease Mother     Colon cancer Father     Diabetes Father     Cancer Sister         unknown    Hypothyroidism Sister     Asthma Sister     Thyroid cancer Neg Hx        Social History     Tobacco Use    Smoking status: Former     Packs/day: 1.00     Years: 51.00     Pack years: 51.00     Types: Cigarettes     Quit date: 2022     Years since quittin.7    Smokeless tobacco: Never    Tobacco comments:     smoking cessation information given    Substance Use Topics    Alcohol use: Yes     Alcohol/week: 1.0 standard drink     Types: 1 Shots of liquor per week     Comment: occ    Drug use: Never       Social History     Social History Narrative    Not on file       ALLERGIES:   Review of patient's allergies indicates:   Allergen Reactions    Sulfa (sulfonamide antibiotics)      Occurred when she was pregnant with varicose veins resulting in leg swelling and pain with standing       MEDS:   Outpatient Medications as of 2023   Medication Sig Dispense Refill    albuterol (PROVENTIL/VENTOLIN HFA) 90 mcg/actuation inhaler INHALE 2 PUFFS INTO THE LUNGS EVERY 4 (FOUR) HOURS AS NEEDED FOR WHEEZING. 54 g 3    albuterol-ipratropium (DUO-NEB) 2.5 mg-0.5 mg/3 mL nebulizer solution Take 3 mLs by nebulization every 6 (six) hours as needed for Wheezing or Shortness of Breath. Rescue 15 mL 5    amLODIPine (NORVASC) 5 MG tablet TAKE 1 TABLET EVERY DAY 90 tablet 3    aspirin (ECOTRIN) 81 MG EC tablet Take 81 mg by mouth once daily.      atorvastatin  (LIPITOR) 40 MG tablet TAKE 1 TABLET EVERY DAY 90 tablet 3    azelastine (ASTELIN) 137 mcg (0.1 %) nasal spray 1 spray (137 mcg total) by Nasal route 2 (two) times daily. 30 mL 11    betamethasone valerate 0.1% (VALISONE) 0.1 % Oint Apply topically once daily. 15 g 1    blood sugar diagnostic (TRUE METRIX GLUCOSE TEST STRIP) Strp Use to test blood glucose two (2) times daily; to be used with insurance-preferred brand of glucometer/supplies 200 strip 3    blood-glucose meter kit Please provide with insurance covered meter 1 each 0    cholecalciferol, vitamin D3, (VITAMIN D3) 50 mcg (2,000 unit) Cap Take by mouth.      DULoxetine (CYMBALTA) 30 MG capsule TAKE 1 CAPSULE EVERY DAY 90 capsule 3    ferrous sulfate 325 (65 FE) MG EC tablet Take 1 tablet (325 mg total) by mouth every other day. 45 tablet 3    hydrOXYzine HCL (ATARAX) 25 MG tablet Take 1 tablet (25 mg total) by mouth 3 (three) times daily as needed for Itching or Anxiety. 30 tablet 3    lancets Misc 1 Device by Misc.(Non-Drug; Combo Route) route 2 (two) times daily. 200 each 11    lansoprazole (PREVACID) 30 MG capsule Take 1 capsule by mouth once a day 30 min before breakfast 30 capsule 5    metFORMIN (GLUCOPHAGE-XR) 500 MG ER 24hr tablet TAKE 2 TABLETS EVERY DAY WITH BREAKFAST 180 tablet 1    multivitamin capsule Take 1 capsule by mouth once daily.      pregabalin (LYRICA) 50 MG capsule Take 1 capsule (50 mg total) by mouth 2 (two) times daily. 60 capsule 0    raloxifene (EVISTA) 60 mg tablet TAKE 1 TABLET EVERY DAY 90 tablet 4    sodium chloride 0.9 % SprA 1 spray by Each Nostril route every evening. 1 each 0    triamcinolone acetonide 0.025% (KENALOG) 0.025 % cream APPLY DAILY TO AFFECTED AREA AS NEEDED FOR ITCHING. MAXIMUM 2 TIMES A WEEK 15 g 1    heparin, porcine, PF, (HEPARIN FLUSH 100 UNITS/ML) 100 unit/mL Syrg        No current facility-administered medications on file as of 6/22/2023.       Vital signs:   Vitals:    06/22/23 1208   BP: 124/80  "  Pulse: 94   SpO2: 95%   Weight: 76.6 kg (168 lb 15.7 oz)   Height: 4' 9" (1.448 m)     Body mass index is 36.57 kg/m².    PHYSICAL EXAM:     Physical Exam  Constitutional:       General: She is not in acute distress.  Cardiovascular:      Rate and Rhythm: Normal rate and regular rhythm.      Pulses:           Dorsalis pedis pulses are 2+ on the right side.        Posterior tibial pulses are 2+ on the right side and 2+ on the left side.      Heart sounds: Normal heart sounds. No murmur heard.    No friction rub. No gallop.   Pulmonary:      Effort: Pulmonary effort is normal.      Breath sounds: Normal breath sounds. No wheezing, rhonchi or rales.   Musculoskeletal:      Cervical back: Neck supple.      Right lower leg: No edema.      Left lower leg: No edema.   Feet:      Right foot:      Protective Sensation: 5 sites tested.  0 sites sensed.      Skin integrity: Skin integrity normal.      Left foot:      Protective Sensation: 5 sites tested.  0 sites sensed.      Skin integrity: Skin integrity normal.   Neurological:      Mental Status: She is alert.           PERTINENT RESULTS:   No visits with results within 1 Week(s) from this visit.   Latest known visit with results is:   Lab Visit on 04/25/2023   Component Date Value Ref Range Status    Hemoglobin A1C 04/25/2023 7.8 (H)  4.0 - 5.6 % Final    Comment: ADA Screening Guidelines:  5.7-6.4%  Consistent with prediabetes  >or=6.5%  Consistent with diabetes    High levels of fetal hemoglobin interfere with the HbA1C  assay. Heterozygous hemoglobin variants (HbS, HgC, etc)do  not significantly interfere with this assay.   However, presence of multiple variants may affect accuracy.      Estimated Avg Glucose 04/25/2023 177 (H)  68 - 131 mg/dL Final       3/20/2023 Routine     Narrative & Impression  EXAMINATION:  CT CHEST LUNG SCREENING LOW DOSE     CLINICAL HISTORY:  smoker; Personal history of nicotine dependence     TECHNIQUE:  CT of the thorax was performed with " low dose, lung screening protocol.  No contrast was administered.  Sagittal and coronal reconstructions were obtained.     COMPARISON:  01/28/2022     FINDINGS:  Lungs: There are no abnormal opacities that require further evaluation.  The largest opacity in the right lung appears solid and measures 0.2 cm on series 4, image 113.  The largest opacity in the left lung appears solid and measures 0.4 cm on series 4, image 302.  The lungs show findings consistent with emphysema.     Pleura:   No effusion..     Heart and pericardium: Normal size without effusion.  Tip of the right chest wall port is at the upper cavoatrial junction.     Aorta and vasculature: Atherosclerosis including coronary arteries.     Chest wall and skeletal structures: Unremarkable except age-appropriate degenerative changes.     Upper abdomen: Unremarkable.     Mediastinum:   Unremarkable.     Impression:     Lung-RADS Category:  2 - Benign Appearance or Behavior - continue annual screening with LDCT in 12 months.     Clinically or potentially clinically significant non lung cancer finding:  None.     Prior Lung Cancer Modifier:  No history of prior lung cancer.        Electronically signed by: Yumi Sherman  Date:                                            03/20/2023  Time:                                           11:30    [phone] 9/17/2020 Routine     Narrative & Impression  EXAMINATION:  DEXA BONE DENSITY SPINE HIP     CLINICAL HISTORY:  Age-related osteoporosis without current pathological fracture     TECHNIQUE:  DXA scanning was performed over the hips and lumbar spine.  Review of the images confirms satisfactory positioning and technique.     COMPARISON:  None     FINDINGS:  The bone mineral density measured from L1 through L4 is 0.927g/cm2.  This corresponds to a T score of -2.2 and a Z score of -1.4.     The bone mineral density within the left femoral neck measures 0.850 g/cm2.  This corresponds to a T score of -1.4 and a Z score  of -0.6.     The bone mineral density within the right femoral neck measures 0.828 g/cm2.  This corresponds to a T score of -1.5 and a Z score of -0.8.     FRAX RESULTS:     10-year Probability of Fracture:     Major Osteoporotic Fracture 7.2%.     Hip Fracture 2.1%.     Impression:     Osteopenia of the lumbar spine and hips.     Consider FDA approved medical therapies in postmenopausal women and men aged 50 years and older, based on the following:     *A hip or vertebral (clinical or morphometric) fracture  *T score less than or equal to -2.5 at the femoral neck or spine after appropriate evaluation to exclude secondary causes.  *Low bone mass -- also known as osteopenia (T score between -1.0 and -2.5 at the femoral neck or spine) and a 10 year probability of hip fracture greater than or equal to 3% or a 10 year probability of major osteoporosis-related fracture greater than or equal to 20% based on the [place]-adapted WHO algorithm.  *Clinician's judgment and/or patient preference may indicate treatment for people with 10 year fracture probabilities is above or below these levels.        Electronically signed by: Yumi Sherman  Date:                                            09/17/2020  Time:                                           11:32    Patient Name: Marizol Kevin   Procedure Date: 2/1/2021 10:09 AM   MRN: [id number]   Account Number: [id number]   YOB: 1945   Age: 75   Room: [address] [place] [address]   Gender: Female   Attending MD: Ashwini Duarte MD   Procedure:            Colonoscopy   Indications:          Personal history of malignant neoplasm of the                         colon, Abnormal PET scan of the GI tract   Providers:            Ashwini Duarte MD, Raza Carroll RN, Meliza Rush RN, Karyn Rios, CRNA   Referring MD:         Kelsey Alcantara MD   Complications:        No immediate complications.   Medicines:            Monitored Anesthesia Care   Procedure:             Pre-Anesthesia Assessment:                         - ASA Grade Assessment: III - A patient with severe                         systemic disease.                         - After reviewing the risks and benefits, the                         patient was deemed in satisfactory condition to                         undergo the procedure.                         - The anesthesia plan was to use moderate                         sedation/analgesia (conscious sedation).                         After I obtained informed consent, the scope was                         passed under direct vision. Throughout the                         procedure, the patient's blood pressure, pulse, and                         oxygen saturations were monitored continuously.                         The Olympus scope PCF-H190DL ([id number]) was                         introduced through the anus and advanced to the                         terminal ileum. The colonoscopy was performed                         without difficulty. The patient tolerated the                         procedure well. The quality of the bowel                         preparation was good. The terminal ileum and the                         rectum were photographed.   Findings:       The perianal and digital rectal examinations were normal.        There was evidence of a prior functional end-to-end ileo-colonic        anastomosis in the transverse colon. This was patent and was        characterized by mild, focal inflammation. The anastomosis was        traversed. This was biopsied with a cold large-capacity forceps for        histology. Verification of patient identification for the specimen        was done. Estimated blood loss was minimal.        A 3 mm polyp was found in the sigmoid colon. The polyp was sessile.        The polyp was removed with a cold snare. Resection and retrieval        were complete. Verification of patient identification for the        specimen was  done. Estimated blood loss was minimal.        A 4 mm polyp was found in the rectum. The polyp was sessile. The        polyp was removed with a cold snare. Resection and retrieval were        complete. Verification of patient identification for the specimen        was done. Estimated blood loss was minimal.        Multiple small and large-mouthed diverticula were found in the        sigmoid colon.        The exam was otherwise without abnormality on direct and        retroflexion views.   Impression:           - Patent functional end-to-end ileo-colonic                         anastomosis, characterized by mild, focal                         inflammation. Biopsied.                         - One 3 mm polyp in the sigmoid colon, removed with                         a cold snare. Resected and retrieved.                         - One 4 mm polyp in the rectum, removed with a cold                         snare. Resected and retrieved.                         - Diverticulosis in the sigmoid colon.                         - The examination was otherwise normal on direct                         and retroflexion views.   Recommendation:       - Discharge patient to home.                         - Resume previous diet.                         - Continue present medications.                         - Await pathology results.                         - Repeat colonoscopy date to be determined after                         pending pathology results are reviewed for                         surveillance.                         - Return to referring physician.                         - Written discharge instructions were provided to                         the patient.                         - The signs and symptoms of potential delayed                         complications were discussed with the patient.                         - Patient has a contact number available for                         emergencies.                          - Return to normal activities tomorrow.   Attending Participation:        I personally performed the entire procedure.   Ashwini Duarte MD   2/1/2021 10:43:18 AM   This report has been verified and signed electronically.   Number of Addenda: 0   Note Initiated On: 2/1/2021 10:09 AM   Scope Withdrawal Time: 0 hours 18 minutes 2 seconds   Estimated Blood Loss: Estimated blood loss was minimal.        This report has been verified and signed electronically.       [phone] 3/20/2023 Routine     Narrative & Impression  EXAMINATION:  CT CHEST LUNG SCREENING LOW DOSE     CLINICAL HISTORY:  smoker; Personal history of nicotine dependence     TECHNIQUE:  CT of the thorax was performed with low dose, lung screening protocol.  No contrast was administered.  Sagittal and coronal reconstructions were obtained.     COMPARISON:  01/28/2022     FINDINGS:  Lungs: There are no abnormal opacities that require further evaluation.  The largest opacity in the right lung appears solid and measures 0.2 cm on series 4, image 113.  The largest opacity in the left lung appears solid and measures 0.4 cm on series 4, image 302.  The lungs show findings consistent with emphysema.     Pleura:   No effusion..     Heart and pericardium: Normal size without effusion.  Tip of the right chest wall port is at the upper cavoatrial junction.     Aorta and vasculature: Atherosclerosis including coronary arteries.     Chest wall and skeletal structures: Unremarkable except age-appropriate degenerative changes.     Upper abdomen: Unremarkable.     Mediastinum:   Unremarkable.     Impression:     Lung-RADS Category:  2 - Benign Appearance or Behavior - continue annual screening with LDCT in 12 months.     Clinically or potentially clinically significant non lung cancer finding:  None.     Prior Lung Cancer Modifier:  No history of prior lung cancer.        Electronically signed by: Yumi Sherman  Date:                                             03/20/2023  Time:                                           11:30           Exam Ended: 03/20/23 11:21 Last Resulted:            ASSESSMENT/PLAN:    1. Type 2 diabetes mellitus with stage 3a chronic kidney disease, without long-term current use of insulin  Overview:  Lab Results   Component Value Date    HGBA1C 7.8 (H) 04/25/2023     - glucose levels improved with trulicity though she does not monitor regularly  - encourage her to check her glucose at least weekly fasting  - she struggles with diet and we discussed dietary changes that may be helpful  - tolerating Trulicity and okay to increase to 1.5 mg weekly  - continue metformin  - exercise limited due to pain    Orders:  -     Cancel: Comprehensive Metabolic Panel; Future; Expected date: 08/21/2023  -     Cancel: Lipid Panel; Future; Expected date: 08/21/2023  -     Cancel: Hemoglobin A1C; Future; Expected date: 08/21/2023  -     Ambulatory referral/consult to Internal Medicine  -     dulaglutide (TRULICITY) 1.5 mg/0.5 mL pen injector; Inject 1.5 mg into the skin every 7 days.  Dispense: 12 pen; Refill: 3  -     POCT Glucose, Hand-Held Device  -     Comprehensive Metabolic Panel; Future; Expected date: 09/22/2023  -     Hemoglobin A1C; Future; Expected date: 09/22/2023  -     Lipid Panel; Future; Expected date: 09/22/2023  -     Cancel: Hemoglobin A1C; Future; Expected date: 06/22/2023    2. Type 2 diabetes mellitus with hyperglycemia, without long-term current use of insulin  Comments:  A1C uncontrolled, repeat. Cont metformin. Add Trulicity; discussed SE. Start BG log. Discussed diet, reduce soda. F/u c DM edu. Resched eye exam.  Overview:  A1C uncontrolled, repeat. Cont metformin. Add Trulicity; discussed SE. Start BG log. Discussed diet, reduce soda. F/u c DM edu. Resched eye exam.      3. Hypertension associated with type 2 diabetes mellitus  Overview:  - well controlled  - continue current management plan   - patient encouraged to notify me with any  changes    Orders:  -     Cancel: Comprehensive Metabolic Panel; Future; Expected date: 08/21/2023  -     Cancel: Lipid Panel; Future; Expected date: 08/21/2023  -     Comprehensive Metabolic Panel; Future; Expected date: 09/22/2023  -     Lipid Panel; Future; Expected date: 09/22/2023    4. Stage 3a chronic kidney disease  Overview:  Lab Results   Component Value Date    CREATININE 1.0 12/20/2022    CREATININE 1.0 12/20/2022     - stable    Orders:  -     Cancel: Comprehensive Metabolic Panel; Future; Expected date: 08/21/2023  -     Comprehensive Metabolic Panel; Future; Expected date: 09/22/2023    5. Hyperlipidemia associated with type 2 diabetes mellitus  Overview:  Lab Results   Component Value Date    LDLCALC 75.0 09/14/2022     - well controlled  - continue current management plan   - patient encouraged to notify me with any changes    Orders:  -     Cancel: Comprehensive Metabolic Panel; Future; Expected date: 08/21/2023  -     Cancel: Lipid Panel; Future; Expected date: 08/21/2023  -     Comprehensive Metabolic Panel; Future; Expected date: 09/22/2023  -     Lipid Panel; Future; Expected date: 09/22/2023    6. Aortic atherosclerosis  Overview:  - goal LDL <70 on high dose statin  - BP goal < 130/80  -  aspirin 81 mg daily  - A1C goal </=7%  - secondary prevention      Orders:  -     Cancel: Comprehensive Metabolic Panel; Future; Expected date: 08/21/2023  -     Cancel: Lipid Panel; Future; Expected date: 08/21/2023  -     Comprehensive Metabolic Panel; Future; Expected date: 09/22/2023  -     Lipid Panel; Future; Expected date: 09/22/2023    7. Mild intermittent asthma without complication  Overview:  - 7/6/22 PFT normal  - well controlled  - continue current management plan   - patient encouraged to notify me with any changes      8. Multiple thyroid nodules  Overview:  - 12/28/22 thyroid ultrasound:Stable multinodular thyroid gland.  Dominant nodule on the right has undergone prior FNA.  Please correlate  with pathology results.  Remaining nodules do not meet criteria for follow-up or FNA.  - previously evaluated by endocrinology 10/2020  - 10/12/202 Pathology FNA: [facility] Thyroid Cytology Category: Benign   Follicular cells, some with Hurthle cell change, and colloid, consistent with   a benign follicular nodule  - evaluated by Oncology who noted no RF for thyroid cancer and TSH normal. FNA completed and benign      9. Mass of parotid gland  Overview:  - 10/15/2014 CT/PET: hypermetabolic parotid lesion   - evaluated by ENT at that time.   - benign      10. Malignant neoplasm of ascending colon  Overview:  - 8/15/14 diagnosed ascending colon s/p right hemicolectomy with 4/17 positive notes   - 11/4/14-4/29/15 chemotherapy with adjuvant mFOLFOX- 6x12  - 2/1/21 colonoscopy: 2 hyperplastic polyps, + colitis  - followed by Oncology And GI      11. Personal history of colonic polyps  Overview:  - 2/1/21 colonoscopy: 2 hyperplastic polyps, + colitis      12. Elevated CEA  Overview:  Lab Results   Component Value Date    CEA 10.1 (H) 03/01/2023     - followed by Oncology        13. Neuropathy due to chemotherapeutic drug  Overview:  - due to chemotherapy for treatment of colon cancer 2014  - followed by Pain Management and currently on pregabalin       14. Age-related osteoporosis without current pathological fracture  Overview:  - 09/17/2020 bone density:  Osteopenia lumbar spine.  Major osteoporotic fracture 7.2%.  Hip fracture risk 2.1%  - currently on raloxifene 60 mg daily  - fall prevention by keeping your legs and hips strong and maintaining good balance  - weight bearing exercises like walking, squats, stair and jogging if able  - over the counter vitamin D3 7802-8882 units daily  - dietary calcium 1200 mg per day with diet or supplements   - repeat bone density scan       15. Cigarette nicotine dependence in remission  Overview:  - 51 pack years  - quit 9/22/22  - counseling regarding recommendations for  LDCT yearly for lung cancer screening. Next due 3/20/24 and followed by Pulmonary      16. Severe obesity (BMI 35.0-39.9) with comorbidity  Overview:  - discussed recommendation for diet and cardiovascular exercise  - counseling on lifestyle modifications for risk factor reduction  - counseling on management options      17. Encounter for screening mammogram for breast cancer  -     Mammo Digital Screening Bilat w/ [facility]; Future; Expected date: 06/22/2023          ORDERS:   Orders Placed This Encounter    Mammo Digital Screening Bilat w/ [facility]    Comprehensive Metabolic Panel    Hemoglobin A1C    Lipid Panel    POCT Glucose, Hand-Held Device    dulaglutide (TRULICITY) 1.5 mg/0.5 mL pen injector       Vaccines recommended: up to date    Follow-up in 2 months with labs or sooner if any concerns.      This note is dictated using the M*Modal Fluency Direct word recognition program. There are word recognition mistakes that are occasionally missed on review.    Dr. Alisa Nixon D.O.   Family Medicine

## 2023-06-22 ENCOUNTER — OFFICE VISIT (OUTPATIENT)
Dept: PRIMARY CARE CLINIC | Facility: CLINIC | Age: 78
End: 2023-06-22
Attending: FAMILY MEDICINE
Payer: MEDICARE

## 2023-06-22 ENCOUNTER — TELEPHONE (OUTPATIENT)
Dept: PAIN MEDICINE | Facility: CLINIC | Age: 78
End: 2023-06-22
Payer: MEDICARE

## 2023-06-22 VITALS
HEART RATE: 94 BPM | WEIGHT: 169 LBS | HEIGHT: 57 IN | BODY MASS INDEX: 36.46 KG/M2 | OXYGEN SATURATION: 95 % | SYSTOLIC BLOOD PRESSURE: 124 MMHG | DIASTOLIC BLOOD PRESSURE: 80 MMHG

## 2023-06-22 DIAGNOSIS — J45.20 MILD INTERMITTENT ASTHMA WITHOUT COMPLICATION: ICD-10-CM

## 2023-06-22 DIAGNOSIS — E11.69 HYPERLIPIDEMIA ASSOCIATED WITH TYPE 2 DIABETES MELLITUS: ICD-10-CM

## 2023-06-22 DIAGNOSIS — N18.31 TYPE 2 DIABETES MELLITUS WITH STAGE 3A CHRONIC KIDNEY DISEASE, WITHOUT LONG-TERM CURRENT USE OF INSULIN: Primary | ICD-10-CM

## 2023-06-22 DIAGNOSIS — Z12.31 ENCOUNTER FOR SCREENING MAMMOGRAM FOR BREAST CANCER: ICD-10-CM

## 2023-06-22 DIAGNOSIS — Z86.010 PERSONAL HISTORY OF COLONIC POLYPS: ICD-10-CM

## 2023-06-22 DIAGNOSIS — I70.0 AORTIC ATHEROSCLEROSIS: ICD-10-CM

## 2023-06-22 DIAGNOSIS — K11.8 MASS OF PAROTID GLAND: ICD-10-CM

## 2023-06-22 DIAGNOSIS — E11.59 HYPERTENSION ASSOCIATED WITH TYPE 2 DIABETES MELLITUS: ICD-10-CM

## 2023-06-22 DIAGNOSIS — N18.31 STAGE 3A CHRONIC KIDNEY DISEASE: ICD-10-CM

## 2023-06-22 DIAGNOSIS — G62.0 NEUROPATHY DUE TO CHEMOTHERAPEUTIC DRUG: ICD-10-CM

## 2023-06-22 DIAGNOSIS — R97.0 ELEVATED CEA: ICD-10-CM

## 2023-06-22 DIAGNOSIS — E78.5 HYPERLIPIDEMIA ASSOCIATED WITH TYPE 2 DIABETES MELLITUS: ICD-10-CM

## 2023-06-22 DIAGNOSIS — C18.2 MALIGNANT NEOPLASM OF ASCENDING COLON: ICD-10-CM

## 2023-06-22 DIAGNOSIS — T45.1X5A NEUROPATHY DUE TO CHEMOTHERAPEUTIC DRUG: ICD-10-CM

## 2023-06-22 DIAGNOSIS — E66.01 SEVERE OBESITY (BMI 35.0-39.9) WITH COMORBIDITY: ICD-10-CM

## 2023-06-22 DIAGNOSIS — F17.211 CIGARETTE NICOTINE DEPENDENCE IN REMISSION: ICD-10-CM

## 2023-06-22 DIAGNOSIS — M81.0 AGE-RELATED OSTEOPOROSIS WITHOUT CURRENT PATHOLOGICAL FRACTURE: ICD-10-CM

## 2023-06-22 DIAGNOSIS — E11.65 TYPE 2 DIABETES MELLITUS WITH HYPERGLYCEMIA, WITHOUT LONG-TERM CURRENT USE OF INSULIN: ICD-10-CM

## 2023-06-22 DIAGNOSIS — E04.2 MULTIPLE THYROID NODULES: ICD-10-CM

## 2023-06-22 DIAGNOSIS — E11.22 TYPE 2 DIABETES MELLITUS WITH STAGE 3A CHRONIC KIDNEY DISEASE, WITHOUT LONG-TERM CURRENT USE OF INSULIN: Primary | ICD-10-CM

## 2023-06-22 DIAGNOSIS — I15.2 HYPERTENSION ASSOCIATED WITH TYPE 2 DIABETES MELLITUS: ICD-10-CM

## 2023-06-22 PROCEDURE — 1126F PR PAIN SEVERITY QUANTIFIED, NO PAIN PRESENT: ICD-10-PCS | Mod: CPTII,S$GLB,, | Performed by: FAMILY MEDICINE

## 2023-06-22 PROCEDURE — 99999 PR PBB SHADOW E&M-EST. PATIENT-LVL V: CPT | Mod: PBBFAC,,, | Performed by: FAMILY MEDICINE

## 2023-06-22 PROCEDURE — 3072F LOW RISK FOR RETINOPATHY: CPT | Mod: CPTII,S$GLB,, | Performed by: FAMILY MEDICINE

## 2023-06-22 PROCEDURE — 99214 PR OFFICE/OUTPT VISIT, EST, LEVL IV, 30-39 MIN: ICD-10-PCS | Mod: S$GLB,,, | Performed by: FAMILY MEDICINE

## 2023-06-22 PROCEDURE — 3074F SYST BP LT 130 MM HG: CPT | Mod: CPTII,S$GLB,, | Performed by: FAMILY MEDICINE

## 2023-06-22 PROCEDURE — 3079F PR MOST RECENT DIASTOLIC BLOOD PRESSURE 80-89 MM HG: ICD-10-PCS | Mod: CPTII,S$GLB,, | Performed by: FAMILY MEDICINE

## 2023-06-22 PROCEDURE — 3079F DIAST BP 80-89 MM HG: CPT | Mod: CPTII,S$GLB,, | Performed by: FAMILY MEDICINE

## 2023-06-22 PROCEDURE — 99214 OFFICE O/P EST MOD 30 MIN: CPT | Mod: S$GLB,,, | Performed by: FAMILY MEDICINE

## 2023-06-22 PROCEDURE — 1160F RVW MEDS BY RX/DR IN RCRD: CPT | Mod: CPTII,S$GLB,, | Performed by: FAMILY MEDICINE

## 2023-06-22 PROCEDURE — 3072F PR LOW RISK FOR RETINOPATHY: ICD-10-PCS | Mod: CPTII,S$GLB,, | Performed by: FAMILY MEDICINE

## 2023-06-22 PROCEDURE — 1126F AMNT PAIN NOTED NONE PRSNT: CPT | Mod: CPTII,S$GLB,, | Performed by: FAMILY MEDICINE

## 2023-06-22 PROCEDURE — 1159F PR MEDICATION LIST DOCUMENTED IN MEDICAL RECORD: ICD-10-PCS | Mod: CPTII,S$GLB,, | Performed by: FAMILY MEDICINE

## 2023-06-22 PROCEDURE — 1160F PR REVIEW ALL MEDS BY PRESCRIBER/CLIN PHARMACIST DOCUMENTED: ICD-10-PCS | Mod: CPTII,S$GLB,, | Performed by: FAMILY MEDICINE

## 2023-06-22 PROCEDURE — 99999 PR PBB SHADOW E&M-EST. PATIENT-LVL V: ICD-10-PCS | Mod: PBBFAC,,, | Performed by: FAMILY MEDICINE

## 2023-06-22 PROCEDURE — 1159F MED LIST DOCD IN RCRD: CPT | Mod: CPTII,S$GLB,, | Performed by: FAMILY MEDICINE

## 2023-06-22 PROCEDURE — 3074F PR MOST RECENT SYSTOLIC BLOOD PRESSURE < 130 MM HG: ICD-10-PCS | Mod: CPTII,S$GLB,, | Performed by: FAMILY MEDICINE

## 2023-06-22 PROCEDURE — 3051F PR MOST RECENT HEMOGLOBIN A1C LEVEL 7.0 - < 8.0%: ICD-10-PCS | Mod: CPTII,S$GLB,, | Performed by: FAMILY MEDICINE

## 2023-06-22 PROCEDURE — 3051F HG A1C>EQUAL 7.0%<8.0%: CPT | Mod: CPTII,S$GLB,, | Performed by: FAMILY MEDICINE

## 2023-06-22 RX ORDER — IBUPROFEN 800 MG/1
800 TABLET ORAL
COMMUNITY
Start: 2023-05-25 | End: 2023-06-22

## 2023-06-22 RX ORDER — HEPARIN 100 UNIT/ML
SYRINGE INTRAVENOUS
COMMUNITY
Start: 2023-01-10

## 2023-06-22 RX ORDER — DULAGLUTIDE 1.5 MG/.5ML
1.5 INJECTION, SOLUTION SUBCUTANEOUS
Qty: 12 PEN | Refills: 3 | Status: SHIPPED | OUTPATIENT
Start: 2023-06-22 | End: 2024-06-21

## 2023-06-22 NOTE — TELEPHONE ENCOUNTER
----- Message from Ashwini Devan sent at 6/22/2023  1:12 PM CDT -----  Contact: ESTEBAN JIMENEZ [[id number]]  Type: Call Back      Who called: ESTEBAN JIMENEZ [[id number]]      What is the request in detail: Patient is requesting a call back. She states that she she is experiencing  some pain from her neuropathy. She would like to know if she can be seen. She states that she has a procedure coming up in July.   Please advise.     Can the clinic reply by [facility]? No      Would the patient rather a call back or a response via [facility]? Call back       Best call back number: [phone] (home)       Additional Information:

## 2023-06-22 NOTE — TELEPHONE ENCOUNTER
Contacted patient, she wanted to know if she could be seen for her neuropathy at her visit.     She was informed she will be having a procedure that day, he doesn't normally perform an evaluation.     She wanted to know what the block was?     The procedure was explained to her.     She verbalized understanding.

## 2023-06-23 PROBLEM — M47.22 OSTEOARTHRITIS OF SPINE WITH RADICULOPATHY, CERVICAL REGION: Status: ACTIVE | Noted: 2023-06-23

## 2023-06-23 PROBLEM — E04.2 MULTIPLE THYROID NODULES: Status: ACTIVE | Noted: 2020-09-10

## 2023-06-23 PROBLEM — R97.8 ELEVATED TUMOR MARKERS: Status: RESOLVED | Noted: 2020-07-20 | Resolved: 2023-06-23

## 2023-06-23 PROBLEM — Z86.0100 PERSONAL HISTORY OF COLONIC POLYPS: Status: ACTIVE | Noted: 2023-06-23

## 2023-06-23 PROBLEM — Z86.010 PERSONAL HISTORY OF COLONIC POLYPS: Status: ACTIVE | Noted: 2023-06-23

## 2023-06-23 PROBLEM — E11.65 TYPE 2 DIABETES MELLITUS WITH HYPERGLYCEMIA, WITHOUT LONG-TERM CURRENT USE OF INSULIN: Status: ACTIVE | Noted: 2023-06-23

## 2023-06-23 PROBLEM — M47.816 DEGENERATIVE JOINT DISEASE (DJD) OF LUMBAR SPINE: Status: ACTIVE | Noted: 2023-06-23

## 2023-06-30 ENCOUNTER — TELEPHONE (OUTPATIENT)
Dept: FAMILY MEDICINE | Facility: CLINIC | Age: 78
End: 2023-06-30
Payer: MEDICARE

## 2023-06-30 NOTE — TELEPHONE ENCOUNTER
----- Message from Lauren Winkler sent at 6/30/2023 10:43 AM CDT -----  Regarding: Patient call back  Who called:ESTEBAN JIMENEZ [[id number]]    What is the request in detail: Patient is requesting a call back. Patient would like to verify she should get blood work in July and not closer to her 9/13 appt. She states if she should, she would like to move it to [facility] 7/21 as she has a procedure that day.   Please advise.    Can the clinic reply by [facility]? No    Best call back number: [phone]    Additional Information: N/A

## 2023-06-30 NOTE — TELEPHONE ENCOUNTER
Staff contacted patient in regards of message, patient would like to get blood work on 7/21 at the [facility] location if possible. Staff informed patient that once labs are placed by provider she will be contacted to schedule. Patient verbalized that she understands

## 2023-07-05 ENCOUNTER — HOSPITAL ENCOUNTER (OUTPATIENT)
Dept: RADIOLOGY | Facility: OTHER | Age: 78
Discharge: HOME | End: 2023-07-05
Attending: FAMILY MEDICINE
Payer: MEDICARE

## 2023-07-05 DIAGNOSIS — Z12.31 ENCOUNTER FOR SCREENING MAMMOGRAM FOR BREAST CANCER: ICD-10-CM

## 2023-07-05 PROCEDURE — 77067 SCR MAMMO BI INCL CAD: CPT | Mod: 26,,, | Performed by: RADIOLOGY

## 2023-07-05 PROCEDURE — 77063 MAMMO DIGITAL SCREENING BILAT WITH TOMO: ICD-10-PCS | Mod: 26,,, | Performed by: RADIOLOGY

## 2023-07-05 PROCEDURE — 77063 BREAST TOMOSYNTHESIS BI: CPT | Mod: 26,,, | Performed by: RADIOLOGY

## 2023-07-05 PROCEDURE — 77067 SCR MAMMO BI INCL CAD: CPT | Mod: TC

## 2023-07-05 PROCEDURE — 77067 MAMMO DIGITAL SCREENING BILAT WITH TOMO: ICD-10-PCS | Mod: 26,,, | Performed by: RADIOLOGY

## 2023-07-11 ENCOUNTER — TELEPHONE (OUTPATIENT)
Dept: PRIMARY CARE CLINIC | Facility: CLINIC | Age: 78
End: 2023-07-11
Payer: MEDICARE

## 2023-07-11 NOTE — TELEPHONE ENCOUNTER
----- Message from Nancy Bernal sent at 7/11/2023 10:27 AM CDT -----  Regarding: call back  Contact: [phone]  Who Called: PT     Patient called in requesting to speak with you. Did not specify why. Please advise.

## 2023-07-14 ENCOUNTER — TELEPHONE (OUTPATIENT)
Dept: SPINE | Facility: CLINIC | Age: 78
End: 2023-07-14
Payer: MEDICARE

## 2023-07-14 DIAGNOSIS — M50.30 DDD (DEGENERATIVE DISC DISEASE), CERVICAL: Primary | ICD-10-CM

## 2023-07-14 DIAGNOSIS — M47.812 CERVICAL SPONDYLOSIS: ICD-10-CM

## 2023-07-14 RX ORDER — PREGABALIN 50 MG/1
50 CAPSULE ORAL 2 TIMES DAILY
Qty: 60 CAPSULE | Refills: 0 | Status: SHIPPED | OUTPATIENT
Start: 2023-07-14 | End: 2023-08-30 | Stop reason: SDUPTHER

## 2023-07-14 NOTE — TELEPHONE ENCOUNTER
----- Message from Iliana Felder MA sent at 7/13/2023  5:00 PM CDT -----  Does the patient know what this is regarding?: Requesting a call back for a refill on Rx pregabalin (LYRICA) 50 MG capsule ; pt said she's almost out of her medication ;please advise

## 2023-07-14 NOTE — TELEPHONE ENCOUNTER
Saff called patient regarding refill request. Request has been sent over the the prescribing provider. Patient has been notified.

## 2023-07-14 NOTE — TELEPHONE ENCOUNTER
----- Message from Ashwini Hartman sent at 7/14/2023 10:34 AM CDT -----  Contact: ESTEBAN JIMENEZ [[id number]]  Type: Call Back      Who called: ESTEBAN JIMENEZ [[id number]]      What is the request in detail: Patient is requesting a call back. Pt is calling to check the status of her pregabalin (LYRICA) 50 MG capsule refill request.    Please advise.     Can the clinic reply by [facility]? no      Would the patient rather a call back or a response via [facility]? Call back       Best call back number: [phone] (home)       Additional Information: [facility] - [place], [place] - [address]

## 2023-07-19 ENCOUNTER — CLINICAL SUPPORT (OUTPATIENT)
Dept: SMOKING CESSATION | Facility: CLINIC | Age: 78
End: 2023-07-19
Payer: COMMERCIAL

## 2023-07-19 DIAGNOSIS — F17.200 NICOTINE DEPENDENCE, UNCOMPLICATED: Primary | ICD-10-CM

## 2023-07-19 PROCEDURE — 99407 PR TOBACCO USE CESSATION INTENSIVE >10 MINUTES: ICD-10-PCS | Mod: S$GLB,,, | Performed by: GENERAL PRACTICE

## 2023-07-19 PROCEDURE — 99999 PR PBB SHADOW E&M-EST. PATIENT-LVL I: CPT | Mod: PBBFAC,,,

## 2023-07-19 PROCEDURE — 99407 BEHAV CHNG SMOKING > 10 MIN: CPT | Mod: S$GLB,,, | Performed by: GENERAL PRACTICE

## 2023-07-19 PROCEDURE — 99999 PR PBB SHADOW E&M-EST. PATIENT-LVL I: ICD-10-PCS | Mod: PBBFAC,,,

## 2023-07-19 NOTE — PROGRESS NOTES
Spoke with patient today in regard to smoking cessation progress for 12 month follow up. She states she is tobacco free. Congratulated patient on their success to remain tobacco free. Patient states she feels much better now. Informed patient of benefit period, future follow ups and contact information if any further help is needed. Will resolve smart form for 12 month follow up for Quit # 1.

## 2023-07-21 ENCOUNTER — HOSPITAL ENCOUNTER (OUTPATIENT)
Facility: OTHER | Age: 78
Discharge: HOME | End: 2023-07-21
Attending: ANESTHESIOLOGY | Admitting: ANESTHESIOLOGY
Payer: MEDICARE

## 2023-07-21 VITALS
WEIGHT: 163 LBS | RESPIRATION RATE: 18 BRPM | BODY MASS INDEX: 35.17 KG/M2 | HEIGHT: 57 IN | HEART RATE: 87 BPM | TEMPERATURE: 98 F | DIASTOLIC BLOOD PRESSURE: 95 MMHG | SYSTOLIC BLOOD PRESSURE: 130 MMHG | OXYGEN SATURATION: 97 %

## 2023-07-21 DIAGNOSIS — G89.29 CHRONIC PAIN: ICD-10-CM

## 2023-07-21 DIAGNOSIS — M25.512 CHRONIC LEFT SHOULDER PAIN: Primary | ICD-10-CM

## 2023-07-21 DIAGNOSIS — G89.29 CHRONIC LEFT SHOULDER PAIN: Primary | ICD-10-CM

## 2023-07-21 LAB — POCT GLUCOSE: 91 MG/DL (ref 70–110)

## 2023-07-21 PROCEDURE — 25000003 PHARM REV CODE 250: Performed by: STUDENT IN AN ORGANIZED HEALTH CARE EDUCATION/TRAINING PROGRAM

## 2023-07-21 PROCEDURE — 25000003 PHARM REV CODE 250: Performed by: ANESTHESIOLOGY

## 2023-07-21 PROCEDURE — 63600175 PHARM REV CODE 636 W HCPCS: Performed by: ANESTHESIOLOGY

## 2023-07-21 PROCEDURE — 25500020 PHARM REV CODE 255: Performed by: ANESTHESIOLOGY

## 2023-07-21 PROCEDURE — 82947 ASSAY GLUCOSE BLOOD QUANT: CPT | Mod: HCNC | Performed by: ANESTHESIOLOGY

## 2023-07-21 PROCEDURE — 64450 NJX AA&/STRD OTHER PN/BRANCH: CPT | Mod: HCNC,LT,, | Performed by: ANESTHESIOLOGY

## 2023-07-21 PROCEDURE — 64450 PR NERVE BLOCK INJ, ANES/STEROID, OTHER PERIPHERAL: ICD-10-PCS | Mod: HCNC,LT,, | Performed by: ANESTHESIOLOGY

## 2023-07-21 PROCEDURE — 64450 NJX AA&/STRD OTHER PN/BRANCH: CPT | Mod: HCNC,LT | Performed by: ANESTHESIOLOGY

## 2023-07-21 RX ORDER — MIDAZOLAM HYDROCHLORIDE 1 MG/ML
INJECTION INTRAMUSCULAR; INTRAVENOUS
Status: DISCONTINUED | OUTPATIENT
Start: 2023-07-21 | End: 2023-07-21 | Stop reason: HOSPADM

## 2023-07-21 RX ORDER — LIDOCAINE HYDROCHLORIDE 20 MG/ML
INJECTION, SOLUTION INFILTRATION; PERINEURAL
Status: DISCONTINUED | OUTPATIENT
Start: 2023-07-21 | End: 2023-07-21 | Stop reason: HOSPADM

## 2023-07-21 RX ORDER — SODIUM CHLORIDE 9 MG/ML
INJECTION, SOLUTION INTRAVENOUS CONTINUOUS
Status: DISCONTINUED | OUTPATIENT
Start: 2023-07-21 | End: 2023-07-21 | Stop reason: HOSPADM

## 2023-07-21 RX ORDER — BUPIVACAINE HYDROCHLORIDE 2.5 MG/ML
INJECTION, SOLUTION EPIDURAL; INFILTRATION; INTRACAUDAL
Status: DISCONTINUED | OUTPATIENT
Start: 2023-07-21 | End: 2023-07-21 | Stop reason: HOSPADM

## 2023-07-21 NOTE — DISCHARGE INSTRUCTIONS
Thank you for allowing us to care for you today. You may receive a survey about the care we provided. Your feedback is valuable and helps us provide excellent care throughout the community.     Home Care Instructions for Pain Management:    1. DIET:   You may resume your normal diet today.   2. BATHING:   You may shower with luke warm water. No tub baths or anything that will soak injection sites under water for the next 24 hours.  3. DRESSING:   You may remove your bandage today.   4. ACTIVITY LEVEL:   You may resume your normal activities 24 hrs after your procedure. Nothing strenuous today.  5. MEDICATIONS:   You may resume your normal medications today. To restart blood thinners, ask your doctor.  6. DRIVING    If you have received any sedatives by mouth today, you may not drive for 12 hours.    If you have received any sedation through your IV, you may not drive for 24 hrs.   7. SPECIAL INSTRUCTIONS:   No heat to the injection site for 24 hrs including, hot bath or shower, heating pad, moist heat, or hot tubs.    Use ice pack to injection site for any pain or discomfort.  Apply ice packs for 20 minute intervals as needed.    IF you have diabetes, be sure to monitor your blood sugar more closely. IF your injection contained steroids your blood sugar levels may become higher than normal.    If you are still having pain upon discharge:  Your pain may improve over the next 48 hours. The anesthetic (numbing medication) works immediately to 48 hours. IF your injection contained a steroid (anti-inflammatory medication), it takes approximately 3 days to start feeling relief and 7-10 days to see your greatest results from the medication. It is possible you may need subsequent injections. This would be discussed at your follow up appointment with pain management or your referring doctor.    Please call the PAIN MANAGEMENT office at [phone] or ON CALL pager at [phone] if you experienced any:   -Weakness or  loss of sensation  -Fever > 101.5  -Pain uncontrolled with oral medications   -Persistent nausea, vomiting, or diarrhea  -Redness or drainage from the injection sites, or any other worrisome concerns.   If physician on call was not reached or could not communicate with our office for any reason please go to the nearest emergency department. Adult Procedural Sedation Instructions    Recovery After Procedural Sedation (Adult)  You have been given medicine by vein to make you sleep during your surgery. This may have included both a pain medicine and sleeping medicine. Most of the effects have worn off. But you may still have some drowsiness for the next 6 to 8 hours.  Home care  Follow these guidelines when you get home:  For the next 8 hours, you should be watched by a responsible adult. This person should make sure your condition is not getting worse.  Don't drink any alcohol for the next 24 hours.  Don't drive, operate dangerous machinery, or make important business or personal decisions during the next 24 hours.  Note: Your healthcare provider may tell you not to take any medicine by mouth for pain or sleep in the next 4 hours. These medicines may react with the medicines you were given in the hospital. This could cause a much stronger response than usual.  Follow-up care  Follow up with your healthcare provider if you are not alert and back to your usual level of activity within 12 hours.  When to seek medical advice  Call your healthcare provider right away if any of these occur:  Drowsiness gets worse  Weakness or dizziness gets worse  Repeated vomiting  You can't be awakened   Date Last Reviewed: 10/18/2016  © 9521-0728 The "[employer]. [address] [place] [address], [place] [address]. All rights reserved. This information is not intended as a substitute for professional medical care. Always follow your healthcare professional's instructions.

## 2023-07-21 NOTE — DISCHARGE SUMMARY
Discharge Note  Short Stay      SUMMARY     Admit Date: 7/21/2023    Attending Physician: ZACHARY PETIT      Discharge Physician: ZACHARY PETIT      Discharge Date: 7/21/2023 9:29 AM    Procedure(s) (LRB):  BLOCK, NERVE, LEFT SHOULDER ARTICULAR BRANCH keep date rep aware (Left)    Final Diagnosis: Chronic left shoulder pain [M25.512, G89.29]    Disposition: Home or self care    Patient Instructions:   Current Discharge Medication List        CONTINUE these medications which have NOT CHANGED    Details   albuterol (PROVENTIL/VENTOLIN HFA) 90 mcg/actuation inhaler INHALE 2 PUFFS INTO THE LUNGS EVERY 4 (FOUR) HOURS AS NEEDED FOR WHEEZING.  Qty: 54 g, Refills: 3    Associated Diagnoses: Mild intermittent asthma without complication      albuterol-ipratropium (DUO-NEB) 2.5 mg-0.5 mg/3 mL nebulizer solution Take 3 mLs by nebulization every 6 (six) hours as needed for Wheezing or Shortness of Breath. Rescue  Qty: 15 mL, Refills: 5    Associated Diagnoses: COPD exacerbation      amLODIPine (NORVASC) 5 MG tablet TAKE 1 TABLET EVERY DAY  Qty: 90 tablet, Refills: 3    Associated Diagnoses: Essential hypertension      aspirin (ECOTRIN) 81 MG EC tablet Take 81 mg by mouth once daily.      atorvastatin (LIPITOR) 40 MG tablet TAKE 1 TABLET EVERY DAY  Qty: 90 tablet, Refills: 3    Associated Diagnoses: Type 2 diabetes mellitus without complication, without long-term current use of insulin      azelastine (ASTELIN) 137 mcg (0.1 %) nasal spray 1 spray (137 mcg total) by Nasal route 2 (two) times daily.  Qty: 30 mL, Refills: 11    Associated Diagnoses: Allergic rhinitis, unspecified seasonality, unspecified trigger      betamethasone valerate 0.1% (VALISONE) 0.1 % Oint Apply topically once daily.  Qty: 15 g, Refills: 1    Associated Diagnoses: Genital ulcer, female      blood sugar diagnostic (TRUE METRIX GLUCOSE TEST STRIP) Strp Use to test blood glucose two (2) times daily; to be used with insurance-preferred brand of  glucometer/supplies  Qty: 200 strip, Refills: 3    Associated Diagnoses: Type 2 diabetes mellitus with other specified complication, unspecified whether long term insulin use      blood-glucose meter kit Please provide with insurance covered meter  Qty: 1 each, Refills: 0    Associated Diagnoses: Type 2 diabetes mellitus with other specified complication, unspecified whether long term insulin use      cholecalciferol, vitamin D3, (VITAMIN D3) 50 mcg (2,000 unit) Cap Take by mouth.      dulaglutide (TRULICITY) 1.5 mg/0.5 mL pen injector Inject 1.5 mg into the skin every 7 days.  Qty: 12 pen, Refills: 3    Associated Diagnoses: Type 2 diabetes mellitus with stage 3a chronic kidney disease, without long-term current use of insulin      DULoxetine (CYMBALTA) 30 MG capsule TAKE 1 CAPSULE EVERY DAY  Qty: 90 capsule, Refills: 3      ferrous sulfate 325 (65 FE) MG EC tablet Take 1 tablet (325 mg total) by mouth every other day.  Qty: 45 tablet, Refills: 3    Associated Diagnoses: Muscle cramps      heparin, porcine, PF, (HEPARIN FLUSH 100 UNITS/ML) 100 unit/mL Syrg       hydrOXYzine HCL (ATARAX) 25 MG tablet Take 1 tablet (25 mg total) by mouth 3 (three) times daily as needed for Itching or Anxiety.  Qty: 30 tablet, Refills: 3    Associated Diagnoses: Anxiety      lancets Misc 1 Device by Misc.(Non-Drug; Combo Route) route 2 (two) times daily.  Qty: 200 each, Refills: 11    Associated Diagnoses: Type 2 diabetes mellitus with other specified complication, unspecified whether long term insulin use      lansoprazole (PREVACID) 30 MG capsule Take 1 capsule by mouth once a day 30 min before breakfast  Qty: 30 capsule, Refills: 5      metFORMIN (GLUCOPHAGE-XR) 500 MG ER 24hr tablet TAKE 2 TABLETS EVERY DAY WITH BREAKFAST  Qty: 180 tablet, Refills: 1    Associated Diagnoses: Type 2 diabetes mellitus without complication, without long-term current use of insulin      multivitamin capsule Take 1 capsule by mouth once daily.       pregabalin (LYRICA) 50 MG capsule Take 1 capsule (50 mg total) by mouth 2 (two) times daily.  Qty: 60 capsule, Refills: 0    Associated Diagnoses: DDD (degenerative disc disease), cervical; Cervical spondylosis      raloxifene (EVISTA) 60 mg tablet TAKE 1 TABLET EVERY DAY  Qty: 90 tablet, Refills: 4      sodium chloride 0.9 % SprA 1 spray by Each Nostril route every evening.  Qty: 1 each, Refills: 0      triamcinolone acetonide 0.025% (KENALOG) 0.025 % cream APPLY DAILY TO AFFECTED AREA AS NEEDED FOR ITCHING. MAXIMUM 2 TIMES A WEEK  Qty: 15 g, Refills: 1                 Discharge Diagnosis: Chronic left shoulder pain [M25.512, G89.29]  Condition on Discharge: Stable with no complications to procedure   Diet on Discharge: Same as before.  Activity: as per instruction sheet.  Discharge to: Home with a responsible adult.  Follow up: 2-4 weeks       Please call my office or pager at [phone] if experienced any weakness or loss of sensation, fever > 101.5, pain uncontrolled with oral medications, persistent nausea/vomiting/or diarrhea, redness or drainage from the incisions, or any other worrisome concerns. If physician on call was not reached or could not communicate with our office for any reason please go to the nearest emergency department

## 2023-07-21 NOTE — OP NOTE
Diagnostic Shoulder Peripheral Nerves Block under Fluoroscopic Guidance    The procedure, risks, benefits, and options were discussed with the patient. There are no contraindications to the procedure. The patent expressed understanding and agreed to the procedure. Informed written consent was obtained prior to the start of the procedure and can be found in the patient's chart.    PATIENT NAME: Marizol Kevin   MRN: [id number]     DATE OF PROCEDURE: 07/21/2023    PROCEDURE: Diagnostic Left Shoulder Peripheral Nerves Block under Fluoroscopic Guidance    PRE-OP DIAGNOSIS: Chronic left shoulder pain [M25.512, G89.29]    POST-OP DIAGNOSIS: Same    PHYSICIAN: Abdi Del Toro MD    ASSISTANTS: Blake Fechtel, M.D. [facility] Pain Fellow      MEDICATIONS INJECTED: Bupivacine 0.25%     LOCAL ANESTHETIC INJECTED: Xylocaine 2%     SEDATION: Versed 1mg and Fentanyl 0mcg                                                                                                                                                                                     Conscious sedation ordered by M.D. Patient re-evaluation prior to administration of conscious sedation. No changes noted in patient's status from initial evaluation. The patient's vital signs were monitored by RN and patient remained hemodynamically stable throughout the procedure.    Event Time In   Sedation Start 0916   Sedation End 0924       ESTIMATED BLOOD LOSS: None    COMPLICATIONS: None    INTERVAL HISTORY: Patient has clinical findings of chronic shoulder pain that is not relieved by other therapies.     TECHNIQUE: Time-out was performed to identify the patient and procedure to be performed. With the patient laying in an anterior oblique position, the surgical area was prepped and draped in the usual sterile fashion using ChloraPrep and a fenestrated drape. The suprascapular and axillary nerve branches were determined under fluoroscopy guidance. The target site for needle placement  was obtained by manual palpation with radiographic confirmation. Skin anesthesia was achieved by injecting Lidocaine 2% over the injection site. A 25 gauge,  3.5 inch spinal quinke needle was then advanced into the posterior aspect of the scapula adjacent to the glenoid fossa under fluoroscopy in the AP with 15-20 degrees contralateral views. Once the needle tip position was confirmed on fluoroscopy, negative pressure was applied to confirm no intravascular placement. Contrast dye  Omnipaque (300mg/mL) was injected to confirm placement and there was no vascular runoff. 1 mL of the anesthetic listed above was then slowly injected. Next, the needle was advanced to the superior aspect of the posterior tubercle of the humeral head. Once the needle tip position was confirmed on fluoroscopy, negative pressure was applied to confirm no intravascular placement. Contrast dye  Omnipaque (300mg/mL) was injected to confirm placement and there was no vascular runoff. 1 mL of the anesthetic listed above was then slowly injected. Finally, the C-arm was rotated into a lateral view with 15 degree cephalad wag to perform the anterior block of the lateral pectoral nerve branch. The surgical area was prepped and draped in the usual sterile fashion. The target site for needle placement was obtained by manual palpation with radiographic confirmation. Skin anesthesia was achieved by injecting Lidocaine 2% over the injection site. A 25 gauge, 3.5 inch spinal quinke needle was advanced to the mid aspect of  the coracoid process under fluoroscopy. Once the needle tip position was confirmed on fluoroscopy, negative pressure was applied to confirm no intravascular placement. Contrast dye  Omnipaque (300mg/mL) was injected to confirm placement and there was no vascular runoff. 1 mL of the anesthetic listed above was then slowly injected. The needles were removed and bleeding was nil. A sterile dressing was applied. No specimens collected. The  patient tolerated the procedure well.     The patient was monitored after the procedure in the recovery area. They were given post-procedure and discharge instructions to follow at home. The patient was discharged in a stable condition.    ZACHARY PETIT MD    I reviewed and edited the fellow's note. I conducted my own interview and physical examination. I agree with the findings. I was present and supervising all critical portions of the procedure.    Abdi Del Toro MD

## 2023-07-21 NOTE — H&P
"HPI  Patient presenting for Procedure(s) (LRB):  BLOCK, NERVE, LEFT SHOULDER ARTICULAR BRANCH keep date rep aware (Left)     Patient on Anti-coagulation No    No health changes since previous encounter    Past Medical History:   Diagnosis Date    Allergy     Anxiety     Cancer     colon    Cataract     Colon cancer     Dependence on nicotine from cigarettes 9/3/2020    Diabetes mellitus, type 2     Dry eye syndrome     Hyperlipidemia     Hypertension     Insomnia     Light cigarette smoker (1-9 cigs/day) 6/21/2022    Malignant neoplasm of ascending colon 7/20/2020    Squamous blepharitis     Syncope 4/16/2022    Tobacco use disorder, moderate, in early remission 11/2/2022    Vitamin D deficiency 9/10/2020     Past Surgical History:   Procedure Laterality Date    CATARACT EXTRACTION, BILATERAL  03/2020    CHOLECYSTECTOMY      COLON SURGERY      Colon cancer    COLONOSCOPY N/A 02/01/2021    Procedure: COLONOSCOPY;  Surgeon: Ashwini Duarte MD;  Location: [address]);  Service: Endoscopy;  Laterality: N/A;  medical transportation  covid test 1/29 [employer], instructions mailed-KPvt    EYE SURGERY      Cataract    HYSTERECTOMY      JOINT REPLACEMENT      Knee    KNEE SURGERY      LAPAROSCOPIC LEFT COLON RESECTION       Review of patient's allergies indicates:   Allergen Reactions    Sulfa (sulfonamide antibiotics)      Occurred when she was pregnant with varicose veins resulting in leg swelling and pain with standing      Current Facility-Administered Medications   Medication    0.9%  NaCl infusion    BUPivacaine (PF) 0.25% (2.5 mg/ml) injection    iohexoL (OMNIPAQUE 300) injection    LIDOcaine HCL 20 mg/ml (2%) injection       PMHx, PSHx, Allergies, Medications reviewed in epic    ROS negative except pain complaints in HPI    OBJECTIVE:    /64 (BP Location: Right arm, Patient Position: Lying)   Pulse 86   Temp 98.2 °F (36.8 °C) (Oral)   Resp 16   Ht 4' 9" (1.448 m)   Wt 73.9 kg (163 lb)   SpO2 95% "   Breastfeeding No   BMI 35.27 kg/m²     PHYSICAL EXAMINATION:    GENERAL: Well appearing, in no acute distress, alert and oriented x3.  PSYCH:  Mood and affect appropriate.  SKIN: Skin color, texture, turgor normal, no rashes or lesions which will impact the procedure.  CV: RRR with palpation of the radial artery.  PULM: No evidence of respiratory difficulty, symmetric chest rise. Clear to auscultation.  NEURO: Cranial nerves grossly intact.    Plan:    Proceed with procedure as planned Procedure(s) (LRB):  BLOCK, NERVE, LEFT SHOULDER ARTICULAR BRANCH keep date rep aware (Left)    Kain Gonzalez  07/21/2023

## 2023-07-24 ENCOUNTER — TELEPHONE (OUTPATIENT)
Dept: PAIN MEDICINE | Facility: CLINIC | Age: 78
End: 2023-07-24
Payer: MEDICARE

## 2023-07-24 DIAGNOSIS — M12.812 ROTATOR CUFF ARTHROPATHY OF LEFT SHOULDER: Primary | ICD-10-CM

## 2023-07-24 DIAGNOSIS — G89.29 CHRONIC LEFT SHOULDER PAIN: ICD-10-CM

## 2023-07-24 DIAGNOSIS — M25.512 CHRONIC LEFT SHOULDER PAIN: ICD-10-CM

## 2023-07-24 DIAGNOSIS — E11.65 TYPE 2 DIABETES MELLITUS WITH HYPERGLYCEMIA, WITHOUT LONG-TERM CURRENT USE OF INSULIN: Primary | ICD-10-CM

## 2023-07-24 NOTE — TELEPHONE ENCOUNTER
----- Message from Tosha Reynoso sent at 7/24/2023 11:30 AM CDT -----  Regarding: pain diary  Name of caller: Marizol       What is the requesting detail: pt completed her pain diary and is requesting a call back to discuss. Please advise       Can the clinic reply by [facility]:       What number to call back:[phone]

## 2023-07-24 NOTE — TELEPHONE ENCOUNTER
Lissa Javier MD  You 11 minutes ago (3:32 PM)       Please schedule for Left shoulder articular branches cooled RFA      Lissa Javier MD 11 minutes ago (3:32 PM)       Addended by: LISSA JAVIER on: 7/24/2023 03:32 PM       Modules accepted: Orders           Note       You  Lissa Javier MD 3 hours ago (11:55 AM)     AW  Spoke with pt she stated she have received 100% out of 100% relief from 7/21/23 BLOCK, NERVE, LEFT SHOULDER ARTICULAR BRANCH.     1st Block      You  Marizol Kevin 3 hours ago (11:54 AM)     You 3 hours ago (11:53 AM)     AW  ----- Message from Tosha Reynoso sent at 7/24/2023 11:30 AM CDT -----  Regarding: pain diary  Name of caller: Marizol         What is the requesting detail: pt completed her pain diary and is requesting a call back to discuss. Please advise         Can the clinic reply by [facility]:         What number to call back:[phone]

## 2023-07-26 ENCOUNTER — TELEPHONE (OUTPATIENT)
Dept: PRIMARY CARE CLINIC | Facility: CLINIC | Age: 78
End: 2023-07-26
Payer: MEDICARE

## 2023-07-26 NOTE — TELEPHONE ENCOUNTER
----- Message from Ashwini Devan sent at 7/26/2023  2:25 PM CDT -----  Contact: ESTEBAN JIMENEZ [[id number]]  Type: Call Back      Who called: ESTEBAN JIMENEZ [[id number]]      What is the request in detail: Patient is requesting a call back. Pt would like to know if she can be seen tomorrow, she states that she has some SOB, chills, and coughing and her stomach is upset. She states that she do not feel well.  Please advise.     Can the clinic reply by [facility]? No      Would the patient rather a call back or a response via [facility]? Call back       Best call back number: [phone] (home)       Additional Information:

## 2023-07-27 ENCOUNTER — TELEPHONE (OUTPATIENT)
Dept: PRIMARY CARE CLINIC | Facility: CLINIC | Age: 78
End: 2023-07-27
Payer: MEDICARE

## 2023-07-27 ENCOUNTER — APPOINTMENT (OUTPATIENT)
Dept: RADIOLOGY | Facility: OTHER | Age: 78
End: 2023-07-27
Attending: FAMILY MEDICINE
Payer: MEDICARE

## 2023-07-27 ENCOUNTER — OFFICE VISIT (OUTPATIENT)
Dept: PRIMARY CARE CLINIC | Facility: CLINIC | Age: 78
End: 2023-07-27
Attending: FAMILY MEDICINE
Payer: MEDICARE

## 2023-07-27 ENCOUNTER — PATIENT MESSAGE (OUTPATIENT)
Dept: ADMINISTRATIVE | Facility: OTHER | Age: 78
End: 2023-07-27
Payer: MEDICARE

## 2023-07-27 VITALS
BODY MASS INDEX: 36.9 KG/M2 | HEIGHT: 57 IN | WEIGHT: 171.06 LBS | SYSTOLIC BLOOD PRESSURE: 130 MMHG | HEART RATE: 93 BPM | DIASTOLIC BLOOD PRESSURE: 88 MMHG | TEMPERATURE: 99 F | OXYGEN SATURATION: 94 %

## 2023-07-27 DIAGNOSIS — R11.0 NAUSEA: ICD-10-CM

## 2023-07-27 DIAGNOSIS — R06.02 SHORTNESS OF BREATH: ICD-10-CM

## 2023-07-27 DIAGNOSIS — R53.83 FATIGUE, UNSPECIFIED TYPE: Primary | ICD-10-CM

## 2023-07-27 LAB
CTP QC/QA: YES
CTP QC/QA: YES
POC MOLECULAR INFLUENZA A AGN: NEGATIVE
POC MOLECULAR INFLUENZA B AGN: NEGATIVE
SARS-COV-2 AG RESP QL IA.RAPID: NEGATIVE

## 2023-07-27 PROCEDURE — 99214 OFFICE O/P EST MOD 30 MIN: CPT | Mod: HCNC,S$GLB,, | Performed by: FAMILY MEDICINE

## 2023-07-27 PROCEDURE — 87502 INFLUENZA DNA AMP PROBE: CPT | Mod: QW,HCNC,S$GLB, | Performed by: FAMILY MEDICINE

## 2023-07-27 PROCEDURE — 99999 PR PBB SHADOW E&M-EST. PATIENT-LVL V: CPT | Mod: PBBFAC,HCNC,, | Performed by: FAMILY MEDICINE

## 2023-07-27 PROCEDURE — 3079F PR MOST RECENT DIASTOLIC BLOOD PRESSURE 80-89 MM HG: ICD-10-PCS | Mod: HCNC,CPTII,S$GLB, | Performed by: FAMILY MEDICINE

## 2023-07-27 PROCEDURE — 3072F LOW RISK FOR RETINOPATHY: CPT | Mod: HCNC,CPTII,S$GLB, | Performed by: FAMILY MEDICINE

## 2023-07-27 PROCEDURE — 99999 PR PBB SHADOW E&M-EST. PATIENT-LVL V: ICD-10-PCS | Mod: PBBFAC,HCNC,, | Performed by: FAMILY MEDICINE

## 2023-07-27 PROCEDURE — 1126F PR PAIN SEVERITY QUANTIFIED, NO PAIN PRESENT: ICD-10-PCS | Mod: HCNC,CPTII,S$GLB, | Performed by: FAMILY MEDICINE

## 2023-07-27 PROCEDURE — 3075F SYST BP GE 130 - 139MM HG: CPT | Mod: HCNC,CPTII,S$GLB, | Performed by: FAMILY MEDICINE

## 2023-07-27 PROCEDURE — 71046 XR CHEST PA AND LATERAL: ICD-10-PCS | Mod: 26,HCNC,, | Performed by: RADIOLOGY

## 2023-07-27 PROCEDURE — 71046 X-RAY EXAM CHEST 2 VIEWS: CPT | Mod: TC,HCNC

## 2023-07-27 PROCEDURE — 3044F HG A1C LEVEL LT 7.0%: CPT | Mod: HCNC,CPTII,S$GLB, | Performed by: FAMILY MEDICINE

## 2023-07-27 PROCEDURE — 99214 PR OFFICE/OUTPT VISIT, EST, LEVL IV, 30-39 MIN: ICD-10-PCS | Mod: HCNC,S$GLB,, | Performed by: FAMILY MEDICINE

## 2023-07-27 PROCEDURE — 3079F DIAST BP 80-89 MM HG: CPT | Mod: HCNC,CPTII,S$GLB, | Performed by: FAMILY MEDICINE

## 2023-07-27 PROCEDURE — 1159F PR MEDICATION LIST DOCUMENTED IN MEDICAL RECORD: ICD-10-PCS | Mod: HCNC,CPTII,S$GLB, | Performed by: FAMILY MEDICINE

## 2023-07-27 PROCEDURE — 1160F PR REVIEW ALL MEDS BY PRESCRIBER/CLIN PHARMACIST DOCUMENTED: ICD-10-PCS | Mod: HCNC,CPTII,S$GLB, | Performed by: FAMILY MEDICINE

## 2023-07-27 PROCEDURE — 1126F AMNT PAIN NOTED NONE PRSNT: CPT | Mod: HCNC,CPTII,S$GLB, | Performed by: FAMILY MEDICINE

## 2023-07-27 PROCEDURE — 1160F RVW MEDS BY RX/DR IN RCRD: CPT | Mod: HCNC,CPTII,S$GLB, | Performed by: FAMILY MEDICINE

## 2023-07-27 PROCEDURE — 87811 SARS-COV-2 COVID19 W/OPTIC: CPT | Mod: QW,HCNC,S$GLB, | Performed by: FAMILY MEDICINE

## 2023-07-27 PROCEDURE — 71046 X-RAY EXAM CHEST 2 VIEWS: CPT | Mod: 26,HCNC,, | Performed by: RADIOLOGY

## 2023-07-27 PROCEDURE — 87502 POCT INFLUENZA A/B MOLECULAR: ICD-10-PCS | Mod: QW,HCNC,S$GLB, | Performed by: FAMILY MEDICINE

## 2023-07-27 PROCEDURE — 1159F MED LIST DOCD IN RCRD: CPT | Mod: HCNC,CPTII,S$GLB, | Performed by: FAMILY MEDICINE

## 2023-07-27 PROCEDURE — 87811 SARS CORONAVIRUS 2 ANTIGEN POCT, MANUAL READ: ICD-10-PCS | Mod: QW,HCNC,S$GLB, | Performed by: FAMILY MEDICINE

## 2023-07-27 PROCEDURE — 3044F PR MOST RECENT HEMOGLOBIN A1C LEVEL <7.0%: ICD-10-PCS | Mod: HCNC,CPTII,S$GLB, | Performed by: FAMILY MEDICINE

## 2023-07-27 PROCEDURE — 3075F PR MOST RECENT SYSTOLIC BLOOD PRESS GE 130-139MM HG: ICD-10-PCS | Mod: HCNC,CPTII,S$GLB, | Performed by: FAMILY MEDICINE

## 2023-07-27 PROCEDURE — 3072F PR LOW RISK FOR RETINOPATHY: ICD-10-PCS | Mod: HCNC,CPTII,S$GLB, | Performed by: FAMILY MEDICINE

## 2023-07-27 RX ORDER — ONDANSETRON 4 MG/1
4 TABLET, FILM COATED ORAL EVERY 8 HOURS PRN
Qty: 30 TABLET | Refills: 0 | Status: SHIPPED | OUTPATIENT
Start: 2023-07-27 | End: 2023-08-30

## 2023-07-27 NOTE — PROGRESS NOTES
FAMILY MEDICINE  [facility]  [place]    Reason for visit:   Chief Complaint   Patient presents with    Shortness of Breath    Chills    Cough    Fatigue       HPI: Marizol Kevin is a 77 y.o. female  -  former smoker (quit 2021) with obesity, hypertension, hyperlipidemia, type 2 diabetes, chronic pain secondary to degenerative disc disease, chronic kidney disease stage 3, aortic atherosclerosis, history of colon cancer 20114 s/p right hemicolectomy, GERD, anxiety, osteoporosis and vitamin-D deficiency presents with shortness of breath and chills    Pain management:  Dr. Abdi Del Toro MD  Oncology:  Dr. Juanita Lebron MD  Gastroenterology: Lorenzo Malcolm  Orthopedist Dr. Caitlin Ryan MD    Marizol Kevin reports that she has been feeling unwell since 7/25/23.  She reports her symptoms initially started decreased appetite and nausea.  She reports that the symptoms seemed to progress with shortness of breath increased lethargy and chills.  She does have an albuterol inhaler.  She reports she used her albuterol inhaler without any relief.  She feels very fatigued.  She still has some mild nausea.  She denies any known sick contacts.        Review of Systems   Constitutional:  Positive for chills and malaise/fatigue.   Respiratory:  Positive for shortness of breath.    All other systems reviewed and are negative.    Social History     Social History Narrative    Not on file         ALLERGIES:   Review of patient's allergies indicates:   Allergen Reactions    Sulfa (sulfonamide antibiotics)      Occurred when she was pregnant with varicose veins resulting in leg swelling and pain with standing       MEDS:   Current Outpatient Medications on File Prior to Visit   Medication Sig Dispense Refill Last Dose    albuterol (PROVENTIL/VENTOLIN HFA) 90 mcg/actuation inhaler INHALE 2 PUFFS INTO THE LUNGS EVERY 4 (FOUR) HOURS AS NEEDED FOR WHEEZING. 54 g 3 Taking    albuterol-ipratropium  (DUO-NEB) 2.5 mg-0.5 mg/3 mL nebulizer solution Take 3 mLs by nebulization every 6 (six) hours as needed for Wheezing or Shortness of Breath. Rescue 15 mL 5 Taking    amLODIPine (NORVASC) 5 MG tablet TAKE 1 TABLET EVERY DAY 90 tablet 3 Taking    aspirin (ECOTRIN) 81 MG EC tablet Take 81 mg by mouth once daily.   Taking    atorvastatin (LIPITOR) 40 MG tablet TAKE 1 TABLET EVERY DAY 90 tablet 3 Taking    azelastine (ASTELIN) 137 mcg (0.1 %) nasal spray 1 spray (137 mcg total) by Nasal route 2 (two) times daily. 30 mL 11 Taking    betamethasone valerate 0.1% (VALISONE) 0.1 % Oint Apply topically once daily. 15 g 1 Taking    blood sugar diagnostic (TRUE METRIX GLUCOSE TEST STRIP) Strp Use to test blood glucose two (2) times daily; to be used with insurance-preferred brand of glucometer/supplies 200 strip 3 Taking    blood-glucose meter kit Please provide with insurance covered meter 1 each 0 Taking    cholecalciferol, vitamin D3, (VITAMIN D3) 50 mcg (2,000 unit) Cap Take by mouth.   Taking    dulaglutide (TRULICITY) 1.5 mg/0.5 mL pen injector Inject 1.5 mg into the skin every 7 days. 12 pen 3 Taking    DULoxetine (CYMBALTA) 30 MG capsule TAKE 1 CAPSULE EVERY DAY 90 capsule 3 Taking    ferrous sulfate 325 (65 FE) MG EC tablet Take 1 tablet (325 mg total) by mouth every other day. 45 tablet 3 Taking    heparin, porcine, PF, (HEPARIN FLUSH 100 UNITS/ML) 100 unit/mL Syrg    Taking    hydrOXYzine HCL (ATARAX) 25 MG tablet Take 1 tablet (25 mg total) by mouth 3 (three) times daily as needed for Itching or Anxiety. 30 tablet 3 Taking    lancets Misc 1 Device by Misc.(Non-Drug; Combo Route) route 2 (two) times daily. 200 each 11 Taking    lansoprazole (PREVACID) 30 MG capsule Take 1 capsule by mouth once a day 30 min before breakfast 30 capsule 5 Taking    metFORMIN (GLUCOPHAGE-XR) 500 MG ER 24hr tablet TAKE 2 TABLETS EVERY DAY WITH BREAKFAST 180 tablet 1 Taking    multivitamin capsule Take 1 capsule by mouth once daily.    "Taking    pregabalin (LYRICA) 50 MG capsule Take 1 capsule (50 mg total) by mouth 2 (two) times daily. 60 capsule 0 Taking    raloxifene (EVISTA) 60 mg tablet TAKE 1 TABLET EVERY DAY 90 tablet 4 Taking    sodium chloride 0.9 % SprA 1 spray by Each Nostril route every evening. 1 each 0 Taking    triamcinolone acetonide 0.025% (KENALOG) 0.025 % cream APPLY DAILY TO AFFECTED AREA AS NEEDED FOR ITCHING. MAXIMUM 2 TIMES A WEEK 15 g 1 Taking       Vital signs:   Vitals:    07/27/23 1249   BP: 130/88   Pulse: 93   Temp: 98.5 °F (36.9 °C)   SpO2: (!) 94%   Weight: 77.6 kg (171 lb 1.2 oz)   Height: 4' 9" (1.448 m)     Body mass index is 37.02 kg/m².    PHYSICAL EXAM:     Physical Exam  Constitutional:       General: She is not in acute distress.  HENT:      Right Ear: Tympanic membrane and ear canal normal.      Left Ear: Tympanic membrane and ear canal normal.   Cardiovascular:      Rate and Rhythm: Normal rate and regular rhythm.      Heart sounds: Normal heart sounds. No murmur heard.    No friction rub. No gallop.   Pulmonary:      Effort: Pulmonary effort is normal. No respiratory distress.      Breath sounds: Normal breath sounds. No wheezing, rhonchi or rales.   Abdominal:      General: There is no distension.      Palpations: Abdomen is soft.      Tenderness: There is no abdominal tenderness.   Musculoskeletal:      Cervical back: Neck supple.      Right lower leg: No edema.      Left lower leg: No edema.   Neurological:      Mental Status: She is alert.   Psychiatric:         Speech: Speech normal.         PERTINENT RESULTS:   Lab Visit on 07/27/2023   Component Date Value Ref Range Status    WBC 07/27/2023 10.11  3.90 - 12.70 K/uL Final    RBC 07/27/2023 4.86  4.00 - 5.40 M/uL Final    Hemoglobin 07/27/2023 13.0  12.0 - 16.0 g/dL Final    Hematocrit 07/27/2023 41.8  37.0 - 48.5 % Final    MCV 07/27/2023 86  82 - 98 fL Final    MCH 07/27/2023 26.7 (L)  27.0 - 31.0 pg Final    MCHC 07/27/2023 31.1 (L)  32.0 - 36.0 " g/dL Final    RDW 07/27/2023 13.5  11.5 - 14.5 % Final    Platelets 07/27/2023 287  150 - 450 K/uL Final    MPV 07/27/2023 8.7 (L)  9.2 - 12.9 fL Final    Immature Granulocytes 07/27/2023 0.3  0.0 - 0.5 % Final    Gran # (ANC) 07/27/2023 6.4  1.8 - 7.7 K/uL Final    Immature Grans (Abs) 07/27/2023 0.03  0.00 - 0.04 K/uL Final    Comment: Mild elevation in immature granulocytes is non specific and   can be seen in a variety of conditions including stress response,   acute inflammation, trauma and pregnancy. Correlation with other   laboratory and clinical findings is essential.      Lymph # 07/27/2023 2.7  1.0 - 4.8 K/uL Final    Mono # 07/27/2023 0.7  0.3 - 1.0 K/uL Final    Eos # 07/27/2023 0.2  0.0 - 0.5 K/uL Final    Baso # 07/27/2023 0.03  0.00 - 0.20 K/uL Final    nRBC 07/27/2023 0  0 /100 WBC Final    Gran % 07/27/2023 63.1  38.0 - 73.0 % Final    Lymph % 07/27/2023 27.0  18.0 - 48.0 % Final    Mono % 07/27/2023 7.0  4.0 - 15.0 % Final    Eosinophil % 07/27/2023 2.3  0.0 - 8.0 % Final    Basophil % 07/27/2023 0.3  0.0 - 1.9 % Final    Differential Method 07/27/2023 Automated   Final   Lab Visit on 07/27/2023   Component Date Value Ref Range Status    Specimen UA 07/27/2023 Urine, Clean Catch   Final    Color, UA 07/27/2023 Yellow  Yellow, Straw, Loren Final    Appearance, UA 07/27/2023 Hazy (A)  Clear Final    pH, UA 07/27/2023 6.0  5.0 - 8.0 Final    Specific Gravity, UA 07/27/2023 1.020  1.005 - 1.030 Final    Protein, UA 07/27/2023 Negative  Negative Final    Comment: Recommend a 24 hour urine protein or a urine   protein/creatinine ratio if globulin induced proteinuria is  clinically suspected.      Glucose, UA 07/27/2023 Negative  Negative Final    Ketones, UA 07/27/2023 Trace (A)  Negative Final    Bilirubin (UA) 07/27/2023 Negative  Negative Final    Occult Blood UA 07/27/2023 Negative  Negative Final    Nitrite, UA 07/27/2023 Negative  Negative Final    Leukocytes, UA 07/27/2023 Negative  Negative  Final   Office Visit on 07/27/2023   Component Date Value Ref Range Status    SARS Coronavirus 2 Antigen 07/27/2023 Negative  Negative Final     Acceptable 07/27/2023 Yes   Final    POC Molecular Influenza A Ag 07/27/2023 Negative  Negative, Not Reported Final    POC Molecular Influenza B Ag 07/27/2023 Negative  Negative, Not Reported Final     Acceptable 07/27/2023 Yes   Final       X-Ray Chest PA And Lateral  Narrative: EXAMINATION:  XR CHEST PA AND LATERAL    CLINICAL HISTORY:  Shortness of breath    TECHNIQUE:  PA and lateral views of the chest were performed.    COMPARISON:  10/14/2022.    FINDINGS:  There is a right chest port present with tip of catheter near the junction of the superior vena cava and right atrium.  The heart and mediastinal structures are within normal limits in size and unchanged.  Atherosclerotic calcification is present within the thoracic aorta.  Pulmonary vasculature is unremarkable.  The lungs are free of focal consolidations.  There is no evidence for pneumothorax or pleural effusions.  Bony structures appear grossly intact.  Impression: No acute chest disease identified.  No detrimental interval change noted when compared to 09/14/2022.    Right chest port.    Electronically signed by: Antione Michaud MD  Date:    07/27/2023  Time:    14:09      ASSESSMENT/PLAN:    1. Fatigue, unspecified type  Overview:  - influenza and COVID-19 testing negative   -WBC normal and chest x-ray clear   - possible viral illness though etiology unclear  -discussed with patient the symptoms worsen that she should proceed to the ER    Orders:  -     Urinalysis, Reflex to Urine Culture Urine, Clean Catch; Future; Expected date: 07/27/2023  -     CBC Auto Differential; Future; Expected date: 07/27/2023    2. Shortness of breath  Overview:  - oxygen saturation 94% which is below baseline though appears to range 95-97%  - the last time she was this low she was hospitalized for acute  respiratory failure 2/2 COPD she does have a history of emphysema   -however her lung exam today is clear and do not hear any wheezing, rhonchi decreased air flow  - chest x-ray is clear   -unclear etiology   -discuss if symptoms continue to persist or progress to proceed to the ER    Orders:  -     SARS Coronavirus 2 Antigen, POCT Manual Read  -     POCT Influenza A/B Molecular  -     Cancel: X-Ray Chest PA And Lateral; Future; Expected date: 07/27/2023  -     X-Ray Chest PA And Lateral; Future; Expected date: 07/27/2023  -     CBC Auto Differential; Future; Expected date: 07/27/2023    3. Nausea  -     ondansetron (ZOFRAN) 4 MG tablet; Take 1 tablet (4 mg total) by mouth every 8 (eight) hours as needed for Nausea.  Dispense: 30 tablet; Refill: 0          Vaccines recommended: covid-19 booster when recovered    Follow-up in 1 week or sooner if any concerns.    This note is dictated using the M*Modal Fluency Direct word recognition program. There are word recognition mistakes that are occasionally missed on review.    Dr. Alisa Nixon D.O.   [facility] Medicine

## 2023-07-27 NOTE — TELEPHONE ENCOUNTER
----- Message from Anni Gillis sent at 7/27/2023  3:02 PM CDT -----  Regarding: missed call       Who Called: Marizol         Who Left Message for Patient: Hussain Westbrook         Does the patient know what this is regarding? No         Best Call Back Number:[phone]         Additional Information:

## 2023-07-31 ENCOUNTER — TELEPHONE (OUTPATIENT)
Dept: INTERNAL MEDICINE | Facility: CLINIC | Age: 78
End: 2023-07-31
Payer: MEDICARE

## 2023-07-31 ENCOUNTER — TELEPHONE (OUTPATIENT)
Dept: PRIMARY CARE CLINIC | Facility: CLINIC | Age: 78
End: 2023-07-31
Payer: MEDICARE

## 2023-07-31 DIAGNOSIS — J45.20 MILD INTERMITTENT ASTHMA WITHOUT COMPLICATION: ICD-10-CM

## 2023-07-31 RX ORDER — FLUTICASONE PROPIONATE AND SALMETEROL 250; 50 UG/1; UG/1
1 POWDER RESPIRATORY (INHALATION) 2 TIMES DAILY
Qty: 180 EACH | Refills: 3 | Status: SHIPPED | OUTPATIENT
Start: 2023-07-31 | End: 2023-09-15 | Stop reason: ALTCHOICE

## 2023-07-31 NOTE — TELEPHONE ENCOUNTER
----- Message from Lauren Winkler sent at 7/31/2023 10:25 AM CDT -----  Regarding: Patient call back  Who called:ESTEBAN JIMENEZ [[id number]]    What is the request in detail: Patient is requesting a call back. She would like to speak with the provider before the end of the day if possible.  She would not provide further details.    Please advise.    Can the clinic reply by [facility]? No    Best call back number: [phone]    Additional Information: N/A

## 2023-07-31 NOTE — TELEPHONE ENCOUNTER
Called and spoke with Marizol Kevin she reports that she is feeling better.  However she has been having wheezing and shortness a breath that preceded this upper respiratory infection.  She has been on Advair in the past and she reports that it has been helpful.  She is not needed in awhile but she reports that since the temperature has been hot it feels that her asthma has been flaring more often.  I agree that she should restart Advair.  Prescription sent to pharmacy.  Questions elicited and answered.  Encouraged her to notify me of any concerns.    We discussed her low-dose CT scan for lung cancer screening.  She completed a low-dose CT scan on 3/20/23 that was negative and Pulmonary recommended a repeat CT scan in 1 year    Dr. Alisa Nixon D.O.   Family Medicine

## 2023-07-31 NOTE — TELEPHONE ENCOUNTER
Please call patient and schedule 4 month diabetes follow-up with A1C prior to visit    Dr. Alisa Nixon D.O.   Family Medicine

## 2023-07-31 NOTE — TELEPHONE ENCOUNTER
Please schedule the patient's lab appt.  I'm not sure what date and time you confirmed with her. Thanks

## 2023-07-31 NOTE — TELEPHONE ENCOUNTER
Pt stated that smoking cessation called and said that she will need aLow dose ct scan but will need 's approval.She would like to talk to you about her breathing. She stated that  is suppose to give her a call, I informed her that  might call her at the end of the day.

## 2023-07-31 NOTE — TELEPHONE ENCOUNTER
----- Message from Dorita Jeter MA sent at 7/31/2023 10:29 AM CDT -----  Name of Who is Calling:ESTEBAN JIMENEZ [[id number]]                What is the request in detail: Pt is requesting a call back to discuss her breathing problems. Pt states sometimes chest is tight and albuterol does not work. Please assist.                 [facility]                What Number to Call Back [facility] not [facility]: [phone]

## 2023-07-31 NOTE — TELEPHONE ENCOUNTER
E with pt and informed her no appts no appts avilable at this time with any Mds. I rec'd that she visit the nearest U/C so that she can ask for an EKG or they mad give her a breathing tx. Pt was given location below as she ask that I help find a location for her:    [facility]  3.6  (5) · Urgent care center  [address] mi · [address] · [phone]  Open ? Closes 8?PM  Medicare/Medicaid accepted  Has online care      Pt also asked for an U/C on [place] other than the [facility] but I was not successful. Pt thanked me.

## 2023-08-09 ENCOUNTER — HOSPITAL ENCOUNTER (OUTPATIENT)
Dept: RADIOLOGY | Facility: OTHER | Age: 78
Discharge: HOME | End: 2023-08-09
Attending: INTERNAL MEDICINE
Payer: MEDICARE

## 2023-08-09 DIAGNOSIS — K21.9 ACID REFLUX: ICD-10-CM

## 2023-08-09 DIAGNOSIS — R13.10 DYSPHAGIA: ICD-10-CM

## 2023-08-09 PROCEDURE — 74220 X-RAY XM ESOPHAGUS 1CNTRST: CPT | Mod: TC

## 2023-08-09 PROCEDURE — A9698 NON-RAD CONTRAST MATERIALNOC: HCPCS | Performed by: INTERNAL MEDICINE

## 2023-08-09 PROCEDURE — 74220 X-RAY XM ESOPHAGUS 1CNTRST: CPT | Mod: 26,,, | Performed by: RADIOLOGY

## 2023-08-09 PROCEDURE — 74220 FL ESOPHAGRAM COMPLETE: ICD-10-PCS | Mod: 26,,, | Performed by: RADIOLOGY

## 2023-08-09 PROCEDURE — 25500020 PHARM REV CODE 255: Performed by: INTERNAL MEDICINE

## 2023-08-09 RX ADMIN — BARIUM SULFATE 150 ML: 0.6 SUSPENSION ORAL at 02:08

## 2023-08-22 ENCOUNTER — OFFICE VISIT (OUTPATIENT)
Dept: PAIN MEDICINE | Facility: CLINIC | Age: 78
End: 2023-08-22
Payer: MEDICARE

## 2023-08-22 VITALS
BODY MASS INDEX: 37.09 KG/M2 | TEMPERATURE: 98 F | WEIGHT: 171.94 LBS | DIASTOLIC BLOOD PRESSURE: 63 MMHG | OXYGEN SATURATION: 100 % | HEART RATE: 90 BPM | RESPIRATION RATE: 18 BRPM | HEIGHT: 57 IN | SYSTOLIC BLOOD PRESSURE: 102 MMHG

## 2023-08-22 DIAGNOSIS — M12.812 ROTATOR CUFF ARTHROPATHY OF LEFT SHOULDER: ICD-10-CM

## 2023-08-22 DIAGNOSIS — G62.9 PERIPHERAL POLYNEUROPATHY: ICD-10-CM

## 2023-08-22 DIAGNOSIS — E11.40 PAINFUL DIABETIC NEUROPATHY: Primary | ICD-10-CM

## 2023-08-22 DIAGNOSIS — G89.4 CHRONIC PAIN DISORDER: ICD-10-CM

## 2023-08-22 DIAGNOSIS — E11.42 DIABETIC POLYNEUROPATHY ASSOCIATED WITH TYPE 2 DIABETES MELLITUS: ICD-10-CM

## 2023-08-22 PROCEDURE — 1125F PR PAIN SEVERITY QUANTIFIED, PAIN PRESENT: ICD-10-PCS | Mod: HCNC,CPTII,S$GLB, | Performed by: ANESTHESIOLOGY

## 2023-08-22 PROCEDURE — 99214 OFFICE O/P EST MOD 30 MIN: CPT | Mod: HCNC,GC,S$GLB, | Performed by: ANESTHESIOLOGY

## 2023-08-22 PROCEDURE — 99999 PR PBB SHADOW E&M-EST. PATIENT-LVL V: ICD-10-PCS | Mod: PBBFAC,HCNC,, | Performed by: ANESTHESIOLOGY

## 2023-08-22 PROCEDURE — 1159F PR MEDICATION LIST DOCUMENTED IN MEDICAL RECORD: ICD-10-PCS | Mod: HCNC,CPTII,S$GLB, | Performed by: ANESTHESIOLOGY

## 2023-08-22 PROCEDURE — 3078F PR MOST RECENT DIASTOLIC BLOOD PRESSURE < 80 MM HG: ICD-10-PCS | Mod: HCNC,CPTII,S$GLB, | Performed by: ANESTHESIOLOGY

## 2023-08-22 PROCEDURE — 1160F PR REVIEW ALL MEDS BY PRESCRIBER/CLIN PHARMACIST DOCUMENTED: ICD-10-PCS | Mod: HCNC,CPTII,S$GLB, | Performed by: ANESTHESIOLOGY

## 2023-08-22 PROCEDURE — 1125F AMNT PAIN NOTED PAIN PRSNT: CPT | Mod: HCNC,CPTII,S$GLB, | Performed by: ANESTHESIOLOGY

## 2023-08-22 PROCEDURE — 99999 PR PBB SHADOW E&M-EST. PATIENT-LVL V: CPT | Mod: PBBFAC,HCNC,, | Performed by: ANESTHESIOLOGY

## 2023-08-22 PROCEDURE — 1159F MED LIST DOCD IN RCRD: CPT | Mod: HCNC,CPTII,S$GLB, | Performed by: ANESTHESIOLOGY

## 2023-08-22 PROCEDURE — 1101F PT FALLS ASSESS-DOCD LE1/YR: CPT | Mod: HCNC,CPTII,S$GLB, | Performed by: ANESTHESIOLOGY

## 2023-08-22 PROCEDURE — 3288F PR FALLS RISK ASSESSMENT DOCUMENTED: ICD-10-PCS | Mod: HCNC,CPTII,S$GLB, | Performed by: ANESTHESIOLOGY

## 2023-08-22 PROCEDURE — 1101F PR PT FALLS ASSESS DOC 0-1 FALLS W/OUT INJ PAST YR: ICD-10-PCS | Mod: HCNC,CPTII,S$GLB, | Performed by: ANESTHESIOLOGY

## 2023-08-22 PROCEDURE — 3074F SYST BP LT 130 MM HG: CPT | Mod: HCNC,CPTII,S$GLB, | Performed by: ANESTHESIOLOGY

## 2023-08-22 PROCEDURE — 3044F PR MOST RECENT HEMOGLOBIN A1C LEVEL <7.0%: ICD-10-PCS | Mod: HCNC,CPTII,S$GLB, | Performed by: ANESTHESIOLOGY

## 2023-08-22 PROCEDURE — 3044F HG A1C LEVEL LT 7.0%: CPT | Mod: HCNC,CPTII,S$GLB, | Performed by: ANESTHESIOLOGY

## 2023-08-22 PROCEDURE — 1160F RVW MEDS BY RX/DR IN RCRD: CPT | Mod: HCNC,CPTII,S$GLB, | Performed by: ANESTHESIOLOGY

## 2023-08-22 PROCEDURE — 3288F FALL RISK ASSESSMENT DOCD: CPT | Mod: HCNC,CPTII,S$GLB, | Performed by: ANESTHESIOLOGY

## 2023-08-22 PROCEDURE — 3072F LOW RISK FOR RETINOPATHY: CPT | Mod: HCNC,CPTII,S$GLB, | Performed by: ANESTHESIOLOGY

## 2023-08-22 PROCEDURE — 3078F DIAST BP <80 MM HG: CPT | Mod: HCNC,CPTII,S$GLB, | Performed by: ANESTHESIOLOGY

## 2023-08-22 PROCEDURE — 3072F PR LOW RISK FOR RETINOPATHY: ICD-10-PCS | Mod: HCNC,CPTII,S$GLB, | Performed by: ANESTHESIOLOGY

## 2023-08-22 PROCEDURE — 3074F PR MOST RECENT SYSTOLIC BLOOD PRESSURE < 130 MM HG: ICD-10-PCS | Mod: HCNC,CPTII,S$GLB, | Performed by: ANESTHESIOLOGY

## 2023-08-22 PROCEDURE — 99214 PR OFFICE/OUTPT VISIT, EST, LEVL IV, 30-39 MIN: ICD-10-PCS | Mod: HCNC,GC,S$GLB, | Performed by: ANESTHESIOLOGY

## 2023-08-22 NOTE — PROGRESS NOTES
Chronic Pain - Established        Chief Complaint:   Chief Complaint   Patient presents with    Peripheral Neuropathy    Leg Pain    Hip Pain     Interval History 8/22/23:    Ms. Kevin returns for follow-up of her neck pain. Her primary pain today is her neuropathy pain. She is tolerating lyrica 50 mg BID but has noted alopecia since starting. However, it has helped her pain. Given her history of colon cancer and chemo, it is unclear if this is hairloss is from the lyrica.  She also comments on her shoulder pain and how the prior nerve block helped but did not last long.  It provided >80% relief of her shoulder pain.  She is concerned about repeating the injection because she states the last one cost her $300.   Acupuncture was very helpful for her, but was not covered by her insurance and cost $150 per session.   Regarding her neuropathy, she endorses painful burning in her feet that prevents sleeping. The lyrica and duloxetine have both helped, but she stopped taking duloxetine when she started lyrica. She did not have any known adverse effects from the duloxetine. At her last visit 6/6/23, Qutenza was ordered. Will investigate if steps to approval for that.   We also discussed spinal cord stimulator trial. She has not had spine MRI or psychological clearance.          Initial HPI:  Marizol Kevin has a PMHx of HTN, HLD, DMII, osteoporosis, colon cancer s/p chemothearpy. She presents to the clinic for the evaluation of neuropathic pain along with neck pain that radiates into the left arm. The pain started 5-6 months ago, no specific inciting event. Symptoms have been worsening.The pain is located in the left side of the neck area and radiates to the left periscapular area with further distal radiation to the whole arm.  The pain is described as aching, stabbing, and sqaueezing  and is rated as 9/10. The pain is rated with a score of  5/10 on the BEST day and a score of 9/10 on the WORST day.  Symptoms  interfere with daily activity, sleeping, and enjoyment of life. The pain is exacerbated by Laying and Lifting.  The pain is mitigated by nothing.  Pt also endorses severe neuropathic pain in BL hands and feet. She has used gabapentinin the past without any relief. Currently on Cymbalta. Recently started Acupuncture for neuropathy, which she reports has provided significant relief.     Patient denies night fever/night sweats, urinary incontinence, bowel incontinence, significant weight loss, and loss of sensations.    Physical Therapy/Home Exercise: yes and continues to do home exercises for her shoulder and her balance    Pain Disability Index Review:  Last 3 PDI Scores 8/22/2023 6/6/2023   Pain Disability Index (PDI) 28 35       Pain Medications:  - pregabalin 50 mg BID  - was on duloxetine 30 mg BID  - Previously on gabapentin (dose unknown) but was stopped due to lack of efficacy     report:  Not applicable    Pain Procedures:   SAB injection 3/30/23 --> minimal relief  Left shoulder nerve blocks 7/21/23 --> 80% relief    Imaging:   MRI C Spine 4/15/22  FINDINGS:  large amount of image degradation from artifact.  Straightening of the usual cervical lordosis. Degenerative changes. Disc space narrowing C4-C5, C5-C6. Vertebral body heights and alignment appear maintained without acute compression or subluxation evident.  Although no obvious fracture cannot exclude subtle findings including marrow edema with the large amount of image degradation. Further follow-up or evaluation is to be obtained is suggested by CT. There do appear to be small posterior disc bulges multiple levels, C3 through C 6 without appreciable significant spinal canal narrowing     Impression:     Grossly negative study for acute findings but note is made that limited evaluation with the amount artifact and if concern for acute cervical spine trauma correlation with CT is recommended and note is made the patient did have CT 04/15/2022 please  refer to that report.  Subtle findings as suggested on that CT scan likely would not be apparent on the MRI with the degree of artifact.     If further evaluation or follow-up is to be obtained it is recommended to be by CT     This report was flagged in Epic as abnormal.     Final read    Past Medical History:   Diagnosis Date    Allergy     Anxiety     Cancer     colon    Cataract     Colon cancer     Dependence on nicotine from cigarettes 9/3/2020    Diabetes mellitus, type 2     Dry eye syndrome     Hyperlipidemia     Hypertension     Insomnia     Light cigarette smoker (1-9 cigs/day) 2022    Malignant neoplasm of ascending colon 2020    Squamous blepharitis     Syncope 2022    Tobacco use disorder, moderate, in early remission 2022    Vitamin D deficiency 9/10/2020     Past Surgical History:   Procedure Laterality Date    CATARACT EXTRACTION, BILATERAL  2020    CHOLECYSTECTOMY      COLON SURGERY      Colon cancer    COLONOSCOPY N/A 2021    Procedure: COLONOSCOPY;  Surgeon: Ashwini Duarte MD;  [facility]  Service: Endoscopy;  Laterality: N/A;  medical transportation  covid test  [place], instructions mailed-[facility]    EYE SURGERY      Cataract    HYSTERECTOMY      INJECTION OF ANESTHETIC AGENT AROUND NERVE Left 2023    Procedure: BLOCK, NERVE, LEFT SHOULDER ARTICULAR BRANCH keep date rep aware;  Surgeon: Abdi Del Toro MD;  Location: [facility] PAIN MGT;  Service: Pain Management;  Laterality: Left;    JOINT REPLACEMENT      Knee    KNEE SURGERY      LAPAROSCOPIC LEFT COLON RESECTION       Social History     Socioeconomic History    Marital status:    Tobacco Use    Smoking status: Former     Current packs/day: 0.00     Average packs/day: 1 pack/day for 51.0 years (51.0 ttl pk-yrs)     Types: Cigarettes     Start date: 1971     Quit date: 2022     Years since quittin.9    Smokeless tobacco: Never    Tobacco comments:     smoking  cessation information given    Substance and Sexual Activity    Alcohol use: Yes     Alcohol/week: 1.0 standard drink of alcohol     Types: 1 Shots of liquor per week     Comment: occ    Drug use: Never    Sexual activity: Not Currently     Birth control/protection: Abstinence     Social Determinants of Health     Financial Resource Strain: Low Risk  (6/7/2023)    Overall Financial Resource Strain (CARDIA)     Difficulty of Paying Living Expenses: Not hard at all   Food Insecurity: No Food Insecurity (6/7/2023)    Hunger Vital Sign     Worried About Running Out of Food in the Last Year: Never true     Ran Out of Food in the Last Year: Never true   Transportation Needs: No Transportation Needs (6/7/2023)    PRAPARE - Transportation     Lack of Transportation (Medical): No     Lack of Transportation (Non-Medical): No   Physical Activity: Insufficiently Active (6/7/2023)    Exercise Vital Sign     Days of Exercise per Week: 2 days     Minutes of Exercise per Session: 20 min   Stress: No Stress Concern Present (6/7/2023)    [place] [employer] - Occupational Stress Questionnaire     Feeling of Stress : Not at all   Social Connections: Socially Isolated (6/7/2023)    Social Connection and Isolation Panel [NHANES]     Frequency of Communication with Friends and Family: More than three times a week     Frequency of Social Gatherings with Friends and Family: Twice a week     Attends Taoism Services: Never     Active Member of Clubs or Organizations: No     Attends Club or Organization Meetings: Never     Marital Status:    Housing Stability: Low Risk  (6/7/2023)    Housing Stability Vital Sign     Unable to Pay for Housing in the Last Year: No     Number of Places Lived in the Last Year: 1     Unstable Housing in the Last Year: No     Family History   Problem Relation Age of Onset    Heart attack Mother     Heart disease Mother     Colon cancer Father     Diabetes Father     Cancer Sister          unknown    Hypothyroidism Sister     Asthma Sister     Thyroid cancer Neg Hx        Review of patient's allergies indicates:   Allergen Reactions    Sulfa (sulfonamide antibiotics)      Occurred when she was pregnant with varicose veins resulting in leg swelling and pain with standing       Current Outpatient Medications   Medication Sig    amLODIPine (NORVASC) 5 MG tablet TAKE 1 TABLET EVERY DAY    aspirin (ECOTRIN) 81 MG EC tablet Take 81 mg by mouth once daily.    atorvastatin (LIPITOR) 40 MG tablet TAKE 1 TABLET EVERY DAY    azelastine (ASTELIN) 137 mcg (0.1 %) nasal spray 1 spray (137 mcg total) by Nasal route 2 (two) times daily.    betamethasone valerate 0.1% (VALISONE) 0.1 % Oint Apply topically once daily.    blood sugar diagnostic (TRUE METRIX GLUCOSE TEST STRIP) Strp Use to test blood glucose two (2) times daily; to be used with insurance-preferred brand of glucometer/supplies    blood-glucose meter kit Please provide with insurance covered meter    cholecalciferol, vitamin D3, (VITAMIN D3) 50 mcg (2,000 unit) Cap Take by mouth.    dulaglutide (TRULICITY) 1.5 mg/0.5 mL pen injector Inject 1.5 mg into the skin every 7 days.    DULoxetine (CYMBALTA) 30 MG capsule TAKE 1 CAPSULE EVERY DAY    ferrous sulfate 325 (65 FE) MG EC tablet Take 1 tablet (325 mg total) by mouth every other day.    fluticasone-salmeterol diskus inhaler 250-50 mcg Inhale 1 puff into the lungs 2 (two) times daily. Controller    heparin, porcine, PF, (HEPARIN FLUSH 100 UNITS/ML) 100 unit/mL Syrg     hydrOXYzine HCL (ATARAX) 25 MG tablet Take 1 tablet (25 mg total) by mouth 3 (three) times daily as needed for Itching or Anxiety.    lancets Misc 1 Device by Misc.(Non-Drug; Combo Route) route 2 (two) times daily.    lansoprazole (PREVACID) 30 MG capsule Take 1 capsule by mouth once a day 30 min before breakfast    metFORMIN (GLUCOPHAGE-XR) 500 MG ER 24hr tablet TAKE 2 TABLETS EVERY DAY WITH BREAKFAST    multivitamin capsule Take 1  "capsule by mouth once daily.    ondansetron (ZOFRAN) 4 MG tablet Take 1 tablet (4 mg total) by mouth every 8 (eight) hours as needed for Nausea.    pregabalin (LYRICA) 50 MG capsule Take 1 capsule (50 mg total) by mouth 2 (two) times daily.    raloxifene (EVISTA) 60 mg tablet TAKE 1 TABLET EVERY DAY    sodium chloride 0.9 % SprA 1 spray by Each Nostril route every evening.    triamcinolone acetonide 0.025% (KENALOG) 0.025 % cream APPLY DAILY TO AFFECTED AREA AS NEEDED FOR ITCHING. MAXIMUM 2 TIMES A WEEK    albuterol (PROVENTIL/VENTOLIN HFA) 90 mcg/actuation inhaler INHALE 2 PUFFS INTO THE LUNGS EVERY 4 (FOUR) HOURS AS NEEDED FOR WHEEZING. (Patient not taking: Reported on 8/22/2023)    albuterol-ipratropium (DUO-NEB) 2.5 mg-0.5 mg/3 mL nebulizer solution Take 3 mLs by nebulization every 6 (six) hours as needed for Wheezing or Shortness of Breath. Rescue (Patient not taking: Reported on 8/22/2023)     No current facility-administered medications for this visit.       REVIEW OF SYSTEMS:    GENERAL:  No weight loss, malaise or fevers.  HEENT:  Negative for frequent or significant headaches.  NECK:  Negative for lumps, goiter, pain and significant neck swelling.  RESPIRATORY:  Negative for cough, wheezing or shortness of breath.  CARDIOVASCULAR:  Negative for chest pain, leg swelling or palpitations.  GI:  Negative for abdominal discomfort, blood in stools or black stools or change in bowel habits.  MUSCULOSKELETAL:  See HPI.  SKIN:  Negative for lesions, rash, and itching.  PSYCH:  +sleep disturbance, depression .  HEMATOLOGY/LYMPHOLOGY:  Negative for prolonged bleeding, bruising easily or swollen nodes.  NEURO:   No history of headaches, syncope, paralysis, seizures or tremors.  All other reviewed and negative other than HPI.    OBJECTIVE:    /63   Pulse 90   Temp 97.8 °F (36.6 °C) (Oral)   Resp 18   Ht 4' 9" (1.448 m)   Wt 78 kg (171 lb 15.3 oz)   SpO2 100%   BMI 37.21 kg/m²     PHYSICAL " EXAMINATION:    General appearance: Well appearing, in no acute distress, alert and oriented x3.  Psych:  Context appropriate thought content, affect and range of emotion. Easily tearful due to chronic pain. Denies SI/HI. Laughs with jokes.  Skin: Skin color, texture, turgor normal, no rashes or lesions, in both upper and lower body.  Head/face:  Normocephalic, atraumatic. No palpable lymph nodes.  Neck: No gross deformity nor asymetry. Diffusely TTP about the C spine, most notably over facet joints L>R, trapezius BL, rhomboids BL. Limited ROM s/t pain, pain worse with lateral bending. Facet loading positive on left. Spurling negative.  Cor: RRR  Pulm: CTA  GI:  Soft and non-tender.  Extremities: Peripheral joint ROM is full and pain free without obvious instability or laxity in all four extremities. No deformities, edema, or skin discoloration. Good capillary refill.  Musculoskeletal:   Left shoulder: No gross deformities, asymetry. TTP over subacromial region on Left shoulder, palpation reproduced some radiating pain distally. ROM limited to approx 90 degrees Abduction and 100 forawrd flexion d/t pain. + Neer, Carrero, Empty can. Less painful than before    Neuro: Bilateral upper and lower extremity coordination and muscle stretch reflexes are reduced and symmetric.  Plantar response are downgoing. No loss of sensation is noted.  Gait: normal.    ASSESSMENT: 77 y.o. year old female with widespread pain, consistent with the followin. Painful diabetic neuropathy  MRI Lumbar Spine Without Contrast    MRI Thoracic Spine Without Contrast    Ambulatory referral/consult to Psychology      2. Rotator cuff arthropathy of left shoulder        3. Peripheral polyneuropathy        4. Diabetic polyneuropathy associated with type 2 diabetes mellitus        5. Chronic pain disorder                PLAN:     Neck Pain:   - Continue Home exercise program  - MRI Cspine frmo 23 did NOT show significant cervical stenosis.  She has moderate left neuroforaminal stenosis at C3/4, C4/5 and C5/6, and moderate right at C5/6.     Left Shoulder Pain: Pt's primary complaint of left shoulder and arm pain is most likely related to left shoulder rotator cuff arthropathy, impingement. Pt previously poorly responseive to SAB injections and PT.   Left shoulder articular branch blocks with > 80% relief  Scheduled left shoulder articular branch RFA  Continue with PT.     Peripheral Neuropathy: Secondary to chemo and DMII. Failed gabapentin.  - Gave taper schedule for lyrica. If alopecia is not improved after 3-6 months, can consider re-starting.  - Rx duloxetine 30 mg BID for pain  - Investigate status of Qutenza.   - Refer to psychology for evaluation for spinal cord stimulator for improved pain control and peripheral blood flow.   - Obtain thoracic and lumbar spine MRI without contrast for planning for spinal cord stimulator trial    - RTC 1 month    - Counseled patient regarding the importance of activity modification, constant sleeping habits, and physical therapy.    The above plan and management options were discussed at length with patient. Patient is in agreement with the above and verbalized understanding. It will be communicated with the referring physician via electronic record, fax, or mail.    Tico Peterson  08/22/2023    I have reviewed and concur with the resident's history, physical, assessment, and plan.  I have personally interviewed and examined the patient at bedside.  See below addendum for my evaluation and additional findings.    Abdi Del Toro MD

## 2023-08-23 ENCOUNTER — PATIENT MESSAGE (OUTPATIENT)
Dept: PAIN MEDICINE | Facility: OTHER | Age: 78
End: 2023-08-23
Payer: MEDICARE

## 2023-08-24 ENCOUNTER — TELEPHONE (OUTPATIENT)
Dept: PAIN MEDICINE | Facility: CLINIC | Age: 78
End: 2023-08-24
Payer: MEDICARE

## 2023-08-24 NOTE — TELEPHONE ENCOUNTER
----- Message from Jack Leonard sent at 8/24/2023  8:59 AM CDT -----  Name of Who is Calling:ESTEBAN JIMENEZ [[id number]]           What is the request in detail:pt stated that she would like to speak with the office directly in regards to her questions/concerns, PT DID NOT GIVE INFO ON WHAT IT WAS IN REGARDS TO .Please contact to further discuss and advise.            Can the clinic reply by [facility]:[phone]           What Number to Call Back if not in [facility]:[phone]

## 2023-08-24 NOTE — TELEPHONE ENCOUNTER
Staff contacted pt who was wondering the reasoning behind her 2 MRI. Staff told pt that the MRI were on different parts of her spine. Staff changed her MRI appointments to make them back to back. Pt got another call during the phone call and the line disconnected

## 2023-08-25 ENCOUNTER — TELEPHONE (OUTPATIENT)
Dept: PAIN MEDICINE | Facility: CLINIC | Age: 78
End: 2023-08-25

## 2023-08-25 DIAGNOSIS — E11.40 PAINFUL DIABETIC NEUROPATHY: Primary | ICD-10-CM

## 2023-08-25 NOTE — TELEPHONE ENCOUNTER
----- Message from Abdi Del Toro MD sent at 8/23/2023 10:50 AM CDT -----  Can we please follow up on the status of Qutenza patch for this patient?     I also ordered psych evaluation for SCS.

## 2023-08-25 NOTE — TELEPHONE ENCOUNTER
"I contacted patient to schedule her qutenza patch procedure. Ms. Kevin had questions in regards to her MRIs and psych evaluation.     She expressed that she doesn't want to do the Mri's or the psych evaluation/testing as of yet. She would like to try the Q-Patches- she asked that the MRI 's be cancelled. She expressed she has too much going on and need to one thing at a time."     An appointment date and time was discussed, accepted and scheduled.       "

## 2023-08-25 NOTE — TELEPHONE ENCOUNTER
----- Message from Tico Peterson MD sent at 8/22/2023  3:45 PM CDT -----  Regarding: Quetenza follow-up  Ms Miller,     We had this patient for Dr. Del Toro who has terrible diabetic and chemotherapy peripheral neuropathy.  It looks from the note like they were planning to order Qutenza for her at their last appointment, which was 6/6/23.  She's asking what happened and where in the process that is.  I am struggling to find that information.  Is there somewhere I can look to check on the progress of obtaining that medication/approval for that medication?  Would it help for me to re-order it?  I deeply appreciate any insight.  Thank you!    Tico

## 2023-08-28 ENCOUNTER — TELEPHONE (OUTPATIENT)
Dept: PRIMARY CARE CLINIC | Facility: CLINIC | Age: 78
End: 2023-08-28
Payer: MEDICARE

## 2023-08-28 NOTE — TELEPHONE ENCOUNTER
Pt would like to know if she can bring her paperwork for the RTA to the office and wait for them to be signed.

## 2023-08-28 NOTE — TELEPHONE ENCOUNTER
----- Message from Troy Oh sent at 8/28/2023  1:36 PM CDT -----  Regarding: RTA Forms  Name of Who is Calling:  Patient          What is the request in detail:  Patient stated she has forms for RTA Transportation to be filled and would like to know if she can bring them to the office and wait until they be filled            Can the clinic reply by [facility]: No            What Number to Call Back if not in [facility]:[phone]

## 2023-08-28 NOTE — TELEPHONE ENCOUNTER
Yes okay. Only when I am in the office which is Monday-Thursday 8 AM-4 pm    Dr. Alisa Nixon D.O.   Family Medicine

## 2023-08-30 ENCOUNTER — TELEPHONE (OUTPATIENT)
Dept: PAIN MEDICINE | Facility: CLINIC | Age: 78
End: 2023-08-30
Payer: MEDICARE

## 2023-08-30 DIAGNOSIS — M47.812 CERVICAL SPONDYLOSIS: ICD-10-CM

## 2023-08-30 DIAGNOSIS — M50.30 DDD (DEGENERATIVE DISC DISEASE), CERVICAL: ICD-10-CM

## 2023-08-30 DIAGNOSIS — R11.0 NAUSEA: ICD-10-CM

## 2023-08-30 DIAGNOSIS — E11.40 PAINFUL DIABETIC NEUROPATHY: Primary | ICD-10-CM

## 2023-08-30 RX ORDER — ONDANSETRON 4 MG/1
4 TABLET, FILM COATED ORAL EVERY 8 HOURS PRN
Qty: 30 TABLET | Refills: 0 | Status: SHIPPED | OUTPATIENT
Start: 2023-08-30

## 2023-08-30 RX ORDER — PREGABALIN 50 MG/1
50 CAPSULE ORAL 2 TIMES DAILY
Qty: 60 CAPSULE | Refills: 0 | Status: SHIPPED | OUTPATIENT
Start: 2023-08-30 | End: 2023-09-06

## 2023-08-30 RX ORDER — KETOCONAZOLE 20 MG/G
CREAM TOPICAL DAILY
Qty: 60 G | Refills: 0 | Status: SHIPPED | OUTPATIENT
Start: 2023-08-30 | End: 2023-08-30 | Stop reason: SDUPTHER

## 2023-08-30 RX ORDER — KETOCONAZOLE 20 MG/G
CREAM TOPICAL DAILY
Qty: 60 G | Refills: 0 | Status: SHIPPED | OUTPATIENT
Start: 2023-08-30 | End: 2023-09-15 | Stop reason: SDUPTHER

## 2023-08-30 NOTE — TELEPHONE ENCOUNTER
No care due was identified.  [facility] Embedded Care Due Messages. Reference number: [id number].   8/30/2023 9:27:15 AM CDT

## 2023-08-30 NOTE — TELEPHONE ENCOUNTER
----- Message from Charles Pabon sent at 8/30/2023  8:23 AM CDT -----  Name of Who is Calling: ESTEBAN JIMENEZ [[id number]]            What is the request in detail: Patient is requesting call back to get rx for       Ketoconazome cream 2%        Can the clinic reply by [facility]: no              What Number to Call Back if not in [facility]: [phone]

## 2023-08-30 NOTE — TELEPHONE ENCOUNTER
Staff contacted pt who needs her lyrica refill to go to the [facility]     Last seen 8/22/23

## 2023-08-30 NOTE — TELEPHONE ENCOUNTER
----- Message from Anni Gillis sent at 8/30/2023  8:31 AM CDT -----  Regarding: medication concerns  Name of Who is Calling: Marizol           What is the request in detail: Patient is requesting a call back ASAP. She has 1 Lyrica tablet left and need an emergency supply sent to [facility] that's on her chart and the remaining to [facility].           Can the clinic reply by [facility]: No           What Number to Call Back if not in [facility]: [phone]

## 2023-08-30 NOTE — TELEPHONE ENCOUNTER
Refill Encounter    PCP Visits: Recent Visits  Date Type Provider Dept   07/27/23 Office Visit Alisa Nixon,  [facility]   06/22/23 Office Visit Alisa Nixon DO [facility]   04/25/23 Office Visit Belinda Fajardo MD [facility]   12/20/22 Office Visit Belinda Fajardo MD [facility]   11/10/22 Office Visit Belinda Fajardo MD [facility]   09/14/22 Office Visit Belinda Fajardo MD [facility]   Showing recent visits within past 360 days and meeting all other requirements  Future Appointments  Date Type Provider Dept   11/29/23 Appointment Alisa Nixon DO [facility]   Showing future appointments within next 720 days and meeting all other requirements     Last 3 Blood Pressure:   BP Readings from Last 3 Encounters:   08/22/23 102/63   07/27/23 130/88   07/21/23 (!) 130/95     Preferred Pharmacy:   [facility] - [place], [place] - [address] [facility]  [address] [address]  Phone: [phone] Fax: [phone]    [id number] - [place], [place] - [address] AT [facility] [address]  [address]  [place] [address]  Phone: [phone] Fax: [phone]    [facility]  [address]  [place] [address]  Phone: [phone] Fax: [phone]    Requested RX:  Requested Prescriptions     Pending Prescriptions Disp Refills    ondansetron (ZOFRAN) 4 MG tablet [Pharmacy Med Name: ONDANSETRON HYDROCHLORIDE 4 MG Tablet] 30 tablet 0     Sig: TAKE 1 TABLET EVERY 8 HOURS AS NEEDED FOR NAUSEA      RX Route: Normal

## 2023-08-31 ENCOUNTER — TELEPHONE (OUTPATIENT)
Dept: PAIN MEDICINE | Facility: CLINIC | Age: 78
End: 2023-08-31
Payer: MEDICARE

## 2023-08-31 ENCOUNTER — OFFICE VISIT (OUTPATIENT)
Dept: OPTOMETRY | Facility: CLINIC | Age: 78
End: 2023-08-31
Payer: MEDICARE

## 2023-08-31 ENCOUNTER — LAB VISIT (OUTPATIENT)
Dept: LAB | Facility: OTHER | Age: 78
End: 2023-08-31
Attending: STUDENT IN AN ORGANIZED HEALTH CARE EDUCATION/TRAINING PROGRAM
Payer: MEDICARE

## 2023-08-31 DIAGNOSIS — H01.02A SQUAMOUS BLEPHARITIS OF UPPER AND LOWER EYELIDS OF BOTH EYES: ICD-10-CM

## 2023-08-31 DIAGNOSIS — H52.4 MYOPIA WITH ASTIGMATISM AND PRESBYOPIA, BILATERAL: ICD-10-CM

## 2023-08-31 DIAGNOSIS — Z01.00 ROUTINE EYE EXAM: Primary | ICD-10-CM

## 2023-08-31 DIAGNOSIS — H52.13 MYOPIA WITH ASTIGMATISM AND PRESBYOPIA, BILATERAL: ICD-10-CM

## 2023-08-31 DIAGNOSIS — E61.1 IRON DEFICIENCY: ICD-10-CM

## 2023-08-31 DIAGNOSIS — H01.02B SQUAMOUS BLEPHARITIS OF UPPER AND LOWER EYELIDS OF BOTH EYES: ICD-10-CM

## 2023-08-31 DIAGNOSIS — Z85.038 HISTORY OF COLON CANCER: ICD-10-CM

## 2023-08-31 DIAGNOSIS — H52.203 MYOPIA WITH ASTIGMATISM AND PRESBYOPIA, BILATERAL: ICD-10-CM

## 2023-08-31 LAB
ALBUMIN SERPL BCP-MCNC: 3.6 G/DL (ref 3.5–5.2)
ALP SERPL-CCNC: 98 U/L (ref 55–135)
ALT SERPL W/O P-5'-P-CCNC: 19 U/L (ref 10–44)
ANION GAP SERPL CALC-SCNC: 10 MMOL/L (ref 8–16)
AST SERPL-CCNC: 24 U/L (ref 10–40)
BASOPHILS # BLD AUTO: 0.03 K/UL (ref 0–0.2)
BASOPHILS NFR BLD: 0.3 % (ref 0–1.9)
BILIRUB SERPL-MCNC: 0.4 MG/DL (ref 0.1–1)
BUN SERPL-MCNC: 17 MG/DL (ref 8–23)
CALCIUM SERPL-MCNC: 9.5 MG/DL (ref 8.7–10.5)
CEA SERPL-MCNC: 6 NG/ML (ref 0–5)
CHLORIDE SERPL-SCNC: 107 MMOL/L (ref 95–110)
CO2 SERPL-SCNC: 23 MMOL/L (ref 23–29)
CREAT SERPL-MCNC: 1.1 MG/DL (ref 0.5–1.4)
DIFFERENTIAL METHOD: ABNORMAL
EOSINOPHIL # BLD AUTO: 0.2 K/UL (ref 0–0.5)
EOSINOPHIL NFR BLD: 1.9 % (ref 0–8)
ERYTHROCYTE [DISTWIDTH] IN BLOOD BY AUTOMATED COUNT: 13.4 % (ref 11.5–14.5)
EST. GFR  (NO RACE VARIABLE): 52 ML/MIN/1.73 M^2
FERRITIN SERPL-MCNC: 39 NG/ML (ref 20–300)
GLUCOSE SERPL-MCNC: 139 MG/DL (ref 70–110)
HCT VFR BLD AUTO: 41.9 % (ref 37–48.5)
HGB BLD-MCNC: 12.8 G/DL (ref 12–16)
IMM GRANULOCYTES # BLD AUTO: 0.07 K/UL (ref 0–0.04)
IMM GRANULOCYTES NFR BLD AUTO: 0.7 % (ref 0–0.5)
IRON SERPL-MCNC: 41 UG/DL (ref 30–160)
LYMPHOCYTES # BLD AUTO: 2.4 K/UL (ref 1–4.8)
LYMPHOCYTES NFR BLD: 23.2 % (ref 18–48)
MCH RBC QN AUTO: 26.4 PG (ref 27–31)
MCHC RBC AUTO-ENTMCNC: 30.5 G/DL (ref 32–36)
MCV RBC AUTO: 87 FL (ref 82–98)
MONOCYTES # BLD AUTO: 0.7 K/UL (ref 0.3–1)
MONOCYTES NFR BLD: 6.9 % (ref 4–15)
NEUTROPHILS # BLD AUTO: 6.8 K/UL (ref 1.8–7.7)
NEUTROPHILS NFR BLD: 67 % (ref 38–73)
NRBC BLD-RTO: 0 /100 WBC
PLATELET # BLD AUTO: 291 K/UL (ref 150–450)
PMV BLD AUTO: 9 FL (ref 9.2–12.9)
POTASSIUM SERPL-SCNC: 4.6 MMOL/L (ref 3.5–5.1)
PROT SERPL-MCNC: 7.2 G/DL (ref 6–8.4)
RBC # BLD AUTO: 4.84 M/UL (ref 4–5.4)
SATURATED IRON: 11 % (ref 20–50)
SODIUM SERPL-SCNC: 140 MMOL/L (ref 136–145)
TOTAL IRON BINDING CAPACITY: 386 UG/DL (ref 250–450)
TRANSFERRIN SERPL-MCNC: 261 MG/DL (ref 200–375)
WBC # BLD AUTO: 10.13 K/UL (ref 3.9–12.7)

## 2023-08-31 PROCEDURE — 92014 COMPRE OPH EXAM EST PT 1/>: CPT | Mod: HCNC,S$GLB,, | Performed by: OPTOMETRIST

## 2023-08-31 PROCEDURE — 99999 PR PBB SHADOW E&M-EST. PATIENT-LVL III: CPT | Mod: PBBFAC,HCNC,, | Performed by: OPTOMETRIST

## 2023-08-31 PROCEDURE — 82378 CARCINOEMBRYONIC ANTIGEN: CPT | Mod: HCNC | Performed by: STUDENT IN AN ORGANIZED HEALTH CARE EDUCATION/TRAINING PROGRAM

## 2023-08-31 PROCEDURE — 83540 ASSAY OF IRON: CPT | Mod: HCNC | Performed by: STUDENT IN AN ORGANIZED HEALTH CARE EDUCATION/TRAINING PROGRAM

## 2023-08-31 PROCEDURE — 92014 PR EYE EXAM, EST PATIENT,COMPREHESV: ICD-10-PCS | Mod: HCNC,S$GLB,, | Performed by: OPTOMETRIST

## 2023-08-31 PROCEDURE — 36415 COLL VENOUS BLD VENIPUNCTURE: CPT | Mod: HCNC | Performed by: STUDENT IN AN ORGANIZED HEALTH CARE EDUCATION/TRAINING PROGRAM

## 2023-08-31 PROCEDURE — 92015 DETERMINE REFRACTIVE STATE: CPT | Mod: HCNC,S$GLB,, | Performed by: OPTOMETRIST

## 2023-08-31 PROCEDURE — 82728 ASSAY OF FERRITIN: CPT | Mod: HCNC | Performed by: STUDENT IN AN ORGANIZED HEALTH CARE EDUCATION/TRAINING PROGRAM

## 2023-08-31 PROCEDURE — 84466 ASSAY OF TRANSFERRIN: CPT | Mod: HCNC | Performed by: STUDENT IN AN ORGANIZED HEALTH CARE EDUCATION/TRAINING PROGRAM

## 2023-08-31 PROCEDURE — 99999 PR PBB SHADOW E&M-EST. PATIENT-LVL III: ICD-10-PCS | Mod: PBBFAC,HCNC,, | Performed by: OPTOMETRIST

## 2023-08-31 PROCEDURE — 92015 PR REFRACTION: ICD-10-PCS | Mod: HCNC,S$GLB,, | Performed by: OPTOMETRIST

## 2023-08-31 PROCEDURE — 85025 COMPLETE CBC W/AUTO DIFF WBC: CPT | Mod: HCNC | Performed by: STUDENT IN AN ORGANIZED HEALTH CARE EDUCATION/TRAINING PROGRAM

## 2023-08-31 PROCEDURE — 80053 COMPREHEN METABOLIC PANEL: CPT | Mod: HCNC | Performed by: STUDENT IN AN ORGANIZED HEALTH CARE EDUCATION/TRAINING PROGRAM

## 2023-08-31 NOTE — PROGRESS NOTES
HPI    Annual Diabetic Eye Exam, SHANDRA 1 yr Dr Li  Pt states Blurred Vision, hx of Blepharitis    Pt denies F/F     Pt reports Dry/ Itchy/ Burning  Gtt: Yes AT Therapy   Pt reports No Relief   Pt reports no longer using lid scrubs    DM Dx'ed   BS:   Hemoglobin A1C       Date                     Value               Ref Range             Status                07/21/2023               6.4 (H)             4.0 - 5.6 %           Final                Last edited by Trevon Miller, OD on 8/31/2023  9:56 AM.            Assessment /Plan     For exam results, see Encounter Report.    Routine eye exam  -No retinopathy noted today.  Continued control with primary care physician and annual comprehensive eye exam.    Squamous blepharitis of upper and lower eyelids of both eyes  -reviewed importance of daily hygiene and drops  -Nightly lid scrubs using Ocusoft. Systane Complete PF as needed.    Myopia with astigmatism and presbyopia, bilateral  Eyeglass Final Rx       Eyeglass Final Rx         Sphere Cylinder Axis Dist VA Add    Right -0.75 +1.25 010 20/40 +2.50    Left -0.50 +1.00 150 20/40 +2.50      Type: PAL    Expiration Date: 8/31/2024                      RTC 1 yr    [facility]

## 2023-08-31 NOTE — TELEPHONE ENCOUNTER
----- Message from Elizabeth Kaba sent at 8/30/2023 12:01 PM CDT -----      Can the clinic reply in [facility]:Y        Please refill the medication(s) listed below. Please call the patient when the prescription(s) is ready for  at this phone number         Medication #1 pregabalin (LYRICA) 50 MG capsule    Medication #2       [id number] [place] [address] AT [facility] [address]   Phone:  [phone]  Fax:  [phone]

## 2023-09-05 ENCOUNTER — TELEPHONE (OUTPATIENT)
Dept: PRIMARY CARE CLINIC | Facility: CLINIC | Age: 78
End: 2023-09-05
Payer: MEDICARE

## 2023-09-05 NOTE — TELEPHONE ENCOUNTER
----- Message from Swapnil Campbell sent at 9/5/2023  3:14 PM CDT -----  Name of Who is Calling: ESTEBAN JIMENEZ [[id number]]           What is the request in detail: Patient is requesting a call back.  Patient would like to know if Dr. Nixon read the portion of the paperwork she had to sign.  Please assist.           Can the clinic reply by [facility]: No           What Number to Call Back if not in [facility]: [phone]

## 2023-09-05 NOTE — TELEPHONE ENCOUNTER
----- Message from Jack Leonard sent at 9/5/2023 12:22 PM CDT -----  Name of Who is Calling:ESTEBAN JIMENEZ [[id number]]           What is the request in detail:pt stated that she would like to speak with the office directly in regards to her questions/concerns, PT DID NOT GIVE INFO ON WHAT IT WAS IN REGARDS TO .Please contact to further discuss and advise.            Can the clinic reply by [facility]:[phone]           What Number to Call Back [facility] not [facility]:[phone]

## 2023-09-06 ENCOUNTER — OFFICE VISIT (OUTPATIENT)
Dept: PAIN MEDICINE | Facility: CLINIC | Age: 78
End: 2023-09-06
Payer: MEDICARE

## 2023-09-06 VITALS
WEIGHT: 169.75 LBS | BODY MASS INDEX: 36.62 KG/M2 | TEMPERATURE: 98 F | DIASTOLIC BLOOD PRESSURE: 79 MMHG | HEIGHT: 57 IN | HEART RATE: 90 BPM | RESPIRATION RATE: 19 BRPM | SYSTOLIC BLOOD PRESSURE: 130 MMHG

## 2023-09-06 DIAGNOSIS — G89.4 CHRONIC PAIN DISORDER: ICD-10-CM

## 2023-09-06 DIAGNOSIS — E11.42 DIABETIC POLYNEUROPATHY ASSOCIATED WITH TYPE 2 DIABETES MELLITUS: ICD-10-CM

## 2023-09-06 DIAGNOSIS — E11.40 PAINFUL DIABETIC NEUROPATHY: Primary | ICD-10-CM

## 2023-09-06 PROCEDURE — 1160F RVW MEDS BY RX/DR IN RCRD: CPT | Mod: HCNC,CPTII,S$GLB, | Performed by: NURSE PRACTITIONER

## 2023-09-06 PROCEDURE — 3044F PR MOST RECENT HEMOGLOBIN A1C LEVEL <7.0%: ICD-10-PCS | Mod: HCNC,CPTII,S$GLB, | Performed by: NURSE PRACTITIONER

## 2023-09-06 PROCEDURE — 3078F PR MOST RECENT DIASTOLIC BLOOD PRESSURE < 80 MM HG: ICD-10-PCS | Mod: HCNC,CPTII,S$GLB, | Performed by: NURSE PRACTITIONER

## 2023-09-06 PROCEDURE — 99999 PR PBB SHADOW E&M-EST. PATIENT-LVL V: CPT | Mod: PBBFAC,HCNC,, | Performed by: NURSE PRACTITIONER

## 2023-09-06 PROCEDURE — 1125F AMNT PAIN NOTED PAIN PRSNT: CPT | Mod: HCNC,CPTII,S$GLB, | Performed by: NURSE PRACTITIONER

## 2023-09-06 PROCEDURE — 1159F PR MEDICATION LIST DOCUMENTED IN MEDICAL RECORD: ICD-10-PCS | Mod: HCNC,CPTII,S$GLB, | Performed by: NURSE PRACTITIONER

## 2023-09-06 PROCEDURE — 64999 UNLISTED PX NERVOUS SYSTEM: CPT | Mod: HCNC,S$GLB,, | Performed by: NURSE PRACTITIONER

## 2023-09-06 PROCEDURE — 1101F PT FALLS ASSESS-DOCD LE1/YR: CPT | Mod: HCNC,CPTII,S$GLB, | Performed by: NURSE PRACTITIONER

## 2023-09-06 PROCEDURE — 1159F MED LIST DOCD IN RCRD: CPT | Mod: HCNC,CPTII,S$GLB, | Performed by: NURSE PRACTITIONER

## 2023-09-06 PROCEDURE — 1101F PR PT FALLS ASSESS DOC 0-1 FALLS W/OUT INJ PAST YR: ICD-10-PCS | Mod: HCNC,CPTII,S$GLB, | Performed by: NURSE PRACTITIONER

## 2023-09-06 PROCEDURE — 3288F PR FALLS RISK ASSESSMENT DOCUMENTED: ICD-10-PCS | Mod: HCNC,CPTII,S$GLB, | Performed by: NURSE PRACTITIONER

## 2023-09-06 PROCEDURE — 3288F FALL RISK ASSESSMENT DOCD: CPT | Mod: HCNC,CPTII,S$GLB, | Performed by: NURSE PRACTITIONER

## 2023-09-06 PROCEDURE — 3075F SYST BP GE 130 - 139MM HG: CPT | Mod: HCNC,CPTII,S$GLB, | Performed by: NURSE PRACTITIONER

## 2023-09-06 PROCEDURE — 3078F DIAST BP <80 MM HG: CPT | Mod: HCNC,CPTII,S$GLB, | Performed by: NURSE PRACTITIONER

## 2023-09-06 PROCEDURE — 99999 PR PBB SHADOW E&M-EST. PATIENT-LVL V: ICD-10-PCS | Mod: PBBFAC,HCNC,, | Performed by: NURSE PRACTITIONER

## 2023-09-06 PROCEDURE — 1160F PR REVIEW ALL MEDS BY PRESCRIBER/CLIN PHARMACIST DOCUMENTED: ICD-10-PCS | Mod: HCNC,CPTII,S$GLB, | Performed by: NURSE PRACTITIONER

## 2023-09-06 PROCEDURE — 3044F HG A1C LEVEL LT 7.0%: CPT | Mod: HCNC,CPTII,S$GLB, | Performed by: NURSE PRACTITIONER

## 2023-09-06 PROCEDURE — 3075F PR MOST RECENT SYSTOLIC BLOOD PRESS GE 130-139MM HG: ICD-10-PCS | Mod: HCNC,CPTII,S$GLB, | Performed by: NURSE PRACTITIONER

## 2023-09-06 PROCEDURE — 1125F PR PAIN SEVERITY QUANTIFIED, PAIN PRESENT: ICD-10-PCS | Mod: HCNC,CPTII,S$GLB, | Performed by: NURSE PRACTITIONER

## 2023-09-06 PROCEDURE — 64999 PR QUTENZA APPLICATION: ICD-10-PCS | Mod: HCNC,S$GLB,, | Performed by: NURSE PRACTITIONER

## 2023-09-06 NOTE — PROGRESS NOTES
Chronic Pain - Established        Chief Complaint:   Chief Complaint   Patient presents with    Foot Pain         Interval History 9/6/2023:  There patient is here for follow up of foot pain and numbness secondary to diabetic neuropathy and previous chemotherapy. She is here today for application of Qutenza patches to bilateral feet. She has burning and numbness to the anterior and posterior feet. She was started on Lyrica 50 mg BID at last OV. She has no side effects from the medication but has not noticed any improvement in symptoms. Her pain today is 9/10.    Interval History 8/22/23:    Ms. Kevin returns for follow-up of her neck pain. Her primary pain today is her neuropathy pain. She is tolerating lyrica 50 mg BID but has noted alopecia since starting. However, it has helped her pain. Given her history of colon cancer and chemo, it is unclear if this is hairloss is from the lyrica.  She also comments on her shoulder pain and how the prior nerve block helped but did not last long.  It provided >80% relief of her shoulder pain.  She is concerned about repeating the injection because she states the last one cost her $300.   Acupuncture was very helpful for her, but was not covered by her insurance and cost $150 per session.   Regarding her neuropathy, she endorses painful burning in her feet that prevents sleeping. The lyrica and duloxetine have both helped, but she stopped taking duloxetine when she started lyrica. She did not have any known adverse effects from the duloxetine. At her last visit 6/6/23, Qutenza was ordered. Will investigate if steps to approval for that.   We also discussed spinal cord stimulator trial. She has not had spine MRI or psychological clearance.          Initial HPI:  Marizol Kevin has a PMHx of HTN, HLD, DMII, osteoporosis, colon cancer s/p chemothearpy. She presents to the clinic for the evaluation of neuropathic pain along with neck pain that radiates into the left arm. The  pain started 5-6 months ago, no specific inciting event. Symptoms have been worsening.The pain is located in the left side of the neck area and radiates to the left periscapular area with further distal radiation to the whole arm.  The pain is described as aching, stabbing, and sqaueezing  and is rated as 9/10. The pain is rated with a score of  5/10 on the BEST day and a score of 9/10 on the WORST day.  Symptoms interfere with daily activity, sleeping, and enjoyment of life. The pain is exacerbated by Laying and Lifting.  The pain is mitigated by nothing.  Pt also endorses severe neuropathic pain in BL hands and feet. She has used gabapentinin the past without any relief. Currently on Cymbalta. Recently started Acupuncture for neuropathy, which she reports has provided significant relief.     Patient denies night fever/night sweats, urinary incontinence, bowel incontinence, significant weight loss, and loss of sensations.    Physical Therapy/Home Exercise: yes and continues to do home exercises for her shoulder and her balance    Pain Disability Index Review:      9/6/2023     1:42 PM 8/22/2023     2:17 PM 6/6/2023     2:06 PM   Last 3 PDI Scores   Pain Disability Index (PDI) 40 28 35       Pain Medications:  - pregabalin 50 mg BID  - was on duloxetine 30 mg BID  - Previously on gabapentin (dose unknown) but was stopped due to lack of efficacy     report:  Not applicable    Pain Procedures:   SAB injection 3/30/23 --> minimal relief  Left shoulder nerve blocks 7/21/23 --> 80% relief    Imaging:   MRI C Spine 4/15/22  FINDINGS:  large amount of image degradation from artifact.  Straightening of the usual cervical lordosis. Degenerative changes. Disc space narrowing C4-C5, C5-C6. Vertebral body heights and alignment appear maintained without acute compression or subluxation evident.  Although no obvious fracture cannot exclude subtle findings including marrow edema with the large amount of image degradation.  Further follow-up or evaluation is to be obtained is suggested by CT. There do appear to be small posterior disc bulges multiple levels, C3 through C 6 without appreciable significant spinal canal narrowing     Impression:     Grossly negative study for acute findings but note is made that limited evaluation with the amount artifact and if concern for acute cervical spine trauma correlation with CT is recommended and note is made the patient did have CT 04/15/2022 please refer to that report.  Subtle findings as suggested on that CT scan likely would not be apparent on the MRI with the degree of artifact.     If further evaluation or follow-up is to be obtained it is recommended to be by CT     This report was flagged in Epic as abnormal.     Final read    Past Medical History:   Diagnosis Date    Allergy     Anxiety     Cancer     colon    Cataract     Colon cancer     Dependence on nicotine from cigarettes 9/3/2020    Diabetes mellitus, type 2     Dry eye syndrome     Hyperlipidemia     Hypertension     Insomnia     Light cigarette smoker (1-9 cigs/day) 6/21/2022    Malignant neoplasm of ascending colon 7/20/2020    Squamous blepharitis     Syncope 4/16/2022    Tobacco use disorder, moderate, in early remission 11/2/2022    Vitamin D deficiency 9/10/2020     Past Surgical History:   Procedure Laterality Date    CATARACT EXTRACTION, BILATERAL  03/2020    CHOLECYSTECTOMY      COLON SURGERY      Colon cancer    COLONOSCOPY N/A 02/01/2021    Procedure: COLONOSCOPY;  Surgeon: Ashwini Duarte MD;  Location: [facility] ([address] [place] [address]);  Service: Endoscopy;  Laterality: N/A;  medical transportation  covid test 1/29 [facility], [facility]    EYE SURGERY      Cataract    HYSTERECTOMY      INJECTION OF ANESTHETIC AGENT AROUND NERVE Left 7/21/2023    Procedure: BLOCK, NERVE, LEFT SHOULDER ARTICULAR BRANCH keep date rep aware;  Surgeon: Abdi Del Toro MD;  Location: [facility];  Service: Pain Management;   Laterality: Left;    JOINT REPLACEMENT      Knee    KNEE SURGERY      LAPAROSCOPIC LEFT COLON RESECTION       Social History     Socioeconomic History    Marital status:    Tobacco Use    Smoking status: Former     Current packs/day: 0.00     Average packs/day: 1 pack/day for 51.0 years (51.0 ttl pk-yrs)     Types: Cigarettes     Start date: 1971     Quit date: 2022     Years since quittin.9    Smokeless tobacco: Never    Tobacco comments:     smoking cessation information given    Substance and Sexual Activity    Alcohol use: Yes     Alcohol/week: 1.0 standard drink of alcohol     Types: 1 Shots of liquor per week     Comment: occ    Drug use: Never    Sexual activity: Not Currently     Birth control/protection: Abstinence     Social Determinants of Health     Financial Resource Strain: Low Risk  (2023)    Overall Financial Resource Strain (CARDIA)     Difficulty of Paying Living Expenses: Not hard at all   Food Insecurity: No Food Insecurity (2023)    Hunger Vital Sign     Worried About Running Out of Food in the Last Year: Never true     Ran Out of Food in the Last Year: Never true   Transportation Needs: No Transportation Needs (2023)    PRAPARE - Transportation     Lack of Transportation (Medical): No     Lack of Transportation (Non-Medical): No   Physical Activity: Insufficiently Active (2023)    Exercise Vital Sign     Days of Exercise per Week: 2 days     Minutes of Exercise per Session: 20 min   Stress: No Stress Concern Present (2023)    [employer] - Occupational Stress Questionnaire     Feeling of Stress : Not at all   Social Connections: Socially Isolated (2023)    Social Connection and Isolation Panel [NHANES]     Frequency of Communication with Friends and Family: More than three times a week     Frequency of Social Gatherings with Friends and Family: Twice a week     Attends Mandaeism Services: Never     Active Member of Clubs  or Organizations: No     Attends Club or Organization Meetings: Never     Marital Status:    Housing Stability: Low Risk  (6/7/2023)    Housing Stability Vital Sign     Unable to Pay for Housing in the Last Year: No     Number of Places Lived in the Last Year: 1     Unstable Housing in the Last Year: No     Family History   Problem Relation Age of Onset    Heart attack Mother     Heart disease Mother     Colon cancer Father     Diabetes Father     Cancer Sister         unknown    Hypothyroidism Sister     Asthma Sister     Thyroid cancer Neg Hx        Review of patient's allergies indicates:   Allergen Reactions    Grass pollen-june grass standard     Sulfa (sulfonamide antibiotics)      Occurred when she was pregnant with varicose veins resulting in leg swelling and pain with standing       Current Outpatient Medications   Medication Sig    albuterol (PROVENTIL/VENTOLIN HFA) 90 mcg/actuation inhaler INHALE 2 PUFFS INTO THE LUNGS EVERY 4 (FOUR) HOURS AS NEEDED FOR WHEEZING.    albuterol-ipratropium (DUO-NEB) 2.5 mg-0.5 mg/3 mL nebulizer solution Take 3 mLs by nebulization every 6 (six) hours as needed for Wheezing or Shortness of Breath. Rescue    amLODIPine (NORVASC) 5 MG tablet TAKE 1 TABLET EVERY DAY    aspirin (ECOTRIN) 81 MG EC tablet Take 81 mg by mouth once daily.    atorvastatin (LIPITOR) 40 MG tablet TAKE 1 TABLET EVERY DAY    azelastine (ASTELIN) 137 mcg (0.1 %) nasal spray 1 spray (137 mcg total) by Nasal route 2 (two) times daily.    betamethasone valerate 0.1% (VALISONE) 0.1 % Oint Apply topically once daily.    blood sugar diagnostic (TRUE METRIX GLUCOSE TEST STRIP) Strp Use to test blood glucose two (2) times daily; to be used with insurance-preferred brand of glucometer/supplies    blood-glucose meter kit Please provide with insurance covered meter    cholecalciferol, vitamin D3, (VITAMIN D3) 50 mcg (2,000 unit) Cap Take by mouth.    dulaglutide (TRULICITY) 1.5 mg/0.5 mL pen injector Inject  1.5 mg into the skin every 7 days.    DULoxetine (CYMBALTA) 30 MG capsule TAKE 1 CAPSULE EVERY DAY    ferrous sulfate 325 (65 FE) MG EC tablet Take 1 tablet (325 mg total) by mouth every other day.    fluticasone-salmeterol diskus inhaler 250-50 mcg Inhale 1 puff into the lungs 2 (two) times daily. Controller    heparin, porcine, PF, (HEPARIN FLUSH 100 UNITS/ML) 100 unit/mL Syrg     hydrOXYzine HCL (ATARAX) 25 MG tablet Take 1 tablet (25 mg total) by mouth 3 (three) times daily as needed for Itching or Anxiety.    ketoconazole (NIZORAL) 2 % cream Apply topically once daily. for 14 days    lancets Misc 1 Device by Misc.(Non-Drug; Combo Route) route 2 (two) times daily.    lansoprazole (PREVACID) 30 MG capsule Take 1 capsule by mouth once a day 30 min before breakfast    metFORMIN (GLUCOPHAGE-XR) 500 MG ER 24hr tablet TAKE 2 TABLETS EVERY DAY WITH BREAKFAST    multivitamin capsule Take 1 capsule by mouth once daily.    ondansetron (ZOFRAN) 4 MG tablet TAKE 1 TABLET EVERY 8 HOURS AS NEEDED FOR NAUSEA    pregabalin (LYRICA) 50 MG capsule Take 1 capsule (50 mg total) by mouth 2 (two) times daily.    raloxifene (EVISTA) 60 mg tablet TAKE 1 TABLET EVERY DAY    sodium chloride 0.9 % SprA 1 spray by Each Nostril route every evening.    triamcinolone acetonide 0.025% (KENALOG) 0.025 % cream APPLY DAILY TO AFFECTED AREA AS NEEDED FOR ITCHING. MAXIMUM 2 TIMES A WEEK     No current facility-administered medications for this visit.       REVIEW OF SYSTEMS:    GENERAL:  No weight loss, malaise or fevers.  HEENT:  Negative for frequent or significant headaches.  NECK:  Negative for lumps, goiter, pain and significant neck swelling.  RESPIRATORY:  Negative for cough, wheezing or shortness of breath.  CARDIOVASCULAR:  Negative for chest pain, leg swelling or palpitations.  GI:  Negative for abdominal discomfort, blood in stools or black stools or change in bowel habits.  MUSCULOSKELETAL:  See HPI.  SKIN:  Negative for lesions, rash,  "and itching.  PSYCH:  +sleep disturbance, depression .  HEMATOLOGY/LYMPHOLOGY:  Negative for prolonged bleeding, bruising easily or swollen nodes.  NEURO:   No history of headaches, syncope, paralysis, seizures or tremors.  All other reviewed and negative other than HPI.    OBJECTIVE:    /79 (BP Location: Right arm, Patient Position: Sitting)   Pulse 90   Temp 98.3 °F (36.8 °C) (Oral)   Resp 19   Ht 4' 9" (1.448 m)   Wt 77 kg (169 lb 12.1 oz)   BMI 36.73 kg/m²     PHYSICAL EXAMINATION:    General appearance: Well appearing, in no acute distress, alert and oriented x3.  Psych:  Context appropriate thought content, affect and range of emotion. Easily tearful due to chronic pain. Denies SI/HI. Laughs with jokes.  Skin: Skin color, texture, turgor normal, no rashes or lesions, in both upper and lower body.  Neuro: Decreased sensation to bilateral feet.  Gait: Antalgic.     ASSESSMENT: 77 y.o. year old female with widespread pain, consistent with the followin. Painful diabetic neuropathy  capsaicin-skin cleanser patch 4 patch      2. Chronic pain disorder        3. Diabetic polyneuropathy associated with type 2 diabetes mellitus            PLAN:     - Qutenza patch applied today as per below.  - Increase Lyrica to 75 mg BID.  - Continue duloxetine 30 mg BID for pain  - RTC 1 month to determine effectiveness of Qutenza.    - Counseled patient regarding the importance of activity modification, constant sleeping habits, and physical therapy.    The above plan and management options were discussed at length with patient. Patient is in agreement with the above and verbalized understanding.    Jenny Glass  2023      PATIENT NAME: Marizol Kevin      MRN: [id number]       DATE OF PROCEDURE:TODAYDATE@      Diagnosis: Painful Diabetic Neuropathy E13.42     Postprocedural Diagnosis: Same     Complications: None     Informed Consent:  The patient's condition and proposed procedures, risks " (including complications of nerve damage, skin irritation, infection, and failure of pain relief), and alternatives were discussed with the patient or responsible party.  The patient's/responsible party's questions were answered.  The patient/responsible party appeared to understand and chose to proceed.       Procedural Pause:  A procedural pause verifying correct patient, medical record number, allergies, medications to be administered, current vital signs, and proper application site was performed immediately prior to beginning the procedure.     Procedure in Detail:  The patient was placed in a supine position. 2 patches were used for the procedure today.  The patches were applied to the target area and secured with tape.  A coban was used to further secure the patches in place.  The patient was allowed to rest in a comfortable position, and monitored by staff every 10-15 minutes to ensure comfort. The patches remained in place for 30 minutes.  The patches were then removed and the cleaning gel was applied and allowed to sit for an additional 60 seconds, after which the area was wiped clean.      The patient was then discharged in stable condition.        DISCUSSION:    Hands washed, dried and gloved donned prior to patient care. Examined pt's feet for any open wounds. Skin intact with no open areas noted. Patches applied to plantar and dorsal areas of both feet. Wrapped to help with adherence. Examined skin 10 minutes after application, no signs of redness or irritation. Patient denies pain. Patches removed after 30 minutes and disposed of in biohazard bag.       Plan to repeat every 91 days.      YOLANDE Granger  09/06/2023

## 2023-09-07 ENCOUNTER — TELEPHONE (OUTPATIENT)
Dept: PAIN MEDICINE | Facility: CLINIC | Age: 78
End: 2023-09-07
Payer: MEDICARE

## 2023-09-07 RX ORDER — DULOXETIN HYDROCHLORIDE 30 MG/1
30 CAPSULE, DELAYED RELEASE ORAL DAILY
Qty: 90 CAPSULE | Refills: 3 | Status: SHIPPED | OUTPATIENT
Start: 2023-09-07 | End: 2023-09-20 | Stop reason: SDUPTHER

## 2023-09-07 NOTE — TELEPHONE ENCOUNTER
----- Message from Dorita Jeter MA sent at 9/7/2023  8:20 AM CDT -----  Regarding: Refill Request  Who Called:ESTEBAN JIMENEZ [[id number]]           New Prescription or Refill : Refill      RX Name and Strength:  DULoxetine (CYMBALTA) 30 MG capsule         30 day or 90 day RX: 30           Local or Mail Order : mail            Preferred Pharmacy:[facility] - [facility] [address]      Would the patient rather a call back or a response via [facility]? call        Best Call Back Number:  [phone]        Additional Information:PT WANTS TO BE NOTIFIED WHEN SENT TO PHARMACY

## 2023-09-07 NOTE — TELEPHONE ENCOUNTER
Staff contacted pt who said she had some questions about the qutenza patches she received yesterday. Her feet are burning and tingling after the patches and she was wondering if that was normal. She wants the Qutenza explained and how it eases her neuropathy. She said the burning is worse when she stands and moves around. She is requesting a call from Jenny. Staff said staff would send a message to Jenny to call her.

## 2023-09-07 NOTE — TELEPHONE ENCOUNTER
----- Message from Dorita Jeter MA sent at 9/7/2023  3:18 PM CDT -----  Name of Who is Calling:ESTEBAN JIMENEZ [[id number]]                What is the request in detail: Pt wants to discuss her feet  neuropathy from yesterday. Please assist.                Can the clinic reply by [facility]: No                What Number to Call Back [facility] not in [facility]: [phone]

## 2023-09-15 DIAGNOSIS — J45.20 MILD INTERMITTENT ASTHMA WITHOUT COMPLICATION: Primary | ICD-10-CM

## 2023-09-15 RX ORDER — FLUTICASONE FUROATE AND VILANTEROL 100; 25 UG/1; UG/1
1 POWDER RESPIRATORY (INHALATION) DAILY
Qty: 90 EACH | Refills: 3 | Status: SHIPPED | OUTPATIENT
Start: 2023-09-15 | End: 2023-09-20 | Stop reason: SDUPTHER

## 2023-09-15 RX ORDER — KETOCONAZOLE 20 MG/G
CREAM TOPICAL DAILY
Qty: 60 G | Refills: 0 | Status: SHIPPED | OUTPATIENT
Start: 2023-09-15 | End: 2023-11-20

## 2023-09-15 NOTE — TELEPHONE ENCOUNTER
----- Message from Becky Scott sent at 9/15/2023  8:19 AM CDT -----  Regarding: Medication  Refill  Request                  Reply in [facility]: NO      Please refill the medication listed below. Please call the patient   [phone] (W)        Medication        BREO ELLIPTA (fluticasone furoate and vilanterol)       Preferred Pharmacy:  [facility] - [facility] [address]   Phone: [phone]  Fax:  [phone]               Analisa, NP

## 2023-09-15 NOTE — TELEPHONE ENCOUNTER
No care due was identified.  [facility] Embedded Care Due Messages. Reference number: [id number].   9/15/2023 10:31:21 AM CDT

## 2023-09-18 ENCOUNTER — TELEPHONE (OUTPATIENT)
Dept: PAIN MEDICINE | Facility: CLINIC | Age: 78
End: 2023-09-18
Payer: MEDICARE

## 2023-09-18 ENCOUNTER — TELEPHONE (OUTPATIENT)
Dept: ORTHOPEDICS | Facility: CLINIC | Age: 78
End: 2023-09-18
Payer: MEDICARE

## 2023-09-18 NOTE — TELEPHONE ENCOUNTER
----- Message from Troy Oh sent at 9/15/2023 10:47 AM CDT -----  Regarding: Refill Request    Who Called: Patient        New Prescription or Refill : Refill    RX Name and Strength:   DULoxetine (CYMBALTA) 30 MG capsule      RX Name and Strength:   pregabalin (LYRICA) 50 MG capsule       RX Name and Strength:      30 day or 90 day RX:     Preferred Pharmacy:[facility] - [facility] [address]    Would the patient rather a call back or a response via [facility]?    Best Call Back Number:  [phone]    Additional Information:

## 2023-09-18 NOTE — TELEPHONE ENCOUNTER
Spoke to patient and scheduled her with Soila on 9/25 for an injection----- Message from Beverly Herr sent at 9/18/2023  2:09 PM CDT -----  Regarding: self [phone]  .Type: Patient Call Back    Who called: self     What is the request in detail: Pt is requesting to know if she needs to get another xray and can she get another inj in her arm & thumb    Can the clinic reply by [facility]? Call back     Would the patient rather a call back or a response via [facility]?  Call back     Best call back number: .[phone]

## 2023-09-20 ENCOUNTER — TELEPHONE (OUTPATIENT)
Dept: PAIN MEDICINE | Facility: CLINIC | Age: 78
End: 2023-09-20
Payer: MEDICARE

## 2023-09-20 DIAGNOSIS — M47.812 CERVICAL SPONDYLOSIS: ICD-10-CM

## 2023-09-20 DIAGNOSIS — M50.30 DDD (DEGENERATIVE DISC DISEASE), CERVICAL: ICD-10-CM

## 2023-09-20 DIAGNOSIS — J45.20 MILD INTERMITTENT ASTHMA WITHOUT COMPLICATION: ICD-10-CM

## 2023-09-20 RX ORDER — PREGABALIN 75 MG/1
75 CAPSULE ORAL 3 TIMES DAILY
Qty: 270 CAPSULE | Refills: 0 | OUTPATIENT
Start: 2023-09-20

## 2023-09-20 RX ORDER — DULOXETIN HYDROCHLORIDE 30 MG/1
30 CAPSULE, DELAYED RELEASE ORAL DAILY
Qty: 30 CAPSULE | Refills: 11 | OUTPATIENT
Start: 2023-09-20 | End: 2024-09-19

## 2023-09-20 RX ORDER — PREGABALIN 75 MG/1
75 CAPSULE ORAL 2 TIMES DAILY
Qty: 60 CAPSULE | Refills: 0 | Status: SHIPPED | OUTPATIENT
Start: 2023-09-20 | End: 2023-10-20

## 2023-09-20 RX ORDER — FLUTICASONE FUROATE AND VILANTEROL 100; 25 UG/1; UG/1
1 POWDER RESPIRATORY (INHALATION) DAILY
Qty: 90 EACH | Refills: 3 | Status: SHIPPED | OUTPATIENT
Start: 2023-09-20 | End: 2023-09-25 | Stop reason: SDUPTHER

## 2023-09-20 RX ORDER — PREGABALIN 50 MG/1
50 CAPSULE ORAL 2 TIMES DAILY
Qty: 60 CAPSULE | Refills: 0 | Status: SHIPPED | OUTPATIENT
Start: 2023-09-20 | End: 2023-11-10 | Stop reason: SDUPTHER

## 2023-09-20 RX ORDER — DULOXETIN HYDROCHLORIDE 30 MG/1
30 CAPSULE, DELAYED RELEASE ORAL DAILY
Qty: 90 CAPSULE | Refills: 3 | Status: SHIPPED | OUTPATIENT
Start: 2023-09-20

## 2023-09-20 NOTE — TELEPHONE ENCOUNTER
----- Message from Tequila Lorenzo sent at 9/20/2023 11:44 AM CDT -----  Regarding: auth for rx  Name of Who is Calling:  ESTEBAN JIMENEZ [[id number]]        What is the request in detail:  Pt is trying to  her rx of fluticasone furoate-vilanteroL (BREO) 100-25 mcg/dose diskus inhaler/ It says on her chart it is still waiting for authorization. Please assist        Can the clinic reply by [facility]:          What Number to Call Back if not in [facility]:[phone]

## 2023-09-20 NOTE — TELEPHONE ENCOUNTER
No care due was identified.  [facility] Embedded Care Due Messages. Reference number: [id number].   9/20/2023 2:14:03 PM CDT

## 2023-09-20 NOTE — TELEPHONE ENCOUNTER
Patient contacted office, she really requires a refill on her Cymbalta and her lyrica to be sent to [facility] as she can't get to [facility].     Lyrica is discontinued in her chart, But NP last note said it was increased to 75 mg BID.     Can a prescription be sent or called into her the preferred pharmacy.

## 2023-09-20 NOTE — TELEPHONE ENCOUNTER
Staff contact pt and informed pt that both of her medication were sent to [facility] pharmacy. Pt said she called [facility] today and that there they did not have the medication. Staff said it was sent in today. Pt said she is going to call [facility] again.

## 2023-09-20 NOTE — TELEPHONE ENCOUNTER
----- Message from Troy Oh sent at 9/20/2023 12:12 PM CDT -----  Regarding: Refill Request    Who Called: Marilu From [facility] ID#       New Prescription or Refill : Refill    RX Name and Strength:   DULoxetine (CYMBALTA) 30 MG capsule      RX Name and Strength:  pregabalin (LYRICA) 50 MG capsule        RX Name and Strength:      30 day or 90 day RX:     Preferred Pharmacy:[id number] [address] AT [facility]     Would the patient rather a call back or a response via [facility]?    Best Call Back Number:  [phone]    Additional Information: Marilu Gabriel calling from [facility] on behalf of patient, Patient has been calling a few days for her Rx

## 2023-09-22 ENCOUNTER — TELEPHONE (OUTPATIENT)
Dept: ORTHOPEDICS | Facility: CLINIC | Age: 78
End: 2023-09-22
Payer: MEDICARE

## 2023-09-25 ENCOUNTER — OFFICE VISIT (OUTPATIENT)
Dept: ORTHOPEDICS | Facility: CLINIC | Age: 78
End: 2023-09-25
Payer: MEDICARE

## 2023-09-25 VITALS — HEIGHT: 57 IN | WEIGHT: 169.75 LBS | BODY MASS INDEX: 36.62 KG/M2

## 2023-09-25 DIAGNOSIS — J45.20 MILD INTERMITTENT ASTHMA WITHOUT COMPLICATION: ICD-10-CM

## 2023-09-25 DIAGNOSIS — M19.049 CMC ARTHRITIS: Primary | ICD-10-CM

## 2023-09-25 DIAGNOSIS — M75.52 BURSITIS OF LEFT SHOULDER: ICD-10-CM

## 2023-09-25 PROCEDURE — 1125F PR PAIN SEVERITY QUANTIFIED, PAIN PRESENT: ICD-10-PCS | Mod: HCNC,CPTII,S$GLB, | Performed by: PHYSICIAN ASSISTANT

## 2023-09-25 PROCEDURE — 1101F PR PT FALLS ASSESS DOC 0-1 FALLS W/OUT INJ PAST YR: ICD-10-PCS | Mod: HCNC,CPTII,S$GLB, | Performed by: PHYSICIAN ASSISTANT

## 2023-09-25 PROCEDURE — 99214 PR OFFICE/OUTPT VISIT, EST, LEVL IV, 30-39 MIN: ICD-10-PCS | Mod: 25,HCNC,S$GLB, | Performed by: PHYSICIAN ASSISTANT

## 2023-09-25 PROCEDURE — 99214 OFFICE O/P EST MOD 30 MIN: CPT | Mod: 25,HCNC,S$GLB, | Performed by: PHYSICIAN ASSISTANT

## 2023-09-25 PROCEDURE — 20610 DRAIN/INJ JOINT/BURSA W/O US: CPT | Mod: HCNC,LT,S$GLB, | Performed by: PHYSICIAN ASSISTANT

## 2023-09-25 PROCEDURE — 20600 PR DRAIN/INJECT SMALL JOINT/BURSA: ICD-10-PCS | Mod: HCNC,51,XS,LT | Performed by: PHYSICIAN ASSISTANT

## 2023-09-25 PROCEDURE — 3288F PR FALLS RISK ASSESSMENT DOCUMENTED: ICD-10-PCS | Mod: HCNC,CPTII,S$GLB, | Performed by: PHYSICIAN ASSISTANT

## 2023-09-25 PROCEDURE — 1101F PT FALLS ASSESS-DOCD LE1/YR: CPT | Mod: HCNC,CPTII,S$GLB, | Performed by: PHYSICIAN ASSISTANT

## 2023-09-25 PROCEDURE — 1159F PR MEDICATION LIST DOCUMENTED IN MEDICAL RECORD: ICD-10-PCS | Mod: HCNC,CPTII,S$GLB, | Performed by: PHYSICIAN ASSISTANT

## 2023-09-25 PROCEDURE — 99999 PR PBB SHADOW E&M-EST. PATIENT-LVL III: CPT | Mod: PBBFAC,HCNC,, | Performed by: PHYSICIAN ASSISTANT

## 2023-09-25 PROCEDURE — 20600 DRAIN/INJ JOINT/BURSA W/O US: CPT | Mod: HCNC,51,XS,LT | Performed by: PHYSICIAN ASSISTANT

## 2023-09-25 PROCEDURE — 3288F FALL RISK ASSESSMENT DOCD: CPT | Mod: HCNC,CPTII,S$GLB, | Performed by: PHYSICIAN ASSISTANT

## 2023-09-25 PROCEDURE — 1159F MED LIST DOCD IN RCRD: CPT | Mod: HCNC,CPTII,S$GLB, | Performed by: PHYSICIAN ASSISTANT

## 2023-09-25 PROCEDURE — 20610 PR DRAIN/INJECT LARGE JOINT/BURSA: ICD-10-PCS | Mod: HCNC,LT,S$GLB, | Performed by: PHYSICIAN ASSISTANT

## 2023-09-25 PROCEDURE — 3044F PR MOST RECENT HEMOGLOBIN A1C LEVEL <7.0%: ICD-10-PCS | Mod: HCNC,CPTII,S$GLB, | Performed by: PHYSICIAN ASSISTANT

## 2023-09-25 PROCEDURE — 99999 PR PBB SHADOW E&M-EST. PATIENT-LVL III: ICD-10-PCS | Mod: PBBFAC,HCNC,, | Performed by: PHYSICIAN ASSISTANT

## 2023-09-25 PROCEDURE — 3044F HG A1C LEVEL LT 7.0%: CPT | Mod: HCNC,CPTII,S$GLB, | Performed by: PHYSICIAN ASSISTANT

## 2023-09-25 PROCEDURE — 1125F AMNT PAIN NOTED PAIN PRSNT: CPT | Mod: HCNC,CPTII,S$GLB, | Performed by: PHYSICIAN ASSISTANT

## 2023-09-25 RX ORDER — DEXAMETHASONE SODIUM PHOSPHATE 4 MG/ML
4 INJECTION, SOLUTION INTRA-ARTICULAR; INTRALESIONAL; INTRAMUSCULAR; INTRAVENOUS; SOFT TISSUE
Status: COMPLETED | OUTPATIENT
Start: 2023-09-25 | End: 2023-09-25

## 2023-09-25 RX ORDER — LIDOCAINE HYDROCHLORIDE 10 MG/ML
8 INJECTION, SOLUTION EPIDURAL; INFILTRATION; INTRACAUDAL; PERINEURAL ONCE
Status: COMPLETED | OUTPATIENT
Start: 2023-09-25 | End: 2023-09-25

## 2023-09-25 RX ORDER — LIDOCAINE HYDROCHLORIDE 10 MG/ML
0.5 INJECTION, SOLUTION EPIDURAL; INFILTRATION; INTRACAUDAL; PERINEURAL ONCE
Status: COMPLETED | OUTPATIENT
Start: 2023-09-25 | End: 2023-09-25

## 2023-09-25 RX ORDER — TRIAMCINOLONE ACETONIDE 40 MG/ML
80 INJECTION, SUSPENSION INTRA-ARTICULAR; INTRAMUSCULAR
Status: COMPLETED | OUTPATIENT
Start: 2023-09-25 | End: 2023-09-25

## 2023-09-25 RX ADMIN — LIDOCAINE HYDROCHLORIDE 80 MG: 10 INJECTION, SOLUTION EPIDURAL; INFILTRATION; INTRACAUDAL; PERINEURAL at 04:09

## 2023-09-25 RX ADMIN — DEXAMETHASONE SODIUM PHOSPHATE 4 MG: 4 INJECTION, SOLUTION INTRA-ARTICULAR; INTRALESIONAL; INTRAMUSCULAR; INTRAVENOUS; SOFT TISSUE at 03:09

## 2023-09-25 RX ADMIN — LIDOCAINE HYDROCHLORIDE 5 MG: 10 INJECTION, SOLUTION EPIDURAL; INFILTRATION; INTRACAUDAL; PERINEURAL at 03:09

## 2023-09-25 RX ADMIN — TRIAMCINOLONE ACETONIDE 80 MG: 40 INJECTION, SUSPENSION INTRA-ARTICULAR; INTRAMUSCULAR at 03:09

## 2023-09-25 NOTE — PROGRESS NOTES
Clinic Note  Orthopedics    SUBJECTIVE:     History of Present Illness:  3/30/23  Marizol Kevin is a 77 y.o. female who presents left shoulder pain for many years, acutely worse over the last month. Patient had previously been diagnosed with subacromial bursitis, but hasn't had previous injections or therapy. She also complains of left first CMC pain as well. Reports constant numbness in hands and feet since chemotherapy for colon cancer.     Patient states her left shoulder continues to bother her and it radiates to her thumb. Her pain starts in the upper trap of her left shoulder.     Patient has significant pain in her left thumb she has pain in her shoulder she states she had a sac that was placed on it it felt good she also wants a medicine for her shoulder    9/25/23  Patient presents today for follow up. At last visit pt received L shoulder and L thumb CMC injections. She reports pain relief following these. Pain has returned. She is interested in repeat injections today. She has previously been to physical therapy. She also sees pain management.        Review of patient's allergies indicates:   Allergen Reactions    Grass pollen-june grass standard     Sulfa (sulfonamide antibiotics)      Occurred when she was pregnant with varicose veins resulting in leg swelling and pain with standing       Past Medical History:   Diagnosis Date    Allergy     Anxiety     Cancer     colon    Cataract     Colon cancer     Dependence on nicotine from cigarettes 9/3/2020    Diabetes mellitus, type 2     Dry eye syndrome     Hyperlipidemia     Hypertension     Insomnia     Light cigarette smoker (1-9 cigs/day) 6/21/2022    Malignant neoplasm of ascending colon 7/20/2020    Squamous blepharitis     Syncope 4/16/2022    Tobacco use disorder, moderate, in early remission 11/2/2022    Vitamin D deficiency 9/10/2020     Past Surgical History:   Procedure Laterality Date    CATARACT EXTRACTION, BILATERAL  03/2020     CHOLECYSTECTOMY      COLON SURGERY      Colon cancer    COLONOSCOPY N/A 2021    Procedure: COLONOSCOPY;  Surgeon: Ashwini Duarte MD;  Location: [facility] (4TH FLR);  Service: Endoscopy;  Laterality: N/A;  medical transportation  covid test  Jehovah's witness, instructions mailed-[facility]    EYE SURGERY      Cataract    HYSTERECTOMY      INJECTION OF ANESTHETIC AGENT AROUND NERVE Left 2023    Procedure: BLOCK, NERVE, LEFT SHOULDER ARTICULAR BRANCH keep date rep aware;  Surgeon: Abdi Del Toro MD;  [facility];  Service: Pain Management;  Laterality: Left;    JOINT REPLACEMENT      Knee    KNEE SURGERY      LAPAROSCOPIC LEFT COLON RESECTION       Family History   Problem Relation Age of Onset    Heart attack Mother     Heart disease Mother     Colon cancer Father     Diabetes Father     Cancer Sister         unknown    Hypothyroidism Sister     Asthma Sister     Thyroid cancer Neg Hx      Social History     Tobacco Use    Smoking status: Former     Current packs/day: 0.00     Average packs/day: 1 pack/day for 51.0 years (51.0 ttl pk-yrs)     Types: Cigarettes     Start date: 1971     Quit date: 2022     Years since quittin.0    Smokeless tobacco: Never    Tobacco comments:     smoking cessation information given    Substance Use Topics    Alcohol use: Yes     Alcohol/week: 1.0 standard drink of alcohol     Types: 1 Shots of liquor per week     Comment: occ    Drug use: Never        Review of Systems:  Patient denies constitutional symptoms, cardiac symptoms, respiratory symptoms, GI symptoms.  The remainder of the musculoskeletal ROS is included in the HPI.      OBJECTIVE:     Physical Exam:  Gen:  No acute distress  CV:  Peripherally well-perfused.  Pulses 2+ bilaterally.  Lungs:  Normal respiratory effort.  Abdomen:  Soft, non-tender, non-distended  Head/Neck:  Normocephalic.  Atraumatic. No TTP, AROM and PROM intact without pain  Neuro:  CN intact without deficit, SILT throughout  B/L Upper & Lower Extremities    MSK:  LUE:  Good elbow, wrist, finger ROM. She lacks about 30 degrees of full shoulder flexion and abduction. She has positive impingement. She is ttp at the thumb CMC. AIN/PIN/Radial/Median/Ulnar Nerves assessed in isolation without deficit. Radial & Ulnar arteries palpated 2+. Capillary Refill <3s    Diagnostic Results:  X-rays of the Left shoulder were ordered and images reviewed by me.  These showed mild AC joint degnerative changes    ASSESSMENT/PLAN:     A/P: Marizol Kevin is a 77 y.o. female with Left shoulder subacromial bursitis    Plan:  Treatment options discussed pt wishes to proceed with repeat L shoulder and L thumb CMC injections. She will continue home exercise program   RTC as needed       PROCEDURE:  I have explained the risks, benefits, and alternatives of the procedure in detail.  The patient voices understanding and all questions have been answered. Verbal consent obtained. Fluoroscopy used. The patient agrees to proceed as planned. So after a sterile prep of the skin in the normal fashion the left thumb CMC is injected  using a 25 gauge needle with a combination of 0.5cc 1% plain lidocaine and 4 mg of dexamethasone.  The patient is cautioned and immediate relief of pain is secondary to the local anesthetic and will be temporary.  After the anesthetic wears off there may be a increase in pain that may last for a few hours or a few days and they should use ice to help alleviate this flair up of pain. Patient tolerated the procedure well.     I have explained the risks, benefits, and alternatives of the procedure in detail.  The patient voices understanding and all questions have been answered.  The patient agrees to proceed as planned. After a sterile prep of the skin in the normal the left shoulder is injected from the posterior approach using a 22 gauge needle with a combination of 8cc 1% lidocaine and 80 mg of kenalog. The patient is cautioned and  immediate relief of pain is secondary to the local anesthetic and will be temporary.  After the anesthetic wears off there may be a increase in pain that may last for a few hours or a few days and they should use ice to help alleviate this flair up of pain.

## 2023-09-26 RX ORDER — FLUTICASONE FUROATE AND VILANTEROL 100; 25 UG/1; UG/1
1 POWDER RESPIRATORY (INHALATION) DAILY
Qty: 90 EACH | Refills: 3 | Status: SHIPPED | OUTPATIENT
Start: 2023-09-26 | End: 2023-10-17 | Stop reason: SDUPTHER

## 2023-09-26 NOTE — TELEPHONE ENCOUNTER
----- Message from Jack Leonard sent at 9/20/2023  3:22 PM CDT -----  Can the clinic reply in [facility]: NO              Please refill the medication(s) listed below. Please call the patient when the prescription(s) is ready for  at this phone number   [phone]           Medication #1 fluticasone furoate-vilanteroL (BREO) 100-25 mcg/dose diskus inhaler         Medication #2           Preferred Pharmacy:[facility] - [facility] [address]   Phone:  [phone]  Fax:  [phone]

## 2023-09-26 NOTE — TELEPHONE ENCOUNTER
Refill Encounter    PCP Visits: Recent Visits  Date Type Provider Dept   07/27/23 Office Visit Alisa Nixon,  [facility]   06/22/23 Office Visit Alisa Nixon DO [facility]   04/25/23 Office Visit Belinda Fajardo MD [facility]   12/20/22 Office Visit Belinda Fajardo MD [facility]   11/10/22 Office Visit Belinda Fajardo MD [facility]   Showing recent visits within past 360 days and meeting all other requirements  Future Appointments  Date Type Provider Dept   11/29/23 Appointment Alisa Nixon,  [facility]   Showing future appointments within next 720 days and meeting all other requirements     Last 3 Blood Pressure:   BP Readings from Last 3 Encounters:   09/06/23 130/79   08/22/23 102/63   07/27/23 130/88     Preferred Pharmacy:   [id number] - [place], [place] - [address] [facility] [address]  [address]  [place] [address]  Phone: [phone] Fax: [phone]    [facility] - [facility] [address]  [address] [facility] [address]  Phone: [phone] Fax: [phone]    [facility]  [address]  [place] [address]  Phone: [phone] Fax: [phone]    Requested RX:  Requested Prescriptions     Pending Prescriptions Disp Refills    fluticasone furoate-vilanteroL (BREO) 100-25 mcg/dose diskus inhaler 90 each 3     Sig: Inhale 1 puff into the lungs once daily. Controller      RX Route: Normal

## 2023-09-26 NOTE — TELEPHONE ENCOUNTER
No care due was identified.  [facility] Embedded Care Due Messages. Reference number: [id number].   9/25/2023 9:15:05 PM CDT

## 2023-09-29 ENCOUNTER — HOSPITAL ENCOUNTER (EMERGENCY)
Facility: HOSPITAL | Age: 78
Discharge: HOME | End: 2023-09-30
Attending: EMERGENCY MEDICINE
Payer: MEDICARE

## 2023-09-29 ENCOUNTER — OFFICE VISIT (OUTPATIENT)
Dept: URGENT CARE | Facility: CLINIC | Age: 78
End: 2023-09-29
Payer: MEDICARE

## 2023-09-29 VITALS
DIASTOLIC BLOOD PRESSURE: 75 MMHG | WEIGHT: 167.56 LBS | TEMPERATURE: 98 F | HEART RATE: 105 BPM | SYSTOLIC BLOOD PRESSURE: 124 MMHG | HEIGHT: 57 IN | RESPIRATION RATE: 16 BRPM | OXYGEN SATURATION: 96 % | BODY MASS INDEX: 36.15 KG/M2

## 2023-09-29 VITALS
DIASTOLIC BLOOD PRESSURE: 77 MMHG | SYSTOLIC BLOOD PRESSURE: 134 MMHG | HEART RATE: 95 BPM | OXYGEN SATURATION: 97 % | HEIGHT: 57 IN | RESPIRATION RATE: 20 BRPM | WEIGHT: 169 LBS | BODY MASS INDEX: 36.46 KG/M2 | TEMPERATURE: 97 F

## 2023-09-29 DIAGNOSIS — M54.50 ACUTE LEFT-SIDED LOW BACK PAIN, UNSPECIFIED WHETHER SCIATICA PRESENT: ICD-10-CM

## 2023-09-29 DIAGNOSIS — M54.32 SCIATICA OF LEFT SIDE: Primary | ICD-10-CM

## 2023-09-29 DIAGNOSIS — M25.562 ACUTE PAIN OF LEFT KNEE: Primary | ICD-10-CM

## 2023-09-29 PROCEDURE — 73562 XR KNEE 3 VIEW LEFT: ICD-10-PCS | Mod: LT,S$GLB,, | Performed by: RADIOLOGY

## 2023-09-29 PROCEDURE — 96372 PR INJECTION,THERAP/PROPH/DIAG2ST, IM OR SUBCUT: ICD-10-PCS | Mod: S$GLB,,, | Performed by: NURSE PRACTITIONER

## 2023-09-29 PROCEDURE — 99214 PR OFFICE/OUTPT VISIT, EST, LEVL IV, 30-39 MIN: ICD-10-PCS | Mod: 25,S$GLB,, | Performed by: NURSE PRACTITIONER

## 2023-09-29 PROCEDURE — 72170 XR PELVIS ROUTINE AP: ICD-10-PCS | Mod: S$GLB,,, | Performed by: RADIOLOGY

## 2023-09-29 PROCEDURE — 72170 X-RAY EXAM OF PELVIS: CPT | Mod: S$GLB,,, | Performed by: RADIOLOGY

## 2023-09-29 PROCEDURE — 99214 OFFICE O/P EST MOD 30 MIN: CPT | Mod: 25,S$GLB,, | Performed by: NURSE PRACTITIONER

## 2023-09-29 PROCEDURE — 99285 EMERGENCY DEPT VISIT HI MDM: CPT | Mod: 25

## 2023-09-29 PROCEDURE — 73562 X-RAY EXAM OF KNEE 3: CPT | Mod: LT,S$GLB,, | Performed by: RADIOLOGY

## 2023-09-29 PROCEDURE — 96372 THER/PROPH/DIAG INJ SC/IM: CPT | Performed by: EMERGENCY MEDICINE

## 2023-09-29 PROCEDURE — 63600175 PHARM REV CODE 636 W HCPCS: Performed by: EMERGENCY MEDICINE

## 2023-09-29 PROCEDURE — 96372 THER/PROPH/DIAG INJ SC/IM: CPT | Mod: S$GLB,,, | Performed by: NURSE PRACTITIONER

## 2023-09-29 RX ORDER — KETOROLAC TROMETHAMINE 30 MG/ML
15 INJECTION, SOLUTION INTRAMUSCULAR; INTRAVENOUS
Status: COMPLETED | OUTPATIENT
Start: 2023-09-29 | End: 2023-09-29

## 2023-09-29 RX ORDER — METHYLPREDNISOLONE SOD SUCC 125 MG
125 VIAL (EA) INJECTION
Status: COMPLETED | OUTPATIENT
Start: 2023-09-29 | End: 2023-09-29

## 2023-09-29 RX ADMIN — KETOROLAC TROMETHAMINE 15 MG: 30 INJECTION, SOLUTION INTRAMUSCULAR; INTRAVENOUS at 11:09

## 2023-09-29 RX ADMIN — METHYLPREDNISOLONE SODIUM SUCCINATE 125 MG: 125 INJECTION, POWDER, FOR SOLUTION INTRAMUSCULAR; INTRAVENOUS at 10:09

## 2023-09-29 NOTE — PROGRESS NOTES
"Subjective:      Patient ID: Marizol Kevin is a 77 y.o. female.    Vitals:  height is 4' 9" (1.448 m) and weight is 76.7 kg (169 lb). Her oral temperature is 97.3 °F (36.3 °C). Her blood pressure is 134/77 and her pulse is 95. Her respiration is 20 and oxygen saturation is 97%.     Chief Complaint: Leg Pain    Pt states "On and off pain behind the left leg in the calf area x's about a week and a half; tried Ibuprofen 800 with no relief; no known injury."    PROVIDER NOTE:    78 Y/O presents to  for evaluation of left leg pain for approx 1.5 weeks. She denies shortness of breath, difficulty breathing, and chest pain. She has taken 800 mg of Ibuprofen for the pain. There has been no history of lengthy travel, immobilization, or recent surgery.    Leg Pain         Musculoskeletal:  Positive for pain and back pain.          Objective:     Physical Exam   Constitutional:  Non-toxic appearance. She does not appear ill. No distress.   HENT:   Head: Normocephalic and atraumatic.   Ears:   Right Ear: External ear normal. impacted cerumen  Left Ear: External ear normal.   Mouth/Throat: Mucous membranes are moist.   Eyes: Extraocular movement intact   Pulmonary/Chest: Effort normal. No respiratory distress.   Abdominal: Normal appearance.   Musculoskeletal: Normal range of motion.         General: No swelling or tenderness. Normal range of motion.      Right ankle: Normal. She exhibits no swelling.      Left ankle: Normal. She exhibits no swelling. No tenderness.      Thoracic back: Normal.        Back:       Comments: No swelling, tenderness, pain, or erythema to left calf   Neurological: She is alert.   Skin: Skin is not diaphoretic.   Nursing note and vitals reviewed.      XR PELVIS ROUTINE AP    Result Date: 9/29/2023  EXAMINATION: XR PELVIS ROUTINE AP CLINICAL HISTORY: Pain in left knee TECHNIQUE: AP view of the pelvis was performed. COMPARISON: None. FINDINGS: No fracture or dislocation.  No significant " degenerative change.  Moderate vascular calcifications are present.     No acute osseous abnormality. Electronically signed by: Cricket Villa MD Date:    09/29/2023 Time:    11:17    XR KNEE 3 VIEW LEFT    Result Date: 9/29/2023  EXAMINATION: XR KNEE 3 VIEW LEFT CLINICAL HISTORY: Pain in left knee TECHNIQUE: AP, lateral, and Merchant views of the left knee were performed. COMPARISON: None FINDINGS: No fracture, dislocation, or joint effusion.  Moderate vascular calcifications are present.     No acute osseous abnormality. Electronically signed by: Cricket Villa MD Date:    09/29/2023 Time:    11:17    Assessment:     1. Acute pain of left knee    2. Acute left-sided low back pain, unspecified whether sciatica present        Plan:   No fracture, dislocation, joint effusion noted to xray  Pelvic xray without fracture or dislocation.   Pt does seem to have some left lower back pain that was noted at the end of the visit from pt. Toradol injection given. Pt concerned about pain, has to go to grand daughter's bridal shower. Advised that she can take Tylenol arthritis for pain. Follow up with Ortho.    Acute pain of left knee  -     XR KNEE 3 VIEW LEFT; Future; Expected date: 09/29/2023  -     XR PELVIS ROUTINE AP; Future; Expected date: 09/29/2023  -     ketorolac injection 15 mg    Acute left-sided low back pain, unspecified whether sciatica present  -     ketorolac injection 15 mg             Patient Instructions   General Discharge Instructions   If you were prescribed a narcotic or controlled medication, do not drive or operate heavy equipment or machinery while taking these medications.  If you were prescribed antibiotics, please take them to completion.  You must understand that you've received an Urgent Care treatment only and that you may be released before all your medical problems are known or treated. You, the patient, will arrange for follow up care as instructed.  Follow up with your PCP or specialty  clinic as directed in the next 1-2 weeks if not improved or as needed.  You can call [phone] to schedule an appointment with the appropriate provider.  If your condition worsens we recommend that you receive another evaluation at the emergency room immediately or contact your primary medical clinics after hours call service to discuss your concerns.  Please return here or go to the Emergency Department for any concerns or worsening of condition.      WE CANNOT RULE OUT ALL POSSIBLE CAUSES OF YOUR SYMPTOMS IN THE URGENT CARE SETTING PLEASE GO TO THE ER IF YOU FEELS YOUR CONDITION IS WORSENING OR YOU WOULD LIKE EMERGENT EVALUATION.

## 2023-09-29 NOTE — FIRST PROVIDER EVALUATION
Emergency Department TeleTriage Encounter Note      CHIEF COMPLAINT    Chief Complaint   Patient presents with    Leg Pain     C/O left leg pain.  Denies trauma.  Seen in urgent care today and given tramadol without relief.  States  now can't walk due to pain.       VITAL SIGNS   Initial Vitals [09/29/23 1853]   BP Pulse Resp Temp SpO2   124/75 105 16 97.6 °F (36.4 °C) 96 %      MAP       --            ALLERGIES    Review of patient's allergies indicates:   Allergen Reactions    Grass pollen-june grass standard     Sulfa (sulfonamide antibiotics)      Occurred when she was pregnant with varicose veins resulting in leg swelling and pain with standing       PROVIDER TRIAGE NOTE  Verbal consent for the teletriage evaluation was given by the patient at the start of the evaluation.  All efforts will be made to maintain patient's privacy during the evaluation.      This is a teletriage evaluation of a 77 y.o. female presenting to the ED with c/o left leg pain, left knee pain, left hip pain, and back pain that started this AM.  Seen at  and diagnosed with sciatica, given toradol shot and had negative xray.  No relief from meds.  Denies trauma/injury.  Limited physical exam via telehealth: The patient is awake, alert, answering questions appropriately and is not in respiratory distress.  As the Teletriage provider, I performed an initial assessment and ordered appropriate labs and imaging studies, if any, to facilitate the patient's care once placed in the ED. Once a room is available, care and a full evaluation will be completed by an alternate ED provider.  Any additional orders and the final disposition will be determined by that provider.  All imaging and labs will not be followed-up by the Teletriage Team, including myself.          ORDERS  Labs Reviewed - No data to display    ED Orders (720h ago, onward)      None              Virtual Visit Note: The provider triage portion of this emergency department evaluation  and documentation was performed via [employer], a HIPAA-compliant telemedicine application, in concert with a tele-presenter in the room. A face to face patient evaluation with one of my colleagues will occur once the patient is placed in an emergency department room.      DISCLAIMER: This note was prepared with [employer] voice recognition transcription software. Garbled syntax, mangled pronouns, and other bizarre constructions may be attributed to that software system.

## 2023-09-30 RX ORDER — NAPROXEN 500 MG/1
500 TABLET ORAL 2 TIMES DAILY WITH MEALS
Qty: 20 TABLET | Refills: 0 | Status: SHIPPED | OUTPATIENT
Start: 2023-09-30 | End: 2024-01-25

## 2023-09-30 RX ORDER — METHOCARBAMOL 500 MG/1
500 TABLET, FILM COATED ORAL 2 TIMES DAILY PRN
Qty: 10 TABLET | Refills: 0 | Status: SHIPPED | OUTPATIENT
Start: 2023-09-30 | End: 2023-10-10

## 2023-09-30 NOTE — ED PROVIDER NOTES
"Encounter Date: 9/29/2023       History     Chief Complaint   Patient presents with    Leg Pain     C/O left leg pain.  Denies trauma.  Seen in urgent care today and given tramadol without relief.  States  now can't walk due to pain.     This piece was seen at [facility] earlier today given a shot of Toradol with some minimal relief to her left hip pain that radiates down into her leg all the way down into the back of her foot.  She describes it as a burning hot sensation.  Denies any bowel or bladder dysfunction denies any new trauma.  "Has never had this before".    The history is provided by the patient and a relative.     Review of patient's allergies indicates:   Allergen Reactions    Grass pollen-june grass standard     Sulfa (sulfonamide antibiotics)      Occurred when she was pregnant with varicose veins resulting in leg swelling and pain with standing     Past Medical History:   Diagnosis Date    Allergy     Anxiety     Cancer     colon    Cataract     Colon cancer     Dependence on nicotine from cigarettes 9/3/2020    Diabetes mellitus, type 2     Dry eye syndrome     Hyperlipidemia     Hypertension     Insomnia     Light cigarette smoker (1-9 cigs/day) 6/21/2022    Malignant neoplasm of ascending colon 7/20/2020    Squamous blepharitis     Syncope 4/16/2022    Tobacco use disorder, moderate, in early remission 11/2/2022    Vitamin D deficiency 9/10/2020     Past Surgical History:   Procedure Laterality Date    CATARACT EXTRACTION, BILATERAL  03/2020    CHOLECYSTECTOMY      COLON SURGERY      Colon cancer    COLONOSCOPY N/A 02/01/2021    Procedure: COLONOSCOPY;  Surgeon: Ashwini Duarte MD;  Location: [address]);  Service: Endoscopy;  Laterality: N/A;  medical transportation  covid test 1/29 [place], instructions mailed-[facility]    EYE SURGERY      Cataract    HYSTERECTOMY      INJECTION OF ANESTHETIC AGENT AROUND NERVE Left 7/21/2023    Procedure: BLOCK, NERVE, LEFT SHOULDER ARTICULAR " BRANCH keep date rep aware;  Surgeon: Abdi Del Toro MD;  Location: [facility];  Service: Pain Management;  Laterality: Left;    JOINT REPLACEMENT      Knee    KNEE SURGERY      LAPAROSCOPIC LEFT COLON RESECTION       Family History   Problem Relation Age of Onset    Heart attack Mother     Heart disease Mother     Colon cancer Father     Diabetes Father     Cancer Sister         unknown    Hypothyroidism Sister     Asthma Sister     Thyroid cancer Neg Hx      Social History     Tobacco Use    Smoking status: Former     Current packs/day: 0.00     Average packs/day: 1 pack/day for 51.0 years (51.0 ttl pk-yrs)     Types: Cigarettes     Start date: 1971     Quit date: 2022     Years since quittin.0    Smokeless tobacco: Never    Tobacco comments:     smoking cessation information given    Substance Use Topics    Alcohol use: Yes     Alcohol/week: 1.0 standard drink of alcohol     Types: 1 Shots of liquor per week     Comment: occ    Drug use: Never     Review of Systems   Constitutional: Negative.    HENT: Negative.     Eyes: Negative.    Respiratory: Negative.     Cardiovascular: Negative.    Gastrointestinal: Negative.    Endocrine: Negative.    Genitourinary: Negative.    Musculoskeletal:  Positive for back pain.   Skin: Negative.    Allergic/Immunologic: Negative.    Neurological: Negative.    Hematological: Negative.    Psychiatric/Behavioral: Negative.     All other systems reviewed and are negative.      Physical Exam     Initial Vitals [23 1853]   BP Pulse Resp Temp SpO2   124/75 105 16 97.6 °F (36.4 °C) 96 %      MAP       --         Physical Exam    Nursing note and vitals reviewed.  Constitutional: Vital signs are normal. She appears well-developed. She is active and cooperative.   HENT:   Head: Normocephalic and atraumatic.   Eyes: Conjunctivae, EOM and lids are normal. Pupils are equal, round, and reactive to light.   Neck: Trachea normal. Neck supple. No thyroid mass present.     Full passive range of motion without pain.     Cardiovascular:  Normal rate, regular rhythm, S1 normal, S2 normal, normal heart sounds, intact distal pulses and normal pulses.           Pulmonary/Chest: Effort normal and breath sounds normal.   Abdominal: Abdomen is soft and protuberant. Bowel sounds are normal.   Musculoskeletal:         General: Normal range of motion.      Cervical back: Full passive range of motion without pain and neck supple.        Legs:      Lymphadenopathy:     She has no axillary adenopathy.   Neurological: She is alert and oriented to person, place, and time. She has normal strength. No cranial nerve deficit or sensory deficit. GCS eye subscore is 4. GCS verbal subscore is 5. GCS motor subscore is 6.   Skin: Skin is warm, dry and intact.   Psychiatric: She has a normal mood and affect. Her speech is normal and behavior is normal. Judgment and thought content normal. Cognition and memory are normal.         ED Course   Procedures  Labs Reviewed - No data to display     Imaging Results              CT Lumbar Spine Without Contrast (Final result)  Result time 09/29/23 23:40:43      Final result by Neymar Mason MD (09/29/23 23:40:43)                   Impression:      Multilevel lumbar stenosis with increasing spondylosis and degenerative change since 2018.    Most severe lumbar stenosis at L3-4 and L4-5.    No new fracture or subluxation.      Electronically signed by: Neymar Mason  Date:    09/29/2023  Time:    23:40               Narrative:    EXAMINATION:  CT LUMBAR SPINE WITHOUT CONTRAST    CLINICAL HISTORY:  Lumbar radiculopathy, symptoms persist with conservative treatment;    TECHNIQUE:  Low-dose axial, sagittal and coronal reformations are obtained through the lumbar spine.  Contrast was not administered.    COMPARISON:  MRI lumbar spine, 06/18/2018    FINDINGS:  Alignment is stable.  Progressive spondylosis with disc space narrowing and vacuum phenomenon increasing at  all levels but most severely at L to L3 L3-L4.  The anterolisthesis of L4 on L5 appear stable.    At T12-L1 there is no significant stenosis.    At the L1-L2 level, mild facet arthropathy with no significant stenosis.    At the L2-L3 level, concentric disc bulge and hypertrophic facet ligamentous changes result in moderate central canal stenosis with trefoil deformity of the thecal sac.    At the L3-L4 level, severe central canal stenosis from concentric disc bulge and hypertrophic facet arthropathy and ligamentum flavum hypertrophy with napkin ring deformity of the traversing thecal sac and cauda equina suggested.    At the L4-5 level, the anterolisthesis with uncovering of the L4-5 disc and facet arthropathy and ligamentous hypertrophy causes moderate central canal stenosis.    At the L5-S1 level, vacuum phenomenon is noted with leakage through the annulus into the epidural space but no evidence of central or foraminal stenosis.    Arthrosclerotic disease throughout the aorta and iliac vessels.                                        Imaging Results              CT Lumbar Spine Without Contrast (Final result)  Result time 09/29/23 23:40:43      Final result by Neymar Mason MD (09/29/23 23:40:43)                   Impression:      Multilevel lumbar stenosis with increasing spondylosis and degenerative change since 2018.    Most severe lumbar stenosis at L3-4 and L4-5.    No new fracture or subluxation.      Electronically signed by: Neymar Mason  Date:    09/29/2023  Time:    23:40               Narrative:    EXAMINATION:  CT LUMBAR SPINE WITHOUT CONTRAST    CLINICAL HISTORY:  Lumbar radiculopathy, symptoms persist with conservative treatment;    TECHNIQUE:  Low-dose axial, sagittal and coronal reformations are obtained through the lumbar spine.  Contrast was not administered.    COMPARISON:  MRI lumbar spine, 06/18/2018    FINDINGS:  Alignment is stable.  Progressive spondylosis with disc space narrowing  and vacuum phenomenon increasing at all levels but most severely at L to L3 L3-L4.  The anterolisthesis of L4 on L5 appear stable.    At T12-L1 there is no significant stenosis.    At the L1-L2 level, mild facet arthropathy with no significant stenosis.    At the L2-L3 level, concentric disc bulge and hypertrophic facet ligamentous changes result in moderate central canal stenosis with trefoil deformity of the thecal sac.    At the L3-L4 level, severe central canal stenosis from concentric disc bulge and hypertrophic facet arthropathy and ligamentum flavum hypertrophy with napkin ring deformity of the traversing thecal sac and cauda equina suggested.    At the L4-5 level, the anterolisthesis with uncovering of the L4-5 disc and facet arthropathy and ligamentous hypertrophy causes moderate central canal stenosis.    At the L5-S1 level, vacuum phenomenon is noted with leakage through the annulus into the epidural space but no evidence of central or foraminal stenosis.    Arthrosclerotic disease throughout the aorta and iliac vessels.                                       Medications   methylPREDNISolone sodium succinate injection 125 mg (125 mg Intramuscular Given 9/29/23 2218)     Medical Decision Making  The patient has abnormalities in her lumbosacral spine consistent with the patient's complaint of L5-S1 radicular symptomatology.  She is no evidence of any cauda equina syndrome or conus medullaris.  Patient's subsequently be discharged home with the appropriate medications and instructions to seek follow up with a neurosurgeon or orthopedic spine specialist.  The CT of the patient's lumbar spine was read by radiology and reviewed by me.    Amount and/or Complexity of Data Reviewed  Radiology: ordered. Decision-making details documented in ED Course.    Risk  Prescription drug management.                               Clinical Impression:   Final diagnoses:  [M54.32] Sciatica of left side (Primary)        ED  Disposition Condition    Discharge Stable          ED Prescriptions       Medication Sig Dispense Start Date End Date Auth. Provider    naproxen (NAPROSYN) 500 MG tablet Take 1 tablet (500 mg total) by mouth 2 (two) times daily with meals. 20 tablet 9/30/2023 -- Oliver Berger MD    methocarbamoL (ROBAXIN) 500 MG Tab Take 1 tablet (500 mg total) by mouth 2 (two) times daily as needed. 10 tablet 9/30/2023 10/5/2023 Oliver Berger MD          Follow-up Information       Follow up With Specialties Details Why Contact Info    Neurosurgeon or orthopedic spine specialist  Schedule an appointment as soon as possible for a visit today               Oliver Berger MD  09/30/23 3862

## 2023-10-01 NOTE — PATIENT INSTRUCTIONS
General Discharge Instructions   If you were prescribed a narcotic or controlled medication, do not drive or operate heavy equipment or machinery while taking these medications.  If you were prescribed antibiotics, please take them to completion.  You must understand that you've received an Urgent Care treatment only and that you may be released before all your medical problems are known or treated. You, the patient, will arrange for follow up care as instructed.  Follow up with your PCP or specialty clinic as directed in the next 1-2 weeks if not improved or as needed.  You can call [phone] to schedule an appointment with the appropriate provider.  If your condition worsens we recommend that you receive another evaluation at the emergency room immediately or contact your primary medical clinics after hours call service to discuss your concerns.  Please return here or go to the Emergency Department for any concerns or worsening of condition.      WE CANNOT RULE OUT ALL POSSIBLE CAUSES OF YOUR SYMPTOMS IN THE URGENT CARE SETTING PLEASE GO TO THE ER IF YOU FEELS YOUR CONDITION IS WORSENING OR YOU WOULD LIKE EMERGENT EVALUATION.

## 2023-10-02 ENCOUNTER — HOSPITAL ENCOUNTER (OUTPATIENT)
Dept: RADIOLOGY | Facility: OTHER | Age: 78
Discharge: HOME | End: 2023-10-02
Attending: NURSE PRACTITIONER
Payer: MEDICARE

## 2023-10-02 ENCOUNTER — INFUSION (OUTPATIENT)
Dept: INFUSION THERAPY | Facility: OTHER | Age: 78
End: 2023-10-02
Attending: STUDENT IN AN ORGANIZED HEALTH CARE EDUCATION/TRAINING PROGRAM
Payer: MEDICARE

## 2023-10-02 ENCOUNTER — OFFICE VISIT (OUTPATIENT)
Dept: HEMATOLOGY/ONCOLOGY | Facility: CLINIC | Age: 78
End: 2023-10-02
Payer: MEDICARE

## 2023-10-02 ENCOUNTER — OFFICE VISIT (OUTPATIENT)
Dept: PAIN MEDICINE | Facility: CLINIC | Age: 78
End: 2023-10-02
Payer: MEDICARE

## 2023-10-02 VITALS
HEART RATE: 93 BPM | TEMPERATURE: 98 F | TEMPERATURE: 98 F | BODY MASS INDEX: 34.02 KG/M2 | SYSTOLIC BLOOD PRESSURE: 110 MMHG | HEIGHT: 57 IN | SYSTOLIC BLOOD PRESSURE: 115 MMHG | WEIGHT: 157.19 LBS | DIASTOLIC BLOOD PRESSURE: 71 MMHG | RESPIRATION RATE: 18 BRPM | OXYGEN SATURATION: 100 % | RESPIRATION RATE: 18 BRPM | HEART RATE: 101 BPM | OXYGEN SATURATION: 100 % | WEIGHT: 157.19 LBS | DIASTOLIC BLOOD PRESSURE: 73 MMHG | BODY MASS INDEX: 33.91 KG/M2

## 2023-10-02 DIAGNOSIS — E11.40 PAINFUL DIABETIC NEUROPATHY: ICD-10-CM

## 2023-10-02 DIAGNOSIS — G89.4 CHRONIC PAIN DISORDER: ICD-10-CM

## 2023-10-02 DIAGNOSIS — I10 ESSENTIAL HYPERTENSION: ICD-10-CM

## 2023-10-02 DIAGNOSIS — E11.42 DIABETIC POLYNEUROPATHY ASSOCIATED WITH TYPE 2 DIABETES MELLITUS: ICD-10-CM

## 2023-10-02 DIAGNOSIS — M54.17 LUMBOSACRAL RADICULOPATHY: Primary | ICD-10-CM

## 2023-10-02 DIAGNOSIS — M25.562 CHRONIC PAIN OF LEFT KNEE: ICD-10-CM

## 2023-10-02 DIAGNOSIS — C18.2 MALIGNANT NEOPLASM OF ASCENDING COLON: Primary | ICD-10-CM

## 2023-10-02 DIAGNOSIS — G89.29 CHRONIC PAIN OF LEFT KNEE: ICD-10-CM

## 2023-10-02 DIAGNOSIS — F17.211 CIGARETTE NICOTINE DEPENDENCE IN REMISSION: ICD-10-CM

## 2023-10-02 DIAGNOSIS — Z85.038 HISTORY OF COLON CANCER: Primary | ICD-10-CM

## 2023-10-02 DIAGNOSIS — E61.1 IRON DEFICIENCY: ICD-10-CM

## 2023-10-02 DIAGNOSIS — R97.0 ELEVATED CEA: ICD-10-CM

## 2023-10-02 PROCEDURE — 1125F PR PAIN SEVERITY QUANTIFIED, PAIN PRESENT: ICD-10-PCS | Mod: HCNC,CPTII,S$GLB, | Performed by: STUDENT IN AN ORGANIZED HEALTH CARE EDUCATION/TRAINING PROGRAM

## 2023-10-02 PROCEDURE — 3044F PR MOST RECENT HEMOGLOBIN A1C LEVEL <7.0%: ICD-10-PCS | Mod: HCNC,CPTII,S$GLB, | Performed by: NURSE PRACTITIONER

## 2023-10-02 PROCEDURE — 3074F SYST BP LT 130 MM HG: CPT | Mod: HCNC,CPTII,S$GLB, | Performed by: STUDENT IN AN ORGANIZED HEALTH CARE EDUCATION/TRAINING PROGRAM

## 2023-10-02 PROCEDURE — 99999 PR PBB SHADOW E&M-EST. PATIENT-LVL III: ICD-10-PCS | Mod: PBBFAC,HCNC,, | Performed by: NURSE PRACTITIONER

## 2023-10-02 PROCEDURE — 1101F PR PT FALLS ASSESS DOC 0-1 FALLS W/OUT INJ PAST YR: ICD-10-PCS | Mod: HCNC,CPTII,S$GLB, | Performed by: STUDENT IN AN ORGANIZED HEALTH CARE EDUCATION/TRAINING PROGRAM

## 2023-10-02 PROCEDURE — 3074F SYST BP LT 130 MM HG: CPT | Mod: HCNC,CPTII,S$GLB, | Performed by: NURSE PRACTITIONER

## 2023-10-02 PROCEDURE — 3078F PR MOST RECENT DIASTOLIC BLOOD PRESSURE < 80 MM HG: ICD-10-PCS | Mod: HCNC,CPTII,S$GLB, | Performed by: STUDENT IN AN ORGANIZED HEALTH CARE EDUCATION/TRAINING PROGRAM

## 2023-10-02 PROCEDURE — 3288F PR FALLS RISK ASSESSMENT DOCUMENTED: ICD-10-PCS | Mod: HCNC,CPTII,S$GLB, | Performed by: NURSE PRACTITIONER

## 2023-10-02 PROCEDURE — 99999 PR PBB SHADOW E&M-EST. PATIENT-LVL III: CPT | Mod: PBBFAC,HCNC,, | Performed by: NURSE PRACTITIONER

## 2023-10-02 PROCEDURE — 73562 XR KNEE 3 VIEW LEFT: ICD-10-PCS | Mod: 26,HCNC,LT, | Performed by: RADIOLOGY

## 2023-10-02 PROCEDURE — 3074F PR MOST RECENT SYSTOLIC BLOOD PRESSURE < 130 MM HG: ICD-10-PCS | Mod: HCNC,CPTII,S$GLB, | Performed by: NURSE PRACTITIONER

## 2023-10-02 PROCEDURE — 73562 X-RAY EXAM OF KNEE 3: CPT | Mod: TC,HCNC,FY,LT

## 2023-10-02 PROCEDURE — 99214 OFFICE O/P EST MOD 30 MIN: CPT | Mod: HCNC,S$GLB,, | Performed by: NURSE PRACTITIONER

## 2023-10-02 PROCEDURE — 1125F AMNT PAIN NOTED PAIN PRSNT: CPT | Mod: HCNC,CPTII,S$GLB, | Performed by: NURSE PRACTITIONER

## 2023-10-02 PROCEDURE — 3044F PR MOST RECENT HEMOGLOBIN A1C LEVEL <7.0%: ICD-10-PCS | Mod: HCNC,CPTII,S$GLB, | Performed by: STUDENT IN AN ORGANIZED HEALTH CARE EDUCATION/TRAINING PROGRAM

## 2023-10-02 PROCEDURE — 99215 PR OFFICE/OUTPT VISIT, EST, LEVL V, 40-54 MIN: ICD-10-PCS | Mod: HCNC,S$GLB,, | Performed by: STUDENT IN AN ORGANIZED HEALTH CARE EDUCATION/TRAINING PROGRAM

## 2023-10-02 PROCEDURE — 1125F AMNT PAIN NOTED PAIN PRSNT: CPT | Mod: HCNC,CPTII,S$GLB, | Performed by: STUDENT IN AN ORGANIZED HEALTH CARE EDUCATION/TRAINING PROGRAM

## 2023-10-02 PROCEDURE — 3074F PR MOST RECENT SYSTOLIC BLOOD PRESSURE < 130 MM HG: ICD-10-PCS | Mod: HCNC,CPTII,S$GLB, | Performed by: STUDENT IN AN ORGANIZED HEALTH CARE EDUCATION/TRAINING PROGRAM

## 2023-10-02 PROCEDURE — A4216 STERILE WATER/SALINE, 10 ML: HCPCS | Mod: HCNC | Performed by: STUDENT IN AN ORGANIZED HEALTH CARE EDUCATION/TRAINING PROGRAM

## 2023-10-02 PROCEDURE — 3044F HG A1C LEVEL LT 7.0%: CPT | Mod: HCNC,CPTII,S$GLB, | Performed by: NURSE PRACTITIONER

## 2023-10-02 PROCEDURE — 99999 PR PBB SHADOW E&M-EST. PATIENT-LVL III: ICD-10-PCS | Mod: PBBFAC,HCNC,, | Performed by: STUDENT IN AN ORGANIZED HEALTH CARE EDUCATION/TRAINING PROGRAM

## 2023-10-02 PROCEDURE — 3078F DIAST BP <80 MM HG: CPT | Mod: HCNC,CPTII,S$GLB, | Performed by: STUDENT IN AN ORGANIZED HEALTH CARE EDUCATION/TRAINING PROGRAM

## 2023-10-02 PROCEDURE — 3288F FALL RISK ASSESSMENT DOCD: CPT | Mod: HCNC,CPTII,S$GLB, | Performed by: NURSE PRACTITIONER

## 2023-10-02 PROCEDURE — 96523 IRRIG DRUG DELIVERY DEVICE: CPT | Mod: HCNC

## 2023-10-02 PROCEDURE — 3078F DIAST BP <80 MM HG: CPT | Mod: HCNC,CPTII,S$GLB, | Performed by: NURSE PRACTITIONER

## 2023-10-02 PROCEDURE — 1101F PT FALLS ASSESS-DOCD LE1/YR: CPT | Mod: HCNC,CPTII,S$GLB, | Performed by: STUDENT IN AN ORGANIZED HEALTH CARE EDUCATION/TRAINING PROGRAM

## 2023-10-02 PROCEDURE — 3078F PR MOST RECENT DIASTOLIC BLOOD PRESSURE < 80 MM HG: ICD-10-PCS | Mod: HCNC,CPTII,S$GLB, | Performed by: NURSE PRACTITIONER

## 2023-10-02 PROCEDURE — 99999 PR PBB SHADOW E&M-EST. PATIENT-LVL III: CPT | Mod: PBBFAC,HCNC,, | Performed by: STUDENT IN AN ORGANIZED HEALTH CARE EDUCATION/TRAINING PROGRAM

## 2023-10-02 PROCEDURE — 1101F PR PT FALLS ASSESS DOC 0-1 FALLS W/OUT INJ PAST YR: ICD-10-PCS | Mod: HCNC,CPTII,S$GLB, | Performed by: NURSE PRACTITIONER

## 2023-10-02 PROCEDURE — 3044F HG A1C LEVEL LT 7.0%: CPT | Mod: HCNC,CPTII,S$GLB, | Performed by: STUDENT IN AN ORGANIZED HEALTH CARE EDUCATION/TRAINING PROGRAM

## 2023-10-02 PROCEDURE — 99215 OFFICE O/P EST HI 40 MIN: CPT | Mod: HCNC,S$GLB,, | Performed by: STUDENT IN AN ORGANIZED HEALTH CARE EDUCATION/TRAINING PROGRAM

## 2023-10-02 PROCEDURE — 99214 PR OFFICE/OUTPT VISIT, EST, LEVL IV, 30-39 MIN: ICD-10-PCS | Mod: HCNC,S$GLB,, | Performed by: NURSE PRACTITIONER

## 2023-10-02 PROCEDURE — 1101F PT FALLS ASSESS-DOCD LE1/YR: CPT | Mod: HCNC,CPTII,S$GLB, | Performed by: NURSE PRACTITIONER

## 2023-10-02 PROCEDURE — 3288F FALL RISK ASSESSMENT DOCD: CPT | Mod: HCNC,CPTII,S$GLB, | Performed by: STUDENT IN AN ORGANIZED HEALTH CARE EDUCATION/TRAINING PROGRAM

## 2023-10-02 PROCEDURE — 63600175 PHARM REV CODE 636 W HCPCS: Mod: HCNC | Performed by: STUDENT IN AN ORGANIZED HEALTH CARE EDUCATION/TRAINING PROGRAM

## 2023-10-02 PROCEDURE — 25000003 PHARM REV CODE 250: Mod: HCNC | Performed by: STUDENT IN AN ORGANIZED HEALTH CARE EDUCATION/TRAINING PROGRAM

## 2023-10-02 PROCEDURE — 1125F PR PAIN SEVERITY QUANTIFIED, PAIN PRESENT: ICD-10-PCS | Mod: HCNC,CPTII,S$GLB, | Performed by: NURSE PRACTITIONER

## 2023-10-02 PROCEDURE — 73562 X-RAY EXAM OF KNEE 3: CPT | Mod: 26,HCNC,LT, | Performed by: RADIOLOGY

## 2023-10-02 PROCEDURE — 3288F PR FALLS RISK ASSESSMENT DOCUMENTED: ICD-10-PCS | Mod: HCNC,CPTII,S$GLB, | Performed by: STUDENT IN AN ORGANIZED HEALTH CARE EDUCATION/TRAINING PROGRAM

## 2023-10-02 RX ORDER — HEPARIN 100 UNIT/ML
500 SYRINGE INTRAVENOUS
Status: COMPLETED | OUTPATIENT
Start: 2023-10-02 | End: 2023-10-02

## 2023-10-02 RX ORDER — SODIUM CHLORIDE 0.9 % (FLUSH) 0.9 %
10 SYRINGE (ML) INJECTION
Status: COMPLETED | OUTPATIENT
Start: 2023-10-02 | End: 2023-10-02

## 2023-10-02 RX ORDER — TRAMADOL HYDROCHLORIDE 50 MG/1
50 TABLET ORAL EVERY 12 HOURS PRN
Qty: 30 TABLET | Refills: 0 | Status: SHIPPED | OUTPATIENT
Start: 2023-10-02 | End: 2023-10-17

## 2023-10-02 RX ADMIN — HEPARIN 500 UNITS: 100 SYRINGE at 01:10

## 2023-10-02 RX ADMIN — SODIUM CHLORIDE, PRESERVATIVE FREE 10 ML: 5 INJECTION INTRAVENOUS at 01:10

## 2023-10-02 NOTE — PROGRESS NOTES
PROGRESS NOTE    Subjective:       Patient ID: Marizol Kevin is a 77 y.o. female.    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 Ms Kevin is a 73 yo woman who initially saw Dr GORDON on 2020 to establish care for her history of colon cancer. She was followed by Dr Hubbard previously. She underwent a right hemicolectomy on 8/15/2014 for cancer of the ascending colon. Four out of 17 lymph nodes were positive. She had a PET on 1/15/2014 which was negative for liver mets. She did have a hypermetabolic parotid lesion on the PET scan. She underwent adjuvant FOLFOX for 6 months from 2014 to 2015. She had elevated CEA and was last seen by Dr Hubbard on 2020. According to Dr Rosales note, the last colonoscopy was in  at [facility]. As per patient the colonoscopy was good. She was told she is due for colonoscopy in 5 years. She is followed by Dr Cabrera for the parotid lesion. Her last CEA was 7.8 on 2019. She is a chronic smoker. 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 Occasional muscle cramps.   2. CEA on 20 elevated at 14.4. CT C/A/P 20: No evidence of metastatic disease. Partially evaluated small right thyroid nodule.  Dedicated thyroid ultrasound may be obtained for further evaluation if there is concern.  3. Biopsy of thyroid lesion on 10/12/20 showed benign pathology.    Interval History:   Ms Kevin returns for follow up. Last cig in 2022. quit smoking.   dysphagia, s/p dilation- improvement   Chronic fatigue - stable      No abd pain, change in bowel habits, no weight loss, no n/v or blood in stool.     ECO    ROS:   A ten-point system review is obtained and negative except for what was stated in the Interval History.     Physical  "Examination:   Vital signs reviewed.   /71 (BP Location: Left arm, Patient Position: Sitting, BP Method: Medium (Automatic))   Pulse 101   Temp 98.4 °F (36.9 °C) (Oral)   Resp 18   Ht 4' 9" (1.448 m)   Wt 71.3 kg (157 lb 3 oz)   SpO2 100%   BMI 34.02 kg/m²     General: well hydrated, well developed, in no acute distress  HEENT: normocephalic, PERRLA, EOMI, anicteric sclerae, oropharynx clear  Neck: supple, no JVD, thyromegaly, cervical or supraclavicular lymphadenopathy  Lungs: clear breath sounds bilaterally, no wheezing, rales, or rhonchi  Chest: port site clean  Heart: RRR, no M/R/G  Abdomen: soft, no tenderness, non-distended, no hepatosplenomegaly, mass, or hernia. BS present  Extremities: no clubbing, cyanosis, or edema  Skin: no rash, ulcer, or open wounds  Neuro: alert and oriented x 4, no focal neuro deficit  Psych: pleasant and appropriate mood and affect    Objective:     Laboratory Data:  Labs reviewed.   Lab Results   Component Value Date    WBC 10.13 08/31/2023    HGB 12.8 08/31/2023    HCT 41.9 08/31/2023    MCV 87 08/31/2023     08/31/2023     08/31/2023    K 4.6 08/31/2023     08/31/2023    CO2 23 08/31/2023    BUN 17 08/31/2023    CREATININE 1.1 08/31/2023    ALT 19 08/31/2023    AST 24 08/31/2023    ALKPHOS 98 08/31/2023    BILITOT 0.4 08/31/2023    MG 1.5 (L) 04/16/2022           Imaging Data:  CT C/A/P 7/29/20: No evidence of metastatic disease. Partially evaluated small right thyroid nodule.  Dedicated thyroid ultrasound may be obtained for further evaluation if there is concern.    Assessment and Plan:     1. History of colon cancer    2. Cigarette nicotine dependence in remission    3. Essential hypertension    4. Elevated CEA      Colon cancer- history of stage III adenocarcinoma of the ascending colon s/p right hemicolectomy on 8/15/2014. Four out of 17 lymph nodes were positive. She underwent adjuvant FOLFOX for 6 months from 11/4/2014 to 4/29/2015.  Her CEA " has been elevated without radiographic evidence of recurrence.   -cea chronically elevated but stable / lower than prev. CT scan to be done if pt is symptomatic or cea level worsens.   -monitor for now       Cscope 2021- Impression:           - Patent functional end-to-end ileo-colonic anastomosis, characterized by mild, focal inflammation. Biopsied.                         - One 3 mm polyp in the sigmoid colon, removed with a cold snare. Resected and retrieved.           - One 4 mm polyp in the rectum, removed with a cold         snare. Resected and retrieved.                         - Diverticulosis in the sigmoid colon.    - The examination was otherwise normal on direct                      and retroflexion views.     Past C scope 2019, 4 mm polyp was removed from transverse colon.   repeat colonoscopy was advised in 3 years    Dysphagia-   EGD 02/09/2023 with Dr. Lorenzo Briones-normal esophagus, normal duodenum, or edema in the antrum and stomach body compatible with gastritis.  Also underwent empiric dilatation for dysphagia    Iron def - not anemic. Iron rich diet advised.     Cigarette smoking in remission  Pt congratulated and encouraged to refrain. CT lung cancer screening 2022- negative. Repeat annually. ordered  Ct chest 3/2023- cat 2 , repeat annually    HTN  - BP good  - c/w current meds and f/u with pcp        Her multiple questions were answered in the clinic. Encouraged to call should issues arise.       I spent >40 mins on reviewing epic chart notes, reviewing tests, nursing concerns,obtaining history, performing physical exam, counseling and educating patient/family/caregiver, documentation, independently interpreting results and discussing them with patient/family/caregiver, care coordination, ordering medications/ tests/ procedures and referring and communicating with other health care professionals.     Electronically signed by Juanita Lebron

## 2023-10-02 NOTE — PLAN OF CARE
Port to chest accessed. Good blood return and flushes easily. Port heparinized per protocol then deaccessed. Bandaid in place.

## 2023-10-02 NOTE — PROGRESS NOTES
Chronic Pain - Established        Chief Complaint:   Chief Complaint   Patient presents with    Leg Pain    Knee Pain    Foot Pain     Interval History 10/2/2023:  The patient presents today to discuss worsened back and left knee pain. She was recently evaluated in the ED for left-sided sciatica. States that her back pain improved with the steroid injection, but she continues to struggle with left knee pain. She denies any back pain today. She did have a lumbar CT in the ED which showed multilevel stenosis, worse at L3-L4 level where there is severe central canal stenosis from concentric disc bulge and hypertrophic facet arthropathy and ligamentum flavum hypertrophy with napkin ring deformity of the traversing thecal sac and cauda equina suggested. She was supposed to be referred to neurosurgery but a referral was not made. She denies bowel or bladder incontinence. Left knee pain is sharp and stabbing in nature. She feels like the knee will give out at time. She has had benefit with Tramadol in the past and is asking for a prescription. Her pain today is 10/10.    Interval History 9/6/2023:  There patient is here for follow up of foot pain and numbness secondary to diabetic neuropathy and previous chemotherapy. She is here today for application of Qutenza patches to bilateral feet. She has burning and numbness to the anterior and posterior feet. She was started on Lyrica 50 mg BID at last OV. She has no side effects from the medication but has not noticed any improvement in symptoms. Her pain today is 9/10.    Interval History 8/22/23:    Ms. Kevin returns for follow-up of her neck pain. Her primary pain today is her neuropathy pain. She is tolerating lyrica 50 mg BID but has noted alopecia since starting. However, it has helped her pain. Given her history of colon cancer and chemo, it is unclear if this is hairloss is from the lyrica.  She also comments on her shoulder pain and how the prior nerve block helped but  did not last long.  It provided >80% relief of her shoulder pain.  She is concerned about repeating the injection because she states the last one cost her $300.   Acupuncture was very helpful for her, but was not covered by her insurance and cost $150 per session.   Regarding her neuropathy, she endorses painful burning in her feet that prevents sleeping. The lyrica and duloxetine have both helped, but she stopped taking duloxetine when she started lyrica. She did not have any known adverse effects from the duloxetine. At her last visit 6/6/23, Qutenza was ordered. Will investigate if steps to approval for that.   We also discussed spinal cord stimulator trial. She has not had spine MRI or psychological clearance.          Initial HPI:  Marizol Kevin has a PMHx of HTN, HLD, DMII, osteoporosis, colon cancer s/p chemothearpy. She presents to the clinic for the evaluation of neuropathic pain along with neck pain that radiates into the left arm. The pain started 5-6 months ago, no specific inciting event. Symptoms have been worsening.The pain is located in the left side of the neck area and radiates to the left periscapular area with further distal radiation to the whole arm.  The pain is described as aching, stabbing, and sqaueezing  and is rated as 9/10. The pain is rated with a score of  5/10 on the BEST day and a score of 9/10 on the WORST day.  Symptoms interfere with daily activity, sleeping, and enjoyment of life. The pain is exacerbated by Laying and Lifting.  The pain is mitigated by nothing.  Pt also endorses severe neuropathic pain in BL hands and feet. She has used gabapentinin the past without any relief. Currently on Cymbalta. Recently started Acupuncture for neuropathy, which she reports has provided significant relief.     Patient denies night fever/night sweats, urinary incontinence, bowel incontinence, significant weight loss, and loss of sensations.    Physical Therapy/Home Exercise: yes and  continues to do home exercises for her shoulder and her balance    Pain Disability Index Review:      10/2/2023    11:37 AM 9/6/2023     1:42 PM 8/22/2023     2:17 PM   Last 3 PDI Scores   Pain Disability Index (PDI) 70 40 28       Pain Medications:  - pregabalin 50 mg BID  - was on duloxetine 30 mg BID  - Previously on gabapentin (dose unknown) but was stopped due to lack of efficacy     report:  Not applicable    Pain Procedures:   SAB injection 3/30/23 --> minimal relief  Left shoulder nerve blocks 7/21/23 --> 80% relief    Imaging:   MRI C Spine 4/15/22  FINDINGS:  large amount of image degradation from artifact.  Straightening of the usual cervical lordosis. Degenerative changes. Disc space narrowing C4-C5, C5-C6. Vertebral body heights and alignment appear maintained without acute compression or subluxation evident.  Although no obvious fracture cannot exclude subtle findings including marrow edema with the large amount of image degradation. Further follow-up or evaluation is to be obtained is suggested by CT. There do appear to be small posterior disc bulges multiple levels, C3 through C 6 without appreciable significant spinal canal narrowing     Impression:     Grossly negative study for acute findings but note is made that limited evaluation with the amount artifact and if concern for acute cervical spine trauma correlation with CT is recommended and note is made the patient did have CT 04/15/2022 please refer to that report.  Subtle findings as suggested on that CT scan likely would not be apparent on the MRI with the degree of artifact.     If further evaluation or follow-up is to be obtained it is recommended to be by CT     This report was flagged in Epic as abnormal.     Final read    CT Lumbar Spine Without Contrast  Order: [id number]  Status: Final result     Visible to patient: Yes (seen)     Next appt: 10/17/2023 at 10:20 AM in Pain Medicine (YOLANDE Granger)     0 Result  Notes  Details    Reading Physician Reading Date Result Priority   Neymar Mason MD  [phone] 9/29/2023 STAT     Narrative & Impression  EXAMINATION:  CT LUMBAR SPINE WITHOUT CONTRAST     CLINICAL HISTORY:  Lumbar radiculopathy, symptoms persist with conservative treatment;     TECHNIQUE:  Low-dose axial, sagittal and coronal reformations are obtained through the lumbar spine.  Contrast was not administered.     COMPARISON:  MRI lumbar spine, 06/18/2018     FINDINGS:  Alignment is stable.  Progressive spondylosis with disc space narrowing and vacuum phenomenon increasing at all levels but most severely at L to L3 L3-L4.  The anterolisthesis of L4 on L5 appear stable.     At T12-L1 there is no significant stenosis.     At the L1-L2 level, mild facet arthropathy with no significant stenosis.     At the L2-L3 level, concentric disc bulge and hypertrophic facet ligamentous changes result in moderate central canal stenosis with trefoil deformity of the thecal sac.     At the L3-L4 level, severe central canal stenosis from concentric disc bulge and hypertrophic facet arthropathy and ligamentum flavum hypertrophy with napkin ring deformity of the traversing thecal sac and cauda equina suggested.     At the L4-5 level, the anterolisthesis with uncovering of the L4-5 disc and facet arthropathy and ligamentous hypertrophy causes moderate central canal stenosis.     At the L5-S1 level, vacuum phenomenon is noted with leakage through the annulus into the epidural space but no evidence of central or foraminal stenosis.     Arthrosclerotic disease throughout the aorta and iliac vessels.     Impression:     Multilevel lumbar stenosis with increasing spondylosis and degenerative change since 2018.     Most severe lumbar stenosis at L3-4 and L4-5.     No new fracture or subluxation.     Past Medical History:   Diagnosis Date    Allergy     Anxiety     Cancer     colon    Cataract     Colon cancer     Dependence on  nicotine from cigarettes 9/3/2020    Diabetes mellitus, type 2     Dry eye syndrome     Hyperlipidemia     Hypertension     Insomnia     Light cigarette smoker (1-9 cigs/day) 2022    Malignant neoplasm of ascending colon 2020    Squamous blepharitis     Syncope 2022    Tobacco use disorder, moderate, in early remission 2022    Vitamin D deficiency 9/10/2020     Past Surgical History:   Procedure Laterality Date    CATARACT EXTRACTION, BILATERAL  2020    CHOLECYSTECTOMY      COLON SURGERY      Colon cancer    COLONOSCOPY N/A 2021    Procedure: COLONOSCOPY;  Surgeon: Ashwini Duarte MD;  [facility]  Service: Endoscopy;  Laterality: N/A;  medical transportation  covid test  [facility], instructions mailed-\[facility]\""    EYE SURGERY      Cataract    HYSTERECTOMY      INJECTION OF ANESTHETIC AGENT AROUND NERVE Left 2023    Procedure: BLOCK, NERVE, LEFT SHOULDER ARTICULAR BRANCH keep date rep aware;  Surgeon: Abdi Del Toro MD;  Location: [facility] PAIN [facility]GT;  Service: Pain Management;  Laterality: Left;    JOINT REPLACEMENT      Knee    KNEE SURGERY      LAPAROSCOPIC LEFT COLON RESECTION       Social History     Socioeconomic History    Marital status:    Tobacco Use    Smoking status: Former     Current packs/day: 0.00     Average packs/day: 1 pack/day for 51.0 years (51.0 ttl pk-yrs)     Types: Cigarettes     Start date: 1971     Quit date: 2022     Years since quittin.0    Smokeless tobacco: Never    Tobacco comments:     smoking cessation information given    Substance and Sexual Activity    Alcohol use: Yes     Alcohol/week: 1.0 standard drink of alcohol     Types: 1 Shots of liquor per week     Comment: occ    Drug use: Never    Sexual activity: Not Currently     Birth control/protection: Abstinence     Social Determinants of Health     Financial Resource Strain: Low Risk  (2023)    Overall Financial Resource Strain (CARDIA)     Difficulty of  Paying Living Expenses: Not hard at all   Food Insecurity: No Food Insecurity (6/7/2023)    Hunger Vital Sign     Worried About Running Out of Food in the Last Year: Never true     Ran Out of Food in the Last Year: Never true   Transportation Needs: No Transportation Needs (6/7/2023)    PRAPARE - Transportation     Lack of Transportation (Medical): No     Lack of Transportation (Non-Medical): No   Physical Activity: Insufficiently Active (6/7/2023)    Exercise Vital Sign     Days of Exercise per Week: 2 days     Minutes of Exercise per Session: 20 min   Stress: No Stress Concern Present (6/7/2023)    [employer] [place] [employer] - Occupational Stress Questionnaire     Feeling of Stress : Not at all   Social Connections: Socially Isolated (6/7/2023)    Social Connection and Isolation Panel [NHANES]     Frequency of Communication with Friends and Family: More than three times a week     Frequency of Social Gatherings with Friends and Family: Twice a week     Attends Quaker Services: Never     Active Member of Clubs or Organizations: No     Attends Club or Organization Meetings: Never     Marital Status:    Housing Stability: Low Risk  (6/7/2023)    Housing Stability Vital Sign     Unable to Pay for Housing in the Last Year: No     Number of Places Lived in the Last Year: 1     Unstable Housing in the Last Year: No     Family History   Problem Relation Age of Onset    Heart attack Mother     Heart disease Mother     Colon cancer Father     Diabetes Father     Cancer Sister         unknown    Hypothyroidism Sister     Asthma Sister     Thyroid cancer Neg Hx        Review of patient's allergies indicates:   Allergen Reactions    Grass pollen-june grass standard     Sulfa (sulfonamide antibiotics)      Occurred when she was pregnant with varicose veins resulting in leg swelling and pain with standing       Current Outpatient Medications   Medication Sig    albuterol (PROVENTIL/VENTOLIN HFA) 90  mcg/actuation inhaler INHALE 2 PUFFS INTO THE LUNGS EVERY 4 (FOUR) HOURS AS NEEDED FOR WHEEZING.    albuterol-ipratropium (DUO-NEB) 2.5 mg-0.5 mg/3 mL nebulizer solution Take 3 mLs by nebulization every 6 (six) hours as needed for Wheezing or Shortness of Breath. Rescue    amLODIPine (NORVASC) 5 MG tablet TAKE 1 TABLET EVERY DAY    aspirin (ECOTRIN) 81 MG EC tablet Take 81 mg by mouth once daily.    atorvastatin (LIPITOR) 40 MG tablet TAKE 1 TABLET EVERY DAY    azelastine (ASTELIN) 137 mcg (0.1 %) nasal spray 1 spray (137 mcg total) by Nasal route 2 (two) times daily.    betamethasone valerate 0.1% (VALISONE) 0.1 % Oint Apply topically once daily.    blood sugar diagnostic (TRUE METRIX GLUCOSE TEST STRIP) Strp Use to test blood glucose two (2) times daily; to be used with insurance-preferred brand of glucometer/supplies    blood-glucose meter kit Please provide with insurance covered meter    cholecalciferol, vitamin D3, (VITAMIN D3) 50 mcg (2,000 unit) Cap Take by mouth.    dulaglutide (TRULICITY) 1.5 mg/0.5 mL pen injector Inject 1.5 mg into the skin every 7 days.    DULoxetine (CYMBALTA) 30 MG capsule Take 1 capsule (30 mg total) by mouth once daily.    ferrous sulfate 325 (65 FE) MG EC tablet Take 1 tablet (325 mg total) by mouth every other day.    fluticasone furoate-vilanteroL (BREO) 100-25 mcg/dose diskus inhaler Inhale 1 puff into the lungs once daily. Controller    heparin, porcine, PF, (HEPARIN FLUSH 100 UNITS/ML) 100 unit/mL Syrg     hydrOXYzine HCL (ATARAX) 25 MG tablet Take 1 tablet (25 mg total) by mouth 3 (three) times daily as needed for Itching or Anxiety.    ketoconazole (NIZORAL) 2 % cream Apply topically once daily. for 14 days    lancets Misc 1 Device by Misc.(Non-Drug; Combo Route) route 2 (two) times daily.    lansoprazole (PREVACID) 30 MG capsule Take 1 capsule by mouth once a day 30 min before breakfast    metFORMIN (GLUCOPHAGE-XR) 500 MG ER 24hr tablet TAKE 2 TABLETS EVERY DAY WITH  BREAKFAST    methocarbamoL (ROBAXIN) 500 MG Tab Take 1 tablet (500 mg total) by mouth 2 (two) times daily as needed.    multivitamin capsule Take 1 capsule by mouth once daily.    naproxen (NAPROSYN) 500 MG tablet Take 1 tablet (500 mg total) by mouth 2 (two) times daily with meals.    ondansetron (ZOFRAN) 4 MG tablet TAKE 1 TABLET EVERY 8 HOURS AS NEEDED FOR NAUSEA    pregabalin (LYRICA) 50 MG capsule Take 1 capsule (50 mg total) by mouth 2 (two) times daily. (Patient not taking: Reported on 10/2/2023)    pregabalin (LYRICA) 75 MG capsule Take 1 capsule (75 mg total) by mouth 2 (two) times daily.    raloxifene (EVISTA) 60 mg tablet TAKE 1 TABLET EVERY DAY    sodium chloride 0.9 % SprA 1 spray by Each Nostril route every evening.    triamcinolone acetonide 0.025% (KENALOG) 0.025 % cream APPLY DAILY TO AFFECTED AREA AS NEEDED FOR ITCHING. MAXIMUM 2 TIMES A WEEK (Patient not taking: Reported on 10/2/2023)     No current facility-administered medications for this visit.       REVIEW OF SYSTEMS:    GENERAL:  No weight loss, malaise or fevers.  HEENT:  Negative for frequent or significant headaches.  NECK:  Negative for lumps, goiter, pain and significant neck swelling.  RESPIRATORY:  Negative for cough, wheezing or shortness of breath.  CARDIOVASCULAR:  Negative for chest pain, leg swelling or palpitations.  GI:  Negative for abdominal discomfort, blood in stools or black stools or change in bowel habits.  MUSCULOSKELETAL:  See HPI.  SKIN:  Negative for lesions, rash, and itching.  PSYCH:  +sleep disturbance, depression .  HEMATOLOGY/LYMPHOLOGY:  Negative for prolonged bleeding, bruising easily or swollen nodes.  NEURO:   No history of headaches, syncope, paralysis, seizures or tremors.  All other reviewed and negative other than HPI.    OBJECTIVE:    /73   Pulse 93   Temp 98.3 °F (36.8 °C)   Resp 18   Wt 71.3 kg (157 lb 3 oz)   SpO2 100%   BMI 34.02 kg/m²     PHYSICAL EXAMINATION:    General appearance:  Well appearing, in no acute distress, alert and oriented x3.  Psych:  Context appropriate thought content, affect and range of emotion. Easily tearful due to chronic pain. Denies SI/HI. Laughs with jokes.  Skin: Skin color, texture, turgor normal, no rashes or lesions, in both upper and lower body.  Back: No TTP over lumbar spine. Full ROM with pain on extension. Mild facet loading bilaterally. SLR is negative.  Msk: Medial joint line tenderness to left knee. Painful extension of left knee.   Neuro: Decreased sensation to bilateral feet. Babinski is negative.  Gait: Antalgic.     ASSESSMENT: 77 y.o. year old female with widespread pain, consistent with the followin. Lumbosacral radiculopathy  Ambulatory referral/consult to Neurosurgery      2. Chronic pain disorder        3. Diabetic polyneuropathy associated with type 2 diabetes mellitus        4. Painful diabetic neuropathy        5. Chronic pain of left knee  X-Ray Knee 3 View Left            PLAN:     - ED Notes and lumbar CT reviewed and independently interpreted.  - Referral placed for neurosurgery giving CT, although she is no longer having back pain.  - Order XRAYs of knees. Consider left knee MRI.  - Schedule for left knee steroid in office with Dr. Del Toro.  - Continue Lyrica 75 mg BID.  - Tramadol 50 mg BID PRN severe pain per Dr. Del Toro.  - Continue duloxetine 30 mg BID.  - RTC 2 weeks after knee injection.    The above plan and management options were discussed at length with patient. Patient is in agreement with the above and verbalized understanding.    Jenny Glass  10/02/2023

## 2023-10-03 ENCOUNTER — TELEPHONE (OUTPATIENT)
Dept: PAIN MEDICINE | Facility: CLINIC | Age: 78
End: 2023-10-03
Payer: MEDICARE

## 2023-10-03 NOTE — TELEPHONE ENCOUNTER
This  message has been escalated to COMPA Glass to further discuss and advise via staff message. Patient has been notified.

## 2023-10-03 NOTE — TELEPHONE ENCOUNTER
----- Message from Dorita Jeter MA sent at 10/3/2023  8:07 AM CDT -----  Name of Who is Calling:ESTEBAN JIMENEZ [[id number]]                What is the request in detail: Pt states she was suppose to get a call yesterday about her x-ray and did not. Please assist.                Can the clinic reply by [facility]: No                What Number to Call Back if not in [facility]: [phone]

## 2023-10-03 NOTE — TELEPHONE ENCOUNTER
----- Message from YOLANDE Jacobs sent at 10/3/2023 12:07 PM CDT -----  Regarding: RE: discuss xray results  I will call her now. I was going through all of the imaging now from this morning.  ----- Message -----  From: Iliana Felder MA  Sent: 10/3/2023  11:38 AM CDT  To: YOLANDE Jacobs  Subject: discuss xray results                             Patient stated that you told her yesterday during a visit that you would call her with her xray results. Would you her to set up a virtual to discuss or will you give her a call?

## 2023-10-05 ENCOUNTER — TELEPHONE (OUTPATIENT)
Dept: PAIN MEDICINE | Facility: CLINIC | Age: 78
End: 2023-10-05
Payer: MEDICARE

## 2023-10-05 NOTE — TELEPHONE ENCOUNTER
----- Message from Ashwini Devan sent at 10/5/2023  1:29 PM CDT -----  Contact: ESTEBAN JIMENEZ [[id number]]  Type: Call Back      Who called: ESTEBAN JIMENEZ [[id number]]      What is the request in detail: Patient is requesting a call back. Pt would like to know if an approval has been received in regard to her knee injection.   Please advise.     Can the clinic reply by [facility]? No      Would the patient rather a call back or a response via [facility]? Call back       Best call back number: [phone] (home)       Additional Information:

## 2023-10-06 ENCOUNTER — TELEPHONE (OUTPATIENT)
Dept: PAIN MEDICINE | Facility: CLINIC | Age: 78
End: 2023-10-06
Payer: MEDICARE

## 2023-10-06 NOTE — TELEPHONE ENCOUNTER
----- Message from Jack Leonard sent at 10/6/2023 10:07 AM CDT -----  Type: Call Back        Who called: ESTEBAN JIMENEZ [[id number]]        What is the request in detail: Patient is requesting a call back. Pt would like to know if an approval has been received in regard to her knee injection.   Please advise.      Can the clinic reply by [facility]? No        Would the patient rather a call back or a response via [facility]? Call back         Best call back number: [phone] (home)         Additional Information:

## 2023-10-06 NOTE — TELEPHONE ENCOUNTER
----- Message from Jack Leonard sent at 10/6/2023 12:22 PM CDT -----  Name of Who is Calling:ESTEBAN JIMENEZ [[id number]]           What is the request in detail: PT STATED SHE WOULD LIKE TO SPEAK WITH  THE OFFICE BECAUSE SHE IS HURTING. I GAVE THE PATIENT UPDATE INFO AND SHE STATRD SHE STILL WOULD LIKE AN CALL TODAY.Please contact to further discuss and advise.            Can the clinic reply by [facility]: NO           [facility]:[phone]

## 2023-10-06 NOTE — TELEPHONE ENCOUNTER
----- Message from Moriah Martinez sent at 10/6/2023  4:27 PM CDT -----  Regarding: pt call back  Name of Who is Calling:         What is the request in detail: is waiting on a call back from Iliana Felder MA, please advise.         Can the clinic reply by [facility]: no          What [facility]: [phone]

## 2023-10-10 ENCOUNTER — OFFICE VISIT (OUTPATIENT)
Dept: NEUROSURGERY | Facility: CLINIC | Age: 78
End: 2023-10-10
Payer: MEDICARE

## 2023-10-10 VITALS
SYSTOLIC BLOOD PRESSURE: 133 MMHG | WEIGHT: 157 LBS | HEART RATE: 74 BPM | BODY MASS INDEX: 33.97 KG/M2 | DIASTOLIC BLOOD PRESSURE: 78 MMHG

## 2023-10-10 DIAGNOSIS — M25.569 KNEE PAIN, UNSPECIFIED CHRONICITY, UNSPECIFIED LATERALITY: Primary | ICD-10-CM

## 2023-10-10 DIAGNOSIS — M54.17 LUMBOSACRAL RADICULOPATHY: ICD-10-CM

## 2023-10-10 PROCEDURE — 1159F PR MEDICATION LIST DOCUMENTED IN MEDICAL RECORD: ICD-10-PCS | Mod: HCNC,CPTII,S$GLB, | Performed by: NURSE PRACTITIONER

## 2023-10-10 PROCEDURE — 1101F PT FALLS ASSESS-DOCD LE1/YR: CPT | Mod: HCNC,CPTII,S$GLB, | Performed by: NURSE PRACTITIONER

## 2023-10-10 PROCEDURE — 1160F PR REVIEW ALL MEDS BY PRESCRIBER/CLIN PHARMACIST DOCUMENTED: ICD-10-PCS | Mod: HCNC,CPTII,S$GLB, | Performed by: NURSE PRACTITIONER

## 2023-10-10 PROCEDURE — 1159F MED LIST DOCD IN RCRD: CPT | Mod: HCNC,CPTII,S$GLB, | Performed by: NURSE PRACTITIONER

## 2023-10-10 PROCEDURE — 3044F HG A1C LEVEL LT 7.0%: CPT | Mod: HCNC,CPTII,S$GLB, | Performed by: NURSE PRACTITIONER

## 2023-10-10 PROCEDURE — 3075F PR MOST RECENT SYSTOLIC BLOOD PRESS GE 130-139MM HG: ICD-10-PCS | Mod: HCNC,CPTII,S$GLB, | Performed by: NURSE PRACTITIONER

## 2023-10-10 PROCEDURE — 3075F SYST BP GE 130 - 139MM HG: CPT | Mod: HCNC,CPTII,S$GLB, | Performed by: NURSE PRACTITIONER

## 2023-10-10 PROCEDURE — 1160F RVW MEDS BY RX/DR IN RCRD: CPT | Mod: HCNC,CPTII,S$GLB, | Performed by: NURSE PRACTITIONER

## 2023-10-10 PROCEDURE — 99999 PR PBB SHADOW E&M-EST. PATIENT-LVL V: ICD-10-PCS | Mod: PBBFAC,HCNC,, | Performed by: NURSE PRACTITIONER

## 2023-10-10 PROCEDURE — 3288F PR FALLS RISK ASSESSMENT DOCUMENTED: ICD-10-PCS | Mod: HCNC,CPTII,S$GLB, | Performed by: NURSE PRACTITIONER

## 2023-10-10 PROCEDURE — 1126F AMNT PAIN NOTED NONE PRSNT: CPT | Mod: HCNC,CPTII,S$GLB, | Performed by: NURSE PRACTITIONER

## 2023-10-10 PROCEDURE — 99214 OFFICE O/P EST MOD 30 MIN: CPT | Mod: HCNC,S$GLB,, | Performed by: NURSE PRACTITIONER

## 2023-10-10 PROCEDURE — 99214 PR OFFICE/OUTPT VISIT, EST, LEVL IV, 30-39 MIN: ICD-10-PCS | Mod: HCNC,S$GLB,, | Performed by: NURSE PRACTITIONER

## 2023-10-10 PROCEDURE — 1126F PR PAIN SEVERITY QUANTIFIED, NO PAIN PRESENT: ICD-10-PCS | Mod: HCNC,CPTII,S$GLB, | Performed by: NURSE PRACTITIONER

## 2023-10-10 PROCEDURE — 3044F PR MOST RECENT HEMOGLOBIN A1C LEVEL <7.0%: ICD-10-PCS | Mod: HCNC,CPTII,S$GLB, | Performed by: NURSE PRACTITIONER

## 2023-10-10 PROCEDURE — 3078F DIAST BP <80 MM HG: CPT | Mod: HCNC,CPTII,S$GLB, | Performed by: NURSE PRACTITIONER

## 2023-10-10 PROCEDURE — 99999 PR PBB SHADOW E&M-EST. PATIENT-LVL V: CPT | Mod: PBBFAC,HCNC,, | Performed by: NURSE PRACTITIONER

## 2023-10-10 PROCEDURE — 1101F PR PT FALLS ASSESS DOC 0-1 FALLS W/OUT INJ PAST YR: ICD-10-PCS | Mod: HCNC,CPTII,S$GLB, | Performed by: NURSE PRACTITIONER

## 2023-10-10 PROCEDURE — 3078F PR MOST RECENT DIASTOLIC BLOOD PRESSURE < 80 MM HG: ICD-10-PCS | Mod: HCNC,CPTII,S$GLB, | Performed by: NURSE PRACTITIONER

## 2023-10-10 PROCEDURE — 3288F FALL RISK ASSESSMENT DOCD: CPT | Mod: HCNC,CPTII,S$GLB, | Performed by: NURSE PRACTITIONER

## 2023-10-10 NOTE — PROGRESS NOTES
Neurosurgery  History & Physical    SUBJECTIVE:     History of Present Illness: Marizol Kevin is a 77 y.o. female PMHx of HTN, HLD, DMII, osteoporosis, colon cancer s/p chemothearpy. She is being seen in clinic today as a referral from pain management to discuss surgical options for her lumbar stenosis. States that she has struggled with intermittent neck and back pain for several years. Endorses severe neuropathic pain in BL hands and feet. The pain was so severe that she was evaluated in the ED on 9/29/23 for left-sided sciatica. States that her back pain improved with the steroid injection, but she continues to struggle with left knee pain. She denies any back pain today.     Review of patient's allergies indicates:   Allergen Reactions    Grass pollen-june grass standard     Sulfa (sulfonamide antibiotics)      Occurred when she was pregnant with varicose veins resulting in leg swelling and pain with standing       Current Outpatient Medications   Medication Sig Dispense Refill    albuterol (PROVENTIL/VENTOLIN HFA) 90 mcg/actuation inhaler INHALE 2 PUFFS INTO THE LUNGS EVERY 4 (FOUR) HOURS AS NEEDED FOR WHEEZING. 54 g 3    albuterol-ipratropium (DUO-NEB) 2.5 mg-0.5 mg/3 mL nebulizer solution Take 3 mLs by nebulization every 6 (six) hours as needed for Wheezing or Shortness of Breath. Rescue 15 mL 5    amLODIPine (NORVASC) 5 MG tablet TAKE 1 TABLET EVERY DAY 90 tablet 3    aspirin (ECOTRIN) 81 MG EC tablet Take 81 mg by mouth once daily.      atorvastatin (LIPITOR) 40 MG tablet TAKE 1 TABLET EVERY DAY 90 tablet 3    azelastine (ASTELIN) 137 mcg (0.1 %) nasal spray 1 spray (137 mcg total) by Nasal route 2 (two) times daily. 30 mL 11    betamethasone valerate 0.1% (VALISONE) 0.1 % Oint Apply topically once daily. 15 g 1    blood sugar diagnostic (TRUE METRIX GLUCOSE TEST STRIP) Strp Use to test blood glucose two (2) times daily; to be used with insurance-preferred brand of glucometer/supplies 200 strip 3     blood-glucose meter kit Please provide with insurance covered meter 1 each 0    cholecalciferol, vitamin D3, (VITAMIN D3) 50 mcg (2,000 unit) Cap Take by mouth.      dulaglutide (TRULICITY) 1.5 mg/0.5 mL pen injector Inject 1.5 mg into the skin every 7 days. 12 pen 3    DULoxetine (CYMBALTA) 30 MG capsule Take 1 capsule (30 mg total) by mouth once daily. 90 capsule 3    ferrous sulfate 325 (65 FE) MG EC tablet Take 1 tablet (325 mg total) by mouth every other day. 45 tablet 3    flu vac 2023 65up-lxaVP69S,PF, (FLUAD QUAD 2023-24,65Y UP,,PF,) 60 mcg (15 mcg x 4)/0.5 mL Syrg Inject 0.5 mLs into the muscle once. for 1 dose 0.5 mL 0    fluticasone furoate-vilanteroL (BREO) 100-25 mcg/dose diskus inhaler Inhale 1 puff into the lungs once daily. Controller 90 each 3    heparin, porcine, PF, (HEPARIN FLUSH 100 UNITS/ML) 100 unit/mL Syrg       hydrOXYzine HCL (ATARAX) 25 MG tablet Take 1 tablet (25 mg total) by mouth 3 (three) times daily as needed for Itching or Anxiety. 30 tablet 3    ketoconazole (NIZORAL) 2 % cream Apply topically once daily. for 14 days 60 g 0    lancets Misc 1 Device by Misc.(Non-Drug; Combo Route) route 2 (two) times daily. 200 each 11    lansoprazole (PREVACID) 30 MG capsule Take 1 capsule by mouth once a day 30 min before breakfast 30 capsule 5    metFORMIN (GLUCOPHAGE-XR) 500 MG ER 24hr tablet TAKE 2 TABLETS EVERY DAY WITH BREAKFAST 180 tablet 1    methocarbamoL (ROBAXIN) 500 MG Tab Take 1 tablet (500 mg total) by mouth 2 (two) times daily as needed. 10 tablet 0    multivitamin capsule Take 1 capsule by mouth once daily.      naproxen (NAPROSYN) 500 MG tablet Take 1 tablet (500 mg total) by mouth 2 (two) times daily with meals. 20 tablet 0    ondansetron (ZOFRAN) 4 MG tablet TAKE 1 TABLET EVERY 8 HOURS AS NEEDED FOR NAUSEA 30 tablet 0    pregabalin (LYRICA) 50 MG capsule Take 1 capsule (50 mg total) by mouth 2 (two) times daily. 60 capsule 0    pregabalin (LYRICA) 75 MG capsule Take 1 capsule (75  mg total) by mouth 2 (two) times daily. 60 capsule 0    raloxifene (EVISTA) 60 mg tablet TAKE 1 TABLET EVERY DAY 90 tablet 4    RSVPreF3 antigen-AS01E, PF, (AREXVY, PF,) 120 mcg/0.5 mL SusR vaccine Inject 0.5 mLs into the muscle once. for 1 dose 1 each 0    sodium chloride 0.9 % SprA 1 spray by Each Nostril route every evening. 1 each 0    traMADoL (ULTRAM) 50 mg tablet Take 1 tablet (50 mg total) by mouth every 12 (twelve) hours as needed for Pain. 30 tablet 0    traMADoL (ULTRAM) 50 mg tablet Take one tablet by mouth every 12 hours as needed for pain 30 tablet 0    triamcinolone acetonide 0.025% (KENALOG) 0.025 % cream APPLY DAILY TO AFFECTED AREA AS NEEDED FOR ITCHING. MAXIMUM 2 TIMES A WEEK 15 g 1     No current facility-administered medications for this visit.       Past Medical History:   Diagnosis Date    Allergy     Anxiety     Cancer     colon    Cataract     Colon cancer     Dependence on nicotine from cigarettes 9/3/2020    Diabetes mellitus, type 2     Dry eye syndrome     Hyperlipidemia     Hypertension     Insomnia     Light cigarette smoker (1-9 cigs/day) 6/21/2022    Malignant neoplasm of ascending colon 7/20/2020    Squamous blepharitis     Syncope 4/16/2022    Tobacco use disorder, moderate, in early remission 11/2/2022    Vitamin D deficiency 9/10/2020     Past Surgical History:   Procedure Laterality Date    CATARACT EXTRACTION, BILATERAL  03/2020    CHOLECYSTECTOMY      COLON SURGERY      Colon cancer    COLONOSCOPY N/A 02/01/2021    Procedure: COLONOSCOPY;  Surgeon: Ashwini Duarte MD;  Location: [address]);  Service: Endoscopy;  Laterality: N/A;  medical transportation  covid test 1/29 [facility], instructions mailed-[facility]    EYE SURGERY      Cataract    HYSTERECTOMY      INJECTION OF ANESTHETIC AGENT AROUND NERVE Left 7/21/2023    Procedure: BLOCK, NERVE, LEFT SHOULDER ARTICULAR BRANCH keep date rep aware;  Surgeon: Abdi Del Toro MD;  Location: [facility];  Service: Pain  Management;  Laterality: Left;    JOINT REPLACEMENT      Knee    KNEE SURGERY      LAPAROSCOPIC LEFT COLON RESECTION       Family History       Problem Relation (Age of Onset)    Asthma Sister    Cancer Sister    Colon cancer Father    Diabetes Father    Heart attack Mother    Heart disease Mother    Hypothyroidism Sister          Social History     Socioeconomic History    Marital status:    Tobacco Use    Smoking status: Former     Current packs/day: 0.00     Average packs/day: 1 pack/day for 51.0 years (51.0 ttl pk-yrs)     Types: Cigarettes     Start date: 1971     Quit date: 2022     Years since quittin.0    Smokeless tobacco: Never    Tobacco comments:     smoking cessation information given    Substance and Sexual Activity    Alcohol use: Yes     Alcohol/week: 1.0 standard drink of alcohol     Types: 1 Shots of liquor per week     Comment: occ    Drug use: Never    Sexual activity: Not Currently     Birth control/protection: Abstinence     Social Determinants of Health     Financial Resource Strain: Low Risk  (2023)    Overall Financial Resource Strain (CARDIA)     Difficulty of Paying Living Expenses: Not hard at all   Food Insecurity: No Food Insecurity (2023)    Hunger Vital Sign     Worried About Running Out of Food in the Last Year: Never true     Ran Out of Food in the Last Year: Never true   Transportation Needs: No Transportation Needs (2023)    PRAPARE - Transportation     Lack of Transportation (Medical): No     Lack of Transportation (Non-Medical): No   Physical Activity: Insufficiently Active (2023)    Exercise Vital Sign     Days of Exercise per Week: 2 days     Minutes of Exercise per Session: 20 min   Stress: No Stress Concern Present (2023)    [place] [employer] - Occupational Stress Questionnaire     Feeling of Stress : Not at all   Social Connections: Socially Isolated (2023)    Social Connection and Isolation Panel [NHANES]      Frequency of Communication with Friends and Family: More than three times a week     Frequency of Social Gatherings with Friends and Family: Twice a week     Attends [employer] Services: Never     Active Member of Clubs or Organizations: No     Attends Club or Organization Meetings: Never     Marital Status:    Housing Stability: Low Risk  (6/7/2023)    Housing Stability Vital Sign     Unable to Pay for Housing in the Last Year: No     Number of Places Lived in the Last Year: 1     Unstable Housing in the Last Year: No       Review of Systems    OBJECTIVE:     Vital Signs  Pulse: 74  BP: 133/78  Pain Score: 0-No pain  Weight: 71.2 kg (157 lb)  Body mass index is 33.97 kg/m².      Neurosurgery Physical Exam  General: well developed, well nourished, no distress.   Head: normocephalic, atraumatic  Neurologic: Alert and oriented. Thought content appropriate.  GCS: Motor: 6/Verbal: 5/Eyes: 4 GCS Total: 15  Mental Status: Awake, Alert, Oriented x 4  Language: No aphasia  Speech: No dysarthria  Cranial nerves: face symmetric, tongue midline, CN II-XII grossly intact.   Eyes: pupils equal, round, reactive to light with accomodation, EOMI.   Pulmonary: normal respirations, no signs of respiratory distress  Abdomen: soft, non-distended  Skin: Skin is warm, dry and intact.  Sensory: intact to light touch throughout  Motor Strength:Moves all extremities spontaneously with good tone.      Diagnostic Results:  I have personally reviewed the CT lumbar spine dated 9/29/23, which shows multilevel degenerative changes most pronounced L3/4 and L4/5 with severe stenosis. Vacuum disc phenomenon noted at several levels.     ASSESSMENT/PLAN:   Marizol Kevin is a 77 y.o. female PMHx of HTN, HLD, DMII, osteoporosis, colon cancer s/p chemothearpy. She was seen in clinic today as a referral from pain management to discuss surgical options for her lumbar stenosis. States that her back pain improved with the steroid injection  in the ED, but she continues to struggle with left knee pain. She is not interested in back surgery at this time. I have placed a referral to orthopedics regarding her knee pain. I have encouraged her to contact the clinic with any questions, concerns, or adverse clinical changes. She verbalized understanding.      YOLANDE Helm-ANICETO  Neurosurgery  [facility].      Note dictated with voice recognition software, please excuse any grammatical errors.

## 2023-10-17 ENCOUNTER — PROCEDURE VISIT (OUTPATIENT)
Dept: PAIN MEDICINE | Facility: CLINIC | Age: 78
End: 2023-10-17
Payer: MEDICARE

## 2023-10-17 ENCOUNTER — TELEPHONE (OUTPATIENT)
Dept: PAIN MEDICINE | Facility: CLINIC | Age: 78
End: 2023-10-17
Payer: MEDICARE

## 2023-10-17 VITALS
BODY MASS INDEX: 33.97 KG/M2 | DIASTOLIC BLOOD PRESSURE: 75 MMHG | OXYGEN SATURATION: 100 % | HEART RATE: 98 BPM | WEIGHT: 157 LBS | RESPIRATION RATE: 18 BRPM | SYSTOLIC BLOOD PRESSURE: 137 MMHG | TEMPERATURE: 99 F

## 2023-10-17 DIAGNOSIS — J45.20 MILD INTERMITTENT ASTHMA WITHOUT COMPLICATION: ICD-10-CM

## 2023-10-17 DIAGNOSIS — G89.4 CHRONIC PAIN DISORDER: Primary | ICD-10-CM

## 2023-10-17 DIAGNOSIS — G89.29 CHRONIC PAIN OF LEFT KNEE: ICD-10-CM

## 2023-10-17 DIAGNOSIS — M25.562 CHRONIC PAIN OF LEFT KNEE: ICD-10-CM

## 2023-10-17 DIAGNOSIS — M17.10 PRIMARY OSTEOARTHRITIS OF KNEE, UNSPECIFIED LATERALITY: ICD-10-CM

## 2023-10-17 PROCEDURE — 20611 DRAIN/INJ JOINT/BURSA W/US: CPT | Mod: HCNC,LT,S$GLB, | Performed by: ANESTHESIOLOGY

## 2023-10-17 PROCEDURE — 20611 PR DRAIN/ASP/INJECT MAJOR JOINT/BURSA W/US GUIDANCE: ICD-10-PCS | Mod: HCNC,LT,S$GLB, | Performed by: ANESTHESIOLOGY

## 2023-10-17 RX ORDER — FLUTICASONE FUROATE AND VILANTEROL 100; 25 UG/1; UG/1
1 POWDER RESPIRATORY (INHALATION) DAILY
Qty: 90 EACH | Refills: 3 | Status: SHIPPED | OUTPATIENT
Start: 2023-10-17 | End: 2023-10-20 | Stop reason: SDUPTHER

## 2023-10-17 NOTE — TELEPHONE ENCOUNTER
Refill Encounter    PCP Visits: Recent Visits  Date Type Provider Dept   07/27/23 Office Visit Alisa Nixon,  [facility]   06/22/23 Office Visit Alisa Nixon DO [facility]   04/25/23 Office Visit Belinda Fajardo MD [facility]   12/20/22 Office Visit Belinda Fajardo MD [facility]   11/10/22 Office Visit Belinda Fajardo MD [facility]   Showing recent visits within past 360 days and meeting all other requirements  Future Appointments  Date Type Provider Dept   11/29/23 Appointment Alisa Nixon,  [facility]   Showing future appointments within next 720 days and meeting all other requirements     Last 3 Blood Pressure:   BP Readings from Last 3 Encounters:   10/10/23 133/78   10/02/23 110/73   10/02/23 115/71     Preferred Pharmacy:   [id number] - [place], [place] - [address] AT [facility] [address]  [address]  [place] [address]  Phone: [phone] Fax: [phone]    [facility] - [place], [place] - [address]  [address] [address]  Phone: [phone] Fax: [phone]    [facility]  [address]  [place] [address]  Phone: [phone] Fax: [phone]    [facility] [address] [place] [address] [address] [facility]  [address] [facility] [address]  Phone: [phone] Fax: [phone]    Requested RX:  Requested Prescriptions     Pending Prescriptions Disp Refills    fluticasone furoate-vilanteroL (BREO) 100-25 mcg/dose diskus inhaler 90 each 3     Sig: Inhale 1 puff into the lungs once daily. Controller      RX Route: Normal

## 2023-10-17 NOTE — PROCEDURES
Patient Name: Marizol Kevin  MRN: [id number]    INFORMED CONSENT: The procedure, risks, benefits and options were discussed with patient. There are no contraindications to the procedure. The patient expressed understanding and agreed to proceed. The personnel performing the procedure was discussed. I verify that I personally obtained Marizol's consent prior to the start of the procedure and the signed consent can be found on the patient's chart.    Procedure Date: 10/17/2023    Anesthesia: Topical    Pre Procedure diagnosis:   1. Chronic pain disorder    2. Primary osteoarthritis of knee, unspecified laterality      Post-Procedure diagnosis: same      Sedation: None    PROCEDURE: LEFT KNEE INTRAARTICULAR ASPIRATION AND INJECTION under ultrasound guide    Patient in the supine position with LEFT knee bending 30 degrees, the area of the LEFT knee was prepped with chlorhexidine .  After performing time out and palpating the superior-lateral pattellar aspect A high-frequency linear transducer ultrasound probe is placed along the superolateral recess. After visualization and confirmation of capsular space, A 22 Gauge 1.5 inch needle was slowly advance Using an in-plane approach, and directed into the suprapatellar joint space towards the knee joint. A 10cc syringe was used to aspirate straw colored synovial fluid, a total of ~12cc were aspirated. Then, A separate 5cc syringe containing 3 cc of bupivicaine 0.25% and 40 mg kenalog was injected in the Knee joint.      No complications. Patient tolerated procedure well.    Blood Loss: Nill  Specimen: None    Simon Chávez MD      I have reviewed the notes, assessments, and/or procedures performed by resident, I concur with her/his documentation of Marizol Kevin.    Abdi Del Toro MD

## 2023-10-17 NOTE — TELEPHONE ENCOUNTER
No care due was identified.  [facility] Embedded Care Due Messages. Reference number: [id number].   10/17/2023 11:37:55 AM CDT

## 2023-10-20 ENCOUNTER — TELEPHONE (OUTPATIENT)
Dept: PRIMARY CARE CLINIC | Facility: CLINIC | Age: 78
End: 2023-10-20
Payer: MEDICARE

## 2023-10-20 DIAGNOSIS — J45.20 MILD INTERMITTENT ASTHMA WITHOUT COMPLICATION: ICD-10-CM

## 2023-10-20 RX ORDER — FLUTICASONE FUROATE AND VILANTEROL 100; 25 UG/1; UG/1
1 POWDER RESPIRATORY (INHALATION) DAILY
Qty: 90 EACH | Refills: 3 | Status: SHIPPED | OUTPATIENT
Start: 2023-10-20 | End: 2024-01-25

## 2023-10-20 NOTE — TELEPHONE ENCOUNTER
----- Message from Db Werner MA sent at 10/19/2023  3:54 PM CDT -----    ----- Message -----  From: Dorita Jeter MA  Sent: 10/19/2023  12:47 PM CDT  To: Hussain Cuellar Staff    Name of Who is Calling: Niels with [facility] calling on behalf of ESTEBAN JIMENEZ [[id number]]                What is the request in detail: [facility] has some f/u questions for fluticasone furoate-vilanteroL (BREO) 100-25 mcg/dose diskus inhaler. They want to know if the dr wants to change it for BREO that is covered.Please assist.                Can the clinic reply by [facility]: No                What Number to Call Back if not in [facility]: [phone] ref#[id number]

## 2023-10-20 NOTE — TELEPHONE ENCOUNTER
Breo sent to [facility].  Please call pharmacy and make sure the prescription is correct.  We have sent this prescription for patient several times she is had difficulty getting it.  Please see what the issue is.    Orders Placed This Encounter    fluticasone furoate-vilanteroL (BREO) 100-25 mcg/dose diskus inhaler     Dr. Alisa Nixon D.O.   Family Medicine

## 2023-10-23 NOTE — TELEPHONE ENCOUNTER
Oct 13 was the last filled. The next fill is in December. Stated  that the medication was sent out on Friday 13 and delivered on oct 16.

## 2023-10-27 ENCOUNTER — TELEPHONE (OUTPATIENT)
Dept: PAIN MEDICINE | Facility: CLINIC | Age: 78
End: 2023-10-27
Payer: MEDICARE

## 2023-10-27 RX ORDER — HYDROCODONE BITARTRATE AND ACETAMINOPHEN 5; 325 MG/1; MG/1
1 TABLET ORAL EVERY 12 HOURS PRN
Qty: 14 TABLET | Refills: 0 | Status: SHIPPED | OUTPATIENT
Start: 2023-10-27 | End: 2023-11-03

## 2023-10-27 NOTE — TELEPHONE ENCOUNTER
----- Message from Aracely Miller MA sent at 10/27/2023 10:56 AM CDT -----  Regarding: Increased knee pain  Good morning Dr. Del Toro,     I had the pleasure of speaking to Ms. Kevin, she had a knee steroid injection on 10.17.23- she does report some pain decreased right after, however she is experiencing increased pain daily and a few times, her leg gave away and she has so far prevented herself from actually hitting the floor.      She currently is taking the tramadol and it is causing her to have extensive itching. She wanted to be able to attend her daughter wedding and enjoy herself but she doesn't believe she will be able to due to the pain. She is wondering if there is anything you would recommend to help decrease the pain for her.     She continues to take the lyrica, topical creams and not getting any relief.     Any options for her?     Aracely

## 2023-10-30 ENCOUNTER — TELEPHONE (OUTPATIENT)
Dept: PAIN MEDICINE | Facility: CLINIC | Age: 78
End: 2023-10-30
Payer: MEDICARE

## 2023-10-30 NOTE — TELEPHONE ENCOUNTER
----- Message from Tequila Lorenzo sent at 10/30/2023  8:02 AM CDT -----  Regarding: knee still hurting  Name of Who is Calling:ESTEBAN JIMENEZ [[id number]          What is the request in detail: pt was in 10/17. She states that her knee is still hurting really bad and is asking if she can come in and get an injection in her knee . Please call back to schedule           Can the clinic reply by [facility]:          What Number to Call Back [facility] not [facility]: [phone]

## 2023-10-31 ENCOUNTER — TELEPHONE (OUTPATIENT)
Dept: PAIN MEDICINE | Facility: CLINIC | Age: 78
End: 2023-10-31
Payer: MEDICARE

## 2023-10-31 NOTE — TELEPHONE ENCOUNTER
----- Message from Elizabeth Kaba sent at 10/31/2023  4:13 PM CDT -----      Name of Who is Calling: ESTEBAN JIMENEZ [[id number]]      What is the request in detail: Pt called to speak with the office.Please contact to further discuss and advise.          Can the clinic reply by [facility]: Y      What Number to Call Back [facility] not [facility]: [phone]

## 2023-10-31 NOTE — TELEPHONE ENCOUNTER
Staff spoke with pt about her knee injection. Pt has an upcoming appointment with nava to be further evaluated for  to put orders in for her to get another procedure.pt states it's not just her knee that's giving her problems it's also her lower back.    ----- Message from Elizabeth Kaba sent at 10/31/2023  4:13 PM CDT -----      Name of Who is Calling: ESTEBAN JIMENEZ [[id number]]      What is the request in detail: Pt called to speak with the office.Please contact to further discuss and advise.          Can the clinic reply by [facility]: Y      What Number to Call Back if not in [facility]: [phone]

## 2023-10-31 NOTE — TELEPHONE ENCOUNTER
----- Message from Elizabeth Kaba sent at 10/31/2023  4:13 PM CDT -----      Name of Who is Calling: ESTEBAN JIMENEZ [[id number]]      What is the request in detail: Pt called to speak with the office.Please contact to further discuss and advise.          Can the clinic reply by [facility]: Y      What [facility] to Call Back [facility]: [phone]

## 2023-11-10 DIAGNOSIS — M47.812 CERVICAL SPONDYLOSIS: ICD-10-CM

## 2023-11-10 DIAGNOSIS — M50.30 DDD (DEGENERATIVE DISC DISEASE), CERVICAL: ICD-10-CM

## 2023-11-10 RX ORDER — PREGABALIN 50 MG/1
50 CAPSULE ORAL 2 TIMES DAILY
Qty: 60 CAPSULE | Refills: 0 | Status: SHIPPED | OUTPATIENT
Start: 2023-11-10 | End: 2024-01-25

## 2023-11-10 NOTE — TELEPHONE ENCOUNTER
----- Message from Ashwini Hartman sent at 11/10/2023 12:42 PM CST -----  Contact: ESTEBAN JIMENEZ [[id number]]  Type: RX Refill Request    Who Called:  ESTEBAN JIMENEZ [[id number]]    Refill or New Rx: Refill     RX Name and Strength: pregabalin (LYRICA) 50 MG capsule      Is this a 30 day or 90 day RX: 90 days     Preferred Pharmacy with phone number: [facility] [address]    Local or Mail Order: Mail       Would the patient rather a call back or a response via [facility]? Call back     Best Call Back Number: [phone] (home)         Additional Information:

## 2023-11-10 NOTE — TELEPHONE ENCOUNTER
----- Message from Ashwini Hartman sent at 11/10/2023 12:42 PM CST -----  Contact: ESTEBAN JIMENEZ [[id number]]  Type: RX Refill Request    Who Called:  ESTEBAN JIMENEZ [[id number]]    Refill or New Rx: Refill     RX Name and Strength: pregabalin (LYRICA) 50 MG capsule      Is this a 30 day or 90 day RX: 90 days     Preferred Pharmacy with phone number: [facility] - [facility] [address]    Local or Mail Order: Mail       Would the patient rather a call back or a response via [facility]? Call back     Best Call Back Number: [phone] (home)         Additional Information:

## 2023-11-10 NOTE — TELEPHONE ENCOUNTER
Pt refill request for pregabalin has been sent over to her provider and is now pending an approval.    ----- Message from Ashwini Hartman sent at 11/10/2023 12:42 PM CST -----  Contact: ESTEBAN JIMENEZ [[id number]]  Type: RX Refill Request    Who Called:  ESTEBAN JIMENEZ [[id number]]    Refill or New Rx: Refill     RX Name and Strength: pregabalin (LYRICA) 50 MG capsule      Is this a 30 day or 90 day RX: 90 days     Preferred Pharmacy with phone number: [facility] - [place], [place] - [address]    Local or Mail Order: Mail       Would the patient rather a call back or a response via [facility]? Call back     Best Call Back Number: [phone] (home)         Additional Information:

## 2023-11-15 DIAGNOSIS — Z78.0 MENOPAUSE: ICD-10-CM

## 2023-11-20 ENCOUNTER — OFFICE VISIT (OUTPATIENT)
Dept: PAIN MEDICINE | Facility: CLINIC | Age: 78
End: 2023-11-20
Payer: MEDICARE

## 2023-11-20 VITALS
RESPIRATION RATE: 12 BRPM | DIASTOLIC BLOOD PRESSURE: 66 MMHG | BODY MASS INDEX: 34.52 KG/M2 | SYSTOLIC BLOOD PRESSURE: 109 MMHG | WEIGHT: 160 LBS | HEIGHT: 57 IN | HEART RATE: 104 BPM

## 2023-11-20 DIAGNOSIS — M54.17 LUMBOSACRAL RADICULOPATHY: ICD-10-CM

## 2023-11-20 DIAGNOSIS — M17.10 PRIMARY OSTEOARTHRITIS OF KNEE, UNSPECIFIED LATERALITY: ICD-10-CM

## 2023-11-20 DIAGNOSIS — M54.16 LUMBAR RADICULOPATHY: Primary | ICD-10-CM

## 2023-11-20 DIAGNOSIS — G89.4 CHRONIC PAIN DISORDER: ICD-10-CM

## 2023-11-20 DIAGNOSIS — G89.29 CHRONIC PAIN OF LEFT KNEE: ICD-10-CM

## 2023-11-20 DIAGNOSIS — M25.562 CHRONIC PAIN OF LEFT KNEE: ICD-10-CM

## 2023-11-20 PROCEDURE — 99214 OFFICE O/P EST MOD 30 MIN: CPT | Mod: HCNC,S$GLB,, | Performed by: NURSE PRACTITIONER

## 2023-11-20 PROCEDURE — 1125F PR PAIN SEVERITY QUANTIFIED, PAIN PRESENT: ICD-10-PCS | Mod: HCNC,CPTII,S$GLB, | Performed by: NURSE PRACTITIONER

## 2023-11-20 PROCEDURE — 1160F PR REVIEW ALL MEDS BY PRESCRIBER/CLIN PHARMACIST DOCUMENTED: ICD-10-PCS | Mod: HCNC,CPTII,S$GLB, | Performed by: NURSE PRACTITIONER

## 2023-11-20 PROCEDURE — 1159F PR MEDICATION LIST DOCUMENTED IN MEDICAL RECORD: ICD-10-PCS | Mod: HCNC,CPTII,S$GLB, | Performed by: NURSE PRACTITIONER

## 2023-11-20 PROCEDURE — 3078F PR MOST RECENT DIASTOLIC BLOOD PRESSURE < 80 MM HG: ICD-10-PCS | Mod: HCNC,CPTII,S$GLB, | Performed by: NURSE PRACTITIONER

## 2023-11-20 PROCEDURE — 3288F PR FALLS RISK ASSESSMENT DOCUMENTED: ICD-10-PCS | Mod: HCNC,CPTII,S$GLB, | Performed by: NURSE PRACTITIONER

## 2023-11-20 PROCEDURE — 99214 PR OFFICE/OUTPT VISIT, EST, LEVL IV, 30-39 MIN: ICD-10-PCS | Mod: HCNC,S$GLB,, | Performed by: NURSE PRACTITIONER

## 2023-11-20 PROCEDURE — 1159F MED LIST DOCD IN RCRD: CPT | Mod: HCNC,CPTII,S$GLB, | Performed by: NURSE PRACTITIONER

## 2023-11-20 PROCEDURE — 99999 PR PBB SHADOW E&M-EST. PATIENT-LVL III: CPT | Mod: PBBFAC,HCNC,, | Performed by: NURSE PRACTITIONER

## 2023-11-20 PROCEDURE — 1101F PR PT FALLS ASSESS DOC 0-1 FALLS W/OUT INJ PAST YR: ICD-10-PCS | Mod: HCNC,CPTII,S$GLB, | Performed by: NURSE PRACTITIONER

## 2023-11-20 PROCEDURE — 3074F SYST BP LT 130 MM HG: CPT | Mod: HCNC,CPTII,S$GLB, | Performed by: NURSE PRACTITIONER

## 2023-11-20 PROCEDURE — 3044F HG A1C LEVEL LT 7.0%: CPT | Mod: HCNC,CPTII,S$GLB, | Performed by: NURSE PRACTITIONER

## 2023-11-20 PROCEDURE — 3288F FALL RISK ASSESSMENT DOCD: CPT | Mod: HCNC,CPTII,S$GLB, | Performed by: NURSE PRACTITIONER

## 2023-11-20 PROCEDURE — 3078F DIAST BP <80 MM HG: CPT | Mod: HCNC,CPTII,S$GLB, | Performed by: NURSE PRACTITIONER

## 2023-11-20 PROCEDURE — 3074F PR MOST RECENT SYSTOLIC BLOOD PRESSURE < 130 MM HG: ICD-10-PCS | Mod: HCNC,CPTII,S$GLB, | Performed by: NURSE PRACTITIONER

## 2023-11-20 PROCEDURE — 3044F PR MOST RECENT HEMOGLOBIN A1C LEVEL <7.0%: ICD-10-PCS | Mod: HCNC,CPTII,S$GLB, | Performed by: NURSE PRACTITIONER

## 2023-11-20 PROCEDURE — 1125F AMNT PAIN NOTED PAIN PRSNT: CPT | Mod: HCNC,CPTII,S$GLB, | Performed by: NURSE PRACTITIONER

## 2023-11-20 PROCEDURE — 1101F PT FALLS ASSESS-DOCD LE1/YR: CPT | Mod: HCNC,CPTII,S$GLB, | Performed by: NURSE PRACTITIONER

## 2023-11-20 PROCEDURE — 1160F RVW MEDS BY RX/DR IN RCRD: CPT | Mod: HCNC,CPTII,S$GLB, | Performed by: NURSE PRACTITIONER

## 2023-11-20 PROCEDURE — 99999 PR PBB SHADOW E&M-EST. PATIENT-LVL III: ICD-10-PCS | Mod: PBBFAC,HCNC,, | Performed by: NURSE PRACTITIONER

## 2023-11-20 NOTE — PROGRESS NOTES
Chronic Pain - Established        Chief Complaint:   Chief Complaint   Patient presents with    Knee Pain     Interval History 11/20/2023:  The patient presents today for follow up of lower back and left knee pain. She is s/p left knee steroid injection on 10/17/23 with 80% relief of knee pain. Her knee pain is currently tolerable. She did she neurosurgery since previous encounter and had a referral placed to orthopedics for evaluation. She says that she feels like there is excess fluid in her knee. She does still get intermittent back pain with radiation down the back and side of the left leg and associated numbness. We previously discussed JT and she would like to further discuss at this time. Her pain today is 2/10.    Interval History 10/2/2023:  The patient presents today to discuss worsened back and left knee pain. She was recently evaluated in the ED for left-sided sciatica. States that her back pain improved with the steroid injection, but she continues to struggle with left knee pain. She denies any back pain today. She did have a lumbar CT in the ED which showed multilevel stenosis, worse at L3-L4 level where there is severe central canal stenosis from concentric disc bulge and hypertrophic facet arthropathy and ligamentum flavum hypertrophy with napkin ring deformity of the traversing thecal sac and cauda equina suggested. She was supposed to be referred to neurosurgery but a referral was not made. She denies bowel or bladder incontinence. Left knee pain is sharp and stabbing in nature. She feels like the knee will give out at time. She has had benefit with Tramadol in the past and is asking for a prescription. Her pain today is 10/10.    Interval History 9/6/2023:  There patient is here for follow up of foot pain and numbness secondary to diabetic neuropathy and previous chemotherapy. She is here today for application of Qutenza patches to bilateral feet. She has burning and numbness to the anterior and  posterior feet. She was started on Lyrica 50 mg BID at last OV. She has no side effects from the medication but has not noticed any improvement in symptoms. Her pain today is 9/10.    Interval History 8/22/23:    Ms. Kevin returns for follow-up of her neck pain. Her primary pain today is her neuropathy pain. She is tolerating lyrica 50 mg BID but has noted alopecia since starting. However, it has helped her pain. Given her history of colon cancer and chemo, it is unclear if this is hairloss is from the lyrica.  She also comments on her shoulder pain and how the prior nerve block helped but did not last long.  It provided >80% relief of her shoulder pain.  She is concerned about repeating the injection because she states the last one cost her $300.   Acupuncture was very helpful for her, but was not covered by her insurance and cost $150 per session.   Regarding her neuropathy, she endorses painful burning in her feet that prevents sleeping. The lyrica and duloxetine have both helped, but she stopped taking duloxetine when she started lyrica. She did not have any known adverse effects from the duloxetine. At her last visit 6/6/23, Qutenza was ordered. Will investigate if steps to approval for that.   We also discussed spinal cord stimulator trial. She has not had spine MRI or psychological clearance.          Initial HPI:  Marizol Kevin has a PMHx of HTN, HLD, DMII, osteoporosis, colon cancer s/p chemothearpy. She presents to the clinic for the evaluation of neuropathic pain along with neck pain that radiates into the left arm. The pain started 5-6 months ago, no specific inciting event. Symptoms have been worsening.The pain is located in the left side of the neck area and radiates to the left periscapular area with further distal radiation to the whole arm.  The pain is described as aching, stabbing, and sqaueezing  and is rated as 9/10. The pain is rated with a score of  5/10 on the BEST day and a score of  9/10 on the WORST day.  Symptoms interfere with daily activity, sleeping, and enjoyment of life. The pain is exacerbated by Laying and Lifting.  The pain is mitigated by nothing.  Pt also endorses severe neuropathic pain in BL hands and feet. She has used gabapentinin the past without any relief. Currently on Cymbalta. Recently started Acupuncture for neuropathy, which she reports has provided significant relief.     Patient denies night fever/night sweats, urinary incontinence, bowel incontinence, significant weight loss, and loss of sensations.    Physical Therapy/Home Exercise: yes and continues to do home exercises for her shoulder and her balance    Pain Disability Index Review:      11/20/2023     2:36 PM 10/2/2023    11:37 AM 9/6/2023     1:42 PM   Last 3 PDI Scores   Pain Disability Index (PDI) 10 70 40       Pain Medications:  - pregabalin 50 mg BID  - was on duloxetine 30 mg BID  - Previously on gabapentin (dose unknown) but was stopped due to lack of efficacy     report:  Not applicable    Pain Procedures:   SAB injection 3/30/23 --> minimal relief  Left shoulder nerve blocks 7/21/23 --> 80% relief  10/17/23 Left knee steroid injection- 80% relief    Imaging:   MRI C Spine 4/15/22  FINDINGS:  large amount of image degradation from artifact.  Straightening of the usual cervical lordosis. Degenerative changes. Disc space narrowing C4-C5, C5-C6. Vertebral body heights and alignment appear maintained without acute compression or subluxation evident.  Although no obvious fracture cannot exclude subtle findings including marrow edema with the large amount of image degradation. Further follow-up or evaluation is to be obtained is suggested by CT. There do appear to be small posterior disc bulges multiple levels, C3 through C 6 without appreciable significant spinal canal narrowing     Impression:     Grossly negative study for acute findings but note is made that limited evaluation with the amount artifact  and if concern for acute cervical spine trauma correlation with CT is recommended and note is made the patient did have CT 04/15/2022 please refer to that report.  Subtle findings as suggested on that CT scan likely would not be apparent on the MRI with the degree of artifact.     If further evaluation or follow-up is to be obtained it is recommended to be by CT     This report was flagged in Epic as abnormal.     Final read    CT Lumbar Spine Without Contrast  Order: [id number]  Status: Final result     Visible to patient: Yes (seen)     Next appt: 10/17/2023 at 10:20 AM in Pain Medicine (Jenny Glass, [facility])     0 Result Notes  Details    Reading Physician Reading Date Result Priority   Neymar Mason MD  [phone] 9/29/2023 STAT     Narrative & Impression  EXAMINATION:  CT LUMBAR SPINE WITHOUT CONTRAST     CLINICAL HISTORY:  Lumbar radiculopathy, symptoms persist with conservative treatment;     TECHNIQUE:  Low-dose axial, sagittal and coronal reformations are obtained through the lumbar spine.  Contrast was not administered.     COMPARISON:  MRI lumbar spine, 06/18/2018     FINDINGS:  Alignment is stable.  Progressive spondylosis with disc space narrowing and vacuum phenomenon increasing at all levels but most severely at L to L3 L3-L4.  The anterolisthesis of L4 on L5 appear stable.     At T12-L1 there is no significant stenosis.     At the L1-L2 level, mild facet arthropathy with no significant stenosis.     At the L2-L3 level, concentric disc bulge and hypertrophic facet ligamentous changes result in moderate central canal stenosis with trefoil deformity of the thecal sac.     At the L3-L4 level, severe central canal stenosis from concentric disc bulge and hypertrophic facet arthropathy and ligamentum flavum hypertrophy with napkin ring deformity of the traversing thecal sac and cauda equina suggested.     At the L4-5 level, the anterolisthesis with uncovering of the L4-5 disc and facet  arthropathy and ligamentous hypertrophy causes moderate central canal stenosis.     At the L5-S1 level, vacuum phenomenon is noted with leakage through the annulus into the epidural space but no evidence of central or foraminal stenosis.     Arthrosclerotic disease throughout the aorta and iliac vessels.     Impression:     Multilevel lumbar stenosis with increasing spondylosis and degenerative change since 2018.     Most severe lumbar stenosis at L3-4 and L4-5.     No new fracture or subluxation.     Past Medical History:   Diagnosis Date    Allergy     Anxiety     Cancer     colon    Cataract     Colon cancer     Dependence on nicotine from cigarettes 9/3/2020    Diabetes mellitus, type 2     Dry eye syndrome     Hyperlipidemia     Hypertension     Insomnia     Light cigarette smoker (1-9 cigs/day) 6/21/2022    Malignant neoplasm of ascending colon 7/20/2020    Squamous blepharitis     Syncope 4/16/2022    Tobacco use disorder, moderate, in early remission 11/2/2022    Vitamin D deficiency 9/10/2020     Past Surgical History:   Procedure Laterality Date    CATARACT EXTRACTION, BILATERAL  03/2020    CHOLECYSTECTOMY      COLON SURGERY      Colon cancer    COLONOSCOPY N/A 02/01/2021    Procedure: COLONOSCOPY;  Surgeon: Ashwini Duarte MD;  Location: [facility] ([address] [place] [address]);  Service: Endoscopy;  Laterality: N/A;  medical transportation  covid test 1/29 [employer], instructions mailed-KPvt    EYE SURGERY      Cataract    HYSTERECTOMY      INJECTION OF ANESTHETIC AGENT AROUND NERVE Left 7/21/2023    Procedure: BLOCK, NERVE, LEFT SHOULDER ARTICULAR BRANCH keep date rep aware;  Surgeon: Abdi Del Toro MD;  Location: [facility] [place] [facility];  Service: Pain Management;  Laterality: Left;    JOINT REPLACEMENT      Knee    KNEE SURGERY      LAPAROSCOPIC LEFT COLON RESECTION       Social History     Socioeconomic History    Marital status:    Tobacco Use    Smoking status: Former     Current packs/day: 0.00     Average  packs/day: 1 pack/day for 51.0 years (51.0 ttl pk-yrs)     Types: Cigarettes     Start date: 1971     Quit date: 2022     Years since quittin.1    Smokeless tobacco: Never    Tobacco comments:     smoking cessation information given    Substance and Sexual Activity    Alcohol use: Yes     Alcohol/week: 1.0 standard drink of alcohol     Types: 1 Shots of liquor per week     Comment: occ    Drug use: Never    Sexual activity: Not Currently     Birth control/protection: Abstinence     Social Determinants of Health     Financial Resource Strain: Low Risk  (2023)    Overall Financial Resource Strain (CARDIA)     Difficulty of Paying Living Expenses: Not hard at all   Food Insecurity: No Food Insecurity (2023)    Hunger Vital Sign     Worried About Running Out of Food in the Last Year: Never true     Ran Out of Food in the Last Year: Never true   Transportation Needs: No Transportation Needs (2023)    PRAPARE - Transportation     Lack of Transportation (Medical): No     Lack of Transportation (Non-Medical): No   Physical Activity: Insufficiently Active (2023)    Exercise Vital Sign     Days of Exercise per Week: 2 days     Minutes of Exercise per Session: 20 min   Stress: No Stress Concern Present (2023)    [place] [employer] - Occupational Stress Questionnaire     Feeling of Stress : Not at all   Social Connections: Socially Isolated (2023)    Social Connection and Isolation Panel [NHANES]     Frequency of Communication with Friends and Family: More than three times a week     Frequency of Social Gatherings with Friends and Family: Twice a week     Attends Synagogue Services: Never     Active Member of Clubs or Organizations: No     Attends Club or Organization Meetings: Never     Marital Status:    Housing Stability: Low Risk  (2023)    Housing Stability Vital Sign     Unable to Pay for Housing in the Last Year: No     Number of Places Lived in the  Last Year: 1     Unstable Housing in the Last Year: No     Family History   Problem Relation Age of Onset    Heart attack Mother     Heart disease Mother     Colon cancer Father     Diabetes Father     Cancer Sister         unknown    Hypothyroidism Sister     Asthma Sister     Thyroid cancer Neg Hx        Review of patient's allergies indicates:   Allergen Reactions    Grass pollen-june grass standard     Sulfa (sulfonamide antibiotics)      Occurred when she was pregnant with varicose veins resulting in leg swelling and pain with standing       Current Outpatient Medications   Medication Sig    albuterol (PROVENTIL/VENTOLIN HFA) 90 mcg/actuation inhaler INHALE 2 PUFFS INTO THE LUNGS EVERY 4 (FOUR) HOURS AS NEEDED FOR WHEEZING.    albuterol-ipratropium (DUO-NEB) 2.5 mg-0.5 mg/3 mL nebulizer solution Take 3 mLs by nebulization every 6 (six) hours as needed for Wheezing or Shortness of Breath. Rescue    amLODIPine (NORVASC) 5 MG tablet TAKE 1 TABLET EVERY DAY    aspirin (ECOTRIN) 81 MG EC tablet Take 81 mg by mouth once daily.    atorvastatin (LIPITOR) 40 MG tablet TAKE 1 TABLET EVERY DAY    azelastine (ASTELIN) 137 mcg (0.1 %) nasal spray 1 spray (137 mcg total) by Nasal route 2 (two) times daily.    betamethasone valerate 0.1% (VALISONE) 0.1 % Oint Apply topically once daily.    blood sugar diagnostic (TRUE METRIX GLUCOSE TEST STRIP) Strp Use to test blood glucose two (2) times daily; to be used with insurance-preferred brand of glucometer/supplies    blood-glucose meter kit Please provide with insurance covered meter    cholecalciferol, vitamin D3, (VITAMIN D3) 50 mcg (2,000 unit) Cap Take by mouth.    dulaglutide (TRULICITY) 1.5 mg/0.5 mL pen injector Inject 1.5 mg into the skin every 7 days.    DULoxetine (CYMBALTA) 30 MG capsule Take 1 capsule (30 mg total) by mouth once daily.    ferrous sulfate 325 (65 FE) MG EC tablet Take 1 tablet (325 mg total) by mouth every other day.    fluticasone furoate-vilanteroL  (BREO) 100-25 mcg/dose diskus inhaler Inhale 1 puff into the lungs once daily. Controller    heparin, porcine, PF, (HEPARIN FLUSH 100 UNITS/ML) 100 unit/mL Syrg     hydrOXYzine HCL (ATARAX) 25 MG tablet Take 1 tablet (25 mg total) by mouth 3 (three) times daily as needed for Itching or Anxiety.    ketoconazole (NIZORAL) 2 % cream Apply topically once daily. for 14 days    lancets Misc 1 Device by Misc.(Non-Drug; Combo Route) route 2 (two) times daily.    lansoprazole (PREVACID) 30 MG capsule Take 1 capsule by mouth once a day 30 min before breakfast    metFORMIN (GLUCOPHAGE-XR) 500 MG ER 24hr tablet TAKE 2 TABLETS EVERY DAY WITH BREAKFAST    multivitamin capsule Take 1 capsule by mouth once daily.    naproxen (NAPROSYN) 500 MG tablet Take 1 tablet (500 mg total) by mouth 2 (two) times daily with meals.    ondansetron (ZOFRAN) 4 MG tablet TAKE 1 TABLET EVERY 8 HOURS AS NEEDED FOR NAUSEA    pregabalin (LYRICA) 50 MG capsule Take 1 capsule (50 mg total) by mouth 2 (two) times daily.    raloxifene (EVISTA) 60 mg tablet TAKE 1 TABLET EVERY DAY    sodium chloride 0.9 % SprA 1 spray by Each Nostril route every evening.    traMADoL (ULTRAM) 50 mg tablet Take one tablet by mouth every 12 hours as needed for pain    triamcinolone acetonide 0.025% (KENALOG) 0.025 % cream APPLY DAILY TO AFFECTED AREA AS NEEDED FOR ITCHING. MAXIMUM 2 TIMES A WEEK     No current facility-administered medications for this visit.       REVIEW OF SYSTEMS:    GENERAL:  No weight loss, malaise or fevers.  HEENT:  Negative for frequent or significant headaches.  NECK:  Negative for lumps, goiter, pain and significant neck swelling.  RESPIRATORY:  Negative for cough, wheezing or shortness of breath.  CARDIOVASCULAR:  Negative for chest pain, leg swelling or palpitations.  GI:  Negative for abdominal discomfort, blood in stools or black stools or change in bowel habits.  MUSCULOSKELETAL:  See HPI.  SKIN:  Negative for lesions, rash, and itching.  PSYCH:   "+sleep disturbance, depression .  HEMATOLOGY/LYMPHOLOGY:  Negative for prolonged bleeding, bruising easily or swollen nodes.  NEURO:   No history of headaches, syncope, paralysis, seizures or tremors.  All other reviewed and negative other than HPI.    OBJECTIVE:    /66 (BP Location: Right arm, Patient Position: Sitting, BP Method: Medium (Automatic))   Pulse 104   Resp 12   Ht 4' 9" (1.448 m)   Wt 72.6 kg (160 lb)   BMI 34.62 kg/m²     PHYSICAL EXAMINATION:    General appearance: Well appearing, in no acute distress, alert and oriented x3.  Psych:  Context appropriate thought content, affect and range of emotion. Easily tearful due to chronic pain. Denies SI/HI. Laughs with jokes.  Skin: Skin color, texture, turgor normal, no rashes or lesions, in both upper and lower body.  Back: No TTP over lumbar spine. Full ROM with pain on extension. Mild facet loading bilaterally. SLR is negative.  Msk: Medial joint line tenderness to left knee. Painful extension of left knee.   Neuro: Decreased sensation to bilateral feet. Babinski is negative.  Gait: Antalgic.     ASSESSMENT: 77 y.o. year old female with widespread pain, consistent with the followin. Lumbar radiculopathy  Procedure Order to Pain Management      2. Chronic pain disorder        3. Primary osteoarthritis of knee, unspecified laterality        4. Lumbosacral radiculopathy        5. Chronic pain of left knee                PLAN:     - I personally reviewed and interpreted relevant and pertinent imaging. Results were discussed with the patient today.     - Neurosurgery notes reviewed.    - Schedule for left L4/5 and L5/S1 TF JT.    - She has an appointment with ortho sports med. She says she has had the knee drained in the past and feels like symptoms are similar. I did explain that this would be evaluated by ortho.    - Continue Lyrica 75 mg BID.    - Continue duloxetine 30 mg BID.    - RTC 2 weeks after knee injection.    The above plan and " management options were discussed at length with patient. Patient is in agreement with the above and verbalized understanding.    Jenny Glass  11/20/2023

## 2023-11-21 DIAGNOSIS — M54.16 LUMBAR RADICULOPATHY: Primary | ICD-10-CM

## 2023-11-28 ENCOUNTER — HOSPITAL ENCOUNTER (OUTPATIENT)
Dept: RADIOLOGY | Facility: HOSPITAL | Age: 78
Discharge: HOME | End: 2023-11-28
Attending: PHYSICIAN ASSISTANT
Payer: MEDICARE

## 2023-11-28 ENCOUNTER — OFFICE VISIT (OUTPATIENT)
Dept: SPORTS MEDICINE | Facility: CLINIC | Age: 78
End: 2023-11-28
Payer: MEDICARE

## 2023-11-28 VITALS
HEIGHT: 57 IN | SYSTOLIC BLOOD PRESSURE: 117 MMHG | BODY MASS INDEX: 34.72 KG/M2 | WEIGHT: 160.94 LBS | HEART RATE: 93 BPM | DIASTOLIC BLOOD PRESSURE: 81 MMHG

## 2023-11-28 DIAGNOSIS — M25.562 CHRONIC PAIN OF LEFT KNEE: Primary | ICD-10-CM

## 2023-11-28 DIAGNOSIS — G89.29 CHRONIC PAIN OF LEFT KNEE: ICD-10-CM

## 2023-11-28 DIAGNOSIS — M25.562 CHRONIC PAIN OF LEFT KNEE: ICD-10-CM

## 2023-11-28 DIAGNOSIS — M17.12 PRIMARY OSTEOARTHRITIS OF LEFT KNEE: ICD-10-CM

## 2023-11-28 DIAGNOSIS — G89.29 CHRONIC PAIN OF LEFT KNEE: Primary | ICD-10-CM

## 2023-11-28 PROCEDURE — 1101F PR PT FALLS ASSESS DOC 0-1 FALLS W/OUT INJ PAST YR: ICD-10-PCS | Mod: HCNC,CPTII,S$GLB, | Performed by: PHYSICIAN ASSISTANT

## 2023-11-28 PROCEDURE — 99999 PR PBB SHADOW E&M-EST. PATIENT-LVL III: CPT | Mod: PBBFAC,HCNC,, | Performed by: PHYSICIAN ASSISTANT

## 2023-11-28 PROCEDURE — 3079F DIAST BP 80-89 MM HG: CPT | Mod: HCNC,CPTII,S$GLB, | Performed by: PHYSICIAN ASSISTANT

## 2023-11-28 PROCEDURE — 99214 PR OFFICE/OUTPT VISIT, EST, LEVL IV, 30-39 MIN: ICD-10-PCS | Mod: 25,HCNC,S$GLB, | Performed by: PHYSICIAN ASSISTANT

## 2023-11-28 PROCEDURE — 3074F SYST BP LT 130 MM HG: CPT | Mod: HCNC,CPTII,S$GLB, | Performed by: PHYSICIAN ASSISTANT

## 2023-11-28 PROCEDURE — 3079F PR MOST RECENT DIASTOLIC BLOOD PRESSURE 80-89 MM HG: ICD-10-PCS | Mod: HCNC,CPTII,S$GLB, | Performed by: PHYSICIAN ASSISTANT

## 2023-11-28 PROCEDURE — 1125F PR PAIN SEVERITY QUANTIFIED, PAIN PRESENT: ICD-10-PCS | Mod: HCNC,CPTII,S$GLB, | Performed by: PHYSICIAN ASSISTANT

## 2023-11-28 PROCEDURE — 1159F PR MEDICATION LIST DOCUMENTED IN MEDICAL RECORD: ICD-10-PCS | Mod: HCNC,CPTII,S$GLB, | Performed by: PHYSICIAN ASSISTANT

## 2023-11-28 PROCEDURE — 1159F MED LIST DOCD IN RCRD: CPT | Mod: HCNC,CPTII,S$GLB, | Performed by: PHYSICIAN ASSISTANT

## 2023-11-28 PROCEDURE — 99214 OFFICE O/P EST MOD 30 MIN: CPT | Mod: 25,HCNC,S$GLB, | Performed by: PHYSICIAN ASSISTANT

## 2023-11-28 PROCEDURE — 1101F PT FALLS ASSESS-DOCD LE1/YR: CPT | Mod: HCNC,CPTII,S$GLB, | Performed by: PHYSICIAN ASSISTANT

## 2023-11-28 PROCEDURE — 3044F HG A1C LEVEL LT 7.0%: CPT | Mod: HCNC,CPTII,S$GLB, | Performed by: PHYSICIAN ASSISTANT

## 2023-11-28 PROCEDURE — 1125F AMNT PAIN NOTED PAIN PRSNT: CPT | Mod: HCNC,CPTII,S$GLB, | Performed by: PHYSICIAN ASSISTANT

## 2023-11-28 PROCEDURE — 99999 PR PBB SHADOW E&M-EST. PATIENT-LVL III: ICD-10-PCS | Mod: PBBFAC,HCNC,, | Performed by: PHYSICIAN ASSISTANT

## 2023-11-28 PROCEDURE — 3074F PR MOST RECENT SYSTOLIC BLOOD PRESSURE < 130 MM HG: ICD-10-PCS | Mod: HCNC,CPTII,S$GLB, | Performed by: PHYSICIAN ASSISTANT

## 2023-11-28 PROCEDURE — 3288F PR FALLS RISK ASSESSMENT DOCUMENTED: ICD-10-PCS | Mod: HCNC,CPTII,S$GLB, | Performed by: PHYSICIAN ASSISTANT

## 2023-11-28 PROCEDURE — 20610 LARGE JOINT ASPIRATION/INJECTION: L KNEE: ICD-10-PCS | Mod: HCNC,LT,S$GLB, | Performed by: PHYSICIAN ASSISTANT

## 2023-11-28 PROCEDURE — 1160F PR REVIEW ALL MEDS BY PRESCRIBER/CLIN PHARMACIST DOCUMENTED: ICD-10-PCS | Mod: HCNC,CPTII,S$GLB, | Performed by: PHYSICIAN ASSISTANT

## 2023-11-28 PROCEDURE — 3288F FALL RISK ASSESSMENT DOCD: CPT | Mod: HCNC,CPTII,S$GLB, | Performed by: PHYSICIAN ASSISTANT

## 2023-11-28 PROCEDURE — 3044F PR MOST RECENT HEMOGLOBIN A1C LEVEL <7.0%: ICD-10-PCS | Mod: HCNC,CPTII,S$GLB, | Performed by: PHYSICIAN ASSISTANT

## 2023-11-28 PROCEDURE — 20610 DRAIN/INJ JOINT/BURSA W/O US: CPT | Mod: HCNC,LT,S$GLB, | Performed by: PHYSICIAN ASSISTANT

## 2023-11-28 PROCEDURE — 1160F RVW MEDS BY RX/DR IN RCRD: CPT | Mod: HCNC,CPTII,S$GLB, | Performed by: PHYSICIAN ASSISTANT

## 2023-11-28 RX ORDER — TRIAMCINOLONE ACETONIDE 40 MG/ML
40 INJECTION, SUSPENSION INTRA-ARTICULAR; INTRAMUSCULAR
Status: DISCONTINUED | OUTPATIENT
Start: 2023-11-28 | End: 2023-11-28 | Stop reason: HOSPADM

## 2023-11-28 RX ADMIN — TRIAMCINOLONE ACETONIDE 40 MG: 40 INJECTION, SUSPENSION INTRA-ARTICULAR; INTRAMUSCULAR at 01:11

## 2023-11-28 NOTE — PROGRESS NOTES
CC: Left knee pain    Patient is a 77-year-old female who presents today for initial evaluation of left knee pain.  She was referred to me by pain management.  Patient states that she began experiencing atraumatic, gradual onset left knee pain this past August.  She does not recall any recent falls, injuries, or trauma to attribute to this.  Pain is rather diffuse throughout the left knee, however does seem to localize to the medial lateral joint lines.  She denies any associated instability.  She does notice some occasional grinding/catching in left knee when bending and straightening it.  The knee does swell occasionally and had her knee aspirated in August.  She has received corticosteroid injections before which seemed to temporarily help with pain.  No prior injuries or surgeries on the left knee.  She ambulates with a cane for assistance at baseline.  Thus far treatment has included over-the-counter medications, rest, and ice compresses with little relief.    + mechanical symptoms, - instability    Is affecting ADLs.  Pain is 3/10 at it's worst.    REVIEW OF SYSTEMS:  Constitution: Negative. Negative for chills, fever and night sweats.   HENT: Negative for congestion and headaches.    Eyes: Negative for blurred vision, left vision loss and right vision loss.   Cardiovascular: Negative for chest pain and syncope.   Respiratory: Negative for cough and shortness of breath.    Endocrine: Negative for polydipsia, polyphagia and polyuria.   Hematologic/Lymphatic: Negative for bleeding problem. Does not bruise/bleed easily.   Skin: Negative for dry skin, itching and rash.   Musculoskeletal: Negative for falls. Positive for left knee pain and  muscle weakness.   Gastrointestinal: Negative for abdominal pain and bowel incontinence.   Genitourinary: Negative for bladder incontinence and nocturia.   Neurological: Negative for disturbances in coordination, loss of balance and seizures.   Psychiatric/Behavioral: Negative  for depression. The patient does not have insomnia.    Allergic/Immunologic: Negative for hives and persistent infections.     PAST MEDICAL HISTORY:    Past Medical History:   Diagnosis Date    Allergy     Anxiety     Cancer     colon    Cataract     Colon cancer     Dependence on nicotine from cigarettes 9/3/2020    Diabetes mellitus, type 2     Dry eye syndrome     Hyperlipidemia     Hypertension     Insomnia     Light cigarette smoker (1-9 cigs/day) 6/21/2022    Malignant neoplasm of ascending colon 7/20/2020    Squamous blepharitis     Syncope 4/16/2022    Tobacco use disorder, moderate, in early remission 11/2/2022    Vitamin D deficiency 9/10/2020       PAST SURGICAL HISTORY:   Past Surgical History:   Procedure Laterality Date    CATARACT EXTRACTION, BILATERAL  03/2020    CHOLECYSTECTOMY      COLON SURGERY      Colon cancer    COLONOSCOPY N/A 02/01/2021    Procedure: COLONOSCOPY;  Surgeon: Ashwini Duarte MD;  [facility]  Service: Endoscopy;  Laterality: N/A;  medical transportation  covid test 1/29 [employer], instructions mailed-[facility]    EYE SURGERY      Cataract    HYSTERECTOMY      INJECTION OF ANESTHETIC AGENT AROUND NERVE Left 7/21/2023    Procedure: BLOCK, NERVE, LEFT SHOULDER ARTICULAR BRANCH keep date rep aware;  Surgeon: Abdi Del Toro MD;  Location: [facility] PAIN MGT;  Service: Pain Management;  Laterality: Left;    JOINT REPLACEMENT      Knee    KNEE SURGERY      LAPAROSCOPIC LEFT COLON RESECTION         FAMILY HISTORY:   Family History   Problem Relation Age of Onset    Heart attack Mother     Heart disease Mother     Colon cancer Father     Diabetes Father     Cancer Sister         unknown    Hypothyroidism Sister     Asthma Sister     Thyroid cancer Neg Hx        SOCIAL HISTORY:   Social History     Socioeconomic History    Marital status:    Tobacco Use    Smoking status: Former     Current packs/day: 0.00     Average packs/day: 1 pack/day for 51.0 years (51.0 ttl  pk-yrs)     Types: Cigarettes     Start date: 1971     Quit date: 2022     Years since quittin.1    Smokeless tobacco: Never    Tobacco comments:     smoking cessation information given    Substance and Sexual Activity    Alcohol use: Yes     Alcohol/week: 1.0 standard drink of alcohol     Types: 1 Shots of liquor per week     Comment: occ    Drug use: Never    Sexual activity: Not Currently     Birth control/protection: Abstinence     Social Determinants of Health     Financial Resource Strain: Low Risk  (2023)    Overall Financial Resource Strain (CARDIA)     Difficulty of Paying Living Expenses: Not hard at all   Food Insecurity: No Food Insecurity (2023)    Hunger Vital Sign     Worried About Running Out of Food in the Last Year: Never true     Ran Out of Food in the Last Year: Never true   Transportation Needs: No Transportation Needs (2023)    PRAPARE - Transportation     Lack of Transportation (Medical): No     Lack of Transportation (Non-Medical): No   Physical Activity: Insufficiently Active (2023)    Exercise Vital Sign     Days of Exercise per Week: 2 days     Minutes of Exercise per Session: 20 min   Stress: No Stress Concern Present (2023)    [employer] - Occupational Stress Questionnaire     Feeling of Stress : Not at all   Social Connections: Socially Isolated (2023)    Social Connection and Isolation Panel [NHANES]     Frequency of Communication with Friends and Family: More than three times a week     Frequency of Social Gatherings with Friends and Family: Twice a week     Attends Sabianist Services: Never     Active Member of Clubs or Organizations: No     Attends Club or Organization Meetings: Never     Marital Status:    Housing Stability: Low Risk  (2023)    Housing Stability Vital Sign     Unable to Pay for Housing in the Last Year: No     Number of Places Lived in the Last Year: 1     Unstable Housing in the Last  Year: No       MEDICATIONS:     Current Outpatient Medications:     albuterol (PROVENTIL/VENTOLIN HFA) 90 mcg/actuation inhaler, INHALE 2 PUFFS INTO THE LUNGS EVERY 4 (FOUR) HOURS AS NEEDED FOR WHEEZING., Disp: 54 g, Rfl: 3    albuterol-ipratropium (DUO-NEB) 2.5 mg-0.5 mg/3 mL nebulizer solution, Take 3 mLs by nebulization every 6 (six) hours as needed for Wheezing or Shortness of Breath. Rescue, Disp: 15 mL, Rfl: 5    amLODIPine (NORVASC) 5 MG tablet, TAKE 1 TABLET EVERY DAY, Disp: 90 tablet, Rfl: 3    aspirin (ECOTRIN) 81 MG EC tablet, Take 81 mg by mouth once daily., Disp: , Rfl:     atorvastatin (LIPITOR) 40 MG tablet, TAKE 1 TABLET EVERY DAY, Disp: 90 tablet, Rfl: 3    azelastine (ASTELIN) 137 mcg (0.1 %) nasal spray, 1 spray (137 mcg total) by Nasal route 2 (two) times daily., Disp: 30 mL, Rfl: 11    betamethasone valerate 0.1% (VALISONE) 0.1 % Oint, Apply topically once daily., Disp: 15 g, Rfl: 1    blood sugar diagnostic (TRUE METRIX GLUCOSE TEST STRIP) Strp, Use to test blood glucose two (2) times daily; to be used with insurance-preferred brand of glucometer/supplies, Disp: 200 strip, Rfl: 3    blood-glucose meter kit, Please provide with insurance covered meter, Disp: 1 each, Rfl: 0    cholecalciferol, vitamin D3, (VITAMIN D3) 50 mcg (2,000 unit) Cap, Take by mouth., Disp: , Rfl:     dulaglutide (TRULICITY) 1.5 mg/0.5 mL pen injector, Inject 1.5 mg into the skin every 7 days., Disp: 12 pen, Rfl: 3    DULoxetine (CYMBALTA) 30 MG capsule, Take 1 capsule (30 mg total) by mouth once daily., Disp: 90 capsule, Rfl: 3    ferrous sulfate 325 (65 FE) MG EC tablet, Take 1 tablet (325 mg total) by mouth every other day., Disp: 45 tablet, Rfl: 3    fluticasone furoate-vilanteroL (BREO) 100-25 mcg/dose diskus inhaler, Inhale 1 puff into the lungs once daily. Controller, Disp: 90 each, Rfl: 3    heparin, porcine, PF, (HEPARIN FLUSH 100 UNITS/ML) 100 unit/mL Syrg, , Disp: , Rfl:     hydrOXYzine HCL (ATARAX) 25 MG  "tablet, Take 1 tablet (25 mg total) by mouth 3 (three) times daily as needed for Itching or Anxiety., Disp: 30 tablet, Rfl: 3    lancets Misc, 1 Device by Misc.(Non-Drug; Combo Route) route 2 (two) times daily., Disp: 200 each, Rfl: 11    lansoprazole (PREVACID) 30 MG capsule, Take 1 capsule by mouth once a day 30 min before breakfast, Disp: 30 capsule, Rfl: 5    metFORMIN (GLUCOPHAGE-XR) 500 MG ER 24hr tablet, TAKE 2 TABLETS EVERY DAY WITH BREAKFAST, Disp: 180 tablet, Rfl: 1    multivitamin capsule, Take 1 capsule by mouth once daily., Disp: , Rfl:     naproxen (NAPROSYN) 500 MG tablet, Take 1 tablet (500 mg total) by mouth 2 (two) times daily with meals., Disp: 20 tablet, Rfl: 0    ondansetron (ZOFRAN) 4 MG tablet, TAKE 1 TABLET EVERY 8 HOURS AS NEEDED FOR NAUSEA, Disp: 30 tablet, Rfl: 0    pregabalin (LYRICA) 50 MG capsule, Take 1 capsule (50 mg total) by mouth 2 (two) times daily., Disp: 60 capsule, Rfl: 0    raloxifene (EVISTA) 60 mg tablet, TAKE 1 TABLET EVERY DAY, Disp: 90 tablet, Rfl: 4    sodium chloride 0.9 % SprA, 1 spray by Each Nostril route every evening., Disp: 1 each, Rfl: 0    traMADoL (ULTRAM) 50 mg tablet, Take one tablet by mouth every 12 hours as needed for pain, Disp: 30 tablet, Rfl: 0    triamcinolone acetonide 0.025% (KENALOG) 0.025 % cream, APPLY DAILY TO AFFECTED AREA AS NEEDED FOR ITCHING. MAXIMUM 2 TIMES A WEEK, Disp: 15 g, Rfl: 1    ketoconazole (NIZORAL) 2 % cream, Apply topically once daily. for 14 days, Disp: 60 g, Rfl: 0    ALLERGIES:   Review of patient's allergies indicates:   Allergen Reactions    Grass pollen-june grass standard     Sulfa (sulfonamide antibiotics)      Occurred when she was pregnant with varicose veins resulting in leg swelling and pain with standing       VITAL SIGNS:   /81   Pulse 93   Ht 4' 9" (1.448 m)   Wt 73 kg (160 lb 15 oz)   BMI 34.83 kg/m²      PHYSICAL EXAMINATION  General:  The patient is alert and oriented x 3.  Mood is pleasant.  " Observation of ears, eyes and nose reveal no gross abnormalities.  No labored breathing observed.    LEFT KNEE EXAMINATION     OBSERVATION / INSPECTION   Gait:   antalgic with cane  Alignment:  Neutral   Scars:   Well healed longitudinal midline incision present to right knee.  No scars present to left knee   Muscle atrophy: Mild  Effusion:  None   Warmth:  None   Discoloration:   none     TENDERNESS / CREPITUS (T / C):          T / C      T / C   Patella   - / -   Lateral joint line   + / -   Peripatellar medial  -  Medial joint line    + / -   Peripatellar lateral -  Medial plica   - / -   Patellar tendon -   Popliteal fossa   - / -   Quad tendon   -   Gastrocnemius   -   Prepatellar Bursa - / -   Quadricep   -   Tibial tubercle  -  Thigh/hamstring  -   Pes anserine/HS -  Fibula    -   ITB   - / -  Tibia     -   Tib/fib joint  - / -  LCL    -     MFC   - / -   MCL: Proximal  -    LFC   - / -    Distal   -          ROM: (* = pain)  PASSIVE   ACTIVE    Left :   5 / 0 / 120*   5 / 0 / 120*     Right :    5 / 0 / 110   5 / 0 / 110    Patellofemoral examination:  See above noted areas of tenderness.   Patella position    Subluxation / dislocation: Centered           Sup. / Inf;   Normal   Crepitus (PF):    Absent   Patellar Mobility:       Medial-lateral:   Normal    Superior-inferior:  Normal    Inferior tilt   Normal    Patellar tendon:  Normal   Lateral tilt:    Normal   J-sign:     None   Patellofemoral grind:   No pain       MENISCAL SIGNS:     Pain on terminal extension:  +  Pain on terminal flexion:  +  Delanos maneuver:  + (for pain)  Squat     deferred    LIGAMENT EXAMINATION:  ACL / Lachman:  normal (-1 to 2mm)    PCL-Post.  drawer: normal 0 to 2mm  MCL- Valgus:  normal 0 to 2mm  LCL- Varus:  normal 0 to 2mm  Pivot shift: normal (Equal)   Dial Test: difference c/w other side   At 30° flexion: normal (< 5°)    At 90° flexion: normal (< 5°)   Reverse Pivot Shift:   normal (Equal)     STRENGTH: (* = with  pain) PAINFUL SIDE   Quadricep   5/5   Hamstrin/5    EXTREMITY NEURO-VASCULAR EXAMINATION:   Sensation:  Grossly intact to light touch all dermatomal regions.   Motor Function:  Fully intact motor function at hip, knee, foot and ankle    DTRs;  quadriceps and  achilles 2+.  No clonus and downgoing Babinski.    Vascular status:  DP and PT pulses 2+, brisk capillary refill, symmetric.     OTHER FINDINGS:  N/A    X-rays left knee (10/2/2023):   No acute fracture or dislocation.  Bilateral tibiofemoral and moderate to severe patellofemoral degenerative change noted.  Questionable left knee effusion.  Scattered vascular calcifications noted.    Kellgren Raza grade 2     ASSESSMENT:    Left knee pain  Primary osteoarthritis of left knee    PLAN:     I made the decision to obtain old records of the patient including previous notes and imaging. New imaging was ordered today of the extremity or extremities evaluated. I independently reviewed and interpreted the radiographs and/or MRIs today as well as prior imaging, if available.    We discussed at length different treatment options including conservative vs surgical management. These include anti-inflammatories, acetaminophen, rest, ice, heat, formal physical therapy including strengthening and stretching exercises, home exercise programs, dry needling, corticosteroid versus viscosupplementation injection and finally surgical intervention.      Left knee corticosteroid injection performed today.  See procedure note for details.    Recommend she continue to rest, ice, and elevate the left knee and take over-the-counter anti-inflammatories/acetaminophen as needed for pain.    Follow-up as needed.  She will reach out to us if she would like to proceed with viscosupplementation injections in the future.      All questions were answered, pt will contact us for questions or concerns in the interim.      Medical Dictation software was used during the dictation of  portions or the entirety of this medical record.  Phonetic or grammatic errors may exist due to the use of this software. For clarification, refer to the author of the document.

## 2023-11-28 NOTE — PROCEDURES
Large Joint Aspiration/Injection: L knee    Date/Time: 11/28/2023 1:30 PM    Performed by: Richard Mcknight PA-C  Authorized by: Richard Mcknight PA-C    Consent Done?:  Yes (Verbal)  Indications:  Pain and arthritis  Site marked: the procedure site was marked    Timeout: prior to procedure the correct patient, procedure, and site was verified    Prep: patient was prepped and draped in usual sterile fashion    Local anesthesia used?: No      Details:  Needle Size:  22 G  Ultrasonic Guidance for needle placement?: No    Approach:  Anterolateral  Location:  Knee  Site:  L knee  Medications:  40 mg triamcinolone acetonide 40 mg/mL  Patient tolerance:  Patient tolerated the procedure well with no immediate complications    Injection Procedure  A time out was performed, including verification of patient ID, procedure, site and side, availability of information and equipment, review of safety issues, and agreement with consent, the procedure site was marked.    After time out was performed, the patient was prepped aseptically with povidone-iodine swabsticks. A diagnostic and therapeutic injection of 1:3cc Kenalog/Marcaine was given under sterile technique using a 22g x 1.5 needle from the anterolateral aspect of the left Knee Joint in the sitting position.      Marizol Kevin had no adverse reactions to the medication. Pain decreased. She was instructed to apply ice to the joint for 20 minutes and avoid strenuous activities for 24-36 hours following the injection. She was warned of possible blood sugar and/or blood pressure changes during that time. Following that time, she can resume regular activities.    She was reminded to call the clinic immediately for any adverse side effects as explained in clinic today.

## 2023-12-18 ENCOUNTER — TELEPHONE (OUTPATIENT)
Dept: PAIN MEDICINE | Facility: CLINIC | Age: 78
End: 2023-12-18
Payer: MEDICARE

## 2023-12-18 NOTE — TELEPHONE ENCOUNTER
Staff called pt and gave her the procedure dept number to call for arrival time and any pre-op information.      ----- Message from Moriah Martinez sent at 12/18/2023 11:07 AM CST -----  Regarding: pt call back/procedure  Name of Who is Calling: pt        What is the request in detail: pt needs a call back to get time and any other info she may need for procedure on 12/22, please advise.        Can the clinic reply by [facility]: no          [facility] [phone]

## 2023-12-18 NOTE — TELEPHONE ENCOUNTER
----- Message from Moriah Martinez sent at 12/18/2023 11:07 AM CST -----  Regarding: pt call back/procedure  Name of Who is Calling: pt        What is the request in detail: pt needs a call back to get time and any other info she may need for procedure on 12/22, please advise.        Can the clinic reply by [facility]: no          [facility] [phone]

## 2023-12-22 ENCOUNTER — TELEPHONE (OUTPATIENT)
Dept: PAIN MEDICINE | Facility: CLINIC | Age: 78
End: 2023-12-22
Payer: MEDICARE

## 2023-12-22 ENCOUNTER — HOSPITAL ENCOUNTER (OUTPATIENT)
Facility: OTHER | Age: 78
Discharge: HOME | End: 2023-12-22
Attending: ANESTHESIOLOGY | Admitting: ANESTHESIOLOGY
Payer: MEDICARE

## 2023-12-22 VITALS
RESPIRATION RATE: 16 BRPM | HEART RATE: 88 BPM | OXYGEN SATURATION: 98 % | BODY MASS INDEX: 35.17 KG/M2 | WEIGHT: 163 LBS | DIASTOLIC BLOOD PRESSURE: 68 MMHG | TEMPERATURE: 98 F | SYSTOLIC BLOOD PRESSURE: 122 MMHG | HEIGHT: 57 IN

## 2023-12-22 DIAGNOSIS — R25.2 MUSCLE CRAMPS: ICD-10-CM

## 2023-12-22 DIAGNOSIS — M54.16 LUMBAR RADICULOPATHY: Primary | ICD-10-CM

## 2023-12-22 DIAGNOSIS — G89.29 CHRONIC PAIN: ICD-10-CM

## 2023-12-22 LAB — POCT GLUCOSE: 92 MG/DL (ref 70–110)

## 2023-12-22 PROCEDURE — 64484 NJX AA&/STRD TFRM EPI L/S EA: CPT | Mod: HCNC,LT | Performed by: ANESTHESIOLOGY

## 2023-12-22 PROCEDURE — 99152 MOD SED SAME PHYS/QHP 5/>YRS: CPT | Mod: ,,, | Performed by: ANESTHESIOLOGY

## 2023-12-22 PROCEDURE — 64484 NJX AA&/STRD TFRM EPI L/S EA: CPT | Mod: HCNC,LT,, | Performed by: ANESTHESIOLOGY

## 2023-12-22 PROCEDURE — 25500020 PHARM REV CODE 255: Mod: HCNC | Performed by: ANESTHESIOLOGY

## 2023-12-22 PROCEDURE — 64484 PRA INJECT ANES/STEROID FORAMEN LUMBAR/SACRAL W IMG GUIDE ,EA ADD LEVEL: ICD-10-PCS | Mod: HCNC,LT,, | Performed by: ANESTHESIOLOGY

## 2023-12-22 PROCEDURE — 63600175 PHARM REV CODE 636 W HCPCS: Mod: HCNC | Performed by: ANESTHESIOLOGY

## 2023-12-22 PROCEDURE — 64483 NJX AA&/STRD TFRM EPI L/S 1: CPT | Mod: HCNC,LT,, | Performed by: ANESTHESIOLOGY

## 2023-12-22 PROCEDURE — 64483 NJX AA&/STRD TFRM EPI L/S 1: CPT | Mod: HCNC,LT | Performed by: ANESTHESIOLOGY

## 2023-12-22 PROCEDURE — 64483 PR EPIDURAL INJ, ANES/STEROID, TRANSFORAMINAL, LUMB/SACR, SNGL LEVL: ICD-10-PCS | Mod: HCNC,LT,, | Performed by: ANESTHESIOLOGY

## 2023-12-22 PROCEDURE — 25000003 PHARM REV CODE 250: Mod: HCNC | Performed by: ANESTHESIOLOGY

## 2023-12-22 PROCEDURE — 99152 PR MOD CONSCIOUS SEDATION, SAME PHYS, 5+ YRS, FIRST 15 MIN: ICD-10-PCS | Mod: ,,, | Performed by: ANESTHESIOLOGY

## 2023-12-22 RX ORDER — FENTANYL CITRATE 50 UG/ML
INJECTION, SOLUTION INTRAMUSCULAR; INTRAVENOUS
Status: DISCONTINUED | OUTPATIENT
Start: 2023-12-22 | End: 2023-12-22 | Stop reason: HOSPADM

## 2023-12-22 RX ORDER — DEXAMETHASONE SODIUM PHOSPHATE 10 MG/ML
INJECTION INTRAMUSCULAR; INTRAVENOUS
Status: DISCONTINUED | OUTPATIENT
Start: 2023-12-22 | End: 2023-12-22 | Stop reason: HOSPADM

## 2023-12-22 RX ORDER — SODIUM CHLORIDE 9 MG/ML
INJECTION, SOLUTION INTRAVENOUS CONTINUOUS
Status: DISCONTINUED | OUTPATIENT
Start: 2023-12-22 | End: 2023-12-22 | Stop reason: HOSPADM

## 2023-12-22 RX ORDER — LIDOCAINE HYDROCHLORIDE 10 MG/ML
INJECTION, SOLUTION EPIDURAL; INFILTRATION; INTRACAUDAL; PERINEURAL
Status: DISCONTINUED | OUTPATIENT
Start: 2023-12-22 | End: 2023-12-22 | Stop reason: HOSPADM

## 2023-12-22 RX ORDER — LIDOCAINE HYDROCHLORIDE 20 MG/ML
INJECTION, SOLUTION INFILTRATION; PERINEURAL
Status: DISCONTINUED | OUTPATIENT
Start: 2023-12-22 | End: 2023-12-22 | Stop reason: HOSPADM

## 2023-12-22 RX ORDER — MIDAZOLAM HYDROCHLORIDE 1 MG/ML
INJECTION INTRAMUSCULAR; INTRAVENOUS
Status: DISCONTINUED | OUTPATIENT
Start: 2023-12-22 | End: 2023-12-22 | Stop reason: HOSPADM

## 2023-12-22 NOTE — DISCHARGE INSTRUCTIONS

## 2023-12-22 NOTE — OP NOTE
Lumbar Transforaminal Epidural Steroid Injection under Fluoroscopic Guidance    The procedure, risks, benefits, and options were discussed with the patient. There are no contraindications to the procedure. The patent expressed understanding and agreed to the procedure. Informed written consent was obtained prior to the start of the procedure and can be found in the patient's chart.    PATIENT NAME: Marizol Kevin   MRN: [id number]     DATE OF PROCEDURE: 12/22/2023    PROCEDURE:  Left  L4/5 and L5/S1 Lumbar Transforaminal Epidural Steroid Injection under Fluoroscopic Guidance    PRE-OP DIAGNOSIS: Lumbar radiculopathy [M54.16] Lumbar radiculopathy [M54.16]    POST-OP DIAGNOSIS: Same    PHYSICIAN: Abdi Del Toro MD    ASSISTANTS: Gerry Shea MD Fellow    MEDICATIONS INJECTED: Preservative-free Decadron 10mg with 5cc of Lidocaine 1% MPF     LOCAL ANESTHETIC INJECTED: Xylocaine 2%     SEDATION: Versed 2mg and Fentanyl 50mcg                                                                                                                                                                                     Conscious sedation ordered by M.D. Patient re-evaluation prior to administration of conscious sedation. No changes noted in patient's status from initial evaluation. The patient's vital signs were monitored by RN and patient remained hemodynamically stable throughout the procedure.    Event Time In   Sedation Start 1425   Sedation End 1435       ESTIMATED BLOOD LOSS: None    COMPLICATIONS: None    TECHNIQUE: Time-out was performed to identify the patient and procedure to be performed. With the patient laying in a prone position, the surgical area was prepped and draped in the usual sterile fashion using ChloraPrep and a fenestrated drape.The levels were determined under fluoroscopy guidance. Skin anesthesia was achieved by injecting Lidocaine 2% over the injection sites. The transforaminal spaces were then approached with  a 22 gauge, 5 inch spinal quinke needle that was introduced under fluoroscopic guidance in the AP and Lateral views. Once the needle tip was in the area of the transforaminal space, and there was no blood, CSF or paraesthesias, contrast dye Omnipaque (300mg/mL) was injected to confirm placement and there was no vascular runoff. Fluoroscopic imaging in the AP and lateral views revealed a clear outline of the spinal nerve with proximal spread of agent through the neural foramen into the epidural space. 3 mL of the medication mixture listed above was injected slowly at each site. Displacement of the radio opaque contrast after injection of the medication confirmed that the medication went into the area of the transforaminal spaces. The needles were removed and bleeding was nil. A sterile dressing was applied. No specimens collected. The patient tolerated the procedure well.     The patient was monitored after the procedure in the recovery area. They were given post-procedure and discharge instructions to follow at home. The patient was discharged in a stable condition.    Gerry Shea MD    I reviewed and edited the fellow's note. I conducted my own interview and physical examination. I agree with the findings. I was present and supervising all critical portions of the procedure.      Abdi Del Toro MD

## 2023-12-22 NOTE — TELEPHONE ENCOUNTER
----- Message from Roya Casanova sent at 12/22/2023  1:29 PM CST -----  Name of Who is Calling:ESTEBAN JIMENEZ [[id number]]                   What is the request in detail: PT running a little late she was waiting on her ride she's on the way now                   Can the clinic reply by [facility]: No                   [facility] [phone]

## 2023-12-22 NOTE — DISCHARGE SUMMARY
Discharge Note  Short Stay      SUMMARY     Admit Date: 12/22/2023    Attending Physician:LISSA JAVIER        Discharge Physician: Gerry Shea      Discharge Date: 12/22/2023 2:21 PM    Procedure(s) (LRB):  LUMBAR TRANSFORAMINAL LEFT L4/5 AND L5/S1 (Left)    Final Diagnosis: Lumbar radiculopathy [M54.16]    Disposition: Home or self care    Patient Instructions:   Current Discharge Medication List        CONTINUE these medications which have NOT CHANGED    Details   albuterol (PROVENTIL/VENTOLIN HFA) 90 mcg/actuation inhaler INHALE 2 PUFFS INTO THE LUNGS EVERY 4 (FOUR) HOURS AS NEEDED FOR WHEEZING.  Qty: 54 g, Refills: 3    Associated Diagnoses: Mild intermittent asthma without complication      albuterol-ipratropium (DUO-NEB) 2.5 mg-0.5 mg/3 mL nebulizer solution Take 3 mLs by nebulization every 6 (six) hours as needed for Wheezing or Shortness of Breath. Rescue  Qty: 15 mL, Refills: 5    Associated Diagnoses: COPD exacerbation      amLODIPine (NORVASC) 5 MG tablet TAKE 1 TABLET EVERY DAY  Qty: 90 tablet, Refills: 3    Associated Diagnoses: Essential hypertension      aspirin (ECOTRIN) 81 MG EC tablet Take 81 mg by mouth once daily.      atorvastatin (LIPITOR) 40 MG tablet TAKE 1 TABLET EVERY DAY  Qty: 90 tablet, Refills: 3    Associated Diagnoses: Type 2 diabetes mellitus without complication, without long-term current use of insulin      azelastine (ASTELIN) 137 mcg (0.1 %) nasal spray 1 spray (137 mcg total) by Nasal route 2 (two) times daily.  Qty: 30 mL, Refills: 11    Associated Diagnoses: Allergic rhinitis, unspecified seasonality, unspecified trigger      betamethasone valerate 0.1% (VALISONE) 0.1 % Oint Apply topically once daily.  Qty: 15 g, Refills: 1    Associated Diagnoses: Genital ulcer, female      blood sugar diagnostic (TRUE METRIX GLUCOSE TEST STRIP) Strp Use to test blood glucose two (2) times daily; to be used with insurance-preferred brand of glucometer/supplies  Qty: 200 strip, Refills:  3    Associated Diagnoses: Type 2 diabetes mellitus with other specified complication, unspecified whether long term insulin use      blood-glucose meter kit Please provide with insurance covered meter  Qty: 1 each, Refills: 0    Associated Diagnoses: Type 2 diabetes mellitus with other specified complication, unspecified whether long term insulin use      cholecalciferol, vitamin D3, (VITAMIN D3) 50 mcg (2,000 unit) Cap Take by mouth.      dulaglutide (TRULICITY) 1.5 mg/0.5 mL pen injector Inject 1.5 mg into the skin every 7 days.  Qty: 12 pen, Refills: 3    Associated Diagnoses: Type 2 diabetes mellitus with stage 3a chronic kidney disease, without long-term current use of insulin      DULoxetine (CYMBALTA) 30 MG capsule Take 1 capsule (30 mg total) by mouth once daily.  Qty: 90 capsule, Refills: 3      fluticasone furoate-vilanteroL (BREO) 100-25 mcg/dose diskus inhaler Inhale 1 puff into the lungs once daily. Controller  Qty: 90 each, Refills: 3    Associated Diagnoses: Mild intermittent asthma without complication      heparin, porcine, PF, (HEPARIN FLUSH 100 UNITS/ML) 100 unit/mL Syrg       hydrOXYzine HCL (ATARAX) 25 MG tablet Take 1 tablet (25 mg total) by mouth 3 (three) times daily as needed for Itching or Anxiety.  Qty: 30 tablet, Refills: 3    Associated Diagnoses: Anxiety      ketoconazole (NIZORAL) 2 % cream Apply topically once daily. for 14 days  Qty: 60 g, Refills: 0      lancets Misc 1 Device by Misc.(Non-Drug; Combo Route) route 2 (two) times daily.  Qty: 200 each, Refills: 11    Associated Diagnoses: Type 2 diabetes mellitus with other specified complication, unspecified whether long term insulin use      lansoprazole (PREVACID) 30 MG capsule Take 1 capsule by mouth once a day 30 min before breakfast  Qty: 30 capsule, Refills: 5      metFORMIN (GLUCOPHAGE-XR) 500 MG ER 24hr tablet TAKE 2 TABLETS EVERY DAY WITH BREAKFAST  Qty: 180 tablet, Refills: 1    Associated Diagnoses: Type 2 diabetes  mellitus without complication, without long-term current use of insulin      multivitamin capsule Take 1 capsule by mouth once daily.      naproxen (NAPROSYN) 500 MG tablet Take 1 tablet (500 mg total) by mouth 2 (two) times daily with meals.  Qty: 20 tablet, Refills: 0      ondansetron (ZOFRAN) 4 MG tablet TAKE 1 TABLET EVERY 8 HOURS AS NEEDED FOR NAUSEA  Qty: 30 tablet, Refills: 0    Associated Diagnoses: Nausea      pregabalin (LYRICA) 50 MG capsule Take 1 capsule (50 mg total) by mouth 2 (two) times daily.  Qty: 60 capsule, Refills: 0    Associated Diagnoses: DDD (degenerative disc disease), cervical; Cervical spondylosis      raloxifene (EVISTA) 60 mg tablet TAKE 1 TABLET EVERY DAY  Qty: 90 tablet, Refills: 4      sodium chloride 0.9 % SprA 1 spray by Each Nostril route every evening.  Qty: 1 each, Refills: 0      traMADoL (ULTRAM) 50 mg tablet Take one tablet by mouth every 12 hours as needed for pain  Qty: 30 tablet, Refills: 0    Comments: Quantity prescribed more than 7 day supply? Press F2 and select one:[id number]        triamcinolone acetonide 0.025% (KENALOG) 0.025 % cream APPLY DAILY TO AFFECTED AREA AS NEEDED FOR ITCHING. MAXIMUM 2 TIMES A WEEK  Qty: 15 g, Refills: 1           STOP taking these medications       ferrous sulfate 325 (65 FE) MG EC tablet Comments:   Reason for Stopping:                   Discharge Diagnosis: Lumbar radiculopathy [M54.16]  Condition on Discharge: Stable with no complications to procedure   Diet on Discharge: Same as before.  Activity: as per instruction sheet.  Discharge to: Home with a responsible adult.  Follow up: 2-4 weeks       Please call my office or pager at [phone] if experienced any weakness or loss of sensation, fever > 101.5, pain uncontrolled with oral medications, persistent nausea/vomiting/or diarrhea, redness or drainage from the incisions, or any other worrisome concerns. If physician on call was not reached or could not communicate with our office for  any reason please go to the nearest emergency department

## 2023-12-22 NOTE — H&P
HPI  Patient presenting for Procedure(s) (LRB):  LUMBAR TRANSFORAMINAL LEFT L4/5 AND L5/S1 (Left)     Patient on Anti-coagulation No    No health changes since previous encounter    Past Medical History:   Diagnosis Date    Allergy     Anxiety     Cancer     colon    Cataract     Colon cancer     Dependence on nicotine from cigarettes 9/3/2020    Diabetes mellitus, type 2     Dry eye syndrome     Hyperlipidemia     Hypertension     Insomnia     Light cigarette smoker (1-9 cigs/day) 6/21/2022    Malignant neoplasm of ascending colon 7/20/2020    Squamous blepharitis     Syncope 4/16/2022    Tobacco use disorder, moderate, in early remission 11/2/2022    Vitamin D deficiency 9/10/2020     Past Surgical History:   Procedure Laterality Date    CATARACT EXTRACTION, BILATERAL  03/2020    CHOLECYSTECTOMY      COLON SURGERY      Colon cancer    COLONOSCOPY N/A 02/01/2021    Procedure: COLONOSCOPY;  Surgeon: Ashwini Duarte MD;  [facility]: [facility] ([facility]  Service: Endoscopy;  Laterality: N/A;  medical transportation  covid test 1/29 [employer], instructions mailed-KPvt    EYE SURGERY      Cataract    HYSTERECTOMY      INJECTION OF ANESTHETIC AGENT AROUND NERVE Left 7/21/2023    Procedure: BLOCK, NERVE, LEFT SHOULDER ARTICULAR BRANCH keep date rep aware;  Surgeon: Abdi Del Toro MD;  Location: [facility];  Service: Pain Management;  Laterality: Left;    JOINT REPLACEMENT      Knee    KNEE SURGERY      LAPAROSCOPIC LEFT COLON RESECTION       Review of patient's allergies indicates:   Allergen Reactions    Grass pollen-june grass standard     Sulfa (sulfonamide antibiotics)      Occurred when she was pregnant with varicose veins resulting in leg swelling and pain with standing      Current Facility-Administered Medications   Medication    0.9%  NaCl infusion       PMHx, PSHx, Allergies, Medications reviewed in epic    ROS negative except pain complaints in HPI    OBJECTIVE:    There were no vitals taken for this  visit.    PHYSICAL EXAMINATION:    GENERAL: Well appearing, in no acute distress, alert and oriented x3.  PSYCH:  Mood and affect appropriate.  SKIN: Skin color, texture, turgor normal, no rashes or lesions which will impact the procedure.  CV: RRR with palpation of the radial artery.  PULM: No evidence of respiratory difficulty, symmetric chest rise. Clear to auscultation.  NEURO: Cranial nerves grossly intact.    Plan:    Proceed with procedure as planned Procedure(s) (LRB):  LUMBAR TRANSFORAMINAL LEFT L4/5 AND L5/S1 (Left)    Gerry Shea MD  12/22/2023

## 2023-12-22 NOTE — TELEPHONE ENCOUNTER
Pt came to the 9th floor pain clinic instead of the procedure dept.staff called the procedure dept to let them know she is on her way there.      ----- Message from Roya Casanova sent at 12/22/2023  1:29 PM CST -----  Name of Who is Calling:ESTEBAN JIMENEZ [[id number]]                   What is the request in detail: PT running a little late she was waiting on her ride she's on the way now                   Can the clinic reply by [facility]: No                   What Number to Call Back if not in [facility]: [phone]

## 2024-01-03 ENCOUNTER — TELEPHONE (OUTPATIENT)
Dept: PAIN MEDICINE | Facility: CLINIC | Age: 79
End: 2024-01-03
Payer: MEDICARE

## 2024-01-03 DIAGNOSIS — M54.16 LUMBAR RADICULOPATHY: ICD-10-CM

## 2024-01-03 DIAGNOSIS — M54.42 CHRONIC BILATERAL LOW BACK PAIN WITH BILATERAL SCIATICA: Primary | ICD-10-CM

## 2024-01-03 DIAGNOSIS — G89.4 CHRONIC PAIN SYNDROME: ICD-10-CM

## 2024-01-03 DIAGNOSIS — M54.41 CHRONIC BILATERAL LOW BACK PAIN WITH BILATERAL SCIATICA: Primary | ICD-10-CM

## 2024-01-03 DIAGNOSIS — G89.29 CHRONIC BILATERAL LOW BACK PAIN WITH BILATERAL SCIATICA: Primary | ICD-10-CM

## 2024-01-03 NOTE — TELEPHONE ENCOUNTER
----- Message from Alma Castillo sent at 1/3/2024  9:16 AM CST -----  Name of Who is Calling: ESTEBAN JIMENEZ [[id number]]              What is the request in detail: Patient requesting a call back to discuss referral for acupuncture. Dr. Hicks              Can the clinic reply by [facility]: No              [facility]: [phone]

## 2024-01-03 NOTE — TELEPHONE ENCOUNTER
Pt called in asking for a referral for Acupuncture at [facility] on [address].    ----- Message from Alma Castillo sent at 1/3/2024  9:16 AM CST -----  Name of Who is Calling: ESTEBAN JIMENEZ [[id number]]              What is the request in detail: Patient requesting a call back to discuss referral for acupuncture. Dr. Hicks              Can the clinic reply by [facility]: No              What Number to Call Back if not in [facility]: [phone]

## 2024-01-10 ENCOUNTER — PATIENT MESSAGE (OUTPATIENT)
Dept: HEMATOLOGY/ONCOLOGY | Facility: CLINIC | Age: 79
End: 2024-01-10
Payer: MEDICARE

## 2024-01-11 ENCOUNTER — TELEPHONE (OUTPATIENT)
Dept: HEMATOLOGY/ONCOLOGY | Facility: CLINIC | Age: 79
End: 2024-01-11
Payer: MEDICARE

## 2024-01-11 NOTE — TELEPHONE ENCOUNTER
Spoke with Ms. Kevin about her transfer of care to Dr. Mcduffie in April 15 2024 at 2 pm. No other questions asked.

## 2024-01-12 ENCOUNTER — CLINICAL SUPPORT (OUTPATIENT)
Dept: REHABILITATION | Facility: HOSPITAL | Age: 79
End: 2024-01-12
Attending: ANESTHESIOLOGY
Payer: MEDICARE

## 2024-01-12 DIAGNOSIS — G89.29 CHRONIC BILATERAL LOW BACK PAIN WITH BILATERAL SCIATICA: ICD-10-CM

## 2024-01-12 DIAGNOSIS — G89.4 CHRONIC PAIN SYNDROME: ICD-10-CM

## 2024-01-12 DIAGNOSIS — G89.29 CHRONIC PAIN OF LEFT KNEE: ICD-10-CM

## 2024-01-12 DIAGNOSIS — M54.42 CHRONIC BILATERAL LOW BACK PAIN WITH BILATERAL SCIATICA: ICD-10-CM

## 2024-01-12 DIAGNOSIS — M25.562 CHRONIC PAIN OF LEFT KNEE: Primary | ICD-10-CM

## 2024-01-12 DIAGNOSIS — M54.16 LUMBAR RADICULOPATHY: ICD-10-CM

## 2024-01-12 DIAGNOSIS — M54.41 CHRONIC BILATERAL LOW BACK PAIN WITH BILATERAL SCIATICA: ICD-10-CM

## 2024-01-12 DIAGNOSIS — M25.562 CHRONIC PAIN OF LEFT KNEE: ICD-10-CM

## 2024-01-12 DIAGNOSIS — M54.59 OTHER LOW BACK PAIN: Primary | ICD-10-CM

## 2024-01-12 DIAGNOSIS — G89.29 CHRONIC PAIN OF LEFT KNEE: Primary | ICD-10-CM

## 2024-01-12 PROCEDURE — 97810 ACUP 1/> WO ESTIM 1ST 15 MIN: CPT | Mod: PN

## 2024-01-12 PROCEDURE — 97811 ACUP 1/> W/O ESTIM EA ADD 15: CPT | Mod: PN

## 2024-01-12 NOTE — PROGRESS NOTES
"  Acupuncture Evaluation Note     Name: Marizol Kevin  Clinic Number: [id number]    Traditional Chinese Medicine (TCM) Diagnosis: Qi Stagnation and Blood Stasis  Medical Diagnosis: Chronic pain in left knee, Chronic lower back pain  Encounter Diagnoses   Name Primary?    Chronic bilateral low back pain with bilateral sciatica     Lumbar radiculopathy     Chronic pain syndrome         Evaluation Date: 1/12/2024    Visit #/Visits authorized: [unfilled] 1/12    Precautions: Diabetes and cancer    Subjective     Chief Concern: Left knee pain for 4 months    Cancer Related Symptoms: Neuropathy    Medical necessity is demonstrated by the following IMPAIRMENTS: Medical Necessity: Cancer related pain, Decreased mobility limits day to day activities, social, and emergent situations, and Decreased quality of life                Aggravating Factors:  movement, standing, and changing positions   Relieving Factors:  ice and rest    Symptom Description:     Quality:  Burning and Sharp  Severity:  10  Frequency:  continuously    Previous Treatments Tried:  Injection(s), Medication, and Imaging    HEENT:  WNL    Chest:  WNL    Digestion:     Diet: in general, a "healthy" diet     Fluids: denies use, is drinking plenty of fluids, social drinker  Taste/Appetite: not good   Symptoms: none    Sleep: good    Energy Levels:  8/10    Psychological Symptoms:  None    Other Symptoms: None    GYN Symptoms: None    Objective     Observation: Looks normal    Tongue:      Body:  normal   Color:  pink   Coating:  thin,  and white,    Pulse:        normal       New Findings:  None    Treatment     Treatment Principles:  Move Qi and Blood, stop pain    Acupuncture points used:    Bilateral points:  Unilateral points:  Left: LV 8, SP 9, Hiwot on knee + 5  Right:  Auricular Treatment:  none   Needles In: 7  Needles Out: 7  Needles W/O STIM placed: 11:10 AM  [place] W/O STIM removed: 11: 55 AM      Other Traditional Chinese Medicine Modalities - " None    Assessment     After treatment, patient felt very good     Patient prognosis is Good.     Patient will continue to benefit from acupuncture treatment to address the deficits listed in the problem list box on initial evaluation, provide patient family education and to maximize pt's level of independence in the home and community environment.     Patient's spiritual, cultural and educational needs considered and pt agreeable to plan of care and goals.     Anticipated barriers to treatment: Fall    Plan     Recommend 1 /week for 12 sessions then re-assess.      Education:  Patient is aware of cumulative benefit of acupuncture

## 2024-01-19 ENCOUNTER — CLINICAL SUPPORT (OUTPATIENT)
Dept: REHABILITATION | Facility: HOSPITAL | Age: 79
End: 2024-01-19
Payer: MEDICARE

## 2024-01-19 DIAGNOSIS — M25.511 CHRONIC PAIN OF BOTH SHOULDERS: ICD-10-CM

## 2024-01-19 DIAGNOSIS — M54.59 OTHER LOW BACK PAIN: Primary | ICD-10-CM

## 2024-01-19 DIAGNOSIS — G89.29 CHRONIC PAIN OF BOTH SHOULDERS: ICD-10-CM

## 2024-01-19 DIAGNOSIS — M25.512 CHRONIC PAIN OF BOTH SHOULDERS: ICD-10-CM

## 2024-01-19 DIAGNOSIS — G89.29 CHRONIC PAIN OF LEFT KNEE: ICD-10-CM

## 2024-01-19 DIAGNOSIS — M25.562 CHRONIC PAIN OF LEFT KNEE: ICD-10-CM

## 2024-01-19 PROCEDURE — 97811 ACUP 1/> W/O ESTIM EA ADD 15: CPT | Mod: PN

## 2024-01-19 PROCEDURE — 97810 ACUP 1/> WO ESTIM 1ST 15 MIN: CPT | Mod: PN

## 2024-01-19 NOTE — PROGRESS NOTES
Acupuncture Treatment Note     Name: Marizol Kevin  [facility] Number: [id number]    Traditional Chinese Medicine Diagnosis: Qi Stagnation and Blood Stasis   Physician: Abdi Del Toro MD    Date of Service: 1/19/2024     Medical Diagnosis:  Chronic pain in left knee; Chronic pain in both shoulders,Chronic lower back pain       Encounter Diagnoses   Name Primary?    Chronic bilateral low back pain with bilateral sciatica      Lumbar radiculopathy      Chronic pain syndrome      Visit #/Visits authorized: 2/ 12     Precautions: Diabetes and cance     Subjective     Chief Complaint:  Left knee pain for 4 months , Chronic pain in both shoulders    Cancer Related Symptoms: Neuropathy    Medical necessity is demonstrated by the following IMPAIRMENTS: Medical Necessity: Cancer related pain, Decreased mobility limits day to day activities, social, and emergent situations, and Decreased quality of life    Response to Previous Treatment:  Good    Quality of Symptoms (Better/Worse):  Better    Other Condition/Symptoms:  None    Objective      New Findings:  None    Treatment Principles:  Move Qi and Blood, stop pain     Acupuncture points used:    Bilateral points: GB 21, Hiwot on shoulder + 3  Unilateral points:  Left: LV 8, SP 9, Hiwot on knee + 5   Right:  Auricular Treatment:  None   Needles In: 15  Needles Out: 15  Needles W/O STIM placed: 1:40 PM  Needles W/O STIM removed: 2: 20 PM    Other Traditional Chinese Medicine Modalities:  None    Recommendations:  PT    Education:  Patient is aware of cumulative effect of acupuncture      Assessment      Analysis of Treatment:  Patient felt good    Pt prognosis is Good.     Patient will continue to benefit from acupuncture treatment to address the deficits listed in the problem list box on initial evaluation, provide patient family education and to maximize pt's level of independence in the home and community environment.     Patient's spiritual, cultural and educational needs  considered and pt agreeable to plan of care and goals.     Anticipated barriers to treatment: None    Plan     Recommend       1  /week for 12   treatments and re-assess.

## 2024-01-23 ENCOUNTER — TELEPHONE (OUTPATIENT)
Dept: PAIN MEDICINE | Facility: CLINIC | Age: 79
End: 2024-01-23
Payer: MEDICARE

## 2024-01-23 ENCOUNTER — TELEPHONE (OUTPATIENT)
Dept: INTERNAL MEDICINE | Facility: CLINIC | Age: 79
End: 2024-01-23
Payer: MEDICARE

## 2024-01-23 NOTE — TELEPHONE ENCOUNTER
----- Message from Alma  sent at 1/22/2024 11:58 AM CST -----  Name of Who is Calling: ESTEBAN JIMENEZ [[id number]]              What is the request in detail: Patient requesting a call back to discuss requesting records from the first time she came in for her knee pain.               Can the clinic reply by [facility]: No              What Number to Call Back if not in [facility]:  [phone]

## 2024-01-24 ENCOUNTER — CLINICAL SUPPORT (OUTPATIENT)
Dept: REHABILITATION | Facility: HOSPITAL | Age: 79
End: 2024-01-24
Payer: MEDICARE

## 2024-01-24 DIAGNOSIS — M25.511 CHRONIC PAIN OF BOTH SHOULDERS: ICD-10-CM

## 2024-01-24 DIAGNOSIS — M25.512 CHRONIC PAIN OF BOTH SHOULDERS: ICD-10-CM

## 2024-01-24 DIAGNOSIS — G89.29 CHRONIC PAIN OF LEFT KNEE: ICD-10-CM

## 2024-01-24 DIAGNOSIS — M25.562 CHRONIC PAIN OF LEFT KNEE: ICD-10-CM

## 2024-01-24 DIAGNOSIS — M54.59 OTHER LOW BACK PAIN: Primary | ICD-10-CM

## 2024-01-24 DIAGNOSIS — G89.29 CHRONIC PAIN OF BOTH SHOULDERS: ICD-10-CM

## 2024-01-24 PROCEDURE — 97814 ACUP 1/> W/ESTIM EA ADDL 15: CPT | Mod: PN

## 2024-01-24 PROCEDURE — 97813 ACUP 1/> W/ESTIM 1ST 15 MIN: CPT | Mod: PN

## 2024-01-24 NOTE — PROGRESS NOTES
Acupuncture Treatment Note     Name: Marizol Kevin  [facility] Number: [id number]    Traditional Chinese Medicine Diagnosis: Qi Stagnation and Blood Stasis   Physician: Abdi Del Toro MD    Date of Service: 1/24/2024     Medical Diagnosis:  Chronic pain in left knee; Chronic pain in both shoulders, Chronic lower back pain with bilateral sciatica       Encounter Diagnoses   Name Primary?    Chronic bilateral low back pain with bilateral sciatica      Lumbar radiculopathy      Chronic pain syndrome      Visit #/Visits authorized: 3/ 12     Precautions: Diabetes and cance     Subjective     Chief Complaint:  Left knee pain for 4 months , Chronic pain in both shoulders    Cancer Related Symptoms: Neuropathy    Medical necessity is demonstrated by the following IMPAIRMENTS: Medical Necessity: Cancer related pain, Decreased mobility limits day to day activities, social, and emergent situations, and Decreased quality of life    Response to Previous Treatment:  Good    Quality of Symptoms (Better/Worse):  Better    Other Condition/Symptoms:  None    Objective      New Findings:  None    Treatment Principles:  Move Qi and Blood, stop pain     Acupuncture points used:    Bilateral points: GB 21, Hiwot on shoulder + 3, Hiwot on lower + 4  Unilateral points:  Left: LV 8, SP 9, Hiwot on knee + 5, GB 31, GB 32   Right:  EA: Right: Hiwot on lower back + 4  EA: Left: Hiwot on lower back + 4  Auricular Treatment:  None   Needles In: 25  Needles Out: 25  [place] W/ STIM placed: 2:40 PM  Needles W/ STIM removed: 3: 20 PM    Other Traditional Chinese Medicine Modalities:  None    Recommendations:  PT    Education:  Patient is aware of cumulative effect of acupuncture      Assessment      Analysis of Treatment:  Patient felt good    Pt prognosis is Good.     Patient will continue to benefit from acupuncture treatment to address the deficits listed in the problem list box on initial evaluation, provide patient family education and to  maximize pt's level of independence in the home and community environment.     Patient's spiritual, cultural and educational needs considered and pt agreeable to plan of care and goals.     Anticipated barriers to treatment: None    Plan     Recommend       1  /week for 12   treatments and re-assess.

## 2024-01-25 ENCOUNTER — LAB VISIT (OUTPATIENT)
Dept: LAB | Facility: OTHER | Age: 79
End: 2024-01-25
Attending: INTERNAL MEDICINE
Payer: MEDICARE

## 2024-01-25 ENCOUNTER — OFFICE VISIT (OUTPATIENT)
Dept: INTERNAL MEDICINE | Facility: CLINIC | Age: 79
End: 2024-01-25
Attending: INTERNAL MEDICINE
Payer: MEDICARE

## 2024-01-25 VITALS
BODY MASS INDEX: 35.48 KG/M2 | OXYGEN SATURATION: 96 % | DIASTOLIC BLOOD PRESSURE: 80 MMHG | HEART RATE: 97 BPM | WEIGHT: 164.44 LBS | SYSTOLIC BLOOD PRESSURE: 124 MMHG | HEIGHT: 57 IN

## 2024-01-25 DIAGNOSIS — I10 ESSENTIAL HYPERTENSION: ICD-10-CM

## 2024-01-25 DIAGNOSIS — E04.1 THYROID NODULE: ICD-10-CM

## 2024-01-25 DIAGNOSIS — E11.22 TYPE 2 DIABETES MELLITUS WITH STAGE 3A CHRONIC KIDNEY DISEASE, WITHOUT LONG-TERM CURRENT USE OF INSULIN: ICD-10-CM

## 2024-01-25 DIAGNOSIS — M81.0 OSTEOPOROSIS, UNSPECIFIED OSTEOPOROSIS TYPE, UNSPECIFIED PATHOLOGICAL FRACTURE PRESENCE: ICD-10-CM

## 2024-01-25 DIAGNOSIS — R97.0 ELEVATED CEA: ICD-10-CM

## 2024-01-25 DIAGNOSIS — E11.65 TYPE 2 DIABETES MELLITUS WITH HYPERGLYCEMIA, WITHOUT LONG-TERM CURRENT USE OF INSULIN: ICD-10-CM

## 2024-01-25 DIAGNOSIS — Z87.891 HISTORY OF TOBACCO USE: ICD-10-CM

## 2024-01-25 DIAGNOSIS — J43.2 CENTRILOBULAR EMPHYSEMA: ICD-10-CM

## 2024-01-25 DIAGNOSIS — E66.01 SEVERE OBESITY (BMI 35.0-39.9) WITH COMORBIDITY: ICD-10-CM

## 2024-01-25 DIAGNOSIS — Z00.00 ANNUAL PHYSICAL EXAM: Primary | ICD-10-CM

## 2024-01-25 DIAGNOSIS — J30.9 ALLERGIC RHINITIS, UNSPECIFIED SEASONALITY, UNSPECIFIED TRIGGER: ICD-10-CM

## 2024-01-25 DIAGNOSIS — N18.31 STAGE 3A CHRONIC KIDNEY DISEASE: ICD-10-CM

## 2024-01-25 DIAGNOSIS — C18.2 MALIGNANT NEOPLASM OF ASCENDING COLON: ICD-10-CM

## 2024-01-25 DIAGNOSIS — E11.40 PAINFUL DIABETIC NEUROPATHY: ICD-10-CM

## 2024-01-25 DIAGNOSIS — E11.69 HYPERLIPIDEMIA ASSOCIATED WITH TYPE 2 DIABETES MELLITUS: ICD-10-CM

## 2024-01-25 DIAGNOSIS — M25.562 LEFT MEDIAL KNEE PAIN: ICD-10-CM

## 2024-01-25 DIAGNOSIS — R79.9 ABNORMAL FINDING OF BLOOD CHEMISTRY, UNSPECIFIED: ICD-10-CM

## 2024-01-25 DIAGNOSIS — Z00.00 ANNUAL PHYSICAL EXAM: ICD-10-CM

## 2024-01-25 DIAGNOSIS — G62.0 DRUG-INDUCED POLYNEUROPATHY: ICD-10-CM

## 2024-01-25 DIAGNOSIS — I70.0 AORTIC ATHEROSCLEROSIS: ICD-10-CM

## 2024-01-25 DIAGNOSIS — E78.5 HYPERLIPIDEMIA ASSOCIATED WITH TYPE 2 DIABETES MELLITUS: ICD-10-CM

## 2024-01-25 DIAGNOSIS — J45.20 MILD INTERMITTENT ASTHMA WITHOUT COMPLICATION: ICD-10-CM

## 2024-01-25 DIAGNOSIS — N76.6 GENITAL ULCER, FEMALE: ICD-10-CM

## 2024-01-25 DIAGNOSIS — B18.1 CHRONIC VIRAL HEPATITIS B WITHOUT DELTA AGENT AND WITHOUT COMA: ICD-10-CM

## 2024-01-25 DIAGNOSIS — N18.31 TYPE 2 DIABETES MELLITUS WITH STAGE 3A CHRONIC KIDNEY DISEASE, WITHOUT LONG-TERM CURRENT USE OF INSULIN: ICD-10-CM

## 2024-01-25 LAB
25(OH)D3+25(OH)D2 SERPL-MCNC: 37 NG/ML (ref 30–96)
ALBUMIN SERPL BCP-MCNC: 3.7 G/DL (ref 3.5–5.2)
ALP SERPL-CCNC: 108 U/L (ref 55–135)
ALT SERPL W/O P-5'-P-CCNC: 19 U/L (ref 10–44)
ANION GAP SERPL CALC-SCNC: 10 MMOL/L (ref 8–16)
AST SERPL-CCNC: 22 U/L (ref 10–40)
BASOPHILS # BLD AUTO: 0.04 K/UL (ref 0–0.2)
BASOPHILS NFR BLD: 0.4 % (ref 0–1.9)
BILIRUB SERPL-MCNC: 0.3 MG/DL (ref 0.1–1)
BUN SERPL-MCNC: 11 MG/DL (ref 8–23)
CALCIUM SERPL-MCNC: 9.8 MG/DL (ref 8.7–10.5)
CHLORIDE SERPL-SCNC: 105 MMOL/L (ref 95–110)
CHOLEST SERPL-MCNC: 228 MG/DL (ref 120–199)
CHOLEST/HDLC SERPL: 5.3 {RATIO} (ref 2–5)
CO2 SERPL-SCNC: 25 MMOL/L (ref 23–29)
CREAT SERPL-MCNC: 1 MG/DL (ref 0.5–1.4)
DIFFERENTIAL METHOD BLD: ABNORMAL
EOSINOPHIL # BLD AUTO: 0.3 K/UL (ref 0–0.5)
EOSINOPHIL NFR BLD: 3.2 % (ref 0–8)
ERYTHROCYTE [DISTWIDTH] IN BLOOD BY AUTOMATED COUNT: 14.4 % (ref 11.5–14.5)
EST. GFR  (NO RACE VARIABLE): 58 ML/MIN/1.73 M^2
ESTIMATED AVG GLUCOSE: 128 MG/DL (ref 68–131)
GLUCOSE SERPL-MCNC: 85 MG/DL (ref 70–110)
HBA1C MFR BLD: 6.1 % (ref 4–5.6)
HCT VFR BLD AUTO: 39.3 % (ref 37–48.5)
HDLC SERPL-MCNC: 43 MG/DL (ref 40–75)
HDLC SERPL: 18.9 % (ref 20–50)
HGB BLD-MCNC: 12 G/DL (ref 12–16)
IMM GRANULOCYTES # BLD AUTO: 0.07 K/UL (ref 0–0.04)
IMM GRANULOCYTES NFR BLD AUTO: 0.8 % (ref 0–0.5)
LDLC SERPL CALC-MCNC: 136.4 MG/DL (ref 63–159)
LYMPHOCYTES # BLD AUTO: 2.6 K/UL (ref 1–4.8)
LYMPHOCYTES NFR BLD: 29.1 % (ref 18–48)
MCH RBC QN AUTO: 25.3 PG (ref 27–31)
MCHC RBC AUTO-ENTMCNC: 30.5 G/DL (ref 32–36)
MCV RBC AUTO: 83 FL (ref 82–98)
MONOCYTES # BLD AUTO: 0.7 K/UL (ref 0.3–1)
MONOCYTES NFR BLD: 7.5 % (ref 4–15)
NEUTROPHILS # BLD AUTO: 5.3 K/UL (ref 1.8–7.7)
NEUTROPHILS NFR BLD: 59 % (ref 38–73)
NONHDLC SERPL-MCNC: 185 MG/DL
NRBC BLD-RTO: 0 /100 WBC
PLATELET # BLD AUTO: 320 K/UL (ref 150–450)
PMV BLD AUTO: 9.5 FL (ref 9.2–12.9)
POTASSIUM SERPL-SCNC: 4 MMOL/L (ref 3.5–5.1)
PROT SERPL-MCNC: 7.6 G/DL (ref 6–8.4)
RBC # BLD AUTO: 4.75 M/UL (ref 4–5.4)
SODIUM SERPL-SCNC: 140 MMOL/L (ref 136–145)
TRIGL SERPL-MCNC: 243 MG/DL (ref 30–150)
TSH SERPL DL<=0.005 MIU/L-ACNC: 0.82 UIU/ML (ref 0.4–4)
WBC # BLD AUTO: 9.06 K/UL (ref 3.9–12.7)

## 2024-01-25 PROCEDURE — 80053 COMPREHEN METABOLIC PANEL: CPT | Performed by: INTERNAL MEDICINE

## 2024-01-25 PROCEDURE — 36415 COLL VENOUS BLD VENIPUNCTURE: CPT | Performed by: INTERNAL MEDICINE

## 2024-01-25 PROCEDURE — 1125F AMNT PAIN NOTED PAIN PRSNT: CPT | Mod: CPTII,S$GLB,, | Performed by: INTERNAL MEDICINE

## 2024-01-25 PROCEDURE — 84443 ASSAY THYROID STIM HORMONE: CPT | Performed by: INTERNAL MEDICINE

## 2024-01-25 PROCEDURE — 1100F PTFALLS ASSESS-DOCD GE2>/YR: CPT | Mod: CPTII,S$GLB,, | Performed by: INTERNAL MEDICINE

## 2024-01-25 PROCEDURE — 99999 PR PBB SHADOW E&M-EST. PATIENT-LVL V: CPT | Mod: PBBFAC,,, | Performed by: INTERNAL MEDICINE

## 2024-01-25 PROCEDURE — 83036 HEMOGLOBIN GLYCOSYLATED A1C: CPT | Performed by: INTERNAL MEDICINE

## 2024-01-25 PROCEDURE — 82306 VITAMIN D 25 HYDROXY: CPT | Performed by: INTERNAL MEDICINE

## 2024-01-25 PROCEDURE — 1159F MED LIST DOCD IN RCRD: CPT | Mod: CPTII,S$GLB,, | Performed by: INTERNAL MEDICINE

## 2024-01-25 PROCEDURE — 3072F LOW RISK FOR RETINOPATHY: CPT | Mod: CPTII,S$GLB,, | Performed by: INTERNAL MEDICINE

## 2024-01-25 PROCEDURE — 1160F RVW MEDS BY RX/DR IN RCRD: CPT | Mod: CPTII,S$GLB,, | Performed by: INTERNAL MEDICINE

## 2024-01-25 PROCEDURE — 3288F FALL RISK ASSESSMENT DOCD: CPT | Mod: CPTII,S$GLB,, | Performed by: INTERNAL MEDICINE

## 2024-01-25 PROCEDURE — 85025 COMPLETE CBC W/AUTO DIFF WBC: CPT | Performed by: INTERNAL MEDICINE

## 2024-01-25 PROCEDURE — 3079F DIAST BP 80-89 MM HG: CPT | Mod: CPTII,S$GLB,, | Performed by: INTERNAL MEDICINE

## 2024-01-25 PROCEDURE — 99215 OFFICE O/P EST HI 40 MIN: CPT | Mod: S$GLB,,, | Performed by: INTERNAL MEDICINE

## 2024-01-25 PROCEDURE — 3074F SYST BP LT 130 MM HG: CPT | Mod: CPTII,S$GLB,, | Performed by: INTERNAL MEDICINE

## 2024-01-25 PROCEDURE — 80061 LIPID PANEL: CPT | Performed by: INTERNAL MEDICINE

## 2024-01-25 RX ORDER — BETAMETHASONE VALERATE 1.2 MG/G
OINTMENT TOPICAL DAILY
Qty: 15 G | Refills: 1 | Status: SHIPPED | OUTPATIENT
Start: 2024-01-25 | End: 2024-01-25 | Stop reason: SDUPTHER

## 2024-01-25 RX ORDER — BETAMETHASONE VALERATE 1.2 MG/G
OINTMENT TOPICAL DAILY
Qty: 15 G | Refills: 1 | Status: SHIPPED | OUTPATIENT
Start: 2024-01-25

## 2024-01-25 RX ORDER — TRIAMCINOLONE ACETONIDE 5 MG/G
CREAM TOPICAL 2 TIMES DAILY
Qty: 15 G | Refills: 3 | Status: SHIPPED | OUTPATIENT
Start: 2024-01-25

## 2024-01-25 RX ORDER — ACETAMINOPHEN 500 MG
2000 TABLET ORAL DAILY
Qty: 90 CAPSULE | Refills: 3 | Status: SHIPPED | OUTPATIENT
Start: 2024-01-25 | End: 2024-05-07 | Stop reason: SDUPTHER

## 2024-01-25 RX ORDER — TRIAMCINOLONE ACETONIDE 5 MG/G
CREAM TOPICAL 2 TIMES DAILY
Qty: 15 G | Refills: 3 | Status: SHIPPED | OUTPATIENT
Start: 2024-01-25 | End: 2024-01-25 | Stop reason: SDUPTHER

## 2024-01-25 NOTE — PROGRESS NOTES
Subjective:       Patient ID: Marizol Kevin is a 78 y.o. female.    Chief Complaint: Annual Exam (Establish care)    Here to formally establish care    75 yo F with PMHx of colon CA, COPD, tobacco abuse, DM, HTN, HLD osteoporosis, neuropathy s/p chemo,    Left medial knee pain, burning shooting pain is her biggest complaint. She is unable to ambulate without pain and this is isolating and depressing. Reports some relief after IA injection but not 100% and lasted only 48hrs. She is TTP on medial knee. Had acupuncture yesterday.       Pain management:  Dr. Abdi Del Toro MD  Oncology:  Dr. Juanita Lebron MD now ...  Gastroenterology: Lorenzo Malcolm  Orthopedist Dr. Caitlin Ryan MD        ### DM ###  Metformin 500mg BID. Trulicity 1.5mg QW  . Lipitor 40mg     Monitor sugary drinks       HGBA1C                   6.4 (H)             07/21/2023 07:53 AM        HGBA1C                   7.8 (H)             04/25/2023 11:08 AM        HGBA1C                   8.5 (H)             12/20/2022 03:19 PM        HGBA1C                   7.4 (H)             09/14/2022 10:32 AM        HGBA1C                   6.4 (H)             05/19/2022 10:17 AM        HGBA1C                   7.2 (H)             10/01/2021 02:55 PM        HGBA1C                   7.2 (H)             06/01/2021 02:40 PM        HGBA1C                   7.5 (H)             03/12/2021 09:05 AM        HGBA1C                   7.1 (H)             09/10/2020 12:15 PM       ### Former tobacco use ###  -Had extremely bad taste in her mouth, headaches, and dizziness from taking bupropion   -  ### osteoporosis ###  9/17/2020 Last DEXA: osteopenia: Unclear history.  intermediate FRAX score.  Cont raloxifene.    ### lumbar DJD ###  Multilevel lumbar stenosis with increasing spondylosis and degenerative change since 2018.     Most severe lumbar stenosis at L3-4 and L4-5.  CT LUMBAR SPINE WITHOUT CONTRAST      ### colon CA ###  history of stage III  "adenocarcinoma of the ascending colon s/p right hemicolectomy on 8/15/2014. Four out of 17 lymph nodes were positive. She underwent adjuvant FOLFOX for 6 months from 11/4/2014 to 4/29/2015.  Her CEA has been elevated without radiographic evidence of recurrence.   -cea chronically elevated but stable / lower than prev. CT scan to be done if pt is symptomatic or cea level worsens.   -monitor for now Juanita Lebron MD at 10/7/2023Cscope 2021-9/29/2023        ### Thyroid nodule              Path benign 10/2020/ rec f/u endocrine to determine frequency and duration of U/s monitoring.              Review of Systems   Constitutional:  Negative for chills, fatigue, fever and unexpected weight change.   HENT:  Negative for ear pain, hearing loss, postnasal drip, tinnitus, trouble swallowing and voice change.    Respiratory:  Negative for cough, chest tightness, shortness of breath and wheezing.    Cardiovascular:  Negative for chest pain, palpitations and leg swelling.   Gastrointestinal:  Negative for abdominal pain, blood in stool, diarrhea, nausea and vomiting.   Endocrine: Negative for polydipsia, polyphagia and polyuria.   Genitourinary:  Negative for difficulty urinating, dysuria, hematuria and vaginal bleeding.   Musculoskeletal:  Positive for myalgias.   Skin:  Negative for rash.   Allergic/Immunologic: Negative for food allergies.   Neurological:  Negative for dizziness, numbness and headaches.   Hematological:  Does not bruise/bleed easily.   Psychiatric/Behavioral:  The patient is not nervous/anxious.        Objective:      Vitals:    01/25/24 1252 01/25/24 1352   BP: (!) 130/92 124/80   BP Location: Right arm    Patient Position: Sitting    Pulse: 97    SpO2: 96%    Weight: 74.6 kg (164 lb 7.4 oz)    Height: 4' 9" (1.448 m)       Physical Exam  Vitals and nursing note reviewed.   Constitutional:       General: She is not in acute distress.     Appearance: Normal appearance. She is well-developed.   HENT:      " Head: Normocephalic and atraumatic.      Mouth/Throat:      Pharynx: No oropharyngeal exudate.   Eyes:      General: No scleral icterus.     Conjunctiva/sclera: Conjunctivae normal.      Pupils: Pupils are equal, round, and reactive to light.   Neck:      Thyroid: No thyromegaly.   Cardiovascular:      Rate and Rhythm: Normal rate and regular rhythm.      Heart sounds: Normal heart sounds. No murmur heard.  Pulmonary:      Effort: Pulmonary effort is normal.      Breath sounds: Normal breath sounds. No wheezing or rales.   Abdominal:      General: There is no distension.   Musculoskeletal:      Left knee: Tenderness present over the MCL (and medial hamstring).   Lymphadenopathy:      Cervical: No cervical adenopathy.   Skin:     General: Skin is warm and dry.   Neurological:      Mental Status: She is alert and oriented to person, place, and time.   Psychiatric:         Behavior: Behavior normal.         Assessment:       1. Annual physical exam    2. Centrilobular emphysema    3. Chronic viral hepatitis B without delta agent and without coma    4. Painful diabetic neuropathy    5. Severe obesity (BMI 35.0-39.9) with comorbidity    6. Malignant neoplasm of ascending colon    7. Stage 3a chronic kidney disease    8. Aortic atherosclerosis    9. Drug-induced polyneuropathy    10. Type 2 diabetes mellitus with hyperglycemia, without long-term current use of insulin    11. Essential hypertension    12. History of tobacco use    13. Allergic rhinitis, unspecified seasonality, unspecified trigger    14. Thyroid nodule    15. Osteoporosis, unspecified osteoporosis type, unspecified pathological fracture presence    16. Type 2 diabetes mellitus with stage 3a chronic kidney disease, without long-term current use of insulin    17. Elevated CEA    18. Hyperlipidemia associated with type 2 diabetes mellitus    19. Mild intermittent asthma without complication    20. Abnormal finding of blood chemistry, unspecified    21. Left  medial knee pain    22. Genital ulcer, female        Plan:       Marizol was seen today for annual exam.    Diagnoses and all orders for this visit:    Annual physical exam  -     Comprehensive Metabolic Panel; Future  -     Lipid Panel; Future  -     TSH; Future  -     CBC Auto Differential; Future  -     Hemoglobin A1C; Future    Chronic viral hepatitis B without delta agent and without coma   monitor  Painful diabetic neuropathy  -     TSH; Future    Severe obesity (BMI 35.0-39.9) with comorbidity  -     Comprehensive Metabolic Panel; Future  -     Lipid Panel; Future  -     TSH; Future  -     Hemoglobin A1C; Future    Malignant neoplasm of ascending colon    Stage 3a chronic kidney disease  -     Comprehensive Metabolic Panel; Future    Aortic atherosclerosis  -     Lipid Panel; Future  -     TSH; Future    Drug-induced polyneuropathy  -     Lipid Panel; Future  -     TSH; Future    Type 2 diabetes mellitus with hyperglycemia, without long-term current use of insulin    Essential hypertension   Controlled and asymptomatic.  Continue current Rx regimen.    History of tobacco use   UTD with lung scan    Allergic rhinitis, unspecified seasonality, unspecified trigger   1)Antihistamines(Allegra, Claritin, Xzyal, Zyrtec)  2)Nasal Steroids (Nasocort, Rhinocort, Flonase)  3)Distilled salt water sinus rinses via neti pots or products such as Tian Med Sinus Rinse or Sinugator. Must wash container or device and use bottled water to avoid introducing infection.   You can can use (as directed) any combination of these three things every day of your life if needed in order to treat or control your symptoms. Brand name use of medications is not necessary    Thyroid nodule    Osteoporosis, unspecified osteoporosis type, unspecified pathological fracture presence  -     DXA Bone Density Axial Skeleton 1 or more sites; Future  -     Vitamin D; Future    Type 2 diabetes mellitus with stage 3a chronic kidney disease, without  long-term current use of insulin    Elevated CEA    Hyperlipidemia associated with type 2 diabetes mellitus    Mild intermittent asthma without complication    Abnormal finding of blood chemistry, unspecified  -     CBC Auto Differential; Future  -     Hemoglobin A1C; Future    Left medial knee pain  -     Ambulatory referral/consult to Physical/Occupational Therapy; Future    Genital ulcer, female  -     Discontinue: betamethasone valerate 0.1% (VALISONE) 0.1 % Oint; Apply topically once daily.  -     betamethasone valerate 0.1% (VALISONE) 0.1 % Oint; Apply topically once daily.    Other orders  -     Discontinue: triamcinolone acetonide 0.5% (KENALOG) 0.5 % Crea; Apply topically 2 (two) times daily.  -     triamcinolone acetonide 0.5% (KENALOG) 0.5 % Crea; Apply topically 2 (two) times daily.  -     cholecalciferol, vitamin D3, (VITAMIN D3) 50 mcg (2,000 unit) Cap capsule; Take 1 capsule (2,000 Units total) by mouth once daily.    >40 minutes were spent today reviewing patient chart.  Much of today's visit was spent discussing with patient my chart review, their concerns, health maintenance, my assessment, and recommendations.       RTC in 6 months or sooner maira Ferrell MD  Internal Medicine-[facility]        Side effects of medication(s) were discussed in detail and patient voiced understanding.  Patient will call back for any issues or complications.

## 2024-01-26 ENCOUNTER — TELEPHONE (OUTPATIENT)
Dept: INTERNAL MEDICINE | Facility: CLINIC | Age: 79
End: 2024-01-26
Payer: MEDICARE

## 2024-01-26 NOTE — TELEPHONE ENCOUNTER
----- Message from Erik Robertson sent at 1/26/2024 11:37 AM CST -----  Contact: Patient  Type:  Patient Call          Who Called: patient         Does the patient know what this is regarding?: Requesting a call back ;pt said that she was in the clinic on yesterday and would like to know when should she start therapy ; please advise           Would the patient rather a call back or a response via [facility]?call           Best Call Back Number: [phone]             Additional Information:

## 2024-01-31 ENCOUNTER — HOSPITAL ENCOUNTER (OUTPATIENT)
Dept: RADIOLOGY | Facility: OTHER | Age: 79
Discharge: HOME | End: 2024-01-31
Attending: INTERNAL MEDICINE
Payer: MEDICARE

## 2024-01-31 ENCOUNTER — TELEPHONE (OUTPATIENT)
Dept: INTERNAL MEDICINE | Facility: CLINIC | Age: 79
End: 2024-01-31
Payer: MEDICARE

## 2024-01-31 DIAGNOSIS — E11.9 TYPE 2 DIABETES MELLITUS WITHOUT COMPLICATION, WITHOUT LONG-TERM CURRENT USE OF INSULIN: ICD-10-CM

## 2024-01-31 DIAGNOSIS — I10 ESSENTIAL HYPERTENSION: ICD-10-CM

## 2024-01-31 DIAGNOSIS — M81.0 OSTEOPOROSIS, UNSPECIFIED OSTEOPOROSIS TYPE, UNSPECIFIED PATHOLOGICAL FRACTURE PRESENCE: ICD-10-CM

## 2024-01-31 DIAGNOSIS — J45.20 MILD INTERMITTENT ASTHMA WITHOUT COMPLICATION: ICD-10-CM

## 2024-01-31 PROCEDURE — 77080 DXA BONE DENSITY AXIAL: CPT | Mod: TC

## 2024-01-31 PROCEDURE — 77080 DXA BONE DENSITY AXIAL: CPT | Mod: 26,ICN,, | Performed by: RADIOLOGY

## 2024-01-31 NOTE — TELEPHONE ENCOUNTER
----- Message from Becky Scott sent at 1/31/2024  2:02 PM CST -----  Regarding: Results                  Name of Who is Calling:  ESTEBAN JIMENEZ    Who Left The Message:   ESTEBAN JIMENEZ      What is the request in detail:  Patient called requesting a call back regarding her recent Bone Density results.  Please give a call back at your earliest convenience and further advise.    Thank you      Reply by [facility]: NO      Preferred Call Back :  [phone] (R)

## 2024-02-01 RX ORDER — AMLODIPINE BESYLATE 5 MG/1
TABLET ORAL
Qty: 90 TABLET | Refills: 3 | Status: SHIPPED | OUTPATIENT
Start: 2024-02-01

## 2024-02-01 RX ORDER — ALBUTEROL SULFATE 90 UG/1
2 AEROSOL, METERED RESPIRATORY (INHALATION) EVERY 4 HOURS PRN
Qty: 18 G | Refills: 3 | Status: SHIPPED | OUTPATIENT
Start: 2024-02-01

## 2024-02-01 RX ORDER — ATORVASTATIN CALCIUM 40 MG/1
TABLET, FILM COATED ORAL
Qty: 90 TABLET | Refills: 3 | Status: SHIPPED | OUTPATIENT
Start: 2024-02-01 | End: 2024-04-30 | Stop reason: SDUPTHER

## 2024-02-01 RX ORDER — METFORMIN HYDROCHLORIDE 500 MG/1
TABLET, EXTENDED RELEASE ORAL
Qty: 180 TABLET | Refills: 3 | Status: SHIPPED | OUTPATIENT
Start: 2024-02-01

## 2024-02-01 RX ORDER — FERROUS SULFATE 325(65) MG
TABLET, DELAYED RELEASE (ENTERIC COATED) ORAL EVERY OTHER DAY
Qty: 45 TABLET | Refills: 3 | Status: SHIPPED | OUTPATIENT
Start: 2024-02-01

## 2024-02-02 ENCOUNTER — CLINICAL SUPPORT (OUTPATIENT)
Dept: REHABILITATION | Facility: OTHER | Age: 79
End: 2024-02-02
Attending: INTERNAL MEDICINE
Payer: MEDICARE

## 2024-02-02 ENCOUNTER — PATIENT MESSAGE (OUTPATIENT)
Dept: INTERNAL MEDICINE | Facility: CLINIC | Age: 79
End: 2024-02-02
Payer: MEDICARE

## 2024-02-02 ENCOUNTER — CLINICAL SUPPORT (OUTPATIENT)
Dept: REHABILITATION | Facility: HOSPITAL | Age: 79
End: 2024-02-02
Payer: MEDICARE

## 2024-02-02 DIAGNOSIS — Z78.9 IMPAIRED MOBILITY AND ADLS: ICD-10-CM

## 2024-02-02 DIAGNOSIS — M25.562 CHRONIC PAIN OF LEFT KNEE: Primary | ICD-10-CM

## 2024-02-02 DIAGNOSIS — Z74.09 IMPAIRED MOBILITY AND ADLS: ICD-10-CM

## 2024-02-02 DIAGNOSIS — G89.29 CHRONIC PAIN OF BOTH SHOULDERS: ICD-10-CM

## 2024-02-02 DIAGNOSIS — M25.662 DECREASED RANGE OF MOTION OF LEFT KNEE: ICD-10-CM

## 2024-02-02 DIAGNOSIS — M25.512 CHRONIC PAIN OF BOTH SHOULDERS: ICD-10-CM

## 2024-02-02 DIAGNOSIS — M25.511 CHRONIC PAIN OF BOTH SHOULDERS: ICD-10-CM

## 2024-02-02 DIAGNOSIS — M25.562 CHRONIC PAIN OF LEFT KNEE: ICD-10-CM

## 2024-02-02 DIAGNOSIS — M25.562 LEFT MEDIAL KNEE PAIN: ICD-10-CM

## 2024-02-02 DIAGNOSIS — M54.59 OTHER LOW BACK PAIN: Primary | ICD-10-CM

## 2024-02-02 DIAGNOSIS — G89.29 CHRONIC PAIN OF LEFT KNEE: Primary | ICD-10-CM

## 2024-02-02 DIAGNOSIS — G89.29 CHRONIC PAIN OF LEFT KNEE: ICD-10-CM

## 2024-02-02 PROCEDURE — 97811 ACUP 1/> W/O ESTIM EA ADD 15: CPT | Mod: PN

## 2024-02-02 PROCEDURE — 97810 ACUP 1/> WO ESTIM 1ST 15 MIN: CPT | Mod: PN

## 2024-02-02 PROCEDURE — 97163 PT EVAL HIGH COMPLEX 45 MIN: CPT | Mod: PN

## 2024-02-02 NOTE — PATIENT INSTRUCTIONS
"Access Code: [email or web address]  URL: [email or web address]  Date: 02/02/2024  Prepared by: Rusty Robertson    Exercises  - Beginner Bridge  - 1-2 x daily - 5-7 x weekly - 3 sets - 12 reps - 3 seconds hold  - Long Sitting Quad Set  - 1-2 x daily - 5-7 x weekly - 3 sets - 10 reps - 3" hold  - Seated Long Arc Quad  - 1-2 x daily - 5-7 x weekly - 3 sets - 10 reps - 3" hold  "

## 2024-02-02 NOTE — PLAN OF CARE
[facility] Physical Therapy (PT) Initial Evaluation- KNEE       Name: Marizol Kevin  Clinic Number: [id number]  YOB: 1945    Therapy Diagnosis:   Encounter Diagnoses   Name Primary?    Chronic pain of left knee Yes    Decreased range of motion of left knee     Impaired mobility and ADLs      Physician: Pedro Luis Butterfield MD    Physician Orders: PT Evaluate and Treat  Medical Diagnosis:  Left medial knee pain [M25.562]   Surgical Procedure and Date: None  Evaluation Date: 2/2/2024  Insurance Authorization Period Expiration: 1/24/2025  Plan of Care Certification Period: 5/1/2024  Progress Note Due: Every 6th visit or 30 days, whichever occurs first. Prior to physician follow up to provide referring provider accurate and up to date patient progress with rehabilitation.    Date of Return to MD: To be determined  Visit # / Visits authorized: 1 / 1; future visits pending    Precautions:  Standard    Time In: 9:00 am  Time Out: 9:55 am  Total Appointment Time (timed & untimed codes): 55 minutes    Subjective     Date of onset: 4 months    History of current condition: Marizol Kevin is a 78 year old female that presents with a chief complaint of Left medial knee pain that has been present since September/August of 2023. Patient states that she feels her knee is most irritable with activities of daily living like standing, walking, and rising from a seated position. Patient endorses that she feels the left knee is unstable at times when ambulating. Uses a SPC for primary assistive device. She reports that she had an episode of pain in her lumbar spine for 2-3 weeks that improved with conservative management.      Pain: Current 4/10, worst 8/10, best 4/10   Location: left knee, medial joint line   Description: Aching, Dull, Tight, Deep, and Sharp  Aggravating Factors: Standing, Bending, Walking, Night Time, Morning, Extension, Flexing, Lifting, and Getting out of bed/chair  Easing  Factors: pain medication, ice, lying down, heating pad, and rest    Past medical history:   Past Medical History:   Diagnosis Date    Allergy     Anxiety     Cancer     colon    Cataract     Colon cancer     Dependence on nicotine from cigarettes 9/3/2020    Diabetes mellitus, type 2     Dry eye syndrome     Hyperlipidemia     Hypertension     Insomnia     Light cigarette smoker (1-9 cigs/day) 6/21/2022    Malignant neoplasm of ascending colon 7/20/2020    Squamous blepharitis     Syncope 4/16/2022    Tobacco use disorder, moderate, in early remission 11/2/2022    Vitamin D deficiency 9/10/2020       Past Surgical history:  has a past surgical history that includes Laparoscopic left colon resection; Hysterectomy; Cholecystectomy; Knee surgery; Cataract extraction, bilateral (03/2020); Colonoscopy (N/A, 02/01/2021); Eye surgery; Joint replacement; Colon surgery; Injection of anesthetic agent around nerve (Left, 7/21/2023); and Transforaminal epidural injection of steroid (Left, 12/22/2023).    Medications: Marizol has a current medication list which includes the following prescription(s): albuterol, albuterol-ipratropium, amlodipine, aspirin, atorvastatin, azelastine, betamethasone valerate 0.1%, blood sugar diagnostic, blood-glucose meter, cholecalciferol (vitamin d3), trulicity, duloxetine, ferrous sulfate, heparin, porcine (pf), hydroxyzine hcl, ketoconazole, lancets, metformin, multivitamin, ondansetron, pregabalin, raloxifene, and triamcinolone acetonide 0.5%.    Allergies:   Review of patient's allergies indicates:   Allergen Reactions    Grass pollen-june grass standard     Sulfa (sulfonamide antibiotics)      Occurred when she was pregnant with varicose veins resulting in leg swelling and pain with standing       Prior Therapy: Yes, 2023 for shoulder pain  Social: Lives in [place], [place]   Occupation: Retired   Prior Level of Function: Independent with all ADL/iADLs   Current Level of Function: Independent  with all ADL/iADLs   Patient's goals: improve her walking tolerance    KOOS Jr: 36.9    CMS Impairment/Limitation/Restriction for FOTO Survey    Therapist reviewed FOTO scores for Marizol Kevin on 2/2/2024.   FOTO documents entered into EPIC - see Media section.    Limitation Score: 75%  Predicted Score: 54%  Category: Mobility       Imaging:  X-ray: Please note, Merchant view is overpenetrated.  Allowing for this, I see no acute fracture.  Degenerative change patellofemoral compartment.  No significant suprapatellar joint effusion or soft tissue edema.    Environmental/Abuse/Neglect/Sensory concerns: None  The patient's spiritual, cultural, social and education needs were considered with no evidence of barriers noted.     Objective     Posture: resting in tibial external rotation of the Left knee     Gait: decreased step/stride length with SPC  Functional Squats: 5 reps, 28 seconds with Upper Extremity support    Range of Motion (ROM):    LEFT RIGHT   Hip flexion 118 120   Knee flexion 127 134   Knee extension -4 +1   Ankle dorsiflexion (DF) 8 7   Ankle plantarflexion (PF) 55 55     Manual Muscle Test (MMT):   LEFT RIGHT   Hip flexion 3+/5 4/5   Hip extension 3/5 3/5   Hip abduction 3/5 3/5   Hip adduction 4/5 4/5   Knee flexion 4-/5 4/5   Knee extension 3+/5 4-/5   Ankle DF 4/5 4/5   Ankle PF 4/5 4/5       FLEXIBILITY   LEFT RIGHT   Hamstring 65 ° 71 °   Mina's - -   Ely  + -       Palpation: tenderness to palpation over medial joint line    Special tests:   [x] Anterior/Posterior drawer (-) [] Verdin's/Grind [x] Varus/Valgus stress (-)   [] Apley's [x] Delano's (+) medial  [] Step up  [] Pivot Shift    [] Steinmann's     Joint Mobility:   Patellofemoral Joint Mobility: 2/6   Tibiofemoral Joint Mobility: 2/6  Talocrural Joint Mobility: 3/6  Proximal Tibiofibular Mobility: 3/6    Treatment   Treatment Time In: 9:35 am  Treatment Time Out: 9:55 am  Total Treatment time (time-based codes) separate from  Evaluation: 20 minutes    Marizol received the following manual therapy techniques: Joint mobilizations were applied to the: Left Knee for 10 minutes, including:  Knee Extension Mobilization grade II  Knee flexion gapping grade II with IR   Medial Meniscus Gapping    Marizol participated in dynamic functional therapeutic activities to improve functional performance for 10  minutes, including:  Quad Sets 20x's  Bridges 10x's   Long Arc Quad 15x's       Home Exercises and Patient Education Provided  Education provided:   Patient provided education on the importance of compliance with HEP, diagnosis and prognosis, and intended duration/frequency of physical therapy plan of care. Shared-decision making between evaluating therapist and patient utilized to determine therapeutic intervention selection, plan of care parameters, and continued monitoring of signs/symptoms.    Written Home Exercise Program (HEP) Provided: Patient instructed to cont prior HEP.  Exercises were reviewed and Marizol was able to demonstrate them prior to the end of the session.  Marizol demonstrated good  understanding of the education provided.     See Electronic Medical Record under Patient Instructions for exercises provided 2/2/2024.    Assessment   PT Dx: Patient presents with primary mobility deficits, nociceptive pain state with a input pain mechanism.   Movement Impairment Syndrome (MSI): Knee Tibiofemoral Rotation with Valgus Syndrome    Marizol is a 78 y.o. female referred to outpatient Physical Therapy with a medical diagnosis of Left medial knee pain. Patient presents with decreased quadriceps force production, limitation in terminal knee extension range of motion, irritable medial meniscus, negative slump test, and limited lumbar mobility/motor control. Rehab prognosis is: Good.   Patient will benefit from skilled outpatient Physical Therapy to address the deficits stated above and in the chart below, provide patient/family education, and  to maximize patient's level of independence.     Patient's spiritual, cultural and educational needs considered and patient is agreeable to the plan of care and goals as stated below:     Anticipated Barriers for therapy: None  Medical Necessity is demonstrated by the following  History  Co-morbidities and personal factors that may impact the plan of care [] LOW: no personal factors / co-morbidities  [] MODERATE: 1-2 personal factors / co-morbidities  [] HIGH: 3+ personal factors / co-morbidities    Moderate / High Support Documentation:   Allergy    Anxiety    Cancer    colon   Cataract    Colon cancer    Dependence on nicotine from cigarettes    Diabetes mellitus, type 2    Dry eye syndrome    Hyperlipidemia    Hypertension    Insomnia    Light cigarette smoker (1-9 cigs/day)    Malignant neoplasm of ascending colon    Squamous blepharitis    Syncope    Tobacco use disorder, moderate, in early remission    Vitamin D deficiency         Examination  Body Structures and Functions, activity limitations and participation restrictions that may impact the plan of care [] LOW: addressing 1-2 elements  [] MODERATE: 3+ elements  [x] HIGH: 4+ elements (please support below)    Moderate / High Support Documentation:   Adverse Neural Tension  Left Knee Mobility Deficits  Left Lower Extremity Weakness  Gait Deficit     Clinical Presentation [] LOW: stable  [] MODERATE: Evolving  [] HIGH: Unstable     Decision Making/ Complexity Score: high       GOALS  Short Term Goals (4 weeks):  1. Patient will have improved AROM of the left knee to +2 degrees of knee extension.  2. Patient will have decreased complaints of pain to 2/10 during long bouts of standing and walking to improve participation in activities of daily living.   3. Patient will be independent and compliant with issued HEP for continued functional mobility and strength gains for recreational/leisure activities.     Long Term Goals (8 weeks):   Patient will have improved  AROM of the left knee to 3-0-130 degrees of knee mobility to demonstrate improved knee range of motion.  2. Patient will have improved strength of the left knee to 4/5 for the Left quadriceps muscle to show improved motor control of the knee during gait.  3. Patient will be able to resume prior functional level with no deficits.   4. Patient will have overall improvement in condition to have increased score on KOOS Jr to 50% to demonstrate clinically significant change in knee function.   5. Patient will have overall improvement in condition to have decreased FOTO Limitation score to 54% to demonstrate clinically significant change in knee function.     Plan     Plan of care Certification: 2/2/2024 to 5/1/2024.    Outpatient Physical Therapy 2 times weekly for 10 weeks to include the following interventions: Cervical/Lumbar Traction, Electrical Stimulation as needed, Gait Training, Manual Therapy, Moist Heat/ Ice, Neuromuscular Re-ed, Orthotic Management and Training, Patient Education, Self Care, Therapeutic Activities, Therapeutic Exercise, and Trigger Point Dry Needling, Blood Flow Restriction Training.     Rusty Robertson, PT  2/2/2024        Plan Next Visit   Date     Visit     NuStep    Short Arc Quad    Long Arc Quad    Quad Sets    Sit to Stand (TRX Assisted)    Adriana Stephens    Step Ups    Tandem Walks

## 2024-02-02 NOTE — PROGRESS NOTES
"  [facility] Physical Therapy (PT) Initial Evaluation- KNEE       Name: Marizol Kevin  Clinic Number: [id number]  YOB: 1945    Therapy Diagnosis:   Encounter Diagnoses   Name Primary?    Chronic pain of left knee Yes    Decreased range of motion of left knee     Impaired mobility and ADLs      Physician: Pedro Luis Butterfield MD    Physician Orders: PT Evaluate and Treat  Medical Diagnosis:  Left medial knee pain [M25.562]   Surgical Procedure and Date: None  Evaluation Date: 2/2/2024  Insurance Authorization Period Expiration: 1/24/2025  Plan of Care Certification Period: 5/1/2024  Progress Note Due: Every 6th visit or 30 days, whichever occurs first. Prior to physician follow up to provide referring provider accurate and up to date patient progress with rehabilitation.    Date of Return to MD: To be determined  Visit # / Visits authorized: 1 / 1; future visits pending    Precautions:  Standard    Time In: 9:*** am  Time Out: 9:*** am  Total Appointment Time (timed & untimed codes): *** minutes    Subjective     Date of onset: 4 months    History of current condition: Marizol Kevin is a 78 year old female that presents with a chief complaint of Left medial knee pain that has been present for ***. Patient states that she feels her knee is most irritable with activities of daily living like standing, walking, and rising from a seated position. Patient endorses ***     Pain: Current {0-10:44103::"0"}/10, worst {0-10:47552::"0"}/10, best {0-10:20507::"0"}/10   Location: left knee, medial joint line   Description: Aching, Dull, Tight, Deep, and Sharp  Aggravating Factors: Standing, Bending, Walking, Night Time, Morning, Extension, Flexing, Lifting, and Getting out of bed/chair  Easing Factors: pain medication, ice, lying down, heating pad, and rest    Past medical history:   Past Medical History:   Diagnosis Date    Allergy     Anxiety     Cancer     colon    Cataract     Colon " cancer     Dependence on nicotine from cigarettes 9/3/2020    Diabetes mellitus, type 2     Dry eye syndrome     Hyperlipidemia     Hypertension     Insomnia     Light cigarette smoker (1-9 cigs/day) 2022    Malignant neoplasm of ascending colon 2020    Squamous blepharitis     Syncope 2022    Tobacco use disorder, moderate, in early remission 2022    Vitamin D deficiency 9/10/2020       Past Surgical history:  has a past surgical history that includes Laparoscopic left colon resection; Hysterectomy; Cholecystectomy; Knee surgery; Cataract extraction, bilateral (2020); Colonoscopy (N/A, 2021); Eye surgery; Joint replacement; Colon surgery; Injection of anesthetic agent around nerve (Left, 2023); and Transforaminal epidural injection of steroid (Left, 2023).    Medications: Marizol has a current medication list which includes the following prescription(s): albuterol, albuterol-ipratropium, amlodipine, aspirin, atorvastatin, azelastine, betamethasone valerate 0.1%, blood sugar diagnostic, blood-glucose meter, cholecalciferol (vitamin d3), trulicity, duloxetine, ferrous sulfate, heparin, porcine (pf), hydroxyzine hcl, ketoconazole, lancets, metformin, multivitamin, ondansetron, pregabalin, raloxifene, and triamcinolone acetonide 0.5%.    Allergies:   Review of patient's allergies indicates:   Allergen Reactions    Grass pollen- grass standard     Sulfa (sulfonamide antibiotics)      Occurred when she was pregnant with varicose veins resulting in leg swelling and pain with standing       Prior Therapy: Yes,  for shoulder pain  Social: Lives in [place], [place]   Occupation: Retired   Prior Level of Function: Independent with all ADL/iADLs   Current Level of Function: Independent with all ADL/iADLs   Patient's goals: ***    Lower Extremity Functional Scale Score (LEFS):    ***     *Scorin represents extreme difficulty and 80 represents no difficulty    CMS  "Impairment/Limitation/Restriction for FOTO Survey    Therapist reviewed FOTO scores for Marizol Kevin on 2/2/2024.   FOTO documents entered into [employer] - see Media section.    Limitation Score: ***%  Predicted Score: ***%  Category: Mobility       Imaging:  X-ray: Please note, Merchant view is overpenetrated.  Allowing for this, I see no acute fracture.  Degenerative change patellofemoral compartment.  No significant suprapatellar joint effusion or soft tissue edema.    Environmental/Abuse/Neglect/Sensory concerns: None  The patient's spiritual, cultural, social and education needs were considered with no evidence of barriers noted.     Objective     Posture: ***    Gait: ***    Balance: Single Leg Stance (SLS): Left (L)*** seconds Right (R)*** seconds  Single Leg Heel Raises: Left *** reps, Right *** reps  Functional Squats: *** reps     Range of Motion (ROM):    LEFT RIGHT   Hip flexion *** ***   Knee flexion *** ***   Knee extension *** ***   Ankle dorsiflexion (DF) *** ***   Ankle plantarflexion (PF) *** ***     Manual Muscle Test (MMT):   LEFT RIGHT   Hip flexion ***/5 ***/5   Hip extension ***/5 ***/5   Hip abduction ***/5 ***/5   Hip adduction ***/5 ***/5   Knee flexion ***/5 ***/5   Knee extension ***/5 ***/5   Ankle DF ***/5 ***/5   Ankle PF ***/5 ***/5     GIRTH: (cm)   LEFT RIGHT   Calf (6" distal to mid-patella) *** ***   Mid patella  *** ***   Quad (4" proximal to mid-patella) *** ***     FLEXIBILITY   LEFT RIGHT   Hamstring *** ° *** °   Mina's *** ***   Ely  *** ***   Mango  *** ***       Palpation: ***    Special tests: ***  [] Anterior/Posterior drawer [] Verdin's/Grind [] Varus/Valgus stress   [] Apley's [] Delano's  [] Step up  [] Pivot Shift    [] Steinmann's     Joint Mobility:   Patellofemoral Joint Mobility: ***/6   Tibiofemoral Joint Mobility: ***/6  Talocrural Joint Mobility: ***/6  Proximal Tibiofibular Mobility: ***/6    Treatment   Treatment Time In: ***  Treatment Time Out: " ***  Total Treatment time (time-based codes) separate from Evaluation: *** minutes    Marizol received the following manual therapy techniques: Joint mobilizations were applied to the: Left Knee for *** minutes, including:  ***  Marizol participated in dynamic functional therapeutic activities to improve functional performance for ***  minutes, including:  ***      Home Exercises and Patient Education Provided  Education provided:   Patient provided education on the importance of compliance with HEP, diagnosis and prognosis, and intended duration/frequency of physical therapy plan of care. Shared-decision making between evaluating therapist and patient utilized to determine therapeutic intervention selection, plan of care parameters, and continued monitoring of signs/symptoms.    Written Home Exercise Program (HEP) Provided: Patient instructed to cont prior HEP.  Exercises were reviewed and Marizol was able to demonstrate them prior to the end of the session.  Marizol demonstrated good  understanding of the education provided.     See Electronic Medical Record under Patient Instructions for exercises provided 2/2/2024.    Assessment   PT Dx: Patient presents with primary *** deficits, *** pain state with a *** pain mechanism.   Movement Impairment Syndrome (MSI): *** Syndrome    Marizol is a 78 y.o. female referred to outpatient Physical Therapy with a medical diagnosis of Left medial knee pain. Patient presents with ***. Rehab prognosis is: Good.     Patient will benefit from skilled outpatient Physical Therapy to address the deficits stated above and in the chart below, provide patient/family education, and to maximize patient's level of independence.     Patient's spiritual, cultural and educational needs considered and patient is agreeable to the plan of care and goals as stated below:     Anticipated Barriers for therapy: None  Medical Necessity is demonstrated by the following  History  Co-morbidities and personal  "factors that may impact the plan of care [] LOW: no personal factors / co-morbidities  [] MODERATE: 1-2 personal factors / co-morbidities  [] HIGH: 3+ personal factors / co-morbidities    Moderate / High Support Documentation:   Allergy    Anxiety    Cancer    colon   Cataract    Colon cancer    Dependence on nicotine from cigarettes    Diabetes mellitus, type 2    Dry eye syndrome    Hyperlipidemia    Hypertension    Insomnia    Light cigarette smoker (1-9 cigs/day)    Malignant neoplasm of ascending colon    Squamous blepharitis    Syncope    Tobacco use disorder, moderate, in early remission    Vitamin D deficiency         Examination  Body Structures and Functions, activity limitations and participation restrictions that may impact the plan of care [] LOW: addressing 1-2 elements  [] MODERATE: 3+ elements  [] HIGH: 4+ elements (please support below)    Moderate / High Support Documentation: ***     Clinical Presentation [] LOW: stable  [] MODERATE: Evolving  [] HIGH: Unstable     Decision Making/ Complexity Score: {Desc; low/moderate/high:259117}       GOALS  Short Term Goals (4 weeks):  1. Patient will have improved {Blank single:38117::"PROM","AROM"} of the {Blank single:60497::"right","left"} knee to ***.  2. Patient will have decreased complaints of pain to ***/10 during long bouts of standing and walking to improve participation in activities of daily living.   3. Patient will be independent and compliant with issued HEP for continued functional mobility and strength gains for recreational/leisure activities.     Long Term Goals (8 weeks):   Patient will have improved {Blank single:31027::"PROM","AROM"} of the {Blank single:20617::"right","left"} knee to ***.  2. Patient will have improved strength of the {Blank single:45526::"right","left"} knee to ***.  3. Patient will be able to resume prior functional level with no deficits.   4. Patient will have overall improvement in condition to have increased score " on Lower Extremity Functional Scale (LEFS) to ***% to demonstrate clinically significant change in knee function.   5. Patient will have overall improvement in condition to have decreased FOTO Limitation score to ***% to demonstrate clinically significant change in knee function.     Plan     Plan of care Certification: 2/2/2024 to 5/1/2024.    Outpatient Physical Therapy 2 times weekly for 10 weeks to include the following interventions: Cervical/Lumbar Traction, Electrical Stimulation as needed, Gait Training, Manual Therapy, Moist Heat/ Ice, Neuromuscular Re-ed, Orthotic Management and Training, Patient Education, Self Care, Therapeutic Activities, Therapeutic Exercise, and Trigger Point Dry Needling, Blood Flow Restriction Training .     Rusty Robertson, PT  2/2/2024        Plan Next Visit   Date     Visit     Therapeutic Exercise    ***                        Neuromuscular Re-Education    ***

## 2024-02-04 NOTE — PROGRESS NOTES
Acupuncture Treatment Note     Name: Marizol Kevin  [facility] Number: [id number]    Traditional Chinese Medicine Diagnosis: Qi Stagnation and Blood Stasis   Physician: Abdi Del Toro MD    Date of Service: 2/2/2024     Medical Diagnosis:  Chronic pain in left knee; Chronic pain in both shoulders, Chronic lower back pain with bilateral sciatica       Encounter Diagnoses   Name Primary?    Chronic bilateral low back pain with bilateral sciatica      Lumbar radiculopathy      Chronic pain syndrome      Visit #/Visits authorized: 4/ 12     Precautions: Diabetes and cance     Subjective     Chief Complaint:  Left knee pain for 4 months , Chronic pain in both shoulders    Cancer Related Symptoms: Neuropathy    Medical necessity is demonstrated by the following IMPAIRMENTS: Medical Necessity: Cancer related pain, Decreased mobility limits day to day activities, social, and emergent situations, and Decreased quality of life    Response to Previous Treatment:  Good    Quality of Symptoms (Better/Worse):  Better    Other Condition/Symptoms:  None    Objective      New Findings:  None    Treatment Principles:  Move Qi and Blood, stop pain     Acupuncture points used:    Bilateral points:   Unilateral points:  Left: Hiwot on knee + 4,    Right:  Auricular Treatment:  None   Needles In: 4  Needles Out: 4  Needles W/O STIM placed: 9:50 AM  [place] W/O STIM removed: 10: 20 AM    Other Traditional Chinese Medicine Modalities:  None    Recommendations:  PT    Education:  Patient is aware of cumulative effect of acupuncture      Assessment      Analysis of Treatment:  Patient felt good    Pt prognosis is Good.     Patient will continue to benefit from acupuncture treatment to address the deficits listed in the problem list box on initial evaluation, provide patient family education and to maximize pt's level of independence in the home and community environment.     Patient's spiritual, cultural and educational needs considered  and pt agreeable to plan of care and goals.     Anticipated barriers to treatment: None    Plan     Recommend       1  /week for 12   treatments and re-assess.

## 2024-02-06 ENCOUNTER — CLINICAL SUPPORT (OUTPATIENT)
Dept: REHABILITATION | Facility: OTHER | Age: 79
End: 2024-02-06
Payer: MEDICARE

## 2024-02-06 ENCOUNTER — DOCUMENTATION ONLY (OUTPATIENT)
Dept: REHABILITATION | Facility: OTHER | Age: 79
End: 2024-02-06

## 2024-02-06 DIAGNOSIS — Z74.09 IMPAIRED MOBILITY AND ADLS: ICD-10-CM

## 2024-02-06 DIAGNOSIS — Z78.9 IMPAIRED MOBILITY AND ADLS: ICD-10-CM

## 2024-02-06 DIAGNOSIS — M25.662 DECREASED RANGE OF MOTION OF LEFT KNEE: ICD-10-CM

## 2024-02-06 DIAGNOSIS — G89.29 CHRONIC PAIN OF LEFT KNEE: Primary | ICD-10-CM

## 2024-02-06 DIAGNOSIS — M25.562 CHRONIC PAIN OF LEFT KNEE: Primary | ICD-10-CM

## 2024-02-06 PROCEDURE — 97530 THERAPEUTIC ACTIVITIES: CPT | Mod: HCNC,PN,CQ

## 2024-02-06 PROCEDURE — 97110 THERAPEUTIC EXERCISES: CPT | Mod: HCNC,PN,CQ

## 2024-02-06 PROCEDURE — 97112 NEUROMUSCULAR REEDUCATION: CPT | Mod: HCNC,PN,CQ

## 2024-02-06 NOTE — PROGRESS NOTES
"[facility]   Physical Therapy Treatment Note     Name: Marizol Kevin  Clinic Number: [id number]    Therapy Diagnosis:   Encounter Diagnoses   Name Primary?    Chronic pain of left knee Yes    Decreased range of motion of left knee     Impaired mobility and ADLs      Physician: Pedro Luis Butterfield MD    Visit Date: 2/6/2024    Physician Orders: PT Evaluate and Treat  Medical Diagnosis:  Left medial knee pain [M25.562]   Surgical Procedure and Date: None  Evaluation Date: 2/2/2024  Insurance Authorization Period Expiration: 1/24/2025  Plan of Care Certification Period: 5/1/2024  Progress Note Due: Every 6th visit or 30 days, whichever occurs first. Prior to physician follow up to provide referring provider accurate and up to date patient progress with rehabilitation.    Date of Return to MD: To be determined  Visit # / Visits authorized: 2/20     Precautions:  Standard      Time In: 1045 ( late arrival )  Time Out: 1130  Total Billable Time: 45 minutes    SUBJECTIVE     Pt reports: she felt better after her last PT session. States "Rusty really gave me a good session and I felt better right away".  She was compliant with home exercise program.  Response to previous treatment: tolerated well, no issues  Functional change: None today    Pain: Current 4/10, worst 8/10, best 4/10   Location: left knee, medial joint line   Description: Aching, Dull, Tight, Deep, and Sharp  Aggravating Factors: Standing, Bending, Walking, Night Time, Morning, Extension, Flexing, Lifting, and Getting out of bed/chair  Easing Factors: pain medication, ice, lying down, heating pad, and rest    Prior Level of Function: Independent with all ADL/iADLs   Current Level of Function: Independent with all ADL/iADLs   Patient's goals: improve her walking tolerance    OBJECTIVE     Objective Measures updated at progress report unless specified.     Treatment     Marizol received the treatments listed below:  " "    therapeutic exercises to develop strength, endurance, ROM, and flexibility for 00 minutes including:  NP    Marizol received the following manual therapy techniques: Joint mobilizations were applied to the: Left Knee for 10 minutes, including:    Knee Extension Mobilization grade II  Knee flexion gapping grade II with IR   Medial Meniscus Gapping    neuromuscular re-education activities to improve: Kinesthetic, Sense, Proprioception, and Posture for 20 minutes. The following activities were included:    +Nu-Step, x 8 min, lvl 5 - for muscular endurance and to promote joint lubrication   +L LAQ 2x10  +L SAQ 2x10  +Supine clamshells, RTB 2x10  +L Quad sets, 5" hold x 20  +Bridging, 2x10    Marizol participated in dynamic functional therapeutic activities to improve functional performance for 15 minutes, including:    +TRX squats, 2x10  +Step ups 4" 2x10 R/L  +Tandem walks in // 6 laps        Patient Education and Home Exercises     Home Exercises Provided and Patient Education Provided     Education provided:   - Exercise form and rationale     Written Home Exercises Provided: Patient instructed to cont prior HEP. Exercises were reviewed and Marizol was able to demonstrate them prior to the end of the session.  Marizol demonstrated good  understanding of the education provided. See EMR under Patient Instructions for exercises provided during therapy sessions    ASSESSMENT     Marizol tolerated tx well with minimal complaints. Muscle weakness in quads/glutes are notable at this time. Emphasis was focused on global strengthening as well as functional exercises to help improve ADL performance/reduced pain.       Marizol Is progressing well towards her goals.   Pt prognosis is Good.     Pt will continue to benefit from skilled outpatient physical therapy to address the deficits listed in the problem list box on initial evaluation, provide pt/family education and to maximize pt's level of independence in the home and " community environment.     Pt's spiritual, cultural and educational needs considered and pt agreeable to plan of care and goals.     Anticipated barriers to physical therapy: None    GOALS  Short Term Goals (4 weeks):  1. Patient will have improved AROM of the left knee to +2 degrees of knee extension. ( progressing, not met )  2. Patient will have decreased complaints of pain to 2/10 during long bouts of standing and walking to improve participation in activities of daily living. ( progressing, not met )  3. Patient will be independent and compliant with issued HEP for continued functional mobility and strength gains for recreational/leisure activities. ( progressing, not met )     Long Term Goals (8 weeks):   Patient will have improved AROM of the left knee to 3-0-130 degrees of knee mobility to demonstrate improved knee range of motion.( progressing, not met )  2. Patient will have improved strength of the left knee to 4/5 for the Left quadriceps muscle to show improved motor control of the knee during gait.( progressing, not met )  3. Patient will be able to resume prior functional level with no deficits. ( progressing, not met )  4. Patient will have overall improvement in condition to have increased score on KOOS Jr to 50% to demonstrate clinically significant change in knee function. ( progressing, not met )  5. Patient will have overall improvement in condition to have decreased FOTO Limitation score to 54% to demonstrate clinically significant change in knee function. ( progressing, not met )    PLAN     Plan of care Certification: 2/2/2024 to 5/1/2024.    Focus on LE strength and endurance as well as knee ROM with emphasis on ADL performance     Outpatient Physical Therapy 2 times weekly for 10 weeks to include the following interventions: Cervical/Lumbar Traction, Electrical Stimulation as needed, Gait Training, Manual Therapy, Moist Heat/ Ice, Neuromuscular Re-ed, Orthotic Management and Training,  Patient Education, Self Care, Therapeutic Activities, Therapeutic Exercise, and Trigger Point Dry Needling, Blood Flow Restriction Training    David Lieberman, PTA

## 2024-02-06 NOTE — PROGRESS NOTES
Physical Therapist and Physical Therapist Assistant met face to face to discuss patient's treatment plan and progress towards established goals. Pt will be seen by a physical therapist minimally every 6th visit or every 30 days.    David Lieberman, PTA  02/06/2024

## 2024-02-14 NOTE — PROGRESS NOTES
[facility]   Physical Therapy Treatment Note     Name: Marizol Kevin  Clinic Number: [id number]    Therapy Diagnosis:   Encounter Diagnoses   Name Primary?    Chronic pain of left knee Yes    Decreased range of motion of left knee     Impaired mobility and ADLs        Physician: Pedro Luis Butterfield MD    Visit Date: 2/15/2024    Physician Orders: PT Evaluate and Treat  Medical Diagnosis:  Left medial knee pain [M25.562]   Surgical Procedure and Date: None  Evaluation Date: 2/2/2024  Insurance Authorization Period Expiration: 1/24/2025  Plan of Care Certification Period: 5/1/2024  Progress Note Due: Every 6th visit or 30 days, whichever occurs first. Prior to physician follow up to provide referring provider accurate and up to date patient progress with rehabilitation.    Date of Return to MD: To be determined  Visit # / Visits authorized: 2/20     Precautions:  Standard      Time In: 10:11 am  Time Out: 11:00 am   Total Billable Time: 30 minutes (Double Book)    SUBJECTIVE     Pt reports: that she feels like yesterday was a rough day but feeling better overall.   She was compliant with home exercise program.  Response to previous treatment: tolerated well, no issues  Functional change: None today    Pain: Current 4/10, worst 8/10, best 4/10   Location: left knee, medial joint line   Description: Aching, Dull, Tight, Deep, and Sharp  Aggravating Factors: Standing, Bending, Walking, Night Time, Morning, Extension, Flexing, Lifting, and Getting out of bed/chair  Easing Factors: pain medication, ice, lying down, heating pad, and rest    Prior Level of Function: Independent with all ADL/iADLs   Current Level of Function: Independent with all ADL/iADLs   Patient's goals: improve her walking tolerance    OBJECTIVE     Objective Measures updated at progress report unless specified.     Treatment     Marizol received the treatments listed below:      therapeutic exercises to develop strength,  "endurance, ROM, and flexibility for 00 minutes including:  NP    Marizol received the following manual therapy techniques: Joint mobilizations were applied to the: Left Knee for 10 minutes, including:    Knee Extension Mobilization grade II  Knee flexion gapping grade II with IR   Medial Meniscus Gapping    neuromuscular re-education activities to improve: Kinesthetic, Sense, Proprioception, and Posture for 15 minutes. The following activities were included:    +Nu-Step, x 8 min, lvl 5 - for muscular endurance and to promote joint lubrication   +L LAQ 2x10 2# cuff  +L SAQ 2x10 2# cuff   +Straight Leg Raise 2 x 10 reps 2# cuff   +L Quad sets, 2' 5" hold   +Bridging, 2x10    Marizol participated in dynamic functional therapeutic activities to improve functional performance for 15 minutes, including:  +TRX squats, 2x10  +Sit to stand 3 x 8 reps   +Step ups 4" 2x10 R/L  +Tandem walks in // 6 laps        Patient Education and Home Exercises     Home Exercises Provided and Patient Education Provided     Education provided:   - Exercise form and rationale     Written Home Exercises Provided: Patient instructed to cont prior HEP. Exercises were reviewed and Marizol was able to demonstrate them prior to the end of the session.  Marizol demonstrated good  understanding of the education provided. See EMR under Patient Instructions for exercises provided during therapy sessions    ASSESSMENT     Marizol presents with improved knee mobility and range of motion that was improved further with manual therapy and exercise. Patient demonstrated improved sit to stand and step up motor control. Patient with continued with quadriceps muscle performance deficits.   Marizol Is progressing well towards her goals.   Pt prognosis is Good.     Pt will continue to benefit from skilled outpatient physical therapy to address the deficits listed in the problem list box on initial evaluation, provide pt/family education and to maximize pt's level of " independence in the home and community environment.     Pt's spiritual, cultural and educational needs considered and pt agreeable to plan of care and goals.     Anticipated barriers to physical therapy: None    GOALS  Short Term Goals (4 weeks):  1. Patient will have improved AROM of the left knee to +2 degrees of knee extension. ( progressing, not met )  2. Patient will have decreased complaints of pain to 2/10 during long bouts of standing and walking to improve participation in activities of daily living. ( progressing, not met )  3. Patient will be independent and compliant with issued HEP for continued functional mobility and strength gains for recreational/leisure activities. ( progressing, not met )     Long Term Goals (8 weeks):   Patient will have improved AROM of the left knee to 3-0-130 degrees of knee mobility to demonstrate improved knee range of motion.( progressing, not met )  2. Patient will have improved strength of the left knee to 4/5 for the Left quadriceps muscle to show improved motor control of the knee during gait.( progressing, not met )  3. Patient will be able to resume prior functional level with no deficits. ( progressing, not met )  4. Patient will have overall improvement in condition to have increased score on KOOS Jr to 50% to demonstrate clinically significant change in knee function. ( progressing, not met )  5. Patient will have overall improvement in condition to have decreased FOTO Limitation score to 54% to demonstrate clinically significant change in knee function. ( progressing, not met )    PLAN     Plan of care Certification: 2/2/2024 to 5/1/2024.    Focus on LE strength and endurance as well as knee ROM with emphasis on ADL performance     Outpatient Physical Therapy 2 times weekly for 10 weeks to include the following interventions: Cervical/Lumbar Traction, Electrical Stimulation as needed, Gait Training, Manual Therapy, Moist Heat/ Ice, Neuromuscular Re-ed, Orthotic  Management and Training, Patient Education, Self Care, Therapeutic Activities, Therapeutic Exercise, and Trigger Point Dry Needling, Blood Flow Restriction Training    Rusty Robertson, ELBERT

## 2024-02-15 ENCOUNTER — CLINICAL SUPPORT (OUTPATIENT)
Dept: REHABILITATION | Facility: OTHER | Age: 79
End: 2024-02-15
Payer: MEDICARE

## 2024-02-15 DIAGNOSIS — M25.662 DECREASED RANGE OF MOTION OF LEFT KNEE: ICD-10-CM

## 2024-02-15 DIAGNOSIS — Z78.9 IMPAIRED MOBILITY AND ADLS: ICD-10-CM

## 2024-02-15 DIAGNOSIS — G89.29 CHRONIC PAIN OF LEFT KNEE: Primary | ICD-10-CM

## 2024-02-15 DIAGNOSIS — Z74.09 IMPAIRED MOBILITY AND ADLS: ICD-10-CM

## 2024-02-15 DIAGNOSIS — M25.562 CHRONIC PAIN OF LEFT KNEE: Primary | ICD-10-CM

## 2024-02-15 PROCEDURE — 97530 THERAPEUTIC ACTIVITIES: CPT | Mod: HCNC,PN

## 2024-02-15 PROCEDURE — 97140 MANUAL THERAPY 1/> REGIONS: CPT | Mod: HCNC,PN

## 2024-02-19 ENCOUNTER — TELEPHONE (OUTPATIENT)
Dept: INTERNAL MEDICINE | Facility: CLINIC | Age: 79
End: 2024-02-19
Payer: MEDICARE

## 2024-02-19 ENCOUNTER — TELEPHONE (OUTPATIENT)
Dept: PAIN MEDICINE | Facility: CLINIC | Age: 79
End: 2024-02-19
Payer: MEDICARE

## 2024-02-19 ENCOUNTER — PATIENT MESSAGE (OUTPATIENT)
Dept: INTERNAL MEDICINE | Facility: CLINIC | Age: 79
End: 2024-02-19
Payer: MEDICARE

## 2024-02-19 NOTE — TELEPHONE ENCOUNTER
----- Message from Tqeuila Lorenoz sent at 2/19/2024 10:00 AM CST -----  Regarding: results  Name of Who is Calling:pt          What is the request in detail:pt would like a c/ b to discuss her test results. Please call to assist         Can the clinic reply by [facility]:          [facility]: [phone]

## 2024-02-19 NOTE — TELEPHONE ENCOUNTER
"----- Message from Meseret Ariza sent at 2/19/2024  9:11 AM CST -----  Regarding: Prescription  "Type:  Patient Call Back    Who Called:PT    What is the reqeust in detail:Pt requesting call back for refill for a medication that's not in her chart. Please advise    Can the clinic reply by [facility]?no     Best Call Back Number:[phone]      Additional Information:            "

## 2024-02-19 NOTE — TELEPHONE ENCOUNTER
"Staff called pt to assist her. Pt did not answer staff left a message for pt to give us a call back.    ----- Message from Meseret Ariza sent at 2/19/2024  9:11 AM CST -----  Regarding: Prescription  "Type:  Patient Call Back    Who Called:PT    What is the reqeust in detail:Pt requesting call back for refill for a medication that's not in her chart. Please advise    Can the clinic reply by [facility]?no     Best Call Back Number:[phone]      Additional Information:            "

## 2024-02-20 ENCOUNTER — CLINICAL SUPPORT (OUTPATIENT)
Dept: REHABILITATION | Facility: OTHER | Age: 79
End: 2024-02-20
Payer: MEDICARE

## 2024-02-20 DIAGNOSIS — G89.29 CHRONIC PAIN OF LEFT KNEE: Primary | ICD-10-CM

## 2024-02-20 DIAGNOSIS — Z74.09 IMPAIRED MOBILITY AND ADLS: ICD-10-CM

## 2024-02-20 DIAGNOSIS — M25.562 CHRONIC PAIN OF LEFT KNEE: Primary | ICD-10-CM

## 2024-02-20 DIAGNOSIS — M25.662 DECREASED RANGE OF MOTION OF LEFT KNEE: ICD-10-CM

## 2024-02-20 DIAGNOSIS — Z78.9 IMPAIRED MOBILITY AND ADLS: ICD-10-CM

## 2024-02-20 PROCEDURE — 97112 NEUROMUSCULAR REEDUCATION: CPT | Mod: HCNC,PN

## 2024-02-20 PROCEDURE — 97110 THERAPEUTIC EXERCISES: CPT | Mod: HCNC,PN

## 2024-02-20 PROCEDURE — 97140 MANUAL THERAPY 1/> REGIONS: CPT | Mod: HCNC,PN

## 2024-02-20 NOTE — PROGRESS NOTES
[facility]   Physical Therapy Treatment Note     Name: Marizol Kevin  Clinic Number: [id number]    Therapy Diagnosis:   Encounter Diagnoses   Name Primary?    Chronic pain of left knee Yes    Decreased range of motion of left knee     Impaired mobility and ADLs          Physician: Pedro Luis Butterfield MD    Visit Date: 2/20/2024    Physician Orders: PT Evaluate and Treat  Medical Diagnosis:  Left medial knee pain [M25.562]   Surgical Procedure and Date: None  Evaluation Date: 2/2/2024  Insurance Authorization Period Expiration: 1/24/2025  Plan of Care Certification Period: 5/1/2024  Progress Note Due: Every 6th visit or 30 days, whichever occurs first. Prior to physician follow up to provide referring provider accurate and up to date patient progress with rehabilitation.    Date of Return to MD: To be determined  Visit # / Visits authorized: 3/20     Precautions:  Standard      Time In: 10:45 am  Time Out: 11:40 am   Total Billable Time: 55 minutes    SUBJECTIVE     Pt reports: that she 3-4 really good days without burning pain in her leg and was able to stand/walk for longer distances.   She was compliant with home exercise program.  Response to previous treatment: tolerated well, no issues  Functional change: None today    Pain: Current 4/10, worst 8/10, best 4/10   Location: left knee, medial joint line   Description: Aching, Dull, Tight, Deep, and Sharp  Aggravating Factors: Standing, Bending, Walking, Night Time, Morning, Extension, Flexing, Lifting, and Getting out of bed/chair  Easing Factors: pain medication, ice, lying down, heating pad, and rest    Prior Level of Function: Independent with all ADL/iADLs   Current Level of Function: Independent with all ADL/iADLs   Patient's goals: improve her walking tolerance    OBJECTIVE     Objective Measures updated at progress report unless specified.     Treatment     Marizol received the treatments listed below:      therapeutic  "exercises to develop strength, endurance, ROM, and flexibility for 00 minutes including:  NP    Marizol received the following manual therapy techniques: Joint mobilizations were applied to the: Left Knee for 10 minutes, including:    Knee Extension Mobilization grade II  Knee flexion gapping grade II with IR   Medial Meniscus Gapping    neuromuscular re-education activities to improve: Kinesthetic, Sense, Proprioception, and Posture for 25 minutes. The following activities were included:    +Nu-Step, x 8 min, lvl 5 - for muscular endurance and to promote joint lubrication   +L LAQ 2x10 2# cuff  +L SAQ 2x10 2# cuff   +Straight Leg Raise 2 x 10 reps 2# cuff   +L Quad sets, 2' 5" hold   +Bridging, 2x10    Marizol participated in dynamic functional therapeutic activities to improve functional performance for 20 minutes, including:  +TRX squats, 2x10  +Sit to stand 3 x 8 reps   +Step ups 4" 2x10 R/L  +Tandem walks in // 6 laps        Patient Education and Home Exercises     Home Exercises Provided and Patient Education Provided     Education provided:   - Exercise form and rationale     Written Home Exercises Provided: Patient instructed to cont prior HEP. Exercises were reviewed and Marizol was able to demonstrate them prior to the end of the session.  Marizol demonstrated good  understanding of the education provided. See EMR under Patient Instructions for exercises provided during therapy sessions    ASSESSMENT     Marizol presents with improved knee mobility and range of motion that was improved further with manual therapy and exercise. Patient demonstrated improved sit to stand and step up motor control. Patient with continued with quadriceps muscle performance deficits.   Marizol Is progressing well towards her goals.   Pt prognosis is Good.   Pt will continue to benefit from skilled outpatient physical therapy to address the deficits listed in the problem list box on initial evaluation, provide pt/family education and " to maximize pt's level of independence in the home and community environment.     Pt's spiritual, cultural and educational needs considered and pt agreeable to plan of care and goals.     Anticipated barriers to physical therapy: None    GOALS  Short Term Goals (4 weeks):  1. Patient will have improved AROM of the left knee to +2 degrees of knee extension. ( progressing, not met )  2. Patient will have decreased complaints of pain to 2/10 during long bouts of standing and walking to improve participation in activities of daily living. ( progressing, not met )  3. Patient will be independent and compliant with issued HEP for continued functional mobility and strength gains for recreational/leisure activities. ( progressing, not met )     Long Term Goals (8 weeks):   Patient will have improved AROM of the left knee to 3-0-130 degrees of knee mobility to demonstrate improved knee range of motion.( progressing, not met )  2. Patient will have improved strength of the left knee to 4/5 for the Left quadriceps muscle to show improved motor control of the knee during gait.( progressing, not met )  3. Patient will be able to resume prior functional level with no deficits. ( progressing, not met )  4. Patient will have overall improvement in condition to have increased score on KOOS Jr to 50% to demonstrate clinically significant change in knee function. ( progressing, not met )  5. Patient will have overall improvement in condition to have decreased FOTO Limitation score to 54% to demonstrate clinically significant change in knee function. ( progressing, not met )    PLAN     Plan of care Certification: 2/2/2024 to 5/1/2024.    Focus on LE strength and endurance as well as knee ROM with emphasis on ADL performance     Outpatient Physical Therapy 2 times weekly for 10 weeks to include the following interventions: Cervical/Lumbar Traction, Electrical Stimulation as needed, Gait Training, Manual Therapy, Moist Heat/ Ice,  Neuromuscular Re-ed, Orthotic Management and Training, Patient Education, Self Care, Therapeutic Activities, Therapeutic Exercise, and Trigger Point Dry Needling, Blood Flow Restriction Training    Rusty Robertson, PT

## 2024-02-21 ENCOUNTER — TELEPHONE (OUTPATIENT)
Dept: PAIN MEDICINE | Facility: CLINIC | Age: 79
End: 2024-02-21
Payer: MEDICARE

## 2024-02-21 NOTE — TELEPHONE ENCOUNTER
Duplicate.    ----- Message from Tosha Reynoso sent at 2/21/2024 11:55 AM CST -----  Regarding: refill  Name of caller: trish       What is the requesting detail: pt is requesting a refill for   pregabalin (LYRICA) 50 MG capsule. Please give her a call once Rx is sent to the pharmacy.       [facility] - [place], [place] - [address]            Can the clinic reply by [facility]:       What number to call back: [phone]

## 2024-02-21 NOTE — TELEPHONE ENCOUNTER
----- Message from Tosha Reynoso sent at 2/21/2024 11:55 AM CST -----  Regarding: refill  Name of caller: trish       What is the requesting detail: pt is requesting a refill for   pregabalin (LYRICA) 50 MG capsule. Please give her a call once Rx is sent to the pharmacy.       [facility] - [place], [place] - [address]            Can the clinic reply by [facility]:       What number to call back: [phone]

## 2024-02-21 NOTE — TELEPHONE ENCOUNTER
Pt message is being forwarded to Nusrat Bird    ----- Message from Dorita Jeter MA sent at 2/20/2024 12:22 PM CST -----  Regarding: Refill Request  Who Called:ESTEBAN JIMENEZ [[id number]]           New Prescription or Refill : Refill      RX Name and Strength:  pregabalin (LYRICA) 50 MG capsule              30 day or 90 day RX: 30           Local or Mail Order : mail            Preferred Pharmacy:[facility] - [place] [id number]       Would the patient rather a call back or a response via [facility]? call        Best Call Back Number:  [phone]     PT WOULD LIKE A CALL WHEN MEDICATION IS SENT...

## 2024-02-21 NOTE — TELEPHONE ENCOUNTER
----- Message from Dorita Jeter MA sent at 2/20/2024 12:22 PM CST -----  Regarding: Refill Request  Who Called:ESTEBAN JIMENEZ [[id number]]           New Prescription or Refill : Refill      RX Name and Strength:  pregabalin (LYRICA) 50 MG capsule              30 day or 90 day RX: 30           Local or Mail Order : mail            Preferred Pharmacy:[facility] - [facility] [address]       Would the patient rather a call back or a response via [facility]? call        Best Call Back Number:  [phone]     PT WOULD LIKE A CALL WHEN MEDICATION IS SENT...

## 2024-02-21 NOTE — PROGRESS NOTES
[facility]   Physical Therapy Treatment Note     Name: Marizol Kevin  Clinic Number: [id number]    Therapy Diagnosis:   Encounter Diagnoses   Name Primary?    Chronic pain of left knee Yes    Decreased range of motion of left knee     Impaired mobility and ADLs            Physician: Pedro Luis Butterfield MD    Visit Date: 2/22/2024    Physician Orders: PT Evaluate and Treat  Medical Diagnosis:  Left medial knee pain [M25.562]   Surgical Procedure and Date: None  Evaluation Date: 2/2/2024  Insurance Authorization Period Expiration: 1/24/2025  Plan of Care Certification Period: 5/1/2024  Progress Note Due: Every 6th visit or 30 days, whichever occurs first. Prior to physician follow up to provide referring provider accurate and up to date patient progress with rehabilitation.    Date of Return to MD: To be determined  Visit # / Visits authorized: 4/20  FOTO: MET      Precautions:  Standard      Time In: 10:45 am  Time Out: 11:40 am   Total Billable Time: 55 minutes    SUBJECTIVE     Pt reports: that she had a good day after last session.   She was compliant with home exercise program.  Response to previous treatment: tolerated well, no issues  Functional change: None today    Pain: Current 4/10, worst 8/10, best 4/10   Location: left knee, medial joint line   Description: Aching, Dull, Tight, Deep, and Sharp  Aggravating Factors: Standing, Bending, Walking, Night Time, Morning, Extension, Flexing, Lifting, and Getting out of bed/chair  Easing Factors: pain medication, ice, lying down, heating pad, and rest    Prior Level of Function: Independent with all ADL/iADLs   Current Level of Function: Independent with all ADL/iADLs   Patient's goals: improve her walking tolerance    OBJECTIVE     Objective Measures updated at progress report unless specified.     Treatment     Marizol received the treatments listed below:      therapeutic exercises to develop strength, endurance, ROM, and  "flexibility for 00 minutes including:  NP    Marizol received the following manual therapy techniques: Joint mobilizations were applied to the: Left Knee for 10 minutes, including:    Knee Extension Mobilization grade II  Knee flexion gapping grade II with IR   Medial Meniscus Gapping    neuromuscular re-education activities to improve: Kinesthetic, Sense, Proprioception, and Posture for 25 minutes. The following activities were included:    +Nu-Step, x 8 min, lvl 5 - for muscular endurance and to promote joint lubrication   +L LAQ 2x10 2# cuff  +L SAQ 2x10 2# cuff   +Straight Leg Raise 2 x 10 reps 2# cuff   +L Quad sets, 2' 5" hold   +Bridging, 2x10    Marizol participated in dynamic functional therapeutic activities to improve functional performance for 20 minutes, including:  +TRX squats, 2x10  +Sit to stand 3 x 8 reps   +Step ups 4" 2x10 R/L  +Tandem walks in // 6 laps        Patient Education and Home Exercises     Home Exercises Provided and Patient Education Provided     Education provided:   - Exercise form and rationale     Written Home Exercises Provided: Patient instructed to cont prior HEP. Exercises were reviewed and Marizol was able to demonstrate them prior to the end of the session.  Marizol demonstrated good  understanding of the education provided. See EMR under Patient Instructions for exercises provided during therapy sessions    ASSESSMENT     Marizol presents with improved knee mobility and range of motion that was improved further with manual therapy and exercise. Patient demonstrated improved sit to stand and step up motor control. Patient with continued with quadriceps muscle performance deficits.   Marizol Is progressing well towards her goals.   Pt prognosis is Good.   Pt will continue to benefit from skilled outpatient physical therapy to address the deficits listed in the problem list box on initial evaluation, provide pt/family education and to maximize pt's level of independence in the home " and community environment.     Pt's spiritual, cultural and educational needs considered and pt agreeable to plan of care and goals.     Anticipated barriers to physical therapy: None    GOALS  Short Term Goals (4 weeks):  1. Patient will have improved AROM of the left knee to +2 degrees of knee extension. ( progressing, not met )  2. Patient will have decreased complaints of pain to 2/10 during long bouts of standing and walking to improve participation in activities of daily living. ( progressing, not met )  3. Patient will be independent and compliant with issued HEP for continued functional mobility and strength gains for recreational/leisure activities. ( progressing, not met )     Long Term Goals (8 weeks):   Patient will have improved AROM of the left knee to 3-0-130 degrees of knee mobility to demonstrate improved knee range of motion.( progressing, not met )  2. Patient will have improved strength of the left knee to 4/5 for the Left quadriceps muscle to show improved motor control of the knee during gait.( progressing, not met )  3. Patient will be able to resume prior functional level with no deficits. ( progressing, not met )  4. Patient will have overall improvement in condition to have increased score on KOOS Jr to 50% to demonstrate clinically significant change in knee function. ( progressing, not met )  5. Patient will have overall improvement in condition to have decreased FOTO Limitation score to 54% to demonstrate clinically significant change in knee function. ( progressing, not met )    PLAN     Plan of care Certification: 2/2/2024 to 5/1/2024.    Focus on LE strength and endurance as well as knee ROM with emphasis on ADL performance     Outpatient Physical Therapy 2 times weekly for 10 weeks to include the following interventions: Cervical/Lumbar Traction, Electrical Stimulation as needed, Gait Training, Manual Therapy, Moist Heat/ Ice, Neuromuscular Re-ed, Orthotic Management and Training,  Patient Education, Self Care, Therapeutic Activities, Therapeutic Exercise, and Trigger Point Dry Needling, Blood Flow Restriction Training    Rusty Robertson, PT

## 2024-02-22 ENCOUNTER — CLINICAL SUPPORT (OUTPATIENT)
Dept: REHABILITATION | Facility: OTHER | Age: 79
End: 2024-02-22
Payer: MEDICARE

## 2024-02-22 DIAGNOSIS — M25.662 DECREASED RANGE OF MOTION OF LEFT KNEE: ICD-10-CM

## 2024-02-22 DIAGNOSIS — Z78.9 IMPAIRED MOBILITY AND ADLS: ICD-10-CM

## 2024-02-22 DIAGNOSIS — Z74.09 IMPAIRED MOBILITY AND ADLS: ICD-10-CM

## 2024-02-22 DIAGNOSIS — M25.562 CHRONIC PAIN OF LEFT KNEE: Primary | ICD-10-CM

## 2024-02-22 DIAGNOSIS — G89.29 CHRONIC PAIN OF LEFT KNEE: Primary | ICD-10-CM

## 2024-02-22 PROCEDURE — 97530 THERAPEUTIC ACTIVITIES: CPT | Mod: HCNC,PN

## 2024-02-22 PROCEDURE — 97140 MANUAL THERAPY 1/> REGIONS: CPT | Mod: HCNC,PN

## 2024-02-22 PROCEDURE — 97112 NEUROMUSCULAR REEDUCATION: CPT | Mod: HCNC,PN

## 2024-02-26 ENCOUNTER — TELEPHONE (OUTPATIENT)
Dept: PAIN MEDICINE | Facility: CLINIC | Age: 79
End: 2024-02-26

## 2024-02-26 RX ORDER — PREGABALIN 75 MG/1
75 CAPSULE ORAL 2 TIMES DAILY
Qty: 60 CAPSULE | Refills: 6 | Status: CANCELLED | OUTPATIENT
Start: 2024-02-26 | End: 2024-08-26

## 2024-02-26 NOTE — TELEPHONE ENCOUNTER
----- Message from Demetrius Sauceda sent at 2/24/2024 11:59 AM CST -----  Type:  RX Refill Request    Who Called:  Marizol  Refill or New Rx: refill  RX Name and Strength: pregabalin (LYRICA) 50 MG capsule   How is the patient currently taking it? (ex. 1XDay): Take 1 capsule (50 mg total) by mouth 2 (two) times daily. - Oral  Is this a 30 day or 90 day RX: 90 day  Preferred Pharmacy with phone number: [facility] [phone]  Local or Mail Order: mail order  Ordering Provider:Abdi Del Toro   Would the patient rather a call back or a response via [facility]? Call back  Best Call Back Number:  [phone]  Additional Information:  no

## 2024-02-26 NOTE — TELEPHONE ENCOUNTER
Pt refill request has been forwarded to Nusrat Bird for pending.      ----- Message from Demetrius Sauceda sent at 2/24/2024 11:59 AM CST -----  Type:  RX Refill Request    Who Called:  Marizol  Refill or New Rx: refill  RX Name and Strength: pregabalin (LYRICA) 50 MG capsule   How is the patient currently taking it? (ex. 1XDay): Take 1 capsule (50 mg total) by mouth 2 (two) times daily. - Oral  Is this a 30 day or 90 day RX: 90 day  Preferred Pharmacy with phone number: [facility] [phone]  Local or Mail Order: mail order  Ordering Provider:Abdi Del Toro   Would the patient rather a call back or a response via [facility]? Call back  Best Call Back Number:  [phone]  Additional Information:  no

## 2024-02-27 ENCOUNTER — CLINICAL SUPPORT (OUTPATIENT)
Dept: REHABILITATION | Facility: OTHER | Age: 79
End: 2024-02-27
Payer: MEDICARE

## 2024-02-27 DIAGNOSIS — M25.562 CHRONIC PAIN OF LEFT KNEE: Primary | ICD-10-CM

## 2024-02-27 DIAGNOSIS — M25.662 DECREASED RANGE OF MOTION OF LEFT KNEE: ICD-10-CM

## 2024-02-27 DIAGNOSIS — Z74.09 IMPAIRED MOBILITY AND ADLS: ICD-10-CM

## 2024-02-27 DIAGNOSIS — G89.29 CHRONIC PAIN OF LEFT KNEE: Primary | ICD-10-CM

## 2024-02-27 DIAGNOSIS — Z78.9 IMPAIRED MOBILITY AND ADLS: ICD-10-CM

## 2024-02-27 PROCEDURE — 97140 MANUAL THERAPY 1/> REGIONS: CPT | Mod: HCNC,PN

## 2024-02-27 PROCEDURE — 97530 THERAPEUTIC ACTIVITIES: CPT | Mod: HCNC,PN

## 2024-02-27 PROCEDURE — 97112 NEUROMUSCULAR REEDUCATION: CPT | Mod: HCNC,PN

## 2024-02-27 NOTE — PROGRESS NOTES
[facility]   Physical Therapy Treatment Note     Name: Marizol Kevin  Clinic Number: [id number]    Therapy Diagnosis:   Encounter Diagnoses   Name Primary?    Chronic pain of left knee Yes    Decreased range of motion of left knee     Impaired mobility and ADLs          Physician: Pedro Luis Butterfield MD    Visit Date: 2/27/2024    Physician Orders: PT Evaluate and Treat  Medical Diagnosis:  Left medial knee pain [M25.562]   Surgical Procedure and Date: None  Evaluation Date: 2/2/2024  Insurance Authorization Period Expiration: 1/24/2025  Plan of Care Certification Period: 5/1/2024  Progress Note Due: Every 6th visit or 30 days, whichever occurs first. Prior to physician follow up to provide referring provider accurate and up to date patient progress with rehabilitation.    Date of Return to MD: To be determined  Visit # / Visits authorized: 5/20  FOTO: MET      Precautions:  Standard      Time In: 10:58 am  Time Out: 11:53 am   Total Billable Time: 55 minutes    SUBJECTIVE     Pt reports: that she had a good couple of days after the last session.   She was compliant with home exercise program.  Response to previous treatment: tolerated well, no issues  Functional change: None today    Pain: Current 1/10, worst 4/10, best 0/10   Location: left knee, medial joint line   Description: Aching, Dull, Tight, Deep, and Sharp  Aggravating Factors: Standing, Bending, Walking, Night Time, Morning, Extension, Flexing, Lifting, and Getting out of bed/chair  Easing Factors: pain medication, ice, lying down, heating pad, and rest    Prior Level of Function: Independent with all ADL/iADLs   Current Level of Function: Independent with all ADL/iADLs   Patient's goals: improve her walking tolerance    OBJECTIVE     Objective Measures updated at progress report unless specified.     Treatment     Marizol received the treatments listed below:      therapeutic exercises to develop strength, endurance, ROM,  "and flexibility for 00 minutes including:  NP    Marizol received the following manual therapy techniques: Joint mobilizations were applied to the: Left Knee for 10 minutes, including:    Knee Extension Mobilization grade II  Knee flexion gapping grade II with IR   Medial Meniscus Gapping    neuromuscular re-education activities to improve: Kinesthetic, Sense, Proprioception, and Posture for 25 minutes. The following activities were included:    +Nu-Step, x 8 min, lvl 5 - for muscular endurance and to promote joint lubrication   +L LAQ 2x10 2# cuff  +L SAQ 2x10 2# cuff   +Straight Leg Raise 2 x 10 reps 2# cuff   +L Quad sets, 2' 5" hold   +Bridging, 2x10  +Supine Sciatic 90/90 glides 20x's     Marizol participated in dynamic functional therapeutic activities to improve functional performance for 20 minutes, including:  +TRX squats, 2x10  +Sit to stand 3 x 8 reps   +Step ups 4" 2x10 R/L  +Tandem walks in // 6 laps        Patient Education and Home Exercises     Home Exercises Provided and Patient Education Provided     Education provided:   - Exercise form and rationale     Written Home Exercises Provided: Patient instructed to cont prior HEP. Exercises were reviewed and Marizol was able to demonstrate them prior to the end of the session.  Marizol demonstrated good  understanding of the education provided. See EMR under Patient Instructions for exercises provided during therapy sessions    ASSESSMENT   Patient with improved gait pattern with single point cane and low irritability entering the clinic upon visual assessment by treating physical therapist.   Marizol presents with improved knee mobility and range of motion that was improved further with manual therapy and exercise. Patient demonstrated improved sit to stand and step up motor control. Patient with continued with quadriceps muscle performance deficits.   Marizol Is progressing well towards her goals.   Pt prognosis is Good.   Pt will continue to benefit from " skilled outpatient physical therapy to address the deficits listed in the problem list box on initial evaluation, provide pt/family education and to maximize pt's level of independence in the home and community environment.     Pt's spiritual, cultural and educational needs considered and pt agreeable to plan of care and goals.     Anticipated barriers to physical therapy: None    GOALS  Short Term Goals (4 weeks):  1. Patient will have improved AROM of the left knee to +2 degrees of knee extension. MET  2. Patient will have decreased complaints of pain to 2/10 during long bouts of standing and walking to improve participation in activities of daily living. MET  3. Patient will be independent and compliant with issued HEP for continued functional mobility and strength gains for recreational/leisure activities. MET     Long Term Goals (8 weeks):   Patient will have improved AROM of the left knee to 3-0-130 degrees of knee mobility to demonstrate improved knee range of motion.( progressing, not met )  2. Patient will have improved strength of the left knee to 4/5 for the Left quadriceps muscle to show improved motor control of the knee during gait.( progressing, not met )  3. Patient will be able to resume prior functional level with no deficits. ( progressing, not met )  4. Patient will have overall improvement in condition to have increased score on KOOS Jr to 50% to demonstrate clinically significant change in knee function. ( progressing, not met )  5. Patient will have overall improvement in condition to have decreased FOTO Limitation score to 54% to demonstrate clinically significant change in knee function. ( progressing, not met )    PLAN     Plan of care Certification: 2/2/2024 to 5/1/2024.    Focus on LE strength and endurance as well as knee ROM with emphasis on ADL performance     Outpatient Physical Therapy 2 times weekly for 10 weeks to include the following interventions: Cervical/Lumbar Traction,  Electrical Stimulation as needed, Gait Training, Manual Therapy, Moist Heat/ Ice, Neuromuscular Re-ed, Orthotic Management and Training, Patient Education, Self Care, Therapeutic Activities, Therapeutic Exercise, and Trigger Point Dry Needling, Blood Flow Restriction Training    Rusty Robertson, ELBERT

## 2024-02-28 ENCOUNTER — PATIENT MESSAGE (OUTPATIENT)
Dept: INTERNAL MEDICINE | Facility: CLINIC | Age: 79
End: 2024-02-28
Payer: MEDICARE

## 2024-02-29 ENCOUNTER — CLINICAL SUPPORT (OUTPATIENT)
Dept: REHABILITATION | Facility: OTHER | Age: 79
End: 2024-02-29
Payer: MEDICARE

## 2024-02-29 DIAGNOSIS — M25.662 DECREASED RANGE OF MOTION OF LEFT KNEE: ICD-10-CM

## 2024-02-29 DIAGNOSIS — G89.29 CHRONIC PAIN OF LEFT KNEE: Primary | ICD-10-CM

## 2024-02-29 DIAGNOSIS — Z78.9 IMPAIRED MOBILITY AND ADLS: ICD-10-CM

## 2024-02-29 DIAGNOSIS — M25.562 CHRONIC PAIN OF LEFT KNEE: Primary | ICD-10-CM

## 2024-02-29 DIAGNOSIS — Z74.09 IMPAIRED MOBILITY AND ADLS: ICD-10-CM

## 2024-02-29 PROCEDURE — 97112 NEUROMUSCULAR REEDUCATION: CPT | Mod: HCNC,PN,CQ

## 2024-02-29 PROCEDURE — 97140 MANUAL THERAPY 1/> REGIONS: CPT | Mod: HCNC,PN,CQ

## 2024-02-29 PROCEDURE — 97530 THERAPEUTIC ACTIVITIES: CPT | Mod: HCNC,PN,CQ

## 2024-02-29 NOTE — PROGRESS NOTES
[facility]   Physical Therapy Treatment Note     Name: Marizol Kevin  Clinic Number: [id number]    Therapy Diagnosis:   Encounter Diagnoses   Name Primary?    Chronic pain of left knee Yes    Decreased range of motion of left knee     Impaired mobility and ADLs        Physician: Pedro Luis Butterfield MD    Visit Date: 2/29/2024    Physician Orders: PT Evaluate and Treat  Medical Diagnosis:  Left medial knee pain [M25.562]   Surgical Procedure and Date: None  Evaluation Date: 2/2/2024  Insurance Authorization Period Expiration: 1/24/2025  Plan of Care Certification Period: 5/1/2024  Progress Note Due: Every 6th visit or 30 days, whichever occurs first. Prior to physician follow up to provide referring provider accurate and up to date patient progress with rehabilitation.    Date of Return to MD: To be determined  Visit # / Visits authorized: 7/20  FOTO: MET      Precautions:  Standard      Time In: 1015 ( early arrival )  Time Out: 1120  Total Billable Time: 55 minutes    SUBJECTIVE     Pt reports:she is feeling so much better She is walking better as she feels like PT is helping.   She was compliant with home exercise program.  Response to previous treatment: tolerated well, no issues  Functional change: walking better    Pain: Current 1/10, worst 4/10, best 0/10   Location: left knee, medial joint line   Description: Aching, Dull, Tight, Deep, and Sharp  Aggravating Factors: Standing, Bending, Walking, Night Time, Morning, Extension, Flexing, Lifting, and Getting out of bed/chair  Easing Factors: pain medication, ice, lying down, heating pad, and rest    Prior Level of Function: Independent with all ADL/iADLs   Current Level of Function: Independent with all ADL/iADLs   Patient's goals: improve her walking tolerance    OBJECTIVE     Objective Measures updated at progress report unless specified.     Treatment     Marizol received the treatments listed below:      therapeutic exercises  "to develop strength, endurance, ROM, and flexibility for 00 minutes including:  NP    Marizol received the following manual therapy techniques: Joint mobilizations were applied to the: Left Knee for 10 minutes, including:    Knee Extension Mobilization grade II  Knee flexion gapping grade II with IR   Medial Meniscus Gapping    neuromuscular re-education activities to improve: Kinesthetic, Sense, Proprioception, and Posture for 30 minutes. The following activities were included:    Nu-Step, x 8 min, lvl 5 - for muscular endurance and to promote joint lubrication   L LAQ 2x10 2# cuff  L SAQ 2x10 2# cuff   Straight Leg Raise 2 x 10 reps 2# cuff   L Quad sets, 2' 5" hold   Bridging, 2x10  Supine Sciatic 90/90 glides 20x's     Marizol participated in dynamic functional therapeutic activities to improve functional performance for 20 minutes, including:  TRX squats, 2x10  Sit to stand 3 x 8 reps   Step ups 4" 2x10 R/L  Tandem walks in // 6 laps        Patient Education and Home Exercises     Home Exercises Provided and Patient Education Provided     Education provided:   - Exercise form and rationale     Written Home Exercises Provided: Patient instructed to cont prior HEP. Exercises were reviewed and Marizol was able to demonstrate them prior to the end of the session.  Marizol demonstrated good  understanding of the education provided. See EMR under Patient Instructions for exercises provided during therapy sessions    ASSESSMENT   Marizol tolerated exercise well today. She demonstrated improved muscle endurance today as she was able to perform exercises with decreased periods of rest between sets. She was able to achieve improved TKE with open chain quad exercises today.      Marizol Is progressing well towards her goals.   Pt prognosis is Good.   Pt will continue to benefit from skilled outpatient physical therapy to address the deficits listed in the problem list box on initial evaluation, provide pt/family education and " to maximize pt's level of independence in the home and community environment.     Pt's spiritual, cultural and educational needs considered and pt agreeable to plan of care and goals.     Anticipated barriers to physical therapy: None    GOALS  Short Term Goals (4 weeks):  1. Patient will have improved AROM of the left knee to +2 degrees of knee extension. MET  2. Patient will have decreased complaints of pain to 2/10 during long bouts of standing and walking to improve participation in activities of daily living. MET  3. Patient will be independent and compliant with issued HEP for continued functional mobility and strength gains for recreational/leisure activities. MET     Long Term Goals (8 weeks):   Patient will have improved AROM of the left knee to 3-0-130 degrees of knee mobility to demonstrate improved knee range of motion.( progressing, not met )  2. Patient will have improved strength of the left knee to 4/5 for the Left quadriceps muscle to show improved motor control of the knee during gait.( progressing, not met )  3. Patient will be able to resume prior functional level with no deficits. ( progressing, not met )  4. Patient will have overall improvement in condition to have increased score on KOOS Jr to 50% to demonstrate clinically significant change in knee function. ( progressing, not met )  5. Patient will have overall improvement in condition to have decreased FOTO Limitation score to 54% to demonstrate clinically significant change in knee function. ( progressing, not met )    PLAN     Plan of care Certification: 2/2/2024 to 5/1/2024.    Focus on LE strength and endurance as well as knee ROM with emphasis on ADL performance     Outpatient Physical Therapy 2 times weekly for 10 weeks to include the following interventions: Cervical/Lumbar Traction, Electrical Stimulation as needed, Gait Training, Manual Therapy, Moist Heat/ Ice, Neuromuscular Re-ed, Orthotic Management and Training, Patient  Education, Self Care, Therapeutic Activities, Therapeutic Exercise, and Trigger Point Dry Needling, Blood Flow Restriction Training    David Lieberman, PTA

## 2024-03-04 ENCOUNTER — TELEPHONE (OUTPATIENT)
Dept: PAIN MEDICINE | Facility: CLINIC | Age: 79
End: 2024-03-04
Payer: MEDICARE

## 2024-03-04 NOTE — PROGRESS NOTES
[facility]   Physical Therapy Treatment Note     Name: Marizol Kevin  Clinic Number: [id number]    Therapy Diagnosis:   Encounter Diagnoses   Name Primary?    Chronic pain of left knee Yes    Decreased range of motion of left knee     Impaired mobility and ADLs          Physician: Pedro Luis Butterfield MD    Visit Date: 3/5/2024    Physician Orders: PT Evaluate and Treat  Medical Diagnosis:  Left medial knee pain [M25.562]   Surgical Procedure and Date: None  Evaluation Date: 2/2/2024  Insurance Authorization Period Expiration: 1/24/2025  Plan of Care Certification Period: 5/1/2024  Progress Note Due: Every 6th visit or 30 days, whichever occurs first. Prior to physician follow up to provide referring provider accurate and up to date patient progress with rehabilitation.    Date of Return to MD: To be determined  Visit # / Visits authorized: 7/20  FOTO: MET      Precautions:  Standard      Time In: 10:55 a  Time Out: 11:50 a  Total Billable Time: 55 minutes    SUBJECTIVE     Pt reports: that she only gets a small ache in the knee but not nearly as severe as when she began therapy.   She was compliant with home exercise program.  Response to previous treatment: tolerated well, no issues  Functional change: walking better    Pain: Current 1/10, worst 4/10, best 0/10   Location: left knee, medial joint line   Description: Aching, Dull, Tight, Deep, and Sharp  Aggravating Factors: Standing, Bending, Walking, Night Time, Morning, Extension, Flexing, Lifting, and Getting out of bed/chair  Easing Factors: pain medication, ice, lying down, heating pad, and rest    Prior Level of Function: Independent with all ADL/iADLs   Current Level of Function: Independent with all ADL/iADLs   Patient's goals: improve her walking tolerance    OBJECTIVE     Objective Measures updated at progress report unless specified.     Treatment     Marizol received the treatments listed below:      therapeutic exercises  "to develop strength, endurance, ROM, and flexibility for 00 minutes including:  NP    Marizol received the following manual therapy techniques: Joint mobilizations were applied to the: Left Knee for 10 minutes, including:  Knee Extension Mobilization grade II  Knee flexion gapping grade II with IR   Medial Meniscus Gapping    neuromuscular re-education activities to improve: Kinesthetic, Sense, Proprioception, and Posture for 25 minutes. The following activities were included:  Nu-Step, x 8 min, lvl 5 - for muscular endurance and to promote joint lubrication   L LAQ 2x10 2# cuff  L SAQ 2x10 2# cuff   Straight Leg Raise 2 x 10 reps 2# cuff   Left TKE with Red Band 30x's   Bridging, 2x10   Supine Sciatic 90/90 glides 20x's     Marizol participated in dynamic functional therapeutic activities to improve functional performance for 20 minutes, including:  TRX squats, 2x10  Sit to stand 3 x 8 reps   Step ups 4" 2x10 R/L  Tandem walks in // 6 laps    Patient Education and Home Exercises     Home Exercises Provided and Patient Education Provided     Education provided:   - Exercise form and rationale     Written Home Exercises Provided: Patient instructed to cont prior HEP. Exercises were reviewed and Marizol was able to demonstrate them prior to the end of the session.  Marizol demonstrated good  understanding of the education provided. See EMR under Patient Instructions for exercises provided during therapy sessions    ASSESSMENT   Marizol tolerated exercise well today. She demonstrated improved muscle endurance today as she was able to perform exercises with decreased periods of rest between sets. She was able to achieve improved TKE with open chain quad exercises today. Patient continues to respond well to manual therapy to the tibiofemoral joint for improved flexion and extension.   Marizol Is progressing well towards her goals.   Pt prognosis is Good.   Pt will continue to benefit from skilled outpatient physical therapy to " address the deficits listed in the problem list box on initial evaluation, provide pt/family education and to maximize pt's level of independence in the home and community environment.     Pt's spiritual, cultural and educational needs considered and pt agreeable to plan of care and goals.     Anticipated barriers to physical therapy: None    GOALS  Short Term Goals (4 weeks):  1. Patient will have improved AROM of the left knee to +2 degrees of knee extension. MET  2. Patient will have decreased complaints of pain to 2/10 during long bouts of standing and walking to improve participation in activities of daily living. MET  3. Patient will be independent and compliant with issued HEP for continued functional mobility and strength gains for recreational/leisure activities. MET     Long Term Goals (8 weeks):   Patient will have improved AROM of the left knee to 3-0-130 degrees of knee mobility to demonstrate improved knee range of motion.( progressing, not met )  2. Patient will have improved strength of the left knee to 4/5 for the Left quadriceps muscle to show improved motor control of the knee during gait.( progressing, not met )  3. Patient will be able to resume prior functional level with no deficits. ( progressing, not met )  4. Patient will have overall improvement in condition to have increased score on KOOS Jr to 50% to demonstrate clinically significant change in knee function. ( progressing, not met )  5. Patient will have overall improvement in condition to have decreased FOTO Limitation score to 54% to demonstrate clinically significant change in knee function. ( progressing, not met )    PLAN     Plan of care Certification: 2/2/2024 to 5/1/2024.    Focus on LE strength and endurance as well as knee ROM with emphasis on ADL performance     Outpatient Physical Therapy 2 times weekly for 10 weeks to include the following interventions: Cervical/Lumbar Traction, Electrical Stimulation as needed, Gait  Training, Manual Therapy, Moist Heat/ Ice, Neuromuscular Re-ed, Orthotic Management and Training, Patient Education, Self Care, Therapeutic Activities, Therapeutic Exercise, and Trigger Point Dry Needling, Blood Flow Restriction Training    Rusty Robertson, ELBERT

## 2024-03-04 NOTE — TELEPHONE ENCOUNTER
----- Message from Alma Castillo sent at 3/4/2024  9:07 AM CST -----  Regarding: Refill Request  Who Called: ESTEBAN JIMENEZ [[id number]]          New Prescription or Refill : Refill      RX Name and Strength:  pregabalin (LYRICA) 75 MG capsule      30 day or 90 day RX: 30      Preferred Pharmacy:  [facility] - [facility] [address]   Phone: [phone]  Fax: [phone]            Would the patient rather a call back or a response via [facility]? Call back        Best Call Back Number:   [phone]        Additional Information: Current medication not working. Patient asking to be put back on the above medication.

## 2024-03-04 NOTE — TELEPHONE ENCOUNTER
Pt message is being forwarded to her provider for a medication switch.      ----- Message from Alma Castillo sent at 3/4/2024  9:07 AM CST -----  Regarding: Refill Request  Who Called: ESTEBAN JIMENEZ [[id number]]          New Prescription or Refill : Refill      RX Name and Strength:  pregabalin (LYRICA) 75 MG capsule      30 day or 90 day RX: 30      Preferred Pharmacy:  [facility] - [place] [address] [address]   Phone: [phone]  Fax: [phone]            Would the patient rather a call back or a response via [facility]? Call back        Best Call Back Number:   [phone]        Additional Information: Current medication not working. Patient asking to be put back on the above medication.

## 2024-03-05 ENCOUNTER — CLINICAL SUPPORT (OUTPATIENT)
Dept: REHABILITATION | Facility: OTHER | Age: 79
End: 2024-03-05
Payer: MEDICARE

## 2024-03-05 DIAGNOSIS — Z74.09 IMPAIRED MOBILITY AND ADLS: ICD-10-CM

## 2024-03-05 DIAGNOSIS — M25.662 DECREASED RANGE OF MOTION OF LEFT KNEE: ICD-10-CM

## 2024-03-05 DIAGNOSIS — M25.562 CHRONIC PAIN OF LEFT KNEE: Primary | ICD-10-CM

## 2024-03-05 DIAGNOSIS — Z78.9 IMPAIRED MOBILITY AND ADLS: ICD-10-CM

## 2024-03-05 DIAGNOSIS — G89.29 CHRONIC PAIN OF LEFT KNEE: Primary | ICD-10-CM

## 2024-03-05 PROCEDURE — 97530 THERAPEUTIC ACTIVITIES: CPT | Mod: HCNC,PN

## 2024-03-05 PROCEDURE — 97112 NEUROMUSCULAR REEDUCATION: CPT | Mod: HCNC,PN

## 2024-03-05 PROCEDURE — 97140 MANUAL THERAPY 1/> REGIONS: CPT | Mod: HCNC,PN

## 2024-03-05 RX ORDER — PREGABALIN 75 MG/1
75 CAPSULE ORAL 2 TIMES DAILY
Qty: 60 CAPSULE | Refills: 6 | Status: SHIPPED | OUTPATIENT
Start: 2024-03-05 | End: 2024-05-07 | Stop reason: SDUPTHER

## 2024-03-05 NOTE — TELEPHONE ENCOUNTER
Staff spoke with pt in regards to her message about Lyrica. Pt is asking if  can please put her back on this medication as it was helping with her neuropathy.pt message is being forwarded to her provider for further assistance.    ----- Message from Miri Foster sent at 3/4/2024 12:42 PM CST -----  Regarding: call back  Type: Patient Call Back    Who called: pt     What is the request in detail: requesting call back to discuss provider denial of her request for pregabalin (LYRICA) 75 MG capsule     Can the clinic reply by [facility]?no    Would the patient rather a call back or a response via [facility]? call    Best call back number: [phone]     Additional Information:

## 2024-03-05 NOTE — TELEPHONE ENCOUNTER
----- Message from Cherytravis Mahoneyody sent at 3/4/2024 12:42 PM CST -----  Regarding: call back  Type: Patient Call Back    Who called: pt     What is the request in detail: requesting call back to discuss provider denial of her request for pregabalin (LYRICA) 75 MG capsule     Can the clinic reply by [facility]?no    Would the patient rather a call back or a response via [facility]? call    Best call back number: [phone]     Additional Information:

## 2024-03-07 ENCOUNTER — CLINICAL SUPPORT (OUTPATIENT)
Dept: REHABILITATION | Facility: OTHER | Age: 79
End: 2024-03-07
Payer: MEDICARE

## 2024-03-07 DIAGNOSIS — M25.662 DECREASED RANGE OF MOTION OF LEFT KNEE: ICD-10-CM

## 2024-03-07 DIAGNOSIS — Z78.9 IMPAIRED MOBILITY AND ADLS: ICD-10-CM

## 2024-03-07 DIAGNOSIS — G89.29 CHRONIC PAIN OF LEFT KNEE: Primary | ICD-10-CM

## 2024-03-07 DIAGNOSIS — Z74.09 IMPAIRED MOBILITY AND ADLS: ICD-10-CM

## 2024-03-07 DIAGNOSIS — M25.562 CHRONIC PAIN OF LEFT KNEE: Primary | ICD-10-CM

## 2024-03-07 PROCEDURE — 97140 MANUAL THERAPY 1/> REGIONS: CPT | Mod: HCNC,PN

## 2024-03-07 PROCEDURE — 97530 THERAPEUTIC ACTIVITIES: CPT | Mod: HCNC,PN

## 2024-03-07 PROCEDURE — 97112 NEUROMUSCULAR REEDUCATION: CPT | Mod: HCNC,PN

## 2024-03-07 NOTE — PROGRESS NOTES
[facility]   Physical Therapy Treatment Note     Name: Marizol Kevin  Clinic Number: [id number]    Therapy Diagnosis:   Encounter Diagnoses   Name Primary?    Chronic pain of left knee Yes    Decreased range of motion of left knee     Impaired mobility and ADLs        Physician: Pedro Luis Butterfield MD    Visit Date: 3/7/2024    Physician Orders: PT Evaluate and Treat  Medical Diagnosis:  Left medial knee pain [M25.562]   Surgical Procedure and Date: None  Evaluation Date: 2/2/2024  Insurance Authorization Period Expiration: 1/24/2025  Plan of Care Certification Period: 5/1/2024  Progress Note Due: Every 6th visit or 30 days, whichever occurs first. Prior to physician follow up to provide referring provider accurate and up to date patient progress with rehabilitation.    Date of Return to MD: To be determined  Visit # / Visits authorized: 8/20  FOTO: MET      Precautions:  Standard      Time In: 10:55 a  Time Out: 11:50 a  Total Billable Time: 55 minutes    SUBJECTIVE     Pt reports: that she has some medial knee symptoms and is concerned about possibly having to have surgery.   She was compliant with home exercise program.  Response to previous treatment: tolerated well, no issues  Functional change: walking better    Pain: Current 1/10, worst 4/10, best 0/10   Location: left knee, medial joint line   Description: Aching, Dull, Tight, Deep, and Sharp  Aggravating Factors: Standing, Bending, Walking, Night Time, Morning, Extension, Flexing, Lifting, and Getting out of bed/chair  Easing Factors: pain medication, ice, lying down, heating pad, and rest    Prior Level of Function: Independent with all ADL/iADLs   Current Level of Function: Independent with all ADL/iADLs   Patient's goals: improve her walking tolerance    OBJECTIVE     Objective Measures updated at progress report unless specified.     Treatment     Marizol received the treatments listed below:      therapeutic exercises to  "develop strength, endurance, ROM, and flexibility for 00 minutes including:  NP    Marizol received the following manual therapy techniques: Joint mobilizations were applied to the: Left Knee for 10 minutes, including:  Knee Extension Mobilization grade II  Knee flexion gapping grade II with IR   Medial Meniscus Gapping    neuromuscular re-education activities to improve: Kinesthetic, Sense, Proprioception, and Posture for 25 minutes. The following activities were included:  Nu-Step, x 8 min, lvl 5 - for muscular endurance and to promote joint lubrication   L LAQ 2x10 2# cuff  L SAQ 2x10 2# cuff   Straight Leg Raise 2 x 10 reps 2# cuff   Left TKE with Red Band 30x's   Bridging, 2x10   Supine Sciatic 90/90 glides 20x's     Marizol participated in dynamic functional therapeutic activities to improve functional performance for 20 minutes, including:  TRX squats, 2x10  Sit to stand 3 x 8 reps   Step ups 4" 2x10 R/L  Tandem walks in // 6 laps    Patient Education and Home Exercises     Home Exercises Provided and Patient Education Provided     Education provided:   - Exercise form and rationale     Written Home Exercises Provided: Patient instructed to cont prior HEP. Exercises were reviewed and Marizol was able to demonstrate them prior to the end of the session.  Marizol demonstrated good  understanding of the education provided. See EMR under Patient Instructions for exercises provided during therapy sessions    ASSESSMENT   Marizol tolerated exercise well today. She demonstrated improved muscle endurance today as she was able to perform exercises with decreased periods of rest between sets. She was able to achieve improved TKE with open chain quad exercises today. Patient continues to respond well to manual therapy to the tibiofemoral joint for improved flexion and extension.   Marizol Is progressing well towards her goals.   Pt prognosis is Good.   Pt will continue to benefit from skilled outpatient physical therapy to " address the deficits listed in the problem list box on initial evaluation, provide pt/family education and to maximize pt's level of independence in the home and community environment.     Pt's spiritual, cultural and educational needs considered and pt agreeable to plan of care and goals.     Anticipated barriers to physical therapy: None    GOALS  Short Term Goals (4 weeks):  1. Patient will have improved AROM of the left knee to +2 degrees of knee extension. MET  2. Patient will have decreased complaints of pain to 2/10 during long bouts of standing and walking to improve participation in activities of daily living. MET  3. Patient will be independent and compliant with issued HEP for continued functional mobility and strength gains for recreational/leisure activities. MET     Long Term Goals (8 weeks):   Patient will have improved AROM of the left knee to 3-0-130 degrees of knee mobility to demonstrate improved knee range of motion.( progressing, not met )  2. Patient will have improved strength of the left knee to 4/5 for the Left quadriceps muscle to show improved motor control of the knee during gait.( progressing, not met )  3. Patient will be able to resume prior functional level with no deficits. ( progressing, not met )  4. Patient will have overall improvement in condition to have increased score on KOOS Jr to 50% to demonstrate clinically significant change in knee function. ( progressing, not met )  5. Patient will have overall improvement in condition to have decreased FOTO Limitation score to 54% to demonstrate clinically significant change in knee function. ( progressing, not met )    PLAN     Plan of care Certification: 2/2/2024 to 5/1/2024.    Focus on LE strength and endurance as well as knee ROM with emphasis on ADL performance     Outpatient Physical Therapy 2 times weekly for 10 weeks to include the following interventions: Cervical/Lumbar Traction, Electrical Stimulation as needed, Gait  Training, Manual Therapy, Moist Heat/ Ice, Neuromuscular Re-ed, Orthotic Management and Training, Patient Education, Self Care, Therapeutic Activities, Therapeutic Exercise, and Trigger Point Dry Needling, Blood Flow Restriction Training    Rusty Robertson, ELBERT

## 2024-03-11 NOTE — PROGRESS NOTES
[facility]   Physical Therapy Treatment Note     Name: Marizol Kevin  Clinic Number: [id number]    Therapy Diagnosis:   Encounter Diagnoses   Name Primary?    Chronic pain of left knee Yes    Decreased range of motion of left knee     Impaired mobility and ADLs          Physician: Pedro Luis Butterfield MD    Visit Date: 3/12/2024    Physician Orders: PT Evaluate and Treat  Medical Diagnosis:  Left medial knee pain [M25.562]   Surgical Procedure and Date: None  Evaluation Date: 2/2/2024  Insurance Authorization Period Expiration: 1/24/2025  Plan of Care Certification Period: 5/1/2024  Progress Note Due: Every 6th visit or 30 days, whichever occurs first. Prior to physician follow up to provide referring provider accurate and up to date patient progress with rehabilitation.    Date of Return to MD: To be determined  Visit # / Visits authorized: 9/20  FOTO: MET      Precautions:  Standard      Time In: 10:55 a  Time Out: 11:50 a  Total Billable Time: 55 minutes    SUBJECTIVE     Pt reports: that she has some medial knee symptoms and is concerned about possibly having to have surgery.   She was compliant with home exercise program.  Response to previous treatment: tolerated well, no issues  Functional change: walking better    Pain: Current 1/10, worst 4/10, best 0/10   Location: left knee, medial joint line   Description: Aching, Dull, Tight, Deep, and Sharp  Aggravating Factors: Standing, Bending, Walking, Night Time, Morning, Extension, Flexing, Lifting, and Getting out of bed/chair  Easing Factors: pain medication, ice, lying down, heating pad, and rest    Prior Level of Function: Independent with all ADL/iADLs   Current Level of Function: Independent with all ADL/iADLs   Patient's goals: improve her walking tolerance    OBJECTIVE     Objective Measures updated at progress report unless specified.     Treatment     Marizol received the treatments listed below:      therapeutic exercises  "to develop strength, endurance, ROM, and flexibility for 00 minutes including:  NP    Marizol received the following manual therapy techniques: Joint mobilizations were applied to the: Left Knee for 10 minutes, including:  Knee Extension Mobilization grade II  Knee flexion gapping grade II with IR   Medial Meniscus Gapping  Hamstring Contract Relax 5x's 6" hold 90/60 degrees each    neuromuscular re-education activities to improve: Kinesthetic, Sense, Proprioception, and Posture for 25 minutes. The following activities were included:  Nu-Step, x 8 min, lvl 5 - for muscular endurance and to promote joint lubrication   L LAQ 2x10 2# cuff  L SAQ 2x10 2# cuff   Straight Leg Raise 2 x 10 reps 2# cuff   Left TKE with Red Band 30x's   Bridging, 2x10   Supine Sciatic 90/90 glides 20x's     Marizol participated in dynamic functional therapeutic activities to improve functional performance for 20 minutes, including:  TRX squats, 2x10  Sit to stand 3 x 8 reps   Step ups 4" 2x10 R/L  Tandem walks in // 6 laps    Patient Education and Home Exercises     Home Exercises Provided and Patient Education Provided     Education provided:   - Exercise form and rationale     Written Home Exercises Provided: Patient instructed to cont prior HEP. Exercises were reviewed and Marizol was able to demonstrate them prior to the end of the session.  Marizol demonstrated good  understanding of the education provided. See EMR under Patient Instructions for exercises provided during therapy sessions    ASSESSMENT   Marizol tolerated exercise well today. She demonstrated improved muscle endurance today as she was able to perform exercises with decreased periods of rest between sets. She was able to achieve improved TKE with open chain quad exercises today. Patient continues to respond well to manual therapy to the tibiofemoral joint for improved flexion and extension.   Marizol Is progressing well towards her goals.   Pt prognosis is Good.   Pt will " continue to benefit from skilled outpatient physical therapy to address the deficits listed in the problem list box on initial evaluation, provide pt/family education and to maximize pt's level of independence in the home and community environment.     Pt's spiritual, cultural and educational needs considered and pt agreeable to plan of care and goals.     Anticipated barriers to physical therapy: None    GOALS  Short Term Goals (4 weeks):  1. Patient will have improved AROM of the left knee to +2 degrees of knee extension. MET  2. Patient will have decreased complaints of pain to 2/10 during long bouts of standing and walking to improve participation in activities of daily living. MET  3. Patient will be independent and compliant with issued HEP for continued functional mobility and strength gains for recreational/leisure activities. MET     Long Term Goals (8 weeks):   Patient will have improved AROM of the left knee to 3-0-130 degrees of knee mobility to demonstrate improved knee range of motion.MET  2. Patient will have improved strength of the left knee to 4/5 for the Left quadriceps muscle to show improved motor control of the knee during gait.( progressing, not met )  3. Patient will be able to resume prior functional level with no deficits. ( progressing, not met )  4. Patient will have overall improvement in condition to have increased score on KOOS Jr to 50% to demonstrate clinically significant change in knee function. MET  5. Patient will have overall improvement in condition to have decreased FOTO Limitation score to 54% to demonstrate clinically significant change in knee function. MET    PLAN     Plan of care Certification: 2/2/2024 to 5/1/2024.    Focus on LE strength and endurance as well as knee ROM with emphasis on ADL performance     Outpatient Physical Therapy 2 times weekly for 10 weeks to include the following interventions: Cervical/Lumbar Traction, Electrical Stimulation as needed, Gait  Training, Manual Therapy, Moist Heat/ Ice, Neuromuscular Re-ed, Orthotic Management and Training, Patient Education, Self Care, Therapeutic Activities, Therapeutic Exercise, and Trigger Point Dry Needling, Blood Flow Restriction Training    Rusty Robertson, ELBERT

## 2024-03-12 ENCOUNTER — CLINICAL SUPPORT (OUTPATIENT)
Dept: REHABILITATION | Facility: OTHER | Age: 79
End: 2024-03-12
Payer: MEDICARE

## 2024-03-12 DIAGNOSIS — G89.29 CHRONIC PAIN OF LEFT KNEE: Primary | ICD-10-CM

## 2024-03-12 DIAGNOSIS — M25.562 CHRONIC PAIN OF LEFT KNEE: Primary | ICD-10-CM

## 2024-03-12 DIAGNOSIS — Z78.9 IMPAIRED MOBILITY AND ADLS: ICD-10-CM

## 2024-03-12 DIAGNOSIS — M25.662 DECREASED RANGE OF MOTION OF LEFT KNEE: ICD-10-CM

## 2024-03-12 DIAGNOSIS — Z74.09 IMPAIRED MOBILITY AND ADLS: ICD-10-CM

## 2024-03-12 PROCEDURE — 97112 NEUROMUSCULAR REEDUCATION: CPT | Mod: HCNC,PN

## 2024-03-12 PROCEDURE — 97530 THERAPEUTIC ACTIVITIES: CPT | Mod: HCNC,PN

## 2024-03-12 PROCEDURE — 97140 MANUAL THERAPY 1/> REGIONS: CPT | Mod: HCNC,PN

## 2024-03-14 ENCOUNTER — CLINICAL SUPPORT (OUTPATIENT)
Dept: REHABILITATION | Facility: OTHER | Age: 79
End: 2024-03-14
Payer: MEDICARE

## 2024-03-14 DIAGNOSIS — M25.562 CHRONIC PAIN OF LEFT KNEE: Primary | ICD-10-CM

## 2024-03-14 DIAGNOSIS — G89.29 CHRONIC PAIN OF LEFT KNEE: Primary | ICD-10-CM

## 2024-03-14 DIAGNOSIS — Z74.09 IMPAIRED MOBILITY AND ADLS: ICD-10-CM

## 2024-03-14 DIAGNOSIS — Z78.9 IMPAIRED MOBILITY AND ADLS: ICD-10-CM

## 2024-03-14 DIAGNOSIS — M25.662 DECREASED RANGE OF MOTION OF LEFT KNEE: ICD-10-CM

## 2024-03-14 PROCEDURE — 97530 THERAPEUTIC ACTIVITIES: CPT | Mod: HCNC,PN,CQ

## 2024-03-14 PROCEDURE — 97112 NEUROMUSCULAR REEDUCATION: CPT | Mod: HCNC,PN,CQ

## 2024-03-14 PROCEDURE — 97140 MANUAL THERAPY 1/> REGIONS: CPT | Mod: HCNC,PN,CQ

## 2024-03-14 NOTE — PROGRESS NOTES
[facility]   Physical Therapy Treatment Note     Name: Marizol Kevin  Clinic Number: [id number]    Therapy Diagnosis:   Encounter Diagnoses   Name Primary?    Chronic pain of left knee Yes    Decreased range of motion of left knee     Impaired mobility and ADLs            Physician: Pedro Luis Butterfield MD    Visit Date: 3/14/2024    Physician Orders: PT Evaluate and Treat  Medical Diagnosis:  Left medial knee pain [M25.562]   Surgical Procedure and Date: None  Evaluation Date: 2/2/2024  Insurance Authorization Period Expiration: 1/24/2025  Plan of Care Certification Period: 5/1/2024  Progress Note Due: Every 6th visit or 30 days, whichever occurs first. Prior to physician follow up to provide referring provider accurate and up to date patient progress with rehabilitation.    Date of Return to MD: To be determined  Visit # / Visits authorized: 11/20  FOTO: MET      Precautions:  Standard      Time In: 1009  Time Out: 1145  Total Billable Time: 53 minutes    SUBJECTIVE     Pt reports: she is feeling some clicking in her knee but overall is seeing some improvement.   She was compliant with home exercise program.  Response to previous treatment: tolerated well, no issues  Functional change: walking better    Pain: Current 1/10, worst 4/10, best 0/10   Location: left knee, medial joint line   Description: Aching, Dull, Tight, Deep, and Sharp  Aggravating Factors: Standing, Bending, Walking, Night Time, Morning, Extension, Flexing, Lifting, and Getting out of bed/chair  Easing Factors: pain medication, ice, lying down, heating pad, and rest    Prior Level of Function: Independent with all ADL/iADLs   Current Level of Function: Independent with all ADL/iADLs   Patient's goals: improve her walking tolerance    OBJECTIVE     Objective Measures updated at progress report unless specified.     Treatment     Marizol received the treatments listed below:      therapeutic exercises to develop  "strength, endurance, ROM, and flexibility for 00 minutes including:  NP    Marizol received the following manual therapy techniques: Joint mobilizations were applied to the: Left Knee for 10 minutes, including:  Knee Extension Mobilization grade II  Knee flexion gapping grade II with IR   Medial Meniscus Gapping  Hamstring Contract Relax 5x's 6" hold 90/60 degrees each    neuromuscular re-education activities to improve: Kinesthetic, Sense, Proprioception, and Posture for 23 minutes. The following activities were included:  Nu-Step, x 8 min, lvl 5 - for muscular endurance and to promote joint lubrication   L LAQ 2x10 2# cuff  L SAQ 2x10 2# cuff   Straight Leg Raise 2 x 10 reps 2# cuff   Left TKE with Red Band 30x's   Bridging, 2x10   Supine Sciatic 90/90 glides 20x's     Marizol participated in dynamic functional therapeutic activities to improve functional performance for 20 minutes, including:  TRX squats, 2x10  Sit to stand 3 x 8 reps   Step ups 4" 2x10 R/L  Tandem walks in // 6 laps    Patient Education and Home Exercises     Home Exercises Provided and Patient Education Provided     Education provided:   - Exercise form and rationale     Written Home Exercises Provided: Patient instructed to cont prior HEP. Exercises were reviewed and Marizol was able to demonstrate them prior to the end of the session.  Marizol demonstrated good  understanding of the education provided. See EMR under Patient Instructions for exercises provided during therapy sessions    ASSESSMENT   Marizol continues to make progress within her POC as her musculare strength is improving however she continues to report medial knee pain as well as decreased functional capacity with ADLs. She would continue to benefit from skilled PT services to help address these deficits.        Marizol Is progressing well towards her goals.   Pt prognosis is Good.   Pt will continue to benefit from skilled outpatient physical therapy to address the deficits listed " in the problem list box on initial evaluation, provide pt/family education and to maximize pt's level of independence in the home and community environment.     Pt's spiritual, cultural and educational needs considered and pt agreeable to plan of care and goals.     Anticipated barriers to physical therapy: None    GOALS  Short Term Goals (4 weeks):  1. Patient will have improved AROM of the left knee to +2 degrees of knee extension. MET  2. Patient will have decreased complaints of pain to 2/10 during long bouts of standing and walking to improve participation in activities of daily living. MET  3. Patient will be independent and compliant with issued HEP for continued functional mobility and strength gains for recreational/leisure activities. MET     Long Term Goals (8 weeks):   Patient will have improved AROM of the left knee to 3-0-130 degrees of knee mobility to demonstrate improved knee range of motion.MET  2. Patient will have improved strength of the left knee to 4/5 for the Left quadriceps muscle to show improved motor control of the knee during gait.( progressing, not met )  3. Patient will be able to resume prior functional level with no deficits. ( progressing, not met )  4. Patient will have overall improvement in condition to have increased score on KOOS Jr to 50% to demonstrate clinically significant change in knee function. MET  5. Patient will have overall improvement in condition to have decreased FOTO Limitation score to 54% to demonstrate clinically significant change in knee function. MET    PLAN     Plan of care Certification: 2/2/2024 to 5/1/2024.    Focus on LE strength and endurance as well as knee ROM with emphasis on ADL performance     Outpatient Physical Therapy 2 times weekly for 10 weeks to include the following interventions: Cervical/Lumbar Traction, Electrical Stimulation as needed, Gait Training, Manual Therapy, Moist Heat/ Ice, Neuromuscular Re-ed, Orthotic Management and  Training, Patient Education, Self Care, Therapeutic Activities, Therapeutic Exercise, and Trigger Point Dry Needling, Blood Flow Restriction Training    David Lieberman, PTA

## 2024-03-19 ENCOUNTER — CLINICAL SUPPORT (OUTPATIENT)
Dept: REHABILITATION | Facility: OTHER | Age: 79
End: 2024-03-19
Payer: MEDICARE

## 2024-03-19 DIAGNOSIS — Z74.09 IMPAIRED MOBILITY AND ADLS: ICD-10-CM

## 2024-03-19 DIAGNOSIS — M25.662 DECREASED RANGE OF MOTION OF LEFT KNEE: ICD-10-CM

## 2024-03-19 DIAGNOSIS — Z78.9 IMPAIRED MOBILITY AND ADLS: ICD-10-CM

## 2024-03-19 DIAGNOSIS — M25.562 CHRONIC PAIN OF LEFT KNEE: Primary | ICD-10-CM

## 2024-03-19 DIAGNOSIS — G89.29 CHRONIC PAIN OF LEFT KNEE: Primary | ICD-10-CM

## 2024-03-19 PROCEDURE — 97140 MANUAL THERAPY 1/> REGIONS: CPT | Mod: HCNC,PN

## 2024-03-19 PROCEDURE — 97112 NEUROMUSCULAR REEDUCATION: CPT | Mod: HCNC,PN

## 2024-03-19 PROCEDURE — 97530 THERAPEUTIC ACTIVITIES: CPT | Mod: HCNC,PN

## 2024-03-19 NOTE — PROGRESS NOTES
[facility]   Physical Therapy Treatment Note/Discharge     Name: Marizol Kevin  Clinic Number: [id number]    Therapy Diagnosis:   Encounter Diagnoses   Name Primary?    Chronic pain of left knee Yes    Decreased range of motion of left knee     Impaired mobility and ADLs        Physician: Pedro Luis Butterfield MD    Visit Date: 3/19/2024    Physician Orders: PT Evaluate and Treat  Medical Diagnosis:  Left medial knee pain [M25.562]   Surgical Procedure and Date: None  Evaluation Date: 2/2/2024  Insurance Authorization Period Expiration: 1/24/2025  Plan of Care Certification Period: 5/1/2024  Progress Note Due: Every 6th visit or 30 days, whichever occurs first. Prior to physician follow up to provide referring provider accurate and up to date patient progress with rehabilitation.    Date of Return to MD: To be determined  Visit # / Visits authorized: 11/20  FOTO: MET      Precautions:  Standard    Time In: 10:56 am  Time Out: 11:50 am  Total Billable Time: 54 minutes    SUBJECTIVE     Pt reports: that she feels like she is ready for discharge.   She was compliant with home exercise program.  Response to previous treatment: tolerated well, no issues  Functional change: walking better    Pain: Current 0/10, worst 4/10, best 0/10   Location: left knee, medial joint line   Description: Aching, Dull, Tight, Deep, and Sharp  Aggravating Factors: Standing, Bending, Walking, Night Time, Morning, Extension, Flexing, Lifting, and Getting out of bed/chair  Easing Factors: pain medication, ice, lying down, heating pad, and rest    Prior Level of Function: Independent with all ADL/iADLs   Current Level of Function: Independent with all ADL/iADLs   Patient's goals: improve her walking tolerance    OBJECTIVE     Objective Measures updated at progress report unless specified.     Treatment     Marizol received the treatments listed below:      Marizol received the following manual therapy techniques:  "Joint mobilizations were applied to the: Left Knee for 10 minutes, including:  Knee Extension Mobilization grade II  Knee flexion gapping grade II with IR   Medial Meniscus Gapping  Hamstring Contract Relax 5x's 6" hold 90/60 degrees each    neuromuscular re-education activities to improve: Kinesthetic, Sense, Proprioception, and Posture for 23 minutes. The following activities were included:  Nu-Step, x 8 min, lvl 5 - for muscular endurance and to promote joint lubrication   L LAQ 2x10 2# cuff  L SAQ 2x10 2# cuff   Straight Leg Raise 2 x 10 reps 2# cuff   Left TKE with Red Band 30x's   Bridging, 2x10   Supine Sciatic 90/90 glides 20x's     Marizol participated in dynamic functional therapeutic activities to improve functional performance for 20 minutes, including:  TRX squats, 2x10  Sit to stand 3 x 8 reps   Step ups 4" 2x10 R/L  Tandem walks in // 6 laps    Patient Education and Home Exercises     Home Exercises Provided and Patient Education Provided     Education provided:   - Exercise form and rationale     Written Home Exercises Provided: Patient instructed to cont prior HEP. Exercises were reviewed and Marizol was able to demonstrate them prior to the end of the session.  Marizol demonstrated good  understanding of the education provided. See EMR under Patient Instructions for exercises provided during therapy sessions    ASSESSMENT   Marizol demonstrates restored joint play of the Left tibiofemoral joint and normalized range of motion that are allowing for optimal functional capacity with ADLs. Patient and therapist through shared decision making determined that patient is appropriate for discharge at this time.   Marizol Is progressing well towards her goals.   Pt prognosis is Good.   Pt will no longer continue to benefit from skilled outpatient physical therapy to address the deficits listed in the problem list box on initial evaluation, provide pt/family education and to maximize pt's level of independence in " the home and community environment.     Pt's spiritual, cultural and educational needs considered and pt agreeable to plan of care and goals.     Anticipated barriers to physical therapy: None    GOALS  Short Term Goals (4 weeks):  1. Patient will have improved AROM of the left knee to +2 degrees of knee extension. MET  2. Patient will have decreased complaints of pain to 2/10 during long bouts of standing and walking to improve participation in activities of daily living. MET  3. Patient will be independent and compliant with issued HEP for continued functional mobility and strength gains for recreational/leisure activities. MET     Long Term Goals (8 weeks):   Patient will have improved AROM of the left knee to 3-0-130 degrees of knee mobility to demonstrate improved knee range of motion.MET  2. Patient will have improved strength of the left knee to 4/5 for the Left quadriceps muscle to show improved motor control of the knee during gait.MET  3. Patient will be able to resume prior functional level with no deficits. MET  4. Patient will have overall improvement in condition to have increased score on KOOS Jr to 50% to demonstrate clinically significant change in knee function. MET  5. Patient will have overall improvement in condition to have decreased FOTO Limitation score to 54% to demonstrate clinically significant change in knee function. MET    PLAN     Discharge.     Rusty Robertson, PT

## 2024-03-26 ENCOUNTER — TELEPHONE (OUTPATIENT)
Dept: INTERNAL MEDICINE | Facility: CLINIC | Age: 79
End: 2024-03-26
Payer: MEDICARE

## 2024-03-26 NOTE — TELEPHONE ENCOUNTER
----- Message from Alma Castillo sent at 3/25/2024  9:42 AM CDT -----  Regarding: Sooner Appt Request  Who Is Calling : ESTEBAN JIMENEZ [[id number]]        Reason For The Call: Patient is requesting a sooner appointment.  Patient declined first available and does not want to be added to the waitlist.  Please contact the patient to schedule.        Preferred Contact Method: [phone]        Additional Information: Symptom: Rash or Redness - Widespread  Outcome: Schedule a same-day appointment or talk to a nurse or provider within 1 hour.  Reason: Caller denied all higher acuity questions

## 2024-04-05 ENCOUNTER — CLINICAL SUPPORT (OUTPATIENT)
Dept: REHABILITATION | Facility: HOSPITAL | Age: 79
End: 2024-04-05
Payer: MEDICARE

## 2024-04-05 DIAGNOSIS — M25.562 CHRONIC PAIN OF LEFT KNEE: ICD-10-CM

## 2024-04-05 DIAGNOSIS — G89.29 CHRONIC PAIN OF LEFT KNEE: ICD-10-CM

## 2024-04-05 DIAGNOSIS — M54.59 OTHER LOW BACK PAIN: Primary | ICD-10-CM

## 2024-04-05 PROCEDURE — 97811 ACUP 1/> W/O ESTIM EA ADD 15: CPT | Mod: HCNC,PN

## 2024-04-05 PROCEDURE — 97810 ACUP 1/> WO ESTIM 1ST 15 MIN: CPT | Mod: HCNC,PN

## 2024-04-05 NOTE — PROGRESS NOTES
Acupuncture Treatment Note     Name: Marizol Kevin  [facility] Number: [id number]    Traditional Chinese Medicine Diagnosis: Qi Stagnation and Blood Stasis   Physician: Abdi Del Toro MD    Date of Service: 4/5/2024     Medical Diagnosis:  Chronic pain in left knee; Chronic pain in both shoulders, Chronic lower back pain with bilateral sciatica       Encounter Diagnoses   Name Primary?    Chronic bilateral low back pain with bilateral sciatica      Lumbar radiculopathy      Chronic pain syndrome      Visit #/Visits authorized: 5 / 12     Precautions: Diabetes and cance     Subjective     Chief Complaint:  Left knee pain for 4 months , Chronic pain in both shoulders    Cancer Related Symptoms: Neuropathy    Medical necessity is demonstrated by the following IMPAIRMENTS: Medical Necessity: Cancer related pain, Decreased mobility limits day to day activities, social, and emergent situations, and Decreased quality of life    Response to Previous Treatment:  Good    Quality of Symptoms (Better/Worse):  Better    Other Condition/Symptoms:  None    Objective      New Findings:  None    Treatment Principles:  Move Qi and Blood, stop pain     Acupuncture points used:    Bilateral points:  Hiwot on lower back + 3  Unilateral points:  Left: Hiwot on knee + 4,  Hiwot on lower leg + 3, GB 34  Right:  Auricular Treatment:  None   Needles In: 14  Needles Out: 14  Needles W/O STIM placed: 11:05 AM  Needles W/O STIM removed: 11: 50 AM    Other Traditional Chinese Medicine Modalities:  None    Recommendations:  PT    Education:  Patient is aware of cumulative effect of acupuncture      Assessment      Analysis of Treatment:  Patient felt good    Pt prognosis is Good.     Patient will continue to benefit from acupuncture treatment to address the deficits listed in the problem list box on initial evaluation, provide patient family education and to maximize pt's level of independence in the home and community environment.     Patient's  spiritual, cultural and educational needs considered and pt agreeable to plan of care and goals.     Anticipated barriers to treatment: None    Plan     Recommend       1  /week for 12   treatments and re-assess.

## 2024-04-09 ENCOUNTER — OFFICE VISIT (OUTPATIENT)
Dept: SPINE | Facility: CLINIC | Age: 79
End: 2024-04-09
Payer: MEDICARE

## 2024-04-09 VITALS
DIASTOLIC BLOOD PRESSURE: 80 MMHG | HEIGHT: 57 IN | WEIGHT: 164.44 LBS | SYSTOLIC BLOOD PRESSURE: 188 MMHG | RESPIRATION RATE: 12 BRPM | HEART RATE: 90 BPM | OXYGEN SATURATION: 100 % | BODY MASS INDEX: 35.48 KG/M2

## 2024-04-09 DIAGNOSIS — M70.62 GREATER TROCHANTERIC BURSITIS OF LEFT HIP: ICD-10-CM

## 2024-04-09 DIAGNOSIS — M46.1 SACROILIITIS: ICD-10-CM

## 2024-04-09 DIAGNOSIS — G57.03 PIRIFORMIS SYNDROME OF BOTH SIDES: Primary | ICD-10-CM

## 2024-04-09 PROCEDURE — 3072F LOW RISK FOR RETINOPATHY: CPT | Mod: CPTII,S$GLB,,

## 2024-04-09 PROCEDURE — 1159F MED LIST DOCD IN RCRD: CPT | Mod: CPTII,S$GLB,,

## 2024-04-09 PROCEDURE — 3288F FALL RISK ASSESSMENT DOCD: CPT | Mod: CPTII,S$GLB,,

## 2024-04-09 PROCEDURE — 99999 PR PBB SHADOW E&M-EST. PATIENT-LVL V: CPT | Mod: PBBFAC,,,

## 2024-04-09 PROCEDURE — 1101F PT FALLS ASSESS-DOCD LE1/YR: CPT | Mod: CPTII,S$GLB,,

## 2024-04-09 PROCEDURE — 99214 OFFICE O/P EST MOD 30 MIN: CPT | Mod: S$GLB,,,

## 2024-04-09 PROCEDURE — 3079F DIAST BP 80-89 MM HG: CPT | Mod: CPTII,S$GLB,,

## 2024-04-09 PROCEDURE — 1125F AMNT PAIN NOTED PAIN PRSNT: CPT | Mod: CPTII,S$GLB,,

## 2024-04-09 PROCEDURE — 1160F RVW MEDS BY RX/DR IN RCRD: CPT | Mod: CPTII,S$GLB,,

## 2024-04-09 PROCEDURE — 3077F SYST BP >= 140 MM HG: CPT | Mod: CPTII,S$GLB,,

## 2024-04-09 RX ORDER — METHOCARBAMOL 750 MG/1
750 TABLET, FILM COATED ORAL 3 TIMES DAILY PRN
Qty: 90 TABLET | Refills: 0 | Status: SHIPPED | OUTPATIENT
Start: 2024-04-09 | End: 2024-04-09

## 2024-04-09 RX ORDER — METHOCARBAMOL 750 MG/1
750 TABLET, FILM COATED ORAL 3 TIMES DAILY PRN
Qty: 90 TABLET | Refills: 0 | Status: SHIPPED | OUTPATIENT
Start: 2024-04-09

## 2024-04-09 NOTE — H&P (VIEW-ONLY)
Chronic Pain - Established        Chief Complaint:   Chief Complaint   Patient presents with    Back Pain     Interval History 4/9/2024:  Marizol Kevin presents for follow-up of lower back and left knee pain. She also had left L4/5 and L5/S1 TF JT on 12/22/2023.  This has provided 70% pain relief that is on-going. Today, she is complaining of bilateral leg pain into the buttock and posterolateral bilateral thighs. Pain description: electric, aching, shooting, burning, tight. Exacerbating factors: twisting, bending, stooping. She continues Lyrica. She has not taken any muscle relaxants. The patient denies fever/night sweats, urinary incontinence, bowel incontinence, significant weight changes, significant motor weakness or changes, or loss of sensations. Her pain today is 10/10.     Interval History 11/20/2023:  The patient presents today for follow up of lower back and left knee pain. She is s/p left knee steroid injection on 10/17/23 with 80% relief of knee pain. Her knee pain is currently tolerable. She did she neurosurgery since previous encounter and had a referral placed to orthopedics for evaluation. She says that she feels like there is excess fluid in her knee. She does still get intermittent back pain with radiation down the back and side of the left leg and associated numbness. We previously discussed JT and she would like to further discuss at this time. Her pain today is 2/10.    Interval History 10/2/2023:  The patient presents today to discuss worsened back and left knee pain. She was recently evaluated in the ED for left-sided sciatica. States that her back pain improved with the steroid injection, but she continues to struggle with left knee pain. She denies any back pain today. She did have a lumbar CT in the ED which showed multilevel stenosis, worse at L3-L4 level where there is severe central canal stenosis from concentric disc bulge and hypertrophic facet arthropathy and ligamentum  flavum hypertrophy with napkin ring deformity of the traversing thecal sac and cauda equina suggested. She was supposed to be referred to neurosurgery but a referral was not made. She denies bowel or bladder incontinence. Left knee pain is sharp and stabbing in nature. She feels like the knee will give out at time. She has had benefit with Tramadol in the past and is asking for a prescription. Her pain today is 10/10.    Interval History 9/6/2023:  There patient is here for follow up of foot pain and numbness secondary to diabetic neuropathy and previous chemotherapy. She is here today for application of Qutenza patches to bilateral feet. She has burning and numbness to the anterior and posterior feet. She was started on Lyrica 50 mg BID at last OV. She has no side effects from the medication but has not noticed any improvement in symptoms. Her pain today is 9/10.    Interval History 8/22/23:    Ms. Kevin returns for follow-up of her neck pain. Her primary pain today is her neuropathy pain. She is tolerating lyrica 50 mg BID but has noted alopecia since starting. However, it has helped her pain. Given her history of colon cancer and chemo, it is unclear if this is hairloss is from the lyrica.  She also comments on her shoulder pain and how the prior nerve block helped but did not last long.  It provided >80% relief of her shoulder pain.  She is concerned about repeating the injection because she states the last one cost her $300.   Acupuncture was very helpful for her, but was not covered by her insurance and cost $150 per session.   Regarding her neuropathy, she endorses painful burning in her feet that prevents sleeping. The lyrica and duloxetine have both helped, but she stopped taking duloxetine when she started lyrica. She did not have any known adverse effects from the duloxetine. At her last visit 6/6/23, Qutenza was ordered. Will investigate if steps to approval for that.   We also discussed spinal cord  stimulator trial. She has not had spine MRI or psychological clearance.          Initial HPI:  Marizol Kevin has a PMHx of HTN, HLD, DMII, osteoporosis, colon cancer s/p chemothearpy. She presents to the clinic for the evaluation of neuropathic pain along with neck pain that radiates into the left arm. The pain started 5-6 months ago, no specific inciting event. Symptoms have been worsening.The pain is located in the left side of the neck area and radiates to the left periscapular area with further distal radiation to the whole arm.  The pain is described as aching, stabbing, and sqaueezing  and is rated as 9/10. The pain is rated with a score of  5/10 on the BEST day and a score of 9/10 on the WORST day.  Symptoms interfere with daily activity, sleeping, and enjoyment of life. The pain is exacerbated by Laying and Lifting.  The pain is mitigated by nothing.  Pt also endorses severe neuropathic pain in BL hands and feet. She has used gabapentinin the past without any relief. Currently on Cymbalta. Recently started Acupuncture for neuropathy, which she reports has provided significant relief.     Patient denies night fever/night sweats, urinary incontinence, bowel incontinence, significant weight loss, and loss of sensations.    Physical Therapy/Home Exercise: yes and continues to do home exercises for her shoulder and her balance    Pain Disability Index Review:      4/9/2024    12:56 PM 11/20/2023     2:36 PM 10/2/2023    11:37 AM   Last 3 PDI Scores   Pain Disability Index (PDI) 50 10 70       Pain Medications:  - pregabalin 50 mg BID  - was on duloxetine 30 mg BID  - Previously on gabapentin (dose unknown) but was stopped due to lack of efficacy     report:  Not applicable    Pain Procedures:   SAB injection 3/30/23 --> minimal relief  Left shoulder nerve blocks 7/21/23 --> 80% relief  10/17/23 Left knee steroid injection- 80% relief  12/22/2023 - Left L4/5 and L5/S1 TF JT - 70% relief    Imaging:   MRI  C Spine 4/15/22  FINDINGS:  large amount of image degradation from artifact.  Straightening of the usual cervical lordosis. Degenerative changes. Disc space narrowing C4-C5, C5-C6. Vertebral body heights and alignment appear maintained without acute compression or subluxation evident.  Although no obvious fracture cannot exclude subtle findings including marrow edema with the large amount of image degradation. Further follow-up or evaluation is to be obtained is suggested by CT. There do appear to be small posterior disc bulges multiple levels, C3 through C 6 without appreciable significant spinal canal narrowing     Impression:     Grossly negative study for acute findings but note is made that limited evaluation with the amount artifact and if concern for acute cervical spine trauma correlation with CT is recommended and note is made the patient did have CT 04/15/2022 please refer to that report.  Subtle findings as suggested on that CT scan likely would not be apparent on the MRI with the degree of artifact.     If further evaluation or follow-up is to be obtained it is recommended to be by CT     This report was flagged in Epic as abnormal.     Final read    CT Lumbar Spine Without Contrast  Order: [id number]  Status: Final result     Visible to patient: Yes (seen)     Next appt: 10/17/2023 at 10:20 AM in Pain Medicine (Jenny Glass, [facility])     0 Result Notes  Details    Reading Physician Reading Date Result Priority   Neymar Mason MD  [phone] 9/29/2023 STAT     Narrative & Impression  EXAMINATION:  CT LUMBAR SPINE WITHOUT CONTRAST     CLINICAL HISTORY:  Lumbar radiculopathy, symptoms persist with conservative treatment;     TECHNIQUE:  Low-dose axial, sagittal and coronal reformations are obtained through the lumbar spine.  Contrast was not administered.     COMPARISON:  MRI lumbar spine, 06/18/2018     FINDINGS:  Alignment is stable.  Progressive spondylosis with disc space narrowing and  vacuum phenomenon increasing at all levels but most severely at L to L3 L3-L4.  The anterolisthesis of L4 on L5 appear stable.     At T12-L1 there is no significant stenosis.     At the L1-L2 level, mild facet arthropathy with no significant stenosis.     At the L2-L3 level, concentric disc bulge and hypertrophic facet ligamentous changes result in moderate central canal stenosis with trefoil deformity of the thecal sac.     At the L3-L4 level, severe central canal stenosis from concentric disc bulge and hypertrophic facet arthropathy and ligamentum flavum hypertrophy with napkin ring deformity of the traversing thecal sac and cauda equina suggested.     At the L4-5 level, the anterolisthesis with uncovering of the L4-5 disc and facet arthropathy and ligamentous hypertrophy causes moderate central canal stenosis.     At the L5-S1 level, vacuum phenomenon is noted with leakage through the annulus into the epidural space but no evidence of central or foraminal stenosis.     Arthrosclerotic disease throughout the aorta and iliac vessels.     Impression:     Multilevel lumbar stenosis with increasing spondylosis and degenerative change since 2018.     Most severe lumbar stenosis at L3-4 and L4-5.     No new fracture or subluxation.     Past Medical History:   Diagnosis Date    Allergy     Anxiety     Cancer     colon    Cataract     Colon cancer     Dependence on nicotine from cigarettes 9/3/2020    Diabetes mellitus, type 2     Dry eye syndrome     Hyperlipidemia     Hypertension     Insomnia     Light cigarette smoker (1-9 cigs/day) 6/21/2022    Malignant neoplasm of ascending colon 7/20/2020    Squamous blepharitis     Syncope 4/16/2022    Tobacco use disorder, moderate, in early remission 11/2/2022    Vitamin D deficiency 9/10/2020     Past Surgical History:   Procedure Laterality Date    CATARACT EXTRACTION, BILATERAL  03/2020    CHOLECYSTECTOMY      COLON SURGERY      Colon cancer    COLONOSCOPY N/A 02/01/2021     Procedure: COLONOSCOPY;  Surgeon: Ashwini Duarte MD;  Location: [facility] ENDO (4TH FLR);  Service: Endoscopy;  Laterality: N/A;  medical transportation  covid test  [facility], instructions mailed-[facility]    EYE SURGERY      Cataract    HYSTERECTOMY      INJECTION OF ANESTHETIC AGENT AROUND NERVE Left 2023    Procedure: BLOCK, NERVE, LEFT SHOULDER ARTICULAR BRANCH keep date rep aware;  Surgeon: Abdi Del Toro MD;  Location: [facility] PAIN MGT;  Service: Pain Management;  Laterality: Left;    JOINT REPLACEMENT      Knee    KNEE SURGERY      LAPAROSCOPIC LEFT COLON RESECTION      TRANSFORAMINAL EPIDURAL INJECTION OF STEROID Left 2023    Procedure: LUMBAR TRANSFORAMINAL LEFT L4/5 AND L5/S1;  Surgeon: Abdi Del Toro MD;  Location: [facility] PAIN MGT;  Service: Pain Management;  Laterality: Left;  [phone]  2 WK F/U ANTWON     Social History     Socioeconomic History    Marital status:    Tobacco Use    Smoking status: Former     Current packs/day: 0.00     Average packs/day: 1 pack/day for 51.0 years (51.0 ttl pk-yrs)     Types: Cigarettes     Start date: 1971     Quit date: 2022     Years since quittin.5    Smokeless tobacco: Never    Tobacco comments:     smoking cessation information given    Substance and Sexual Activity    Alcohol use: Yes     Alcohol/week: 1.0 standard drink of alcohol     Types: 1 Shots of liquor per week     Comment: occ    Drug use: Never    Sexual activity: Not Currently     Birth control/protection: Abstinence     Social Determinants of Health     Financial Resource Strain: Low Risk  (2023)    Overall Financial Resource Strain (CARDIA)     Difficulty of Paying Living Expenses: Not hard at all   Food Insecurity: No Food Insecurity (2023)    Hunger Vital Sign     Worried About Running Out of Food in the Last Year: Never true     Ran Out of Food in the Last Year: Never true   Transportation Needs: No Transportation Needs (2023)    PRAPARE -  Transportation     Lack of Transportation (Medical): No     Lack of Transportation (Non-Medical): No   Physical Activity: Insufficiently Active (6/7/2023)    Exercise Vital Sign     Days of Exercise per Week: 2 days     Minutes of Exercise per Session: 20 min   Stress: No Stress Concern Present (6/7/2023)    [employer] - Occupational Stress Questionnaire     Feeling of Stress : Not at all   Social Connections: Socially Isolated (6/7/2023)    Social Connection and Isolation Panel [NHANES]     Frequency of Communication with Friends and Family: More than three times a week     Frequency of Social Gatherings with Friends and Family: Twice a week     Attends Zoroastrian Services: Never     Active Member of Clubs or Organizations: No     Attends Club or Organization Meetings: Never     Marital Status:    Housing Stability: Low Risk  (6/7/2023)    Housing Stability Vital Sign     Unable to Pay for Housing in the Last Year: No     Number of Places Lived in the Last Year: 1     Unstable Housing in the Last Year: No     Family History   Problem Relation Age of Onset    Heart attack Mother     Heart disease Mother     Colon cancer Father     Diabetes Father     Cancer Sister         unknown    Hypothyroidism Sister     Asthma Sister     Thyroid cancer Neg Hx        Review of patient's allergies indicates:   Allergen Reactions    Grass pollen-june grass standard     Sulfa (sulfonamide antibiotics)      Occurred when she was pregnant with varicose veins resulting in leg swelling and pain with standing       Current Outpatient Medications   Medication Sig    albuterol (PROVENTIL/VENTOLIN HFA) 90 mcg/actuation inhaler INHALE 2 PUFFS INTO THE LUNGS EVERY 4 (FOUR) HOURS AS NEEDED FOR WHEEZING    albuterol-ipratropium (DUO-NEB) 2.5 mg-0.5 mg/3 mL nebulizer solution Take 3 mLs by nebulization every 6 (six) hours as needed for Wheezing or Shortness of Breath. Rescue    amLODIPine (NORVASC) 5 MG  tablet TAKE 1 TABLET EVERY DAY    aspirin (ECOTRIN) 81 MG EC tablet Take 81 mg by mouth once daily.    atorvastatin (LIPITOR) 40 MG tablet TAKE 1 TABLET EVERY DAY    azelastine (ASTELIN) 137 mcg (0.1 %) nasal spray 1 spray (137 mcg total) by Nasal route 2 (two) times daily.    betamethasone valerate 0.1% (VALISONE) 0.1 % Oint Apply topically once daily.    blood sugar diagnostic (TRUE METRIX GLUCOSE TEST STRIP) Strp Use to test blood glucose two (2) times daily; to be used with insurance-preferred brand of glucometer/supplies    blood-glucose meter kit Please provide with insurance covered meter    cholecalciferol, vitamin D3, (VITAMIN D3) 50 mcg (2,000 unit) Cap capsule Take 1 capsule (2,000 Units total) by mouth once daily.    dulaglutide (TRULICITY) 1.5 mg/0.5 mL pen injector Inject 1.5 mg into the skin every 7 days.    DULoxetine (CYMBALTA) 30 MG capsule Take 1 capsule (30 mg total) by mouth once daily.    ferrous sulfate 325 (65 FE) MG EC tablet TAKE 1 TABLET EVERY OTHER DAY    heparin, porcine, PF, (HEPARIN FLUSH 100 UNITS/ML) 100 unit/mL Syrg     hydrOXYzine HCL (ATARAX) 25 MG tablet Take 1 tablet (25 mg total) by mouth 3 (three) times daily as needed for Itching or Anxiety.    ketoconazole (NIZORAL) 2 % cream Apply topically once daily. for 14 days    lancets Misc 1 Device by Misc.(Non-Drug; Combo Route) route 2 (two) times daily.    metFORMIN (GLUCOPHAGE-XR) 500 MG ER 24hr tablet TAKE 2 TABLETS EVERY DAY WITH BREAKFAST    multivitamin capsule Take 1 capsule by mouth once daily.    ondansetron (ZOFRAN) 4 MG tablet TAKE 1 TABLET EVERY 8 HOURS AS NEEDED FOR NAUSEA    pregabalin (LYRICA) 75 MG capsule Take 1 capsule (75 mg total) by mouth 2 (two) times daily.    raloxifene (EVISTA) 60 mg tablet TAKE 1 TABLET EVERY DAY    triamcinolone acetonide 0.5% (KENALOG) 0.5 % Crea Apply topically 2 (two) times daily.     No current facility-administered medications for this visit.       REVIEW OF SYSTEMS:    GENERAL:  No  "weight loss, malaise or fevers.  HEENT:  Negative for frequent or significant headaches.  NECK:  Negative for lumps, goiter, pain and significant neck swelling.  RESPIRATORY:  Negative for cough, wheezing or shortness of breath.  CARDIOVASCULAR:  Negative for chest pain, leg swelling or palpitations.  GI:  Negative for abdominal discomfort, blood in stools or black stools or change in bowel habits.  MUSCULOSKELETAL:  See HPI.  SKIN:  Negative for lesions, rash, and itching.  PSYCH:  +sleep disturbance, depression .  HEMATOLOGY/LYMPHOLOGY:  Negative for prolonged bleeding, bruising easily or swollen nodes.  NEURO:   No history of headaches, syncope, paralysis, seizures or tremors.  All other reviewed and negative other than HPI.    OBJECTIVE:    BP (!) 188/80 (BP Location: Left forearm, Patient Position: Sitting, BP Method: Medium (Automatic))   Pulse 90   Resp 12   Ht 4' 9" (1.448 m)   Wt 74.6 kg (164 lb 7.4 oz)   SpO2 100%   BMI 35.59 kg/m²       PHYSICAL EXAMINATION:    GENERAL APPEARANCE: Well appearing, in no acute distress.  PSYCH:  Mood and affect appropriate.  SKIN: Skin color, texture, turgor normal, no rashes or lesions to visible areas.  HEAD/FACE:  Normocephalic, atraumatic.  NECK: No pain to palpation over the cervical paraspinous muscles. Spurling Negative. No pain with neck flexion, extension, or lateral flexion.   CARDIO: Rate regular.  No lower extremity edema. Capillary refill <2 seconds.   PULM: Bilateral chest rise. No apparent respiratory distress.   GI:  Non-distended  BACK: Straight leg raising in the sitting position is negative to radicular pain. No pain to palpation over the spine or costovertebral angles. Limited range of motion with pain on extension and of tightness in bilateral hamstrings with flexion/bending. Positive axial loading test to the left. Positive tenderness over bilateral SIJ left > right. R. OLGA positive to left.  EXTREMITIES: Peripheral joint ROM is full and pain " free without obvious instability or laxity in all four extremities. No deformities, edema, or skin discoloration.   MUSCULOSKELETAL: Right hip internal rotation log roll positive to lateral hip/thigh pain   Left hip internal roation with log roll positive to lateral hip/thigh pain. Significant TTP over bilateral piriformis with reproduction of pain. Positive piriformis stretch test left > right.  TTP over left GTB. Bilateral upper and lower extremity strength is normal and symmetric.  No atrophy or tone abnormalities are noted.  NEURO: Bilateral upper and lower extremity coordination and muscle stretch reflexes are physiologic and symmetric.  Negative Ring's. Plantar response are downgoing. No clonus noted. No loss of sensation is noted.  GAIT: Antalgic.        ASSESSMENT: 78 y.o. year old female with widespread pain, consistent with the followin. Piriformis syndrome of both sides  methocarbamoL (ROBAXIN) 750 MG Tab    DISCONTINUED: methocarbamoL (ROBAXIN) 750 MG Tab      2. Sacroiliitis        3. Greater trochanteric bursitis of left hip            PLAN:     - I personally reviewed and interpreted relevant and pertinent imaging. Results were discussed with the patient today.     - Procedure orders placed for Bilateral Piriformis and Left GTB    - Handouts for lower body and hip bursitis stretches given and demonstrated to patient.    - Continue Lyrica 75 mg BID.    - Restart Robaxin 750 mg TID PRN for muscle spasms.     - Continue duloxetine 30 mg BID.    - RTC 2-3 weeks after above procedure.    - Consider SIJ injections in the future vs repeat JT    The above plan and management options were discussed at length with patient. Patient is in agreement with the above and verbalized understanding.    Jada Arias  2024

## 2024-04-09 NOTE — PROGRESS NOTES
Chronic Pain - Established        Chief Complaint:   Chief Complaint   Patient presents with    Back Pain     Interval History 4/9/2024:  Marizol Kevin presents for follow-up of lower back and left knee pain. She also had left L4/5 and L5/S1 TF JT on 12/22/2023.  This has provided 70% pain relief that is on-going. Today, she is complaining of bilateral leg pain into the buttock and posterolateral bilateral thighs. Pain description: electric, aching, shooting, burning, tight. Exacerbating factors: twisting, bending, stooping. She continues Lyrica. She has not taken any muscle relaxants. The patient denies fever/night sweats, urinary incontinence, bowel incontinence, significant weight changes, significant motor weakness or changes, or loss of sensations. Her pain today is 10/10.     Interval History 11/20/2023:  The patient presents today for follow up of lower back and left knee pain. She is s/p left knee steroid injection on 10/17/23 with 80% relief of knee pain. Her knee pain is currently tolerable. She did she neurosurgery since previous encounter and had a referral placed to orthopedics for evaluation. She says that she feels like there is excess fluid in her knee. She does still get intermittent back pain with radiation down the back and side of the left leg and associated numbness. We previously discussed JT and she would like to further discuss at this time. Her pain today is 2/10.    Interval History 10/2/2023:  The patient presents today to discuss worsened back and left knee pain. She was recently evaluated in the ED for left-sided sciatica. States that her back pain improved with the steroid injection, but she continues to struggle with left knee pain. She denies any back pain today. She did have a lumbar CT in the ED which showed multilevel stenosis, worse at L3-L4 level where there is severe central canal stenosis from concentric disc bulge and hypertrophic facet arthropathy and ligamentum  flavum hypertrophy with napkin ring deformity of the traversing thecal sac and cauda equina suggested. She was supposed to be referred to neurosurgery but a referral was not made. She denies bowel or bladder incontinence. Left knee pain is sharp and stabbing in nature. She feels like the knee will give out at time. She has had benefit with Tramadol in the past and is asking for a prescription. Her pain today is 10/10.    Interval History 9/6/2023:  There patient is here for follow up of foot pain and numbness secondary to diabetic neuropathy and previous chemotherapy. She is here today for application of Qutenza patches to bilateral feet. She has burning and numbness to the anterior and posterior feet. She was started on Lyrica 50 mg BID at last OV. She has no side effects from the medication but has not noticed any improvement in symptoms. Her pain today is 9/10.    Interval History 8/22/23:    Ms. Kevin returns for follow-up of her neck pain. Her primary pain today is her neuropathy pain. She is tolerating lyrica 50 mg BID but has noted alopecia since starting. However, it has helped her pain. Given her history of colon cancer and chemo, it is unclear if this is hairloss is from the lyrica.  She also comments on her shoulder pain and how the prior nerve block helped but did not last long.  It provided >80% relief of her shoulder pain.  She is concerned about repeating the injection because she states the last one cost her $300.   Acupuncture was very helpful for her, but was not covered by her insurance and cost $150 per session.   Regarding her neuropathy, she endorses painful burning in her feet that prevents sleeping. The lyrica and duloxetine have both helped, but she stopped taking duloxetine when she started lyrica. She did not have any known adverse effects from the duloxetine. At her last visit 6/6/23, Qutenza was ordered. Will investigate if steps to approval for that.   We also discussed spinal cord  stimulator trial. She has not had spine MRI or psychological clearance.          Initial HPI:  Marizol Kevin has a PMHx of HTN, HLD, DMII, osteoporosis, colon cancer s/p chemothearpy. She presents to the clinic for the evaluation of neuropathic pain along with neck pain that radiates into the left arm. The pain started 5-6 months ago, no specific inciting event. Symptoms have been worsening.The pain is located in the left side of the neck area and radiates to the left periscapular area with further distal radiation to the whole arm.  The pain is described as aching, stabbing, and sqaueezing  and is rated as 9/10. The pain is rated with a score of  5/10 on the BEST day and a score of 9/10 on the WORST day.  Symptoms interfere with daily activity, sleeping, and enjoyment of life. The pain is exacerbated by Laying and Lifting.  The pain is mitigated by nothing.  Pt also endorses severe neuropathic pain in BL hands and feet. She has used gabapentinin the past without any relief. Currently on Cymbalta. Recently started Acupuncture for neuropathy, which she reports has provided significant relief.     Patient denies night fever/night sweats, urinary incontinence, bowel incontinence, significant weight loss, and loss of sensations.    Physical Therapy/Home Exercise: yes and continues to do home exercises for her shoulder and her balance    Pain Disability Index Review:      4/9/2024    12:56 PM 11/20/2023     2:36 PM 10/2/2023    11:37 AM   Last 3 PDI Scores   Pain Disability Index (PDI) 50 10 70       Pain Medications:  - pregabalin 50 mg BID  - was on duloxetine 30 mg BID  - Previously on gabapentin (dose unknown) but was stopped due to lack of efficacy     report:  Not applicable    Pain Procedures:   SAB injection 3/30/23 --> minimal relief  Left shoulder nerve blocks 7/21/23 --> 80% relief  10/17/23 Left knee steroid injection- 80% relief  12/22/2023 - Left L4/5 and L5/S1 TF JT - 70% relief    Imaging:   MRI  C Spine 4/15/22  FINDINGS:  large amount of image degradation from artifact.  Straightening of the usual cervical lordosis. Degenerative changes. Disc space narrowing C4-C5, C5-C6. Vertebral body heights and alignment appear maintained without acute compression or subluxation evident.  Although no obvious fracture cannot exclude subtle findings including marrow edema with the large amount of image degradation. Further follow-up or evaluation is to be obtained is suggested by CT. There do appear to be small posterior disc bulges multiple levels, C3 through C 6 without appreciable significant spinal canal narrowing     Impression:     Grossly negative study for acute findings but note is made that limited evaluation with the amount artifact and if concern for acute cervical spine trauma correlation with CT is recommended and note is made the patient did have CT 04/15/2022 please refer to that report.  Subtle findings as suggested on that CT scan likely would not be apparent on the MRI with the degree of artifact.     If further evaluation or follow-up is to be obtained it is recommended to be by CT     This report was flagged in Epic as abnormal.     Final read    CT Lumbar Spine Without Contrast  Order: [id number]  Status: Final result     Visible to patient: Yes (seen)     Next appt: 10/17/2023 at 10:20 AM in Pain Medicine ([facility], [facility])     0 Result Notes  Details    Reading Physician Reading Date Result Priority   Neymar Mason MD  [phone] 9/29/2023 STAT     Narrative & Impression  EXAMINATION:  CT LUMBAR SPINE WITHOUT CONTRAST     CLINICAL HISTORY:  Lumbar radiculopathy, symptoms persist with conservative treatment;     TECHNIQUE:  Low-dose axial, sagittal and coronal reformations are obtained through the lumbar spine.  Contrast was not administered.     COMPARISON:  MRI lumbar spine, 06/18/2018     FINDINGS:  Alignment is stable.  Progressive spondylosis with disc space narrowing and  vacuum phenomenon increasing at all levels but most severely at L to L3 L3-L4.  The anterolisthesis of L4 on L5 appear stable.     At T12-L1 there is no significant stenosis.     At the L1-L2 level, mild facet arthropathy with no significant stenosis.     At the L2-L3 level, concentric disc bulge and hypertrophic facet ligamentous changes result in moderate central canal stenosis with trefoil deformity of the thecal sac.     At the L3-L4 level, severe central canal stenosis from concentric disc bulge and hypertrophic facet arthropathy and ligamentum flavum hypertrophy with napkin ring deformity of the traversing thecal sac and cauda equina suggested.     At the L4-5 level, the anterolisthesis with uncovering of the L4-5 disc and facet arthropathy and ligamentous hypertrophy causes moderate central canal stenosis.     At the L5-S1 level, vacuum phenomenon is noted with leakage through the annulus into the epidural space but no evidence of central or foraminal stenosis.     Arthrosclerotic disease throughout the aorta and iliac vessels.     Impression:     Multilevel lumbar stenosis with increasing spondylosis and degenerative change since 2018.     Most severe lumbar stenosis at L3-4 and L4-5.     No new fracture or subluxation.     Past Medical History:   Diagnosis Date    Allergy     Anxiety     Cancer     colon    Cataract     Colon cancer     Dependence on nicotine from cigarettes 9/3/2020    Diabetes mellitus, type 2     Dry eye syndrome     Hyperlipidemia     Hypertension     Insomnia     Light cigarette smoker (1-9 cigs/day) 6/21/2022    Malignant neoplasm of ascending colon 7/20/2020    Squamous blepharitis     Syncope 4/16/2022    Tobacco use disorder, moderate, in early remission 11/2/2022    Vitamin D deficiency 9/10/2020     Past Surgical History:   Procedure Laterality Date    CATARACT EXTRACTION, BILATERAL  03/2020    CHOLECYSTECTOMY      COLON SURGERY      Colon cancer    COLONOSCOPY N/A 02/01/2021     Procedure: COLONOSCOPY;  Surgeon: Ashwini Duarte MD;  Location: [facility] ENDO (4TH FLR);  Service: Endoscopy;  Laterality: N/A;  medical transportation  covid test  Religious, instructions mailed-[facility]    EYE SURGERY      Cataract    HYSTERECTOMY      INJECTION OF ANESTHETIC AGENT AROUND NERVE Left 2023    Procedure: BLOCK, NERVE, LEFT SHOULDER ARTICULAR BRANCH keep date rep aware;  Surgeon: Abdi Del Toro MD;  Location: [facility] PAIN MGT;  Service: Pain Management;  Laterality: Left;    JOINT REPLACEMENT      Knee    KNEE SURGERY      LAPAROSCOPIC LEFT COLON RESECTION      TRANSFORAMINAL EPIDURAL INJECTION OF STEROID Left 2023    Procedure: LUMBAR TRANSFORAMINAL LEFT L4/5 AND L5/S1;  Surgeon: Abdi Del Toro MD;  Location: [facility] PAIN MGT;  Service: Pain Management;  Laterality: Left;  [phone]  2 WK F/U ANTWON     Social History     Socioeconomic History    Marital status:    Tobacco Use    Smoking status: Former     Current packs/day: 0.00     Average packs/day: 1 pack/day for 51.0 years (51.0 ttl pk-yrs)     Types: Cigarettes     Start date: 1971     Quit date: 2022     Years since quittin.5    Smokeless tobacco: Never    Tobacco comments:     smoking cessation information given    Substance and Sexual Activity    Alcohol use: Yes     Alcohol/week: 1.0 standard drink of alcohol     Types: 1 Shots of liquor per week     Comment: occ    Drug use: Never    Sexual activity: Not Currently     Birth control/protection: Abstinence     Social Determinants of Health     Financial Resource Strain: Low Risk  (2023)    Overall Financial Resource Strain (CARDIA)     Difficulty of Paying Living Expenses: Not hard at all   Food Insecurity: No Food Insecurity (2023)    Hunger Vital Sign     Worried About Running Out of Food in the Last Year: Never true     Ran Out of Food in the Last Year: Never true   Transportation Needs: No Transportation Needs (2023)    PRAPARE -  Transportation     Lack of Transportation (Medical): No     Lack of Transportation (Non-Medical): No   Physical Activity: Insufficiently Active (6/7/2023)    Exercise Vital Sign     Days of Exercise per Week: 2 days     Minutes of Exercise per Session: 20 min   Stress: No Stress Concern Present (6/7/2023)    [employer] - Occupational Stress Questionnaire     Feeling of Stress : Not at all   Social Connections: Socially Isolated (6/7/2023)    Social Connection and Isolation Panel [NHANES]     Frequency of Communication with Friends and Family: More than three times a week     Frequency of Social Gatherings with Friends and Family: Twice a week     Attends Mandaen Services: Never     Active Member of Clubs or Organizations: No     Attends Club or Organization Meetings: Never     Marital Status:    Housing Stability: Low Risk  (6/7/2023)    Housing Stability Vital Sign     Unable to Pay for Housing in the Last Year: No     Number of Places Lived in the Last Year: 1     Unstable Housing in the Last Year: No     Family History   Problem Relation Age of Onset    Heart attack Mother     Heart disease Mother     Colon cancer Father     Diabetes Father     Cancer Sister         unknown    Hypothyroidism Sister     Asthma Sister     Thyroid cancer Neg Hx        Review of patient's allergies indicates:   Allergen Reactions    Grass pollen-june grass standard     Sulfa (sulfonamide antibiotics)      Occurred when she was pregnant with varicose veins resulting in leg swelling and pain with standing       Current Outpatient Medications   Medication Sig    albuterol (PROVENTIL/VENTOLIN HFA) 90 mcg/actuation inhaler INHALE 2 PUFFS INTO THE LUNGS EVERY 4 (FOUR) HOURS AS NEEDED FOR WHEEZING    albuterol-ipratropium (DUO-NEB) 2.5 mg-0.5 mg/3 mL nebulizer solution Take 3 mLs by nebulization every 6 (six) hours as needed for Wheezing or Shortness of Breath. Rescue    amLODIPine (NORVASC) 5 MG  tablet TAKE 1 TABLET EVERY DAY    aspirin (ECOTRIN) 81 MG EC tablet Take 81 mg by mouth once daily.    atorvastatin (LIPITOR) 40 MG tablet TAKE 1 TABLET EVERY DAY    azelastine (ASTELIN) 137 mcg (0.1 %) nasal spray 1 spray (137 mcg total) by Nasal route 2 (two) times daily.    betamethasone valerate 0.1% (VALISONE) 0.1 % Oint Apply topically once daily.    blood sugar diagnostic (TRUE METRIX GLUCOSE TEST STRIP) Strp Use to test blood glucose two (2) times daily; to be used with insurance-preferred brand of glucometer/supplies    blood-glucose meter kit Please provide with insurance covered meter    cholecalciferol, vitamin D3, (VITAMIN D3) 50 mcg (2,000 unit) Cap capsule Take 1 capsule (2,000 Units total) by mouth once daily.    dulaglutide (TRULICITY) 1.5 mg/0.5 mL pen injector Inject 1.5 mg into the skin every 7 days.    DULoxetine (CYMBALTA) 30 MG capsule Take 1 capsule (30 mg total) by mouth once daily.    ferrous sulfate 325 (65 FE) MG EC tablet TAKE 1 TABLET EVERY OTHER DAY    heparin, porcine, PF, (HEPARIN FLUSH 100 UNITS/ML) 100 unit/mL Syrg     hydrOXYzine HCL (ATARAX) 25 MG tablet Take 1 tablet (25 mg total) by mouth 3 (three) times daily as needed for Itching or Anxiety.    ketoconazole (NIZORAL) 2 % cream Apply topically once daily. for 14 days    lancets Misc 1 Device by Misc.(Non-Drug; Combo Route) route 2 (two) times daily.    metFORMIN (GLUCOPHAGE-XR) 500 MG ER 24hr tablet TAKE 2 TABLETS EVERY DAY WITH BREAKFAST    multivitamin capsule Take 1 capsule by mouth once daily.    ondansetron (ZOFRAN) 4 MG tablet TAKE 1 TABLET EVERY 8 HOURS AS NEEDED FOR NAUSEA    pregabalin (LYRICA) 75 MG capsule Take 1 capsule (75 mg total) by mouth 2 (two) times daily.    raloxifene (EVISTA) 60 mg tablet TAKE 1 TABLET EVERY DAY    triamcinolone acetonide 0.5% (KENALOG) 0.5 % Crea Apply topically 2 (two) times daily.     No current facility-administered medications for this visit.       REVIEW OF SYSTEMS:    GENERAL:  No  "weight loss, malaise or fevers.  HEENT:  Negative for frequent or significant headaches.  NECK:  Negative for lumps, goiter, pain and significant neck swelling.  RESPIRATORY:  Negative for cough, wheezing or shortness of breath.  CARDIOVASCULAR:  Negative for chest pain, leg swelling or palpitations.  GI:  Negative for abdominal discomfort, blood in stools or black stools or change in bowel habits.  MUSCULOSKELETAL:  See HPI.  SKIN:  Negative for lesions, rash, and itching.  PSYCH:  +sleep disturbance, depression .  HEMATOLOGY/LYMPHOLOGY:  Negative for prolonged bleeding, bruising easily or swollen nodes.  NEURO:   No history of headaches, syncope, paralysis, seizures or tremors.  All other reviewed and negative other than HPI.    OBJECTIVE:    BP (!) 188/80 (BP Location: Left forearm, Patient Position: Sitting, BP Method: Medium (Automatic))   Pulse 90   Resp 12   Ht 4' 9" (1.448 m)   Wt 74.6 kg (164 lb 7.4 oz)   SpO2 100%   BMI 35.59 kg/m²       PHYSICAL EXAMINATION:    GENERAL APPEARANCE: Well appearing, in no acute distress.  PSYCH:  Mood and affect appropriate.  SKIN: Skin color, texture, turgor normal, no rashes or lesions to visible areas.  HEAD/FACE:  Normocephalic, atraumatic.  NECK: No pain to palpation over the cervical paraspinous muscles. Spurling Negative. No pain with neck flexion, extension, or lateral flexion.   CARDIO: Rate regular.  No lower extremity edema. Capillary refill <2 seconds.   PULM: Bilateral chest rise. No apparent respiratory distress.   GI:  Non-distended  BACK: Straight leg raising in the sitting position is negative to radicular pain. No pain to palpation over the spine or costovertebral angles. Limited range of motion with pain on extension and of tightness in bilateral hamstrings with flexion/bending. Positive axial loading test to the left. Positive tenderness over bilateral SIJ left > right. R. OLGA positive to left.  EXTREMITIES: Peripheral joint ROM is full and pain " free without obvious instability or laxity in all four extremities. No deformities, edema, or skin discoloration.   MUSCULOSKELETAL: Right hip internal rotation log roll positive to lateral hip/thigh pain   Left hip internal roation with log roll positive to lateral hip/thigh pain. Significant TTP over bilateral piriformis with reproduction of pain. Positive piriformis stretch test left > right.  TTP over left GTB. Bilateral upper and lower extremity strength is normal and symmetric.  No atrophy or tone abnormalities are noted.  NEURO: Bilateral upper and lower extremity coordination and muscle stretch reflexes are physiologic and symmetric.  Negative Ring's. Plantar response are downgoing. No clonus noted. No loss of sensation is noted.  GAIT: Antalgic.        ASSESSMENT: 78 y.o. year old female with widespread pain, consistent with the followin. Piriformis syndrome of both sides  methocarbamoL (ROBAXIN) 750 MG Tab    DISCONTINUED: methocarbamoL (ROBAXIN) 750 MG Tab      2. Sacroiliitis        3. Greater trochanteric bursitis of left hip            PLAN:     - I personally reviewed and interpreted relevant and pertinent imaging. Results were discussed with the patient today.     - Procedure orders placed for Bilateral Piriformis and Left GTB    - Handouts for lower body and hip bursitis stretches given and demonstrated to patient.    - Continue Lyrica 75 mg BID.    - Restart Robaxin 750 mg TID PRN for muscle spasms.     - Continue duloxetine 30 mg BID.    - RTC 2-3 weeks after above procedure.    - Consider SIJ injections in the future vs repeat JT    The above plan and management options were discussed at length with patient. Patient is in agreement with the above and verbalized understanding.    Jada Arias  2024

## 2024-04-10 ENCOUNTER — PATIENT MESSAGE (OUTPATIENT)
Dept: PAIN MEDICINE | Facility: OTHER | Age: 79
End: 2024-04-10
Payer: MEDICARE

## 2024-04-10 DIAGNOSIS — N18.31 TYPE 2 DIABETES MELLITUS WITH STAGE 3A CHRONIC KIDNEY DISEASE, WITHOUT LONG-TERM CURRENT USE OF INSULIN: ICD-10-CM

## 2024-04-10 DIAGNOSIS — E11.22 TYPE 2 DIABETES MELLITUS WITH STAGE 3A CHRONIC KIDNEY DISEASE, WITHOUT LONG-TERM CURRENT USE OF INSULIN: ICD-10-CM

## 2024-04-10 RX ORDER — DULAGLUTIDE 1.5 MG/.5ML
1.5 INJECTION, SOLUTION SUBCUTANEOUS
Qty: 12 PEN | Refills: 3 | Status: SHIPPED | OUTPATIENT
Start: 2024-04-10 | End: 2024-04-30

## 2024-04-10 NOTE — TELEPHONE ENCOUNTER
No care due was identified.  [facility] Embedded Care Due Messages. Reference number: [id number].   4/10/2024 4:34:28 PM CDT

## 2024-04-10 NOTE — TELEPHONE ENCOUNTER
Refill Decision Note   Marizol Kevin  is requesting a refill authorization.  Brief Assessment and Rationale for Refill:  Approve     Medication Therapy Plan:         Comments:     Note composed:6:14 PM 04/10/2024

## 2024-04-13 ENCOUNTER — HOSPITAL ENCOUNTER (EMERGENCY)
Facility: OTHER | Age: 79
Discharge: HOME | End: 2024-04-13
Attending: EMERGENCY MEDICINE
Payer: MEDICARE

## 2024-04-13 VITALS
RESPIRATION RATE: 18 BRPM | OXYGEN SATURATION: 99 % | HEART RATE: 85 BPM | SYSTOLIC BLOOD PRESSURE: 138 MMHG | DIASTOLIC BLOOD PRESSURE: 70 MMHG | TEMPERATURE: 98 F | BODY MASS INDEX: 35.59 KG/M2 | HEIGHT: 57 IN

## 2024-04-13 DIAGNOSIS — M47.22 OSTEOARTHRITIS OF SPINE WITH RADICULOPATHY, CERVICAL REGION: ICD-10-CM

## 2024-04-13 DIAGNOSIS — M54.2 NECK PAIN: Primary | ICD-10-CM

## 2024-04-13 DIAGNOSIS — G89.29 CHRONIC BILATERAL LOW BACK PAIN WITH BILATERAL SCIATICA: ICD-10-CM

## 2024-04-13 DIAGNOSIS — M54.42 CHRONIC BILATERAL LOW BACK PAIN WITH BILATERAL SCIATICA: ICD-10-CM

## 2024-04-13 DIAGNOSIS — M19.049 ARTHRITIS PAIN, HAND: ICD-10-CM

## 2024-04-13 DIAGNOSIS — M54.41 CHRONIC BILATERAL LOW BACK PAIN WITH BILATERAL SCIATICA: ICD-10-CM

## 2024-04-13 PROCEDURE — 99284 EMERGENCY DEPT VISIT MOD MDM: CPT | Mod: 25

## 2024-04-13 PROCEDURE — 25000003 PHARM REV CODE 250: Performed by: NURSE PRACTITIONER

## 2024-04-13 PROCEDURE — 96372 THER/PROPH/DIAG INJ SC/IM: CPT | Performed by: NURSE PRACTITIONER

## 2024-04-13 PROCEDURE — 63600175 PHARM REV CODE 636 W HCPCS: Performed by: NURSE PRACTITIONER

## 2024-04-13 RX ORDER — LIDOCAINE 50 MG/G
1 PATCH TOPICAL DAILY
Qty: 15 PATCH | Refills: 0 | Status: SHIPPED | OUTPATIENT
Start: 2024-04-13

## 2024-04-13 RX ORDER — KETOROLAC TROMETHAMINE 30 MG/ML
15 INJECTION, SOLUTION INTRAMUSCULAR; INTRAVENOUS
Status: COMPLETED | OUTPATIENT
Start: 2024-04-13 | End: 2024-04-13

## 2024-04-13 RX ORDER — LIDOCAINE 50 MG/G
1 PATCH TOPICAL DAILY
Qty: 15 PATCH | Refills: 0 | Status: SHIPPED | OUTPATIENT
Start: 2024-04-13 | End: 2024-04-13

## 2024-04-13 RX ORDER — LIDOCAINE 50 MG/G
1 PATCH TOPICAL ONCE
Status: DISCONTINUED | OUTPATIENT
Start: 2024-04-13 | End: 2024-04-13 | Stop reason: HOSPADM

## 2024-04-13 RX ORDER — HYDROCODONE BITARTRATE AND ACETAMINOPHEN 5; 325 MG/1; MG/1
1 TABLET ORAL
Status: COMPLETED | OUTPATIENT
Start: 2024-04-13 | End: 2024-04-13

## 2024-04-13 RX ORDER — METHOCARBAMOL 750 MG/1
750 TABLET, FILM COATED ORAL
Status: COMPLETED | OUTPATIENT
Start: 2024-04-13 | End: 2024-04-13

## 2024-04-13 RX ORDER — HYDROCODONE BITARTRATE AND ACETAMINOPHEN 5; 325 MG/1; MG/1
1 TABLET ORAL EVERY 6 HOURS PRN
Qty: 12 TABLET | Refills: 0 | Status: SHIPPED | OUTPATIENT
Start: 2024-04-13

## 2024-04-13 RX ADMIN — KETOROLAC TROMETHAMINE 15 MG: 30 INJECTION, SOLUTION INTRAMUSCULAR; INTRAVENOUS at 12:04

## 2024-04-13 RX ADMIN — METHOCARBAMOL 750 MG: 750 TABLET ORAL at 12:04

## 2024-04-13 RX ADMIN — HYDROCODONE BITARTRATE AND ACETAMINOPHEN 1 TABLET: 5; 325 TABLET ORAL at 12:04

## 2024-04-13 RX ADMIN — LIDOCAINE 1 PATCH: 50 PATCH CUTANEOUS at 12:04

## 2024-04-13 NOTE — ED TRIAGE NOTES
"Pt presents to the ER with complaints of pain in the left calf that radiates around the leg and into the medial aspect of the left knee. Pt also reporting pain in the lower back. Pt states she was seen by pain mgmt on Monday where she was diagnosed with sciatica. Pt states she is scheduled for a "shot" on 4/25. Pt also reporting sudden onset of pain in the right side of the neck that woke her from her sleep around 4am. No known injury.      "

## 2024-04-13 NOTE — ED NOTES
LOC: The patient is awake, alert, and oriented to self, place, time, and situation. Pt is calm and cooperative. Affect is appropriate.  Speech is appropriate and clear.     APPEARANCE: Patient sitting on stretcher; appears uncomfortable but in no acute distress.  Patient is clean and well groomed.    SKIN: The skin is warm and dry; color consistent with ethnicity.  Patient has normal skin turgor and moist mucus membranes.  Skin intact; no breakdown or bruising noted.     MUSCULOSKELETAL: Patient moving upper without difficulty but reports pain in the left lower leg radiating around into the medial aspect of the left knee. Pt also reporting pain in the lower back and sudden onset of pain in the right side of the neck. Denies weakness.     RESPIRATORY: Airway is open and patent. Respirations spontaneous, even, easy, and non-labored.  Patient has a normal effort and rate.  No accessory muscle use noted. Denies cough.     CARDIAC:  Normal rate noted.  No peripheral edema noted. No complaints of chest pain.     ABDOMEN: Soft and non tender to palpation.  No distention noted. Pt denies abdominal pain; denies nausea, vomiting, diarrhea, or constipation.    NEUROLOGIC: Eyes open spontaneously.  Behavior appropriate to situation.  Follows commands; facial expression symmetrical.  Purposeful motor response noted; normal sensation in all extremities. Pt denies headache; denies lightheadedness or dizziness; denies visual disturbances; denies loss of balance; denies unilateral weakness.

## 2024-04-13 NOTE — ED PROVIDER NOTES
"     Source of History:  Patient    Chief complaint:  Multiple Complaints (Pt reports onset of "sticking and electric" pain in neck this early this AM, denies injury. Pt also reports pain in R leg, R back, and L hand x "a while")      HPI:  Marizol Kevin is a 78 y.o. female with PMH of HTN, arthritis, chronic low back pain with sciatica presenting with "pain all over." She states that she suffers chronically from her sciatic pain.  She has a procedure in a week and a half which she states she can not wait for because her pain has become intolerable however she states does not let it interfere with her ability to perform the tasks of daily living.  She states that she has to drag her left leg and it hurts to walk but this is baseline.  She presents today mainly because of new onset right-sided neck pain.  She states that she intermittently feels a sharp sticking an electric pain on the right side of her neck.  No injury or trauma.  Patient also reports chronic left hand pain.  She states that she has arthritis in her left hand.  She states that she just wants some pain relief until her procedure.  She takes Lyrica  and was recently prescribed Robaxin which she states helps.  She has also tried Tylenol, ibuprofen, aspirin and states none of them work.  She denies fever, chills, loss of bowel or bladder, perineal numbness and new weakness.    This is the extent to the patients complaints today here in the emergency department.    ROS: As per HPI     Review of patient's allergies indicates:   Allergen Reactions    Grass pollen-june grass standard     Sulfa (sulfonamide antibiotics)      Occurred when she was pregnant with varicose veins resulting in leg swelling and pain with standing       PMH:  As per HPI and below:  Past Medical History:   Diagnosis Date    Allergy     Anxiety     Cancer     colon    Cataract     Colon cancer     Dependence on nicotine from cigarettes 9/3/2020    Diabetes mellitus, type 2     " Dry eye syndrome     Hyperlipidemia     Hypertension     Insomnia     Light cigarette smoker (1-9 cigs/day) 2022    Malignant neoplasm of ascending colon 2020    Squamous blepharitis     Syncope 2022    Tobacco use disorder, moderate, in early remission 2022    Vitamin D deficiency 9/10/2020     Past Surgical History:   Procedure Laterality Date    CATARACT EXTRACTION, BILATERAL  2020    CHOLECYSTECTOMY      COLON SURGERY      Colon cancer    COLONOSCOPY N/A 2021    Procedure: COLONOSCOPY;  Surgeon: Ashwini Duarte MD;  Location: [facility] ENDO (4TH FLR);  Service: Endoscopy;  Laterality: N/A;  medical transportation  covid test  [employer], instructions mailed-KPvt    EYE SURGERY      Cataract    HYSTERECTOMY      INJECTION OF ANESTHETIC AGENT AROUND NERVE Left 2023    Procedure: BLOCK, NERVE, LEFT SHOULDER ARTICULAR BRANCH keep date rep aware;  Surgeon: Abdi Del Toro MD;  Location: [facility] PAIN MGT;  Service: Pain Management;  Laterality: Left;    JOINT REPLACEMENT      Knee    KNEE SURGERY      LAPAROSCOPIC LEFT COLON RESECTION      TRANSFORAMINAL EPIDURAL INJECTION OF STEROID Left 2023    Procedure: LUMBAR TRANSFORAMINAL LEFT L4/5 AND L5/S1;  Surgeon: Abdi Del Toro MD;  Location: [facility] PAIN MGT;  Service: Pain Management;  Laterality: Left;  [phone]  2 WK F/U ANTWON       Social History     Tobacco Use    Smoking status: Former     Current packs/day: 0.00     Average packs/day: 1 pack/day for 51.0 years (51.0 ttl pk-yrs)     Types: Cigarettes     Start date: 1971     Quit date: 2022     Years since quittin.5    Smokeless tobacco: Never    Tobacco comments:     smoking cessation information given    Substance Use Topics    Alcohol use: Yes     Alcohol/week: 1.0 standard drink of alcohol     Types: 1 Shots of liquor per week     Comment: occ    Drug use: Never       Physical Exam:    /70 (BP Location: Right arm, Patient Position: Sitting)    "Pulse 85   Temp 98.1 °F (36.7 °C) (Oral)   Resp 18   Ht 4' 9" (1.448 m)   SpO2 99%   Breastfeeding No   BMI 35.59 kg/m²   Nursing note and vital signs reviewed.  Appearance: Afebrile. Not toxic appearing. No acute distress.  Head: Atraumatic  Eyes: No conjunctival injection. No scleral icterus  ENT: Normal phonation  Chest/ Respiratory: No respiratory distress. No accessory muscle use.  Cardiovascular: Regular rate   Abdomen:  Not distended.    Musculoskeletal: Good range of motion all joints.  No deformities.  Neck supple.  No meningismus.  Skin: No rashes seen.  Good turgor.  No ecchymoses.  Neurologic: GCS 15. Ambulates with a steady gait.   Mental Status:  Alert and oriented x 3.  Appropriate, conversant    Labs that have been ordered have been independently reviewed and interpreted by myself.      Labs Reviewed - No data to display    Imaging Results              X-Ray Cervical Spine AP And Lateral (Final result)  Result time 04/13/24 12:35:23      Final result by Tee Núñez MD (04/13/24 12:35:23)                   Impression:      1. No convincing acute displaced fracture or dislocation of the cervical spine noting degenerative changes and additional findings above.      Electronically signed by: Tee Núñez MD  Date:    04/13/2024  Time:    12:35               Narrative:    EXAMINATION:  XR CERVICAL SPINE AP LATERAL    CLINICAL HISTORY:  neck pain;    TECHNIQUE:  AP, lateral and open mouth views of the cervical spine were performed.    COMPARISON:  None.    FINDINGS:  Four views cervical spine.    Lateral imaging demonstrates adequate alignment of the cervical spine without significant vertebral body height loss.  There is disc space height loss primarily involving C4-C5 and C5-C6.  The facet joints are aligned.  The odontoid is intact.  The lateral masses of C1 are in anatomic relationship with C2.  AP spinal alignment is unremarkable.  The bilateral upper lung zones are remarkable for " "coarse interstitial attenuation, edema not excluded.  The paraspinous and hypopharyngeal soft tissues are unremarkable.  The airway is patent.                                        Initial Impression/ Differential Dx:  Urgent evaluation of 78 y.o. female presenting with right-sided neck pain as well as pain from her arthritis in her left hand and chronic low back pain with sciatica. Patient is afebrile, not toxic appearing and hemodynamically stable.  Pain sounds most consistent with cervical radiculopathy.  Will obtain an x-ray of her neck to assess for arthritis.  Without history of trauma, doubt fracture or subluxation.  Patient has tried multiple modalities if over-the-counter medicine.  Recent labs checked, normal kidney function, will give Toradol, apply a lidocaine patch, her home dose of Robaxin as she has not taken it today and also give Norco.   checked and patient has had this medication before but not recently.    Differential Diagnosis includes, but is not limited to:  Cervical radiculopathy, muscle strain, myofascial trigger point, osteoarthritis, sciatica, chronic pain syndrome.      MDM:    Cervical x-ray shows "no acute displaced fracture or dislocation. There is adequate alignment of the cervical spine without significant vertebral body height loss. There is disc space height loss primarily involving C4-C5 and C5-C6."  Counseled patient to follow-up with her back and spine physician that is doing the joint injections as if the neck pain continues she may be a candidate for injections in the cervical spine as well.  After treatment, patient reports improvement and states that she is ready to go home.  Will discharge patient home with a short course of pain meds to address her pain until her procedure.  Patient is amenable to this plan and discharged in good condition with outpatient follow-up. Patient educated on signs and symptoms to monitor for and when to return to ED. Patient verbalized " understanding agrees with treatment plan. All questions and concerns addressed.                      Diagnostic Impression:    1. Neck pain    2. Chronic bilateral low back pain with bilateral sciatica    3. Arthritis pain, hand    4. Osteoarthritis of spine with radiculopathy, cervical region         ED Disposition Condition    Discharge Good            ED Prescriptions       Medication Sig Dispense Start Date End Date Auth. Provider    HYDROcodone-acetaminophen (NORCO) 5-325 mg per tablet Take 1 tablet by mouth every 6 (six) hours as needed for Pain. 12 tablet 4/13/2024 -- Marta Cunningham NP    LIDOcaine (LIDODERM) 5 %  (Status: Discontinued) Place 1 patch onto the skin once daily. Remove & Discard patch within 12 hours or as directed by MD 15 patch 4/13/2024 4/13/2024 Marta Cunningham NP    LIDOcaine (LIDODERM) 5 % Place 1 patch onto the skin once daily. Remove & Discard patch within 12 hours or as directed by MD 15 patch 4/13/2024 -- Marta Cunningham NP          Follow-up Information    None          Marta Cunningham NP  04/13/24 1956

## 2024-04-15 ENCOUNTER — PATIENT OUTREACH (OUTPATIENT)
Dept: EMERGENCY MEDICINE | Facility: OTHER | Age: 79
End: 2024-04-15
Payer: MEDICARE

## 2024-04-16 ENCOUNTER — CLINICAL SUPPORT (OUTPATIENT)
Dept: REHABILITATION | Facility: OTHER | Age: 79
End: 2024-04-16
Payer: MEDICARE

## 2024-04-16 DIAGNOSIS — M25.662 DECREASED RANGE OF MOTION OF LEFT KNEE: ICD-10-CM

## 2024-04-16 DIAGNOSIS — M25.562 LEFT MEDIAL KNEE PAIN: ICD-10-CM

## 2024-04-16 DIAGNOSIS — Z78.9 IMPAIRED MOBILITY AND ADLS: ICD-10-CM

## 2024-04-16 DIAGNOSIS — Z74.09 IMPAIRED MOBILITY AND ADLS: ICD-10-CM

## 2024-04-16 DIAGNOSIS — M25.562 CHRONIC PAIN OF LEFT KNEE: Primary | ICD-10-CM

## 2024-04-16 DIAGNOSIS — G89.29 CHRONIC PAIN OF LEFT KNEE: Primary | ICD-10-CM

## 2024-04-16 PROCEDURE — 97140 MANUAL THERAPY 1/> REGIONS: CPT | Mod: PN

## 2024-04-16 PROCEDURE — 97164 PT RE-EVAL EST PLAN CARE: CPT | Mod: PN

## 2024-04-16 PROCEDURE — 97530 THERAPEUTIC ACTIVITIES: CPT | Mod: PN

## 2024-04-16 NOTE — PROGRESS NOTES
[facility]  Physical Therapy Plan of Care Note     Name: Marizol Kevin  Clinic Number: [id number]    Therapy Diagnosis:   Encounter Diagnoses   Name Primary?    Chronic pain of left knee Yes    Decreased range of motion of left knee     Impaired mobility and ADLs     Left medial knee pain      Physician: Pedro Luis Butterfield MD    Visit Date: 4/16/2024    Physician Orders: PT Evaluate and Treat  Medical Diagnosis:  Left medial knee pain [M25.562]   Surgical Procedure and Date: None  Evaluation Date: 2/2/2024  Insurance Authorization Period Expiration: 1/24/2025  Plan of Care Certification Period: 5/1/2024  Progress Note Due: Every 6th visit or 30 days, whichever occurs first. Prior to physician follow up to provide referring provider accurate and up to date patient progress with rehabilitation.    Date of Return to MD: To be determined  Visit # / Visits authorized: 12/32    Precautions: Standard  Functional Level Prior to Evaluation:  Independent with all ADL/iADLs     Time In: 10:00 am  Time Out: 10:55 am  Total Billable Time: 55 minutes     Subjective     Update: that she went to the ED on Saturday for neck pain and sciatic pain. Patient states that her low back is of more concern over the past 1-2 weeks.     Objective      Update:   Posture: resting in tibial external rotation of the Left knee      Gait: decreased step/stride length with SPC  Functional Squats: 5 reps, 28 seconds with Upper Extremity support    Lumbar Range of Motion:    Limitations Pain   Flexion 25%   Negative      Extension 20%   Negative        Left Side Bending 25% Negative        Right Side Bending 25%  Negative           Range of Motion (ROM):     LEFT RIGHT   Hip flexion 118 120   Knee flexion 127 134   Knee extension -4 +1   Ankle dorsiflexion (DF) 8 7   Ankle plantarflexion (PF) 55 55      Manual Muscle Test (MMT):    LEFT RIGHT   Hip flexion 3+/5 4-/5   Hip extension 3/5 3/5   Hip abduction 3/5 3/5   Hip  adduction 4/5 4/5   Knee flexion 4-/5 3+5   Knee extension 3+/5 4-/5   Ankle DF 4/5 4/5   Ankle PF 4/5 4/5      FLEXIBILITY    LEFT RIGHT   Hamstring 65 ° 71 °   Mina's - -   Ely  + -      Palpation: tenderness to palpation over medial joint line     Special tests:   [x] Anterior/Posterior drawer  (-) [] Verdin's/Grind   [x] Varus/Valgus stress (-)        [] Apley's       [x] Delano's (+) medial                   [] Step up                   [] Pivot Shift     [] Steinmann's      Joint Mobility:   Patellofemoral Joint Mobility: 2/6   Tibiofemoral Joint Mobility: 2/6  Talocrural Joint Mobility: 3/6  Proximal Tibiofibular Mobility: 3/6  Left hip inferior joint mobility: 2/6  Left hip posterior joint mobility: 2/6    Assessment     Update: Patient presents to the clinic with global left lower extremity strength deficits, joint play hypomobility in the left hip, and joint range of motion deficits in the left knee.   Patient would benefit from skilled physical therapy services to address the deficits and movement dysfunction listed above.     Previous Short Term Goals Status:   MET  New Short Term Goals Status:   Progressing  Long Term Goal Status: modified:  include lumbar mobility and motor control   Reasons for Recertification of Therapy:  adverse neural tension of the Left sciatic nerve, mobility deficits     GOALS  GOALS: Short Term Goals:  4 weeks  1.Report decreased posterior thigh and medial knee pain  < / =  3/10  to increase tolerance for ADLs  2. Increase ROM of Left hip into internal rotation 10 degrees where limited in order to perform ADLs without difficulty.  3. Increase strength by 1/3 MMT grade in quadriceps and hamstrings  to increase tolerance for ADL and work activities.  4. Pt to tolerate HEP to improve ROM and independence with ADL's    Long Term Goals: 8 weeks  1.Report decreased posterior thigh and medial knee pain < / = 2/10  to increase tolerance for ADLs and walking.  2.Patient goal: return  to prior level of function and walking her dog without symptoms.   3.Increase strength to 4+/5 in quadriceps and hamstrings  to increase tolerance for ADL and work activities.  4. Pt will report at 20% score on FOTO  to demonstrate increase in LE function with every day tasks.       Plan     Updated Certification Period: 4/16/2024 to 7/9/2024   Recommended Treatment Plan: 2 times per week for 12 weeks:  Cervical/Lumbar Traction, Electrical Stimulation as needed, Gait Training, Manual Therapy, Moist Heat/ Ice, Neuromuscular Re-ed, Orthotic Management and Training, Patient Education, Self Care, Therapeutic Activities, and Therapeutic Exercise  Other Recommendations: None    Rusty Robertson PT, DPT  Board Certified Orthopaedic Clinical Specialist

## 2024-04-18 ENCOUNTER — CLINICAL SUPPORT (OUTPATIENT)
Dept: REHABILITATION | Facility: OTHER | Age: 79
End: 2024-04-18
Payer: MEDICARE

## 2024-04-18 ENCOUNTER — CLINICAL SUPPORT (OUTPATIENT)
Dept: REHABILITATION | Facility: HOSPITAL | Age: 79
End: 2024-04-18
Payer: MEDICARE

## 2024-04-18 DIAGNOSIS — G62.0 NEUROPATHY DUE TO CHEMOTHERAPEUTIC DRUG: ICD-10-CM

## 2024-04-18 DIAGNOSIS — M25.562 CHRONIC PAIN OF LEFT KNEE: Primary | ICD-10-CM

## 2024-04-18 DIAGNOSIS — T45.1X5A NEUROPATHY DUE TO CHEMOTHERAPEUTIC DRUG: ICD-10-CM

## 2024-04-18 DIAGNOSIS — Z78.9 IMPAIRED MOBILITY AND ADLS: ICD-10-CM

## 2024-04-18 DIAGNOSIS — M54.59 OTHER LOW BACK PAIN: Primary | ICD-10-CM

## 2024-04-18 DIAGNOSIS — Z74.09 IMPAIRED MOBILITY AND ADLS: ICD-10-CM

## 2024-04-18 DIAGNOSIS — M25.562 CHRONIC PAIN OF LEFT KNEE: ICD-10-CM

## 2024-04-18 DIAGNOSIS — G89.29 CHRONIC PAIN OF LEFT KNEE: Primary | ICD-10-CM

## 2024-04-18 DIAGNOSIS — M25.662 DECREASED RANGE OF MOTION OF LEFT KNEE: ICD-10-CM

## 2024-04-18 DIAGNOSIS — G89.29 CHRONIC PAIN OF LEFT KNEE: ICD-10-CM

## 2024-04-18 PROCEDURE — 97112 NEUROMUSCULAR REEDUCATION: CPT | Mod: PN

## 2024-04-18 PROCEDURE — 97810 ACUP 1/> WO ESTIM 1ST 15 MIN: CPT | Mod: PN

## 2024-04-18 PROCEDURE — 97530 THERAPEUTIC ACTIVITIES: CPT | Mod: PN

## 2024-04-18 PROCEDURE — 97140 MANUAL THERAPY 1/> REGIONS: CPT | Mod: PN

## 2024-04-18 PROCEDURE — 97811 ACUP 1/> W/O ESTIM EA ADD 15: CPT | Mod: PN

## 2024-04-18 NOTE — PROGRESS NOTES
Acupuncture Treatment Note     Name: Marizol Kevin  [facility] Number: [id number]    Traditional Chinese Medicine Diagnosis: Qi Stagnation and Blood Stasis   Physician: Abdi Del Toro MD    Date of Service: 4/18/2024     Medical Diagnosis:  Chronic pain in left knee; Chronic pain in both shoulders, Chronic lower back pain with bilateral sciatica       Encounter Diagnoses   Name Primary?    Chronic bilateral low back pain with bilateral sciatica      Lumbar radiculopathy      Chronic pain syndrome      Visit #/Visits authorized: 6 / 12     Precautions: Diabetes and cance     Subjective     Chief Complaint:  Left knee pain for 4 months , Chronic pain in both shoulders    Cancer Related Symptoms: Neuropathy    Medical necessity is demonstrated by the following IMPAIRMENTS: Medical Necessity: Cancer related pain, Decreased mobility limits day to day activities, social, and emergent situations, and Decreased quality of life    Response to Previous Treatment:  Good    Quality of Symptoms (Better/Worse):  Better    Other Condition/Symptoms:  None    Objective      New Findings:  None    Treatment Principles:  Move Qi and Blood, stop pain     Acupuncture points used:    Bilateral points:  ST 36, GB 34, GB 35, SP 9, GB 39, Ba Kory+ 4, LV 3, GB 41  Unilateral points:  Left: Hiwot on knee + 6,   Right:  Auricular Treatment:  None   Needles In: 28  Needles Out: 28  Needles W/O STIM placed: 12:00 PM  Needles W/O STIM removed: 12: 50 PM    Other Traditional Chinese Medicine Modalities:  None    Recommendations:  PT    Education:  Patient is aware of cumulative effect of acupuncture      Assessment      Analysis of Treatment:  Patient felt good    Pt prognosis is Good.     Patient will continue to benefit from acupuncture treatment to address the deficits listed in the problem list box on initial evaluation, provide patient family education and to maximize pt's level of independence in the home and community environment.      Patient's spiritual, cultural and educational needs considered and pt agreeable to plan of care and goals.     Anticipated barriers to treatment: None    Plan     Recommend       1  /week for 12   treatments and re-assess.

## 2024-04-18 NOTE — PROGRESS NOTES
[facility]   Physical Therapy Treatment Note      Name: Marizol Kevin  Clinic Number: [id number]    Therapy Diagnosis:   Encounter Diagnoses   Name Primary?    Chronic pain of left knee Yes    Decreased range of motion of left knee     Impaired mobility and ADLs      Physician: Pedro Luis Butterfield MD    Visit Date: 4/18/2024    Physician Orders: PT Evaluate and Treat  Medical Diagnosis:  Left medial knee pain [M25.562]   Surgical Procedure and Date: None  Evaluation Date: 2/2/2024  Insurance Authorization Period Expiration: 1/24/2025  Plan of Care Certification Period: 7/9/2024  Progress Note Due: Every 6th visit or 30 days, whichever occurs first. Prior to physician follow up to provide referring provider accurate and up to date patient progress with rehabilitation.    Date of Return to MD: To be determined  Visit # / Visits authorized: 12/32       PTA Visit #: 0/5     Precautions: Standard    Time In: 11:00 am  Time Out: 11:55 a  Total Appointment Time: 55 minutes    Subjective     Patient reports: There was some swelling yesterday in her left knee. Swelling was relieved by ice, compression, and elevation.  She was compliant with home exercise program.  Response to previous treatment: no increased pain.  Functional change: improved gait after treatment    Pain: 4/10  Location: left medial knee      Objective    Asterisk Signs:   Ankle Circumference R 48.5cm, L 48cm    Objective Measures updated at progress report unless specified.     Treatment     Marizol received the treatments listed below:      manual therapy techniques: Joint mobilizations were applied to the: Left knee for 15 minutes, including:  Medial Gapping  Lateral Patellar Glides Grade I  Inferior Patellar Glides Grade I  Anterior Glide of Tibia Grade I  Tibial/ Femoral gapping    neuromuscular re-education activities to improve: Balance, Coordination, Kinesthetic, Sense, Proprioception, and Posture for 22 minutes. The  following activities were included:  Straight Leg Raise x 20 on each side  Ankle Pumps x 35  Heel Slides x 30  Sciatic Nerve Glides x 35    therapeutic activities to improve functional performance for 23 minutes, including:  Long arc quads with 3# x 30  Hip abductions x 30 on left leg  Hip extensions x 30 on left leg  Seated heel raises 2 x 20 bilateral    Patient Education and Home Exercises       Education provided:   - Patient was provided education on for continued compliance with HEP for continued functional mobility and strength gains for return to prior level of function.      Written Home Exercises Provided: Patient instructed to cont prior HEP. Exercises were reviewed and Marizol was able to demonstrate them prior to the end of the session.  Marizol demonstrated good  understanding of the education provided. See Electronic Medical Record under Patient Instructions for exercises provided during therapy sessions    Assessment   Patient presents to the clinic with moderate symptom irritability pre-session and mild/moderate irritability post-session. Manual therapy was performed to left patella, and anterior knee to improve joint play and allow for optimal movement during corrective exercises.   Patient demonstrates fatigue when performing single leg raises.   Patient demonstrated improvements in long arc quads within session.     Patient will continue to benefit from skilled outpatient physical therapy to address the deficits listed in the problem list box on initial evaluation, provide patient/family education and to maximize patient's level of independence in the home and community environment.     Marizol Is progressing well towards her goals.   Patient prognosis is Good.     Patient's spiritual, cultural and educational needs considered and pt agreeable to plan of care and goals.     Anticipated barriers to physical therapy: None    Goals:   GOALS: Short Term Goals:  4 weeks  1.Report decreased posterior  thigh and medial knee pain  < / =  3/10  to increase tolerance for ADLs  2. Increase ROM of Left hip into internal rotation 10 degrees where limited in order to perform ADLs without difficulty.  3. Increase strength by 1/3 MMT grade in quadriceps and hamstrings  to increase tolerance for ADL and work activities.  4. Pt to tolerate HEP to improve ROM and independence with ADL's     Long Term Goals: 8 weeks  1.Report decreased posterior thigh and medial knee pain < / = 2/10  to increase tolerance for ADLs and walking.  2.Patient goal: return to prior level of function and walking her dog without symptoms.   3.Increase strength to 4+/5 in quadriceps and hamstrings  to increase tolerance for ADL and work activities.  4. Pt will report at 20% score on FOTO  to demonstrate increase in LE function with every day tasks.     Plan     Plan to check lumbar spine, ANTT of sciatic nerve upon next visit.     Updated Certification Period: 4/16/2024 to 7/9/2024     Recommended Treatment Plan: 2 times per week for 12 weeks:  Cervical/Lumbar Traction, Electrical Stimulation as needed, Gait Training, Manual Therapy, Moist Heat/ Ice, Neuromuscular Re-ed, Orthotic Management and Training, Patient Education, Self Care, Therapeutic Activities, and Therapeutic Exercise  Other Recommendations: None    Rusty Robertson PT, DPT  Board Certified Orthopaedic Clinical Specialist

## 2024-04-19 ENCOUNTER — PATIENT MESSAGE (OUTPATIENT)
Dept: PAIN MEDICINE | Facility: OTHER | Age: 79
End: 2024-04-19
Payer: MEDICARE

## 2024-04-19 ENCOUNTER — TELEPHONE (OUTPATIENT)
Dept: PAIN MEDICINE | Facility: CLINIC | Age: 79
End: 2024-04-19
Payer: MEDICARE

## 2024-04-19 NOTE — TELEPHONE ENCOUNTER
----- Message from Shira Scott sent at 4/18/2024  9:46 AM CDT -----  Regarding: Pre op details  Type: Patient Call Back    Who called: p/t     What is the request in detail: p/t states she has to let her transportation [employer] know what time she has to be at her procedure on 04/25 so that they can pick her up. She states she needs to know something by today or tmr. Please call to assist.     Can the clinic reply by [facility]?    Would the patient rather a call back or a response via [facility]?     Best call back number: [phone] (home)       Additional Information:

## 2024-04-19 NOTE — TELEPHONE ENCOUNTER
----- Message from Troy Oh sent at 4/18/2024  4:12 PM CDT -----  Regarding: Concerns and Questions  Name of Who is Calling:  Patient          What is the request in detail:  Please contact patient she will like to speak with you about her procedure on 04/25/2024            Can the clinic reply by [facility]: No            What Number to Call Back if not in [facility]:[phone]

## 2024-04-25 ENCOUNTER — HOSPITAL ENCOUNTER (OUTPATIENT)
Dept: RADIOLOGY | Facility: OTHER | Age: 79
Discharge: HOME | End: 2024-04-25
Attending: STUDENT IN AN ORGANIZED HEALTH CARE EDUCATION/TRAINING PROGRAM | Admitting: ANESTHESIOLOGY
Payer: MEDICARE

## 2024-04-25 ENCOUNTER — HOSPITAL ENCOUNTER (OUTPATIENT)
Facility: OTHER | Age: 79
Discharge: HOME | End: 2024-04-25
Attending: ANESTHESIOLOGY | Admitting: ANESTHESIOLOGY
Payer: MEDICARE

## 2024-04-25 VITALS
BODY MASS INDEX: 35.17 KG/M2 | OXYGEN SATURATION: 97 % | RESPIRATION RATE: 16 BRPM | HEART RATE: 89 BPM | DIASTOLIC BLOOD PRESSURE: 73 MMHG | HEIGHT: 57 IN | TEMPERATURE: 98 F | SYSTOLIC BLOOD PRESSURE: 143 MMHG | WEIGHT: 163 LBS

## 2024-04-25 DIAGNOSIS — M17.10 PRIMARY OSTEOARTHRITIS OF KNEE, UNSPECIFIED LATERALITY: ICD-10-CM

## 2024-04-25 DIAGNOSIS — G57.00 PIRIFORMIS SYNDROME, UNSPECIFIED LATERALITY: Primary | ICD-10-CM

## 2024-04-25 DIAGNOSIS — G89.29 CHRONIC PAIN: ICD-10-CM

## 2024-04-25 DIAGNOSIS — M70.60 GREATER TROCHANTERIC BURSITIS, UNSPECIFIED LATERALITY: ICD-10-CM

## 2024-04-25 DIAGNOSIS — M17.10 PRIMARY OSTEOARTHRITIS OF KNEE, UNSPECIFIED LATERALITY: Primary | ICD-10-CM

## 2024-04-25 LAB — POCT GLUCOSE: 96 MG/DL (ref 70–110)

## 2024-04-25 PROCEDURE — 20552 NJX 1/MLT TRIGGER POINT 1/2: CPT | Mod: 59 | Performed by: ANESTHESIOLOGY

## 2024-04-25 PROCEDURE — 25500020 PHARM REV CODE 255: Performed by: ANESTHESIOLOGY

## 2024-04-25 PROCEDURE — 20610 DRAIN/INJ JOINT/BURSA W/O US: CPT | Mod: LT | Performed by: ANESTHESIOLOGY

## 2024-04-25 PROCEDURE — 63600175 PHARM REV CODE 636 W HCPCS: Mod: JZ,JG | Performed by: ANESTHESIOLOGY

## 2024-04-25 PROCEDURE — 20552 NJX 1/MLT TRIGGER POINT 1/2: CPT | Mod: ,,, | Performed by: ANESTHESIOLOGY

## 2024-04-25 PROCEDURE — 73562 X-RAY EXAM OF KNEE 3: CPT | Mod: TC,50,FY

## 2024-04-25 PROCEDURE — 73562 X-RAY EXAM OF KNEE 3: CPT | Mod: 26,50,, | Performed by: RADIOLOGY

## 2024-04-25 PROCEDURE — 25000003 PHARM REV CODE 250: Performed by: ANESTHESIOLOGY

## 2024-04-25 PROCEDURE — 20610 DRAIN/INJ JOINT/BURSA W/O US: CPT | Mod: 59,LT,, | Performed by: ANESTHESIOLOGY

## 2024-04-25 RX ORDER — BUPIVACAINE HYDROCHLORIDE 2.5 MG/ML
INJECTION, SOLUTION EPIDURAL; INFILTRATION; INTRACAUDAL
Status: DISCONTINUED | OUTPATIENT
Start: 2024-04-25 | End: 2024-04-25 | Stop reason: HOSPADM

## 2024-04-25 RX ORDER — TRIAMCINOLONE ACETONIDE 40 MG/ML
INJECTION, SUSPENSION INTRA-ARTICULAR; INTRAMUSCULAR
Status: DISCONTINUED | OUTPATIENT
Start: 2024-04-25 | End: 2024-04-25 | Stop reason: HOSPADM

## 2024-04-25 RX ORDER — ALPRAZOLAM 0.5 MG/1
0.5 TABLET, ORALLY DISINTEGRATING ORAL ONCE
Status: COMPLETED | OUTPATIENT
Start: 2024-04-25 | End: 2024-04-25

## 2024-04-25 RX ORDER — SODIUM CHLORIDE 9 MG/ML
INJECTION, SOLUTION INTRAVENOUS CONTINUOUS
Status: DISCONTINUED | OUTPATIENT
Start: 2024-04-25 | End: 2024-04-25 | Stop reason: HOSPADM

## 2024-04-25 RX ORDER — LIDOCAINE HYDROCHLORIDE 20 MG/ML
INJECTION, SOLUTION INFILTRATION; PERINEURAL
Status: DISCONTINUED | OUTPATIENT
Start: 2024-04-25 | End: 2024-04-25 | Stop reason: HOSPADM

## 2024-04-25 RX ADMIN — ALPRAZOLAM 0.5 MG: 0.5 TABLET, ORALLY DISINTEGRATING ORAL at 01:04

## 2024-04-25 NOTE — OP NOTE
Piriformis Muscle and Greater Trochanteric Bursa Injection under Fluoroscopic Guidance    The procedure, risks, benefits, and options were discussed with the patient. There are no contraindications to the procedure. The patent expressed understanding and agreed to the procedure. Informed written consent was obtained prior to the start of the procedure and can be found in the patient's chart.    PATIENT NAME: Marizol Kevin   MRN: [id number]     DATE OF PROCEDURE: 04/25/2024    PROCEDURE: Bilateral Piriformis Muscle Injection under Fluoroscopic Guidance  Left Greater Trochanteric Bursa Injection under Fluoroscopic Guidance    PRE-OP DIAGNOSIS: Piriformis syndrome of both sides [G57.03]  Greater trochanteric bursitis of left hip [M70.62]    POST-OP DIAGNOSIS: Same    PHYSICIAN: Abdi Del Toro MD    ASSISTANTS: Osito Negro MD     MEDICATIONS INJECTED: Preservative-free Kenalog 40mg with 3cc of Bupivacaine 0.25%     LOCAL ANESTHETIC INJECTED: Xylocaine 2%     SEDATION: None    ESTIMATED BLOOD LOSS: None    COMPLICATIONS: None    TECHNIQUE: Time-out was performed to identify the patient and procedure to be performed. With the patient laying in a prone position, the surgical area was prepped and draped in the usual sterile fashion using ChloraPrep and a fenestrated drape.The area overlying the right piriformis muscle was identified under fluoroscopy in the AP view. Skin anesthesia was achieved by injecting Lidocaine 2% over the injection sites. A 25 gauge,  3.5 inch spinal quinke needle was then advanced 3 cm inferior and 3 cm lateral to the inferior aspect of the SI joint. Once the piriformis muscle was thought to be encountered, Contrast dye  Omnipaque (300mg/mL) was injected to confirm placement and there was no vascular runoff. Confirmation of spread was identified within the piriformis muscle using AP fluoroscopic views. Then  4 mL of the medication mixture listed above was injected slowly. The same procedure was  then performed on the opposite side. The needles were removed and bleeding was nil. A sterile dressing was applied. No specimens collected. The patient tolerated the procedure well.     The area overlying the greater trochanteric bursa was determined under fluoroscopy guidance. Skin anesthesia was achieved by injecting Lidocaine 2% over the injection site. The greater trochanteric bursa was then approached with a 25 gauge, 3.5 inch spinal quinke needle that was introduced under fluoroscopic guidance in the AP view. Once the needle tip was in the area of the bursa, and there was no blood aspiration, Contrast dye  Omnipaque (300mg/mL) was injected to confirm placement and there was no vascular runoff. 4 mL of the medication mixture listed above was injected slowly. The needles were removed and bleeding was nil. A sterile dressing was applied. No specimens collected. The patient tolerated the procedure well.    The patient was monitored after the procedure in the recovery area. They were given post-procedure and discharge instructions to follow at home. The patient was discharged in a stable condition.    Osito Negro MD    I reviewed and edited the fellow's note. I conducted my own interview and physical examination. I agree with the findings. I was present and supervising all critical portions of the procedure.    Abdi Del Toro MD

## 2024-04-25 NOTE — DISCHARGE SUMMARY
Discharge Note  Short Stay      SUMMARY     Admit Date: 4/25/2024    Attending Physician: Abdi Del Toro MD      Discharge Physician: Abdi Del Toro MD      Discharge Date: 4/25/2024 1:35 PM    Procedure(s) (LRB):  INJECTION, JOINT BILATERAL PIRIFORMIS AND LEFT GTB (N/A)    Final Diagnosis: Piriformis syndrome of both sides [G57.03]  Greater trochanteric bursitis of left hip [M70.62]    Disposition: Home or self care    Patient Instructions:   Current Discharge Medication List        CONTINUE these medications which have NOT CHANGED    Details   albuterol (PROVENTIL/VENTOLIN HFA) 90 mcg/actuation inhaler INHALE 2 PUFFS INTO THE LUNGS EVERY 4 (FOUR) HOURS AS NEEDED FOR WHEEZING  Qty: 18 g, Refills: 3    Associated Diagnoses: Mild intermittent asthma without complication      albuterol-ipratropium (DUO-NEB) 2.5 mg-0.5 mg/3 mL nebulizer solution Take 3 mLs by nebulization every 6 (six) hours as needed for Wheezing or Shortness of Breath. Rescue  Qty: 15 mL, Refills: 5    Associated Diagnoses: COPD exacerbation      amLODIPine (NORVASC) 5 MG tablet TAKE 1 TABLET EVERY DAY  Qty: 90 tablet, Refills: 3    Comments: .  Associated Diagnoses: Essential hypertension      aspirin (ECOTRIN) 81 MG EC tablet Take 81 mg by mouth once daily.      atorvastatin (LIPITOR) 40 MG tablet TAKE 1 TABLET EVERY DAY  Qty: 90 tablet, Refills: 3    Associated Diagnoses: Type 2 diabetes mellitus without complication, without long-term current use of insulin      azelastine (ASTELIN) 137 mcg (0.1 %) nasal spray 1 spray (137 mcg total) by Nasal route 2 (two) times daily.  Qty: 30 mL, Refills: 11    Associated Diagnoses: Allergic rhinitis, unspecified seasonality, unspecified trigger      betamethasone valerate 0.1% (VALISONE) 0.1 % Oint Apply topically once daily.  Qty: 15 g, Refills: 1    Associated Diagnoses: Genital ulcer, female      blood sugar diagnostic (TRUE METRIX GLUCOSE TEST STRIP) Strp Use to test blood glucose two (2) times daily;  to be used with insurance-preferred brand of glucometer/supplies  Qty: 200 strip, Refills: 3    Associated Diagnoses: Type 2 diabetes mellitus with other specified complication, unspecified whether long term insulin use      blood-glucose meter kit Please provide with insurance covered meter  Qty: 1 each, Refills: 0    Associated Diagnoses: Type 2 diabetes mellitus with other specified complication, unspecified whether long term insulin use      cholecalciferol, vitamin D3, (VITAMIN D3) 50 mcg (2,000 unit) Cap capsule Take 1 capsule (2,000 Units total) by mouth once daily.  Qty: 90 capsule, Refills: 3      dulaglutide (TRULICITY) 1.5 mg/0.5 mL pen injector Inject 1.5 mg into the skin every 7 days.  Qty: 12 pen , Refills: 3    Associated Diagnoses: Type 2 diabetes mellitus with stage 3a chronic kidney disease, without long-term current use of insulin      DULoxetine (CYMBALTA) 30 MG capsule Take 1 capsule (30 mg total) by mouth once daily.  Qty: 90 capsule, Refills: 3      ferrous sulfate 325 (65 FE) MG EC tablet TAKE 1 TABLET EVERY OTHER DAY  Qty: 45 tablet, Refills: 3      heparin, porcine, PF, (HEPARIN FLUSH 100 UNITS/ML) 100 unit/mL Syrg       HYDROcodone-acetaminophen (NORCO) 5-325 mg per tablet Take 1 tablet by mouth every 6 (six) hours as needed for Pain.  Qty: 12 tablet, Refills: 0    Comments: Quantity prescribed more than 7 day supply? No  Associated Diagnoses: Neck pain      hydrOXYzine HCL (ATARAX) 25 MG tablet Take 1 tablet (25 mg total) by mouth 3 (three) times daily as needed for Itching or Anxiety.  Qty: 30 tablet, Refills: 3    Associated Diagnoses: Anxiety      ketoconazole (NIZORAL) 2 % cream Apply topically once daily. for 14 days  Qty: 60 g, Refills: 0      lancets Misc 1 Device by Misc.(Non-Drug; Combo Route) route 2 (two) times daily.  Qty: 200 each, Refills: 11    Associated Diagnoses: Type 2 diabetes mellitus with other specified complication, unspecified whether long term insulin use       LIDOcaine (LIDODERM) 5 % Place 1 patch onto the skin once daily. Remove & Discard patch within 12 hours or as directed by MD  Qty: 15 patch, Refills: 0      metFORMIN (GLUCOPHAGE-XR) 500 MG ER 24hr tablet TAKE 2 TABLETS EVERY DAY WITH BREAKFAST  Qty: 180 tablet, Refills: 3    Associated Diagnoses: Type 2 diabetes mellitus without complication, without long-term current use of insulin      methocarbamoL (ROBAXIN) 750 MG Tab Take 1 tablet (750 mg total) by mouth 3 (three) times daily as needed (muscle spasms).  Qty: 90 tablet, Refills: 0    Associated Diagnoses: Piriformis syndrome of both sides      multivitamin capsule Take 1 capsule by mouth once daily.      ondansetron (ZOFRAN) 4 MG tablet TAKE 1 TABLET EVERY 8 HOURS AS NEEDED FOR NAUSEA  Qty: 30 tablet, Refills: 0    Associated Diagnoses: Nausea      pregabalin (LYRICA) 75 MG capsule Take 1 capsule (75 mg total) by mouth 2 (two) times daily.  Qty: 60 capsule, Refills: 6      raloxifene (EVISTA) 60 mg tablet TAKE 1 TABLET EVERY DAY  Qty: 90 tablet, Refills: 4      triamcinolone acetonide 0.5% (KENALOG) 0.5 % Crea Apply topically 2 (two) times daily.  Qty: 15 g, Refills: 3                 Discharge Diagnosis: Piriformis syndrome of both sides [G57.03]  Greater trochanteric bursitis of left hip [M70.62]  Condition on Discharge: Stable with no complications to procedure   Diet on Discharge: Same as before.  Activity: as per instruction sheet.  Discharge to: Home with a responsible adult.  Follow up: 2-4 weeks       Please call my office or pager at [phone] if experienced any weakness or loss of sensation, fever > 101.5, pain uncontrolled with oral medications, persistent nausea/vomiting/or diarrhea, redness or drainage from the incisions, or any other worrisome concerns. If physician on call was not reached or could not communicate with our office for any reason please go to the nearest emergency department

## 2024-04-25 NOTE — DISCHARGE INSTRUCTIONS

## 2024-04-26 ENCOUNTER — TELEPHONE (OUTPATIENT)
Dept: PAIN MEDICINE | Facility: CLINIC | Age: 79
End: 2024-04-26
Payer: MEDICARE

## 2024-04-26 NOTE — TELEPHONE ENCOUNTER
----- Message from Beth Zacarias sent at 4/26/2024 11:30 AM CDT -----  Regarding: procedure  Type:  Patient Returning Call      Name of who is calling:Marizol        What is request in detail:Patient is requesting a call back. Patient would like to know exactly what procedure was for. Patient can see notes but doesn't understand medical terms.        Can clinic reply by [facility]:no        What number to call back if not in [facility]:[phone]

## 2024-04-30 ENCOUNTER — OFFICE VISIT (OUTPATIENT)
Dept: INTERNAL MEDICINE | Facility: CLINIC | Age: 79
End: 2024-04-30
Attending: INTERNAL MEDICINE
Payer: MEDICARE

## 2024-04-30 VITALS
BODY MASS INDEX: 34.44 KG/M2 | WEIGHT: 159.63 LBS | OXYGEN SATURATION: 97 % | HEART RATE: 86 BPM | HEIGHT: 57 IN | SYSTOLIC BLOOD PRESSURE: 118 MMHG | DIASTOLIC BLOOD PRESSURE: 70 MMHG

## 2024-04-30 DIAGNOSIS — J43.2 CENTRILOBULAR EMPHYSEMA: Primary | ICD-10-CM

## 2024-04-30 DIAGNOSIS — N18.31 TYPE 2 DIABETES MELLITUS WITH STAGE 3A CHRONIC KIDNEY DISEASE, WITHOUT LONG-TERM CURRENT USE OF INSULIN: ICD-10-CM

## 2024-04-30 DIAGNOSIS — Z87.891 FORMER SMOKER: ICD-10-CM

## 2024-04-30 DIAGNOSIS — R21 FACIAL RASH: ICD-10-CM

## 2024-04-30 DIAGNOSIS — E11.9 TYPE 2 DIABETES MELLITUS WITHOUT COMPLICATION, WITHOUT LONG-TERM CURRENT USE OF INSULIN: ICD-10-CM

## 2024-04-30 DIAGNOSIS — N18.31 STAGE 3A CHRONIC KIDNEY DISEASE: ICD-10-CM

## 2024-04-30 DIAGNOSIS — E11.22 TYPE 2 DIABETES MELLITUS WITH STAGE 3A CHRONIC KIDNEY DISEASE, WITHOUT LONG-TERM CURRENT USE OF INSULIN: ICD-10-CM

## 2024-04-30 PROCEDURE — 99999 PR PBB SHADOW E&M-EST. PATIENT-LVL III: CPT | Mod: PBBFAC,,, | Performed by: INTERNAL MEDICINE

## 2024-04-30 PROCEDURE — 1159F MED LIST DOCD IN RCRD: CPT | Mod: CPTII,S$GLB,, | Performed by: INTERNAL MEDICINE

## 2024-04-30 PROCEDURE — 3074F SYST BP LT 130 MM HG: CPT | Mod: CPTII,S$GLB,, | Performed by: INTERNAL MEDICINE

## 2024-04-30 PROCEDURE — 1101F PT FALLS ASSESS-DOCD LE1/YR: CPT | Mod: CPTII,S$GLB,, | Performed by: INTERNAL MEDICINE

## 2024-04-30 PROCEDURE — 3078F DIAST BP <80 MM HG: CPT | Mod: CPTII,S$GLB,, | Performed by: INTERNAL MEDICINE

## 2024-04-30 PROCEDURE — 99214 OFFICE O/P EST MOD 30 MIN: CPT | Mod: S$GLB,,, | Performed by: INTERNAL MEDICINE

## 2024-04-30 PROCEDURE — 3288F FALL RISK ASSESSMENT DOCD: CPT | Mod: CPTII,S$GLB,, | Performed by: INTERNAL MEDICINE

## 2024-04-30 PROCEDURE — 3072F LOW RISK FOR RETINOPATHY: CPT | Mod: CPTII,S$GLB,, | Performed by: INTERNAL MEDICINE

## 2024-04-30 PROCEDURE — 1160F RVW MEDS BY RX/DR IN RCRD: CPT | Mod: CPTII,S$GLB,, | Performed by: INTERNAL MEDICINE

## 2024-04-30 RX ORDER — DULAGLUTIDE 1.5 MG/.5ML
1.5 INJECTION, SOLUTION SUBCUTANEOUS
Qty: 12 PEN | Refills: 3 | Status: CANCELLED | OUTPATIENT
Start: 2024-04-30

## 2024-04-30 RX ORDER — PREDNISONE 10 MG/1
40 TABLET ORAL DAILY
Qty: 20 TABLET | Refills: 0 | Status: SHIPPED | OUTPATIENT
Start: 2024-04-30 | End: 2024-05-07

## 2024-04-30 RX ORDER — ATORVASTATIN CALCIUM 40 MG/1
40 TABLET, FILM COATED ORAL DAILY
Qty: 90 TABLET | Refills: 3 | Status: SHIPPED | OUTPATIENT
Start: 2024-04-30

## 2024-04-30 RX ORDER — FAMOTIDINE 40 MG/1
40 TABLET, FILM COATED ORAL 2 TIMES DAILY
COMMUNITY

## 2024-04-30 RX ORDER — DULAGLUTIDE 3 MG/.5ML
3 INJECTION, SOLUTION SUBCUTANEOUS WEEKLY
Qty: 12 PEN | Refills: 2 | Status: SHIPPED | OUTPATIENT
Start: 2024-04-30

## 2024-04-30 NOTE — PROGRESS NOTES
Subjective:       Patient ID: Marizol Kevin is a 78 y.o. female.    Chief Complaint: Rash (Start in face. Eyes. Under arm both= slime like feeling when wiping rash.   )    Here for urgent visit    77 yo F with PMHx of colon CA, COPD, tobacco abuse, DM, HTN, HLD osteoporosis, neuropathy s/p chemo,    Recurrent rash to bilateral axilla despite stopping use of deodorants. Itchy and erythematous. Not current.    2 week hx of cough with occasional wheezing noted by her daughter. No pnd, DICKERSON, orthopnea. Reflux was bad one week prior. Hx of obstructive airway disease.     Pt confirms last LDL was without lipitor. Has restarted. Ran out.      LUMBAR TRANSFORAMINAL LEFT L4/5 AND L5/S1  INJECTION, JOINT BILATERAL PIRIFORMIS AND LEFT GTB  Left medial knee pain, burning shooting pain is her biggest complaint. She is unable to ambulate without pain and this is isolating and depressing. Reports some relief after IA injection but not 100% and lasted only 48hrs. She is TTP on medial knee. Had acupuncture yesterday.         Pain management:  Dr. Abdi Del Toro MD  Oncology:  Dr. Juanita Lebrno MD now ...  Gastroenterology: Lorenzo Malcolm  Orthopedist Dr. Caitlin Ryan MD           ### DM ###  Metformin 500mg BID. Trulicity 1.5mg QW  . Lipitor 40mg     Monitor sugary drinks  4/30/24 increase trulicity to 3mg  Lab Results       Component                Value               Date/Time                  HGBA1C                   6.1 (H)             01/25/2024 02:25 PM        HGBA1C                   6.4 (H)             07/21/2023 07:53 AM        HGBA1C                   7.8 (H)             04/25/2023 11:08 AM        HGBA1C                   8.5 (H)             12/20/2022 03:19 PM        HGBA1C                   7.4 (H)             09/14/2022 10:32 AM        HGBA1C                   6.4 (H)             05/19/2022 10:17 AM        HGBA1C                   7.2 (H)             10/01/2021 02:55 PM        HGBA1C                    7.2 (H)             06/01/2021 02:40 PM        HGBA1C                   7.5 (H)             03/12/2021 09:05 AM        HGBA1C                   7.1 (H)             09/10/2020 12:15 PM           ### Former tobacco use ###  -Had extremely bad taste in her mouth, headaches, and dizziness from taking bupropion   -  ### osteoporosis ###  9/17/2020 Last DEXA: osteopenia: Unclear history.  intermediate FRAX score.  Cont raloxifene.     ### lumbar DJD ###  Multilevel lumbar stenosis with increasing spondylosis and degenerative change since 2018.     Most severe lumbar stenosis at L3-4 and L4-5.  CT LUMBAR SPINE WITHOUT CONTRAST        ### colon CA ###  history of stage III adenocarcinoma of the ascending colon s/p right hemicolectomy on 8/15/2014. Four out of 17 lymph nodes were positive. She underwent adjuvant FOLFOX for 6 months from 11/4/2014 to 4/29/2015.  Her CEA has been elevated without radiographic evidence of recurrence.   -cea chronically elevated but stable / lower than prev. CT scan to be done if pt is symptomatic or cea level worsens.   -monitor for now Juanita Lebron MD at 10/7/2023Cscope 2021-9/29/2023           ### Thyroid nodule              Path benign 10/2020/ rec f/u endocrine to determine                    Review of Systems   Constitutional:  Negative for chills, fatigue, fever and unexpected weight change.   HENT:  Negative for ear pain, hearing loss, postnasal drip, tinnitus, trouble swallowing and voice change.    Respiratory:  Negative for cough, chest tightness, shortness of breath and wheezing.    Cardiovascular:  Negative for chest pain, palpitations and leg swelling.   Gastrointestinal:  Negative for abdominal pain, blood in stool, diarrhea, nausea and vomiting.   Endocrine: Negative for polydipsia, polyphagia and polyuria.   Genitourinary:  Negative for difficulty urinating, dysuria, hematuria and vaginal bleeding.   Skin:  Negative for rash.   Allergic/Immunologic: Negative for food  "allergies.   Neurological:  Negative for dizziness, numbness and headaches.   Hematological:  Does not bruise/bleed easily.   Psychiatric/Behavioral:  The patient is not nervous/anxious.        Objective:      Vitals:    04/30/24 1055   BP: 118/70   BP Location: Right arm   Patient Position: Sitting   BP Method: Large (Manual)   Pulse: 86   SpO2: 97%   Weight: 72.4 kg (159 lb 9.8 oz)   Height: 4' 9" (1.448 m)      Physical Exam  Vitals and nursing note reviewed.   Constitutional:       General: She is not in acute distress.     Appearance: Normal appearance. She is well-developed.   HENT:      Head: Normocephalic and atraumatic.      Mouth/Throat:      Pharynx: No oropharyngeal exudate.   Eyes:      General: No scleral icterus.     Conjunctiva/sclera: Conjunctivae normal.      Pupils: Pupils are equal, round, and reactive to light.   Neck:      Thyroid: No thyromegaly.   Cardiovascular:      Rate and Rhythm: Normal rate and regular rhythm.      Heart sounds: Normal heart sounds. No murmur heard.  Pulmonary:      Effort: Pulmonary effort is normal.      Breath sounds: Normal breath sounds. No wheezing or rales.   Abdominal:      General: There is no distension.   Musculoskeletal:         General: No tenderness.   Lymphadenopathy:      Cervical: No cervical adenopathy.   Skin:     General: Skin is warm and dry.   Neurological:      Mental Status: She is alert and oriented to person, place, and time.   Psychiatric:         Behavior: Behavior normal.         Assessment:       1. Centrilobular emphysema    2. Type 2 diabetes mellitus with stage 3a chronic kidney disease, without long-term current use of insulin    3. Stage 3a chronic kidney disease    4. Former smoker    5. Facial rash    6. Type 2 diabetes mellitus without complication, without long-term current use of insulin        Plan:       Marizol was seen today for rash.    Diagnoses and all orders for this visit:    Centrilobular emphysema  -     CT Chest Lung " Screening Low Dose; Future    Type 2 diabetes mellitus with stage 3a chronic kidney disease, without long-term current use of insulin    Stage 3a chronic kidney disease    Former smoker  -     CT Chest Lung Screening Low Dose; Future    Facial rash  -     Ambulatory referral/consult to Dermatology; Future    Type 2 diabetes mellitus without complication, without long-term current use of insulin  -     atorvastatin (LIPITOR) 40 MG tablet; Take 1 tablet (40 mg total) by mouth once daily.    Other orders  -     dulaglutide (TRULICITY) 3 mg/0.5 mL pen injector; Inject 3 mg into the skin once a week.  -     predniSONE (DELTASONE) 10 MG tablet; Take 4 tablets (40 mg total) by mouth once daily. for 5 days  The following orders have not been finalized:  -     Cancel: dulaglutide (TRULICITY) 1.5 mg/0.5 mL pen injector       Labs in 3 months    Pedro Luis Ferrell MD  Internal Medicine-[facility]        Side effects of medication(s) were discussed in detail and patient voiced understanding.  Patient will call back for any issues or complications.

## 2024-05-06 ENCOUNTER — TELEPHONE (OUTPATIENT)
Dept: INTERNAL MEDICINE | Facility: CLINIC | Age: 79
End: 2024-05-06
Payer: MEDICARE

## 2024-05-06 ENCOUNTER — PATIENT MESSAGE (OUTPATIENT)
Dept: INTERNAL MEDICINE | Facility: CLINIC | Age: 79
End: 2024-05-06
Payer: MEDICARE

## 2024-05-06 RX ORDER — ACETAMINOPHEN 500 MG
2000 TABLET ORAL DAILY
Qty: 90 CAPSULE | Refills: 3 | Status: CANCELLED | OUTPATIENT
Start: 2024-05-06

## 2024-05-06 NOTE — TELEPHONE ENCOUNTER
Pt state she stopped taking Metformin after hearing it can affect the kidneys. Said she has a sister who is on Metformin and having same issues. Pt is asking you prescribe an Alt that won't affect the kidneys and said she WILL NOT take the metformin.

## 2024-05-06 NOTE — TELEPHONE ENCOUNTER
----- Message from Monica Rankin sent at 5/6/2024 11:59 AM CDT -----  Type:  Patient Returning Call         Who Called: self          Who Left Message for Patient: ZULY SLADE         Does the patient know what this is regarding?: she stop taking metformin          Would the patient rather a call back or a response via [facility]? Call back          Best Call Back Number:[phone]

## 2024-05-06 NOTE — TELEPHONE ENCOUNTER
----- Message from Kei Kaba sent at 5/6/2024 11:26 AM CDT -----  Type: Patient Call Back    Who called:Self    What is the request in detail:Pt stopped taking metformin and is requesting a different medication     Can the clinic reply by [facility]?No    Would the patient rather a call back or a response via [facility]? Call    Best call back number:[phone] (home)       Additional Information:[facility] - [facility] [address]   Phone: [phone]  Fax: [phone]

## 2024-05-07 ENCOUNTER — PATIENT OUTREACH (OUTPATIENT)
Dept: EMERGENCY MEDICINE | Facility: OTHER | Age: 79
End: 2024-05-07
Payer: MEDICARE

## 2024-05-07 RX ORDER — PREGABALIN 75 MG/1
75 CAPSULE ORAL 2 TIMES DAILY
Qty: 60 CAPSULE | Refills: 6 | Status: SHIPPED | OUTPATIENT
Start: 2024-05-07 | End: 2024-11-05

## 2024-05-07 RX ORDER — ACETAMINOPHEN 500 MG
2000 TABLET ORAL DAILY
Qty: 90 CAPSULE | Refills: 3 | Status: SHIPPED | OUTPATIENT
Start: 2024-05-07 | End: 2024-05-15 | Stop reason: SDUPTHER

## 2024-05-07 RX ORDER — PREDNISONE 10 MG/1
TABLET ORAL
Qty: 20 TABLET | Refills: 0 | Status: SHIPPED | OUTPATIENT
Start: 2024-05-07

## 2024-05-07 RX ORDER — PREGABALIN 75 MG/1
75 CAPSULE ORAL 2 TIMES DAILY
Qty: 60 CAPSULE | Refills: 6 | OUTPATIENT
Start: 2024-05-07 | End: 2024-11-05

## 2024-05-07 NOTE — TELEPHONE ENCOUNTER
----- Message from Miri Foster sent at 5/7/2024 10:23 AM CDT -----  Regarding: location change  Type: RX Refill Request     Who Called:pt      RX Name and Strength:cholecalciferol, vitamin D3, (VITAMIN D3) 50 mcg (2,000 unit) Cap capsule      Preferred Pharmacy with phone number:[facility] - [facility] [address]   Phone: [phone]     Would the patient rather a call back or a response via [facility]?call     Best Call Back Number:[phone]      Additional Information:

## 2024-05-07 NOTE — TELEPHONE ENCOUNTER
No care due was identified.  [facility] Embedded Care Due Messages. Reference number: [id number].   5/07/2024 10:54:06 AM CDT

## 2024-05-07 NOTE — TELEPHONE ENCOUNTER
Refill Routing Note   Medication(s) are not appropriate for processing by [facility] for the following reason(s):        Outside of protocol    [facility] action(s):  Route               Appointments  past 12m or future 3m with PCP    Date Provider   Last Visit   4/30/2024 Pedro Luis Butterfield MD   Next Visit   7/25/2024 Pedro Luis Butterfield MD   ED visits in past 90 days: 1        Note composed:11:08 AM 05/07/2024

## 2024-05-07 NOTE — TELEPHONE ENCOUNTER
Refill Routing Note   Medication(s) are not appropriate for processing by [facility] for the following reason(s):        Outside of protocol    [facility] action(s):  Route     Requires labs : Yes             Appointments  past 12m or future 3m with PCP    Date Provider   Last Visit   4/30/2024 Pedro Luis Butterfield MD   Next Visit   7/25/2024 Pedro Luis Butterfield MD   ED visits in past 90 days: 1        Note composed:9:47 AM 05/07/2024

## 2024-05-07 NOTE — TELEPHONE ENCOUNTER
Care Due:                  Date            Visit Type   Department     Provider  --------------------------------------------------------------------------------                                EP -                              PRIMARY      [facility] INTERNAL  Last Visit: 04-      CARE ([facility])   MEDICINE       Pedro Luis Butterfield                              EP -                              PRIMARY      [facility] INTERNAL  Next Visit: 07-      CARE ([facility])   MEDICINE       Pedro Luis Butterfield                                                            Last  Test          Frequency    Reason                     Performed    Due Date  --------------------------------------------------------------------------------    HBA1C.......  6 months...  dulaglutide, metFORMIN...  01- 07-    [facility] Embedded Care Due Messages. Reference number: [id number].   5/07/2024 9:30:22 AM CDT

## 2024-05-07 NOTE — TELEPHONE ENCOUNTER
----- Message from Troy Oh sent at 5/7/2024 10:14 AM CDT -----  Regarding: Refill Request    Who Called: Patient        New Prescription or Refill : Refill    RX Name and Strength:   pregabalin (LYRICA) 75 MG capsule      RX Name and Strength:         RX Name and Strength:      30 day or 90 day RX:     Preferred Pharmacy:[facility] - [facility] [address]    Would the patient rather a call back or a response via [facility]?    Best Call Back Number:  [phone]    Additional Information: Patient stated she has not had her Rx since March and in need of her medication

## 2024-05-08 ENCOUNTER — TELEPHONE (OUTPATIENT)
Dept: PAIN MEDICINE | Facility: CLINIC | Age: 79
End: 2024-05-08
Payer: MEDICARE

## 2024-05-08 RX ORDER — TRAMADOL HYDROCHLORIDE 50 MG/1
50 TABLET ORAL EVERY 12 HOURS PRN
Qty: 30 TABLET | Refills: 0 | Status: SHIPPED | OUTPATIENT
Start: 2024-05-08 | End: 2024-05-23

## 2024-05-08 NOTE — TELEPHONE ENCOUNTER
----- Message from Ellen Bird sent at 5/8/2024  4:48 PM CDT -----  Regarding: pt advice  Contact: pt [phone]  Pt returning call from missed call. Pls call

## 2024-05-08 NOTE — TELEPHONE ENCOUNTER
Received call from patient, she is reporting that her recent injections INJECTION, JOINT BILATERAL PIRIFORMIS AND LEFT GTB did help the hip and sciatic pain- however her knee pain is extreme and she is asking if she can have something to help the pain until her follow up.     She does have Robaxin, but she reports it doesn't help, she needs something that is going to take the pain away and allow her to ambulate. At this time, she is unable to walk because the pain is so bad.     She is currently not taking any medication, she has taken 650 mg x2 of tylenol and it didn't touch the pain. When she spoke to Dr. Del Toro in the procedures, they discussed a possible knee injection to help with the pain, but she is also aware she won't be able to get anything until her follow up and she can't wait that long in the pain she is experiencing.    She previously was prescribed a short course of Hydrocodone and tramadol. She doesn't care which one or which medication you will send, but it needs to be something that helps.     If anything is authorized she would like to send it to [facility] pharmacy. She is truly in pain and would like to be able to get up and do things.     Ms. Kevin was informed that Dr. Del Toro is not in clinic today and a response may not be immediate.

## 2024-05-09 ENCOUNTER — OFFICE VISIT (OUTPATIENT)
Dept: PAIN MEDICINE | Facility: CLINIC | Age: 79
End: 2024-05-09
Payer: MEDICARE

## 2024-05-09 ENCOUNTER — TELEPHONE (OUTPATIENT)
Dept: PULMONOLOGY | Facility: CLINIC | Age: 79
End: 2024-05-09
Payer: MEDICARE

## 2024-05-09 ENCOUNTER — TELEPHONE (OUTPATIENT)
Dept: PAIN MEDICINE | Facility: CLINIC | Age: 79
End: 2024-05-09

## 2024-05-09 VITALS
DIASTOLIC BLOOD PRESSURE: 72 MMHG | SYSTOLIC BLOOD PRESSURE: 140 MMHG | BODY MASS INDEX: 35.16 KG/M2 | HEART RATE: 79 BPM | TEMPERATURE: 99 F | OXYGEN SATURATION: 98 % | WEIGHT: 162.5 LBS | RESPIRATION RATE: 18 BRPM

## 2024-05-09 DIAGNOSIS — M17.10 PRIMARY OSTEOARTHRITIS OF KNEE, UNSPECIFIED LATERALITY: Primary | ICD-10-CM

## 2024-05-09 DIAGNOSIS — M17.12 PRIMARY OSTEOARTHRITIS OF LEFT KNEE: Primary | ICD-10-CM

## 2024-05-09 DIAGNOSIS — G57.03 PIRIFORMIS SYNDROME OF BOTH SIDES: ICD-10-CM

## 2024-05-09 DIAGNOSIS — M70.62 GREATER TROCHANTERIC BURSITIS OF LEFT HIP: ICD-10-CM

## 2024-05-09 PROCEDURE — 1125F AMNT PAIN NOTED PAIN PRSNT: CPT | Mod: CPTII,S$GLB,,

## 2024-05-09 PROCEDURE — 1160F RVW MEDS BY RX/DR IN RCRD: CPT | Mod: CPTII,S$GLB,,

## 2024-05-09 PROCEDURE — 99999 PR PBB SHADOW E&M-EST. PATIENT-LVL III: CPT | Mod: PBBFAC,,,

## 2024-05-09 PROCEDURE — 99214 OFFICE O/P EST MOD 30 MIN: CPT | Mod: S$GLB,,,

## 2024-05-09 PROCEDURE — 3078F DIAST BP <80 MM HG: CPT | Mod: CPTII,S$GLB,,

## 2024-05-09 PROCEDURE — 3077F SYST BP >= 140 MM HG: CPT | Mod: CPTII,S$GLB,,

## 2024-05-09 PROCEDURE — 1159F MED LIST DOCD IN RCRD: CPT | Mod: CPTII,S$GLB,,

## 2024-05-09 PROCEDURE — 3288F FALL RISK ASSESSMENT DOCD: CPT | Mod: CPTII,S$GLB,,

## 2024-05-09 PROCEDURE — 3072F LOW RISK FOR RETINOPATHY: CPT | Mod: CPTII,S$GLB,,

## 2024-05-09 PROCEDURE — 1101F PT FALLS ASSESS-DOCD LE1/YR: CPT | Mod: CPTII,S$GLB,,

## 2024-05-09 NOTE — PROGRESS NOTES
Chronic Pain - Established        Chief Complaint:   Chief Complaint   Patient presents with    Knee Pain     Interval History 5/9/2024:  Marizol Kevin returns to clinic s/p bialteral piriformis and left GTB on 4/25/2024. She reports 100% relief from this procedure.  She is happy with this last procedure.  Her main pain generator today is from her left knee. She last had a left genicular CSI on 10/17/2023.  She reports 80% relief that lasted 4 months.  She has done some research and would like to try Synvisc to see if she would have more lasting relief. She has started a 15-day course of Tramadol 50 mg BID PRN with good relief until she is able to have a procedure.  She denies any perceived side effects. Her pain today is 7/10.     Interval History 4/9/2024:  Marizol Kevin presents for follow-up of lower back and left knee pain. She also had left L4/5 and L5/S1 TF JT on 12/22/2023.  This has provided 70% pain relief that is on-going. Today, she is complaining of bilateral leg pain into the buttock and posterolateral bilateral thighs. Pain description: electric, aching, shooting, burning, tight. Exacerbating factors: twisting, bending, stooping. She continues Lyrica. She has not taken any muscle relaxants. The patient denies fever/night sweats, urinary incontinence, bowel incontinence, significant weight changes, significant motor weakness or changes, or loss of sensations. Her pain today is 10/10.     Interval History 11/20/2023:  The patient presents today for follow up of lower back and left knee pain. She is s/p left knee steroid injection on 10/17/23 with 80% relief of knee pain. Her knee pain is currently tolerable. She did she neurosurgery since previous encounter and had a referral placed to orthopedics for evaluation. She says that she feels like there is excess fluid in her knee. She does still get intermittent back pain with radiation down the back and side of the left leg and associated  numbness. We previously discussed JT and she would like to further discuss at this time. Her pain today is 2/10.    Interval History 10/2/2023:  The patient presents today to discuss worsened back and left knee pain. She was recently evaluated in the ED for left-sided sciatica. States that her back pain improved with the steroid injection, but she continues to struggle with left knee pain. She denies any back pain today. She did have a lumbar CT in the ED which showed multilevel stenosis, worse at L3-L4 level where there is severe central canal stenosis from concentric disc bulge and hypertrophic facet arthropathy and ligamentum flavum hypertrophy with napkin ring deformity of the traversing thecal sac and cauda equina suggested. She was supposed to be referred to neurosurgery but a referral was not made. She denies bowel or bladder incontinence. Left knee pain is sharp and stabbing in nature. She feels like the knee will give out at time. She has had benefit with Tramadol in the past and is asking for a prescription. Her pain today is 10/10.    Interval History 9/6/2023:  There patient is here for follow up of foot pain and numbness secondary to diabetic neuropathy and previous chemotherapy. She is here today for application of Qutenza patches to bilateral feet. She has burning and numbness to the anterior and posterior feet. She was started on Lyrica 50 mg BID at last OV. She has no side effects from the medication but has not noticed any improvement in symptoms. Her pain today is 9/10.    Interval History 8/22/23:    Ms. Kevin returns for follow-up of her neck pain. Her primary pain today is her neuropathy pain. She is tolerating lyrica 50 mg BID but has noted alopecia since starting. However, it has helped her pain. Given her history of colon cancer and chemo, it is unclear if this is hairloss is from the lyrica.  She also comments on her shoulder pain and how the prior nerve block helped but did not last  long.  It provided >80% relief of her shoulder pain.  She is concerned about repeating the injection because she states the last one cost her $300.   Acupuncture was very helpful for her, but was not covered by her insurance and cost $150 per session.   Regarding her neuropathy, she endorses painful burning in her feet that prevents sleeping. The lyrica and duloxetine have both helped, but she stopped taking duloxetine when she started lyrica. She did not have any known adverse effects from the duloxetine. At her last visit 6/6/23, Qutenza was ordered. Will investigate if steps to approval for that.   We also discussed spinal cord stimulator trial. She has not had spine MRI or psychological clearance.          Initial HPI:  Marizol Kevin has a PMHx of HTN, HLD, DMII, osteoporosis, colon cancer s/p chemothearpy. She presents to the clinic for the evaluation of neuropathic pain along with neck pain that radiates into the left arm. The pain started 5-6 months ago, no specific inciting event. Symptoms have been worsening.The pain is located in the left side of the neck area and radiates to the left periscapular area with further distal radiation to the whole arm.  The pain is described as aching, stabbing, and sqaueezing  and is rated as 9/10. The pain is rated with a score of  5/10 on the BEST day and a score of 9/10 on the WORST day.  Symptoms interfere with daily activity, sleeping, and enjoyment of life. The pain is exacerbated by Laying and Lifting.  The pain is mitigated by nothing.  Pt also endorses severe neuropathic pain in BL hands and feet. She has used gabapentinin the past without any relief. Currently on Cymbalta. Recently started Acupuncture for neuropathy, which she reports has provided significant relief.     Patient denies night fever/night sweats, urinary incontinence, bowel incontinence, significant weight loss, and loss of sensations.    Physical Therapy/Home Exercise: yes and continues to do  home exercises for her shoulder and her balance    Pain Disability Index Review:      4/9/2024    12:56 PM 11/20/2023     2:36 PM 10/2/2023    11:37 AM   Last 3 PDI Scores   Pain Disability Index (PDI) 50 10 70       Pain Medications:  - pregabalin 50 mg BID  - was on duloxetine 30 mg BID  - Previously on gabapentin (dose unknown) but was stopped due to lack of efficacy     report:  Not applicable    Pain Procedures:   SAB injection 3/30/23 --> minimal relief  Left shoulder nerve blocks 7/21/23 --> 80% relief  10/17/23 Left knee steroid injection- 80% relief  12/22/2023 - Left L4/5 and L5/S1 TF JT - 70% relief  4/25/2024 - Bilateral Piriformis and Left GTB - 100% relief    Imaging:     XR KNEE 3 VIEW BILATERAL     CLINICAL HISTORY:  Unilateral primary osteoarthritis, unspecified knee     COMPARISON:  None.     FINDINGS:  The alignment is within normal limits.  No displaced fractures identified.  No evidence of lytic or blastic lesions.RIGHT total knee arthroplasty.  Vascular calcifications.  Degenerative changes at the level of the medial femoral condyle and patellofemoral joint on the LEFT.Soft tissues are unremarkable.     Impression:     No evidence of fracture.RIGHT total knee arthroplasty and degenerative changes LEFT medial/patellofemoral joints.        Electronically signed by:Cricket Medina MD  Date:                                            04/25/2024  Time:                                           14:46    MRI C Spine 4/15/22  FINDINGS:  large amount of image degradation from artifact.  Straightening of the usual cervical lordosis. Degenerative changes. Disc space narrowing C4-C5, C5-C6. Vertebral body heights and alignment appear maintained without acute compression or subluxation evident.  Although no obvious fracture cannot exclude subtle findings including marrow edema with the large amount of image degradation. Further follow-up or evaluation is to be obtained is suggested by CT. There do  appear to be small posterior disc bulges multiple levels, C3 through C 6 without appreciable significant spinal canal narrowing     Impression:     Grossly negative study for acute findings but note is made that limited evaluation with the amount artifact and if concern for acute cervical spine trauma correlation with CT is recommended and note is made the patient did have CT 04/15/2022 please refer to that report.  Subtle findings as suggested on that CT scan likely would not be apparent on the MRI with the degree of artifact.     If further evaluation or follow-up is to be obtained it is recommended to be by CT     This report was flagged in Epic as abnormal.     Final read    CT Lumbar Spine Without Contrast  Order: [id number]  Status: Final result     Visible to patient: Yes (seen)     Next appt: 10/17/2023 at 10:20 AM in Pain Medicine ([facility])     0 Result Notes  Details    Reading Physician Reading Date Result Priority   Neymar Mason MD  [phone] 9/29/2023 STAT     Narrative & Impression  EXAMINATION:  CT LUMBAR SPINE WITHOUT CONTRAST     CLINICAL HISTORY:  Lumbar radiculopathy, symptoms persist with conservative treatment;     TECHNIQUE:  Low-dose axial, sagittal and coronal reformations are obtained through the lumbar spine.  Contrast was not administered.     COMPARISON:  MRI lumbar spine, 06/18/2018     FINDINGS:  Alignment is stable.  Progressive spondylosis with disc space narrowing and vacuum phenomenon increasing at all levels but most severely at L to L3 L3-L4.  The anterolisthesis of L4 on L5 appear stable.     At T12-L1 there is no significant stenosis.     At the L1-L2 level, mild facet arthropathy with no significant stenosis.     At the L2-L3 level, concentric disc bulge and hypertrophic facet ligamentous changes result in moderate central canal stenosis with trefoil deformity of the thecal sac.     At the L3-L4 level, severe central canal stenosis from concentric  disc bulge and hypertrophic facet arthropathy and ligamentum flavum hypertrophy with napkin ring deformity of the traversing thecal sac and cauda equina suggested.     At the L4-5 level, the anterolisthesis with uncovering of the L4-5 disc and facet arthropathy and ligamentous hypertrophy causes moderate central canal stenosis.     At the L5-S1 level, vacuum phenomenon is noted with leakage through the annulus into the epidural space but no evidence of central or foraminal stenosis.     Arthrosclerotic disease throughout the aorta and iliac vessels.     Impression:     Multilevel lumbar stenosis with increasing spondylosis and degenerative change since 2018.     Most severe lumbar stenosis at L3-4 and L4-5.     No new fracture or subluxation.     Past Medical History:   Diagnosis Date    Allergy     Anxiety     Cancer     colon    Cataract     Colon cancer     Dependence on nicotine from cigarettes 9/3/2020    Diabetes mellitus, type 2     Dry eye syndrome     Hyperlipidemia     Hypertension     Insomnia     Light cigarette smoker (1-9 cigs/day) 6/21/2022    Malignant neoplasm of ascending colon 7/20/2020    Squamous blepharitis     Syncope 4/16/2022    Tobacco use disorder, moderate, in early remission 11/2/2022    Vitamin D deficiency 9/10/2020     Past Surgical History:   Procedure Laterality Date    CATARACT EXTRACTION, BILATERAL  03/2020    CHOLECYSTECTOMY      COLON SURGERY      Colon cancer    COLONOSCOPY N/A 02/01/2021    Procedure: COLONOSCOPY;  Surgeon: Ashwini Duarte MD;  Location: [address]);  Service: Endoscopy;  Laterality: N/A;  medical transportation  covid test 1/29 [facility], instructions mailed-KP[facility]    EYE SURGERY      Cataract    HYSTERECTOMY      INJECTION OF ANESTHETIC AGENT AROUND NERVE Left 7/21/2023    Procedure: BLOCK, NERVE, LEFT SHOULDER ARTICULAR BRANCH keep date rep aware;  Surgeon: Abdi Del Toro MD;  Location: [facility]IN [facility]GT;  Service: Pain Management;  Laterality: Left;     INJECTION OF JOINT N/A 2024    Procedure: INJECTION, JOINT BILATERAL PIRIFORMIS AND LEFT GTB;  Surgeon: Abdi Del Toro MD;  Location: [facility]IN [facility]GT;  Service: Pain Management;  Laterality: N/A;  [phone]  2 WK F/U IRMA    JOINT REPLACEMENT      Knee    KNEE SURGERY      LAPAROSCOPIC LEFT COLON RESECTION      TRANSFORAMINAL EPIDURAL INJECTION OF STEROID Left 2023    Procedure: LUMBAR TRANSFORAMINAL LEFT L4/5 AND L5/S1;  Surgeon: Abdi Del Toro MD;  Location: [facility] PAIN [facility]GT;  Service: Pain Management;  Laterality: Left;  [phone]  2 WK F/U ANTWON     Social History     Socioeconomic History    Marital status:    Tobacco Use    Smoking status: Former     Current packs/day: 0.00     Average packs/day: 1 pack/day for 51.0 years (51.0 ttl pk-yrs)     Types: Cigarettes     Start date: 1971     Quit date: 2022     Years since quittin.6    Smokeless tobacco: Never    Tobacco comments:     smoking cessation information given    Substance and Sexual Activity    Alcohol use: Yes     Alcohol/week: 1.0 standard drink of alcohol     Types: 1 Shots of liquor per week     Comment: occ    Drug use: Never    Sexual activity: Not Currently     Birth control/protection: Abstinence     Social Determinants of Health     Financial Resource Strain: Low Risk  (2023)    Overall Financial Resource Strain (CARDIA)     Difficulty of Paying Living Expenses: Not hard at all   Food Insecurity: No Food Insecurity (2023)    Hunger Vital Sign     Worried About Running Out of Food in the Last Year: Never true     Ran Out of Food in the Last Year: Never true   Transportation Needs: No Transportation Needs (2023)    PRAPARE - Transportation     Lack of Transportation (Medical): No     Lack of Transportation (Non-Medical): No   Physical Activity: Insufficiently Active (2023)    Exercise Vital Sign     Days of Exercise per Week: 2 days     Minutes of Exercise per Session: 20 min   Stress: No  Stress Concern Present (6/7/2023)    [employer] - Occupational Stress Questionnaire     Feeling of Stress : Not at all   Housing Stability: Low Risk  (6/7/2023)    Housing Stability Vital Sign     Unable to Pay for Housing in the Last Year: No     Number of Places Lived in the Last Year: 1     Unstable Housing in the Last Year: No     Family History   Problem Relation Name Age of Onset    Heart attack Mother Dora     Heart disease Mother Dora     Colon cancer Father Milo     Diabetes Father Milo     Cancer Sister Denisha         unknown    Hypothyroidism Sister Denisha     Asthma Sister Denisha     Thyroid cancer Neg Hx         Review of patient's allergies indicates:   Allergen Reactions    Grass pollen-june grass standard     Sulfa (sulfonamide antibiotics)      Occurred when she was pregnant with varicose veins resulting in leg swelling and pain with standing       Current Outpatient Medications   Medication Sig    albuterol (PROVENTIL/VENTOLIN HFA) 90 mcg/actuation inhaler INHALE 2 PUFFS INTO THE LUNGS EVERY 4 (FOUR) HOURS AS NEEDED FOR WHEEZING    albuterol-ipratropium (DUO-NEB) 2.5 mg-0.5 mg/3 mL nebulizer solution Take 3 mLs by nebulization every 6 (six) hours as needed for Wheezing or Shortness of Breath. Rescue    amLODIPine (NORVASC) 5 MG tablet TAKE 1 TABLET EVERY DAY    aspirin (ECOTRIN) 81 MG EC tablet Take 81 mg by mouth once daily.    atorvastatin (LIPITOR) 40 MG tablet Take 1 tablet (40 mg total) by mouth once daily.    azelastine (ASTELIN) 137 mcg (0.1 %) nasal spray 1 spray (137 mcg total) by Nasal route 2 (two) times daily.    betamethasone valerate 0.1% (VALISONE) 0.1 % Oint Apply topically once daily.    blood sugar diagnostic (TRUE METRIX GLUCOSE TEST STRIP) Strp Use to test blood glucose two (2) times daily; to be used with insurance-preferred brand of glucometer/supplies    blood-glucose meter kit Please provide with insurance covered meter    cholecalciferol,  vitamin D3, (VITAMIN D3) 50 mcg (2,000 unit) Cap capsule Take 1 capsule (2,000 Units total) by mouth once daily.    dulaglutide (TRULICITY) 3 mg/0.5 mL pen injector Inject 3 mg into the skin once a week.    DULoxetine (CYMBALTA) 30 MG capsule Take 1 capsule (30 mg total) by mouth once daily.    famotidine (PEPCID) 40 MG tablet Take 40 mg by mouth 2 (two) times daily.    ferrous sulfate 325 (65 FE) MG EC tablet TAKE 1 TABLET EVERY OTHER DAY    heparin, porcine, PF, (HEPARIN FLUSH 100 UNITS/ML) 100 unit/mL Syrg     HYDROcodone-acetaminophen (NORCO) 5-325 mg per tablet Take 1 tablet by mouth every 6 (six) hours as needed for Pain.    hydrOXYzine HCL (ATARAX) 25 MG tablet Take 1 tablet (25 mg total) by mouth 3 (three) times daily as needed for Itching or Anxiety.    ketoconazole (NIZORAL) 2 % cream Apply topically once daily. for 14 days    lancets Misc 1 Device by Misc.(Non-Drug; Combo Route) route 2 (two) times daily.    LIDOcaine (LIDODERM) 5 % Place 1 patch onto the skin once daily. Remove & Discard patch within 12 hours or as directed by MD    metFORMIN (GLUCOPHAGE-XR) 500 MG ER 24hr tablet TAKE 2 TABLETS EVERY DAY WITH BREAKFAST    methocarbamoL (ROBAXIN) 750 MG Tab Take 1 tablet (750 mg total) by mouth 3 (three) times daily as needed (muscle spasms).    multivitamin capsule Take 1 capsule by mouth once daily.    ondansetron (ZOFRAN) 4 MG tablet TAKE 1 TABLET EVERY 8 HOURS AS NEEDED FOR NAUSEA    predniSONE (DELTASONE) 10 MG tablet TAKE 4 TABLETS ONE TIME DAILY FOR 5 DAYS    pregabalin (LYRICA) 75 MG capsule Take 1 capsule (75 mg total) by mouth 2 (two) times daily.    raloxifene (EVISTA) 60 mg tablet TAKE 1 TABLET EVERY DAY    traMADoL (ULTRAM) 50 mg tablet Take 1 tablet (50 mg total) by mouth every 12 (twelve) hours as needed for Pain.    triamcinolone acetonide 0.5% (KENALOG) 0.5 % Crea Apply topically 2 (two) times daily.     No current facility-administered medications for this visit.       REVIEW OF  SYSTEMS:    GENERAL:  No weight loss, malaise or fevers.  HEENT:  Negative for frequent or significant headaches.  NECK:  Negative for lumps, goiter, pain and significant neck swelling.  RESPIRATORY:  Negative for cough, wheezing or shortness of breath.  CARDIOVASCULAR:  Negative for chest pain, leg swelling or palpitations.  GI:  Negative for abdominal discomfort, blood in stools or black stools or change in bowel habits.  MUSCULOSKELETAL:  See HPI.  SKIN:  Negative for lesions, rash, and itching.  PSYCH:  +sleep disturbance, depression .  HEMATOLOGY/LYMPHOLOGY:  Negative for prolonged bleeding, bruising easily or swollen nodes.  NEURO:   No history of headaches, syncope, paralysis, seizures or tremors.  All other reviewed and negative other than HPI.    OBJECTIVE:    BP (!) 140/72   Pulse 79   Temp 98.6 °F (37 °C)   Resp 18   Wt 73.7 kg (162 lb 7.7 oz)   SpO2 98%   BMI 35.16 kg/m²       PHYSICAL EXAMINATION:    GENERAL APPEARANCE: Well appearing, in no acute distress.  PSYCH:  Mood and affect appropriate.  SKIN: Skin color, texture, turgor normal, no rashes or lesions to visible areas.  HEAD/FACE:  Normocephalic, atraumatic.  NECK: No pain to palpation over the cervical paraspinous muscles. Spurling Negative. No pain with neck flexion, extension, or lateral flexion.   CARDIO: Rate regular.  No lower extremity edema. Capillary refill <2 seconds.   PULM: Bilateral chest rise. No apparent respiratory distress.   GI:  Non-distended  BACK: Straight leg raising in the sitting position is negative to radicular pain. No pain to palpation over the spine or costovertebral angles. Limited range of motion with pain on extension and of tightness in bilateral hamstrings with flexion/bending. Positive axial loading test to the left. Positive tenderness over bilateral SIJ left > right. R. OLGA positive to left.  EXTREMITIES: Peripheral joint ROM is full and pain free without obvious instability or laxity in all four  extremities. No deformities, edema, or skin discoloration.   MUSCULOSKELETAL: Mild TTP over bilateral piriformis. Mild TTP over left GTB. Left knee: TTP over medial joint line and patellofemoral joint.  Full ROM with pain on extension and flexion. Bilateral upper and lower extremity strength is normal and symmetric.  No atrophy or tone abnormalities are noted.  NEURO: Bilateral upper and lower extremity coordination and muscle stretch reflexes are physiologic and symmetric.  Negative Ring's. Plantar response are downgoing. No clonus noted. No loss of sensation is noted.  GAIT: Antalgic.    TTP over medial joint line of left knee. Mild effusion to medial aspect of knee.     ASSESSMENT: 78 y.o. year old female with widespread pain, consistent with the followin. Primary osteoarthritis of left knee        2. Piriformis syndrome of both sides        3. Greater trochanteric bursitis of left hip              PLAN:     - Previous imaging was reviewed and discussed with the patient today.     - She is s/p Bilateral Piriformis and Left GTB with 100% relief - we can repeat as needed.     - Continue HEP previously provided in clinic.    - Continue Lyrica 75 mg BID.    - Continue duloxetine 30 mg BID.    - No pain contract on-file. No UDS on-file.     - Plan Synvisc in office for Left Knee    - RTC 2-3 weeks after above procedure.    - Consider SIJ injections in the future vs repeat JT    The above plan and management options were discussed at length with patient. Patient is in agreement with the above and verbalized understanding.    Jada Arias NP   2024

## 2024-05-10 ENCOUNTER — CLINICAL SUPPORT (OUTPATIENT)
Dept: REHABILITATION | Facility: HOSPITAL | Age: 79
End: 2024-05-10
Payer: MEDICARE

## 2024-05-10 DIAGNOSIS — M54.59 OTHER LOW BACK PAIN: Primary | ICD-10-CM

## 2024-05-10 DIAGNOSIS — G89.29 CHRONIC PAIN OF LEFT KNEE: ICD-10-CM

## 2024-05-10 DIAGNOSIS — M25.562 CHRONIC PAIN OF LEFT KNEE: ICD-10-CM

## 2024-05-10 PROCEDURE — 97810 ACUP 1/> WO ESTIM 1ST 15 MIN: CPT | Mod: PN

## 2024-05-10 PROCEDURE — 97811 ACUP 1/> W/O ESTIM EA ADD 15: CPT | Mod: PN

## 2024-05-14 DIAGNOSIS — R53.83 FATIGUE, UNSPECIFIED TYPE: Primary | ICD-10-CM

## 2024-05-14 RX ORDER — ACETAMINOPHEN 500 MG
2000 TABLET ORAL DAILY
Qty: 90 CAPSULE | Refills: 3 | Status: CANCELLED | OUTPATIENT
Start: 2024-05-14

## 2024-05-14 NOTE — TELEPHONE ENCOUNTER
T/C to patient. Left message that request for prescription refill for Vitamin D3 has been sent to [facility] pharmacy.

## 2024-05-14 NOTE — TELEPHONE ENCOUNTER
----- Message from Elizabeth Kaba sent at 5/14/2024 12:15 PM CDT -----      Can the clinic reply in [facility]:Y        Please refill the medication(s) listed below. Please call the patient when the prescription(s) is ready for  at this phone number.Pt called to confirm if she still will be taking Vitamin D and if so she would like it refilled.Please contact to further discuss and advise.             Medication #1 cholecalciferol, vitamin D3, (VITAMIN D3) 50 mcg (2,000 unit) Cap capsule    Medication #2       Preferred Pharmacy: [facility] - [facility] [address]   Phone: [phone]  Fax: [phone]

## 2024-05-14 NOTE — PROGRESS NOTES
Acupuncture Treatment Note     Name: Marizol Kevin  [facility] Number: [id number]    Traditional Chinese Medicine Diagnosis: Qi Stagnation and Blood Stasis   Physician: Abdi Del Toro MD    Date of Service: 5/10/2024     Medical Diagnosis:  Chronic pain in left knee; Chronic pain in both shoulders, Chronic lower back pain with bilateral sciatica       Encounter Diagnoses   Name Primary?    Chronic bilateral low back pain with bilateral sciatica      Lumbar radiculopathy      Chronic pain syndrome      Visit #/Visits authorized: 7 / 12     Precautions: Diabetes and cance     Subjective     Chief Complaint:  Left knee pain for 4 months , Chronic pain in both shoulders, Chronic pain in lower back    Cancer Related Symptoms: Neuropathy    Medical necessity is demonstrated by the following IMPAIRMENTS: Medical Necessity: Cancer related pain, Decreased mobility limits day to day activities, social, and emergent situations, and Decreased quality of life    Response to Previous Treatment:  Good    Quality of Symptoms (Better/Worse):  Better    Other Condition/Symptoms:  None    Objective      New Findings:  None    Treatment Principles:  Move Qi and Blood, stop pain     Acupuncture points used:    Bilateral points:  ST 36, GB 34, GB 35, SP 9, GB 39,   Unilateral points:  Left: [place] on knee + 6,   Right:  Auricular Treatment:  None   Needles In: 16  Needles Out: 16  Needles W/O STIM placed: 10:10 AM  [place] W/O STIM removed: 10: 40 AM    Other Traditional Chinese Medicine Modalities:  None    Recommendations:  PT    Education:  Patient is aware of cumulative effect of acupuncture      Assessment      Analysis of Treatment:  Patient felt good    Pt prognosis is Good.     Patient will continue to benefit from acupuncture treatment to address the deficits listed in the problem list box on initial evaluation, provide patient family education and to maximize pt's level of independence in the home and community environment.      Patient's spiritual, cultural and educational needs considered and pt agreeable to plan of care and goals.     Anticipated barriers to treatment: None    Plan     Recommend       1  /week for 12   treatments and re-assess.

## 2024-05-14 NOTE — TELEPHONE ENCOUNTER
No care due was identified.  [facility] Embedded Care Due Messages. Reference number: [id number].   5/14/2024 12:21:48 PM CDT

## 2024-05-15 RX ORDER — ACETAMINOPHEN 500 MG
2000 TABLET ORAL DAILY
Qty: 90 CAPSULE | Refills: 3 | Status: SHIPPED | OUTPATIENT
Start: 2024-05-15

## 2024-05-15 NOTE — TELEPHONE ENCOUNTER
----- Message from Troy Adriano sent at 5/15/2024  7:53 AM CDT -----  Regarding: Rx  Name of Who is Calling:            What is the request in detail:  Please contact patient she has questions about her Rx cholecalciferol, vitamin D3, (VITAMIN D3) 50 mcg (2,000 unit) Cap capsule                Can the clinic reply by [facility]: Yes            What Number to Call Back if not in [facility]: [phone]

## 2024-05-15 NOTE — TELEPHONE ENCOUNTER
----- Message from Troy Adriano sent at 5/15/2024  7:53 AM CDT -----  Regarding: Rx  Name of Who is Calling:            What is the request in detail:  Please contact patient she has questions about her Rx cholecalciferol, vitamin D3, (VITAMIN D3) 50 mcg (2,000 unit) Cap capsule                Can the clinic reply by [facility]: Yes            What Number to Call Back if not in [facility]: [phone]

## 2024-05-15 NOTE — TELEPHONE ENCOUNTER
[facility] does have rx for patient it just need to be picked up. Patient will  the rx at [facility] because she's out of the med. She would like to get additional meds at [facility] because she don't drive and can't get to the pharmacy.     Med pended

## 2024-05-15 NOTE — TELEPHONE ENCOUNTER
No care due was identified.  [facility] Embedded Care Due Messages. Reference number: [id number].   5/15/2024 10:08:14 AM CDT

## 2024-05-21 ENCOUNTER — PROCEDURE VISIT (OUTPATIENT)
Dept: PAIN MEDICINE | Facility: CLINIC | Age: 79
End: 2024-05-21
Payer: MEDICARE

## 2024-05-21 VITALS
SYSTOLIC BLOOD PRESSURE: 121 MMHG | RESPIRATION RATE: 12 BRPM | DIASTOLIC BLOOD PRESSURE: 80 MMHG | HEART RATE: 65 BPM | OXYGEN SATURATION: 100 % | BODY MASS INDEX: 33.76 KG/M2 | HEIGHT: 57 IN | WEIGHT: 156.5 LBS

## 2024-05-21 DIAGNOSIS — M17.12 PRIMARY OSTEOARTHRITIS OF LEFT KNEE: Primary | ICD-10-CM

## 2024-05-21 PROCEDURE — 20611 DRAIN/INJ JOINT/BURSA W/US: CPT | Mod: LT,S$GLB,, | Performed by: ANESTHESIOLOGY

## 2024-05-21 NOTE — PROCEDURES
Patient Name: Marizol Kevin  MRN: [id number]    INFORMED CONSENT: The procedure, risks, benefits and options were discussed with patient. There are no contraindications to the procedure. The patient expressed understanding and agreed to proceed. The personnel performing the procedure was discussed. I verify that I personally obtained Marziol's consent prior to the start of the procedure and the signed consent can be found on the patient's chart.    Procedure Date: 05/21/2024    Anesthesia: Topical    Pre Procedure diagnosis:   1. Primary osteoarthritis of left knee      Post-Procedure diagnosis: same      Sedation: None    PROCEDURE: LEFT KNEE INTRAARTICULAR SYNVISC INJECTION under ultrasound guide    Patient in the supine position with right knee bending 45 degrees, the area of the left knee was prepped with chlorhexidine .  After performing time out and palpating the superior-lateral pattellar aspect A high-frequency linear transducer ultrasound probe is placed in the superolateral corner of the patella, directed medially toward the patellofemoral joint space  .  A 22 Gauge 1.5 inch needle was slowly advance Using an in-plane approach, and directed into the suprapatellar joint space towards the knee joint. After negative aspiration 6 cc of Synvisc was injected in the Knee joint.      No complications. Patient tolerated procedure well.    Blood Loss: Nill  Specimen: None    Gian Martinez, DO       I have reviewed the notes, assessments, and/or procedures performed by resident/fellow, I concur with her/his documentation of Marizol Kevin .    Abdi Del Toro MD

## 2024-06-11 ENCOUNTER — TELEPHONE (OUTPATIENT)
Dept: INTERNAL MEDICINE | Facility: CLINIC | Age: 79
End: 2024-06-11
Payer: MEDICARE

## 2024-06-11 ENCOUNTER — TELEPHONE (OUTPATIENT)
Dept: PAIN MEDICINE | Facility: CLINIC | Age: 79
End: 2024-06-11
Payer: MEDICARE

## 2024-06-11 NOTE — TELEPHONE ENCOUNTER
Staff called patient, there was no answer, staff called her daughter to try and get her in for consult for the knee Synvisc injection. Patient daughter stated that she will get a hold of her mom and then give us a call back

## 2024-06-11 NOTE — TELEPHONE ENCOUNTER
----- Message from Jessica Trujillo LPN sent at 6/11/2024  2:54 PM CDT -----  PHYSICAL THERAPY order pended.  ----- Message -----  From: Monica Rankin  Sent: 6/11/2024   9:48 AM CDT  To: [facility] Staff    Type:  Patient Requesting Referral    Who Called: self    Referral to What Specialty:PT    Reason for Referral: on going pain    Does the patient want the referral with a specific physician?: n/a    Is the specialist an [facility] or [facility] Physician?: [place]     Would the patient rather a call back or a response via [facility]? Call back     Best Call Back Number: [phone]    Additional Information: pt stated she still have pain and would like to go [facility] [address] again, pt would like call back from nurse

## 2024-06-11 NOTE — TELEPHONE ENCOUNTER
----- Message from Tosha Reynoso sent at 6/11/2024  9:38 AM CDT -----  Regarding: medical advice  Name of caller: eugenio       What is the requesting detail: pt states she is having meniscus pain. Please give her a call back to further discuss.       Can the clinic reply by [facility]:       What number to call back:[phone]

## 2024-06-13 ENCOUNTER — TELEPHONE (OUTPATIENT)
Dept: INTERNAL MEDICINE | Facility: CLINIC | Age: 79
End: 2024-06-13
Payer: MEDICARE

## 2024-06-13 NOTE — TELEPHONE ENCOUNTER
Left voicemail message for patient to call the office. We need to know what kind of symptoms she's having and for how long before the provider can send something out to treat her sinuses

## 2024-06-13 NOTE — TELEPHONE ENCOUNTER
----- Message from Kei Kaba sent at 6/13/2024 11:07 AM CDT -----  Type: Patient Call Back    Who called:Self    What is the request in detail:Pt needs medication for sinus, pt can't come in due to leg.    Can the clinic reply by [facility]?No    Would the patient rather a call back or a response via [facility]? Call    Best call back number:[phone] (home)       Additional Information:[id number] - [place], [place] [id number] AT [facility] [address]   Phone: [phone]  Fax: [phone]

## 2024-06-13 NOTE — TELEPHONE ENCOUNTER
Patient is asking for a rx to be sent in for her sinuses. She says for 1 month now she's had post nasal drip which causes her to get hoarse after talking for a while. Green mucous when she blow her nose and facial pain and pressure. She said she knows its her sinuses. She's taking OTC allegra which is not helping. Please advise

## 2024-06-14 RX ORDER — AZITHROMYCIN 250 MG/1
TABLET, FILM COATED ORAL
Qty: 6 TABLET | Refills: 0 | Status: SHIPPED | OUTPATIENT
Start: 2024-06-14 | End: 2024-06-19

## 2024-06-14 NOTE — TELEPHONE ENCOUNTER
Left voicemail stating message below:    Pedro Luis Butterfield MD [facility] Staff8 hours ago (7:57 AM)       Abx sent to pharmacy. Schedule appt if does not improve with tx

## 2024-06-17 ENCOUNTER — OFFICE VISIT (OUTPATIENT)
Dept: SPORTS MEDICINE | Facility: CLINIC | Age: 79
End: 2024-06-17
Payer: MEDICARE

## 2024-06-17 VITALS — SYSTOLIC BLOOD PRESSURE: 129 MMHG | HEART RATE: 99 BPM | DIASTOLIC BLOOD PRESSURE: 78 MMHG

## 2024-06-17 DIAGNOSIS — M17.12 PRIMARY OSTEOARTHRITIS OF LEFT KNEE: Primary | ICD-10-CM

## 2024-06-17 PROCEDURE — 20610 DRAIN/INJ JOINT/BURSA W/O US: CPT | Mod: HCNC,LT,S$GLB, | Performed by: PHYSICIAN ASSISTANT

## 2024-06-17 PROCEDURE — 99214 OFFICE O/P EST MOD 30 MIN: CPT | Mod: 25,HCNC,S$GLB, | Performed by: PHYSICIAN ASSISTANT

## 2024-06-17 PROCEDURE — 3074F SYST BP LT 130 MM HG: CPT | Mod: HCNC,CPTII,S$GLB, | Performed by: PHYSICIAN ASSISTANT

## 2024-06-17 PROCEDURE — 3288F FALL RISK ASSESSMENT DOCD: CPT | Mod: HCNC,CPTII,S$GLB, | Performed by: PHYSICIAN ASSISTANT

## 2024-06-17 PROCEDURE — 1101F PT FALLS ASSESS-DOCD LE1/YR: CPT | Mod: HCNC,CPTII,S$GLB, | Performed by: PHYSICIAN ASSISTANT

## 2024-06-17 PROCEDURE — 3072F LOW RISK FOR RETINOPATHY: CPT | Mod: HCNC,CPTII,S$GLB, | Performed by: PHYSICIAN ASSISTANT

## 2024-06-17 PROCEDURE — 3078F DIAST BP <80 MM HG: CPT | Mod: HCNC,CPTII,S$GLB, | Performed by: PHYSICIAN ASSISTANT

## 2024-06-17 PROCEDURE — 99999 PR PBB SHADOW E&M-EST. PATIENT-LVL II: CPT | Mod: PBBFAC,HCNC,, | Performed by: PHYSICIAN ASSISTANT

## 2024-06-17 PROCEDURE — 1159F MED LIST DOCD IN RCRD: CPT | Mod: HCNC,CPTII,S$GLB, | Performed by: PHYSICIAN ASSISTANT

## 2024-06-17 PROCEDURE — 1125F AMNT PAIN NOTED PAIN PRSNT: CPT | Mod: HCNC,CPTII,S$GLB, | Performed by: PHYSICIAN ASSISTANT

## 2024-06-17 RX ORDER — ROPIVACAINE HYDROCHLORIDE 2 MG/ML
4 INJECTION, SOLUTION EPIDURAL; INFILTRATION
Status: COMPLETED | OUTPATIENT
Start: 2024-06-17 | End: 2024-06-17

## 2024-06-17 RX ADMIN — ROPIVACAINE HYDROCHLORIDE 4 ML: 2 INJECTION, SOLUTION EPIDURAL; INFILTRATION at 01:06

## 2024-06-17 NOTE — PROGRESS NOTES
CC: Left knee pain    Patient presenting for follow up of left knee pain. Has a history of OA and seen previously at our practice by Richard Mcknight PA-C. She had CSI At that time which she says did not help much. She also followed with Dr. Hastings for pain management where she has received JT which also has not helped her. She also received Synvisc-one injection in the knee with pain management on 5/21 which she feels is not helping her. She is reporting a signficant amount of pain on exam today. She's been prescribed Tramadol by her PCP as well as Robaxin.    Prior Hx 11/28/2023:  Patient is a 77-year-old female who presents today for initial evaluation of left knee pain.  She was referred to me by pain management.  Patient states that she began experiencing atraumatic, gradual onset left knee pain this past August.  She does not recall any recent falls, injuries, or trauma to attribute to this.  Pain is rather diffuse throughout the left knee, however does seem to localize to the medial lateral joint lines.  She denies any associated instability.  She does notice some occasional grinding/catching in left knee when bending and straightening it.  The knee does swell occasionally and had her knee aspirated in August.  She has received corticosteroid injections before which seemed to temporarily help with pain.  No prior injuries or surgeries on the left knee.  She ambulates with a cane for assistance at baseline.  Thus far treatment has included over-the-counter medications, rest, and ice compresses with little relief.    + mechanical symptoms, - instability    Is affecting ADLs.  Pain is 3/10 at it's worst.    REVIEW OF SYSTEMS:  Constitution: Negative. Negative for chills, fever and night sweats.   HENT: Negative for congestion and headaches.    Eyes: Negative for blurred vision, left vision loss and right vision loss.   Cardiovascular: Negative for chest pain and syncope.   Respiratory: Negative for cough and  shortness of breath.    Endocrine: Negative for polydipsia, polyphagia and polyuria.   Hematologic/Lymphatic: Negative for bleeding problem. Does not bruise/bleed easily.   Skin: Negative for dry skin, itching and rash.   Musculoskeletal: Negative for falls. Positive for left knee pain and  muscle weakness.   Gastrointestinal: Negative for abdominal pain and bowel incontinence.   Genitourinary: Negative for bladder incontinence and nocturia.   Neurological: Negative for disturbances in coordination, loss of balance and seizures.   Psychiatric/Behavioral: Negative for depression. The patient does not have insomnia.    Allergic/Immunologic: Negative for hives and persistent infections.     PAST MEDICAL HISTORY:    Past Medical History:   Diagnosis Date    Allergy     Anxiety     Cancer     colon    Cataract     Colon cancer     Dependence on nicotine from cigarettes 9/3/2020    Diabetes mellitus, type 2     Dry eye syndrome     Hyperlipidemia     Hypertension     Insomnia     Light cigarette smoker (1-9 cigs/day) 6/21/2022    Malignant neoplasm of ascending colon 7/20/2020    Squamous blepharitis     Syncope 4/16/2022    Tobacco use disorder, moderate, in early remission 11/2/2022    Vitamin D deficiency 9/10/2020       PAST SURGICAL HISTORY:   Past Surgical History:   Procedure Laterality Date    CATARACT EXTRACTION, BILATERAL  03/2020    CHOLECYSTECTOMY      COLON SURGERY      Colon cancer    COLONOSCOPY N/A 02/01/2021    Procedure: COLONOSCOPY;  Surgeon: Ashwini Duarte MD;  Location: [facility] ([address] [place] [address]);  Service: Endoscopy;  Laterality: N/A;  medical transportation  covid test 1/29 [facility], instructions mailed-[facility]    EYE SURGERY      Cataract    HYSTERECTOMY      INJECTION OF ANESTHETIC AGENT AROUND NERVE Left 7/21/2023    Procedure: BLOCK, NERVE, LEFT SHOULDER ARTICULAR BRANCH keep date rep aware;  Surgeon: Abdi Del Toro MD;  Location: [facility];  Service: Pain Management;  Laterality: Left;     INJECTION OF JOINT N/A 2024    Procedure: INJECTION, JOINT BILATERAL PIRIFORMIS AND LEFT GTB;  Surgeon: Abdi Del Toro MD;  Location: [facility];  Service: Pain Management;  Laterality: N/A;  [phone]  2 WK F/U IRMA    JOINT REPLACEMENT      Knee    KNEE SURGERY      LAPAROSCOPIC LEFT COLON RESECTION      TRANSFORAMINAL EPIDURAL INJECTION OF STEROID Left 2023    Procedure: LUMBAR TRANSFORAMINAL LEFT L4/5 AND L5/S1;  Surgeon: Abdi Del Toro MD;  Location: [facility];  Service: Pain Management;  Laterality: Left;  [phone]  2 WK F/U ANTWON       FAMILY HISTORY:   Family History   Problem Relation Name Age of Onset    Heart attack Mother Dora     Heart disease Mother Dora     Colon cancer Father Milo     Diabetes Father Milo     Cancer Sister Denisha         unknown    Hypothyroidism Sister Denisha     Asthma Sister eDnisha     Thyroid cancer Neg Hx         SOCIAL HISTORY:   Social History     Socioeconomic History    Marital status:    Tobacco Use    Smoking status: Former     Current packs/day: 0.00     Average packs/day: 1 pack/day for 51.0 years (51.0 ttl pk-yrs)     Types: Cigarettes     Start date: 1971     Quit date: 2022     Years since quittin.7    Smokeless tobacco: Never    Tobacco comments:     smoking cessation information given    Substance and Sexual Activity    Alcohol use: Yes     Alcohol/week: 1.0 standard drink of alcohol     Types: 1 Shots of liquor per week     Comment: occ    Drug use: Never    Sexual activity: Not Currently     Birth control/protection: Abstinence     Social Determinants of Health     Financial Resource Strain: Low Risk  (2024)    Overall Financial Resource Strain (CARDIA)     Difficulty of Paying Living Expenses: Not hard at all   Food Insecurity: No Food Insecurity (2024)    Hunger Vital Sign     Worried About Running Out of Food in the Last Year: Never true     Ran Out of Food in the Last Year: Never true    Transportation Needs: No Transportation Needs (6/7/2023)    PRAPARE - Transportation     Lack of Transportation (Medical): No     Lack of Transportation (Non-Medical): No   Physical Activity: Unknown (6/17/2024)    Exercise Vital Sign     Days of Exercise per Week: 0 days   Stress: Stress Concern Present (6/17/2024)    [employer] [place] [employer] - Occupational Stress Questionnaire     Feeling of Stress : To some extent   Housing Stability: Low Risk  (6/7/2023)    Housing Stability Vital Sign     Unable to Pay for Housing in the Last Year: No     Number of Places Lived in the Last Year: 1     Unstable Housing in the Last Year: No       MEDICATIONS:     Current Outpatient Medications:     albuterol (PROVENTIL/VENTOLIN HFA) 90 mcg/actuation inhaler, INHALE 2 PUFFS INTO THE LUNGS EVERY 4 (FOUR) HOURS AS NEEDED FOR WHEEZING, Disp: 18 g, Rfl: 3    albuterol-ipratropium (DUO-NEB) 2.5 mg-0.5 mg/3 mL nebulizer solution, Take 3 mLs by nebulization every 6 (six) hours as needed for Wheezing or Shortness of Breath. Rescue, Disp: 15 mL, Rfl: 5    amLODIPine (NORVASC) 5 MG tablet, TAKE 1 TABLET EVERY DAY, Disp: 90 tablet, Rfl: 3    aspirin (ECOTRIN) 81 MG EC tablet, Take 81 mg by mouth once daily., Disp: , Rfl:     atorvastatin (LIPITOR) 40 MG tablet, Take 1 tablet (40 mg total) by mouth once daily., Disp: 90 tablet, Rfl: 3    azelastine (ASTELIN) 137 mcg (0.1 %) nasal spray, 1 spray (137 mcg total) by Nasal route 2 (two) times daily., Disp: 30 mL, Rfl: 11    azithromycin (Z-TAZ) 250 MG tablet, Take 2 tablets by mouth on day 1; Take 1 tablet by mouth on days 2-5, Disp: 6 tablet, Rfl: 0    betamethasone valerate 0.1% (VALISONE) 0.1 % Oint, Apply topically once daily., Disp: 15 g, Rfl: 1    blood sugar diagnostic (TRUE METRIX GLUCOSE TEST STRIP) Strp, Use to test blood glucose two (2) times daily; to be used with insurance-preferred brand of glucometer/supplies, Disp: 200 strip, Rfl: 3    blood-glucose meter kit,  Please provide with insurance covered meter, Disp: 1 each, Rfl: 0    cholecalciferol, vitamin D3, (VITAMIN D3) 50 mcg (2,000 unit) Cap capsule, Take 1 capsule (2,000 Units total) by mouth once daily., Disp: 90 capsule, Rfl: 3    dulaglutide (TRULICITY) 3 mg/0.5 mL pen injector, Inject 3 mg into the skin once a week., Disp: 12 pen , Rfl: 2    DULoxetine (CYMBALTA) 30 MG capsule, Take 1 capsule (30 mg total) by mouth once daily., Disp: 90 capsule, Rfl: 3    famotidine (PEPCID) 40 MG tablet, Take 40 mg by mouth 2 (two) times daily., Disp: , Rfl:     ferrous sulfate 325 (65 FE) MG EC tablet, TAKE 1 TABLET EVERY OTHER DAY, Disp: 45 tablet, Rfl: 3    heparin, porcine, PF, (HEPARIN FLUSH 100 UNITS/ML) 100 unit/mL Syrg, , Disp: , Rfl:     HYDROcodone-acetaminophen (NORCO) 5-325 mg per tablet, Take 1 tablet by mouth every 6 (six) hours as needed for Pain., Disp: 12 tablet, Rfl: 0    hydrOXYzine HCL (ATARAX) 25 MG tablet, Take 1 tablet (25 mg total) by mouth 3 (three) times daily as needed for Itching or Anxiety., Disp: 30 tablet, Rfl: 3    ketoconazole (NIZORAL) 2 % cream, Apply topically once daily. for 14 days, Disp: 60 g, Rfl: 0    lancets Misc, 1 Device by Misc.(Non-Drug; Combo Route) route 2 (two) times daily., Disp: 200 each, Rfl: 11    LIDOcaine (LIDODERM) 5 %, Place 1 patch onto the skin once daily. Remove & Discard patch within 12 hours or as directed by MD, Disp: 15 patch, Rfl: 0    metFORMIN (GLUCOPHAGE-XR) 500 MG ER 24hr tablet, TAKE 2 TABLETS EVERY DAY WITH BREAKFAST, Disp: 180 tablet, Rfl: 3    methocarbamoL (ROBAXIN) 750 MG Tab, Take 1 tablet (750 mg total) by mouth 3 (three) times daily as needed (muscle spasms)., Disp: 90 tablet, Rfl: 0    multivitamin capsule, Take 1 capsule by mouth once daily., Disp: , Rfl:     ondansetron (ZOFRAN) 4 MG tablet, TAKE 1 TABLET EVERY 8 HOURS AS NEEDED FOR NAUSEA, Disp: 30 tablet, Rfl: 0    predniSONE (DELTASONE) 10 MG tablet, TAKE 4 TABLETS ONE TIME DAILY FOR 5 DAYS, Disp:  20 tablet, Rfl: 0    pregabalin (LYRICA) 75 MG capsule, Take 1 capsule (75 mg total) by mouth 2 (two) times daily., Disp: 60 capsule, Rfl: 6    raloxifene (EVISTA) 60 mg tablet, TAKE 1 TABLET EVERY DAY, Disp: 90 tablet, Rfl: 4    triamcinolone acetonide 0.5% (KENALOG) 0.5 % Crea, Apply topically 2 (two) times daily., Disp: 15 g, Rfl: 3    ALLERGIES:   Review of patient's allergies indicates:   Allergen Reactions    Grass pollen-june grass standard        VITAL SIGNS:   /78   Pulse 99      PHYSICAL EXAMINATION  General:  The patient is alert and oriented x 3.  Mood is pleasant.  Observation of ears, eyes and nose reveal no gross abnormalities.  No labored breathing observed.    LEFT KNEE EXAMINATION   Examination of the left knee demonstrates TTP over the medial and lateral joint line. No effusion. Mild soft tissue swelling. Full extension, flexion to 100 with pain. Would not allow ligamentous exam secondary to pain/guarding. Negative Dealno.    X-rays left knee (10/2/2023):   No acute fracture or dislocation.  Bilateral tibiofemoral and moderate to severe patellofemoral degenerative change noted.  Questionable left knee effusion.  Scattered vascular calcifications noted.    Kellgren Raza grade 2     ASSESSMENT:    Left knee pain  Primary osteoarthritis of left knee    PLAN:   We have discussed a variety of treatment options including medications, injections, physical therapy and other alternative treatments. Pt is requesting CSI and physical therapy at this time.    I made the decision to obtain old records of the patient including previous notes and imaging. I independently reviewed and interpreted lab results today as well as prior imaging.     1. Injection Procedure  A time out was performed, including verification of patient ID, procedure, site and side, availability of information and equipment, review of safety issues, and agreement with consent, the procedure site was marked.    After time out  was performed, the patient was prepped aseptically with chloraprep swabsticks. A diagnostic and therapeutic injection of 1:4cc Kenalog/Marcaine was given under sterile technique using a 22g x 1.5 needle from the Superolateral  aspect of the left Knee Joint in the sitting position.      Marizol Kevin had no adverse reactions to the medication. Pain decreased. She was instructed to apply ice to the joint for 20 minutes and avoid strenuous activities for 24-36 hours following the injection. She was warned of possible blood sugar and/or blood pressure changes during that time. Following that time, she can resume regular activities.    She was reminded to call the clinic immediately for any adverse side effects as explained in clinic today.    2. Continue Tylenol, Tramadol, and Robaxin for pain management.  3. Ambulatory referral to physical therapy for patellofemoral strength and conditioning protocol already placed. She starts this week.  4. Ice compress to the affected area 2-3x a day for 15-20 minutes as needed for pain management.  5. RTC to see me prn    All of the patient's questions were answered and the patient will contact us if they have any questions or concerns in the interim.

## 2024-06-18 NOTE — PROGRESS NOTES
[facility]   Physical Therapy Treatment Note      Name: Marizol Kevin  Clinic Number: [id number]    Therapy Diagnosis:   Encounter Diagnoses   Name Primary?    Chronic pain of left knee Yes    Decreased range of motion of left knee     Impaired mobility and ADLs        Physician: Pedro Luis Butterfield MD    Visit Date: 6/19/2024    Physician Orders: PT Evaluate and Treat  Medical Diagnosis:  Left medial knee pain [M25.562]   Surgical Procedure and Date: None  Evaluation Date: 2/2/2024  Insurance Authorization Period Expiration: 1/24/2025  Plan of Care Certification Period: 7/9/2024  Progress Note Due: Every 6th visit or 30 days, whichever occurs first. Prior to physician follow up to provide referring provider accurate and up to date patient progress with rehabilitation.    Date of Return to MD: To be determined  Visit # / Visits authorized: 14/32       PTA Visit #: 0/5     Precautions: Standard    Time In: 12:30 pm  Time Out: 1:25 pm  Total Appointment Time: 55 minutes    Subjective     Patient reports: that she has received an injection with hyaluronic acid in it and that has helped.   She was compliant with home exercise program.  Response to previous treatment: no increased pain.  Functional change: improved gait after treatment    Pain: 4/10  Location: left medial knee      Objective    Asterisk Signs:   Ankle Circumference R 48.5 cm, L 48 cm    Objective Measures updated at progress report unless specified.     Treatment     Marizol received the treatments listed below:      manual therapy techniques: Joint mobilizations were applied to the: Left knee for 15 minutes, including:  Medial Gapping  Lateral Patellar Glides Grade I  Inferior Patellar Glides Grade I  Anterior Glide of Tibia Grade I  Tibial/ Femoral gapping    neuromuscular re-education activities to improve: Balance, Coordination, Kinesthetic, Sense, Proprioception, and Posture for 20 minutes. The following activities  were included:  Straight Leg Raise x 20 on each side  Ankle Pumps x 35  Heel Slides x 30  Sciatic Nerve Glides x 35    therapeutic activities to improve functional performance for 23 minutes, including:  Long arc quads with 3# x 30  Hip abductions x 30 on left leg  Hip extensions x 30 on left leg  Seated heel raises 2 x 20 bilateral    Patient Education and Home Exercises       Education provided:   - Patient was provided education on for continued compliance with HEP for continued functional mobility and strength gains for return to prior level of function.      Written Home Exercises Provided: Patient instructed to cont prior HEP. Exercises were reviewed and Marizol was able to demonstrate them prior to the end of the session.  Marizol demonstrated good  understanding of the education provided. See Electronic Medical Record under Patient Instructions for exercises provided during therapy sessions    Assessment   Patient presents to the clinic with moderate symptom irritability pre-session and mild/moderate irritability post-session. Manual therapy was performed to left patella, and anterior knee to improve joint play and allow for optimal movement during corrective exercises.   Patient demonstrates fatigue when performing single leg raises.   Patient demonstrated improvements in long arc quads within session.     Patient will continue to benefit from skilled outpatient physical therapy to address the deficits listed in the problem list box on initial evaluation, provide patient/family education and to maximize patient's level of independence in the home and community environment.     Marizol Is progressing well towards her goals.   Patient prognosis is Good.     Patient's spiritual, cultural and educational needs considered and pt agreeable to plan of care and goals.     Anticipated barriers to physical therapy: None    Goals:   GOALS: Short Term Goals:  4 weeks  1.Report decreased posterior thigh and medial knee  pain  < / =  3/10  to increase tolerance for ADLs MET  2. Increase ROM of Left hip into internal rotation 10 degrees where limited in order to perform ADLs without difficulty. MET  3. Increase strength by 1/3 MMT grade in quadriceps and hamstrings  to increase tolerance for ADL and work activities. MET  4. Pt to tolerate HEP to improve ROM and independence with ADL's.  MET     Long Term Goals: 8 weeks  1.Report decreased posterior thigh and medial knee pain < / = 2/10  to increase tolerance for ADLs and walking.  2.Patient goal: return to prior level of function and walking her dog without symptoms.   3.Increase strength to 4+/5 in quadriceps and hamstrings  to increase tolerance for ADL and work activities.  4. Pt will report at 20% score on FOTO  to demonstrate increase in LE function with every day tasks.     Plan     Plan to check lumbar spine, ANTT of sciatic nerve upon next visit.     Updated Certification Period: 4/16/2024 to 7/9/2024     Recommended Treatment Plan: 2 times per week for 12 weeks:  Cervical/Lumbar Traction, Electrical Stimulation as needed, Gait Training, Manual Therapy, Moist Heat/ Ice, Neuromuscular Re-ed, Orthotic Management and Training, Patient Education, Self Care, Therapeutic Activities, and Therapeutic Exercise  Other Recommendations: None    Rusty Robertson PT, DPT  Board Certified Orthopaedic Clinical Specialist

## 2024-06-19 ENCOUNTER — CLINICAL SUPPORT (OUTPATIENT)
Dept: REHABILITATION | Facility: OTHER | Age: 79
End: 2024-06-19
Payer: MEDICARE

## 2024-06-19 DIAGNOSIS — G89.29 CHRONIC PAIN OF LEFT KNEE: Primary | ICD-10-CM

## 2024-06-19 DIAGNOSIS — Z74.09 IMPAIRED MOBILITY AND ADLS: ICD-10-CM

## 2024-06-19 DIAGNOSIS — Z78.9 IMPAIRED MOBILITY AND ADLS: ICD-10-CM

## 2024-06-19 DIAGNOSIS — M25.562 CHRONIC PAIN OF LEFT KNEE: Primary | ICD-10-CM

## 2024-06-19 DIAGNOSIS — M25.662 DECREASED RANGE OF MOTION OF LEFT KNEE: ICD-10-CM

## 2024-06-19 PROCEDURE — 97140 MANUAL THERAPY 1/> REGIONS: CPT | Mod: HCNC,PN

## 2024-06-19 PROCEDURE — 97112 NEUROMUSCULAR REEDUCATION: CPT | Mod: HCNC,PN

## 2024-06-19 PROCEDURE — 97530 THERAPEUTIC ACTIVITIES: CPT | Mod: HCNC,PN

## 2024-06-24 DIAGNOSIS — J30.9 ALLERGIC RHINITIS, UNSPECIFIED SEASONALITY, UNSPECIFIED TRIGGER: ICD-10-CM

## 2024-06-24 RX ORDER — AZELASTINE 1 MG/ML
1 SPRAY, METERED NASAL 2 TIMES DAILY
Qty: 60 ML | Refills: 3 | Status: SHIPPED | OUTPATIENT
Start: 2024-06-24

## 2024-06-27 NOTE — PROGRESS NOTES
[facility]   Physical Therapy Treatment Note      Name: Marizol Kevin  Clinic Number: [id number]    Therapy Diagnosis:   Encounter Diagnoses   Name Primary?    Chronic pain of left knee Yes    Decreased range of motion of left knee     Impaired mobility and ADLs          Physician: Pedro Luis Butterfield MD    Visit Date: 6/28/2024    Physician Orders: PT Evaluate and Treat  Medical Diagnosis:  Left medial knee pain [M25.562]   Surgical Procedure and Date: None  Evaluation Date: 2/2/2024  Insurance Authorization Period Expiration: 1/24/2025  Plan of Care Certification Period: 7/9/2024  Progress Note Due: Every 6th visit or 30 days, whichever occurs first. Prior to physician follow up to provide referring provider accurate and up to date patient progress with rehabilitation.    Date of Return to MD: To be determined  Visit # / Visits authorized: 14/32       PTA Visit #: 0/5     Precautions: Standard    Time In: 10:50 am  Time Out: 11:40 am  Total Appointment Time: 40 minutes    Subjective     Patient reports: that she feels better after last session.   She was compliant with home exercise program.  Response to previous treatment: no increased pain.  Functional change: improved gait after treatment    Pain: 4/10  Location: left medial knee      Objective    Asterisk Signs:   Ankle Circumference R 48.5 cm, L 48 cm    Objective Measures updated at progress report unless specified.     Treatment     Marizlo received the treatments listed below:      manual therapy techniques: Joint mobilizations were applied to the: Left knee for 10 minutes, including:  Medial Gapping Grade III  Lateral Patellar Glides Grade I  Inferior Patellar Glides Grade I  Anterior Glide of Tibia Grade I  Tibial/ Femoral gapping    neuromuscular re-education activities to improve: Balance, Coordination, Kinesthetic, Sense, Proprioception, and Posture for 20 minutes. The following activities were included:  Straight Leg  Raise 2 x 20 reps on each side  Quad Sets 2 x 20 reps   Ankle Pumps x 35  Heel Slides x 30  Sciatic Nerve Glides x 35    therapeutic activities to improve functional performance for 23 minutes, including:  Long arc quads with 3# x 30  Hip abductions x 30 on left leg  Hip extensions x 30 on left leg  Seated heel raises 2 x 20 bilateral    Patient Education and Home Exercises       Education provided:   - Patient was provided education on for continued compliance with HEP for continued functional mobility and strength gains for return to prior level of function.      Written Home Exercises Provided: Patient instructed to cont prior HEP. Exercises were reviewed and Marizol was able to demonstrate them prior to the end of the session.  Marizol demonstrated good  understanding of the education provided. See Electronic Medical Record under Patient Instructions for exercises provided during therapy sessions    Assessment   Patient presents to the clinic with moderate symptom irritability pre-session and mild/moderate irritability post-session. Manual therapy was performed to left patella, and anterior knee to improve joint play and allow for optimal movement during corrective exercises.   Patient demonstrates fatigue when performing single leg raises.   Patient demonstrated improvements in long arc quads within session.     Patient will continue to benefit from skilled outpatient physical therapy to address the deficits listed in the problem list box on initial evaluation, provide patient/family education and to maximize patient's level of independence in the home and community environment.     Marizol Is progressing well towards her goals.   Patient prognosis is Good.     Patient's spiritual, cultural and educational needs considered and pt agreeable to plan of care and goals.     Anticipated barriers to physical therapy: None    Goals:   GOALS: Short Term Goals:  4 weeks  1.Report decreased posterior thigh and medial knee  pain  < / =  3/10  to increase tolerance for ADLs MET  2. Increase ROM of Left hip into internal rotation 10 degrees where limited in order to perform ADLs without difficulty. MET  3. Increase strength by 1/3 MMT grade in quadriceps and hamstrings  to increase tolerance for ADL and work activities. MET  4. Pt to tolerate HEP to improve ROM and independence with ADL's.  MET     Long Term Goals: 8 weeks  1.Report decreased posterior thigh and medial knee pain < / = 2/10  to increase tolerance for ADLs and walking.  2.Patient goal: return to prior level of function and walking her dog without symptoms.   3.Increase strength to 4+/5 in quadriceps and hamstrings  to increase tolerance for ADL and work activities.  4. Pt will report at 20% score on FOTO  to demonstrate increase in LE function with every day tasks.     Plan     Plan to check lumbar spine, ANTT of sciatic nerve upon next visit.     Updated Certification Period: 4/16/2024 to 7/9/2024     Recommended Treatment Plan: 2 times per week for 12 weeks:  Cervical/Lumbar Traction, Electrical Stimulation as needed, Gait Training, Manual Therapy, Moist Heat/ Ice, Neuromuscular Re-ed, Orthotic Management and Training, Patient Education, Self Care, Therapeutic Activities, and Therapeutic Exercise  Other Recommendations: None    Rusty Robertson PT, DPT  Board Certified Orthopaedic Clinical Specialist

## 2024-06-28 ENCOUNTER — CLINICAL SUPPORT (OUTPATIENT)
Dept: REHABILITATION | Facility: OTHER | Age: 79
End: 2024-06-28
Payer: MEDICARE

## 2024-06-28 DIAGNOSIS — M25.662 DECREASED RANGE OF MOTION OF LEFT KNEE: ICD-10-CM

## 2024-06-28 DIAGNOSIS — Z78.9 IMPAIRED MOBILITY AND ADLS: ICD-10-CM

## 2024-06-28 DIAGNOSIS — M25.562 CHRONIC PAIN OF LEFT KNEE: Primary | ICD-10-CM

## 2024-06-28 DIAGNOSIS — G89.29 CHRONIC PAIN OF LEFT KNEE: Primary | ICD-10-CM

## 2024-06-28 DIAGNOSIS — Z74.09 IMPAIRED MOBILITY AND ADLS: ICD-10-CM

## 2024-06-28 PROCEDURE — 97140 MANUAL THERAPY 1/> REGIONS: CPT | Mod: HCNC,PN

## 2024-06-28 PROCEDURE — 97530 THERAPEUTIC ACTIVITIES: CPT | Mod: HCNC,PN

## 2024-06-28 PROCEDURE — 97112 NEUROMUSCULAR REEDUCATION: CPT | Mod: HCNC,PN

## 2024-07-01 ENCOUNTER — TELEPHONE (OUTPATIENT)
Dept: SPINE | Facility: CLINIC | Age: 79
End: 2024-07-01
Payer: MEDICARE

## 2024-07-01 ENCOUNTER — TELEPHONE (OUTPATIENT)
Dept: PAIN MEDICINE | Facility: CLINIC | Age: 79
End: 2024-07-01
Payer: MEDICARE

## 2024-07-01 NOTE — TELEPHONE ENCOUNTER
----- Message from Erik Robertson sent at 7/1/2024 11:06 AM CDT -----  Contact: Dany  Type:  Patient Call          Who Called: Patient         Does the patient know what this is regarding?: Requesting a call back about if she need to schedule a follow up appt to treat her knee concern ;  Pt said that she's doing well and she's in therapy ; please advise           Would the patient rather a call back or a response via [facility]?call           Best Call Back Number: [phone]             Additional Information:

## 2024-07-01 NOTE — TELEPHONE ENCOUNTER
Staff spoke with patient and pt stated that she isn't in any more pain she feels good. She don't need a follow up appointment.

## 2024-07-02 ENCOUNTER — OFFICE VISIT (OUTPATIENT)
Dept: OBSTETRICS AND GYNECOLOGY | Facility: CLINIC | Age: 79
End: 2024-07-02
Payer: MEDICARE

## 2024-07-02 ENCOUNTER — TELEPHONE (OUTPATIENT)
Dept: INTERNAL MEDICINE | Facility: CLINIC | Age: 79
End: 2024-07-02
Payer: MEDICARE

## 2024-07-02 VITALS
BODY MASS INDEX: 33.91 KG/M2 | HEIGHT: 57 IN | SYSTOLIC BLOOD PRESSURE: 130 MMHG | WEIGHT: 157.19 LBS | DIASTOLIC BLOOD PRESSURE: 82 MMHG

## 2024-07-02 DIAGNOSIS — N76.1 SUBACUTE VAGINITIS: Primary | ICD-10-CM

## 2024-07-02 DIAGNOSIS — Z12.31 ENCOUNTER FOR SCREENING MAMMOGRAM FOR BREAST CANCER: Primary | ICD-10-CM

## 2024-07-02 PROCEDURE — 1159F MED LIST DOCD IN RCRD: CPT | Mod: HCNC,CPTII,S$GLB, | Performed by: ADVANCED PRACTICE MIDWIFE

## 2024-07-02 PROCEDURE — 3079F DIAST BP 80-89 MM HG: CPT | Mod: HCNC,CPTII,S$GLB, | Performed by: ADVANCED PRACTICE MIDWIFE

## 2024-07-02 PROCEDURE — 1125F AMNT PAIN NOTED PAIN PRSNT: CPT | Mod: HCNC,CPTII,S$GLB, | Performed by: ADVANCED PRACTICE MIDWIFE

## 2024-07-02 PROCEDURE — 99202 OFFICE O/P NEW SF 15 MIN: CPT | Mod: HCNC,S$GLB,, | Performed by: ADVANCED PRACTICE MIDWIFE

## 2024-07-02 PROCEDURE — 81514 NFCT DS BV&VAGINITIS DNA ALG: CPT | Mod: HCNC | Performed by: ADVANCED PRACTICE MIDWIFE

## 2024-07-02 PROCEDURE — 3075F SYST BP GE 130 - 139MM HG: CPT | Mod: HCNC,CPTII,S$GLB, | Performed by: ADVANCED PRACTICE MIDWIFE

## 2024-07-02 PROCEDURE — 99999 PR PBB SHADOW E&M-EST. PATIENT-LVL II: CPT | Mod: PBBFAC,HCNC,, | Performed by: ADVANCED PRACTICE MIDWIFE

## 2024-07-02 PROCEDURE — 3072F LOW RISK FOR RETINOPATHY: CPT | Mod: HCNC,CPTII,S$GLB, | Performed by: ADVANCED PRACTICE MIDWIFE

## 2024-07-02 RX ORDER — FLUCONAZOLE 200 MG/1
200 TABLET ORAL EVERY OTHER DAY
Qty: 3 TABLET | Refills: 0 | Status: SHIPPED | OUTPATIENT
Start: 2024-07-02 | End: 2024-07-07

## 2024-07-02 NOTE — PROGRESS NOTES
Marizol Kevin is a 78 y.o. female  with pmh of T2DM [facility] with complaint of vaginal itching and odor. Patient states that she has been having moderate pruritus intermittently for the last year. She notices an odor when she first wakes up in the morning that resolves after showering. Patient has tried OTC monistat without relief, but was not consistent with treatment. She has also tried using a fan to keep the area cool. Pt denies any dysuria, hematuria, abdominal pain or fever.     ROS:  GENERAL: No fever, chills, fatigability or weight loss.  VULVAR: No pain, no lesions. Has itching.   VAGINAL: No relaxation, no abnormal bleeding and no lesions.  ABDOMEN: No abdominal pain.   URINARY: No incontinence, no nocturia, no frequency and no dysuria.    Review of patient's allergies indicates:   Allergen Reactions    Grass pollen-patricia grass standard        Current Outpatient Medications:     albuterol (PROVENTIL/VENTOLIN HFA) 90 mcg/actuation inhaler, INHALE 2 PUFFS INTO THE LUNGS EVERY 4 (FOUR) HOURS AS NEEDED FOR WHEEZING, Disp: 18 g, Rfl: 3    albuterol-ipratropium (DUO-NEB) 2.5 mg-0.5 mg/3 mL nebulizer solution, Take 3 mLs by nebulization every 6 (six) hours as needed for Wheezing or Shortness of Breath. Rescue, Disp: 15 mL, Rfl: 5    amLODIPine (NORVASC) 5 MG tablet, TAKE 1 TABLET EVERY DAY, Disp: 90 tablet, Rfl: 3    aspirin (ECOTRIN) 81 MG EC tablet, Take 81 mg by mouth once daily., Disp: , Rfl:     atorvastatin (LIPITOR) 40 MG tablet, Take 1 tablet (40 mg total) by mouth once daily., Disp: 90 tablet, Rfl: 3    azelastine (ASTELIN) 137 mcg (0.1 %) nasal spray, USE 1 SPRAY IN EACH NOSTRIL TWICE DAILY, Disp: 60 mL, Rfl: 3    betamethasone valerate 0.1% (VALISONE) 0.1 % Oint, Apply topically once daily., Disp: 15 g, Rfl: 1    blood sugar diagnostic (TRUE METRIX GLUCOSE TEST STRIP) Strp, Use to test blood glucose two (2) times daily; to be used with insurance-preferred brand of  glucometer/supplies, Disp: 200 strip, Rfl: 3    blood-glucose meter kit, Please provide with insurance covered meter, Disp: 1 each, Rfl: 0    cholecalciferol, vitamin D3, (VITAMIN D3) 50 mcg (2,000 unit) Cap capsule, Take 1 capsule (2,000 Units total) by mouth once daily., Disp: 90 capsule, Rfl: 3    dulaglutide (TRULICITY) 3 mg/0.5 mL pen injector, Inject 3 mg into the skin once a week., Disp: 12 pen , Rfl: 2    DULoxetine (CYMBALTA) 30 MG capsule, Take 1 capsule (30 mg total) by mouth once daily., Disp: 90 capsule, Rfl: 3    famotidine (PEPCID) 40 MG tablet, Take 40 mg by mouth 2 (two) times daily., Disp: , Rfl:     ferrous sulfate 325 (65 FE) MG EC tablet, TAKE 1 TABLET EVERY OTHER DAY, Disp: 45 tablet, Rfl: 3    heparin, porcine, PF, (HEPARIN FLUSH 100 UNITS/ML) 100 unit/mL Syrg, , Disp: , Rfl:     HYDROcodone-acetaminophen (NORCO) 5-325 mg per tablet, Take 1 tablet by mouth every 6 (six) hours as needed for Pain., Disp: 12 tablet, Rfl: 0    hydrOXYzine HCL (ATARAX) 25 MG tablet, Take 1 tablet (25 mg total) by mouth 3 (three) times daily as needed for Itching or Anxiety., Disp: 30 tablet, Rfl: 3    lancets Misc, 1 Device by Misc.(Non-Drug; Combo Route) route 2 (two) times daily., Disp: 200 each, Rfl: 11    LIDOcaine (LIDODERM) 5 %, Place 1 patch onto the skin once daily. Remove & Discard patch within 12 hours or as directed by MD, Disp: 15 patch, Rfl: 0    metFORMIN (GLUCOPHAGE-XR) 500 MG ER 24hr tablet, TAKE 2 TABLETS EVERY DAY WITH BREAKFAST, Disp: 180 tablet, Rfl: 3    methocarbamoL (ROBAXIN) 750 MG Tab, Take 1 tablet (750 mg total) by mouth 3 (three) times daily as needed (muscle spasms)., Disp: 90 tablet, Rfl: 0    multivitamin capsule, Take 1 capsule by mouth once daily., Disp: , Rfl:     ondansetron (ZOFRAN) 4 MG tablet, TAKE 1 TABLET EVERY 8 HOURS AS NEEDED FOR NAUSEA, Disp: 30 tablet, Rfl: 0    predniSONE (DELTASONE) 10 MG tablet, TAKE 4 TABLETS ONE TIME DAILY FOR 5 DAYS, Disp: 20 tablet, Rfl: 0     pregabalin (LYRICA) 75 MG capsule, Take 1 capsule (75 mg total) by mouth 2 (two) times daily., Disp: 60 capsule, Rfl: 6    raloxifene (EVISTA) 60 mg tablet, TAKE 1 TABLET EVERY DAY, Disp: 90 tablet, Rfl: 4    triamcinolone acetonide 0.5% (KENALOG) 0.5 % Crea, Apply topically 2 (two) times daily., Disp: 15 g, Rfl: 3    fluconazole (DIFLUCAN) 200 MG Tab, Take 1 tablet (200 mg total) by mouth every other day. for 3 doses, Disp: 3 tablet, Rfl: 0    ketoconazole (NIZORAL) 2 % cream, Apply topically once daily. for 14 days, Disp: 60 g, Rfl: 0    Past Medical History:   Diagnosis Date    Allergy     Anxiety     Cancer     colon    Cataract     Colon cancer     Dependence on nicotine from cigarettes 9/3/2020    Diabetes mellitus, type 2     Dry eye syndrome     Hyperlipidemia     Hypertension     Insomnia     Light cigarette smoker (1-9 cigs/day) 6/21/2022    Malignant neoplasm of ascending colon 7/20/2020    Squamous blepharitis     Syncope 4/16/2022    Tobacco use disorder, moderate, in early remission 11/2/2022    Vitamin D deficiency 9/10/2020     Past Surgical History:   Procedure Laterality Date    CATARACT EXTRACTION, BILATERAL  03/2020    CHOLECYSTECTOMY      COLON SURGERY      Colon cancer    COLONOSCOPY N/A 02/01/2021    Procedure: COLONOSCOPY;  Surgeon: Ashwini Duarte MD;  Location: [address]);  Service: Endoscopy;  Laterality: N/A;  medical transportation  covid test 1/29 [employer], instructions mailed-[facility]    EYE SURGERY      Cataract    HYSTERECTOMY      INJECTION OF ANESTHETIC AGENT AROUND NERVE Left 7/21/2023    Procedure: BLOCK, NERVE, LEFT SHOULDER ARTICULAR BRANCH keep date rep aware;  Surgeon: Abdi Del Toro MD;  Location: [facility];  Service: Pain Management;  Laterality: Left;    INJECTION OF JOINT N/A 4/25/2024    Procedure: INJECTION, JOINT BILATERAL PIRIFORMIS AND LEFT GTB;  Surgeon: Abdi Del Toro MD;  Location: [facility];  Service: Pain Management;  Laterality: N/A;   "[phone]  2 WK F/U IRMA    JOINT REPLACEMENT      Knee    KNEE SURGERY      LAPAROSCOPIC LEFT COLON RESECTION      TRANSFORAMINAL EPIDURAL INJECTION OF STEROID Left 2023    Procedure: LUMBAR TRANSFORAMINAL LEFT L4/5 AND L5/S1;  Surgeon: Abdi Del Toro MD;  Location: [facility];  Service: Pain Management;  Laterality: Left;  [phone]  2 WK F/U ANTWON     Social History     Tobacco Use    Smoking status: Former     Current packs/day: 0.00     Average packs/day: 1 pack/day for 51.0 years (51.0 ttl pk-yrs)     Types: Cigarettes     Start date: 1971     Quit date: 2022     Years since quittin.7    Smokeless tobacco: Never    Tobacco comments:     smoking cessation information given    Substance Use Topics    Alcohol use: Yes     Alcohol/week: 1.0 standard drink of alcohol     Types: 1 Shots of liquor per week     Comment: occ    Drug use: Never     OB History    Para Term  AB Living   2 2 2     2   SAB IAB Ectopic Multiple Live Births           2      # Outcome Date GA Lbr Harry/2nd Weight Sex Type Anes PTL Lv   2 Term     F Vag-Spont      1 Term 1969    M Vag-Spont          /84   Ht 4' 9" (1.448 m)   Wt 71.3 kg (157 lb 3 oz)   BMI 34.02 kg/m²     PHYSICAL EXAM:  GENERAL: Calm and appropriate affect, alert, oriented x4  SKIN: Color appropriate for race, warm and dry, clean and intact with no rashes.  RESP: Even, unlabored breathing  ABDOMEN: Soft, nontender, no masses.  :   Normal external female genitalia without lesions. Normal hair distribution. Adequate perineal body, normal urethral meatus. Irritation around vulva and perineal area.   Vagina pink, no lesions, vaginal discharge - white curd-like  No significant cystocele or rectocele.  Cervix -no cervical motion tenderness.      ASSESSMENT / PLAN:    ICD-10-CM ICD-9-CM    1. Subacute vaginitis  N76.1 616.10 fluconazole (DIFLUCAN) 200 MG Tab      Vaginosis Screen by DNA Probe        Subacute vaginitis  -    "  fluconazole (DIFLUCAN) 200 MG Tab; Take 1 tablet (200 mg total) by mouth every other day. for 3 doses  Dispense: 3 tablet; Refill: 0  -     Vaginosis Screen by DNA Probe        Patient was counseled today on vaginitis prevention including :  a. avoiding feminine products such as deoderant soaps, body wash, bubble bath, douches, scented toilet paper, deoderant tampons or pads, feminine wipes, chronic pad use, etc.  b. avoiding other vulvovaginal irritants such as long hot baths, humidity, tight, synthetic clothing, chlorine and sitting around in wet bathing suits  c. wearing cotton underwear, avoiding thong underwear and no underwear to bed  d. taking showers instead of baths and use a hair dryer on cool setting afterwards to dry  e. wearing cotton to exercise and shower immediately after exercise and change clothes  f. using polyurethane condoms without spermicide if sexually active and symptoms are triggered by intercourse  g. Discussed use of vagisil, along with repHresh and probiotics    FOLLOW UP:   Pending lab results, PRN lack of improvement.    Corinne Cundiff PA-S    I have seen the patient, reviewed the  PA student's   assessment, plan, and progress note. I have personally interviewed and examined the patient at bedside and: agree with the findings.. Date of Service:  07/02/2024 9:10 AM      Viki Jenkins CNM  Obstetrics

## 2024-07-02 NOTE — TELEPHONE ENCOUNTER
----- Message from Erik Robertson sent at 7/2/2024 10:08 AM CDT -----  Contact: Patient  Type:  Patient Call          Who Called: Patient         Does the patient know what this is regarding?: Requesting a call back about having mammo orders ; pt would like to have her orders so she can scheduled her mammo for 7/8 the same day as her other appt if possible ; please advise           Would the patient rather a call back or a response via [facility]? Call           Best Call Back Number: [phone]             Additional Information:

## 2024-07-03 ENCOUNTER — DOCUMENTATION ONLY (OUTPATIENT)
Dept: REHABILITATION | Facility: OTHER | Age: 79
End: 2024-07-03
Payer: MEDICARE

## 2024-07-03 ENCOUNTER — CLINICAL SUPPORT (OUTPATIENT)
Dept: REHABILITATION | Facility: OTHER | Age: 79
End: 2024-07-03
Payer: MEDICARE

## 2024-07-03 DIAGNOSIS — Z74.09 IMPAIRED MOBILITY AND ADLS: ICD-10-CM

## 2024-07-03 DIAGNOSIS — M25.662 DECREASED RANGE OF MOTION OF LEFT KNEE: ICD-10-CM

## 2024-07-03 DIAGNOSIS — G89.29 CHRONIC PAIN OF LEFT KNEE: Primary | ICD-10-CM

## 2024-07-03 DIAGNOSIS — M25.562 CHRONIC PAIN OF LEFT KNEE: Primary | ICD-10-CM

## 2024-07-03 DIAGNOSIS — Z78.9 IMPAIRED MOBILITY AND ADLS: ICD-10-CM

## 2024-07-03 LAB
BACTERIAL VAGINOSIS DNA: NEGATIVE
CANDIDA GLABRATA DNA: NEGATIVE
CANDIDA KRUSEI DNA: NEGATIVE
CANDIDA RRNA VAG QL PROBE: POSITIVE
T VAGINALIS RRNA GENITAL QL PROBE: NEGATIVE

## 2024-07-03 PROCEDURE — 97140 MANUAL THERAPY 1/> REGIONS: CPT | Mod: HCNC,PN,CQ

## 2024-07-03 PROCEDURE — 97112 NEUROMUSCULAR REEDUCATION: CPT | Mod: HCNC,PN,CQ

## 2024-07-03 PROCEDURE — 97530 THERAPEUTIC ACTIVITIES: CPT | Mod: HCNC,PN,CQ

## 2024-07-03 NOTE — PROGRESS NOTES
[facility]   Physical Therapy Treatment Note      Name: Marizol Kevin  Clinic Number: [id number]    Therapy Diagnosis:   Encounter Diagnoses   Name Primary?    Chronic pain of left knee Yes    Decreased range of motion of left knee     Impaired mobility and ADLs            Physician: Pedro Luis Butterfield MD    Visit Date: 7/3/2024    Physician Orders: PT Evaluate and Treat  Medical Diagnosis:  Left medial knee pain [M25.562]   Surgical Procedure and Date: None  Evaluation Date: 2/2/2024  Insurance Authorization Period Expiration: 1/24/2025  Plan of Care Certification Period: 7/9/2024  Progress Note Due: Every 6th visit or 30 days, whichever occurs first. Prior to physician follow up to provide referring provider accurate and up to date patient progress with rehabilitation.    Date of Return to MD: To be determined  Visit # / Visits authorized: 17/32       Precautions: Standard    Time In: 1048  Time Out: 1207  Total Appointment Time: 38 minutes    Subjective     Patient reports: she feels the manual she received from Rusty was very helpful and she has been feeling better.   She was compliant with home exercise program.  Response to previous treatment: felt better after the manual   Functional change: improved gait after treatment    Pain: 4/10  Location: left medial knee      Objective    Asterisk Signs:   Ankle Circumference R 48.5 cm, L 48 cm    Objective Measures updated at progress report unless specified.     Treatment     Marizol received the treatments listed below:      manual therapy techniques: Joint mobilizations were applied to the: Left knee for 10 minutes, including:  Medial Gapping Grade III  Lateral Patellar Glides Grade I  Inferior Patellar Glides Grade I  Anterior Glide of Tibia Grade I  Tibial/ Femoral gapping    neuromuscular re-education activities to improve: Balance, Coordination, Kinesthetic, Sense, Proprioception, and Posture for 20 minutes. The following  activities were included:  Straight Leg Raise 2 x 20 reps on each side  Quad Sets 2 x 20 reps   Ankle Pumps x 35  Heel Slides x 30  Sciatic Nerve Glides x 35    therapeutic activities to improve functional performance for 8 minutes, including:  Long arc quads with 3# x 30  Hip abductions x 30 on left leg  Hip extensions x 30 on left leg  Seated heel raises 2 x 20 bilateral    Patient Education and Home Exercises       Education provided:   - Patient was provided education on for continued compliance with HEP for continued functional mobility and strength gains for return to prior level of function.      Written Home Exercises Provided: Patient instructed to cont prior HEP. Exercises were reviewed and Marizol was able to demonstrate them prior to the end of the session.  Marizol demonstrated good  understanding of the education provided. See Electronic Medical Record under Patient Instructions for exercises provided during therapy sessions    Assessment     Marizol tolerated tx well with minimal complaints. Muscle weakness in quads/glutes are notable at this time. Emphasis was focused on global strengthening as well as functional exercises to help improve ADL performance/reduced pain.       Marizol Is progressing well towards her goals.   Patient prognosis is Good.     Patient's spiritual, cultural and educational needs considered and pt agreeable to plan of care and goals.     Anticipated barriers to physical therapy: None    Goals:   GOALS: Short Term Goals:  4 weeks  1.Report decreased posterior thigh and medial knee pain  < / =  3/10  to increase tolerance for ADLs MET  2. Increase ROM of Left hip into internal rotation 10 degrees where limited in order to perform ADLs without difficulty. MET  3. Increase strength by 1/3 MMT grade in quadriceps and hamstrings  to increase tolerance for ADL and work activities. MET  4. Pt to tolerate HEP to improve ROM and independence with ADL's.  MET     Long Term Goals: 8  weeks  1.Report decreased posterior thigh and medial knee pain < / = 2/10  to increase tolerance for ADLs and walking.  2.Patient goal: return to prior level of function and walking her dog without symptoms.   3.Increase strength to 4+/5 in quadriceps and hamstrings  to increase tolerance for ADL and work activities.  4. Pt will report at 20% score on FOTO  to demonstrate increase in LE function with every day tasks.     Plan     Plan to check lumbar spine, ANTT of sciatic nerve upon next visit.     Updated Certification Period: 4/16/2024 to 7/9/2024     Recommended Treatment Plan: 2 times per week for 12 weeks:  Cervical/Lumbar Traction, Electrical Stimulation as needed, Gait Training, Manual Therapy, Moist Heat/ Ice, Neuromuscular Re-ed, Orthotic Management and Training, Patient Education, Self Care, Therapeutic Activities, and Therapeutic Exercise  Other Recommendations: None      David Lieberman, PTA

## 2024-07-03 NOTE — PROGRESS NOTES
Physical Therapist and Physical Therapist Assistant met face to face to discuss patient's treatment plan and progress towards established goals. Pt will be seen by a physical therapist minimally every 6th visit or every 30 days.    David Lieberman, PTA  07/03/2024

## 2024-07-04 ENCOUNTER — PATIENT MESSAGE (OUTPATIENT)
Dept: OBSTETRICS AND GYNECOLOGY | Facility: CLINIC | Age: 79
End: 2024-07-04
Payer: MEDICARE

## 2024-07-05 ENCOUNTER — TELEPHONE (OUTPATIENT)
Dept: INTERNAL MEDICINE | Facility: CLINIC | Age: 79
End: 2024-07-05
Payer: MEDICARE

## 2024-07-05 NOTE — TELEPHONE ENCOUNTER
----- Message from Tosha Reynoso sent at 7/5/2024  8:34 AM CDT -----  Regarding: call back request  Name of caller: Marizol       What is the requesting detail: pt is requesting a call back regarding a urine sample.Please advise       Can the clinic reply by [facility]:       What number to call back:[phone]

## 2024-07-05 NOTE — TELEPHONE ENCOUNTER
Pt called and asked about Labs for upcoming appt. I said you have 2 options, get Labs before appt, or get them day of. Pt said she will wait day of.

## 2024-07-05 NOTE — TELEPHONE ENCOUNTER
----- Message from Calvin Bird sent at 7/5/2024 10:14 AM CDT -----  Type:  Patient Returning Call    Who Called: pt  Who Left Message for Patient: unknown  Does the patient know what this is regarding?: no  Would the patient rather a call back or a response via [facility]? call  Best Call Back Number: [phone]  Additional Information:  pt said she does not know where the call came from of the staff that called her but that she called Dr. Butterfield's office this morning

## 2024-07-08 ENCOUNTER — OFFICE VISIT (OUTPATIENT)
Dept: PAIN MEDICINE | Facility: CLINIC | Age: 79
End: 2024-07-08
Payer: MEDICARE

## 2024-07-08 VITALS
TEMPERATURE: 98 F | BODY MASS INDEX: 33.92 KG/M2 | DIASTOLIC BLOOD PRESSURE: 68 MMHG | SYSTOLIC BLOOD PRESSURE: 103 MMHG | HEART RATE: 96 BPM | WEIGHT: 156.75 LBS

## 2024-07-08 DIAGNOSIS — M17.12 PRIMARY OSTEOARTHRITIS OF LEFT KNEE: Primary | ICD-10-CM

## 2024-07-08 PROCEDURE — 1125F AMNT PAIN NOTED PAIN PRSNT: CPT | Mod: HCNC,CPTII,S$GLB,

## 2024-07-08 PROCEDURE — 1159F MED LIST DOCD IN RCRD: CPT | Mod: HCNC,CPTII,S$GLB,

## 2024-07-08 PROCEDURE — 99214 OFFICE O/P EST MOD 30 MIN: CPT | Mod: HCNC,S$GLB,,

## 2024-07-08 PROCEDURE — 3074F SYST BP LT 130 MM HG: CPT | Mod: HCNC,CPTII,S$GLB,

## 2024-07-08 PROCEDURE — 1101F PT FALLS ASSESS-DOCD LE1/YR: CPT | Mod: HCNC,CPTII,S$GLB,

## 2024-07-08 PROCEDURE — 99999 PR PBB SHADOW E&M-EST. PATIENT-LVL III: CPT | Mod: PBBFAC,HCNC,,

## 2024-07-08 PROCEDURE — 3072F LOW RISK FOR RETINOPATHY: CPT | Mod: HCNC,CPTII,S$GLB,

## 2024-07-08 PROCEDURE — 3078F DIAST BP <80 MM HG: CPT | Mod: HCNC,CPTII,S$GLB,

## 2024-07-08 PROCEDURE — 1160F RVW MEDS BY RX/DR IN RCRD: CPT | Mod: HCNC,CPTII,S$GLB,

## 2024-07-08 PROCEDURE — 3288F FALL RISK ASSESSMENT DOCD: CPT | Mod: HCNC,CPTII,S$GLB,

## 2024-07-08 NOTE — PROGRESS NOTES
Chronic Pain - Established        Chief Complaint:   Chief Complaint   Patient presents with    Knee Pain     Left knee     Interval History 7/8/2024:  Marizol Kevin returns to clinic s/p left knee synvisc injection. She reports some relief of pain. She continues to experience pain when she goes to stand and walk.  She feels like she may fall at times due to the pain.  She continues PT and HEP daily. The patient denies fever/night sweats, urinary incontinence, bowel incontinence, significant weight changes, significant motor weakness or changes, or loss of sensations. Her pain today is 8/10.     Interval History 5/9/2024:  Marizol Kevin returns to clinic s/p bialteral piriformis and left GTB on 4/25/2024. She reports 100% relief from this procedure.  She is happy with this last procedure.  Her main pain generator today is from her left knee. She last had a left genicular CSI on 10/17/2023.  She reports 80% relief that lasted 4 months.  She has done some research and would like to try Synvisc to see if she would have more lasting relief. She has started a 15-day course of Tramadol 50 mg BID PRN with good relief until she is able to have a procedure.  She denies any perceived side effects. Her pain today is 7/10.     Interval History 4/9/2024:  Marizol Kevin presents for follow-up of lower back and left knee pain. She also had left L4/5 and L5/S1 TF JT on 12/22/2023.  This has provided 70% pain relief that is on-going. Today, she is complaining of bilateral leg pain into the buttock and posterolateral bilateral thighs. Pain description: electric, aching, shooting, burning, tight. Exacerbating factors: twisting, bending, stooping. She continues Lyrica. She has not taken any muscle relaxants. The patient denies fever/night sweats, urinary incontinence, bowel incontinence, significant weight changes, significant motor weakness or changes, or loss of sensations. Her pain today is 10/10.     Interval  History 11/20/2023:  The patient presents today for follow up of lower back and left knee pain. She is s/p left knee steroid injection on 10/17/23 with 80% relief of knee pain. Her knee pain is currently tolerable. She did she neurosurgery since previous encounter and had a referral placed to orthopedics for evaluation. She says that she feels like there is excess fluid in her knee. She does still get intermittent back pain with radiation down the back and side of the left leg and associated numbness. We previously discussed JT and she would like to further discuss at this time. Her pain today is 2/10.    Interval History 10/2/2023:  The patient presents today to discuss worsened back and left knee pain. She was recently evaluated in the ED for left-sided sciatica. States that her back pain improved with the steroid injection, but she continues to struggle with left knee pain. She denies any back pain today. She did have a lumbar CT in the ED which showed multilevel stenosis, worse at L3-L4 level where there is severe central canal stenosis from concentric disc bulge and hypertrophic facet arthropathy and ligamentum flavum hypertrophy with napkin ring deformity of the traversing thecal sac and cauda equina suggested. She was supposed to be referred to neurosurgery but a referral was not made. She denies bowel or bladder incontinence. Left knee pain is sharp and stabbing in nature. She feels like the knee will give out at time. She has had benefit with Tramadol in the past and is asking for a prescription. Her pain today is 10/10.    Interval History 9/6/2023:  There patient is here for follow up of foot pain and numbness secondary to diabetic neuropathy and previous chemotherapy. She is here today for application of Qutenza patches to bilateral feet. She has burning and numbness to the anterior and posterior feet. She was started on Lyrica 50 mg BID at last OV. She has no side effects from the medication but has  not noticed any improvement in symptoms. Her pain today is 9/10.    Interval History 8/22/23:    Ms. Kevin returns for follow-up of her neck pain. Her primary pain today is her neuropathy pain. She is tolerating lyrica 50 mg BID but has noted alopecia since starting. However, it has helped her pain. Given her history of colon cancer and chemo, it is unclear if this is hairloss is from the lyrica.  She also comments on her shoulder pain and how the prior nerve block helped but did not last long.  It provided >80% relief of her shoulder pain.  She is concerned about repeating the injection because she states the last one cost her $300.   Acupuncture was very helpful for her, but was not covered by her insurance and cost $150 per session.   Regarding her neuropathy, she endorses painful burning in her feet that prevents sleeping. The lyrica and duloxetine have both helped, but she stopped taking duloxetine when she started lyrica. She did not have any known adverse effects from the duloxetine. At her last visit 6/6/23, Qutenza was ordered. Will investigate if steps to approval for that.   We also discussed spinal cord stimulator trial. She has not had spine MRI or psychological clearance.          Initial HPI:  Marizol Kevin has a PMHx of HTN, HLD, DMII, osteoporosis, colon cancer s/p chemothearpy. She presents to the clinic for the evaluation of neuropathic pain along with neck pain that radiates into the left arm. The pain started 5-6 months ago, no specific inciting event. Symptoms have been worsening.The pain is located in the left side of the neck area and radiates to the left periscapular area with further distal radiation to the whole arm.  The pain is described as aching, stabbing, and sqaueezing  and is rated as 9/10. The pain is rated with a score of  5/10 on the BEST day and a score of 9/10 on the WORST day.  Symptoms interfere with daily activity, sleeping, and enjoyment of life. The pain is  exacerbated by Laying and Lifting.  The pain is mitigated by nothing.  Pt also endorses severe neuropathic pain in BL hands and feet. She has used gabapentinin the past without any relief. Currently on Cymbalta. Recently started Acupuncture for neuropathy, which she reports has provided significant relief.     Patient denies night fever/night sweats, urinary incontinence, bowel incontinence, significant weight loss, and loss of sensations.    Physical Therapy/Home Exercise: yes and continues to do home exercises for her shoulder and her balance    Pain Disability Index Review:      7/8/2024     8:53 AM 4/9/2024    12:56 PM 11/20/2023     2:36 PM   Last 3 PDI Scores   Pain Disability Index (PDI) 48 50 10       Pain Medications:  - pregabalin 50 mg BID  - was on duloxetine 30 mg BID  - Previously on gabapentin (dose unknown) but was stopped due to lack of efficacy     report:  Not applicable    Pain Procedures:   SAB injection 3/30/23 --> minimal relief  Left shoulder nerve blocks 7/21/23 --> 80% relief  10/17/23 Left knee steroid injection- 80% relief  12/22/2023 - Left L4/5 and L5/S1 TF JT - 70% relief  4/25/2024 - Bilateral Piriformis and Left GTB - 100% relief  5/21/2024 - LEFT KNEE SYNVISC INJECTION under ultrasound guide - some relief  6/17/2024 - Left knee CSI with sports medicine - limited relief    Imaging:     XR KNEE 3 VIEW BILATERAL     CLINICAL HISTORY:  Unilateral primary osteoarthritis, unspecified knee     COMPARISON:  None.     FINDINGS:  The alignment is within normal limits.  No displaced fractures identified.  No evidence of lytic or blastic lesions.RIGHT total knee arthroplasty.  Vascular calcifications.  Degenerative changes at the level of the medial femoral condyle and patellofemoral joint on the LEFT.Soft tissues are unremarkable.     Impression:     No evidence of fracture.RIGHT total knee arthroplasty and degenerative changes LEFT medial/patellofemoral joints.        Electronically  signed by:Cricket Medina MD  Date:                                            04/25/2024  Time:                                           14:46    MRI C Spine 4/15/22  FINDINGS:  large amount of image degradation from artifact.  Straightening of the usual cervical lordosis. Degenerative changes. Disc space narrowing C4-C5, C5-C6. Vertebral body heights and alignment appear maintained without acute compression or subluxation evident.  Although no obvious fracture cannot exclude subtle findings including marrow edema with the large amount of image degradation. Further follow-up or evaluation is to be obtained is suggested by CT. There do appear to be small posterior disc bulges multiple levels, C3 through C 6 without appreciable significant spinal canal narrowing     Impression:     Grossly negative study for acute findings but note is made that limited evaluation with the amount artifact and if concern for acute cervical spine trauma correlation with CT is recommended and note is made the patient did have CT 04/15/2022 please refer to that report.  Subtle findings as suggested on that CT scan likely would not be apparent on the MRI with the degree of artifact.     If further evaluation or follow-up is to be obtained it is recommended to be by CT     This report was flagged in Epic as abnormal.     Final read    XR KNEE 3 VIEW BILATERAL     CLINICAL HISTORY:  Unilateral primary osteoarthritis, unspecified knee     COMPARISON:  None.     FINDINGS:  The alignment is within normal limits.  No displaced fractures identified.  No evidence of lytic or blastic lesions.RIGHT total knee arthroplasty.  Vascular calcifications.  Degenerative changes at the level of the medial femoral condyle and patellofemoral joint on the LEFT.Soft tissues are unremarkable.     Impression:     No evidence of fracture.RIGHT total knee arthroplasty and degenerative changes LEFT medial/patellofemoral joints.        Electronically signed  by:Cricket Medina MD  Date:                                            04/25/2024  Time:                                           14:46    CT Lumbar Spine Without Contrast  Order: [id number]  Status: Final result     Visible to patient: Yes (seen)     Next appt: 10/17/2023 at 10:20 AM in Pain Medicine (Jenny Glass, [facility])     0 Result Notes  Details    Reading Physician Reading Date Result Priority   Neymar Mason MD  [phone] 9/29/2023 STAT     Narrative & Impression  EXAMINATION:  CT LUMBAR SPINE WITHOUT CONTRAST     CLINICAL HISTORY:  Lumbar radiculopathy, symptoms persist with conservative treatment;     TECHNIQUE:  Low-dose axial, sagittal and coronal reformations are obtained through the lumbar spine.  Contrast was not administered.     COMPARISON:  MRI lumbar spine, 06/18/2018     FINDINGS:  Alignment is stable.  Progressive spondylosis with disc space narrowing and vacuum phenomenon increasing at all levels but most severely at L to L3 L3-L4.  The anterolisthesis of L4 on L5 appear stable.     At T12-L1 there is no significant stenosis.     At the L1-L2 level, mild facet arthropathy with no significant stenosis.     At the L2-L3 level, concentric disc bulge and hypertrophic facet ligamentous changes result in moderate central canal stenosis with trefoil deformity of the thecal sac.     At the L3-L4 level, severe central canal stenosis from concentric disc bulge and hypertrophic facet arthropathy and ligamentum flavum hypertrophy with napkin ring deformity of the traversing thecal sac and cauda equina suggested.     At the L4-5 level, the anterolisthesis with uncovering of the L4-5 disc and facet arthropathy and ligamentous hypertrophy causes moderate central canal stenosis.     At the L5-S1 level, vacuum phenomenon is noted with leakage through the annulus into the epidural space but no evidence of central or foraminal stenosis.     Arthrosclerotic disease throughout the aorta and iliac  vessels.     Impression:     Multilevel lumbar stenosis with increasing spondylosis and degenerative change since 2018.     Most severe lumbar stenosis at L3-4 and L4-5.     No new fracture or subluxation.     Past Medical History:   Diagnosis Date    Allergy     Anxiety     Cancer     colon    Cataract     Colon cancer     Dependence on nicotine from cigarettes 9/3/2020    Diabetes mellitus, type 2     Dry eye syndrome     Hyperlipidemia     Hypertension     Insomnia     Light cigarette smoker (1-9 cigs/day) 6/21/2022    Malignant neoplasm of ascending colon 7/20/2020    Squamous blepharitis     Syncope 4/16/2022    Tobacco use disorder, moderate, in early remission 11/2/2022    Vitamin D deficiency 9/10/2020     Past Surgical History:   Procedure Laterality Date    CATARACT EXTRACTION, BILATERAL  03/2020    CHOLECYSTECTOMY      COLON SURGERY      Colon cancer    COLONOSCOPY N/A 02/01/2021    Procedure: COLONOSCOPY;  Surgeon: Ashwini Duarte MD;  [facility]  Service: Endoscopy;  Laterality: N/A;  medical transportation  covid test 1/29 [facility], instructions mailed-KPvt    EYE SURGERY      Cataract    HYSTERECTOMY      INJECTION OF ANESTHETIC AGENT AROUND NERVE Left 7/21/2023    Procedure: BLOCK, NERVE, LEFT SHOULDER ARTICULAR BRANCH keep date rep aware;  Surgeon: Abdi Del Toro MD;  Location: [facility];  Service: Pain Management;  Laterality: Left;    INJECTION OF JOINT N/A 4/25/2024    Procedure: INJECTION, JOINT BILATERAL PIRIFORMIS AND LEFT GTB;  Surgeon: Abdi Del Toro MD;  Location: [facility];  Service: Pain Management;  Laterality: N/A;  [phone]  2 WK F/U IRMA    JOINT REPLACEMENT      Knee    KNEE SURGERY      LAPAROSCOPIC LEFT COLON RESECTION      TRANSFORAMINAL EPIDURAL INJECTION OF STEROID Left 12/22/2023    Procedure: LUMBAR TRANSFORAMINAL LEFT L4/5 AND L5/S1;  Surgeon: Abdi Del Toro MD;  Location: [facility];  Service: Pain Management;  Laterality: Left;   [phone]  2 WK F/U ANTWON     Social History     Socioeconomic History    Marital status:    Tobacco Use    Smoking status: Former     Current packs/day: 0.00     Average packs/day: 1 pack/day for 51.0 years (51.0 ttl pk-yrs)     Types: Cigarettes     Start date: 1971     Quit date: 2022     Years since quittin.7    Smokeless tobacco: Never    Tobacco comments:     smoking cessation information given    Substance and Sexual Activity    Alcohol use: Yes     Alcohol/week: 1.0 standard drink of alcohol     Types: 1 Shots of liquor per week     Comment: occ    Drug use: Never    Sexual activity: Not Currently     Birth control/protection: Abstinence     Social Determinants of Health     Financial Resource Strain: Low Risk  (2024)    Overall Financial Resource Strain (CARDIA)     Difficulty of Paying Living Expenses: Not hard at all   Food Insecurity: No Food Insecurity (2024)    Hunger Vital Sign     Worried About Running Out of Food in the Last Year: Never true     Ran Out of Food in the Last Year: Never true   Transportation Needs: No Transportation Needs (2023)    PRAPARE - Transportation     Lack of Transportation (Medical): No     Lack of Transportation (Non-Medical): No   Physical Activity: Unknown (2024)    Exercise Vital Sign     Days of Exercise per Week: 0 days   Stress: Stress Concern Present (2024)    [employer] [employer] - Occupational Stress Questionnaire     Feeling of Stress : To some extent   Housing Stability: Low Risk  (2023)    Housing Stability Vital Sign     Unable to Pay for Housing in the Last Year: No     Number of Places Lived in the Last Year: 1     Unstable Housing in the Last Year: No     Family History   Problem Relation Name Age of Onset    Heart attack Mother Dora     Heart disease Mother Dora     Colon cancer Father Milo     Diabetes Father Milo     Cancer Sister Denisha         unknown    Hypothyroidism  Sister Denisha     Asthma Sister Denisha     Thyroid cancer Neg Hx         Review of patient's allergies indicates:   Allergen Reactions    Grass pollen-june grass standard        Current Outpatient Medications   Medication Sig    albuterol (PROVENTIL/VENTOLIN HFA) 90 mcg/actuation inhaler INHALE 2 PUFFS INTO THE LUNGS EVERY 4 (FOUR) HOURS AS NEEDED FOR WHEEZING    albuterol-ipratropium (DUO-NEB) 2.5 mg-0.5 mg/3 mL nebulizer solution Take 3 mLs by nebulization every 6 (six) hours as needed for Wheezing or Shortness of Breath. Rescue    amLODIPine (NORVASC) 5 MG tablet TAKE 1 TABLET EVERY DAY    aspirin (ECOTRIN) 81 MG EC tablet Take 81 mg by mouth once daily.    atorvastatin (LIPITOR) 40 MG tablet Take 1 tablet (40 mg total) by mouth once daily.    azelastine (ASTELIN) 137 mcg (0.1 %) nasal spray USE 1 SPRAY IN EACH NOSTRIL TWICE DAILY    betamethasone valerate 0.1% (VALISONE) 0.1 % Oint Apply topically once daily.    blood sugar diagnostic (TRUE METRIX GLUCOSE TEST STRIP) Strp Use to test blood glucose two (2) times daily; to be used with insurance-preferred brand of glucometer/supplies    blood-glucose meter kit Please provide with insurance covered meter    cholecalciferol, vitamin D3, (VITAMIN D3) 50 mcg (2,000 unit) Cap capsule Take 1 capsule (2,000 Units total) by mouth once daily.    dulaglutide (TRULICITY) 3 mg/0.5 mL pen injector Inject 3 mg into the skin once a week.    DULoxetine (CYMBALTA) 30 MG capsule Take 1 capsule (30 mg total) by mouth once daily.    famotidine (PEPCID) 40 MG tablet Take 40 mg by mouth 2 (two) times daily.    ferrous sulfate 325 (65 FE) MG EC tablet TAKE 1 TABLET EVERY OTHER DAY    heparin, porcine, PF, (HEPARIN FLUSH 100 UNITS/ML) 100 unit/mL Syrg     HYDROcodone-acetaminophen (NORCO) 5-325 mg per tablet Take 1 tablet by mouth every 6 (six) hours as needed for Pain.    hydrOXYzine HCL (ATARAX) 25 MG tablet Take 1 tablet (25 mg total) by mouth 3 (three) times daily as needed for Itching  or Anxiety.    lancets Misc 1 Device by Misc.(Non-Drug; Combo Route) route 2 (two) times daily.    LIDOcaine (LIDODERM) 5 % Place 1 patch onto the skin once daily. Remove & Discard patch within 12 hours or as directed by MD    metFORMIN (GLUCOPHAGE-XR) 500 MG ER 24hr tablet TAKE 2 TABLETS EVERY DAY WITH BREAKFAST    methocarbamoL (ROBAXIN) 750 MG Tab Take 1 tablet (750 mg total) by mouth 3 (three) times daily as needed (muscle spasms).    multivitamin capsule Take 1 capsule by mouth once daily.    ondansetron (ZOFRAN) 4 MG tablet TAKE 1 TABLET EVERY 8 HOURS AS NEEDED FOR NAUSEA    predniSONE (DELTASONE) 10 MG tablet TAKE 4 TABLETS ONE TIME DAILY FOR 5 DAYS    pregabalin (LYRICA) 75 MG capsule Take 1 capsule (75 mg total) by mouth 2 (two) times daily.    raloxifene (EVISTA) 60 mg tablet TAKE 1 TABLET EVERY DAY    triamcinolone acetonide 0.5% (KENALOG) 0.5 % Crea Apply topically 2 (two) times daily.    ketoconazole (NIZORAL) 2 % cream Apply topically once daily. for 14 days     No current facility-administered medications for this visit.       REVIEW OF SYSTEMS:    GENERAL:  No weight loss, malaise or fevers.  HEENT:  Negative for frequent or significant headaches.  NECK:  Negative for lumps, goiter, pain and significant neck swelling.  RESPIRATORY:  Negative for cough, wheezing or shortness of breath.  CARDIOVASCULAR:  Negative for chest pain, leg swelling or palpitations.  GI:  Negative for abdominal discomfort, blood in stools or black stools or change in bowel habits.  MUSCULOSKELETAL:  See HPI.  SKIN:  Negative for lesions, rash, and itching.  PSYCH:  +sleep disturbance, depression .  HEMATOLOGY/LYMPHOLOGY:  Negative for prolonged bleeding, bruising easily or swollen nodes.  NEURO:   No history of headaches, syncope, paralysis, seizures or tremors.  All other reviewed and negative other than HPI.    OBJECTIVE:    /68   Pulse 96   Temp 97.6 °F (36.4 °C)   Wt 71.1 kg (156 lb 12 oz)   BMI 33.92 kg/m²        PHYSICAL EXAMINATION:    GENERAL APPEARANCE: Well appearing, in no acute distress.  PSYCH:  Mood and affect appropriate.  SKIN: Skin color, texture, turgor normal, no rashes or lesions to visible areas.  HEAD/FACE:  Normocephalic, atraumatic.  NECK: No pain to palpation over the cervical paraspinous muscles. Spurling Negative. No pain with neck flexion, extension, or lateral flexion.   CARDIO: Rate regular.  No lower extremity edema. Capillary refill <2 seconds.   PULM: Bilateral chest rise. No apparent respiratory distress.   GI:  Non-distended  BACK: Straight leg raising in the sitting position is negative to radicular pain. No pain to palpation over the spine or costovertebral angles. Limited range of motion with pain on extension and of tightness in bilateral hamstrings with flexion/bending. Positive axial loading test to the left. Positive tenderness over bilateral SIJ left > right. R. OLGA positive to left.  EXTREMITIES: Peripheral joint ROM is full and pain free without obvious instability or laxity in all four extremities. No deformities, edema, or skin discoloration.   MUSCULOSKELETAL: Mild TTP over bilateral piriformis. Mild TTP over left GTB. Left knee: TTP over medial joint line and patellofemoral joint.  Full ROM with pain on extension and flexion. Mild effusion to medial aspect of knee.  Bilateral upper and lower extremity strength is normal and symmetric.  No atrophy or tone abnormalities are noted.  NEURO: Bilateral upper and lower extremity coordination and muscle stretch reflexes are physiologic and symmetric.  Negative Ring's. Plantar response are downgoing. No clonus noted. No loss of sensation is noted.  GAIT: Antalgic.      ASSESSMENT: 78 y.o. year old female with widespread pain, consistent with the followin. Primary osteoarthritis of left knee  Procedure Order to Pain Management    CANCELED: Procedure Order to Pain Management              PLAN:     - Previous imaging was  reviewed and discussed with the patient today.     - Continue weekly PT and daily HEP.     - Continue Lyrica 75 mg BID.    - Continue duloxetine 30 mg BID.    - She can take up to Tylenol 1000 mg TID PRN.    - No pain contract on-file. No UDS on-file.     - Patient will need to follow-up with MD if she wishes to discuss pain medication management.     - She is s/p Synvisc in office for Left Knee with some relief of pain.    - Procedure order placed for Left Genicular Diagnostic Block with progression to cooled RFA if significant short-term relief.    - RTC 4-6 weeks after RFA if completed.       The above plan and management options were discussed at length with patient. Patient is in agreement with the above and verbalized understanding.    Jada Arias NP   07/08/2024

## 2024-07-09 ENCOUNTER — PATIENT MESSAGE (OUTPATIENT)
Dept: PAIN MEDICINE | Facility: OTHER | Age: 79
End: 2024-07-09
Payer: MEDICARE

## 2024-07-09 DIAGNOSIS — M17.12 PRIMARY OSTEOARTHRITIS OF LEFT KNEE: Primary | ICD-10-CM

## 2024-07-11 ENCOUNTER — CLINICAL SUPPORT (OUTPATIENT)
Dept: REHABILITATION | Facility: OTHER | Age: 79
End: 2024-07-11
Payer: MEDICARE

## 2024-07-11 DIAGNOSIS — M25.562 CHRONIC PAIN OF LEFT KNEE: Primary | ICD-10-CM

## 2024-07-11 DIAGNOSIS — Z74.09 IMPAIRED MOBILITY AND ADLS: ICD-10-CM

## 2024-07-11 DIAGNOSIS — Z78.9 IMPAIRED MOBILITY AND ADLS: ICD-10-CM

## 2024-07-11 DIAGNOSIS — G89.29 CHRONIC PAIN OF LEFT KNEE: Primary | ICD-10-CM

## 2024-07-11 DIAGNOSIS — M25.662 DECREASED RANGE OF MOTION OF LEFT KNEE: ICD-10-CM

## 2024-07-11 PROCEDURE — 97140 MANUAL THERAPY 1/> REGIONS: CPT | Mod: HCNC,PN,CQ

## 2024-07-11 PROCEDURE — 97112 NEUROMUSCULAR REEDUCATION: CPT | Mod: HCNC,PN,CQ

## 2024-07-11 NOTE — PROGRESS NOTES
[facility]   Physical Therapy Treatment Note      Name: Marizol Kevin  Clinic Number: [id number]    Therapy Diagnosis:   Encounter Diagnoses   Name Primary?    Chronic pain of left knee Yes    Decreased range of motion of left knee     Impaired mobility and ADLs              Physician: Pedro Luis Butterfield MD    Visit Date: 7/11/2024    Physician Orders: PT Evaluate and Treat  Medical Diagnosis:  Left medial knee pain [M25.562]   Surgical Procedure and Date: None  Evaluation Date: 2/2/2024  Insurance Authorization Period Expiration: 1/24/2025  Plan of Care Certification Period: 7/9/2024  Progress Note Due: Every 6th visit or 30 days, whichever occurs first. Prior to physician follow up to provide referring provider accurate and up to date patient progress with rehabilitation.    Date of Return to MD: To be determined  Visit # / Visits authorized: 18/32       Precautions: Standard    Time In: 1000  Time Out: 1050  Total Appointment Time: 23 minutes    Subjective     Patient reports: she feels weak in her legs today. States her back has been bothering her and she would like to keep therapy light today.   She was compliant with home exercise program.  Response to previous treatment: felt better after the manual   Functional change: improved gait after treatment    Pain: 4/10  Location: left medial knee      Objective    Asterisk Signs:   Ankle Circumference R 48.5 cm, L 48 cm    Vitals 7/11/2024  /82 mmHg  HR 64  SPO2 98%    Objective Measures updated at progress report unless specified.     Treatment     Marizol received the treatments listed below:      manual therapy techniques: Joint mobilizations were applied to the: Left knee for 10 minutes, including:  Medial Gapping Grade III  Lateral Patellar Glides Grade I  Inferior Patellar Glides Grade I  Anterior Glide of Tibia Grade I  Tibial/ Femoral gapping    neuromuscular re-education activities to improve: Balance, Coordination,  Kinesthetic, Sense, Proprioception, and Posture for 20 minutes. The following activities were included:  Straight Leg Raise 2 x 20 reps on each side  Quad Sets 2 x 20 reps   Ankle Pumps x 35  Heel Slides x 30  Sciatic Nerve Glides x 35    therapeutic activities to improve functional performance for 00 minutes, including:  Long arc quads with 3# x 30   Hip abductions x 30 on left leg   Hip extensions x 30 on left leg   Seated heel raises 2 x 20 bilateral    Patient Education and Home Exercises       Education provided:   - Patient was provided education on for continued compliance with HEP for continued functional mobility and strength gains for return to prior level of function.      Written Home Exercises Provided: Patient instructed to cont prior HEP. Exercises were reviewed and Marizol was able to demonstrate them prior to the end of the session.  Marizol demonstrated good  understanding of the education provided. See Electronic Medical Record under Patient Instructions for exercises provided during therapy sessions    Assessment     Marizol tolerated session poorly today due to LBP and weakness in her LE. BP/vitals were assessed with no significant deviations being noted. Resistance was held today as she was not feeling energetic to participate. Pt was instructed to call this clinic, her MD and or go to urgent care if she continues. to feel weak in the next 24 hrs.  Pt verbally expressed understanding. She continues to present with decreased functional ability due to pain and weakness. This is negatively impacting this individuals ability with ADL's. Will continue to address these deficits and promote improved strength, ROM and functional performance as tolerated.        Marizol Is progressing well towards her goals.   Patient prognosis is Good.     Patient's spiritual, cultural and educational needs considered and pt agreeable to plan of care and goals.     Anticipated barriers to physical therapy: None    Goals:    GOALS: Short Term Goals:  4 weeks  1.Report decreased posterior thigh and medial knee pain  < / =  3/10  to increase tolerance for ADLs MET  2. Increase ROM of Left hip into internal rotation 10 degrees where limited in order to perform ADLs without difficulty. MET  3. Increase strength by 1/3 MMT grade in quadriceps and hamstrings  to increase tolerance for ADL and work activities. MET  4. Pt to tolerate HEP to improve ROM and independence with ADL's.  MET     Long Term Goals: 8 weeks  1.Report decreased posterior thigh and medial knee pain < / = 2/10  to increase tolerance for ADLs and walking.  2.Patient goal: return to prior level of function and walking her dog without symptoms.   3.Increase strength to 4+/5 in quadriceps and hamstrings  to increase tolerance for ADL and work activities.  4. Pt will report at 20% score on FOTO  to demonstrate increase in LE function with every day tasks.     Plan     Plan to check lumbar spine, ANTT of sciatic nerve upon next visit.     Updated Certification Period: 4/16/2024 to 7/9/2024     Recommended Treatment Plan: 2 times per week for 12 weeks:  Cervical/Lumbar Traction, Electrical Stimulation as needed, Gait Training, Manual Therapy, Moist Heat/ Ice, Neuromuscular Re-ed, Orthotic Management and Training, Patient Education, Self Care, Therapeutic Activities, and Therapeutic Exercise  Other Recommendations: None      David Lieberman, PTA

## 2024-07-12 NOTE — TELEPHONE ENCOUNTER
----- Message from Beth Zacarias sent at 7/12/2024  9:54 AM CDT -----  Regarding: DULoxetine (CYMBALTA) 30 MG capsule  Type:  Pharmacy Calling to Clarify an RX    Name of Caller:Dina  Pharmacy Name:[facility]  Prescription Name:DULoxetine (CYMBALTA) 30 MG capsule  What do they need to clarify?:refill for 90 day  Best Call Back Number:  Additional Information:

## 2024-07-15 ENCOUNTER — TELEPHONE (OUTPATIENT)
Dept: INTERNAL MEDICINE | Facility: CLINIC | Age: 79
End: 2024-07-15
Payer: MEDICARE

## 2024-07-15 DIAGNOSIS — N18.31 TYPE 2 DIABETES MELLITUS WITH STAGE 3A CHRONIC KIDNEY DISEASE, WITHOUT LONG-TERM CURRENT USE OF INSULIN: Primary | ICD-10-CM

## 2024-07-15 DIAGNOSIS — E11.22 TYPE 2 DIABETES MELLITUS WITH STAGE 3A CHRONIC KIDNEY DISEASE, WITHOUT LONG-TERM CURRENT USE OF INSULIN: Primary | ICD-10-CM

## 2024-07-15 DIAGNOSIS — C18.2 MALIGNANT NEOPLASM OF ASCENDING COLON: ICD-10-CM

## 2024-07-15 NOTE — TELEPHONE ENCOUNTER
Called Pt and she would like Labs done after she sees Dr. Butterfield. She did not request urine but sent something through the portal saying she is due for urine.

## 2024-07-15 NOTE — TELEPHONE ENCOUNTER
----- Message from Cynthia Dorman sent at 7/15/2024 11:59 AM CDT -----  Contact: [phone]  1MEDICALADVICE     Patient is calling for Medical Advice regarding: message for a urine     How long has patient had these symptoms:    Pharmacy name and phone#:    Patient wants a call back or thru [facility]:call back     Comments:  Has an appt on 07/25 received a message for urine and A1C test is she needing to do this before she comes to the appt and if so we needs the orders please advise please give return call so she knows does she need to do before or after she would like to know either way     Please advise patient replies from provider may take up to 48 hours.

## 2024-07-16 RX ORDER — DULOXETIN HYDROCHLORIDE 30 MG/1
30 CAPSULE, DELAYED RELEASE ORAL DAILY
Qty: 90 CAPSULE | Refills: 3 | Status: SHIPPED | OUTPATIENT
Start: 2024-07-16

## 2024-07-17 ENCOUNTER — CLINICAL SUPPORT (OUTPATIENT)
Dept: REHABILITATION | Facility: OTHER | Age: 79
End: 2024-07-17
Payer: MEDICARE

## 2024-07-17 DIAGNOSIS — M25.662 DECREASED RANGE OF MOTION OF LEFT KNEE: ICD-10-CM

## 2024-07-17 DIAGNOSIS — Z78.9 IMPAIRED MOBILITY AND ADLS: ICD-10-CM

## 2024-07-17 DIAGNOSIS — G89.29 CHRONIC PAIN OF LEFT KNEE: Primary | ICD-10-CM

## 2024-07-17 DIAGNOSIS — M25.562 CHRONIC PAIN OF LEFT KNEE: Primary | ICD-10-CM

## 2024-07-17 DIAGNOSIS — Z74.09 IMPAIRED MOBILITY AND ADLS: ICD-10-CM

## 2024-07-17 PROCEDURE — 97112 NEUROMUSCULAR REEDUCATION: CPT | Mod: HCNC,PN,CQ

## 2024-07-17 PROCEDURE — 97140 MANUAL THERAPY 1/> REGIONS: CPT | Mod: HCNC,PN,CQ

## 2024-07-17 NOTE — PROGRESS NOTES
[facility]   Physical Therapy Treatment Note      Name: Marizol Kevin  Clinic Number: [id number]    Therapy Diagnosis:   Encounter Diagnoses   Name Primary?    Chronic pain of left knee Yes    Decreased range of motion of left knee     Impaired mobility and ADLs                Physician: Pedro Luis Butterfield MD    Visit Date: 7/17/2024    Physician Orders: PT Evaluate and Treat  Medical Diagnosis:  Left medial knee pain [M25.562]   Surgical Procedure and Date: None  Evaluation Date: 2/2/2024  Insurance Authorization Period Expiration: 1/24/2025  Plan of Care Certification Period: 7/9/2024  Progress Note Due: Every 6th visit or 30 days, whichever occurs first. Prior to physician follow up to provide referring provider accurate and up to date patient progress with rehabilitation.    Date of Return to MD: To be determined  Visit # / Visits authorized: 19/32       Precautions: Standard    Time In: 1100  Time Out: 1205  Total Appointment Time: 25 minutes    Subjective     Patient reports: she feels better today. Still having L medial knee pain but she feels more energetic.   She was compliant with home exercise program.  Response to previous treatment: felt better after the manual   Functional change: improved gait after treatment    Pain: 4/10  Location: left medial knee      Objective    Asterisk Signs:   Ankle Circumference R 48.5 cm, L 48 cm    Vitals 7/11/2024  /82 mmHg  HR 64  SPO2 98%    Objective Measures updated at progress report unless specified.     Treatment     Marizol received the treatments listed below:      manual therapy techniques: Joint mobilizations were applied to the: Left knee for 10 minutes, including:  Medial Gapping Grade III  Lateral Patellar Glides Grade I  Inferior Patellar Glides Grade I  Anterior Glide of Tibia Grade I  Tibial/ Femoral gapping    neuromuscular re-education activities to improve: Balance, Coordination, Kinesthetic, Sense, Proprioception,  and Posture for 15 minutes. The following activities were included:  Straight Leg Raise 2 x 20 reps on each side  Quad Sets 2 x 20 reps   Ankle Pumps x 35  Heel Slides x 30  Sciatic Nerve Glides x 35    therapeutic activities to improve functional performance for 00 minutes, including:  Long arc quads with 3# x 30   Hip abductions x 30 on left leg   Hip extensions x 30 on left leg   Seated heel raises 2 x 20 bilateral    Patient Education and Home Exercises       Education provided:   - Patient was provided education on for continued compliance with HEP for continued functional mobility and strength gains for return to prior level of function.      Written Home Exercises Provided: Patient instructed to cont prior HEP. Exercises were reviewed and Marizol was able to demonstrate them prior to the end of the session.  Marizol demonstrated good  understanding of the education provided. See Electronic Medical Record under Patient Instructions for exercises provided during therapy sessions    Assessment     Marizol tolerated tx well today with improved musculare endurance vs last session.  Emphasis was focused on global strengthening as well as functional exercises to help improve ADL performance/reduced pain         Marizol Is progressing well towards her goals.   Patient prognosis is Good.     Patient's spiritual, cultural and educational needs considered and pt agreeable to plan of care and goals.     Anticipated barriers to physical therapy: None    Goals:   GOALS: Short Term Goals:  4 weeks  1.Report decreased posterior thigh and medial knee pain  < / =  3/10  to increase tolerance for ADLs MET  2. Increase ROM of Left hip into internal rotation 10 degrees where limited in order to perform ADLs without difficulty. MET  3. Increase strength by 1/3 MMT grade in quadriceps and hamstrings  to increase tolerance for ADL and work activities. MET  4. Pt to tolerate HEP to improve ROM and independence with ADL's.  MET     Long  Term Goals: 8 weeks  1.Report decreased posterior thigh and medial knee pain < / = 2/10  to increase tolerance for ADLs and walking.  2.Patient goal: return to prior level of function and walking her dog without symptoms.   3.Increase strength to 4+/5 in quadriceps and hamstrings  to increase tolerance for ADL and work activities.  4. Pt will report at 20% score on FOTO  to demonstrate increase in LE function with every day tasks.     Plan     Plan to check lumbar spine, ANTT of sciatic nerve upon next visit.     Updated Certification Period: 4/16/2024 to 7/9/2024     Recommended Treatment Plan: 2 times per week for 12 weeks:  Cervical/Lumbar Traction, Electrical Stimulation as needed, Gait Training, Manual Therapy, Moist Heat/ Ice, Neuromuscular Re-ed, Orthotic Management and Training, Patient Education, Self Care, Therapeutic Activities, and Therapeutic Exercise  Other Recommendations: None      David Lieberman, PTA

## 2024-07-22 ENCOUNTER — TELEPHONE (OUTPATIENT)
Dept: PAIN MEDICINE | Facility: CLINIC | Age: 79
End: 2024-07-22
Payer: MEDICARE

## 2024-07-22 ENCOUNTER — TELEPHONE (OUTPATIENT)
Dept: INTERNAL MEDICINE | Facility: CLINIC | Age: 79
End: 2024-07-22
Payer: MEDICARE

## 2024-07-22 NOTE — TELEPHONE ENCOUNTER
----- Message from Cele Pickett sent at 7/22/2024 11:23 AM CDT -----  Name of Who is calling :ESTEBAN JIMENEZ [[id number]]        What is the request in detail:  Staff was returning call and would like a call back. Please assist       Can the clinic reply by [facility]:no            [facility][phone]

## 2024-07-22 NOTE — TELEPHONE ENCOUNTER
----- Message from Roya Casanova sent at 7/22/2024 10:59 AM CDT -----  Name of Who is Calling:ESTEBAN JIMENEZ [[id number]]                   What is the request in detail: PT wants a call back she has a question about her care please assist                    Can the clinic reply by [facility]: No                   What Number to Call Back if not in [facility]:[phone]

## 2024-07-22 NOTE — TELEPHONE ENCOUNTER
Patient has been contacted in regards to ordered labs. Patient informed labs have been placed and can be complete either before or after appointment. Patient states she does have to wait on transportation and might not be able to complete labs prior to appointment . Understanding has been voiced, patient okay to do after appointment. States she will also provide urine sample as well.

## 2024-07-22 NOTE — TELEPHONE ENCOUNTER
----- Message from Cele Pickett sent at 7/22/2024 11:23 AM CDT -----  Name of Who is calling :ESTEBAN JIMENEZ [[id number]]        What is the request in detail:  Staff was returning call and would like a call back. Please assist       Can the clinic reply by [facility]:no            What [facility] back [facility][phone]

## 2024-07-23 ENCOUNTER — TELEPHONE (OUTPATIENT)
Dept: INTERNAL MEDICINE | Facility: CLINIC | Age: 79
End: 2024-07-23
Payer: MEDICARE

## 2024-07-23 ENCOUNTER — LAB VISIT (OUTPATIENT)
Dept: LAB | Facility: OTHER | Age: 79
End: 2024-07-23
Attending: INTERNAL MEDICINE
Payer: MEDICARE

## 2024-07-23 DIAGNOSIS — E11.22 TYPE 2 DIABETES MELLITUS WITH STAGE 3A CHRONIC KIDNEY DISEASE, WITHOUT LONG-TERM CURRENT USE OF INSULIN: ICD-10-CM

## 2024-07-23 DIAGNOSIS — N18.31 TYPE 2 DIABETES MELLITUS WITH STAGE 3A CHRONIC KIDNEY DISEASE, WITHOUT LONG-TERM CURRENT USE OF INSULIN: ICD-10-CM

## 2024-07-23 DIAGNOSIS — E11.22 TYPE 2 DIABETES MELLITUS WITH STAGE 3A CHRONIC KIDNEY DISEASE, WITHOUT LONG-TERM CURRENT USE OF INSULIN: Primary | ICD-10-CM

## 2024-07-23 DIAGNOSIS — N18.31 TYPE 2 DIABETES MELLITUS WITH STAGE 3A CHRONIC KIDNEY DISEASE, WITHOUT LONG-TERM CURRENT USE OF INSULIN: Primary | ICD-10-CM

## 2024-07-23 DIAGNOSIS — C18.2 MALIGNANT NEOPLASM OF ASCENDING COLON: ICD-10-CM

## 2024-07-23 LAB
ALBUMIN SERPL BCP-MCNC: 3.5 G/DL (ref 3.5–5.2)
ALBUMIN/CREAT UR: 11.9 UG/MG (ref 0–30)
ALP SERPL-CCNC: 112 U/L (ref 55–135)
ALT SERPL W/O P-5'-P-CCNC: 12 U/L (ref 10–44)
ANION GAP SERPL CALC-SCNC: 10 MMOL/L (ref 8–16)
AST SERPL-CCNC: 15 U/L (ref 10–40)
BILIRUB SERPL-MCNC: 0.5 MG/DL (ref 0.1–1)
BUN SERPL-MCNC: 11 MG/DL (ref 8–23)
CALCIUM SERPL-MCNC: 10 MG/DL (ref 8.7–10.5)
CEA SERPL-MCNC: 5.1 NG/ML (ref 0–5)
CHLORIDE SERPL-SCNC: 108 MMOL/L (ref 95–110)
CO2 SERPL-SCNC: 21 MMOL/L (ref 23–29)
CREAT SERPL-MCNC: 0.9 MG/DL (ref 0.5–1.4)
CREAT UR-MCNC: 385.4 MG/DL (ref 15–325)
EST. GFR  (NO RACE VARIABLE): >60 ML/MIN/1.73 M^2
ESTIMATED AVG GLUCOSE: 134 MG/DL (ref 68–131)
GLUCOSE SERPL-MCNC: 84 MG/DL (ref 70–110)
HBA1C MFR BLD: 6.3 % (ref 4–5.6)
MICROALBUMIN UR DL<=1MG/L-MCNC: 46 UG/ML
POTASSIUM SERPL-SCNC: 3.8 MMOL/L (ref 3.5–5.1)
PROT SERPL-MCNC: 7.3 G/DL (ref 6–8.4)
SODIUM SERPL-SCNC: 139 MMOL/L (ref 136–145)

## 2024-07-23 PROCEDURE — 82378 CARCINOEMBRYONIC ANTIGEN: CPT | Mod: HCNC | Performed by: INTERNAL MEDICINE

## 2024-07-23 PROCEDURE — 82043 UR ALBUMIN QUANTITATIVE: CPT | Mod: HCNC | Performed by: INTERNAL MEDICINE

## 2024-07-23 PROCEDURE — 83036 HEMOGLOBIN GLYCOSYLATED A1C: CPT | Mod: HCNC | Performed by: INTERNAL MEDICINE

## 2024-07-23 PROCEDURE — 36415 COLL VENOUS BLD VENIPUNCTURE: CPT | Mod: HCNC | Performed by: INTERNAL MEDICINE

## 2024-07-23 PROCEDURE — 80053 COMPREHEN METABOLIC PANEL: CPT | Mod: HCNC | Performed by: INTERNAL MEDICINE

## 2024-07-23 PROCEDURE — 82570 ASSAY OF URINE CREATININE: CPT | Mod: HCNC | Performed by: INTERNAL MEDICINE

## 2024-07-23 NOTE — TELEPHONE ENCOUNTER
----- Message from Gayle England sent at 7/23/2024  3:26 PM CDT -----  Type:  Needs Medical Advice    Who Called:  Kristen from [facility]    Would the patient rather a call back or a response via [facility]?  call  Best Call Back Number:  [phone]  Additional Information:  Kristen is requesting another urine order for pt to re do.

## 2024-07-24 ENCOUNTER — CLINICAL SUPPORT (OUTPATIENT)
Dept: REHABILITATION | Facility: OTHER | Age: 79
End: 2024-07-24
Payer: MEDICARE

## 2024-07-24 DIAGNOSIS — Z78.9 IMPAIRED MOBILITY AND ADLS: ICD-10-CM

## 2024-07-24 DIAGNOSIS — Z74.09 IMPAIRED MOBILITY AND ADLS: ICD-10-CM

## 2024-07-24 DIAGNOSIS — M25.562 CHRONIC PAIN OF LEFT KNEE: Primary | ICD-10-CM

## 2024-07-24 DIAGNOSIS — G89.29 CHRONIC PAIN OF LEFT KNEE: Primary | ICD-10-CM

## 2024-07-24 DIAGNOSIS — M25.662 DECREASED RANGE OF MOTION OF LEFT KNEE: ICD-10-CM

## 2024-07-24 PROCEDURE — 97530 THERAPEUTIC ACTIVITIES: CPT | Mod: HCNC,PN

## 2024-07-24 PROCEDURE — 97112 NEUROMUSCULAR REEDUCATION: CPT | Mod: HCNC,PN

## 2024-07-24 PROCEDURE — 97140 MANUAL THERAPY 1/> REGIONS: CPT | Mod: HCNC,PN

## 2024-07-24 NOTE — PROGRESS NOTES
[facility]   Physical Therapy Treatment Note      Name: Marizol Kevin  Clinic Number: [id number]    Therapy Diagnosis:   Encounter Diagnoses   Name Primary?    Chronic pain of left knee Yes    Decreased range of motion of left knee     Impaired mobility and ADLs          Physician: Pedro Luis Butterfield MD    Visit Date: 7/24/2024    Physician Orders: PT Evaluate and Treat  Medical Diagnosis:  Left medial knee pain [M25.562]   Surgical Procedure and Date: None  Evaluation Date: 2/2/2024  Insurance Authorization Period Expiration: 1/24/2025  Plan of Care Certification Period: 7/9/2024 - 10/17/2024  Progress Note Due: Every 6th visit or 30 days, whichever occurs first. Prior to physician follow up to provide referring provider accurate and up to date patient progress with rehabilitation.    Date of Return to MD: To be determined  Visit # / Visits authorized: 19/32       Precautions: Standard    Time In: 1100 am  Time Out: 11:55 am  Total Appointment Time: 55 minutes    Subjective     Patient reports: she feels better today. Still having L medial knee pain but encouraged about upcoming procedures.   She was compliant with home exercise program.  Response to previous treatment: felt better after the manual   Functional change: improved gait after treatment    Pain: 4/10  Location: left medial knee      Objective    Asterisk Signs:   Ankle Circumference R 48.5 cm, L 48 cm    Vitals 7/11/2024  /82 mmHg  HR 64  SPO2 98%    Objective Measures updated at progress report unless specified.     Treatment     Marizol received the treatments listed below:      manual therapy techniques: Joint mobilizations were applied to the: Left knee for 10 minutes, including:  Medial Gapping Grade III  Lateral Patellar Glides Grade I  Inferior Patellar Glides Grade I  Anterior Glide of Tibia Grade I  Tibial/ Femoral gapping    neuromuscular re-education activities to improve: Balance, Coordination,  Kinesthetic, Sense, Proprioception, and Posture for 20 minutes. The following activities were included:  Straight Leg Raise 2 x 20 reps on each side 3 #  Quad Sets 2 x 20 reps   Ankle Pumps x 35  Prone Flexion Stretch 3'  Sciatic Nerve Glides x 35    therapeutic activities to improve functional performance for 25 minutes, including:  Long arc quads with 3# x 30   Hip abductions x 30 on left leg   Hip extensions x 30 on left leg   Seated heel raises 2 x 20 bilateral    Patient Education and Home Exercises       Education provided:   - Patient was provided education on for continued compliance with HEP for continued functional mobility and strength gains for return to prior level of function.      Written Home Exercises Provided: Patient instructed to cont prior HEP. Exercises were reviewed and Marizol was able to demonstrate them prior to the end of the session.  Marizol demonstrated good  understanding of the education provided. See Electronic Medical Record under Patient Instructions for exercises provided during therapy sessions    Assessment     Patient presents to the clinic with mild symptom irritability pre-session and minimal irritability post-session. Manual therapy was performed to tibiofemoral joint to improve joint play and allow for optimal movement during corrective exercises.   Patient demonstrates medial knee pain that is consistent with saphenous nerve irritability and medial gapping due to lateral hip weakness.   Patient demonstrated improvements in nerve irritability within session.   Patient will continue to benefit from skilled outpatient physical therapy to address the deficits listed in the problem list box on initial evaluation, provide patient/family education and to maximize patient's level of independence in the home and community environment.     Marizol Is progressing well towards her goals.   Patient prognosis is Good.     Patient's spiritual, cultural and educational needs considered and  pt agreeable to plan of care and goals.     Anticipated barriers to physical therapy: None    Goals:   GOALS: Short Term Goals:  4 weeks  1.Report decreased posterior thigh and medial knee pain  < / =  3/10  to increase tolerance for ADLs MET  2. Increase ROM of Left hip into internal rotation 10 degrees where limited in order to perform ADLs without difficulty. MET  3. Increase strength by 1/3 MMT grade in quadriceps and hamstrings  to increase tolerance for ADL and work activities. MET  4. Pt to tolerate HEP to improve ROM and independence with ADL's.  MET     Long Term Goals: 8 weeks  1.Report decreased posterior thigh and medial knee pain < / = 2/10  to increase tolerance for ADLs and walking.  2.Patient goal: return to prior level of function and walking her dog without symptoms.   3.Increase strength to 4+/5 in quadriceps and hamstrings  to increase tolerance for ADL and work activities.  4. Pt will report at 20% score on FOTO  to demonstrate increase in LE function with every day tasks.     Plan     Plan to check lumbar spine, ANTT of sciatic nerve upon next visit.     Updated Certification Period: 4/16/2024 to 10/17/2024    Recommended Treatment Plan: 2 times per week for 12 weeks:  Cervical/Lumbar Traction, Electrical Stimulation as needed, Gait Training, Manual Therapy, Moist Heat/ Ice, Neuromuscular Re-ed, Orthotic Management and Training, Patient Education, Self Care, Therapeutic Activities, and Therapeutic Exercise  Other Recommendations: None      Rusty Robertson, PT

## 2024-07-25 ENCOUNTER — PATIENT MESSAGE (OUTPATIENT)
Dept: ADMINISTRATIVE | Facility: OTHER | Age: 79
End: 2024-07-25
Payer: MEDICARE

## 2024-07-25 ENCOUNTER — OFFICE VISIT (OUTPATIENT)
Dept: INTERNAL MEDICINE | Facility: CLINIC | Age: 79
End: 2024-07-25
Attending: INTERNAL MEDICINE
Payer: MEDICARE

## 2024-07-25 ENCOUNTER — TELEPHONE (OUTPATIENT)
Dept: ORTHOPEDICS | Facility: CLINIC | Age: 79
End: 2024-07-25
Payer: MEDICARE

## 2024-07-25 ENCOUNTER — LAB VISIT (OUTPATIENT)
Dept: LAB | Facility: OTHER | Age: 79
End: 2024-07-25
Attending: INTERNAL MEDICINE
Payer: MEDICARE

## 2024-07-25 VITALS
SYSTOLIC BLOOD PRESSURE: 100 MMHG | WEIGHT: 157.19 LBS | OXYGEN SATURATION: 98 % | DIASTOLIC BLOOD PRESSURE: 80 MMHG | BODY MASS INDEX: 33.91 KG/M2 | HEART RATE: 86 BPM | HEIGHT: 57 IN

## 2024-07-25 DIAGNOSIS — E11.65 TYPE 2 DIABETES MELLITUS WITH HYPERGLYCEMIA, WITHOUT LONG-TERM CURRENT USE OF INSULIN: ICD-10-CM

## 2024-07-25 DIAGNOSIS — G47.10 EXCESSIVE SLEEPINESS: Primary | ICD-10-CM

## 2024-07-25 DIAGNOSIS — R20.8 OTHER DISTURBANCES OF SKIN SENSATION: ICD-10-CM

## 2024-07-25 DIAGNOSIS — C18.2 MALIGNANT NEOPLASM OF ASCENDING COLON: ICD-10-CM

## 2024-07-25 DIAGNOSIS — G47.10 EXCESSIVE SLEEPINESS: ICD-10-CM

## 2024-07-25 DIAGNOSIS — T45.1X5A NEUROPATHY DUE TO CHEMOTHERAPEUTIC DRUG: ICD-10-CM

## 2024-07-25 DIAGNOSIS — Z87.891 HISTORY OF TOBACCO USE: ICD-10-CM

## 2024-07-25 DIAGNOSIS — J45.20 MILD INTERMITTENT ASTHMA WITHOUT COMPLICATION: ICD-10-CM

## 2024-07-25 DIAGNOSIS — R23.4 CHANGES IN SKIN TEXTURE: ICD-10-CM

## 2024-07-25 DIAGNOSIS — G62.0 NEUROPATHY DUE TO CHEMOTHERAPEUTIC DRUG: ICD-10-CM

## 2024-07-25 LAB
BASOPHILS # BLD AUTO: 0.03 K/UL (ref 0–0.2)
BASOPHILS NFR BLD: 0.3 % (ref 0–1.9)
BNP SERPL-MCNC: <10 PG/ML (ref 0–99)
DIFFERENTIAL METHOD BLD: ABNORMAL
EOSINOPHIL # BLD AUTO: 0.1 K/UL (ref 0–0.5)
EOSINOPHIL NFR BLD: 1.4 % (ref 0–8)
ERYTHROCYTE [DISTWIDTH] IN BLOOD BY AUTOMATED COUNT: 14.6 % (ref 11.5–14.5)
FOLATE SERPL-MCNC: 9.8 NG/ML (ref 4–24)
HCT VFR BLD AUTO: 40.1 % (ref 37–48.5)
HGB BLD-MCNC: 12.3 G/DL (ref 12–16)
IMM GRANULOCYTES # BLD AUTO: 0.04 K/UL (ref 0–0.04)
IMM GRANULOCYTES NFR BLD AUTO: 0.5 % (ref 0–0.5)
LYMPHOCYTES # BLD AUTO: 2.6 K/UL (ref 1–4.8)
LYMPHOCYTES NFR BLD: 29.7 % (ref 18–48)
MCH RBC QN AUTO: 25.5 PG (ref 27–31)
MCHC RBC AUTO-ENTMCNC: 30.7 G/DL (ref 32–36)
MCV RBC AUTO: 83 FL (ref 82–98)
MONOCYTES # BLD AUTO: 0.6 K/UL (ref 0.3–1)
MONOCYTES NFR BLD: 6.3 % (ref 4–15)
NEUTROPHILS # BLD AUTO: 5.5 K/UL (ref 1.8–7.7)
NEUTROPHILS NFR BLD: 61.8 % (ref 38–73)
NRBC BLD-RTO: 0 /100 WBC
PLATELET # BLD AUTO: 262 K/UL (ref 150–450)
PMV BLD AUTO: 9.1 FL (ref 9.2–12.9)
RBC # BLD AUTO: 4.83 M/UL (ref 4–5.4)
TSH SERPL DL<=0.005 MIU/L-ACNC: 0.48 UIU/ML (ref 0.4–4)
VIT B12 SERPL-MCNC: 253 PG/ML (ref 210–950)
WBC # BLD AUTO: 8.85 K/UL (ref 3.9–12.7)

## 2024-07-25 PROCEDURE — 83880 ASSAY OF NATRIURETIC PEPTIDE: CPT | Mod: HCNC | Performed by: INTERNAL MEDICINE

## 2024-07-25 PROCEDURE — 3288F FALL RISK ASSESSMENT DOCD: CPT | Mod: HCNC,CPTII,S$GLB, | Performed by: INTERNAL MEDICINE

## 2024-07-25 PROCEDURE — 82746 ASSAY OF FOLIC ACID SERUM: CPT | Mod: HCNC | Performed by: INTERNAL MEDICINE

## 2024-07-25 PROCEDURE — G2211 COMPLEX E/M VISIT ADD ON: HCPCS | Mod: HCNC,S$GLB,, | Performed by: INTERNAL MEDICINE

## 2024-07-25 PROCEDURE — 1159F MED LIST DOCD IN RCRD: CPT | Mod: HCNC,CPTII,S$GLB, | Performed by: INTERNAL MEDICINE

## 2024-07-25 PROCEDURE — 1160F RVW MEDS BY RX/DR IN RCRD: CPT | Mod: HCNC,CPTII,S$GLB, | Performed by: INTERNAL MEDICINE

## 2024-07-25 PROCEDURE — 99215 OFFICE O/P EST HI 40 MIN: CPT | Mod: HCNC,S$GLB,, | Performed by: INTERNAL MEDICINE

## 2024-07-25 PROCEDURE — 3079F DIAST BP 80-89 MM HG: CPT | Mod: HCNC,CPTII,S$GLB, | Performed by: INTERNAL MEDICINE

## 2024-07-25 PROCEDURE — 1101F PT FALLS ASSESS-DOCD LE1/YR: CPT | Mod: HCNC,CPTII,S$GLB, | Performed by: INTERNAL MEDICINE

## 2024-07-25 PROCEDURE — 84443 ASSAY THYROID STIM HORMONE: CPT | Mod: HCNC | Performed by: INTERNAL MEDICINE

## 2024-07-25 PROCEDURE — 1125F AMNT PAIN NOTED PAIN PRSNT: CPT | Mod: HCNC,CPTII,S$GLB, | Performed by: INTERNAL MEDICINE

## 2024-07-25 PROCEDURE — 82607 VITAMIN B-12: CPT | Mod: HCNC | Performed by: INTERNAL MEDICINE

## 2024-07-25 PROCEDURE — 36415 COLL VENOUS BLD VENIPUNCTURE: CPT | Mod: HCNC | Performed by: INTERNAL MEDICINE

## 2024-07-25 PROCEDURE — 3072F LOW RISK FOR RETINOPATHY: CPT | Mod: HCNC,CPTII,S$GLB, | Performed by: INTERNAL MEDICINE

## 2024-07-25 PROCEDURE — 3074F SYST BP LT 130 MM HG: CPT | Mod: HCNC,CPTII,S$GLB, | Performed by: INTERNAL MEDICINE

## 2024-07-25 PROCEDURE — 99999 PR PBB SHADOW E&M-EST. PATIENT-LVL V: CPT | Mod: PBBFAC,HCNC,, | Performed by: INTERNAL MEDICINE

## 2024-07-25 PROCEDURE — 85025 COMPLETE CBC W/AUTO DIFF WBC: CPT | Mod: HCNC | Performed by: INTERNAL MEDICINE

## 2024-07-25 RX ORDER — FLUTICASONE PROPIONATE AND SALMETEROL 250; 50 UG/1; UG/1
1 POWDER RESPIRATORY (INHALATION) 2 TIMES DAILY
Qty: 180 EACH | Refills: 0 | Status: SHIPPED | OUTPATIENT
Start: 2024-07-25 | End: 2025-07-25

## 2024-07-25 NOTE — PROGRESS NOTES
Subjective:       Patient ID: Marizol Kevin is a 78 y.o. female.    Chief Complaint: Follow-up, Diabetes, and Hypertension    Here for 6 month f/u    9 yo F with PMHx of colon CA, COPD, tobacco abuse, DM, HTN, HLD osteoporosis, neuropathy s/p chemo    Two biggest concerns are her continued leg and foot pains and her chronic weakness.  She reports since approximately January 20, 2024 she has been suffering from daily weakness fatigue.  She reports she sleeps all day night.  She will fall asleep watching TV wake up several hours later.  Cycle is near daily.  She reports some periods interrupted breathing requiring her to take a focused deep breath other times no 0 trouble with her breathing.  Denies overt chest tightness or wheezing.  Or orthopnea.  She has never had a sleep study.  CMP and A1c done prior to today's appointment and these were normal.  Will update CBC and TSH.  Lyrica 75 mg b.i.d..  Discussed potential for sedation with this medication albeit a low-dose and patient is not interested in changing this at this time due to uncontrolled pain despite taking this medication.      LUMBAR TRANSFORAMINAL LEFT L4/5 AND L5/S1  INJECTION, JOINT BILATERAL PIRIFORMIS AND LEFT GTB  Left medial knee pain, burning shooting pain is her biggest complaint.         Pain management:  Dr. Abdi Del Toro MD  Oncology:  Dr. Juanita Lebron MD now ...  Gastroenterology: Lorenzo Malcolm  Orthopedist Dr. Caitlin Ryan MD           ### DM ###  Metformin 500mg BID. Trulicity 3.0mg QW  . Lipitor 40mg     Monitor sugary drinks  4/30/24 increase trulicity to 3mg               HGBA1C                   6.3 (H)             07/23/2024 01:44 PM        HGBA1C                   6.1 (H)             01/25/2024 02:25 PM        HGBA1C                   6.4 (H)             07/21/2023 07:53 AM        HGBA1C                   7.8 (H)             04/25/2023 11:08 AM        HGBA1C                   8.5 (H)             12/20/2022 03:19  PM        HGBA1C                   7.4 (H)             09/14/2022 10:32 AM        HGBA1C                   6.4 (H)             05/19/2022 10:17 AM        HGBA1C                   7.2 (H)             10/01/2021 02:55 PM        HGBA1C                   7.2 (H)             06/01/2021 02:40 PM        HGBA1C                   7.5 (H)             03/12/2021 09:05 AM              ### Former tobacco use ###  -Had extremely bad taste in her mouth, headaches, and dizziness from taking bupropion   -CT chest Due:      ### osteoporosis ###  9/17/2020 Last DEXA: osteopenia: Unclear history.  intermediate FRAX score.  Cont raloxifene.     ### lumbar DJD ###  Multilevel lumbar stenosis with increasing spondylosis and degenerative change since 2018.       ### colon CA ###  history of stage III adenocarcinoma of the ascending colon s/p right hemicolectomy on 8/15/2014. Four out of 17 lymph nodes were positive. She underwent adjuvant FOLFOX for 6 months from 11/4/2014 to 4/29/2015.  Her CEA has been elevated without radiographic evidence of recurrence.   -cea chronically elevated but stable / lower than prev. CT scan to be done if pt is symptomatic or cea level worsens.   -monitor for now Juanita Lebron MD at 10/7/2023   Cscope 2021-9/29/2023           ### Thyroid nodule              Path benign 10/2020/ rec f/u endocrine to determine      Review of Systems   Constitutional:  Negative for chills, fatigue, fever and unexpected weight change.   HENT:  Negative for ear pain, hearing loss, postnasal drip, tinnitus, trouble swallowing and voice change.    Respiratory:  Negative for cough, chest tightness, shortness of breath and wheezing.    Cardiovascular:  Negative for chest pain, palpitations and leg swelling.   Gastrointestinal:  Negative for abdominal pain, blood in stool, diarrhea, nausea and vomiting.   Endocrine: Negative for polydipsia, polyphagia and polyuria.   Genitourinary:  Negative for difficulty urinating, dysuria,  "hematuria and vaginal bleeding.   Skin:  Negative for rash.   Allergic/Immunologic: Negative for food allergies.   Neurological:  Negative for dizziness, numbness and headaches.   Hematological:  Does not bruise/bleed easily.   Psychiatric/Behavioral:  The patient is not nervous/anxious.        Objective:      Vitals:    07/25/24 1106   BP: 100/80   BP Location: Left arm   Patient Position: Sitting   Pulse: 86   SpO2: 98%   Weight: 71.3 kg (157 lb 3 oz)   Height: 4' 9" (1.448 m)      Physical Exam  Vitals and nursing note reviewed.   Constitutional:       General: She is not in acute distress.     Appearance: Normal appearance. She is well-developed.   HENT:      Head: Normocephalic and atraumatic.      Mouth/Throat:      Pharynx: No oropharyngeal exudate.   Eyes:      General: No scleral icterus.     Conjunctiva/sclera: Conjunctivae normal.      Pupils: Pupils are equal, round, and reactive to light.   Neck:      Thyroid: No thyromegaly.   Cardiovascular:      Rate and Rhythm: Normal rate and regular rhythm.      Heart sounds: Normal heart sounds. No murmur heard.  Pulmonary:      Effort: Pulmonary effort is normal.      Breath sounds: Normal breath sounds. No wheezing or rales.   Abdominal:      General: There is no distension.   Musculoskeletal:         General: No tenderness.   Lymphadenopathy:      Cervical: No cervical adenopathy.   Skin:     General: Skin is warm and dry.   Neurological:      Mental Status: She is alert and oriented to person, place, and time.   Psychiatric:         Behavior: Behavior normal.         Assessment:       1. Excessive sleepiness    2. Changes in skin texture    3. Other disturbances of skin sensation    4. History of tobacco use    5. Type 2 diabetes mellitus with hyperglycemia, without long-term current use of insulin    6. Malignant neoplasm of ascending colon    7. Mild intermittent asthma without complication    8. Neuropathy due to chemotherapeutic drug        Plan:     "   Marizol was seen today for follow-up, diabetes and hypertension.    Diagnoses and all orders for this visit:    Excessive sleepiness   large differential.  Update CBC and TSH and additional labs will make sure that she does not have some mild heart failure with some of her breathing complaints.  Low suspicion that this is all uncontrolled asthma but again with some of her breathing complaints we will start Laba/ics and monitor.  If updated labs are normal, heart failure ruled out no improvement in overall energy then we will start medication adjustment.  Lyrica is most likely but patient not amenable to change so we may reduce her Trulicity and monitor energy levels.  All this to be done in parallel with the sleep eval. Get colo UTD due to Hx of colon CA and > 3 years since last scope. V.V. in 4-6 weeks. Office and Emergency Department prompts discussed.    -     Ambulatory referral/consult to Sleep Disorders; Future  -     CBC Auto Differential; Future  -     SCHEDULED EKG 12-LEAD (to Muse); Future  -     TSH; Future  -     Vitamin B12; Future  -     Folate; Future  -     B-TYPE NATRIURETIC PEPTIDE; Future  -     Echo; Future    Changes in skin texture  -     TSH; Future    Other disturbances of skin sensation  -     Vitamin B12; Future  -     Folate; Future    Other orders  -     fluticasone-salmeterol diskus inhaler 250-50 mcg; Inhale 1 puff into the lungs 2 (two) times daily. Controller       DM  Controlled and asymptomatic.  Continue current Rx regimen.     Hx colon CA  CEA stable. Monitor.     > 40 minutes were spent today reviewing patient chart.  Much of today's visit was spent discussing with patient my chart review, their concerns, health maintenance, my assessment, and recommendations.      Visit today is associated with current or anticipated ongoing medical care related to this patient's single serious condition/complex condition of DM, HTN, hx colon CA. The patient will return to see me as these  issues will be followed longitudinally.          Pedro Luis Ferrell MD  Internal Medicine-[facility]        Side effects of medication(s) were discussed in detail and patient voiced understanding.  Patient will call back for any issues or complications.

## 2024-07-26 ENCOUNTER — PATIENT MESSAGE (OUTPATIENT)
Dept: ADMINISTRATIVE | Facility: OTHER | Age: 79
End: 2024-07-26
Payer: MEDICARE

## 2024-07-26 NOTE — PROGRESS NOTES
Your B12 levels are on the lower side of normal. Over the counter vitamin b12 replacement, 1000mcg daily, can be taken a few times a week to avoid a deficiency. Disclaimer, B12 deficiency, if left untreated for a long period of time, can lead to permanent neurological symptoms and a severe anemia.  Other than that labs are fine. Thyroid is normal. No anemia. Please see the sleep doctor in addition to starting an inhaler i sent over to pharmacy yesterday. You take this inhaler every day and then monitor breathing over several weeks to see if changed any. Talk soon.    Respectfully,  Pedro Luis Ferrell

## 2024-07-29 ENCOUNTER — HOSPITAL ENCOUNTER (OUTPATIENT)
Dept: RADIOLOGY | Facility: OTHER | Age: 79
Discharge: HOME | End: 2024-07-29
Attending: INTERNAL MEDICINE
Payer: MEDICARE

## 2024-07-29 ENCOUNTER — HOSPITAL ENCOUNTER (OUTPATIENT)
Dept: CARDIOLOGY | Facility: OTHER | Age: 79
Discharge: HOME | End: 2024-07-29
Attending: INTERNAL MEDICINE
Payer: MEDICARE

## 2024-07-29 VITALS
BODY MASS INDEX: 33.87 KG/M2 | HEIGHT: 57 IN | DIASTOLIC BLOOD PRESSURE: 80 MMHG | SYSTOLIC BLOOD PRESSURE: 100 MMHG | HEART RATE: 86 BPM | WEIGHT: 157 LBS

## 2024-07-29 DIAGNOSIS — Z87.891 FORMER SMOKER: ICD-10-CM

## 2024-07-29 DIAGNOSIS — J43.2 CENTRILOBULAR EMPHYSEMA: ICD-10-CM

## 2024-07-29 DIAGNOSIS — Z12.31 ENCOUNTER FOR SCREENING MAMMOGRAM FOR BREAST CANCER: ICD-10-CM

## 2024-07-29 DIAGNOSIS — G47.10 EXCESSIVE SLEEPINESS: ICD-10-CM

## 2024-07-29 LAB
ASCENDING AORTA: 2.79 CM
AV INDEX (PROSTH): 0.54
AV MEAN GRADIENT: 9 MMHG
AV PEAK GRADIENT: 14 MMHG
AV VALVE AREA BY VELOCITY RATIO: 1.48 CM²
AV VALVE AREA: 1.45 CM²
AV VELOCITY RATIO: 0.56
BSA FOR ECHO PROCEDURE: 1.69 M2
CV ECHO LV RWT: 0.46 CM
DOP CALC AO PEAK VEL: 1.87 M/S
DOP CALC AO VTI: 36.6 CM
DOP CALC LVOT AREA: 2.7 CM2
DOP CALC LVOT DIAMETER: 1.84 CM
DOP CALC LVOT PEAK VEL: 1.04 M/S
DOP CALC LVOT STROKE VOLUME: 52.89 CM3
DOP CALC MV VTI: 26.3 CM
DOP CALC RVOT PEAK VEL: 0.78 M/S
DOP CALCLVOT PEAK VEL VTI: 19.9 CM
E WAVE DECELERATION TIME: 202.78 MSEC
E/A RATIO: 0.75
E/E' RATIO: 11.29 M/S
ECHO LV POSTERIOR WALL: 0.93 CM (ref 0.6–1.1)
FRACTIONAL SHORTENING: 28 % (ref 28–44)
INTERVENTRICULAR SEPTUM: 0.97 CM (ref 0.6–1.1)
IVC DIAMETER: 2.34 CM
LA MAJOR: 4.7 CM
LA MINOR: 4.83 CM
LA WIDTH: 2.5 CM
LEFT ATRIUM AREA SYSTOLIC (APICAL 2 CHAMBER): 12.94 CM2
LEFT ATRIUM AREA SYSTOLIC (APICAL 4 CHAMBER): 11.54 CM2
LEFT ATRIUM SIZE: 3.12 CM
LEFT ATRIUM VOLUME INDEX MOD: 15.5 ML/M2
LEFT ATRIUM VOLUME INDEX: 19.5 ML/M2
LEFT ATRIUM VOLUME MOD: 25.03 CM3
LEFT ATRIUM VOLUME: 31.59 CM3
LEFT INTERNAL DIMENSION IN SYSTOLE: 2.88 CM (ref 2.1–4)
LEFT VENTRICLE DIASTOLIC VOLUME INDEX: 43.69 ML/M2
LEFT VENTRICLE DIASTOLIC VOLUME: 70.78 ML
LEFT VENTRICLE END SYSTOLIC VOLUME APICAL 2 CHAMBER: 26.36 ML
LEFT VENTRICLE END SYSTOLIC VOLUME APICAL 4 CHAMBER: 22.58 ML
LEFT VENTRICLE MASS INDEX: 74 G/M2
LEFT VENTRICLE SYSTOLIC VOLUME INDEX: 19.5 ML/M2
LEFT VENTRICLE SYSTOLIC VOLUME: 31.64 ML
LEFT VENTRICULAR INTERNAL DIMENSION IN DIASTOLE: 4.02 CM (ref 3.5–6)
LEFT VENTRICULAR MASS: 119.17 G
LV LATERAL E/E' RATIO: 9.88 M/S
LV SEPTAL E/E' RATIO: 13.17 M/S
LVED V (TEICH): 70.78 ML
LVES V (TEICH): 31.64 ML
LVOT MG: 2.29 MMHG
LVOT MV: 0.72 CM/S
MV MEAN GRADIENT: 2 MMHG
MV PEAK A VEL: 1.06 M/S
MV PEAK E VEL: 0.79 M/S
MV PEAK GRADIENT: 5 MMHG
MV STENOSIS PRESSURE HALF TIME: 58.81 MS
MV VALVE AREA BY CONTINUITY EQUATION: 2.01 CM2
MV VALVE AREA P 1/2 METHOD: 3.74 CM2
PISA MRMAX VEL: 3.76 M/S
PISA TR MAX VEL: 1.76 M/S
PULM VEIN S/D RATIO: 1.38
PV PEAK D VEL: 0.29 M/S
PV PEAK GRADIENT: 3 MMHG
PV PEAK S VEL: 0.4 M/S
PV PEAK VELOCITY: 0.92 M/S
RA MAJOR: 3.94 CM
RA PRESSURE ESTIMATED: 3 MMHG
RA WIDTH: 2.6 CM
RIGHT VENTRICLE DIASTOLIC BASEL DIMENSION: 2.4 CM
RV TB RVSP: 5 MMHG
RV TISSUE DOPPLER FREE WALL SYSTOLIC VELOCITY 1 (APICAL 4 CHAMBER VIEW): 8.29 CM/S
SINUS: 2.4 CM
STJ: 2.33 CM
TDI LATERAL: 0.08 M/S
TDI SEPTAL: 0.06 M/S
TDI: 0.07 M/S
TR MAX PG: 12 MMHG
TRICUSPID ANNULAR PLANE SYSTOLIC EXCURSION: 1.8 CM
TV REST PULMONARY ARTERY PRESSURE: 15 MMHG
Z-SCORE OF LEFT VENTRICULAR DIMENSION IN END DIASTOLE: -1.31
Z-SCORE OF LEFT VENTRICULAR DIMENSION IN END SYSTOLE: 0.11

## 2024-07-29 PROCEDURE — 93005 ELECTROCARDIOGRAM TRACING: CPT | Mod: HCNC

## 2024-07-29 PROCEDURE — 77067 SCR MAMMO BI INCL CAD: CPT | Mod: TC,HCNC

## 2024-07-29 PROCEDURE — 93306 TTE W/DOPPLER COMPLETE: CPT | Mod: 26,HCNC,, | Performed by: INTERNAL MEDICINE

## 2024-07-29 PROCEDURE — 71271 CT THORAX LUNG CANCER SCR C-: CPT | Mod: 26,HCNC,, | Performed by: RADIOLOGY

## 2024-07-29 PROCEDURE — 93010 ELECTROCARDIOGRAM REPORT: CPT | Mod: HCNC,,, | Performed by: INTERNAL MEDICINE

## 2024-07-29 PROCEDURE — 93306 TTE W/DOPPLER COMPLETE: CPT | Mod: HCNC

## 2024-07-29 PROCEDURE — 71271 CT THORAX LUNG CANCER SCR C-: CPT | Mod: TC,HCNC

## 2024-07-29 PROCEDURE — 77063 BREAST TOMOSYNTHESIS BI: CPT | Mod: TC,HCNC

## 2024-07-31 LAB
OHS QRS DURATION: 84 MS
OHS QTC CALCULATION: 435 MS

## 2024-08-01 ENCOUNTER — HOSPITAL ENCOUNTER (OUTPATIENT)
Facility: OTHER | Age: 79
Discharge: HOME | End: 2024-08-01
Attending: ANESTHESIOLOGY | Admitting: ANESTHESIOLOGY
Payer: MEDICARE

## 2024-08-01 VITALS
HEART RATE: 89 BPM | DIASTOLIC BLOOD PRESSURE: 67 MMHG | OXYGEN SATURATION: 93 % | WEIGHT: 156 LBS | SYSTOLIC BLOOD PRESSURE: 130 MMHG | HEIGHT: 57 IN | TEMPERATURE: 98 F | RESPIRATION RATE: 18 BRPM | BODY MASS INDEX: 33.66 KG/M2

## 2024-08-01 DIAGNOSIS — M17.12 PRIMARY OSTEOARTHRITIS OF LEFT KNEE: ICD-10-CM

## 2024-08-01 DIAGNOSIS — M25.562 CHRONIC PAIN OF LEFT KNEE: Primary | ICD-10-CM

## 2024-08-01 DIAGNOSIS — G89.29 CHRONIC PAIN OF LEFT KNEE: Primary | ICD-10-CM

## 2024-08-01 DIAGNOSIS — G89.29 CHRONIC PAIN: ICD-10-CM

## 2024-08-01 LAB — POCT GLUCOSE: 81 MG/DL (ref 70–110)

## 2024-08-01 PROCEDURE — 64454 NJX AA&/STRD GNCLR NRV BRNCH: CPT | Mod: HCNC,LT,, | Performed by: ANESTHESIOLOGY

## 2024-08-01 PROCEDURE — 25000003 PHARM REV CODE 250: Mod: HCNC | Performed by: ANESTHESIOLOGY

## 2024-08-01 PROCEDURE — 63600175 PHARM REV CODE 636 W HCPCS: Mod: JZ,JG,HCNC | Performed by: ANESTHESIOLOGY

## 2024-08-01 PROCEDURE — 64454 NJX AA&/STRD GNCLR NRV BRNCH: CPT | Mod: HCNC,LT | Performed by: ANESTHESIOLOGY

## 2024-08-01 RX ORDER — LIDOCAINE HYDROCHLORIDE 20 MG/ML
INJECTION, SOLUTION INFILTRATION; PERINEURAL
Status: DISCONTINUED | OUTPATIENT
Start: 2024-08-01 | End: 2024-08-01 | Stop reason: HOSPADM

## 2024-08-01 RX ORDER — SODIUM CHLORIDE 9 MG/ML
INJECTION, SOLUTION INTRAVENOUS CONTINUOUS
Status: DISCONTINUED | OUTPATIENT
Start: 2024-08-01 | End: 2024-08-01 | Stop reason: HOSPADM

## 2024-08-01 RX ORDER — BUPIVACAINE HYDROCHLORIDE 2.5 MG/ML
INJECTION, SOLUTION EPIDURAL; INFILTRATION; INTRACAUDAL
Status: DISCONTINUED | OUTPATIENT
Start: 2024-08-01 | End: 2024-08-01 | Stop reason: HOSPADM

## 2024-08-01 RX ORDER — ALPRAZOLAM 0.5 MG/1
0.5 TABLET, ORALLY DISINTEGRATING ORAL ONCE
Status: COMPLETED | OUTPATIENT
Start: 2024-08-01 | End: 2024-08-01

## 2024-08-01 RX ADMIN — ALPRAZOLAM 0.5 MG: 0.5 TABLET, ORALLY DISINTEGRATING ORAL at 02:08

## 2024-08-02 ENCOUNTER — TELEPHONE (OUTPATIENT)
Dept: PAIN MEDICINE | Facility: CLINIC | Age: 79
End: 2024-08-02
Payer: MEDICARE

## 2024-08-02 ENCOUNTER — PATIENT MESSAGE (OUTPATIENT)
Dept: ADMINISTRATIVE | Facility: OTHER | Age: 79
End: 2024-08-02
Payer: MEDICARE

## 2024-08-02 DIAGNOSIS — M17.12 PRIMARY OSTEOARTHRITIS OF LEFT KNEE: Primary | ICD-10-CM

## 2024-08-02 NOTE — TELEPHONE ENCOUNTER
----- Message from Becky Scott sent at 8/2/2024  2:06 PM CDT -----  Regarding: Patient Advice                  PAIN  DIARY:        08/01/2024    Time:  4:30 PM   Pain Score :  2    08/01/2024    Time:  5:30 PM   Pain Score:  2    08/01/2024:   Time :  6:30 PM  Pain Score:  2    08/1/2024:     Time :  7:30 PM  Pain Score : 2    08/01/2024 :  Time :  8:30 PM  Pain Score : 2    08/01/2024    Time :  9:30 PM Pain Score: 1    08/01/2024    Time : 10: 00\PM Pain Score : 1    08/02/2024    Time :  2:00 PM Pain Score:  0

## 2024-08-02 NOTE — TELEPHONE ENCOUNTER
BLOCK, NERVE LEFT DIAGNOSTIC GENICULAR           Pre procedure pain level 9  Percentage of relief within 24 hours of procedure- 80%  Post procedure level 2  Any Improvements in ADL: bathing, dressing, eating, transferring, using toilet, and walking

## 2024-08-05 ENCOUNTER — PATIENT MESSAGE (OUTPATIENT)
Dept: PAIN MEDICINE | Facility: OTHER | Age: 79
End: 2024-08-05
Payer: MEDICARE

## 2024-08-05 DIAGNOSIS — M17.12 PRIMARY OSTEOARTHRITIS OF LEFT KNEE: Primary | ICD-10-CM

## 2024-08-21 ENCOUNTER — PATIENT OUTREACH (OUTPATIENT)
Dept: ADMINISTRATIVE | Facility: HOSPITAL | Age: 79
End: 2024-08-21
Payer: MEDICARE

## 2024-08-22 ENCOUNTER — TELEPHONE (OUTPATIENT)
Dept: PAIN MEDICINE | Facility: CLINIC | Age: 79
End: 2024-08-22
Payer: MEDICARE

## 2024-08-22 NOTE — TELEPHONE ENCOUNTER
----- Message from Cele Pickett sent at 8/22/2024  3:37 PM CDT -----  Name of Who is calling :  ESTEBAN JIMENEZ [[id number]]      What is the request in detail:  Pt would like to know if she can take tylenol before her procedure . Please assist       Can the clinic reply by [facility]:no            What [facility]: [phone]

## 2024-08-23 ENCOUNTER — HOSPITAL ENCOUNTER (OUTPATIENT)
Facility: OTHER | Age: 79
Discharge: HOME | End: 2024-08-23
Attending: ANESTHESIOLOGY | Admitting: ANESTHESIOLOGY
Payer: MEDICARE

## 2024-08-23 VITALS
BODY MASS INDEX: 33.66 KG/M2 | OXYGEN SATURATION: 95 % | TEMPERATURE: 98 F | HEART RATE: 80 BPM | WEIGHT: 156 LBS | RESPIRATION RATE: 16 BRPM | DIASTOLIC BLOOD PRESSURE: 70 MMHG | HEIGHT: 57 IN | SYSTOLIC BLOOD PRESSURE: 130 MMHG

## 2024-08-23 DIAGNOSIS — M17.12 OSTEOARTHRITIS OF LEFT KNEE, UNSPECIFIED OSTEOARTHRITIS TYPE: ICD-10-CM

## 2024-08-23 DIAGNOSIS — G89.29 CHRONIC PAIN: ICD-10-CM

## 2024-08-23 DIAGNOSIS — M47.816 LUMBAR FACET ARTHROPATHY: Primary | ICD-10-CM

## 2024-08-23 LAB — POCT GLUCOSE: 84 MG/DL (ref 70–110)

## 2024-08-23 PROCEDURE — 63600175 PHARM REV CODE 636 W HCPCS: Mod: HCNC | Performed by: ANESTHESIOLOGY

## 2024-08-23 PROCEDURE — 64624 DSTRJ NULYT AGT GNCLR NRV: CPT | Mod: HCNC,LT,, | Performed by: ANESTHESIOLOGY

## 2024-08-23 PROCEDURE — 25000003 PHARM REV CODE 250: Mod: HCNC | Performed by: ANESTHESIOLOGY

## 2024-08-23 PROCEDURE — A4649 SURGICAL SUPPLIES: HCPCS | Mod: HCNC | Performed by: ANESTHESIOLOGY

## 2024-08-23 PROCEDURE — 64624 DSTRJ NULYT AGT GNCLR NRV: CPT | Mod: HCNC,LT | Performed by: ANESTHESIOLOGY

## 2024-08-23 PROCEDURE — 99152 MOD SED SAME PHYS/QHP 5/>YRS: CPT | Mod: HCNC | Performed by: ANESTHESIOLOGY

## 2024-08-23 RX ORDER — BUPIVACAINE HYDROCHLORIDE 2.5 MG/ML
INJECTION, SOLUTION EPIDURAL; INFILTRATION; INTRACAUDAL
Status: DISCONTINUED | OUTPATIENT
Start: 2024-08-23 | End: 2024-08-23 | Stop reason: HOSPADM

## 2024-08-23 RX ORDER — FENTANYL CITRATE 50 UG/ML
INJECTION, SOLUTION INTRAMUSCULAR; INTRAVENOUS
Status: DISCONTINUED | OUTPATIENT
Start: 2024-08-23 | End: 2024-08-23 | Stop reason: HOSPADM

## 2024-08-23 RX ORDER — LIDOCAINE HYDROCHLORIDE 20 MG/ML
INJECTION, SOLUTION INFILTRATION; PERINEURAL
Status: DISCONTINUED | OUTPATIENT
Start: 2024-08-23 | End: 2024-08-23 | Stop reason: HOSPADM

## 2024-08-23 RX ORDER — SODIUM CHLORIDE 9 MG/ML
INJECTION, SOLUTION INTRAVENOUS CONTINUOUS
Status: DISCONTINUED | OUTPATIENT
Start: 2024-08-23 | End: 2024-08-23 | Stop reason: HOSPADM

## 2024-08-23 RX ORDER — TRIAMCINOLONE ACETONIDE 40 MG/ML
INJECTION, SUSPENSION INTRA-ARTICULAR; INTRAMUSCULAR
Status: DISCONTINUED | OUTPATIENT
Start: 2024-08-23 | End: 2024-08-23 | Stop reason: HOSPADM

## 2024-08-23 RX ORDER — MIDAZOLAM HYDROCHLORIDE 1 MG/ML
INJECTION INTRAMUSCULAR; INTRAVENOUS
Status: DISCONTINUED | OUTPATIENT
Start: 2024-08-23 | End: 2024-08-23 | Stop reason: HOSPADM

## 2024-08-23 NOTE — OP NOTE
Therapeutic Genicular Cooled Nerve Radiofrequency Ablation under Fluoroscopy     The procedure, risks, benefits, and options were discussed with the patient. There are no contraindications to the procedure. The patent expressed understanding and agreed to the procedure. Informed written consent was obtained prior to the start of the procedure and can be found in the patient's chart.        PATIENT NAME: Marizol Kevin   MRN: [id number]     DATE OF PROCEDURE: 08/23/2024     PROCEDURE: Therapeutic Left Genicular Cooled Nerve Radiofrequency Ablation under Fluoroscopy    PRE-OP DIAGNOSIS: Primary osteoarthritis of left knee [M17.12]    POST-OP DIAGNOSIS: Primary osteoarthritis of left knee [M17.12]    PHYSICIAN: Abdi Del Toro MD    ASSISTANTS: Chalo Martinez MD  [employer] Pain Medicine Fellow      MEDICATIONS INJECTED:  Preservative-free Kenalog 40mg with 9cc of Bupivicaine 0.25%    LOCAL ANESTHETIC INJECTED:   Xylocaine 2%    SEDATION: Versed 2.5 and Fentanyl 75mcg                                                                                                                                                                                     Conscious sedation ordered by M.D. Patient re-evaluation prior to administration of conscious sedation. No changes noted in patient's status from initial evaluation. The patient's vital signs were monitored by RN and patient remained hemodynamically stable throughout the procedure.    Event Time In   Sedation Start 1334   Sedation End 1351       ESTIMATED BLOOD LOSS:  None    COMPLICATIONS:  None     INTERVAL HISTORY: Patient has clinical findings of chronic knee pain. Patients has completed 2 previous diagnostic genicular nerve blocks with at least 80% relief for the expected duration of the local anesthetic utilized.     TECHNIQUE: Time-out was performed to identify the patient and procedure to be performed. With the patient laying in a supine position, the surgical area was prepped and  draped in the usual sterile fashion using ChloraPrep and fenestrated drape. Three target sites including the superior lateral genicular nerve where the lateral femoral shaft meets the epicondyle, the superior medial genicular nerve where the medial femoral shaft meets the epicondyle, and the inferior medial genicular nerve where the medial tibial shaft meets the epicondyle, were determined under fluoroscopic guidance. Skin anesthesia was achieved by injecting Lidocaine 2% over the injection sites. A 17 gauge, 75mm, 10mm active tip needle was then advanced under fluoroscopy in the AP and lateral views into the positions of the geniculate nerves at these levels. This was followed by motor testing at each of the nerves to ensure that there was no dorsiflexion of the foot. After negative aspiration for blood was confirmed, 1 mL of the lidocaine 2% listed above was injected slowly at each site. This was followed by cooled thermal lesioning at 80 degrees celsius for 150 seconds at each site. That was followed by slowly injecting 2 mL of the medication mixture listed above at each site. The needles were removed and bleeding was nil. A sterile dressing was applied. No specimens collected. The patient tolerated the procedure well and did not have any procedure related motor deficit at the conclusion of the procedure.      The patient was monitored after the procedure in the recovery area. They were given post-procedure and discharge instructions to follow at home. The patient was discharged in a stable condition.    Chalo Martinez MD    I reviewed and edited the fellow's note. I conducted my own interview and physical examination. I agree with the findings. I was present and supervising all critical portions of the procedure.    Abdi Del Toro MD

## 2024-08-23 NOTE — DISCHARGE SUMMARY
Discharge Note  Short Stay      SUMMARY     Admit Date: 8/23/2024    Attending Physician: Abdi Del Toro MD    Discharge Physician: Chalo Martinez    Discharge Date: 8/23/2024 1:32 PM    Procedure(s) (LRB):  RADIOFREQUENCY ABLATION LEFT GENICULAR NERVES COOLED *ASPIRIN OTC* HOLD FOR 5 DAYS (Left)    Final Diagnosis: Primary osteoarthritis of left knee [M17.12]    Disposition: Home or self care    Patient Instructions:   Current Discharge Medication List        CONTINUE these medications which have NOT CHANGED    Details   albuterol (PROVENTIL/VENTOLIN HFA) 90 mcg/actuation inhaler INHALE 2 PUFFS INTO THE LUNGS EVERY 4 (FOUR) HOURS AS NEEDED FOR WHEEZING  Qty: 18 g, Refills: 3    Associated Diagnoses: Mild intermittent asthma without complication      albuterol-ipratropium (DUO-NEB) 2.5 mg-0.5 mg/3 mL nebulizer solution Take 3 mLs by nebulization every 6 (six) hours as needed for Wheezing or Shortness of Breath. Rescue  Qty: 15 mL, Refills: 5    Associated Diagnoses: COPD exacerbation      amLODIPine (NORVASC) 5 MG tablet TAKE 1 TABLET EVERY DAY  Qty: 90 tablet, Refills: 3    Comments: .  Associated Diagnoses: Essential hypertension      aspirin (ECOTRIN) 81 MG EC tablet Take 81 mg by mouth once daily.      atorvastatin (LIPITOR) 40 MG tablet Take 1 tablet (40 mg total) by mouth once daily.  Qty: 90 tablet, Refills: 3    Associated Diagnoses: Type 2 diabetes mellitus without complication, without long-term current use of insulin      azelastine (ASTELIN) 137 mcg (0.1 %) nasal spray USE 1 SPRAY IN EACH NOSTRIL TWICE DAILY  Qty: 60 mL, Refills: 3    Associated Diagnoses: Allergic rhinitis, unspecified seasonality, unspecified trigger      betamethasone valerate 0.1% (VALISONE) 0.1 % Oint Apply topically once daily.  Qty: 15 g, Refills: 1    Associated Diagnoses: Genital ulcer, female      blood sugar diagnostic (TRUE METRIX GLUCOSE TEST STRIP) Strp Use to test blood glucose two (2) times daily; to be used with  insurance-preferred brand of glucometer/supplies  Qty: 200 strip, Refills: 3    Associated Diagnoses: Type 2 diabetes mellitus with other specified complication, unspecified whether long term insulin use      blood-glucose meter kit Please provide with insurance covered meter  Qty: 1 each, Refills: 0    Associated Diagnoses: Type 2 diabetes mellitus with other specified complication, unspecified whether long term insulin use      cholecalciferol, vitamin D3, (VITAMIN D3) 50 mcg (2,000 unit) Cap capsule Take 1 capsule (2,000 Units total) by mouth once daily.  Qty: 90 capsule, Refills: 3    Associated Diagnoses: Fatigue, unspecified type      dulaglutide (TRULICITY) 3 mg/0.5 mL pen injector Inject 3 mg into the skin once a week.  Qty: 12 pen , Refills: 2      famotidine (PEPCID) 40 MG tablet Take 40 mg by mouth 2 (two) times daily.      ferrous sulfate 325 (65 FE) MG EC tablet TAKE 1 TABLET EVERY OTHER DAY  Qty: 45 tablet, Refills: 3      fluticasone-salmeterol diskus inhaler 250-50 mcg Inhale 1 puff into the lungs 2 (two) times daily. Controller  Qty: 180 each, Refills: 0      heparin, porcine, PF, (HEPARIN FLUSH 100 UNITS/ML) 100 unit/mL Syrg       hydrOXYzine HCL (ATARAX) 25 MG tablet Take 1 tablet (25 mg total) by mouth 3 (three) times daily as needed for Itching or Anxiety.  Qty: 30 tablet, Refills: 3    Associated Diagnoses: Anxiety      ketoconazole (NIZORAL) 2 % cream Apply topically once daily. for 14 days  Qty: 60 g, Refills: 0      lancets Misc 1 Device by Misc.(Non-Drug; Combo Route) route 2 (two) times daily.  Qty: 200 each, Refills: 11    Associated Diagnoses: Type 2 diabetes mellitus with other specified complication, unspecified whether long term insulin use      LIDOcaine (LIDODERM) 5 % Place 1 patch onto the skin once daily. Remove & Discard patch within 12 hours or as directed by MD  Qty: 15 patch, Refills: 0      metFORMIN (GLUCOPHAGE-XR) 500 MG ER 24hr tablet TAKE 2 TABLETS EVERY DAY WITH  BREAKFAST  Qty: 180 tablet, Refills: 3    Associated Diagnoses: Type 2 diabetes mellitus without complication, without long-term current use of insulin      methocarbamoL (ROBAXIN) 750 MG Tab Take 1 tablet (750 mg total) by mouth 3 (three) times daily as needed (muscle spasms).  Qty: 90 tablet, Refills: 0    Associated Diagnoses: Piriformis syndrome of both sides      multivitamin capsule Take 1 capsule by mouth once daily.      ondansetron (ZOFRAN) 4 MG tablet TAKE 1 TABLET EVERY 8 HOURS AS NEEDED FOR NAUSEA  Qty: 30 tablet, Refills: 0    Associated Diagnoses: Nausea      pregabalin (LYRICA) 75 MG capsule Take 1 capsule (75 mg total) by mouth 2 (two) times daily.  Qty: 60 capsule, Refills: 6      raloxifene (EVISTA) 60 mg tablet TAKE 1 TABLET EVERY DAY  Qty: 90 tablet, Refills: 4      triamcinolone acetonide 0.5% (KENALOG) 0.5 % Crea Apply topically 2 (two) times daily.  Qty: 15 g, Refills: 3                 Discharge Diagnosis: Primary osteoarthritis of left knee [M17.12]  Condition on Discharge: Stable with no complications to procedure   Diet on Discharge: Same as before.  Activity: as per instruction sheet.  Discharge to: Home with a responsible adult.  Follow up: 2-4 weeks       Please call my office or pager at [phone] if experienced any weakness or loss of sensation, fever > 101.5, pain uncontrolled with oral medications, persistent nausea/vomiting/or diarrhea, redness or drainage from the incisions, or any other worrisome concerns. If physician on call was not reached or could not communicate with our office for any reason please go to the nearest emergency department

## 2024-08-23 NOTE — H&P
HPI  Patient presenting for Procedure(s) (LRB):  RADIOFREQUENCY ABLATION LEFT GENICULAR NERVES COOLED *ASPIRIN OTC* HOLD FOR 5 DAYS (Left)     Patient on Anti-coagulation No    No health changes since previous encounter    Past Medical History:   Diagnosis Date    Allergy     Anxiety     Cancer     colon    Cataract     Colon cancer     Dependence on nicotine from cigarettes 9/3/2020    Diabetes mellitus, type 2     Dry eye syndrome     Hyperlipidemia     Hypertension     Insomnia     Light cigarette smoker (1-9 cigs/day) 6/21/2022    Malignant neoplasm of ascending colon 7/20/2020    Squamous blepharitis     Syncope 4/16/2022    Tobacco use disorder, moderate, in early remission 11/2/2022    Vitamin D deficiency 9/10/2020     Past Surgical History:   Procedure Laterality Date    CATARACT EXTRACTION, BILATERAL  03/2020    CHOLECYSTECTOMY      COLON SURGERY      Colon cancer    COLONOSCOPY N/A 02/01/2021    Procedure: COLONOSCOPY;  Surgeon: Ashwini Duarte MD;  Location: [facility] ([address] [place] [address]);  Service: Endoscopy;  Laterality: N/A;  medical transportation  covid test 1/29 [employer], instructions mailed-KPvt    EYE SURGERY      Cataract    HYSTERECTOMY      INJECTION OF ANESTHETIC AGENT AROUND NERVE Left 7/21/2023    Procedure: BLOCK, NERVE, LEFT SHOULDER ARTICULAR BRANCH keep date rep aware;  Surgeon: Abdi Del Toro MD;  Location: [facility], [place], operated by [facility];  Service: Pain Management;  Laterality: Left;    INJECTION OF ANESTHETIC AGENT AROUND NERVE Left 8/1/2024    Procedure: BLOCK, NERVE LEFT DIAGNOSTIC GENICULAR;  Surgeon: Abdi Del Toro MD;  Location: [facility], [place], [facility];  Service: Pain Management;  Laterality: Left;  [phone]    INJECTION OF JOINT N/A 4/25/2024    Procedure: INJECTION, JOINT BILATERAL PIRIFORMIS AND LEFT GTB;  Surgeon: Abdi Del Toro MD;  Location: [facility], [place], [facility];  Service: Pain Management;  Laterality: N/A;  [phone]  2 WK F/U IRMA    JOINT REPLACEMENT      Knee    KNEE SURGERY      LAPAROSCOPIC LEFT  "COLON RESECTION      TRANSFORAMINAL EPIDURAL INJECTION OF STEROID Left 12/22/2023    Procedure: LUMBAR TRANSFORAMINAL LEFT L4/5 AND L5/S1;  Surgeon: Abdi Del Toro MD;  Location: [facility];  Service: Pain Management;  Laterality: Left;  [phone]  2 WK F/U ANTWON     Review of patient's allergies indicates:   Allergen Reactions    Grass pollen-patricia grass standard       Current Facility-Administered Medications   Medication    0.9%  NaCl infusion    BUPivacaine (PF) 0.25% (2.5 mg/ml) injection    LIDOcaine HCL 20 mg/ml (2%) injection    triamcinolone acetonide injection       PMHx, PSHx, Allergies, Medications reviewed in epic    ROS negative except pain complaints in HPI    OBJECTIVE:    /67 (BP Location: Right arm, Patient Position: Sitting)   Pulse 88   Temp 98.4 °F (36.9 °C) (Oral)   Resp 16   Ht 4' 9" (1.448 m)   Wt 70.8 kg (156 lb)   SpO2 97%   BMI 33.76 kg/m²     PHYSICAL EXAMINATION:    GENERAL: Well appearing, in no acute distress, alert and oriented x3.  PSYCH:  Mood and affect appropriate.  SKIN: Skin color, texture, turgor normal, no rashes or lesions which will impact the procedure.  CV: RRR with palpation of the radial artery.  PULM: No evidence of respiratory difficulty, symmetric chest rise. Clear to auscultation.  NEURO: Cranial nerves grossly intact.    Plan:    Proceed with procedure as planned Procedure(s) (LRB):  RADIOFREQUENCY ABLATION LEFT GENICULAR NERVES COOLED *ASPIRIN OTC* HOLD FOR 5 DAYS (Left)    Chalo Martinez  08/23/2024            "

## 2024-08-23 NOTE — DISCHARGE INSTRUCTIONS

## 2024-09-20 ENCOUNTER — TELEPHONE (OUTPATIENT)
Dept: INTERNAL MEDICINE | Facility: CLINIC | Age: 79
End: 2024-09-20

## 2024-09-20 ENCOUNTER — OFFICE VISIT (OUTPATIENT)
Dept: PAIN MEDICINE | Facility: CLINIC | Age: 79
End: 2024-09-20
Payer: MEDICARE

## 2024-09-20 ENCOUNTER — HOSPITAL ENCOUNTER (OUTPATIENT)
Dept: CARDIOLOGY | Facility: OTHER | Age: 79
Discharge: HOME | End: 2024-09-20
Attending: INTERNAL MEDICINE
Payer: MEDICARE

## 2024-09-20 ENCOUNTER — OFFICE VISIT (OUTPATIENT)
Dept: INTERNAL MEDICINE | Facility: CLINIC | Age: 79
End: 2024-09-20
Attending: INTERNAL MEDICINE
Payer: MEDICARE

## 2024-09-20 VITALS
SYSTOLIC BLOOD PRESSURE: 107 MMHG | RESPIRATION RATE: 18 BRPM | WEIGHT: 155.63 LBS | HEIGHT: 57 IN | DIASTOLIC BLOOD PRESSURE: 67 MMHG | OXYGEN SATURATION: 98 % | BODY MASS INDEX: 33.57 KG/M2

## 2024-09-20 DIAGNOSIS — M17.12 PRIMARY OSTEOARTHRITIS OF LEFT KNEE: ICD-10-CM

## 2024-09-20 DIAGNOSIS — M47.816 LUMBAR FACET ARTHROPATHY: ICD-10-CM

## 2024-09-20 DIAGNOSIS — E11.40 PAINFUL DIABETIC NEUROPATHY: ICD-10-CM

## 2024-09-20 DIAGNOSIS — M17.10 PRIMARY OSTEOARTHRITIS OF KNEE, UNSPECIFIED LATERALITY: Primary | ICD-10-CM

## 2024-09-20 DIAGNOSIS — R41.3 MEMORY LOSS: ICD-10-CM

## 2024-09-20 DIAGNOSIS — G89.4 CHRONIC PAIN SYNDROME: ICD-10-CM

## 2024-09-20 DIAGNOSIS — R53.83 FATIGUE, UNSPECIFIED TYPE: Primary | ICD-10-CM

## 2024-09-20 DIAGNOSIS — R53.83 FATIGUE, UNSPECIFIED TYPE: ICD-10-CM

## 2024-09-20 DIAGNOSIS — M54.17 LUMBOSACRAL RADICULOPATHY: ICD-10-CM

## 2024-09-20 DIAGNOSIS — M54.16 LUMBAR RADICULOPATHY: ICD-10-CM

## 2024-09-20 LAB
OHS QRS DURATION: 82 MS
OHS QTC CALCULATION: 426 MS

## 2024-09-20 PROCEDURE — 93010 ELECTROCARDIOGRAM REPORT: CPT | Mod: HCNC,,, | Performed by: INTERNAL MEDICINE

## 2024-09-20 PROCEDURE — 93005 ELECTROCARDIOGRAM TRACING: CPT | Mod: HCNC

## 2024-09-20 PROCEDURE — 99999 PR PBB SHADOW E&M-EST. PATIENT-LVL II: CPT | Mod: PBBFAC,HCNC,, | Performed by: INTERNAL MEDICINE

## 2024-09-20 PROCEDURE — 99999 PR PBB SHADOW E&M-EST. PATIENT-LVL IV: CPT | Mod: PBBFAC,HCNC,,

## 2024-09-20 RX ORDER — CELECOXIB 100 MG/1
100 CAPSULE ORAL DAILY PRN
Qty: 15 CAPSULE | Refills: 0 | Status: SHIPPED | OUTPATIENT
Start: 2024-09-20

## 2024-09-20 RX ORDER — DULOXETIN HYDROCHLORIDE 30 MG/1
30 CAPSULE, DELAYED RELEASE ORAL DAILY
Qty: 30 CAPSULE | Refills: 0 | Status: SHIPPED | OUTPATIENT
Start: 2024-09-20

## 2024-09-20 NOTE — PROGRESS NOTES
"Chronic Pain - Established        Chief Complaint:   Chief Complaint   Patient presents with    Knee Pain     Interval History 9/20/2024:  Marizol Kevin returns to clinic for follow-up after left genicular cooled RFA on 8/23/2024. She reports 80% pain relief. She occasionally gets a "zing" of sharp pain to her left knee. The unexpected sharp pain "scares" her. She would like something to help her when this occasional pain starts. She continues Lyrica and tylenol with mild relief. She denies any perceived side effects. She had discontinued cymbalta because she didn't think she could take this with Lyrica. She denies recent health changes. She denies recent falls or trauma. She denies new onset fever/night sweats, urinary incontinence, bowel incontinence, significant weight changes, significant motor weakness or changes, or loss of sensations. Her pain today is 4/10.     Interval History 7/8/2024:  Marizol Kevin returns to clinic s/p left knee synvisc injection. She reports some relief of pain. She continues to experience pain when she goes to stand and walk.  She feels like she may fall at times due to the pain.  She continues PT and HEP daily. The patient denies fever/night sweats, urinary incontinence, bowel incontinence, significant weight changes, significant motor weakness or changes, or loss of sensations. Her pain today is 8/10.     Interval History 5/9/2024:  Marizol Kevin returns to clinic s/p bialteral piriformis and left GTB on 4/25/2024. She reports 100% relief from this procedure.  She is happy with this last procedure.  Her main pain generator today is from her left knee. She last had a left genicular CSI on 10/17/2023.  She reports 80% relief that lasted 4 months.  She has done some research and would like to try Synvisc to see if she would have more lasting relief. She has started a 15-day course of Tramadol 50 mg BID PRN with good relief until she is able to have a procedure.  She " denies any perceived side effects. Her pain today is 7/10.     Interval History 4/9/2024:  Marizol Kevin presents for follow-up of lower back and left knee pain. She also had left L4/5 and L5/S1 TF JT on 12/22/2023.  This has provided 70% pain relief that is on-going. Today, she is complaining of bilateral leg pain into the buttock and posterolateral bilateral thighs. Pain description: electric, aching, shooting, burning, tight. Exacerbating factors: twisting, bending, stooping. She continues Lyrica. She has not taken any muscle relaxants. The patient denies fever/night sweats, urinary incontinence, bowel incontinence, significant weight changes, significant motor weakness or changes, or loss of sensations. Her pain today is 10/10.     Interval History 11/20/2023:  The patient presents today for follow up of lower back and left knee pain. She is s/p left knee steroid injection on 10/17/23 with 80% relief of knee pain. Her knee pain is currently tolerable. She did she neurosurgery since previous encounter and had a referral placed to orthopedics for evaluation. She says that she feels like there is excess fluid in her knee. She does still get intermittent back pain with radiation down the back and side of the left leg and associated numbness. We previously discussed JT and she would like to further discuss at this time. Her pain today is 2/10.    Interval History 10/2/2023:  The patient presents today to discuss worsened back and left knee pain. She was recently evaluated in the ED for left-sided sciatica. States that her back pain improved with the steroid injection, but she continues to struggle with left knee pain. She denies any back pain today. She did have a lumbar CT in the ED which showed multilevel stenosis, worse at L3-L4 level where there is severe central canal stenosis from concentric disc bulge and hypertrophic facet arthropathy and ligamentum flavum hypertrophy with napkin ring deformity of the  traversing thecal sac and cauda equina suggested. She was supposed to be referred to neurosurgery but a referral was not made. She denies bowel or bladder incontinence. Left knee pain is sharp and stabbing in nature. She feels like the knee will give out at time. She has had benefit with Tramadol in the past and is asking for a prescription. Her pain today is 10/10.    Interval History 9/6/2023:  There patient is here for follow up of foot pain and numbness secondary to diabetic neuropathy and previous chemotherapy. She is here today for application of Qutenza patches to bilateral feet. She has burning and numbness to the anterior and posterior feet. She was started on Lyrica 50 mg BID at last OV. She has no side effects from the medication but has not noticed any improvement in symptoms. Her pain today is 9/10.    Interval History 8/22/23:    Ms. Kevin returns for follow-up of her neck pain. Her primary pain today is her neuropathy pain. She is tolerating lyrica 50 mg BID but has noted alopecia since starting. However, it has helped her pain. Given her history of colon cancer and chemo, it is unclear if this is hairloss is from the lyrica.  She also comments on her shoulder pain and how the prior nerve block helped but did not last long.  It provided >80% relief of her shoulder pain.  She is concerned about repeating the injection because she states the last one cost her $300.   Acupuncture was very helpful for her, but was not covered by her insurance and cost $150 per session.   Regarding her neuropathy, she endorses painful burning in her feet that prevents sleeping. The lyrica and duloxetine have both helped, but she stopped taking duloxetine when she started lyrica. She did not have any known adverse effects from the duloxetine. At her last visit 6/6/23, Qutenza was ordered. Will investigate if steps to approval for that.   We also discussed spinal cord stimulator trial. She has not had spine MRI or  psychological clearance.          Initial HPI:  Marizol Kevin has a PMHx of HTN, HLD, DMII, osteoporosis, colon cancer s/p chemothearpy. She presents to the clinic for the evaluation of neuropathic pain along with neck pain that radiates into the left arm. The pain started 5-6 months ago, no specific inciting event. Symptoms have been worsening.The pain is located in the left side of the neck area and radiates to the left periscapular area with further distal radiation to the whole arm.  The pain is described as aching, stabbing, and sqaueezing  and is rated as 9/10. The pain is rated with a score of  5/10 on the BEST day and a score of 9/10 on the WORST day.  Symptoms interfere with daily activity, sleeping, and enjoyment of life. The pain is exacerbated by Laying and Lifting.  The pain is mitigated by nothing.  Pt also endorses severe neuropathic pain in BL hands and feet. She has used gabapentinin the past without any relief. Currently on Cymbalta. Recently started Acupuncture for neuropathy, which she reports has provided significant relief.     Patient denies night fever/night sweats, urinary incontinence, bowel incontinence, significant weight loss, and loss of sensations.    Physical Therapy/Home Exercise: yes and continues to do home exercises for her shoulder and her balance    Pain Disability Index Review:      9/20/2024     9:00 AM 7/8/2024     8:53 AM 4/9/2024    12:56 PM   Last 3 PDI Scores   Pain Disability Index (PDI) 20 48 50       Pain Medications:  - pregabalin 50 mg BID  - was on duloxetine 30 mg BID  - Previously on gabapentin (dose unknown) but was stopped due to lack of efficacy     report:  Not applicable    Pain Procedures:   SAB injection 3/30/23 --> minimal relief  Left shoulder nerve blocks 7/21/23 --> 80% relief  10/17/23 Left knee steroid injection- 80% relief  12/22/2023 - Left L4/5 and L5/S1 TF JT - 70% relief  4/25/2024 - Bilateral Piriformis and Left GTB - 100%  relief  5/21/2024 - LEFT KNEE SYNVISC INJECTION under ultrasound guide - some relief  6/17/2024 - Left knee CSI with sports medicine - limited relief  8/23/2024 - left genicular cooled RFA - 80% relief    LABS:    CMP  Sodium   Date Value Ref Range Status   07/23/2024 139 136 - 145 mmol/L Final     Potassium   Date Value Ref Range Status   07/23/2024 3.8 3.5 - 5.1 mmol/L Final     Chloride   Date Value Ref Range Status   07/23/2024 108 95 - 110 mmol/L Final     CO2   Date Value Ref Range Status   07/23/2024 21 (L) 23 - 29 mmol/L Final     Glucose   Date Value Ref Range Status   07/23/2024 84 70 - 110 mg/dL Final     BUN   Date Value Ref Range Status   07/23/2024 11 8 - 23 mg/dL Final     Creatinine   Date Value Ref Range Status   07/23/2024 0.9 0.5 - 1.4 mg/dL Final     Calcium   Date Value Ref Range Status   07/23/2024 10.0 8.7 - 10.5 mg/dL Final     Total Protein   Date Value Ref Range Status   07/23/2024 7.3 6.0 - 8.4 g/dL Final     Albumin   Date Value Ref Range Status   07/23/2024 3.5 3.5 - 5.2 g/dL Final     Total Bilirubin   Date Value Ref Range Status   07/23/2024 0.5 0.1 - 1.0 mg/dL Final     Comment:     For infants and newborns, interpretation of results should be based  on gestational age, weight and in agreement with clinical  observations.    Premature Infant recommended reference ranges:  Up to 24 hours.............<8.0 mg/dL  Up to 48 hours............<12.0 mg/dL  3-5 days..................<15.0 mg/dL  6-29 days.................<15.0 mg/dL       Alkaline Phosphatase   Date Value Ref Range Status   07/23/2024 112 55 - 135 U/L Final     AST   Date Value Ref Range Status   07/23/2024 15 10 - 40 U/L Final     ALT   Date Value Ref Range Status   07/23/2024 12 10 - 44 U/L Final     Anion Gap   Date Value Ref Range Status   07/23/2024 10 8 - 16 mmol/L Final     eGFR   Date Value Ref Range Status   07/23/2024 >60 >60 mL/min/1.73 m^2 Final        Imaging:     XR KNEE 3 VIEW BILATERAL     CLINICAL  HISTORY:  Unilateral primary osteoarthritis, unspecified knee     COMPARISON:  None.     FINDINGS:  The alignment is within normal limits.  No displaced fractures identified.  No evidence of lytic or blastic lesions.RIGHT total knee arthroplasty.  Vascular calcifications.  Degenerative changes at the level of the medial femoral condyle and patellofemoral joint on the LEFT.Soft tissues are unremarkable.     Impression:     No evidence of fracture.RIGHT total knee arthroplasty and degenerative changes LEFT medial/patellofemoral joints.        Electronically signed by:Cricket Medina MD  Date:                                            04/25/2024  Time:                                           14:46    MRI C Spine 4/15/22  FINDINGS:  large amount of image degradation from artifact.  Straightening of the usual cervical lordosis. Degenerative changes. Disc space narrowing C4-C5, C5-C6. Vertebral body heights and alignment appear maintained without acute compression or subluxation evident.  Although no obvious fracture cannot exclude subtle findings including marrow edema with the large amount of image degradation. Further follow-up or evaluation is to be obtained is suggested by CT. There do appear to be small posterior disc bulges multiple levels, C3 through C 6 without appreciable significant spinal canal narrowing     Impression:     Grossly negative study for acute findings but note is made that limited evaluation with the amount artifact and if concern for acute cervical spine trauma correlation with CT is recommended and note is made the patient did have CT 04/15/2022 please refer to that report.  Subtle findings as suggested on that CT scan likely would not be apparent on the MRI with the degree of artifact.     If further evaluation or follow-up is to be obtained it is recommended to be by CT     This report was flagged in Epic as abnormal.     Final read    XR KNEE 3 VIEW BILATERAL     CLINICAL  HISTORY:  Unilateral primary osteoarthritis, unspecified knee     COMPARISON:  None.     FINDINGS:  The alignment is within normal limits.  No displaced fractures identified.  No evidence of lytic or blastic lesions.RIGHT total knee arthroplasty.  Vascular calcifications.  Degenerative changes at the level of the medial femoral condyle and patellofemoral joint on the LEFT.Soft tissues are unremarkable.     Impression:     No evidence of fracture.RIGHT total knee arthroplasty and degenerative changes LEFT medial/patellofemoral joints.        Electronically signed by:Cricket Medina MD  Date:                                            04/25/2024  Time:                                           14:46    CT Lumbar Spine Without Contrast  Order: [id number]  Status: Final result     Visible to patient: Yes (seen)     Next appt: 10/17/2023 at 10:20 AM in Pain Medicine (Jenny Glass, [facility])     0 Result Notes  Details    Reading Physician Reading Date Result Priority   Neymar Mason MD  [phone] 9/29/2023 STAT     Narrative & Impression  EXAMINATION:  CT LUMBAR SPINE WITHOUT CONTRAST     CLINICAL HISTORY:  Lumbar radiculopathy, symptoms persist with conservative treatment;     TECHNIQUE:  Low-dose axial, sagittal and coronal reformations are obtained through the lumbar spine.  Contrast was not administered.     COMPARISON:  MRI lumbar spine, 06/18/2018     FINDINGS:  Alignment is stable.  Progressive spondylosis with disc space narrowing and vacuum phenomenon increasing at all levels but most severely at L to L3 L3-L4.  The anterolisthesis of L4 on L5 appear stable.     At T12-L1 there is no significant stenosis.     At the L1-L2 level, mild facet arthropathy with no significant stenosis.     At the L2-L3 level, concentric disc bulge and hypertrophic facet ligamentous changes result in moderate central canal stenosis with trefoil deformity of the thecal sac.     At the L3-L4 level, severe central canal  stenosis from concentric disc bulge and hypertrophic facet arthropathy and ligamentum flavum hypertrophy with napkin ring deformity of the traversing thecal sac and cauda equina suggested.     At the L4-5 level, the anterolisthesis with uncovering of the L4-5 disc and facet arthropathy and ligamentous hypertrophy causes moderate central canal stenosis.     At the L5-S1 level, vacuum phenomenon is noted with leakage through the annulus into the epidural space but no evidence of central or foraminal stenosis.     Arthrosclerotic disease throughout the aorta and iliac vessels.     Impression:     Multilevel lumbar stenosis with increasing spondylosis and degenerative change since 2018.     Most severe lumbar stenosis at L3-4 and L4-5.     No new fracture or subluxation.     Past Medical History:   Diagnosis Date    Allergy     Anxiety     Cancer     colon    Cataract     Colon cancer     Dependence on nicotine from cigarettes 9/3/2020    Diabetes mellitus, type 2     Dry eye syndrome     Hyperlipidemia     Hypertension     Insomnia     Light cigarette smoker (1-9 cigs/day) 6/21/2022    Malignant neoplasm of ascending colon 7/20/2020    Squamous blepharitis     Syncope 4/16/2022    Tobacco use disorder, moderate, in early remission 11/2/2022    Vitamin D deficiency 9/10/2020     Past Surgical History:   Procedure Laterality Date    CATARACT EXTRACTION, BILATERAL  03/2020    CHOLECYSTECTOMY      COLON SURGERY      Colon cancer    COLONOSCOPY N/A 02/01/2021    Procedure: COLONOSCOPY;  Surgeon: Ashwini Duarte MD;  Location: [address]);  Service: Endoscopy;  Laterality: N/A;  medical transportation  covid test 1/29 Yazidi, instructions mailed-KPvt    EYE SURGERY      Cataract    HYSTERECTOMY      INJECTION OF ANESTHETIC AGENT AROUND NERVE Left 7/21/2023    Procedure: BLOCK, NERVE, LEFT SHOULDER ARTICULAR BRANCH keep date rep aware;  Surgeon: Abdi Del Toro MD;  Location: [facility];  Service: Pain  Management;  Laterality: Left;    INJECTION OF ANESTHETIC AGENT AROUND NERVE Left 2024    Procedure: BLOCK, NERVE LEFT DIAGNOSTIC GENICULAR;  Surgeon: Abdi Del Toro MD;  Location: [facility];  Service: Pain Management;  Laterality: Left;  [phone]    INJECTION OF JOINT N/A 2024    Procedure: INJECTION, JOINT BILATERAL PIRIFORMIS AND LEFT GTB;  Surgeon: Abdi Del Toro MD;  Location: [facility];  Service: Pain Management;  Laterality: N/A;  [phone]  2 WK F/U IRMA    JOINT REPLACEMENT      Knee    KNEE SURGERY      LAPAROSCOPIC LEFT COLON RESECTION      RADIOFREQUENCY ABLATION Left 2024    Procedure: RADIOFREQUENCY ABLATION LEFT GENICULAR NERVES COOLED *ASPIRIN OTC* HOLD FOR 5 DAYS;  Surgeon: Abdi Del Toro MD;  Location: [facility];  Service: Pain Management;  Laterality: Left;  [phone]  4 WK F/U IRMA    TRANSFORAMINAL EPIDURAL INJECTION OF STEROID Left 2023    Procedure: LUMBAR TRANSFORAMINAL LEFT L4/5 AND L5/S1;  Surgeon: Abdi Del Toro MD;  Location: [facility];  Service: Pain Management;  Laterality: Left;  [phone]  2 WK F/U ANTWON     Social History     Socioeconomic History    Marital status:    Tobacco Use    Smoking status: Former     Current packs/day: 0.00     Average packs/day: 1 pack/day for 51.0 years (51.0 ttl pk-yrs)     Types: Cigarettes     Start date: 1971     Quit date: 2022     Years since quittin.9    Smokeless tobacco: Never    Tobacco comments:     smoking cessation information given    Substance and Sexual Activity    Alcohol use: Yes     Alcohol/week: 1.0 standard drink of alcohol     Types: 1 Shots of liquor per week     Comment: occ    Drug use: Never    Sexual activity: Not Currently     Birth control/protection: Abstinence     Social Determinants of Health     Financial Resource Strain: Low Risk  (2024)    Overall Financial Resource Strain (CARDIA)     Difficulty of Paying Living Expenses: Not hard  at all   Food Insecurity: No Food Insecurity (6/17/2024)    Hunger Vital Sign     Worried About Running Out of Food in the Last Year: Never true     Ran Out of Food in the Last Year: Never true   Transportation Needs: No Transportation Needs (6/7/2023)    PRAPARE - Transportation     Lack of Transportation (Medical): No     Lack of Transportation (Non-Medical): No   Physical Activity: Unknown (6/17/2024)    Exercise Vital Sign     Days of Exercise per Week: 0 days   Stress: Stress Concern Present (6/17/2024)    Citizen of [place] [employer] - Occupational Stress Questionnaire     Feeling of Stress : To some extent   Housing Stability: Low Risk  (6/7/2023)    Housing Stability Vital Sign     Unable to Pay for Housing in the Last Year: No     Number of Places Lived in the Last Year: 1     Unstable Housing in the Last Year: No     Family History   Problem Relation Name Age of Onset    Heart attack Mother Dora     Heart disease Mother Dora     Colon cancer Father Milo     Diabetes Father Milo     Cancer Sister Denisha         unknown    Hypothyroidism Sister Denisha     Asthma Sister Denisha     Thyroid cancer Neg Hx         Review of patient's allergies indicates:   Allergen Reactions    Grass pollen-june grass standard        Current Outpatient Medications   Medication Sig    albuterol (PROVENTIL/VENTOLIN HFA) 90 mcg/actuation inhaler INHALE 2 PUFFS INTO THE LUNGS EVERY 4 (FOUR) HOURS AS NEEDED FOR WHEEZING    albuterol-ipratropium (DUO-NEB) 2.5 mg-0.5 mg/3 mL nebulizer solution Take 3 mLs by nebulization every 6 (six) hours as needed for Wheezing or Shortness of Breath. Rescue    amLODIPine (NORVASC) 5 MG tablet TAKE 1 TABLET EVERY DAY    aspirin (ECOTRIN) 81 MG EC tablet Take 81 mg by mouth once daily.    atorvastatin (LIPITOR) 40 MG tablet Take 1 tablet (40 mg total) by mouth once daily.    azelastine (ASTELIN) 137 mcg (0.1 %) nasal spray USE 1 SPRAY IN EACH NOSTRIL TWICE DAILY    betamethasone valerate  0.1% (VALISONE) 0.1 % Oint Apply topically once daily.    blood sugar diagnostic (TRUE METRIX GLUCOSE TEST STRIP) Strp Use to test blood glucose two (2) times daily; to be used with insurance-preferred brand of glucometer/supplies    blood-glucose meter kit Please provide with insurance covered meter    cholecalciferol, vitamin D3, (VITAMIN D3) 50 mcg (2,000 unit) Cap capsule Take 1 capsule (2,000 Units total) by mouth once daily.    dulaglutide (TRULICITY) 3 mg/0.5 mL pen injector Inject 3 mg into the skin once a week.    famotidine (PEPCID) 40 MG tablet Take 40 mg by mouth 2 (two) times daily.    ferrous sulfate 325 (65 FE) MG EC tablet TAKE 1 TABLET EVERY OTHER DAY    fluticasone-salmeterol diskus inhaler 250-50 mcg Inhale 1 puff into the lungs 2 (two) times daily. Controller    heparin, porcine, PF, (HEPARIN FLUSH 100 UNITS/ML) 100 unit/mL Syrg     hydrOXYzine HCL (ATARAX) 25 MG tablet Take 1 tablet (25 mg total) by mouth 3 (three) times daily as needed for Itching or Anxiety.    lancets Misc 1 Device by Misc.(Non-Drug; Combo Route) route 2 (two) times daily.    LIDOcaine (LIDODERM) 5 % Place 1 patch onto the skin once daily. Remove & Discard patch within 12 hours or as directed by MD    metFORMIN (GLUCOPHAGE-XR) 500 MG ER 24hr tablet TAKE 2 TABLETS EVERY DAY WITH BREAKFAST    methocarbamoL (ROBAXIN) 750 MG Tab Take 1 tablet (750 mg total) by mouth 3 (three) times daily as needed (muscle spasms).    multivitamin capsule Take 1 capsule by mouth once daily.    ondansetron (ZOFRAN) 4 MG tablet TAKE 1 TABLET EVERY 8 HOURS AS NEEDED FOR NAUSEA    pregabalin (LYRICA) 75 MG capsule Take 1 capsule (75 mg total) by mouth 2 (two) times daily.    raloxifene (EVISTA) 60 mg tablet TAKE 1 TABLET EVERY DAY    triamcinolone acetonide 0.5% (KENALOG) 0.5 % Crea Apply topically 2 (two) times daily.    celecoxib (CELEBREX) 100 MG capsule Take 1 capsule (100 mg total) by mouth daily as needed for Pain (severe pain).    DULoxetine  "(CYMBALTA) 30 MG capsule Take 1 capsule (30 mg total) by mouth once daily.    ketoconazole (NIZORAL) 2 % cream Apply topically once daily. for 14 days     No current facility-administered medications for this visit.       REVIEW OF SYSTEMS:    GENERAL:  No weight loss, malaise or fevers.  HEENT:  Negative for frequent or significant headaches.  NECK:  Negative for lumps, goiter, pain and significant neck swelling.  RESPIRATORY:  Negative for cough, wheezing or shortness of breath.  CARDIOVASCULAR:  Negative for chest pain, leg swelling or palpitations.  GI:  Negative for abdominal discomfort, blood in stools or black stools or change in bowel habits.  MUSCULOSKELETAL:  See HPI.  SKIN:  Negative for lesions, rash, and itching.  PSYCH:  +sleep disturbance, depression .  HEMATOLOGY/LYMPHOLOGY:  Negative for prolonged bleeding, bruising easily or swollen nodes.  NEURO:   No history of headaches, syncope, paralysis, seizures or tremors.  All other reviewed and negative other than HPI.    OBJECTIVE:    /67 (BP Location: Left arm, Patient Position: Sitting, BP Method: Medium (Automatic))   Resp 18   Ht 4' 9" (1.448 m)   Wt 70.6 kg (155 lb 10.3 oz)   SpO2 98%   BMI 33.68 kg/m²       PHYSICAL EXAMINATION:      GENERAL APPEARANCE: Well appearing, in no acute distress.  PSYCH:  Mood and affect appropriate.  SKIN: Skin color, texture, turgor normal, no rashes or lesions to visible areas.  HEAD/FACE:  Normocephalic, atraumatic.  NECK: No pain to palpation over the cervical paraspinous muscles. Spurling Negative. No pain with neck flexion, extension, or lateral flexion.   CARDIO: Rate regular.  No lower extremity edema. Capillary refill <2 seconds.   PULM: Bilateral chest rise. No apparent respiratory distress.   GI:  Non-distended  BACK: Straight leg raising in the sitting position is negative to radicular pain. No pain to palpation over the spine or costovertebral angles. Limited range of motion with pain on " extension and of tightness in bilateral hamstrings with flexion/bending. Positive axial loading test to the left. Positive tenderness over bilateral SIJ left > right. R. OLGA positive to left.  EXTREMITIES: Peripheral joint ROM is full and pain free without obvious instability or laxity in all four extremities. No deformities, edema, or skin discoloration.   MUSCULOSKELETAL: No pain over bilateral piriformis. No pain over bilateral GTB. Left knee: mild TTP over medial joint line and patellofemoral joint.  Full ROM without pain on extension and flexion. No effusion.  Bilateral upper and lower extremity strength is normal and symmetric.  No atrophy or tone abnormalities are noted.  NEURO: Bilateral upper and lower extremity coordination and muscle stretch reflexes are physiologic and symmetric.  Negative Ring's. Plantar response are downgoing. No clonus noted. No loss of sensation is noted.  GAIT: Antalgic- ambulates with straight cane.      ASSESSMENT: 78 y.o. year old female with widespread pain, consistent with the followin. Primary osteoarthritis of knee, unspecified laterality  DULoxetine (CYMBALTA) 30 MG capsule    celecoxib (CELEBREX) 100 MG capsule      2. Lumbar facet arthropathy        3. Primary osteoarthritis of left knee        4. Lumbar radiculopathy        5. Chronic pain syndrome        6. Lumbosacral radiculopathy        7. Painful diabetic neuropathy              PLAN:     - Previous imaging was reviewed and discussed with the patient today.     - She is s/p Left Genicular cooled RFA with 80% relief.     - Continue weekly PT and daily HEP.     - Continue Lyrica 75 mg BID.    - Restart duloxetine 30 mg daily. We can consider increasing pending side effects.    - Add Celebrex 100 mg daily PRN, #15. Only take for severe pain.     - She can take up to Tylenol 1000 mg TID PRN.    - No pain contract on-file. No UDS on-file.     - Patient will need to follow-up with MD if she wishes to discuss  pain medication management.     - RTC 3-4 months or sooner for follow-up.      The above plan and management options were discussed at length with patient. Patient is in agreement with the above and verbalized understanding.    Jada Arias NP   09/20/2024

## 2024-09-23 ENCOUNTER — TELEPHONE (OUTPATIENT)
Dept: ADMINISTRATIVE | Facility: OTHER | Age: 79
End: 2024-09-23
Payer: MEDICARE

## 2024-09-23 NOTE — TELEPHONE ENCOUNTER
KATERYNA with scheduled Neurology appointment of 9-, and contact number provided for any questions. My Chart message to be sent.

## 2024-09-24 ENCOUNTER — TELEPHONE (OUTPATIENT)
Dept: ADMINISTRATIVE | Facility: OTHER | Age: 79
End: 2024-09-24
Payer: MEDICARE

## 2024-09-30 ENCOUNTER — OFFICE VISIT (OUTPATIENT)
Dept: NEUROLOGY | Facility: CLINIC | Age: 79
End: 2024-09-30
Payer: MEDICARE

## 2024-09-30 VITALS
WEIGHT: 155.88 LBS | SYSTOLIC BLOOD PRESSURE: 110 MMHG | HEART RATE: 87 BPM | HEIGHT: 57 IN | BODY MASS INDEX: 33.63 KG/M2 | DIASTOLIC BLOOD PRESSURE: 56 MMHG

## 2024-09-30 DIAGNOSIS — R41.3 OTHER AMNESIA: Primary | ICD-10-CM

## 2024-09-30 DIAGNOSIS — R41.3 MEMORY LOSS: ICD-10-CM

## 2024-09-30 PROCEDURE — 1125F AMNT PAIN NOTED PAIN PRSNT: CPT | Mod: HCNC,CPTII,S$GLB, | Performed by: INTERNAL MEDICINE

## 2024-09-30 PROCEDURE — 3288F FALL RISK ASSESSMENT DOCD: CPT | Mod: HCNC,CPTII,S$GLB, | Performed by: INTERNAL MEDICINE

## 2024-09-30 PROCEDURE — 3074F SYST BP LT 130 MM HG: CPT | Mod: HCNC,CPTII,S$GLB, | Performed by: INTERNAL MEDICINE

## 2024-09-30 PROCEDURE — 99215 OFFICE O/P EST HI 40 MIN: CPT | Mod: HCNC,S$GLB,, | Performed by: INTERNAL MEDICINE

## 2024-09-30 PROCEDURE — 1159F MED LIST DOCD IN RCRD: CPT | Mod: HCNC,CPTII,S$GLB, | Performed by: INTERNAL MEDICINE

## 2024-09-30 PROCEDURE — 3078F DIAST BP <80 MM HG: CPT | Mod: HCNC,CPTII,S$GLB, | Performed by: INTERNAL MEDICINE

## 2024-09-30 PROCEDURE — 1101F PT FALLS ASSESS-DOCD LE1/YR: CPT | Mod: HCNC,CPTII,S$GLB, | Performed by: INTERNAL MEDICINE

## 2024-09-30 PROCEDURE — 3072F LOW RISK FOR RETINOPATHY: CPT | Mod: HCNC,CPTII,S$GLB, | Performed by: INTERNAL MEDICINE

## 2024-09-30 PROCEDURE — 99999 PR PBB SHADOW E&M-EST. PATIENT-LVL V: CPT | Mod: PBBFAC,HCNC,, | Performed by: INTERNAL MEDICINE

## 2024-09-30 RX ORDER — GABAPENTIN 300 MG/1
300 CAPSULE ORAL NIGHTLY
Qty: 30 CAPSULE | Refills: 11 | Status: SHIPPED | OUTPATIENT
Start: 2024-09-30 | End: 2025-09-30

## 2024-09-30 RX ORDER — DONEPEZIL HYDROCHLORIDE 5 MG/1
5 TABLET, FILM COATED ORAL NIGHTLY
Qty: 60 TABLET | Refills: 5 | Status: SHIPPED | OUTPATIENT
Start: 2024-09-30 | End: 2025-09-30

## 2024-09-30 NOTE — PROGRESS NOTES
GENERAL NEUROLOGY VISIT   09/30/2024  History:    Patient is a 78 y.o. female with past medical history of hypertension, hyperlipidemia, diabetes, COPD, colon cancer, neuropathy from chemotherapy, osteoporosis presenting to the clinic for evaluation of memory impairment and for episodes of near syncope.  History obtained from patient and chart review.    Patient has been experiencing episodes of dizziness and weakness recently, presents 2-3 episodes this entire year. One particular episode occurred on 09/20 when she went into PCPs office after an event earlier in the day.  She states that she was sitting on the porch in could hear the daughter speak to her but did not have enough energy to reply back.  She looked flushed and unwell to her daughter.  Patient endorses feeling lightheaded during this episode.  After she recovered, she was taken to her PCP. It was thought that this could be due to pregabalin, which was discontinued back in July and has been doing well since then.  She states that she was also likely not getting enough oral intake at that time and therefore she experienced the symptoms.  Denies resting tremors, hallucinations or REM behavior disorders.    Patient lives by herself, daughter lives in a few weeks days with the patient as it was closer to her work place.  Patient was not drive, but has never driven in her life.  Daughter has noticed patient repeating herself occasionally.  Patient able to take care of her own finances.  Able to continue cooking, no issues with burning food.  Denies falls in  She was to work as a supervisor for housekeeping.  Patient suffered with major depressive resulting in the past, mood is well controlled now.  Sleep is well.  Snores in the asleep feels refreshed when she wakes up.  No family history of dementia.      Past Medical History:   Diagnosis Date    Allergy     Anxiety     Cancer     colon    Cataract     Colon cancer     Dependence on nicotine from cigarettes  9/3/2020    Diabetes mellitus, type 2     Dry eye syndrome     Hyperlipidemia     Hypertension     Insomnia     Light cigarette smoker (1-9 cigs/day) 6/21/2022    Malignant neoplasm of ascending colon 7/20/2020    Squamous blepharitis     Syncope 4/16/2022    Tobacco use disorder, moderate, in early remission 11/2/2022    Vitamin D deficiency 9/10/2020       Past Surgical History:   Procedure Laterality Date    CATARACT EXTRACTION, BILATERAL  03/2020    CHOLECYSTECTOMY      COLON SURGERY      Colon cancer    COLONOSCOPY N/A 02/01/2021    Procedure: COLONOSCOPY;  Surgeon: Ashwini Duarte MD;  Location: [facility] ENDO (4TH FLR);  Service: Endoscopy;  Laterality: N/A;  medical transportation  covid test 1/29 Druze, instructions mailed-KPvt    EYE SURGERY      Cataract    HYSTERECTOMY      INJECTION OF ANESTHETIC AGENT AROUND NERVE Left 7/21/2023    Procedure: BLOCK, NERVE, LEFT SHOULDER ARTICULAR BRANCH keep date rep aware;  Surgeon: Abdi Del Toro MD;  Location: [facility] PAIN MGT;  Service: Pain Management;  Laterality: Left;    INJECTION OF ANESTHETIC AGENT AROUND NERVE Left 8/1/2024    Procedure: BLOCK, NERVE LEFT DIAGNOSTIC GENICULAR;  Surgeon: Abdi Del Toro MD;  Location: [facility] PAIN MGT;  Service: Pain Management;  Laterality: Left;  [phone]    INJECTION OF JOINT N/A 4/25/2024    Procedure: INJECTION, JOINT BILATERAL PIRIFORMIS AND LEFT GTB;  Surgeon: Abdi Del Toro MD;  Location: [facility] PAIN MGT;  Service: Pain Management;  Laterality: N/A;  [phone]  2 WK F/U [facility]    JOINT REPLACEMENT      Knee    KNEE SURGERY      LAPAROSCOPIC LEFT COLON RESECTION      RADIOFREQUENCY ABLATION Left 8/23/2024    Procedure: RADIOFREQUENCY ABLATION LEFT GENICULAR NERVES COOLED *ASPIRIN OTC* HOLD FOR 5 DAYS;  Surgeon: Abdi Del Toro MD;  Location: [facility] PAIN MGT;  Service: Pain Management;  Laterality: Left;  [phone]  4 WK F/U [facility]    TRANSFORAMINAL EPIDURAL INJECTION OF STEROID Left 12/22/2023    Procedure: LUMBAR  TRANSFORAMINAL LEFT L4/5 AND L5/S1;  Surgeon: Abdi Del Toro MD;  Location: [facility] PAIN MGT;  Service: Pain Management;  Laterality: Left;  [phone]  2 WK F/U [facility]       Social History     Socioeconomic History    Marital status:    Tobacco Use    Smoking status: Former     Current packs/day: 0.00     Average packs/day: 1 pack/day for 51.0 years (51.0 ttl pk-yrs)     Types: Cigarettes     Start date: 1971     Quit date: 2022     Years since quittin.0    Smokeless tobacco: Never    Tobacco comments:     smoking cessation information given    Substance and Sexual Activity    Alcohol use: Yes     Alcohol/week: 1.0 standard drink of alcohol     Types: 1 Shots of liquor per week     Comment: occ    Drug use: Never    Sexual activity: Not Currently     Birth control/protection: Abstinence     Social Drivers of Health     Financial Resource Strain: Low Risk  (2024)    Overall Financial Resource Strain (CARDIA)     Difficulty of Paying Living Expenses: Not hard at all   Food Insecurity: No Food Insecurity (2024)    Hunger Vital Sign     Worried About Running Out of Food in the Last Year: Never true     Ran Out of Food in the Last Year: Never true   Transportation Needs: No Transportation Needs (2023)    PRAPARE - Transportation     Lack of Transportation (Medical): No     Lack of Transportation (Non-Medical): No   Physical Activity: Unknown (2024)    Exercise Vital Sign     Days of Exercise per Week: 0 days   Stress: Stress Concern Present (2024)    [employer] - Occupational Stress Questionnaire     Feeling of Stress : To some extent   Housing Stability: Low Risk  (2023)    Housing Stability Vital Sign     Unable to Pay for Housing in the Last Year: No     Number of Places Lived in the Last Year: 1     Unstable Housing in the Last Year: No       Review of patient's allergies indicates:   Allergen Reactions    Grass pollen-patricia grass  standard        Current Outpatient Medications on File Prior to Visit   Medication Sig Dispense Refill    albuterol (PROVENTIL/VENTOLIN HFA) 90 mcg/actuation inhaler INHALE 2 PUFFS INTO THE LUNGS EVERY 4 (FOUR) HOURS AS NEEDED FOR WHEEZING 18 g 3    albuterol-ipratropium (DUO-NEB) 2.5 mg-0.5 mg/3 mL nebulizer solution Take 3 mLs by nebulization every 6 (six) hours as needed for Wheezing or Shortness of Breath. Rescue 15 mL 5    amLODIPine (NORVASC) 5 MG tablet TAKE 1 TABLET EVERY DAY 90 tablet 3    aspirin (ECOTRIN) 81 MG EC tablet Take 81 mg by mouth once daily.      atorvastatin (LIPITOR) 40 MG tablet Take 1 tablet (40 mg total) by mouth once daily. 90 tablet 3    azelastine (ASTELIN) 137 mcg (0.1 %) nasal spray USE 1 SPRAY IN EACH NOSTRIL TWICE DAILY 60 mL 3    betamethasone valerate 0.1% (VALISONE) 0.1 % Oint Apply topically once daily. 15 g 1    blood sugar diagnostic (TRUE METRIX GLUCOSE TEST STRIP) Strp Use to test blood glucose two (2) times daily; to be used with insurance-preferred brand of glucometer/supplies 200 strip 3    blood-glucose meter kit Please provide with insurance covered meter 1 each 0    celecoxib (CELEBREX) 100 MG capsule Take 1 capsule (100 mg total) by mouth daily as needed for Pain (severe pain). 15 capsule 0    cholecalciferol, vitamin D3, (VITAMIN D3) 50 mcg (2,000 unit) Cap capsule Take 1 capsule (2,000 Units total) by mouth once daily. 90 capsule 3    dulaglutide (TRULICITY) 3 mg/0.5 mL pen injector Inject 3 mg into the skin once a week. 12 pen 2    DULoxetine (CYMBALTA) 30 MG capsule Take 1 capsule (30 mg total) by mouth once daily. 30 capsule 0    famotidine (PEPCID) 40 MG tablet Take 40 mg by mouth 2 (two) times daily.      ferrous sulfate 325 (65 FE) MG EC tablet TAKE 1 TABLET EVERY OTHER DAY 45 tablet 3    fluticasone-salmeterol diskus inhaler 250-50 mcg Inhale 1 puff into the lungs 2 (two) times daily. Controller 180 each 0    heparin, porcine, PF, (HEPARIN FLUSH 100 UNITS/ML)  100 unit/mL Syrg       hydrOXYzine HCL (ATARAX) 25 MG tablet Take 1 tablet (25 mg total) by mouth 3 (three) times daily as needed for Itching or Anxiety. 30 tablet 3    lancets Misc 1 Device by Misc.(Non-Drug; Combo Route) route 2 (two) times daily. 200 each 11    LIDOcaine (LIDODERM) 5 % Place 1 patch onto the skin once daily. Remove & Discard patch within 12 hours or as directed by MD 15 patch 0    metFORMIN (GLUCOPHAGE-XR) 500 MG ER 24hr tablet TAKE 2 TABLETS EVERY DAY WITH BREAKFAST 180 tablet 3    methocarbamoL (ROBAXIN) 750 MG Tab Take 1 tablet (750 mg total) by mouth 3 (three) times daily as needed (muscle spasms). 90 tablet 0    multivitamin capsule Take 1 capsule by mouth once daily.      ondansetron (ZOFRAN) 4 MG tablet TAKE 1 TABLET EVERY 8 HOURS AS NEEDED FOR NAUSEA 30 tablet 0    raloxifene (EVISTA) 60 mg tablet TAKE 1 TABLET EVERY DAY 90 tablet 4    triamcinolone acetonide 0.5% (KENALOG) 0.5 % Crea Apply topically 2 (two) times daily. 15 g 3    ketoconazole (NIZORAL) 2 % cream Apply topically once daily. for 14 days 60 g 0    pregabalin (LYRICA) 75 MG capsule Take 1 capsule (75 mg total) by mouth 2 (two) times daily. (Patient not taking: Reported on 9/30/2024) 60 capsule 6     No current facility-administered medications on file prior to visit.        Family history:  Noncontributory    Review Of Systems     Constitutional Negative for fevers, chills, weigh loss   HEENT Negative for hearing loss, dysphagia, sore throat, diplopia   Respiratory Negative for shortness of breath, cough    Cardiovascular Negative for chest pain, palpitations    Gastrointestinal Negative for constipation, diarrhea, early satiety    Skin Negative for rashes    Musculoskeletal Negative for joint pains, myalgias.   Neurological See Above    Psychological Negative for sleep disturbances.    Heme/Lymph Negative for easy bruising, easy bleeding    Endocrine Negative for polyuria, polydypsia     Physical Exam:     Physical  "Examination  BP (!) 110/56   Pulse 87   Ht 4' 9" (1.448 m)   Wt 70.7 kg (155 lb 13.8 oz)   BMI 33.73 kg/m²   Body mass index is 33.73 kg/m².      Neurological Exam  Mental Status:   Alert and oriented to name, date, location, president.   Naming/Fluency/Comprehension/Repetition intact.   MOCA 21/30    CN:   II, III, IV, VI: PERRL, EOMI, no nystagmus, fundus not visualized due to inadequate dilation.V: intact to fine touch, temperature, pain.VII: symmetrical facial movement, nice smile, no drooping.VIII: grossly intact to hearing.IX, X: symmetrical palate, normal palatal elevation.XI: 5/5 SCM and 5/5 trapezius.XII: tongue midline, 5/5, no deviation or fasciculation.           Motor:    ShAb ElbEx ElbFle WrE WrFle Interos  HipFl KnExt KnFlex FtDors FtPlant   Left 5 5 5 5 5 5 5 5 5 5 5 5   Right 5 5 5 5 5 5 5 5 5 5 5 5   Tone: Normal tone in UE and LE, no atrophy, no fasciculation, no tremor no pronator drift.                Reflexes:    Bicep Tricep Brachioradial Patellar Achilles   Left 2+ 2+ 2+ 2+ 1+   Right 2+ 2+ 2+ 2+ 1+   No Clonus, down going toes b/l.    Sensation: on both UEs and LEs    Light Touch: Normal.Pinprick: Normal.Proprioception: Normal.Vibration: Normal.Temperature: Normal.    Coordination:    Tremor: Absent.Dysmetria: Absent.Heel to Shin: Normal.Nose to Finger: Normal.    Gait:    Not tested    Interval/Previous Work-up:   Blood work:  09/20/2024  Vitamin B12 677(253 2 months back, on oral supplements at this time)  MMA WNL  TSH 0.753    Impression:   #cognitive impairment  MOCA 21/30, scanned in media.  Patient lives independently and is able to manage most of her daily activities including some of the instrumental ADLs without any issues.  Family and PCP have noticed some lapses but nothing major so far.   Level of functioning at least at the level of MCI.  Vitamin B12 was low previously, has now improved with the oral supplements.  Patient does have vascular risk factors.  We will obtain " MRI brain as well as obtain neurocognitive testing for further evaluation.  Patient was open to start trial on Aricept.    #neuropathy  S/p chemtherapy.  Was previously on Lyrica but was discontinued.  This is resulted in increased discomfort. Patient would like to attempt low-dose gabapentin at this time to see if she can tolerate.    #presyncope  Patient had clear presyncopal symptoms.  Endorses being dehydrated around the event, had poor oral intake.  Lyrica has been discontinued as well.  We will obtain further workup if events recur.    Plan:   #cognitive impairment  - referral placed for neurocognitive testing  - obtain MRI brain without contrast  - starting on Aricept 5 mg daily    #neuropathy  - starting on gabapentin 300 mg nightly      RTC 3-4 months or sooner if needed    Time spent on this encounter: 60 minutes. This includes face to face time and non-face to face time preparing to see the patient (eg, review of tests), obtaining and/or reviewing separately obtained history, documenting clinical information in the electronic or other health record, independently interpreting results and communicating results to the patient/family/caregiver, or care coordinator.     Sher Richter MD  Neurology

## 2024-10-02 NOTE — PROGRESS NOTES
NEUROPSYCHOLOGY CONSULT (TELEHEALTH)  Referral Information  Name: Marizol Kevin  MRN: [id number]  : 1945  Age: 78 y.o.  Race: Black or   Gender: female  REFERRAL SOURCE: Sher Richter MD   DATE CONDUCTED: 10/9/2024  SOURCES OF INFORMATION:  The following was gathered from a clinical interview with Ms. Marizol Kevin and review of the available medical records. Ms. Kevin expressed an understanding of the purpose of the evaluation and consented to all procedures. Total licensed billing psychologists professional time including clinical interview, test administration and interpretation of tests administered by the billing psychologist, integration of test results and other clinical data, preparing the final report, and personally reporting results to the patient   Billin - 60 minutes  Telemedicine:   The patient location is: Home  The provider location is: Home  The chief complaint/medical necessity leading to consultation/medical necessity is: cognitive decline  Visit type: Virtual visit with audio  Total time spent with patient: 35 minutes  Each patient to whom he or she provides medical services by telemedicine is:  (1) informed of the relationship between the physician and patient and the respective role of any other health care provider with respect to management of the patient; and (2) notified that he or she may decline to receive medical services by telemedicine and may withdraw from such care at any time.  Consent/Emergency Plan: The patient expressed an understanding of the purpose of the evaluation and consented to all procedures. I informed the patient of limits to confidentiality and discussed an emergency plan.    NEUROPSYCHOLOGICAL EVALUATION - CONFIDENTIAL    SUMMARY/TREATMENT PLAN   Ms. Kevin is a 78 year old female with family and physician reported memory concerns. She indicated that her daughter has told her she forgets conversation. Her PCP recently  referred her to neurology after noting she didn't seem to remember a conversation they had at an appointment several months earlier (she spontaneously recalled this interaction during this visit). She was having episodes of acute fatigue/mental status change that are now presumed to be 2/2 Lyrica as these episodes have resolved since the medication was discontinued. Ms. Kevin remains functionally independent (with the exception of driving) with no reported problems. She deferred neuropsychological testing at this time, noting that she does not feel she is experiencing cognitive or functional decline. Her functioning is not at the level of dementia. She did not present as amnestic. She may be experiencing mild cognitive changes in the setting of moderate microvascular ischemic changes seen on neuroimaging. She was encouraged to contact this clinic in the future if she or her daughter would like her to undergo cognitive testing.     Diagnoses  Problem List Items Addressed This Visit          Neuro    Cognitive complaints - Primary    Current Assessment & Plan     Compensatory Mechanisms: May benefit from a pillbox to ensure 100% accuracy.     Follow-up: She has the clinic contact information if she would like to follow-up in the future.            Thank you for allowing me to participate in Ms. Ruiz care.  If you have any questions, please contact me at [phone].    He Mcintyre Psy.D., ABPP  Board Certified in Clinical Neuropsychology  Department of Neurology    HISTORY OF PRESENT ILLNESS: Ms. Marizol Kevin is a 78 y.o., right-handed, female with 11 years of education who was referred for a neuropsychological evaluation in the setting of family and physician reported cognitive concerns. Ms. Kevin is not worried about her cognition. She noted that her daughter has told her that she forgets conversations. She spontaneously recalled that her PCP suggested memory work-up at a recent visit, noting that  "she did not seem to recall a conversation they had from 7/2024. Lyrica was recently discontinued due to side effect concerns.     Per 9/2024 PCP note: "Pt walked into clinic today reporting sudden weakness this morning. She awoke feeling fine and went to appointment upstairs when she suddenly felty weak,wobbly, light headed, tired. Reports symptoms occur on and off but occur daily. Last hours at a time. No other symptoms reported. HPI similar to prior visit. When this was mentioned to pt she reports she does not recall that conversation, nor the visit. Her b12 was borderline low so Tx but rec to stop Lyrica as first step. Suspect medication SE. Details below. No s/s of superimposed process or infection. Update labs. Hold AM lyrice for 5 days then stop QPM and monitor. If symptoms do not resolve she needs to see neuro for new onset memory loss over several months time period. Discussed with daughter about plan."     Ms. Kevin feels her episodes of weakness have resolved since discontinuing Lyrica. She was seen by neurology on 9/30/2024 and scored 21/30 on the MoCA.     Neuropsychiatric Symptoms:  Hallucinations: Denied  Depression/Labile Mood: Feels that she gave depression up to Alex many years ago. She does not feel that she has been depressed since that time. She denied active SI, plan, or intent.    Anxiety: Denied, described herself as "very at peace."    DAILY FUNCTIONING:  BASIC ADLS: She keeps her apartment clean.   Feeding: independent, she denied anosmia.   Dressing: independent  Bathing: independent    IADLS:  Support System: Resides independently. Daughter stays with her a few days each week, in part to support her. Son lives in [place].   Appointment Management: independent, she keeps an updated calendar.   Medication Compliance: independent. She keeps her medications lined up in her cabinet. She reported strong adherence.  Financial Management: independent, bills set to [employer].   Cooking: No " "issues.  Driving: She never drove. Daughter is her main source of transportation.       BRAIN HEALTH RISK FACTORS:  Hearing Loss: Endorsed, hearing aids.   Falls: Denied. Knee pain for the past few years, significant improvement since procedure on .   Sleep: Improved since improved knee pain. She will sometimes stay up late to watch television. Never diagnosed with VALENTINA.     MEDICAL HISTORY: Ms. Kevin  has a past medical history of Allergy, Anxiety, Cancer, Cataract, Colon cancer, Dependence on nicotine from cigarettes (9/3/2020), Diabetes mellitus, type 2, Dry eye syndrome, Hyperlipidemia, Hypertension, Insomnia, Light cigarette smoker (1-9 cigs/day) (2022), Malignant neoplasm of ascending colon (2020), Squamous blepharitis, Syncope (2022), Tobacco use disorder, moderate, in early remission (2022), and Vitamin D deficiency (9/10/2020).    NEUROIMAGIN2022 Head CT: "No evidence of acute intracranial hemorrhage/hematoma or major arterial infarct in this patient with trauma.  Small focal scalp hematoma over the left parietal bone with no evidence of acute fracture.  Moderate patchy white matter low density as seen with chronic microvascular ischemic changes."    Brain MRI scheduled for 10/15.    SUBSTANCE USE: Ms. Kevin reports that she quit smoking cigarettes in 2022, she had been smoking since she was 19 years old. Minimal alcohol use.     CURRENT MEDICATIONS:    Current Outpatient Medications:     albuterol (PROVENTIL/VENTOLIN HFA) 90 mcg/actuation inhaler, INHALE 2 PUFFS INTO THE LUNGS EVERY 4 (FOUR) HOURS AS NEEDED FOR WHEEZING, Disp: 18 g, Rfl: 3    albuterol-ipratropium (DUO-NEB) 2.5 mg-0.5 mg/3 mL nebulizer solution, Take 3 mLs by nebulization every 6 (six) hours as needed for Wheezing or Shortness of Breath. Rescue, Disp: 15 mL, Rfl: 5    amLODIPine (NORVASC) 5 MG tablet, TAKE 1 TABLET EVERY DAY, Disp: 90 tablet, Rfl: 3    aspirin (ECOTRIN) 81 MG EC tablet, Take 81 mg by mouth " once daily., Disp: , Rfl:     atorvastatin (LIPITOR) 40 MG tablet, Take 1 tablet (40 mg total) by mouth once daily., Disp: 90 tablet, Rfl: 3    azelastine (ASTELIN) 137 mcg (0.1 %) nasal spray, USE 1 SPRAY IN EACH NOSTRIL TWICE DAILY, Disp: 60 mL, Rfl: 3    betamethasone valerate 0.1% (VALISONE) 0.1 % Oint, Apply topically once daily., Disp: 15 g, Rfl: 1    blood sugar diagnostic (TRUE METRIX GLUCOSE TEST STRIP) Strp, Use to test blood glucose two (2) times daily; to be used with insurance-preferred brand of glucometer/supplies, Disp: 200 strip, Rfl: 3    blood-glucose meter kit, Please provide with insurance covered meter, Disp: 1 each, Rfl: 0    celecoxib (CELEBREX) 100 MG capsule, Take 1 capsule (100 mg total) by mouth daily as needed for Pain (severe pain)., Disp: 15 capsule, Rfl: 0    cholecalciferol, vitamin D3, (VITAMIN D3) 50 mcg (2,000 unit) Cap capsule, Take 1 capsule (2,000 Units total) by mouth once daily., Disp: 90 capsule, Rfl: 3    donepeziL (ARICEPT) 5 MG tablet, Take 1 tablet (5 mg total) by mouth every evening., Disp: 60 tablet, Rfl: 5    dulaglutide (TRULICITY) 3 mg/0.5 mL pen injector, Inject 3 mg into the skin once a week., Disp: 12 pen , Rfl: 2    DULoxetine (CYMBALTA) 30 MG capsule, Take 1 capsule (30 mg total) by mouth once daily., Disp: 30 capsule, Rfl: 0    famotidine (PEPCID) 40 MG tablet, Take 40 mg by mouth 2 (two) times daily., Disp: , Rfl:     ferrous sulfate 325 (65 FE) MG EC tablet, TAKE 1 TABLET EVERY OTHER DAY, Disp: 45 tablet, Rfl: 3    fluticasone-salmeterol diskus inhaler 250-50 mcg, Inhale 1 puff into the lungs 2 (two) times daily. Controller, Disp: 180 each, Rfl: 0    gabapentin (NEURONTIN) 300 MG capsule, Take 1 capsule (300 mg total) by mouth every evening., Disp: 30 capsule, Rfl: 11    heparin, porcine, PF, (HEPARIN FLUSH 100 UNITS/ML) 100 unit/mL Syrg, , Disp: , Rfl:     hydrOXYzine HCL (ATARAX) 25 MG tablet, Take 1 tablet (25 mg total) by mouth 3 (three) times daily as  needed for Itching or Anxiety., Disp: 30 tablet, Rfl: 3    ketoconazole (NIZORAL) 2 % cream, Apply topically once daily. for 14 days, Disp: 60 g, Rfl: 0    lancets Misc, 1 Device by Misc.(Non-Drug; Combo Route) route 2 (two) times daily., Disp: 200 each, Rfl: 11    LIDOcaine (LIDODERM) 5 %, Place 1 patch onto the skin once daily. Remove & Discard patch within 12 hours or as directed by MD, Disp: 15 patch, Rfl: 0    metFORMIN (GLUCOPHAGE-XR) 500 MG ER 24hr tablet, TAKE 2 TABLETS EVERY DAY WITH BREAKFAST, Disp: 180 tablet, Rfl: 3    methocarbamoL (ROBAXIN) 750 MG Tab, Take 1 tablet (750 mg total) by mouth 3 (three) times daily as needed (muscle spasms)., Disp: 90 tablet, Rfl: 0    multivitamin capsule, Take 1 capsule by mouth once daily., Disp: , Rfl:     ondansetron (ZOFRAN) 4 MG tablet, TAKE 1 TABLET EVERY 8 HOURS AS NEEDED FOR NAUSEA, Disp: 30 tablet, Rfl: 0    raloxifene (EVISTA) 60 mg tablet, TAKE 1 TABLET EVERY DAY, Disp: 90 tablet, Rfl: 4    traMADoL (ULTRAM) 50 mg tablet, Take 1 tablet (50 mg total) by mouth every 8 (eight) hours as needed for Pain., Disp: 15 tablet, Rfl: 0    triamcinolone acetonide 0.5% (KENALOG) 0.5 % Crea, Apply topically 2 (two) times daily., Disp: 15 g, Rfl: 3     FAMILY HISTORY: family history includes Asthma in her sister; Cancer in her sister; Colon cancer in her father; Diabetes in her father; Heart attack in her mother; Heart disease in her mother; Hypothyroidism in her sister. No known family history of dementia.     PSYCHOSOCIAL HISTORY:   Education:   Level Attained: 11   Learning Difficulties: Denied   Repeated Grade: Denied, not that she can remember.      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    Retired: 2014    Relationship Status:   : No   : Yes, 1971   Children: 2 (son and daughter)    MENTAL STATUS AND OBSERVATIONS:  APPEARANCE: Unable to assess.   ALERTNESS/ORIENTATION: Attentive and  alert. She presented as a reliable historian, particularly for her medical history, including recent medical history.    GAIT/MOTOR: Unable to assess.   SENSORY: Unable to assess.   SPEECH/LANGUAGE: Normal in rate, rhythm, tone, and volume. Expressive and receptive language were grossly intact.  STATED MOOD/AFFECT: Mood was euthymic.   INTERPERSONAL BEHAVIOR: Rapport was quickly and easily established   THOUGHT PROCESSES: Thoughts seemed logical and goal-directed.

## 2024-10-08 ENCOUNTER — TELEPHONE (OUTPATIENT)
Dept: PAIN MEDICINE | Facility: CLINIC | Age: 79
End: 2024-10-08
Payer: MEDICARE

## 2024-10-08 RX ORDER — TRAMADOL HYDROCHLORIDE 50 MG/1
50 TABLET ORAL EVERY 8 HOURS PRN
Qty: 15 TABLET | Refills: 0 | Status: SHIPPED | OUTPATIENT
Start: 2024-10-08 | End: 2024-10-13

## 2024-10-08 NOTE — TELEPHONE ENCOUNTER
----- Message from Troy sent at 10/8/2024  8:20 AM CDT -----  Regarding: Pain  Name of Who is Calling:  Patient          What is the request in detail:  Patient stated after her procedure she is having severe pain on her left knee, Patient would like to know if a Rx can be sent to [id number] - [place] [id number] AT [facility]. Please give patient a call            Can the clinic reply by [facility]: No            What Number to Call Back if not [facility]:[phone]

## 2024-10-09 ENCOUNTER — OFFICE VISIT (OUTPATIENT)
Dept: NEUROLOGY | Facility: CLINIC | Age: 79
End: 2024-10-09
Payer: MEDICARE

## 2024-10-09 DIAGNOSIS — R41.9 COGNITIVE COMPLAINTS: Primary | ICD-10-CM

## 2024-10-09 NOTE — ASSESSMENT & PLAN NOTE
Compensatory Mechanisms: May benefit from a pillbox to ensure 100% accuracy.     Follow-up: She has the clinic contact information if she would like to follow-up in the future.

## 2024-10-15 ENCOUNTER — HOSPITAL ENCOUNTER (OUTPATIENT)
Dept: RADIOLOGY | Facility: OTHER | Age: 79
Discharge: HOME | End: 2024-10-15
Attending: INTERNAL MEDICINE
Payer: MEDICARE

## 2024-10-15 ENCOUNTER — OFFICE VISIT (OUTPATIENT)
Dept: OPTOMETRY | Facility: CLINIC | Age: 79
End: 2024-10-15
Payer: MEDICARE

## 2024-10-15 DIAGNOSIS — H52.13 MYOPIA WITH ASTIGMATISM AND PRESBYOPIA, BILATERAL: ICD-10-CM

## 2024-10-15 DIAGNOSIS — Z01.00 EYE EXAM, ROUTINE: Primary | ICD-10-CM

## 2024-10-15 DIAGNOSIS — R41.3 OTHER AMNESIA: ICD-10-CM

## 2024-10-15 DIAGNOSIS — H04.123 DRY EYE SYNDROME OF BOTH EYES: ICD-10-CM

## 2024-10-15 DIAGNOSIS — H52.4 MYOPIA WITH ASTIGMATISM AND PRESBYOPIA, BILATERAL: ICD-10-CM

## 2024-10-15 DIAGNOSIS — H52.203 MYOPIA WITH ASTIGMATISM AND PRESBYOPIA, BILATERAL: ICD-10-CM

## 2024-10-15 DIAGNOSIS — E11.9 DIABETES MELLITUS TYPE 2 WITHOUT RETINOPATHY: ICD-10-CM

## 2024-10-15 PROCEDURE — 70551 MRI BRAIN STEM W/O DYE: CPT | Mod: TC,HCNC

## 2024-10-15 PROCEDURE — 2023F DILAT RTA XM W/O RTNOPTHY: CPT | Mod: HCNC,CPTII,S$GLB, | Performed by: OPTOMETRIST

## 2024-10-15 PROCEDURE — 70551 MRI BRAIN STEM W/O DYE: CPT | Mod: 26,HCNC,, | Performed by: RADIOLOGY

## 2024-10-15 PROCEDURE — 1160F RVW MEDS BY RX/DR IN RCRD: CPT | Mod: HCNC,CPTII,S$GLB, | Performed by: OPTOMETRIST

## 2024-10-15 PROCEDURE — 92014 COMPRE OPH EXAM EST PT 1/>: CPT | Mod: HCNC,S$GLB,, | Performed by: OPTOMETRIST

## 2024-10-15 PROCEDURE — 3288F FALL RISK ASSESSMENT DOCD: CPT | Mod: HCNC,CPTII,S$GLB, | Performed by: OPTOMETRIST

## 2024-10-15 PROCEDURE — 1159F MED LIST DOCD IN RCRD: CPT | Mod: HCNC,CPTII,S$GLB, | Performed by: OPTOMETRIST

## 2024-10-15 PROCEDURE — 1101F PT FALLS ASSESS-DOCD LE1/YR: CPT | Mod: HCNC,CPTII,S$GLB, | Performed by: OPTOMETRIST

## 2024-10-15 NOTE — PROGRESS NOTES
HPI    SHANDRA: 8/31/2023    Last DFE: 8/31/2023  Chief complaint (CC): 77 yo F here for annual eye exam. Pt c/o blurry   vision OU with and without glasses. Pt denies any other ocular or visual   complaints today.    Glasses? +   Contacts? -  H/o eye surgery, injections or laser:    PCIOL SX OU   H/o eye injury: -  Known eye conditions?    Squamous blepharitis of upper and lower eyelids of both eyes   Myopia with astigmatism and presbyopia, bilateral  Family h/o eye conditions? -  Eye gtts?    Lubricating drops prn       (-) Flashes (-)  Floaters (-) Mucous   (+)  Tearing (+) Itching (-) Burning   (+) Headaches (+) Eye Pain/discomfort (-) Irritation   (-)  Redness (+) Double vision (++) Blurry vision    CL Exam: No    Diabetic? +  A1c? Lab Results       Component                Value               Date                       HGBA1C                   6.3 (H)             07/23/2024              Last edited by Jakob Jaramillo, OD on 10/15/2024  3:30 PM.            Assessment /Plan     For exam results, see Encounter Report.        Eye exam, routine   - [facility]    Diabetes mellitus type 2 without retinopathy   - Pt educated on the importance of maintaining adequate glycemic and blood pressure control via diet, compliance with medications, exercise and timely follow up with PCP.  No diabetic retinopathy, No CSME.   - Return in 1 year for dilated eye exam.    Dry eye syndrome of both eyes   - Advised artificial tears QID OU, as needed    Myopia with astigmatism and presbyopia, bilateral   - New Spectacle Rx given, discussed different options for glasses. RTC 1 year for routine eye exam.                    REFILL ON lisinopril (PRINIVIL,ZESTRIL) 20 MG tablet    PATIENT IS OUT OF MEDICATION     [facility] [phone]    CALL PATIENT AND LET HIM KNOW WHEN THIS IS CALLED IN PLEASE

## 2024-10-16 DIAGNOSIS — J45.20 MILD INTERMITTENT ASTHMA WITHOUT COMPLICATION: Primary | ICD-10-CM

## 2024-10-16 NOTE — TELEPHONE ENCOUNTER
No care due was identified.  [facility] Embedded Care Due Messages. Reference number: [id number].   10/16/2024 12:57:02 PM CDT

## 2024-10-16 NOTE — TELEPHONE ENCOUNTER
Refill Routing Note   Medication(s) are not appropriate for processing by [facility] for the following reason(s):        New or recently adjusted medication    [facility] action(s):  Defer               Appointments  past 12m or future 3m with PCP    Date Provider   Last Visit   9/20/2024 Pedro Luis Butterfield MD   Next Visit   11/14/2024 Pedro Luis Butterfield MD   ED visits in past 90 days: 0        Note composed:4:31 PM 10/16/2024

## 2024-10-18 RX ORDER — FLUTICASONE PROPIONATE AND SALMETEROL 250; 50 UG/1; UG/1
1 POWDER RESPIRATORY (INHALATION) 2 TIMES DAILY
Qty: 180 EACH | Refills: 3 | Status: SHIPPED | OUTPATIENT
Start: 2024-10-18

## 2024-10-18 RX ORDER — GABAPENTIN 300 MG/1
300 CAPSULE ORAL NIGHTLY
Qty: 90 CAPSULE | Refills: 4 | Status: SHIPPED | OUTPATIENT
Start: 2024-10-18

## 2024-11-14 ENCOUNTER — OFFICE VISIT (OUTPATIENT)
Dept: INTERNAL MEDICINE | Facility: CLINIC | Age: 79
End: 2024-11-14
Attending: INTERNAL MEDICINE
Payer: MEDICARE

## 2024-11-14 VITALS
OXYGEN SATURATION: 93 % | HEART RATE: 90 BPM | BODY MASS INDEX: 32.58 KG/M2 | DIASTOLIC BLOOD PRESSURE: 80 MMHG | HEIGHT: 57 IN | WEIGHT: 151 LBS | SYSTOLIC BLOOD PRESSURE: 120 MMHG

## 2024-11-14 DIAGNOSIS — T45.1X5A NEUROPATHY DUE TO CHEMOTHERAPEUTIC DRUG: Primary | ICD-10-CM

## 2024-11-14 DIAGNOSIS — G62.0 NEUROPATHY DUE TO CHEMOTHERAPEUTIC DRUG: Primary | ICD-10-CM

## 2024-11-14 DIAGNOSIS — N18.31 STAGE 3A CHRONIC KIDNEY DISEASE: ICD-10-CM

## 2024-11-14 DIAGNOSIS — Z87.891 HISTORY OF TOBACCO USE: ICD-10-CM

## 2024-11-14 PROCEDURE — 3079F DIAST BP 80-89 MM HG: CPT | Mod: HCNC,CPTII,S$GLB, | Performed by: INTERNAL MEDICINE

## 2024-11-14 PROCEDURE — 99999 PR PBB SHADOW E&M-EST. PATIENT-LVL V: CPT | Mod: PBBFAC,HCNC,, | Performed by: INTERNAL MEDICINE

## 2024-11-14 PROCEDURE — 3074F SYST BP LT 130 MM HG: CPT | Mod: HCNC,CPTII,S$GLB, | Performed by: INTERNAL MEDICINE

## 2024-11-14 PROCEDURE — 3288F FALL RISK ASSESSMENT DOCD: CPT | Mod: HCNC,CPTII,S$GLB, | Performed by: INTERNAL MEDICINE

## 2024-11-14 PROCEDURE — 99214 OFFICE O/P EST MOD 30 MIN: CPT | Mod: HCNC,S$GLB,, | Performed by: INTERNAL MEDICINE

## 2024-11-14 PROCEDURE — G2211 COMPLEX E/M VISIT ADD ON: HCPCS | Mod: HCNC,S$GLB,, | Performed by: INTERNAL MEDICINE

## 2024-11-14 PROCEDURE — 1125F AMNT PAIN NOTED PAIN PRSNT: CPT | Mod: HCNC,CPTII,S$GLB, | Performed by: INTERNAL MEDICINE

## 2024-11-14 PROCEDURE — 1159F MED LIST DOCD IN RCRD: CPT | Mod: HCNC,CPTII,S$GLB, | Performed by: INTERNAL MEDICINE

## 2024-11-14 PROCEDURE — 1160F RVW MEDS BY RX/DR IN RCRD: CPT | Mod: HCNC,CPTII,S$GLB, | Performed by: INTERNAL MEDICINE

## 2024-11-14 PROCEDURE — 1101F PT FALLS ASSESS-DOCD LE1/YR: CPT | Mod: HCNC,CPTII,S$GLB, | Performed by: INTERNAL MEDICINE

## 2024-11-14 RX ORDER — TRAMADOL HYDROCHLORIDE 100 MG/1
100 TABLET, EXTENDED RELEASE ORAL DAILY
Qty: 30 TABLET | Refills: 0 | Status: SHIPPED | OUTPATIENT
Start: 2024-11-14

## 2024-11-14 RX ORDER — FLUTICASONE PROPIONATE 50 MCG
SPRAY, SUSPENSION (ML) NASAL
COMMUNITY

## 2024-11-14 NOTE — PROGRESS NOTES
Subjective:       Patient ID: Marizol Kevin is a 78 y.o. female.    Chief Complaint: follow up on medications, Follow-up (Poor appetite and lack of sleep. Pt interested in something to help with inflammation of the back. ), and Hand Pain (Left hand arthritis pain. Pt do not see her rheumatoi  MD until January and wondering if she can have something to relieve pain until her appt. )    HPI  History of Present Illness    CHIEF COMPLAINT:  Marizol presents with multiple complaints including poor sleep, arthritis pain in hands and arms, and back pain, seeking medication for pain relief and rest.    HPI:  Marizol reports sleep disturbance due to pain in multiple areas. She has arthritis in both hands, with one hand more severely affected, requiring the use of a supportive device. Marizol also has persistent soreness in her arms, which she attributes to possible arthritis, and complains of back pain. These symptoms significantly disrupt her sleep, causing frequent position changes throughout the night with only brief periods of rest.      She has a scheduled appointment with her doctor for arthritis in January but is seeking interim relief due to the severity of symptoms and their impact on her sleep and daily functioning.    Marizol reports recent improvement in previously reported symptoms. After discontinuing Lyrica, she noticed resolution of symptoms including dizziness, cognitive fog, memory impairment, and headaches.    Regarding appetite, the patient reports decreased food intake, which appears to be a side effect of a weight loss medication. She mentions weight loss prior to the prescription of this medication.    MEDICATIONS:  Marizol is on Tramadol 50 mg for pain, which is not effective. She is also on Trulicity for weight loss.    MEDICAL HISTORY:  Marizol has a history of arthritis affecting both hands. She also has a history of Parkinson's disease.      ROS:  General: -fever, -chills, -fatigue, -weight gain,  "+weight loss, +appetite changes  Eyes: -vision changes, -redness, -discharge  ENT: -ear pain, -nasal congestion, -sore throat  Cardiovascular: -chest pain, -palpitations, -lower extremity edema  Respiratory: -cough, -shortness of breath  Gastrointestinal: -abdominal pain, -nausea, -vomiting, -diarrhea, -constipation, -blood in stool  Genitourinary: -dysuria, -hematuria, -frequency  Musculoskeletal: +joint pain, -muscle pain, +back pain  Skin: -rash, -lesion  Neurological: -headache, -dizziness, -numbness, -tingling  Psychiatric: -anxiety, -depression, +sleep difficulty, -memory problems         Review of Systems    Objective:      Vitals:    11/14/24 1335   BP: 120/80   BP Location: Left arm   Patient Position: Sitting   Pulse: 90   SpO2: (!) 93%   Weight: 68.5 kg (151 lb 0.2 oz)   Height: 4' 9" (1.448 m)      Physical Exam    Assessment:       1. Neuropathy due to chemotherapeutic drug    2. Stage 3a chronic kidney disease    3. History of tobacco use        Plan:       Marizol was seen today for follow up on medications, follow-up and hand pain.    Diagnoses and all orders for this visit:    Neuropathy due to chemotherapeutic drug  -     traMADoL (ULTRAM-ER) 100 MG Tb24; Take 1 tablet (100 mg total) by mouth once daily.    Stage 3a chronic kidney disease    History of tobacco use           Assessment & Plan    CHRONIC PAIN MANAGEMENT:  Assessed patient's improvement after discontinuing Pregabalin, noting resolution of symptoms including dizziness, fogginess, slow memory, and headaches.  Considered risks and benefits of pain management options, weighing potential cognitive effects against need for symptom relief.  Will increase Tramadol dosage to extended-release formulation for better pain control, balancing efficacy with potential risks associated with opioid use.  Explained the nature of Tramadol as an opioid medication, its pain-relieving properties, and potential for developing tolerance over time.  Increased " Tramadol from 50 mg to 100 mg extended-release formulation.    TYPE 2 DIABETES MANAGEMENT:  Determined Trulicity was not causing previous symptoms, opted to increase dose back to previous level.  Increased Trulicity dose back to previous level.    MOBILITY ISSUES:  Discussed the importance of mobility and the potential negative impacts of reduced physical activity on overall health.  Provided form for 2 handicap parking placards.    FOLLOW-UP:  Follow up in 2-3 months for reassessment.       Visit today is associated with current or anticipated ongoing medical care related to this patient's single serious condition/complex condition of DM. The patient will return to see me as these issues will be followed longitudinally.    This note was generated with the assistance of ambient listening technology. Verbal consent was obtained by the patient and accompanying visitor(s) for the recording of patient appointment to facilitate this note. I attest to having reviewed and edited the generated note for accuracy, though some syntax or spelling errors may persist. Please contact the author of this note for any clarification.      Pedro Luis Ferrell MD  Internal Medicine-[facility]        Side effects of medication(s) were discussed in detail and patient voiced understanding.  Patient will call back for any issues or complications.

## 2024-11-17 DIAGNOSIS — I10 ESSENTIAL HYPERTENSION: ICD-10-CM

## 2024-11-17 DIAGNOSIS — E11.9 TYPE 2 DIABETES MELLITUS WITHOUT COMPLICATION, WITHOUT LONG-TERM CURRENT USE OF INSULIN: ICD-10-CM

## 2024-11-17 RX ORDER — AMLODIPINE BESYLATE 5 MG/1
TABLET ORAL
Qty: 90 TABLET | Refills: 3 | Status: SHIPPED | OUTPATIENT
Start: 2024-11-17

## 2024-11-17 RX ORDER — METFORMIN HYDROCHLORIDE 500 MG/1
TABLET, EXTENDED RELEASE ORAL
Qty: 180 TABLET | Refills: 0 | Status: SHIPPED | OUTPATIENT
Start: 2024-11-17

## 2024-11-17 NOTE — TELEPHONE ENCOUNTER
Care Due:                  Date            Visit Type   Department     Provider  --------------------------------------------------------------------------------                                EP -                              PRIMARY      [facility]  Last Visit: 11-      CARE ([facility])   MEDICINE       Pedro Luis Butterfield  Next Visit: None Scheduled  None         None Found                                                            Last  Test          Frequency    Reason                     Performed    Due Date  --------------------------------------------------------------------------------    HBA1C.......  6 months...  dulaglutide, metFORMIN...  07- 01-    Lipid Panel.  12 months..  atorvastatin.............  01- 01-    Vitamin D...  12 months..  cholecalciferol,.........  01- 01-    [employer] Embedded Care Due Messages. Reference number: [id number].   11/17/2024 6:02:11 AM CST

## 2024-11-17 NOTE — TELEPHONE ENCOUNTER
Refill Routing Note   Medication(s) are not appropriate for processing by [facility] for the following reason(s):        Outside of protocol    [facility] action(s):  Route  Approve   Requires labs : Yes             Appointments  past 12m or future 3m with PCP    Date Provider   Last Visit   11/14/2024 Pedro Luis Butterfield MD   Next Visit   Visit date not found Pedro Luis Butterfield MD   ED visits in past 90 days: 0        Note composed:2:25 PM 11/17/2024

## 2024-11-19 RX ORDER — FERROUS SULFATE 325(65) MG
TABLET, DELAYED RELEASE (ENTERIC COATED) ORAL EVERY OTHER DAY
Qty: 45 TABLET | Refills: 3 | Status: SHIPPED | OUTPATIENT
Start: 2024-11-19

## 2024-11-19 RX ORDER — DULAGLUTIDE 3 MG/.5ML
3 INJECTION, SOLUTION SUBCUTANEOUS WEEKLY
Qty: 12 PEN | Refills: 2 | Status: SHIPPED | OUTPATIENT
Start: 2024-11-19

## 2024-11-19 NOTE — TELEPHONE ENCOUNTER
Pt would like Trulicity to stay @ 3mg and not increase because 3mg is working just fine and would like to stay at 3mg.     Please advise.

## 2024-11-19 NOTE — TELEPHONE ENCOUNTER
----- Message from Luz sent at 11/19/2024  9:43 AM CST -----  Contact: [phone]  Patient called, to inform to leave the rx dulaglutide (TRULICITY) 3 mg/0.5 mL pen injector dosage as it is. Do not increase it.

## 2024-11-19 NOTE — TELEPHONE ENCOUNTER
No care due was identified.  [facility] Embedded Care Due Messages. Reference number: [id number].   11/19/2024 10:15:44 AM CST

## 2024-12-20 ENCOUNTER — TELEPHONE (OUTPATIENT)
Dept: PAIN MEDICINE | Facility: CLINIC | Age: 79
End: 2024-12-20
Payer: MEDICARE

## 2024-12-20 NOTE — TELEPHONE ENCOUNTER
----- Message from Troy sent at 12/20/2024 12:14 PM CST -----  Regarding: Injection  Name of Who is Calling:  Patient          What is the request in detail:  Please call patient about an injection. Patient would like to know if she can get an injection in her left and right thumb            Can the clinic reply by [facility]: No            What Number to Call Back if not in [facility]: [phone]

## 2024-12-20 NOTE — TELEPHONE ENCOUNTER
Staff called patient to assist her with getting scheduled for an injection. Patient did not answer

## 2025-01-14 DIAGNOSIS — Z00.00 ENCOUNTER FOR MEDICARE ANNUAL WELLNESS EXAM: ICD-10-CM

## 2025-01-24 ENCOUNTER — TELEPHONE (OUTPATIENT)
Dept: PAIN MEDICINE | Facility: CLINIC | Age: 80
End: 2025-01-24
Payer: MEDICARE

## 2025-01-24 NOTE — TELEPHONE ENCOUNTER
----- Message from [employer] sent at 1/24/2025 10:37 AM CST -----  Name of Who is Calling: ESTEBAN JIMENEZ [[id number]]          What is the request in detail: Pt is requesting a call back. Pt would like to know if she can get scheduled for 1/25 for pain injection. Please assist.           Can the clinic reply by [facility]: No          What Number to Call Back if not in [facility]: [phone]

## 2025-01-24 NOTE — TELEPHONE ENCOUNTER
----- Message from [employer] sent at 1/24/2025  8:59 AM CST -----  Name of Who is Calling: ESTEBAN JIMENEZ [[id number]]          What is the request in detail: Pt is requesting a call back to get scheduled for procedure on 1/25. Please assist.           Can the clinic reply by [facility]: No          [facility]:  [phone]

## 2025-01-24 NOTE — TELEPHONE ENCOUNTER
Staff spoke with patient and got her scheduled with Jessica Alfaro for an evaluation of her hand and possible trigger point injections at the time of the visit.

## 2025-01-27 ENCOUNTER — OFFICE VISIT (OUTPATIENT)
Dept: URGENT CARE | Facility: CLINIC | Age: 80
End: 2025-01-27
Payer: MEDICARE

## 2025-01-27 VITALS
DIASTOLIC BLOOD PRESSURE: 84 MMHG | OXYGEN SATURATION: 97 % | TEMPERATURE: 98 F | HEART RATE: 95 BPM | WEIGHT: 149 LBS | HEIGHT: 57 IN | BODY MASS INDEX: 32.15 KG/M2 | RESPIRATION RATE: 18 BRPM | SYSTOLIC BLOOD PRESSURE: 133 MMHG

## 2025-01-27 DIAGNOSIS — M54.42 MIDLINE LOW BACK PAIN WITH LEFT-SIDED SCIATICA, UNSPECIFIED CHRONICITY: Primary | ICD-10-CM

## 2025-01-27 DIAGNOSIS — R53.83 FATIGUE, UNSPECIFIED TYPE: ICD-10-CM

## 2025-01-27 DIAGNOSIS — R52 BODY ACHES: ICD-10-CM

## 2025-01-27 LAB
BILIRUBIN, UA POC OHS: NEGATIVE
BLOOD, UA POC OHS: NEGATIVE
CLARITY, UA POC OHS: CLEAR
COLOR, UA POC OHS: YELLOW
CTP QC/QA: YES
CTP QC/QA: YES
GLUCOSE, UA POC OHS: NEGATIVE
KETONES, UA POC OHS: NEGATIVE
LEUKOCYTES, UA POC OHS: NEGATIVE
NITRITE, UA POC OHS: NEGATIVE
PH, UA POC OHS: 5.5
POC MOLECULAR INFLUENZA A AGN: NEGATIVE
POC MOLECULAR INFLUENZA B AGN: NEGATIVE
PROTEIN, UA POC OHS: 30
SARS-COV-2 AG RESP QL IA.RAPID: NEGATIVE
SPECIFIC GRAVITY, UA POC OHS: 1.02
UROBILINOGEN, UA POC OHS: 0.2

## 2025-01-27 PROCEDURE — 99214 OFFICE O/P EST MOD 30 MIN: CPT | Mod: 25,S$GLB,, | Performed by: PHYSICIAN ASSISTANT

## 2025-01-27 PROCEDURE — 96372 THER/PROPH/DIAG INJ SC/IM: CPT | Mod: S$GLB,,, | Performed by: PHYSICIAN ASSISTANT

## 2025-01-27 PROCEDURE — 87811 SARS-COV-2 COVID19 W/OPTIC: CPT | Mod: QW,S$GLB,, | Performed by: PHYSICIAN ASSISTANT

## 2025-01-27 PROCEDURE — 87502 INFLUENZA DNA AMP PROBE: CPT | Mod: QW,S$GLB,, | Performed by: PHYSICIAN ASSISTANT

## 2025-01-27 PROCEDURE — 81003 URINALYSIS AUTO W/O SCOPE: CPT | Mod: QW,S$GLB,, | Performed by: PHYSICIAN ASSISTANT

## 2025-01-27 RX ORDER — DEXAMETHASONE SODIUM PHOSPHATE 10 MG/ML
9 INJECTION INTRAMUSCULAR; INTRAVENOUS
Status: COMPLETED | OUTPATIENT
Start: 2025-01-27 | End: 2025-01-27

## 2025-01-27 RX ORDER — MELOXICAM 15 MG/1
TABLET ORAL
COMMUNITY

## 2025-01-27 RX ORDER — IBUPROFEN 800 MG/1
800 TABLET ORAL EVERY 6 HOURS PRN
COMMUNITY
Start: 2024-11-22 | End: 2025-01-28

## 2025-01-27 RX ORDER — MELOXICAM 7.5 MG/1
7.5 TABLET ORAL DAILY PRN
Qty: 10 TABLET | Refills: 0 | Status: SHIPPED | OUTPATIENT
Start: 2025-01-27 | End: 2025-01-28 | Stop reason: SDUPTHER

## 2025-01-27 RX ORDER — ACETAMINOPHEN AND CODEINE PHOSPHATE 300; 30 MG/1; MG/1
1 TABLET ORAL EVERY 6 HOURS PRN
COMMUNITY
Start: 2024-11-22

## 2025-01-27 RX ADMIN — DEXAMETHASONE SODIUM PHOSPHATE 9 MG: 10 INJECTION INTRAMUSCULAR; INTRAVENOUS at 01:01

## 2025-01-27 NOTE — PROGRESS NOTES
"Subjective:      Patient ID: Marizol Kevin is a 79 y.o. female.    Vitals:  height is 4' 9" (1.448 m) and weight is 67.6 kg (149 lb). Her oral temperature is 98.3 °F (36.8 °C). Her blood pressure is 133/84 and her pulse is 95. Her respiration is 18 and oxygen saturation is 97%.     Chief Complaint: Generalized Body Aches    Patient is a 79-year-old female presents with complaint of fatigue and body aches body aches started 2 days ago.   Patient states that she has felt or tired and run down than usual, and has had aches and pains all over.  States that she has felt warm and feverish at times since onset, no documented fever.  No known sick contacts.  She does have history of arthritis, chronic pain, sciatica, followed by pain management and has appointment with pain management in 3 days.  Patient states that she has had heartburn described as burning up her chest that occurs intermittently.  Nonexertional, denies CP or dyspnea.  Has history of GERD and has Pepcid for this.  She denies current burning sensation or any CP/dyspnea.  Patient denies any other new associated symptoms since onset.  Has been taking more OTC ibuprofen for pain, which may have contributed.  Patient has chronic low back pain with left sciatica that has waxed and waned, has been severe these past few days.  She denies any URI symptoms otherwise-no cough, congestion, sneezing, sore throat.  Denies any abdominal pain, vomiting, nausea, diarrhea in the past 3 days.  Denies any urinary symptoms.  No rash.  No bleeding or recent medication change.  No falls or injury/trauma.  No incontinence, LE weakness/paresthesia.     Leg Pain   The incident occurred more than 1 week ago. There was no injury mechanism. The pain is present in the left knee and left leg. The quality of the pain is described as aching. The pain is at a severity of 8/10. Pertinent negatives include no numbness. She reports no foreign bodies present.       Constitution: Positive " for fatigue. Negative for chills, sweating and fever.   HENT:  Negative for congestion and sore throat.    Neck: Negative for neck pain and neck stiffness.   Cardiovascular:  Negative for chest pain, leg swelling and palpitations.   Eyes:  Negative for eye itching, eye pain and eye redness.   Respiratory:  Negative for cough, sputum production and shortness of breath.    Gastrointestinal:  Negative for abdominal pain, nausea, vomiting and diarrhea.   Genitourinary:  Negative for dysuria, frequency and urgency.   Musculoskeletal:  Positive for pain, joint pain, arthritis and back pain.   Skin:  Negative for color change and rash.   Neurological:  Negative for dizziness, light-headedness, speech difficulty, headaches, disorientation, altered mental status, numbness, tingling, seizures and tremors.   Psychiatric/Behavioral:  Negative for altered mental status and disorientation.       Objective:     Physical Exam   Constitutional: She is oriented to person, place, and time. She appears well-developed. She is cooperative.  Non-toxic appearance. She does not appear ill. No distress.   HENT:   Head: Normocephalic and atraumatic.   Ears:   Right Ear: Hearing, tympanic membrane, external ear and ear canal normal.   Left Ear: Hearing, tympanic membrane, external ear and ear canal normal.   Nose: Nose normal. No mucosal edema, rhinorrhea or nasal deformity. No epistaxis. Right sinus exhibits no maxillary sinus tenderness and no frontal sinus tenderness. Left sinus exhibits no maxillary sinus tenderness and no frontal sinus tenderness.   Mouth/Throat: Uvula is midline, oropharynx is clear and moist and mucous membranes are normal. No trismus in the jaw. Normal dentition. No uvula swelling. No oropharyngeal exudate, posterior oropharyngeal edema, posterior oropharyngeal erythema, tonsillar abscesses or cobblestoning. No tonsillar exudate.   Eyes: Conjunctivae and lids are normal. No scleral icterus.   Neck: Trachea normal and  phonation normal. Neck supple. No edema present. No erythema present. No neck rigidity present.   Cardiovascular: Normal rate, regular rhythm, normal heart sounds and normal pulses.   Pulmonary/Chest: Effort normal and breath sounds normal. No accessory muscle usage or stridor. No tachypnea and no bradypnea. No respiratory distress. She has no decreased breath sounds. She has no wheezes. She has no rhonchi. She has no rales.   Abdominal: Normal appearance and bowel sounds are normal. She exhibits no mass. Soft. There is no abdominal tenderness. There is no left CVA tenderness and no right CVA tenderness.      Comments: Soft, no TTP.  No CVA tenderness   Musculoskeletal: Normal range of motion.         General: No deformity. Normal range of motion.      Thoracic back: Normal.      Lumbar back: She exhibits tenderness.        Back:       Right lower leg: No edema.      Left lower leg: No edema.      Comments: Low back pain, TTP of lower lumbar to left of midline and near midline.  Pain radiates down left leg to just past the knee.  Nearly full ROM of knee, 5/5 strength.  No LE edema.  Full ROM ankle.  Sensation intact.  PT pulses 2+   Lymphadenopathy:     She has no cervical adenopathy.   Neurological: She is alert and oriented to person, place, and time. She has normal strength and normal reflexes. No sensory deficit. She exhibits normal muscle tone. Coordination normal.   Skin: Skin is warm, dry, intact, not diaphoretic and not pale.   Psychiatric: Her speech is normal and behavior is normal. Judgment and thought content normal.   Nursing note and vitals reviewed.    Results for orders placed or performed in visit on 01/27/25   SARS Coronavirus 2 Antigen, POCT Manual Read    Collection Time: 01/27/25  1:20 PM   Result Value Ref Range    SARS Coronavirus 2 Antigen Negative Negative     Acceptable Yes    POCT Influenza A/B MOLECULAR    Collection Time: 01/27/25  1:20 PM   Result Value Ref Range    POC  Molecular Influenza A Ag Negative Negative    POC Molecular Influenza B Ag Negative Negative     Acceptable Yes    POCT Urinalysis(Instrument)    Collection Time: 01/27/25  2:00 PM   Result Value Ref Range    Color, POC UA Yellow Yellow, Straw, Colorless    Clarity, POC UA Clear Clear    Glucose, POC UA Negative Negative    Bilirubin, POC UA Negative Negative    Ketones, POC UA Negative Negative    Spec Grav POC UA 1.025 1.005 - 1.030    Blood, POC UA Negative Negative    pH, POC UA 5.5 5.0 - 8.0    Protein, POC UA 30 (A) Negative    Urobilinogen, POC UA 0.2 <=1.0    Nitrite, POC UA Negative Negative    WBC, POC UA Negative Negative     *Note: Due to a large number of results and/or encounters for the requested time period, some results have not been displayed. A complete set of results can be found in Results Review.         Assessment:     1. Midline low back pain with left-sided sciatica, unspecified chronicity    2. Fatigue, unspecified type    3. Body aches        Plan:       Midline low back pain with left-sided sciatica, unspecified chronicity  -     dexAMETHasone injection 9 mg  -     meloxicam (MOBIC) 7.5 MG tablet; Take 1 tablet (7.5 mg total) by mouth daily as needed for Pain.  Dispense: 10 tablet; Refill: 0    Fatigue, unspecified type  -     SARS Coronavirus 2 Antigen, POCT Manual Read    Body aches  -     POCT Influenza A/B MOLECULAR  -     POCT Urinalysis(Instrument)      Patient presenting with quite nebulous symptoms of fatigue, generalized aches that began 2 days ago.  She does have chronic pain, and particularly increasingly painful low back pain with sciatica.  Patient is followed by pain management, is looking for relief in meantime.  She will follow up on Thursday with pain management.  Offered corticosteroid for sciatica flare, patient states that meloxicam has helped greatly in the past but stopped taking it several years ago and would like this medication again.  Discussed  risks, benefits of NSAID and steroid.  I reviewed recent labs to assess glucose control and kidney function prior to Rx.  She does have stage III CKD in the past.  Past few GFR greater than 60.  Will prescribe 7.5 mg of meloxicam for short supply, p.r.n. use.  Discussed that the nonspecific fatigue, aches should be monitor closely and monitor for any other new or worsening symptoms that may develop, may explain possible viral illness or other etiology.  Regarding the aches and fatigue, COVID, flu, and UA were negative for infection.  Given strict ER precautions for new or worsening symptoms.  She has no CP or dyspnea, no current burning in the chest, has history of GERD.  Instructed Pepcid, and Tums for breakthrough heartburn.  For any CP, dyspnea, or new or worsening symptoms or change she should go to the ER. Discussed ddx, home care, tx options, and given follow up precautions.  I have reviewed the patient's chart to view previous visits, labs, and imaging to assess PMH and look for any trends or previous treatments.

## 2025-01-28 ENCOUNTER — PATIENT MESSAGE (OUTPATIENT)
Dept: INTERNAL MEDICINE | Facility: CLINIC | Age: 80
End: 2025-01-28
Payer: MEDICARE

## 2025-01-28 ENCOUNTER — TELEPHONE (OUTPATIENT)
Dept: PAIN MEDICINE | Facility: CLINIC | Age: 80
End: 2025-01-28
Payer: MEDICARE

## 2025-01-28 DIAGNOSIS — M54.42 MIDLINE LOW BACK PAIN WITH LEFT-SIDED SCIATICA, UNSPECIFIED CHRONICITY: ICD-10-CM

## 2025-01-28 DIAGNOSIS — G57.03 PIRIFORMIS SYNDROME OF BOTH SIDES: ICD-10-CM

## 2025-01-28 RX ORDER — MELOXICAM 7.5 MG/1
7.5 TABLET ORAL DAILY PRN
Qty: 10 TABLET | Refills: 0 | Status: SHIPPED | OUTPATIENT
Start: 2025-01-28 | End: 2025-02-07

## 2025-01-28 RX ORDER — METHOCARBAMOL 750 MG/1
750 TABLET, FILM COATED ORAL 3 TIMES DAILY PRN
Qty: 90 TABLET | Refills: 0 | Status: SHIPPED | OUTPATIENT
Start: 2025-01-28 | End: 2025-02-03

## 2025-01-28 NOTE — TELEPHONE ENCOUNTER
Staff contacted patient regarding her call back request. Patient stated how she does not want to see a NP due to the end result being a run around of appointments and insurance verifications. Staff understood patient concern and scheduled her follow up with  to a sooner date. Patient was satisfied at the end of call

## 2025-01-28 NOTE — TELEPHONE ENCOUNTER
Care Due:                  Date            Visit Type   Department     Provider  --------------------------------------------------------------------------------                                EP -                              PRIMARY      [facility] INTERNAL  Last Visit: 11-      CARE ([facility])   MEDICINE       Pedro Luis Butterfield  Next Visit: None Scheduled  None         None Found                                                            Last  Test          Frequency    Reason                     Performed    Due Date  --------------------------------------------------------------------------------    HBA1C.......  6 months...  dulaglutide, metFORMIN...  07- 01-    Lipid Panel.  12 months..  atorvastatin.............  01- 01-    Vitamin D...  12 months..  cholecalciferol,.........  01- 01-    [facility] Embedded Care Due Messages. Reference number: [id number].   1/28/2025 12:01:42 PM CST

## 2025-01-28 NOTE — TELEPHONE ENCOUNTER
----- Message from Елена sent at 1/28/2025 11:11 AM CST -----  Type:  RX Refill Request    Who Called: ESTEBAN JIMENEZ [[id number]]    Refill or New Rx: Refill     RX Name and Strength:    methocarbamoL (ROBAXIN) 750 MG Tab    meloxicam (MOBIC) 7.5 MG tablet    Preferred Pharmacy with phone number:[id number] [address] AT [facility] [address]   Phone: [phone]  Fax: [phone]    Local or Mail Order:Local    Would the patient rather a call back or a response via [facility]? Call back    Best Call Back Number:[phone]     Additional Information: Patient states she needs a refill for both medications above if possible. Patient would like a call back with further assistance.

## 2025-01-28 NOTE — TELEPHONE ENCOUNTER
----- Message from Troy sent at 1/28/2025  1:08 PM CST -----  Regarding: Appointment  Name of Who is Calling:  Patient          What is the request in detail:  Please call patient she stated she don't want to see a NP for her upcoming appointment            Can the clinic reply by [facility]: No            What Number to Call Back if not in [facility]: [phone]

## 2025-01-29 DIAGNOSIS — E11.9 TYPE 2 DIABETES MELLITUS WITHOUT COMPLICATION, WITHOUT LONG-TERM CURRENT USE OF INSULIN: ICD-10-CM

## 2025-01-29 NOTE — TELEPHONE ENCOUNTER
Refill Routing Note   Medication(s) are not appropriate for processing by [facility] for the following reason(s):        Required labs outdated    [facility] action(s):  Defer               Appointments  past 12m or future 3m with PCP    Date Provider   Last Visit   11/14/2024 Pedro Luis Butterfield MD   Next Visit   Visit date not found Pedro Luis Butterfield MD   ED visits in past 90 days: 0        Note composed:9:05 AM 01/29/2025

## 2025-01-29 NOTE — TELEPHONE ENCOUNTER
No care due was identified.  [facility] Embedded Care Due Messages. Reference number: [id number].   1/29/2025 1:58:00 AM CST

## 2025-01-30 RX ORDER — METFORMIN HYDROCHLORIDE 500 MG/1
TABLET, EXTENDED RELEASE ORAL
Qty: 180 TABLET | Refills: 0 | Status: SHIPPED | OUTPATIENT
Start: 2025-01-30

## 2025-02-02 DIAGNOSIS — G57.03 PIRIFORMIS SYNDROME OF BOTH SIDES: ICD-10-CM

## 2025-02-02 NOTE — TELEPHONE ENCOUNTER
No care due was identified.  Health [facility] Embedded Care Due Messages. Reference number: [id number].   2/02/2025 6:19:17 AM CST

## 2025-02-03 RX ORDER — METHOCARBAMOL 750 MG/1
750 TABLET, FILM COATED ORAL 3 TIMES DAILY
Qty: 270 TABLET | Refills: 0 | Status: SHIPPED | OUTPATIENT
Start: 2025-02-03

## 2025-02-03 NOTE — TELEPHONE ENCOUNTER
Refill Encounter    PCP Visits: Recent Visits  Date Type Provider Dept   11/14/24 Office Visit Pedro Luis Butterfield MD [facility] Internal Medicine   09/20/24 Office Visit Pedro Luis Butterfield MD [facility] Medicine   07/25/24 Office Visit Pedro Luis Butterfield MD [facility] Medicine   04/30/24 Office Visit Pedro Luis Butterfield MD [facility] Medicine   Showing recent visits within past 360 days and meeting all other requirements  Future Appointments  No visits were found meeting these conditions.  Showing future appointments within next 720 days and meeting all other requirements     Last 3 Blood Pressure:   BP Readings from Last 3 Encounters:   01/27/25 133/84   11/14/24 120/80   09/30/24 (!) 110/56     Preferred Pharmacy:   [facility] - [place], [place] - [address]  [address] [facility] [address] [facility] [address] [address]  Phone: [phone] Fax: [phone]    Requested RX:  Requested Prescriptions     Pending Prescriptions Disp Refills    methocarbamoL (ROBAXIN) 750 MG Tab [Pharmacy Med Name: METHOCARBAMOL 750MG TAB] 270 tablet 0      RX Route: Normal

## 2025-02-04 ENCOUNTER — TELEPHONE (OUTPATIENT)
Dept: PAIN MEDICINE | Facility: CLINIC | Age: 80
End: 2025-02-04
Payer: MEDICARE

## 2025-02-04 ENCOUNTER — OFFICE VISIT (OUTPATIENT)
Dept: PAIN MEDICINE | Facility: CLINIC | Age: 80
End: 2025-02-04
Payer: MEDICARE

## 2025-02-04 VITALS
WEIGHT: 147.69 LBS | RESPIRATION RATE: 18 BRPM | TEMPERATURE: 98 F | HEART RATE: 95 BPM | OXYGEN SATURATION: 100 % | DIASTOLIC BLOOD PRESSURE: 79 MMHG | SYSTOLIC BLOOD PRESSURE: 134 MMHG | BODY MASS INDEX: 31.86 KG/M2 | HEIGHT: 57 IN

## 2025-02-04 DIAGNOSIS — G62.0 NEUROPATHY DUE TO CHEMOTHERAPEUTIC DRUG: ICD-10-CM

## 2025-02-04 DIAGNOSIS — M25.59 PAIN IN OTHER SPECIFIED JOINT: ICD-10-CM

## 2025-02-04 DIAGNOSIS — M17.10 PRIMARY OSTEOARTHRITIS OF KNEE, UNSPECIFIED LATERALITY: Primary | ICD-10-CM

## 2025-02-04 DIAGNOSIS — T45.1X5A NEUROPATHY DUE TO CHEMOTHERAPEUTIC DRUG: ICD-10-CM

## 2025-02-04 DIAGNOSIS — G89.4 CHRONIC PAIN SYNDROME: ICD-10-CM

## 2025-02-04 PROCEDURE — 1159F MED LIST DOCD IN RCRD: CPT | Mod: CPTII,S$GLB,, | Performed by: ANESTHESIOLOGY

## 2025-02-04 PROCEDURE — 1125F AMNT PAIN NOTED PAIN PRSNT: CPT | Mod: CPTII,S$GLB,, | Performed by: ANESTHESIOLOGY

## 2025-02-04 PROCEDURE — 1101F PT FALLS ASSESS-DOCD LE1/YR: CPT | Mod: CPTII,S$GLB,, | Performed by: ANESTHESIOLOGY

## 2025-02-04 PROCEDURE — 3288F FALL RISK ASSESSMENT DOCD: CPT | Mod: CPTII,S$GLB,, | Performed by: ANESTHESIOLOGY

## 2025-02-04 PROCEDURE — 99214 OFFICE O/P EST MOD 30 MIN: CPT | Mod: GC,S$GLB,, | Performed by: ANESTHESIOLOGY

## 2025-02-04 PROCEDURE — 3072F LOW RISK FOR RETINOPATHY: CPT | Mod: CPTII,S$GLB,, | Performed by: ANESTHESIOLOGY

## 2025-02-04 PROCEDURE — 1160F RVW MEDS BY RX/DR IN RCRD: CPT | Mod: CPTII,S$GLB,, | Performed by: ANESTHESIOLOGY

## 2025-02-04 PROCEDURE — 3078F DIAST BP <80 MM HG: CPT | Mod: CPTII,S$GLB,, | Performed by: ANESTHESIOLOGY

## 2025-02-04 PROCEDURE — 80364 OPIOID &OPIATE ANALOG 5/MORE: CPT | Performed by: ANESTHESIOLOGY

## 2025-02-04 PROCEDURE — G2211 COMPLEX E/M VISIT ADD ON: HCPCS | Mod: S$GLB,,, | Performed by: ANESTHESIOLOGY

## 2025-02-04 PROCEDURE — 3075F SYST BP GE 130 - 139MM HG: CPT | Mod: CPTII,S$GLB,, | Performed by: ANESTHESIOLOGY

## 2025-02-04 PROCEDURE — 99999 PR PBB SHADOW E&M-EST. PATIENT-LVL III: CPT | Mod: PBBFAC,,, | Performed by: ANESTHESIOLOGY

## 2025-02-04 RX ORDER — TRAMADOL HYDROCHLORIDE 100 MG/1
100 TABLET, EXTENDED RELEASE ORAL DAILY
Qty: 30 TABLET | Refills: 0 | Status: SHIPPED | OUTPATIENT
Start: 2025-02-04 | End: 2025-02-05 | Stop reason: SDUPTHER

## 2025-02-04 RX ORDER — TRAMADOL HYDROCHLORIDE 100 MG/1
100 TABLET, EXTENDED RELEASE ORAL DAILY
Qty: 30 TABLET | Refills: 0 | Status: SHIPPED | OUTPATIENT
Start: 2025-02-04 | End: 2025-02-04

## 2025-02-04 NOTE — PROGRESS NOTES
Chronic Pain - Established        Chief Complaint:   Chief Complaint   Patient presents with    Knee Pain     Interval History 2/4/2025:  The patient presents for evaluation of left knee pain following left genicular cooled RFA on 8/23/2024. She describes the pain as throbbing and burning in nature. She has discontinued Lyrica but finds Tylenol and Tramadol effective for symptom relief, though she is currently out of Tramadol.  Additionally, she sought urgent care on 1/27/2025 for lumbar pain with sciatica, where she received a 9 mg dexamethasone injection and was prescribed Meloxicam, which she has not yet taken but has previously found beneficial.    Interval History 9/20/2024:  Marizol Kevin returns to clinic for follow-up for knee pain. She is s/p left genicular cooled RFA on 8/23/2024. She says her L knee pain is throbbing and burning in nature. She has stopped Lyrica. Takes tylenol and tramadol with good relief. Is out of tramadol. She went to an urgent care for Lumbar pain with sciata on 1/27, they performed a dexAMETHasone injection 9 mg and prescribed  Meloxicam which she has not taken. yet but has previously gotten relief from it.  She denies recent falls or trauma. She denies new onset fever/night sweats, urinary incontinence, bowel incontinence, significant weight changes, significant motor weakness or changes, or loss of sensations. Her pain today is 8/10.     Interval History 7/8/2024:  Marizol Kevin returns to clinic s/p left knee synvisc injection. She reports some relief of pain. She continues to experience pain when she goes to stand and walk.  She feels like she may fall at times due to the pain.  She continues PT and HEP daily. The patient denies fever/night sweats, urinary incontinence, bowel incontinence, significant weight changes, significant motor weakness or changes, or loss of sensations. Her pain today is 8/10.     Interval History 5/9/2024:  Marizol Kevin returns to  clinic s/p bialteral piriformis and left GTB on 4/25/2024. She reports 100% relief from this procedure.  She is happy with this last procedure.  Her main pain generator today is from her left knee. She last had a left genicular CSI on 10/17/2023.  She reports 80% relief that lasted 4 months.  She has done some research and would like to try Synvisc to see if she would have more lasting relief. She has started a 15-day course of Tramadol 50 mg BID PRN with good relief until she is able to have a procedure.  She denies any perceived side effects. Her pain today is 7/10.     Interval History 4/9/2024:  Marizol Kevin presents for follow-up of lower back and left knee pain. She also had left L4/5 and L5/S1 TF JT on 12/22/2023.  This has provided 70% pain relief that is on-going. Today, she is complaining of bilateral leg pain into the buttock and posterolateral bilateral thighs. Pain description: electric, aching, shooting, burning, tight. Exacerbating factors: twisting, bending, stooping. She continues Lyrica. She has not taken any muscle relaxants. The patient denies fever/night sweats, urinary incontinence, bowel incontinence, significant weight changes, significant motor weakness or changes, or loss of sensations. Her pain today is 10/10.     Interval History 11/20/2023:  The patient presents today for follow up of lower back and left knee pain. She is s/p left knee steroid injection on 10/17/23 with 80% relief of knee pain. Her knee pain is currently tolerable. She did she neurosurgery since previous encounter and had a referral placed to orthopedics for evaluation. She says that she feels like there is excess fluid in her knee. She does still get intermittent back pain with radiation down the back and side of the left leg and associated numbness. We previously discussed JT and she would like to further discuss at this time. Her pain today is 2/10.    Interval History 10/2/2023:  The patient presents today  to discuss worsened back and left knee pain. She was recently evaluated in the ED for left-sided sciatica. States that her back pain improved with the steroid injection, but she continues to struggle with left knee pain. She denies any back pain today. She did have a lumbar CT in the ED which showed multilevel stenosis, worse at L3-L4 level where there is severe central canal stenosis from concentric disc bulge and hypertrophic facet arthropathy and ligamentum flavum hypertrophy with napkin ring deformity of the traversing thecal sac and cauda equina suggested. She was supposed to be referred to neurosurgery but a referral was not made. She denies bowel or bladder incontinence. Left knee pain is sharp and stabbing in nature. She feels like the knee will give out at time. She has had benefit with Tramadol in the past and is asking for a prescription. Her pain today is 10/10.    Interval History 9/6/2023:  There patient is here for follow up of foot pain and numbness secondary to diabetic neuropathy and previous chemotherapy. She is here today for application of Qutenza patches to bilateral feet. She has burning and numbness to the anterior and posterior feet. She was started on Lyrica 50 mg BID at last OV. She has no side effects from the medication but has not noticed any improvement in symptoms. Her pain today is 9/10.    Interval History 8/22/23:    Ms. Kevin returns for follow-up of her neck pain. Her primary pain today is her neuropathy pain. She is tolerating lyrica 50 mg BID but has noted alopecia since starting. However, it has helped her pain. Given her history of colon cancer and chemo, it is unclear if this is hairloss is from the lyrica.  She also comments on her shoulder pain and how the prior nerve block helped but did not last long.  It provided >80% relief of her shoulder pain.  She is concerned about repeating the injection because she states the last one cost her $300.   Acupuncture was very  helpful for her, but was not covered by her insurance and cost $150 per session.   Regarding her neuropathy, she endorses painful burning in her feet that prevents sleeping. The lyrica and duloxetine have both helped, but she stopped taking duloxetine when she started lyrica. She did not have any known adverse effects from the duloxetine. At her last visit 6/6/23, Qutenza was ordered. Will investigate if steps to approval for that.   We also discussed spinal cord stimulator trial. She has not had spine MRI or psychological clearance.          Initial HPI:  Marizol Kevin has a PMHx of HTN, HLD, DMII, osteoporosis, colon cancer s/p chemothearpy. She presents to the clinic for the evaluation of neuropathic pain along with neck pain that radiates into the left arm. The pain started 5-6 months ago, no specific inciting event. Symptoms have been worsening.The pain is located in the left side of the neck area and radiates to the left periscapular area with further distal radiation to the whole arm.  The pain is described as aching, stabbing, and sqaueezing  and is rated as 9/10. The pain is rated with a score of  5/10 on the BEST day and a score of 9/10 on the WORST day.  Symptoms interfere with daily activity, sleeping, and enjoyment of life. The pain is exacerbated by Laying and Lifting.  The pain is mitigated by nothing.  Pt also endorses severe neuropathic pain in BL hands and feet. She has used gabapentinin the past without any relief. Currently on Cymbalta. Recently started Acupuncture for neuropathy, which she reports has provided significant relief.     Patient denies night fever/night sweats, urinary incontinence, bowel incontinence, significant weight loss, and loss of sensations.    Physical Therapy/Home Exercise: yes and continues to do home exercises for her shoulder and her balance    Pain Disability Index Review:      2/4/2025     9:56 AM 9/20/2024     9:00 AM 7/8/2024     8:53 AM   Last 3 PDI Scores    Pain Disability Index (PDI) 56 20 48       Pain Medications:  - was on duloxetine 30 mg BID  - Gabapentin (dose unknown) but was stopped due to lack of efficacy  - Tramadol  - Tylenol   - meloxicam      report:  Not applicable    Pain Procedures:   SAB injection 3/30/23 --> minimal relief  Left shoulder nerve blocks 7/21/23 --> 80% relief  10/17/23 Left knee steroid injection- 80% relief  12/22/2023 - Left L4/5 and L5/S1 TF JT - 70% relief  4/25/2024 - Bilateral Piriformis and Left GTB - 100% relief  5/21/2024 - LEFT KNEE SYNVISC INJECTION under ultrasound guide - some relief  6/17/2024 - Left knee CSI with sports medicine - limited relief  8/23/2024 - left genicular cooled RFA - 80% relief    LABS:    CMP  Sodium   Date Value Ref Range Status   09/20/2024 142 136 - 145 mmol/L Final     Potassium   Date Value Ref Range Status   09/20/2024 3.9 3.5 - 5.1 mmol/L Final     Chloride   Date Value Ref Range Status   09/20/2024 108 95 - 110 mmol/L Final     CO2   Date Value Ref Range Status   09/20/2024 25 23 - 29 mmol/L Final     Glucose   Date Value Ref Range Status   09/20/2024 81 70 - 110 mg/dL Final     BUN   Date Value Ref Range Status   09/20/2024 12 8 - 23 mg/dL Final     Creatinine   Date Value Ref Range Status   09/20/2024 0.9 0.5 - 1.4 mg/dL Final     Calcium   Date Value Ref Range Status   09/20/2024 10.4 8.7 - 10.5 mg/dL Final     Total Protein   Date Value Ref Range Status   09/20/2024 8.0 6.0 - 8.4 g/dL Final     Albumin   Date Value Ref Range Status   09/20/2024 3.8 3.5 - 5.2 g/dL Final     Total Bilirubin   Date Value Ref Range Status   09/20/2024 0.3 0.1 - 1.0 mg/dL Final     Comment:     For infants and newborns, interpretation of results should be based  on gestational age, weight and in agreement with clinical  observations.    Premature Infant recommended reference ranges:  Up to 24 hours.............<8.0 mg/dL  Up to 48 hours............<12.0 mg/dL  3-5 days..................<15.0 mg/dL  6-29  days.................<15.0 mg/dL       Alkaline Phosphatase   Date Value Ref Range Status   09/20/2024 116 55 - 135 U/L Final     AST   Date Value Ref Range Status   09/20/2024 18 10 - 40 U/L Final     ALT   Date Value Ref Range Status   09/20/2024 16 10 - 44 U/L Final     Anion Gap   Date Value Ref Range Status   09/20/2024 9 8 - 16 mmol/L Final     eGFR   Date Value Ref Range Status   09/20/2024 >60 >60 mL/min/1.73 m^2 Final        Imaging:     XR KNEE 3 VIEW BILATERAL     CLINICAL HISTORY:  Unilateral primary osteoarthritis, unspecified knee     COMPARISON:  None.     FINDINGS:  The alignment is within normal limits.  No displaced fractures identified.  No evidence of lytic or blastic lesions.RIGHT total knee arthroplasty.  Vascular calcifications.  Degenerative changes at the level of the medial femoral condyle and patellofemoral joint on the LEFT.Soft tissues are unremarkable.     Impression:     No evidence of fracture.RIGHT total knee arthroplasty and degenerative changes LEFT medial/patellofemoral joints.        Electronically signed by:Cricket Medina MD  Date:                                            04/25/2024  Time:                                           14:46    MRI C Spine 4/15/22  FINDINGS:  large amount of image degradation from artifact.  Straightening of the usual cervical lordosis. Degenerative changes. Disc space narrowing C4-C5, C5-C6. Vertebral body heights and alignment appear maintained without acute compression or subluxation evident.  Although no obvious fracture cannot exclude subtle findings including marrow edema with the large amount of image degradation. Further follow-up or evaluation is to be obtained is suggested by CT. There do appear to be small posterior disc bulges multiple levels, C3 through C 6 without appreciable significant spinal canal narrowing     Impression:     Grossly negative study for acute findings but note is made that limited evaluation with the amount  artifact and if concern for acute cervical spine trauma correlation with CT is recommended and note is made the patient did have CT 04/15/2022 please refer to that report.  Subtle findings as suggested on that CT scan likely would not be apparent on the MRI with the degree of artifact.     If further evaluation or follow-up is to be obtained it is recommended to be by CT     This report was flagged in Epic as abnormal.     Final read    XR KNEE 3 VIEW BILATERAL     CLINICAL HISTORY:  Unilateral primary osteoarthritis, unspecified knee     COMPARISON:  None.     FINDINGS:  The alignment is within normal limits.  No displaced fractures identified.  No evidence of lytic or blastic lesions.RIGHT total knee arthroplasty.  Vascular calcifications.  Degenerative changes at the level of the medial femoral condyle and patellofemoral joint on the LEFT.Soft tissues are unremarkable.     Impression:     No evidence of fracture.RIGHT total knee arthroplasty and degenerative changes LEFT medial/patellofemoral joints.        Electronically signed by:Cricket Medina MD  Date:                                            04/25/2024  Time:                                           14:46    CT Lumbar Spine Without Contrast  Order: [id number]  Status: Final result     Visible to patient: Yes (seen)     Next appt: 10/17/2023 at 10:20 AM in Pain Medicine (Jenny Glass, [facility])     0 Result Notes  Details    Reading Physician Reading Date Result Priority   Neymar Mason MD  [phone] 9/29/2023 STAT     Narrative & Impression  EXAMINATION:  CT LUMBAR SPINE WITHOUT CONTRAST     CLINICAL HISTORY:  Lumbar radiculopathy, symptoms persist with conservative treatment;     TECHNIQUE:  Low-dose axial, sagittal and coronal reformations are obtained through the lumbar spine.  Contrast was not administered.     COMPARISON:  MRI lumbar spine, 06/18/2018     FINDINGS:  Alignment is stable.  Progressive spondylosis with disc space narrowing  and vacuum phenomenon increasing at all levels but most severely at L to L3 L3-L4.  The anterolisthesis of L4 on L5 appear stable.     At T12-L1 there is no significant stenosis.     At the L1-L2 level, mild facet arthropathy with no significant stenosis.     At the L2-L3 level, concentric disc bulge and hypertrophic facet ligamentous changes result in moderate central canal stenosis with trefoil deformity of the thecal sac.     At the L3-L4 level, severe central canal stenosis from concentric disc bulge and hypertrophic facet arthropathy and ligamentum flavum hypertrophy with napkin ring deformity of the traversing thecal sac and cauda equina suggested.     At the L4-5 level, the anterolisthesis with uncovering of the L4-5 disc and facet arthropathy and ligamentous hypertrophy causes moderate central canal stenosis.     At the L5-S1 level, vacuum phenomenon is noted with leakage through the annulus into the epidural space but no evidence of central or foraminal stenosis.     Arthrosclerotic disease throughout the aorta and iliac vessels.     Impression:     Multilevel lumbar stenosis with increasing spondylosis and degenerative change since 2018.     Most severe lumbar stenosis at L3-4 and L4-5.     No new fracture or subluxation.     Past Medical History:   Diagnosis Date    Allergy     Anxiety     Cancer     colon    Cataract     Colon cancer     Dependence on nicotine from cigarettes 9/3/2020    Diabetes mellitus, type 2     Dry eye syndrome     Hyperlipidemia     Hypertension     Insomnia     Light cigarette smoker (1-9 cigs/day) 6/21/2022    Malignant neoplasm of ascending colon 7/20/2020    Squamous blepharitis     Syncope 4/16/2022    Tobacco use disorder, moderate, in early remission 11/2/2022    Vitamin D deficiency 9/10/2020     Past Surgical History:   Procedure Laterality Date    CATARACT EXTRACTION, BILATERAL  03/2020    CHOLECYSTECTOMY      COLON SURGERY      Colon cancer    COLONOSCOPY N/A 02/01/2021     Procedure: COLONOSCOPY;  Surgeon: Ashwini Duarte MD;  Location: [facility] ENDO (4TH FLR);  Service: Endoscopy;  Laterality: N/A;  medical transportation  covid test  Jehovah's witness, instructions mailed-[facility]    EYE SURGERY      Cataract    HYSTERECTOMY      INJECTION OF ANESTHETIC AGENT AROUND NERVE Left 2023    Procedure: BLOCK, NERVE, LEFT SHOULDER ARTICULAR BRANCH keep date rep aware;  Surgeon: Abdi Del Toro MD;  Location: [facility] PAIN MGT;  Service: Pain Management;  Laterality: Left;    INJECTION OF ANESTHETIC AGENT AROUND NERVE Left 2024    Procedure: BLOCK, NERVE LEFT DIAGNOSTIC GENICULAR;  Surgeon: Abdi Del Toro MD;  Location: [facility] PAIN MGT;  Service: Pain Management;  Laterality: Left;  [phone]    INJECTION OF JOINT N/A 2024    Procedure: INJECTION, JOINT BILATERAL PIRIFORMIS AND LEFT GTB;  Surgeon: Abdi Del Toro MD;  Location: [facility] PAIN MGT;  Service: Pain Management;  Laterality: N/A;  [phone]  2 WK F/U [facility]    JOINT REPLACEMENT      Knee    KNEE SURGERY      LAPAROSCOPIC LEFT COLON RESECTION      RADIOFREQUENCY ABLATION Left 2024    Procedure: RADIOFREQUENCY ABLATION LEFT GENICULAR NERVES COOLED *ASPIRIN OTC* HOLD FOR 5 DAYS;  Surgeon: Abdi Del Toro MD;  Location: [facility] PAIN MGT;  Service: Pain Management;  Laterality: Left;  [phone]  4 WK F/U [facility]    TRANSFORAMINAL EPIDURAL INJECTION OF STEROID Left 2023    Procedure: LUMBAR TRANSFORAMINAL LEFT L4/5 AND L5/S1;  Surgeon: Abdi Del Toro MD;  Location: [facility] PAIN MGT;  Service: Pain Management;  Laterality: Left;  [phone]  2 WK F/U [facility]     Social History     Socioeconomic History    Marital status:    Tobacco Use    Smoking status: Former     Current packs/day: 0.00     Average packs/day: 1 pack/day for 51.0 years (51.0 ttl pk-yrs)     Types: Cigarettes     Start date: 1971     Quit date: 2022     Years since quittin.3    Smokeless tobacco: Never    Tobacco comments:      smoking cessation information given    Substance and Sexual Activity    Alcohol use: Yes     Alcohol/week: 1.0 standard drink of alcohol     Types: 1 Shots of liquor per week     Comment: occ    Drug use: Never    Sexual activity: Not Currently     Birth control/protection: Abstinence     Social Drivers of Health     Financial Resource Strain: Low Risk  (6/17/2024)    Overall Financial Resource Strain (CARDIA)     Difficulty of Paying Living Expenses: Not hard at all   Food Insecurity: No Food Insecurity (6/17/2024)    Hunger Vital Sign     Worried About Running Out of Food in the Last Year: Never true     Ran Out of Food in the Last Year: Never true   Transportation Needs: No Transportation Needs (6/7/2023)    PRAPARE - Transportation     Lack of Transportation (Medical): No     Lack of Transportation (Non-Medical): No   Physical Activity: Unknown (6/17/2024)    Exercise Vital Sign     Days of Exercise per Week: 0 days   Stress: Stress Concern Present (6/17/2024)    [employer] [place] [employer] - Occupational Stress Questionnaire     Feeling of Stress : To some extent   Housing Stability: Low Risk  (6/7/2023)    Housing Stability Vital Sign     Unable to Pay for Housing in the Last Year: No     Number of Places Lived in the Last Year: 1     Unstable Housing in the Last Year: No     Family History   Problem Relation Name Age of Onset    Heart attack Mother Dora     Heart disease Mother Dora     Colon cancer Father Milo     Diabetes Father Milo     Cancer Sister Denisha         unknown    Hypothyroidism Sister Denisha     Asthma Sister Denisha     Thyroid cancer Neg Hx         Review of patient's allergies indicates:   Allergen Reactions    Grass pollen-june grass standard     Sulfa (sulfonamide antibiotics) Rash     Occurred when she was pregnant with varicose veins resulting in leg swelling and pain with standing       Current Outpatient Medications   Medication Sig    acetaminophen-codeine 300-30mg  (TYLENOL #3) 300-30 mg Tab Take 1 tablet by mouth every 6 (six) hours as needed.    albuterol (PROVENTIL/VENTOLIN HFA) 90 mcg/actuation inhaler INHALE 2 PUFFS INTO THE LUNGS EVERY 4 (FOUR) HOURS AS NEEDED FOR WHEEZING    albuterol-ipratropium (DUO-NEB) 2.5 mg-0.5 mg/3 mL nebulizer solution Take 3 mLs by nebulization every 6 (six) hours as needed for Wheezing or Shortness of Breath. Rescue    amLODIPine (NORVASC) 5 MG tablet TAKE 1 TABLET EVERY DAY    aspirin (ECOTRIN) 81 MG EC tablet Take 81 mg by mouth once daily.    atorvastatin (LIPITOR) 40 MG tablet Take 1 tablet (40 mg total) by mouth once daily.    azelastine (ASTELIN) 137 mcg (0.1 %) nasal spray USE 1 SPRAY IN EACH NOSTRIL TWICE DAILY    betamethasone valerate 0.1% (VALISONE) 0.1 % Oint Apply topically once daily.    blood sugar diagnostic (TRUE METRIX GLUCOSE TEST STRIP) Strp Use to test blood glucose two (2) times daily; to be used with insurance-preferred brand of glucometer/supplies    blood-glucose meter kit Please provide with insurance covered meter    cholecalciferol, vitamin D3, (VITAMIN D3) 50 mcg (2,000 unit) Cap capsule Take 1 capsule (2,000 Units total) by mouth once daily.    donepeziL (ARICEPT) 5 MG tablet Take 1 tablet (5 mg total) by mouth every evening.    dulaglutide (TRULICITY) 3 mg/0.5 mL pen injector Inject 3 mg into the skin once a week.    DULoxetine (CYMBALTA) 30 MG capsule Take 1 capsule (30 mg total) by mouth once daily.    famotidine (PEPCID) 40 MG tablet Take 40 mg by mouth 2 (two) times daily.    ferrous sulfate 325 (65 FE) MG EC tablet TAKE 1 TABLET EVERY OTHER DAY    fluticasone propionate (FLONASE) 50 mcg/actuation nasal spray 16    fluticasone-salmeterol diskus inhaler 250-50 mcg Inhale 1 puff into the lungs 2 (two) times daily. Controller    gabapentin (NEURONTIN) 300 MG capsule Take 1 capsule (300 mg total) by mouth every evening.    heparin, porcine, PF, (HEPARIN FLUSH 100 UNITS/ML) 100 unit/mL Syrg     hydrOXYzine HCL  (ATARAX) 25 MG tablet Take 1 tablet (25 mg total) by mouth 3 (three) times daily as needed for Itching or Anxiety.    lancets Misc 1 Device by Misc.(Non-Drug; Combo Route) route 2 (two) times daily.    LIDOcaine (LIDODERM) 5 % Place 1 patch onto the skin once daily. Remove & Discard patch within 12 hours or as directed by MD    meloxicam (MOBIC) 15 MG tablet     meloxicam (MOBIC) 7.5 MG tablet Take 1 tablet (7.5 mg total) by mouth daily as needed for Pain.    metFORMIN (GLUCOPHAGE-XR) 500 MG ER 24hr tablet TAKE 2 TABLETS EVERY DAY WITH BREAKFAST    methocarbamoL (ROBAXIN) 750 MG Tab Take 1 tablet (750 mg total) by mouth 3 (three) times daily.    multivitamin capsule Take 1 capsule by mouth once daily.    ondansetron (ZOFRAN) 4 MG tablet TAKE 1 TABLET EVERY 8 HOURS AS NEEDED FOR NAUSEA    raloxifene (EVISTA) 60 mg tablet TAKE 1 TABLET EVERY DAY    traMADoL (ULTRAM-ER) 100 MG Tb24 Take 1 tablet (100 mg total) by mouth once daily.    triamcinolone acetonide 0.5% (KENALOG) 0.5 % Crea Apply topically 2 (two) times daily.    ketoconazole (NIZORAL) 2 % cream Apply topically once daily. for 14 days     No current facility-administered medications for this visit.       REVIEW OF SYSTEMS:    GENERAL:  No weight loss, malaise or fevers.  HEENT:  Negative for frequent or significant headaches.  NECK:  Negative for lumps, goiter, pain and significant neck swelling.  RESPIRATORY:  Negative for cough, wheezing or shortness of breath.  CARDIOVASCULAR:  Negative for chest pain, leg swelling or palpitations.  GI:  Negative for abdominal discomfort, blood in stools or black stools or change in bowel habits.  MUSCULOSKELETAL:  See HPI.  SKIN:  Negative for lesions, rash, and itching.  PSYCH:  +sleep disturbance, depression .  HEMATOLOGY/LYMPHOLOGY:  Negative for prolonged bleeding, bruising easily or swollen nodes.  NEURO:   No history of headaches, syncope, paralysis, seizures or tremors.  All other reviewed and negative other than  "HPI.    OBJECTIVE:    /79   Pulse 95   Temp 98 °F (36.7 °C)   Resp 18   Ht 4' 9" (1.448 m)   Wt 67 kg (147 lb 11.3 oz)   SpO2 100%   BMI 31.96 kg/m²       PHYSICAL EXAMINATION:      GENERAL APPEARANCE: Well appearing, in no acute distress.  PSYCH:  Mood and affect appropriate.  SKIN: Skin color, texture, turgor normal, no rashes or lesions to visible areas.  HEAD/FACE:  Normocephalic, atraumatic.  NECK: No pain to palpation over the cervical paraspinous muscles. Spurling Negative. No pain with neck flexion, extension, or lateral flexion.   CARDIO: Rate regular.  No lower extremity edema. Capillary refill <2 seconds.   PULM: Bilateral chest rise. No apparent respiratory distress.   GI:  Non-distended  BACK: Straight leg raising in the sitting position is negative to radicular pain. No pain to palpation over the spine or costovertebral angles. Limited range of motion with pain on extension and of tightness in bilateral hamstrings with flexion/bending. Positive axial loading test to the left. Positive tenderness over bilateral SIJ left > right. R. OLGA positive to left.  EXTREMITIES: Peripheral joint ROM is full and pain free without obvious instability or laxity in all four extremities. No deformities, edema, or skin discoloration.   MUSCULOSKELETAL: No pain over bilateral piriformis. No pain over bilateral GTB. Left knee: mild TTP over medial joint line and patellofemoral joint.  Full ROM without pain on extension and flexion. No effusion.  Bilateral upper and lower extremity strength is normal and symmetric.  Tenderness over Piriformis muscle  NEURO: Bilateral upper and lower extremity coordination and muscle stretch reflexes are physiologic and symmetric.  Negative Ring's. Plantar response are downgoing. No clonus noted. No loss of sensation is noted.  GAIT: Antalgic- ambulates with straight cane.      ASSESSMENT: 79 y.o. year old female with widespread pain, consistent with the followin. " Primary osteoarthritis of knee, unspecified laterality  Procedure Order to Pain Management      2. Neuropathy due to chemotherapeutic drug  Pain Clinic Drug Screen    traMADoL (ULTRAM-ER) 100 MG Tb24    DISCONTINUED: traMADoL (ULTRAM-ER) 100 MG Tb24    DISCONTINUED: traMADoL (ULTRAM-ER) 100 MG Tb24      3. Pain in other specified joint  Pain Clinic Drug Screen      4. Chronic pain syndrome          Plan:  Diagnostics & Review:  Previous imaging reviewed and discussed with the patient today.  Interventions & Procedures:  Plan for repeat left genicular cooled RFA, as the patient previously experienced 80% relief.  Schedule in-office knee pes anserine bursa and left piriformis injection in one week.  Medications:  Continue Gabapentin 300 mg QHS.  She may take Tylenol up to 1000 mg TID PRN.  Continue Tramadol 100 mg ER q24h PRN for pain relief.  Encourage trial of Meloxicam, given prior benefit.  Monitoring & Compliance:  Order urine drug screen (UDS) and establish a drug contract on file.  Rehabilitation & Lifestyle:  Continue daily home exercise program (HEP).  Follow-Up:  Return to clinic in one week for scheduled in-office injections.    The above plan and management options were discussed at length with patient. Patient is in agreement with the above and verbalized understanding.    Jennifer Yeboah DO   02/04/2025    I spent a total of 30 minutes on the day of the visit.  This includes face to face time and non-face to face time preparing to see the patient by reviewing previous labs/imaging, obtaining and/or reviewing separately obtained history, documenting clinical information in the electronic or other health record, independently interpreting results and communicating results to the patient/family/caregiver.    Abdi Del Toro

## 2025-02-05 ENCOUNTER — TELEPHONE (OUTPATIENT)
Dept: PAIN MEDICINE | Facility: CLINIC | Age: 80
End: 2025-02-05
Payer: MEDICARE

## 2025-02-05 RX ORDER — GABAPENTIN 300 MG/1
300 CAPSULE ORAL NIGHTLY
Qty: 30 CAPSULE | Refills: 4 | Status: SHIPPED | OUTPATIENT
Start: 2025-02-05

## 2025-02-05 RX ORDER — TRAMADOL HYDROCHLORIDE 100 MG/1
100 TABLET, EXTENDED RELEASE ORAL DAILY
Qty: 30 TABLET | Refills: 0 | Status: SHIPPED | OUTPATIENT
Start: 2025-02-05 | End: 2025-02-14 | Stop reason: SDUPTHER

## 2025-02-05 NOTE — TELEPHONE ENCOUNTER
Staff spoke with patient and let her know her provider sent her medication to her pharmacy from yesterday.

## 2025-02-05 NOTE — TELEPHONE ENCOUNTER
"----- Message from Med Assistant Gina sent at 2/5/2025 10:11 AM CST -----  Regarding: FW: Patient Advice    ----- Message -----  From: Becky Scott  Sent: 2/5/2025  10:06 AM CST  To: [facility] Staff  Subject: Patient Advice                                               Name of Who is Calling:  ESTEBAN JIMENEZ    Who Left The Message:  ESTEBAN JIMENEZ      What is the request in detail:      Patient called stating, "she's returning the Office's call and would like you to please call again."    Patient also states,  "she only have 2 tablets left and does not want to be with her medication."   Patient would like to pick-up her medication today because after today she has no one that can pick-up on her medication.     Please further advise.     Thank you      Reply by [facility]: NO      Preferred Call Back :  [phone] (H)  "

## 2025-02-05 NOTE — TELEPHONE ENCOUNTER
----- Message from Becky Scott sent at 2/5/2025  8:15 AM CST -----  Regarding: Medication  Refill  Request                Can the clinic reply in [facility]: NO      Please refill the medication listed below. Please call the patient  [phone] (M      Medication     traMADoL (ULTRAM-ER) 100 MG Tb24    Preferred Pharmacy: [id number] [place] [address] AT [facility] [address]   Phone: [phone]  Fax: [phone]

## 2025-02-05 NOTE — TELEPHONE ENCOUNTER
Staff left a message for a return call back regarding the patients medication from her visit on yesterday.patient provider has not yet signed off on her medication.

## 2025-02-05 NOTE — TELEPHONE ENCOUNTER
----- Message from Med Assistant Fuentes sent at 2/5/2025 10:12 AM CST -----  Regarding: FW: Return Call    ----- Message -----  From: Troy Oh  Sent: 2/5/2025   9:53 AM CST  To: [facility] Staff  Subject: Return Call                                        Who Called:  Patient       Who Left Message for Patient:  Vianey,       Does the patient know what this is regarding?  Returning Call        Best Call Back Number:  [phone]         Additional Information: Patient is returning a call.  Please assist.

## 2025-02-08 LAB
1OH-MIDAZOLAM UR QL SCN: NOT DETECTED
6MAM UR QL: NOT DETECTED
7AMINOCLONAZEPAM UR QL: NOT DETECTED
A-OH ALPRAZ UR QL: NOT DETECTED
ALPRAZ UR QL: NOT DETECTED
AMPHET UR QL SCN: NOT DETECTED
ANNOTATION COMMENT IMP: NORMAL
BARBITURATES UR QL: NEGATIVE
BUPRENORPHINE UR QL: NOT DETECTED
BZE UR QL: NEGATIVE
CARBOXYTHC UR QL: NEGATIVE
CARISOPRODOL UR QL: NEGATIVE
CLONAZEPAM UR QL: NOT DETECTED
CODEINE UR QL: NOT DETECTED
CREAT UR-MCNC: 109 MG/DL (ref 20–400)
DIAZEPAM UR QL: NOT DETECTED
ETHYL GLUCURONIDE UR QL: NEGATIVE
FENTANYL UR QL: NOT DETECTED
GABAPENTIN UR QL CFM: PRESENT
HYDROCODONE UR QL: NOT DETECTED
HYDROMORPHONE UR QL: NOT DETECTED
LORAZEPAM UR QL: NOT DETECTED
MDA UR QL: NOT DETECTED
MDEA UR QL: NOT DETECTED
MDMA UR QL: NOT DETECTED
ME-PHENIDATE UR QL: NOT DETECTED
METHADONE UR QL: NEGATIVE
METHAMPHET UR QL: NOT DETECTED
MIDAZOLAM UR QL SCN: NOT DETECTED
MORPHINE UR QL: NOT DETECTED
NALOXONE UR QL CFM: NOT DETECTED
NORBUPRENORPHINE UR QL CFM: NOT DETECTED
NORDIAZEPAM UR QL: NOT DETECTED
NORFENTANYL UR QL: NOT DETECTED
NORHYDROCODONE UR QL CFM: NOT DETECTED
NORMEPERIDINE UR QL CFM: NOT DETECTED
NOROXYCODONE UR QL CFM: NOT DETECTED
NOROXYMORPHONE UR QL SCN: NOT DETECTED
OXAZEPAM UR QL: NOT DETECTED
OXYCODONE UR QL: NOT DETECTED
OXYMORPHONE UR QL: NOT DETECTED
PATHOLOGY STUDY: NORMAL
PCP UR QL: NEGATIVE
PHENTERMINE UR QL: NOT DETECTED
PREGABALIN UR QL CFM: NOT DETECTED
SERVICE CMNT-IMP: NORMAL
TAPENTADOL UR QL SCN: NOT DETECTED
TAPENTADOL UR QL SCN: NOT DETECTED
TEMAZEPAM UR QL: NOT DETECTED
TRAMADOL UR QL: NORMAL
ZOLPIDEM PHENYL-4-CARB UR QL SCN: NOT DETECTED
ZOLPIDEM UR QL: NOT DETECTED

## 2025-02-14 ENCOUNTER — TELEPHONE (OUTPATIENT)
Dept: PAIN MEDICINE | Facility: CLINIC | Age: 80
End: 2025-02-14
Payer: MEDICARE

## 2025-02-14 DIAGNOSIS — T45.1X5A NEUROPATHY DUE TO CHEMOTHERAPEUTIC DRUG: ICD-10-CM

## 2025-02-14 DIAGNOSIS — G62.0 NEUROPATHY DUE TO CHEMOTHERAPEUTIC DRUG: ICD-10-CM

## 2025-02-14 RX ORDER — TRAMADOL HYDROCHLORIDE 100 MG/1
100 TABLET, EXTENDED RELEASE ORAL DAILY
Qty: 30 TABLET | Refills: 0 | Status: SHIPPED | OUTPATIENT
Start: 2025-02-14

## 2025-02-14 NOTE — TELEPHONE ENCOUNTER
----- Message from Troy sent at 2/14/2025 11:00 AM CST -----  Regarding: Refill Request    Who Called: Patient and Maryjanea Rep Phamalejandrachandrika        New Prescription or Refill : Refill    RX Name and Strength:   traMADoL (ULTRAM-ER) 100 MG Tb24      RX Name and Strength:         RX Name and Strength:      30 day or 90 day RX:     Preferred Pharmacy:[facility] - [facility] [address]    Would the patient rather a call back or a response via [facility]?    Best Call Back Number:   [phone]    Additional Information: Patient asking that her prescription be fax. Patient stated [facility] don't have her medication in stock either do the [facility]

## 2025-02-14 NOTE — TELEPHONE ENCOUNTER
Patient requesting refill on traMADoL (ULTRAM-ER) 100 MG   Last office visit 02/05/25   shows last refill on 11/14/24  Patient does not have a pain contract on file with [facility]  Patient last UDS 02/04/25    CODEINE  Not Detected         Comment: INTERPRETIVE INFORMATION: Codeine, U  Positive Cutoff: 40 ng/mL  Methodology: Mass Spectrometry   MORPHINE  Not Detected         Comment: INTERPRETIVE INFORMATION:Morphine, U  Positive Cutoff: 20 ng/mL  Methodology: Mass Spectrometry   6-ACETYLMORPHINE  Not Detected         Comment: INTERPRETIVE INFORMATION:6-acetylmorphine, U  Positive Cutoff: 20 ng/mL  Methodology: Mass Spectrometry   OXYCODONE  Not Detected         Comment: INTERPRETIVE INFORMATION:Oxycodone, U  Positive Cutoff: 40 ng/mL  Methodology: Mass Spectrometry   NOROYXCODONE  Not Detected         Comment: INTERPRETIVE INFORMATION:Noroxycodone, U  Positive Cutoff: 100 ng/mL  Methodology: Mass Spectrometry   OXYMORPHONE  Not Detected         Comment: INTERPRETIVE INFORMATION:Oxymorphone, U  Positive Cutoff: 40 ng/mL  Methodology: Mass Spectrometry   NOROXYMORPHONE  Not Detected         Comment: INTERPRETIVE INFORMATION:Noroxymorphone, U  Positive Cutoff: 100 ng/mL  Methodology: Mass Spectrometry   HYDROCODONE  Not Detected         Comment: INTERPRETIVE INFORMATION:Hydrocodone, U  Positive Cutoff: 40 ng/mL  Methodology: Mass Spectrometry   NORHYDROCODONE  Not Detected         Comment: INTERPRETIVE INFORMATION:Norhydrocodone, U  Positive Cutoff: 100 ng/mL  Methodology: Mass Spectrometry   HYDROMORPHONE  Not Detected         Comment: INTERPRETIVE INFORMATION:Hydromorphone, U  Positive Cutoff: 20 ng/mL  Methodology: Mass Spectrometry   BUPRENORPHINE  Not Detected         Comment: INTERPRETIVE INFORMATION:Buprenorphine, U  Positive Cutoff: 5 ng/mL  Methodology: Mass Spectrometry   NORUBPRENORPHINE  Not Detected         Comment: INTERPRETIVE INFORMATION:Norbuprenorphine, U  Positive  Cutoff: 20 ng/mL  Methodology: Mass Spectrometry   FENTANYL  Not Detected         Comment: INTERPRETIVE INFORMATION:Fentanyl, U  Positive Cutoff: 2 ng/mL  Methodology: Mass Spectrometry   NORFENTANYL  Not Detected         Comment: INTERPRETIVE INFORMATION:Norfentanyl, U  Positive Cutoff: 2 ng/mL  Methodology: Mass Spectrometry   MEPERIDINE METABOLITE  Not Detected         Comment: INTERPRETIVE INFORMATION:Meperidine metabolite, U  Positive Cutoff: 50 ng/mL  Methodology: Mass Spectrometry   TAPENTADOL  Not Detected         Comment: INTERPRETIVE INFORMATION:Tapentadol, U  Positive Cutoff: 100 ng/mL  Methodology: Mass Spectrometry   TAPENTADOL-O-SULF  Not Detected         Comment: INTERPRETIVE INFORMATION:Tapentadol-o-Sulf, U  Positive Cutoff: 200 ng/mL  Methodology: Mass Spectrometry   METHADONE  Negative         Comment: Presumptive negative by immunoassay. Testing by mass  spectrometry is available on request.  INTERPRETIVE INFORMATION: Methadone Screen, U  Positive Cutoff: 150 ng/mL  Methodology: Immunoassay   TRAMADOL  PresumptivePOS         Comment: Presumptive positive by immunoassay. Testing by mass  spectrometry is available on request.  INTERPRETIVE INFORMATION:Tramadol Screen, U  Positive Cutoff: 100 ng/mL  Methodology: Immunoassay   AMPHETAMINE  Not Detected         Comment: INTERPRETIVE INFORMATION:Amphetamine, U  Positive Cutoff: 50 ng/mL  Methodology: Mass Spectrometry   METHAMPHETAMINE  Not Detected         Comment: INTERPRETIVE INFORMATION:Methamphetamine, U  Positive Cutoff: 200 ng/mL  Methodology: Mass Spectrometry   MDMA- ECSTASY  Not Detected         Comment: INTERPRETIVE INFORMATION:MDMA, U  Positive Cutoff: 200 ng/mL  Methodology: Mass Spectrometry   MDA  Not Detected         Comment: INTERPRETIVE INFORMATION:MDA, U  Positive Cutoff: 200 ng/mL  Methodology: Mass Spectrometry   MDEA- Kay  Not Detected         Comment: INTERPRETIVE INFORMATION:MDEA, U  Positive Cutoff: 200 ng/mL  Methodology: Mass  Spectrometry   METHYLPHENIDATE  Not Detected         Comment: INTERPRETIVE INFORMATION:Methylphenidate, U  Positive Cutoff: 100 ng/mL  Methodology: Mass Spectrometry   PHENTERMINE  Not Detected         Comment: INTERPRETIVE INFORMATION:Phentermine, U  Positive Cutoff: 100 ng/mL  Methodology: Mass Spectrometry   BENZOYLECGONINE  Negative         Comment: Presumptive negative by immunoassay. Testing by mass  spectrometry is available on request.  INTERPRETIVE INFORMATION:Cocaine Screen, U  Positive Cutoff: 150 ng/mL  Methodology: Immunoassay   ALPRAZOLAM  Not Detected         Comment: INTERPRETIVE INFORMATION:Alprazolam, U  Positive Cutoff: 40 ng/mL  Methodology: Mass Spectrometry   ALPHA-OH-ALPRAZOLAM  Not Detected         Comment: INTERPRETIVE INFORMATION:Alpha-OH-Alprazolam, U  Positive Cutoff: 20 ng/mL  Methodology: Mass Spectrometry   CLONAZEPAM  Not Detected         Comment: INTERPRETIVE INFORMATION:Clonazepam, U  Positive Cutoff: 20 ng/mL  Methodology: Mass Spectrometry   7-AMINOCLONAZEPAM  Not Detected         Comment: INTERPRETIVE INFORMATION:7-Aminoclonazepam, U  Positive Cutoff: 40 ng/mL  Methodology: Mass Spectrometry   DIAZEPAM  Not Detected         Comment: INTERPRETIVE INFORMATION:Diazepam, U  Positive Cutoff: 50 ng/mL  Methodology: Mass Spectrometry   NORDIAZEPAM  Not Detected         Comment: INTERPRETIVE INFORMATION:Nordiazepam, U  Positive Cutoff: 50 ng/mL  Methodology: Mass Spectrometry   OXAZEPAM  Not Detected         Comment: INTERPRETIVE INFORMATION:Oxazepam, U  Positive Cutoff: 50 ng/mL  Methodology: Mass Spectrometry   TEMAZEPAM  Not Detected         Comment: INTERPRETIVE INFORMATION:Temazepam, U  Positive Cutoff: 50 ng/mL  Methodology: Mass Spectrometry   Lorazepam  Not Detected         Comment: INTERPRETIVE INFORMATION:Lorazepam, U  Positive Cutoff: 60 ng/mL  Methodology: Mass Spectrometry   MIDAZOLAM  Not Detected         Comment: INTERPRETIVE INFORMATION:Midazolam, U  Positive Cutoff: 20  ng/mL  Methodology: Mass Spectrometry   ZOLPIDEM  Not Detected         Comment: INTERPRETIVE INFORMATION:Zolpidem, U  Positive Cutoff: 20 ng/mL  Methodology: Mass Spectrometry   BARBITURATES  Negative         Comment: Presumptive negative by immunoassay. Testing by mass  spectrometry is available on request.  INTERPRETIVE INFORMATION:Barbiturates Screen, U  Positive Cutoff: 200 ng/mL  Methodology: Immunoassay   Creatinine, Urine 20.0 - 400.0 mg/dL 109.0 385.4 High  R 285.2 R 172.0 R 152.8 R 230.7 R 206.2 R   ETHYL GLUCURONIDE  Negative         Comment: Presumptive negative by immunoassay. Testing by mass  spectrometry is available on request.  INTERPRETIVE INFORMATION:Ethyl Glucuronide Screen, U  Positive Cutoff: 500 ng/mL  Methodology: Immunoassay   MARIJUANA METABOLITE  Negative         Comment: Presumptive negative by immunoassay. Testing by mass  spectrometry is available on request.  INTERPRETIVE INFORMATION: THC (Cannabinoids) Screen, U  Positive Cutoff: 50 ng/mL  Methodology: Immunoassay   PCP  Negative         Comment: Presumptive negative by immunoassay. Testing by mass  spectrometry is available on request.  INTERPRETIVE INFORMATION:Phencyclidine Screen, U  Positive Cutoff: 25 ng/mL  Methodology: Immunoassay   CARISOPRODOL  Negative         Comment: Presumptive negative by immunoassay. Testing by mass  spectrometry is available on request.  INTERPRETIVE INFORMATION: Carisoprodol Screen, U  Positive Cutoff: 100 ng/mL  Methodology: Immunoassay  The carisoprodol immunoassay has cross-reactivity to  carisoprodol and meprobamate.   Naloxone  Not Detected         Comment: INTERPRETIVE INFORMATION:Naloxone, U  Positive Cutoff: 100 ng/mL  Methodology: Mass Spectrometry   Gabapentin  Present         Comment: INTERPRETIVE INFORMATION:Gabapentin, U  Positive Cutoff: 3,000 ng/mL  Methodology: Mass Spectrometry   Pregabalin  Not Detected         Comment: INTERPRETIVE INFORMATION:Pregabalin, U  Positive Cutoff: 3,000  ng/mL  Methodology: Mass Spectrometry   Alpha-OH-Midazolam  Not Detected         Comment: INTERPRETIVE INFORMATION:Alpha-OH-Midazolam, U  Positive Cutoff: 20 ng/mL  Methodology: Mass Spectrometry   Zolpidem Metabolite  Not Detected         Comment: INTERPRETIVE INFORMATION:Zolpidem Metabolite, U  Positive Cutoff: 100 ng/mL  Methodology: Mass Spectrometry   PAIN MANAGEMENT DRUG PANEL  See Below         Comment: Methodology: Qualitative Enzyme Immunoassay and Qualitative  Liquid Chromatography-Tandem Mass Spectrometry,  Quantitative Spectrophotometry  The absence of expected drug(s) and/or drug metabolite(s)  may indicate non-compliance, inappropriate timing of  specimen collection relative to drug administration, poor  drug absorption, diluted/adulterated urine, or limitations  of testing. The concentration must be greater than or equal  to the cutoff to be reported as present.  If specific drug  concentrations are required, contact the laboratory within  two weeks of specimen collection to request quantification  by a second analytical technique. Interpretive questions  should be directed to the laboratory.  Results based on immunoassay detection that do not match  clinical expectations should be  interpreted with caution. Confirmatory testing by mass  spectrometry for immunoassay-based results is available, if  ordered within two weeks of specimen collection. Additional  charges apply.  For medical purposes only; not valid for forensic use.  This test was developed and its performance characteristics  determined by [facility]. It has not been cleared or  approved by the US Food and Drug Administration. This test  was performed in a CLIA certified laboratory and is  intended for clinical purposes.   EER PAIN MGT FRANCISCO,HIGH RES/EMIT, INTERP  See Note         Comment: Authorized individuals can access the [facility] Enhanced Report  with an [facility] Connect account using the following link.    Your local lab can assist you  in obtaining the patient  report if you don't have a Connect account.    [email or web address]  Performed By: [facility]  [address] [place] [id number]  : Kain Taylor MD, PhD  CLIA Number: [id number]   Resulting Agency  [facility]

## 2025-02-14 NOTE — TELEPHONE ENCOUNTER
----- Message from Troy sent at 2/14/2025 11:00 AM CST -----  Regarding: Refill Request    Who Called: Patient and Maryjanea Rep Phamalejandrachandrika        New Prescription or Refill : Refill    RX Name and Strength:   traMADoL (ULTRAM-ER) 100 MG Tb24      RX Name and Strength:         RX Name and Strength:      30 day or 90 day RX:     Preferred Pharmacy:[facility] - [facility] [address]    Would the patient rather a call back or a response via [facility]?    Best Call Back Number:   [phone]    Additional Information: Patient asking that her prescription be fax. Patient stated [facility] don't have her medication in stock either do the [facility]

## 2025-02-18 ENCOUNTER — TELEPHONE (OUTPATIENT)
Dept: INTERNAL MEDICINE | Facility: CLINIC | Age: 80
End: 2025-02-18
Payer: MEDICARE

## 2025-02-18 NOTE — TELEPHONE ENCOUNTER
Called pt and she would like an appt on the 25th of Feb because she will already be here. Pt is seeing Celso Nunez for URI symptoms.

## 2025-02-18 NOTE — TELEPHONE ENCOUNTER
----- Message from Richard sent at 2/18/2025 11:19 AM CST -----  Regarding: self  Type: Sooner Appointment RequestPatient is requesting a sooner appointment. Patient declined first available appointment listed as well as another facility and provider. Patient will not accept being placed on the waitlist and is requesting a message to be sent to the doctor.Name of caller:selfWhne is the first available appointment:April Symptoms:sickWould the patient rather a call back or a response via [facility]:[phone] Information:

## 2025-02-25 ENCOUNTER — PROCEDURE VISIT (OUTPATIENT)
Dept: PAIN MEDICINE | Facility: CLINIC | Age: 80
End: 2025-02-25
Payer: MEDICARE

## 2025-02-25 ENCOUNTER — OFFICE VISIT (OUTPATIENT)
Dept: INTERNAL MEDICINE | Facility: CLINIC | Age: 80
End: 2025-02-25
Payer: MEDICARE

## 2025-02-25 VITALS
HEIGHT: 57 IN | DIASTOLIC BLOOD PRESSURE: 62 MMHG | WEIGHT: 156.94 LBS | TEMPERATURE: 99 F | HEART RATE: 97 BPM | SYSTOLIC BLOOD PRESSURE: 94 MMHG | OXYGEN SATURATION: 99 % | BODY MASS INDEX: 33.86 KG/M2

## 2025-02-25 VITALS
RESPIRATION RATE: 18 BRPM | WEIGHT: 156.5 LBS | SYSTOLIC BLOOD PRESSURE: 140 MMHG | HEIGHT: 57 IN | BODY MASS INDEX: 33.76 KG/M2 | OXYGEN SATURATION: 100 % | HEART RATE: 88 BPM | DIASTOLIC BLOOD PRESSURE: 87 MMHG | TEMPERATURE: 97 F

## 2025-02-25 DIAGNOSIS — R05.1 ACUTE COUGH: Primary | ICD-10-CM

## 2025-02-25 DIAGNOSIS — M70.50 PES ANSERINE BURSITIS: ICD-10-CM

## 2025-02-25 DIAGNOSIS — G57.03 PIRIFORMIS SYNDROME OF BOTH SIDES: Primary | ICD-10-CM

## 2025-02-25 DIAGNOSIS — C18.2 MALIGNANT NEOPLASM OF ASCENDING COLON: ICD-10-CM

## 2025-02-25 DIAGNOSIS — M79.18 MYOFASCIAL PAIN: ICD-10-CM

## 2025-02-25 DIAGNOSIS — J43.2 CENTRILOBULAR EMPHYSEMA: ICD-10-CM

## 2025-02-25 PROCEDURE — 3078F DIAST BP <80 MM HG: CPT | Mod: HCNC,CPTII,S$GLB, | Performed by: COUNSELOR

## 2025-02-25 PROCEDURE — 3288F FALL RISK ASSESSMENT DOCD: CPT | Mod: HCNC,CPTII,S$GLB, | Performed by: COUNSELOR

## 2025-02-25 PROCEDURE — 76942 ECHO GUIDE FOR BIOPSY: CPT | Mod: HCNC,59,S$GLB, | Performed by: ANESTHESIOLOGY

## 2025-02-25 PROCEDURE — 20610 DRAIN/INJ JOINT/BURSA W/O US: CPT | Mod: HCNC,LT,S$GLB, | Performed by: ANESTHESIOLOGY

## 2025-02-25 PROCEDURE — 99999 PR PBB SHADOW E&M-EST. PATIENT-LVL V: CPT | Mod: PBBFAC,HCNC,, | Performed by: COUNSELOR

## 2025-02-25 PROCEDURE — 87635 SARS-COV-2 COVID-19 AMP PRB: CPT | Mod: QW,HCNC,S$GLB, | Performed by: COUNSELOR

## 2025-02-25 PROCEDURE — 3074F SYST BP LT 130 MM HG: CPT | Mod: HCNC,CPTII,S$GLB, | Performed by: COUNSELOR

## 2025-02-25 PROCEDURE — G2211 COMPLEX E/M VISIT ADD ON: HCPCS | Mod: HCNC,S$GLB,, | Performed by: COUNSELOR

## 2025-02-25 PROCEDURE — 87502 INFLUENZA DNA AMP PROBE: CPT | Mod: QW,HCNC,S$GLB, | Performed by: COUNSELOR

## 2025-02-25 PROCEDURE — 1101F PT FALLS ASSESS-DOCD LE1/YR: CPT | Mod: HCNC,CPTII,S$GLB, | Performed by: COUNSELOR

## 2025-02-25 PROCEDURE — 3072F LOW RISK FOR RETINOPATHY: CPT | Mod: HCNC,CPTII,S$GLB, | Performed by: COUNSELOR

## 2025-02-25 PROCEDURE — 1125F AMNT PAIN NOTED PAIN PRSNT: CPT | Mod: HCNC,CPTII,S$GLB, | Performed by: COUNSELOR

## 2025-02-25 PROCEDURE — 1159F MED LIST DOCD IN RCRD: CPT | Mod: HCNC,CPTII,S$GLB, | Performed by: COUNSELOR

## 2025-02-25 PROCEDURE — 99214 OFFICE O/P EST MOD 30 MIN: CPT | Mod: HCNC,S$GLB,, | Performed by: COUNSELOR

## 2025-02-25 PROCEDURE — 20552 NJX 1/MLT TRIGGER POINT 1/2: CPT | Mod: HCNC,59,S$GLB, | Performed by: ANESTHESIOLOGY

## 2025-02-25 RX ORDER — PROMETHAZINE HYDROCHLORIDE AND DEXTROMETHORPHAN HYDROBROMIDE 6.25; 15 MG/5ML; MG/5ML
5 SYRUP ORAL
Qty: 180 ML | Refills: 0 | Status: SHIPPED | OUTPATIENT
Start: 2025-02-25 | End: 2025-03-07

## 2025-02-25 RX ORDER — BENZONATATE 200 MG/1
200 CAPSULE ORAL 3 TIMES DAILY PRN
Qty: 30 CAPSULE | Refills: 0 | Status: SHIPPED | OUTPATIENT
Start: 2025-02-25 | End: 2025-03-07

## 2025-02-25 RX ADMIN — TRIAMCINOLONE ACETONIDE 40 MG: 40 INJECTION, SUSPENSION INTRA-ARTICULAR; INTRAMUSCULAR at 12:02

## 2025-02-25 NOTE — PROGRESS NOTES
Subjective:       Patient ID: Marizol Kevin is a 79 y.o. female with history ascending colon cancer s/p chemo and R hemicolectomy, T2DM with CKD 3a, osteoporosis, GERD, hep B, insomnia, hx tobacco use, thyroid nodules, HTN, HLD    Chief Complaint: Acute cough [R05.1]    Patient is new to me, PCP is Dr. Butterfield. Here today for the following:    History of Present Illness    CHIEF COMPLAINT:  Patient presents today for cough with thick white and yellow sputum production.    HISTORY OF PRESENT ILLNESS:  She reports cold symptoms began approximately one week ago. She experiences thick sputum production that forms pebble-like formations, requiring active expulsion particularly at night. She reports chest pain described as knife-like when coughing. She had a sore throat prior to current symptoms. She experiences significant weakness, fatigue with near-syncopal episodes, and shortness of breath. She reports generalized body aches and episodes of internal burning sensation without fever. She notes diarrhea but is uncertain if related to diet or history of colon cancer. She has been able to maintain oral intake.     MEDICAL HISTORY:  She has a history of colon cancer. She has had minor centrilobular and paraseptal emphysema found on low dose CT for smoking history. PFT did not show obstructive lung disease. Pulmonology removed diagnosis of COPD and diagnosed with mild intermittent asthma, placing her on triple therapy. She reports today using ICS-LABA disk inhaler and albuterol as needed. However, albuterol is not well tolerated by her causing increased heart rate and can disrupt breathing.     MEDICATIONS:  She takes Amlodipine for blood pressure management with reported effectiveness. She has Mucinex available at home and uses Zofran as needed for nausea.    SOCIAL HISTORY:  She quit smoking 2 years ago after a 40+ year history of tobacco use. She denies current wheezing or significant breathing  difficulties.    ROS:  General: -fever, -chills, +fatigue, -weight gain, -weight loss  Eyes: -vision changes, -redness, -discharge  ENT: -ear pain, -nasal congestion, +sore throat  Cardiovascular: +chest pain, -palpitations, -lower extremity edema  Respiratory: +cough, +shortness of breath, -difficulty breathing  Gastrointestinal: -abdominal pain, -nausea, -vomiting, +diarrhea, -constipation, -blood in stool  Genitourinary: -dysuria, -hematuria, -frequency  Musculoskeletal: -joint pain, -muscle pain  Skin: -rash, -lesion  Neurological: -headache, -dizziness, -numbness, -tingling, +weakness  Psychiatric: -anxiety, -depression, -sleep difficulty          Current Outpatient Medications   Medication Instructions    acetaminophen-codeine 300-30mg (TYLENOL #3) 300-30 mg Tab 1 tablet, Every 6 hours PRN    albuterol (PROVENTIL/VENTOLIN HFA) 90 mcg/actuation inhaler 2 puffs, Inhalation, Every 4 hours PRN    albuterol-ipratropium (DUO-NEB) 2.5 mg-0.5 mg/3 mL nebulizer solution 3 mLs, Nebulization, Every 6 hours PRN, Rescue    amLODIPine (NORVASC) 5 MG tablet TAKE 1 TABLET EVERY DAY    aspirin (ECOTRIN) 81 mg, Daily    atorvastatin (LIPITOR) 40 mg, Oral, Daily    azelastine (ASTELIN) 137 mcg, 2 times daily    benzonatate (TESSALON) 200 mg, Oral, 3 times daily PRN    betamethasone valerate 0.1% (VALISONE) 0.1 % Oint Topical (Top), Daily    blood sugar diagnostic (TRUE METRIX GLUCOSE TEST STRIP) Strp Use to test blood glucose two (2) times daily; to be used with insurance-preferred brand of glucometer/supplies    blood-glucose meter kit Please provide with insurance covered meter    cholecalciferol (vitamin D3) (VITAMIN D3) 2,000 Units, Oral, Daily    donepeziL (ARICEPT) 5 mg, Oral, Nightly    DULoxetine (CYMBALTA) 30 mg, Oral, Daily    famotidine (PEPCID) 40 mg, 2 times daily    ferrous sulfate 325 (65 FE) MG EC tablet Oral, Every other day    fluticasone propionate (FLONASE) 50 mcg/actuation nasal spray 16     "fluticasone-salmeterol diskus inhaler 250-50 mcg 1 puff, Inhalation, 2 times daily, Controller    gabapentin (NEURONTIN) 300 mg, Oral, Nightly    heparin, porcine, PF, (HEPARIN FLUSH 100 UNITS/ML) 100 unit/mL Syrg No dose, route, or frequency recorded.    hydrOXYzine HCL (ATARAX) 25 mg, Oral, 3 times daily PRN    ketoconazole (NIZORAL) 2 % cream Topical (Top), Daily    lancets Misc 1 Device, Misc.(Non-Drug; Combo Route), 2 times daily    LIDOcaine (LIDODERM) 5 % 1 patch, Transdermal, Daily, Remove & Discard patch within 12 hours or as directed by MD    meloxicam (MOBIC) 15 MG tablet     metFORMIN (GLUCOPHAGE-XR) 500 MG ER 24hr tablet TAKE 2 TABLETS EVERY DAY WITH BREAKFAST    methocarbamoL (ROBAXIN) 750 mg, Oral, 3 times daily    multivitamin capsule 1 capsule, Daily    ondansetron (ZOFRAN) 4 mg, Oral, Every 8 hours PRN    promethazine-dextromethorphan (PROMETHAZINE-DM) 6.25-15 mg/5 mL Syrp 5 mLs, Oral, Every 4-6 hours PRN    raloxifene (EVISTA) 60 mg tablet TAKE 1 TABLET EVERY DAY    traMADoL (ULTRAM-ER) 100 mg, Oral, Daily    triamcinolone acetonide 0.5% (KENALOG) 0.5 % Crea Topical (Top), 2 times daily    TRULICITY 3 mg, Subcutaneous, Weekly     Objective:      Vitals:    02/25/25 1345   BP: 94/62   Pulse: 97   Temp: 98.5 °F (36.9 °C)   SpO2: 99%   Weight: 71.2 kg (156 lb 15.5 oz)   Height: 4' 9" (1.448 m)   PainSc: 10-Worst pain ever   PainLoc: Generalized     Body mass index is 33.97 kg/m².    Physical Exam  Vitals reviewed.   Constitutional:       General: She is not in acute distress.     Appearance: Normal appearance. She is not ill-appearing, toxic-appearing or diaphoretic.      Comments: Patient appears fatigued.   HENT:      Head: Normocephalic and atraumatic.      Comments: Slight pain with pressure over maxillary sinuses, nontender over frontal sinuses.      Right Ear: Tympanic membrane, ear canal and external ear normal. There is no impacted cerumen.      Left Ear: Tympanic membrane, ear canal and " external ear normal. There is no impacted cerumen.      Nose: Congestion present.      Mouth/Throat:      Mouth: Mucous membranes are moist.      Pharynx: Oropharynx is clear. No oropharyngeal exudate.      Comments: Minor erythema noted in posterior throat.   Eyes:      General:         Right eye: No discharge.         Left eye: No discharge.      Extraocular Movements: Extraocular movements intact.      Conjunctiva/sclera: Conjunctivae normal.   Cardiovascular:      Rate and Rhythm: Normal rate and regular rhythm.      Pulses: Normal pulses.      Heart sounds: Normal heart sounds. No murmur heard.     No friction rub. No gallop.   Pulmonary:      Effort: Pulmonary effort is normal. No respiratory distress.      Breath sounds: Normal breath sounds. No stridor. No wheezing, rhonchi or rales.   Chest:      Chest wall: No tenderness.   Musculoskeletal:      Right lower leg: No edema.      Left lower leg: No edema.   Skin:     General: Skin is warm and dry.      Capillary Refill: Capillary refill takes less than 2 seconds.   Neurological:      General: No focal deficit present.      Mental Status: She is alert and oriented to person, place, and time. Mental status is at baseline.   Psychiatric:         Mood and Affect: Mood normal.         Behavior: Behavior normal.         Thought Content: Thought content normal.         Judgment: Judgment normal.         Assessment:       1. Acute cough    2. Painful diabetic neuropathy    3. Severe obesity (BMI 35.0-39.9) with comorbidity    4. Malignant neoplasm of ascending colon    5. Chronic viral hepatitis B without delta agent and without coma    6. Stage 3a chronic kidney disease    7. Centrilobular emphysema        IMPRESSION:  - Assessed symptoms as likely viral infection based on clinical presentation and absence of fever  - Considered unclear COPD history and potential need for more aggressive treatment  - Ordered COVID and flu tests to rule out these infections  - Will  initially pursue conservative management with symptomatic treatment    Plan:   Acute cough  - Auscultated patient's lungs, finding them clear with no fluid or signs of infection. Likely viral etiology, low suspicion for PE, pneumonia.   - Instructed patient to use nebulizer or albuterol inhaler for acute dyspnea and continue using inhalers as usual.  - Prescribed Mucinex, Tessalon Perles, and Promethazine for symptom management.  - Flu and COVID negative in office.   -     POCT COVID-19 Rapid Screening  -     POCT Influenza A/B Molecular  -     promethazine-dextromethorphan (PROMETHAZINE-DM) 6.25-15 mg/5 mL Syrp; Take 5 mLs by mouth every 4 to 6 hours as needed.  Dispense: 180 mL; Refill: 0  -     benzonatate (TESSALON) 200 MG capsule; Take 1 capsule (200 mg total) by mouth 3 (three) times daily as needed.  Dispense: 30 capsule; Refill: 0    Malignant neoplasm of ascending colon  - Noted the patient's history of colon cancer.  - Patient to continue current regimen. Aware to alert clinic of any changes from baseline.     Centrilobular emphysema  Seen on previous CT scan, follow up PFT showing no obstructive lung disease.   Noted this during today's visit and had patient centered discussion about current treatment regimen. Patient expresses understanding and agreeable to contact clinic if symptoms worsen or do not improve for further evaluation or treatment.   Noted improvement in wheezing and breathing difficulties since patient quit smoking 2 years ago.    OTHER INSTRUCTIONS:  - Discussed importance of adequate hydration and sleep for recovery.  - Patient to increase fluid intake, especially water, and prioritize adequate sleep while recovering.  - Advised to avoid Zofran while using Promethazine.    FOLLOW UP:  - Patient to notify clinic if symptoms do not improve by the end of the week.         This note was generated with the assistance of ambient listening technology. Verbal consent was obtained by the patient  and accompanying visitor(s) for the recording of patient appointment to facilitate this note. I attest to having reviewed and edited the generated note for accuracy, though some syntax or spelling errors may persist. Please contact the author of this note for any clarification.    Enrique Adorno PA-C    2/25/2025

## 2025-02-25 NOTE — TELEPHONE ENCOUNTER
Voicemail not setup   29 year old male p/w quarter-sized area of redness below his left eye.  No pain, itching, or associated ocular issues.  He then noted swelling below his b/l eyes.   No SOB, no tongue/lip swelling.  On exam, patient with mild erythema to upper chest and back, no hives.  Unclear etiology.  Treat with Benadryl, Pepcid, Steroids PO, observe, allergy follow up

## 2025-02-25 NOTE — PROCEDURES
"PIRIFORMIS INJECTION PROCEDURE NOTE      Patient Name: Marizol Kevin  MRN: [id number]    INFORMED CONSENT: The procedure, risks, benefits and options were discussed with patient. There are no contraindications to the procedure. The patient expressed understanding and agreed to proceed. The personnel performing the procedure was discussed. I verify that I personally obtained Marizol's consent prior to the start of the procedure and the signed consent can be found on the patient's chart.    Procedure Date: 02/25/2025      Pre Procedure diagnosis:   1. Piriformis syndrome of both sides        2. Myofascial pain        3. Pes anserine bursitis              Post-Procedure diagnosis: same      Sedation: None    PROCEDURE: LEFT Piriformis injection under ultrasound guidance      DESCRIPTION OF PROCEDURE: The patient was brought to the procedure room. . The patient was positioned prone on the exam table . . The skin overlying the LEFT piriformis and gluteal region was prepped with chlorhexidene and draped in a sterile fashion.  A  26 gauge 3.5" spinal needle was slowly advanced under direct ultrasound guidance into piriformis muscle--extreme care was taken to avoid contact with the sciatic nerve. When the needle tip is clearly visualized in the piriformis muscle, Negative aspiration was confirmed. A combination of 4.5 cc of Bupivacaine 0.25% and 0.5 ml of 40 mg/1ml kenalog was easily injected. The needle was removed and bleeding was nil. A sterile dressing was applied.    Blood Loss: Nill  Specimen: None    Simon Chávez MD        PES ANSERINE BURSA INJECTION PROCEDURE NOTE    Patient Name: Marizol Kevin  MRN: [id number]    INFORMED CONSENT: The procedure, risks, benefits and options were discussed with patient. There are no contraindications to the procedure. The patient expressed understanding and agreed to proceed. The personnel performing the procedure was discussed. I verify that I personally obtained Marizol's " consent prior to the start of the procedure and the signed consent can be found on the patient's chart.    Procedure Date: 02/25/2025    Anesthesia: Topical    Pre Procedure diagnosis:   1. Piriformis syndrome of both sides    2. Myofascial pain    3. Pes anserine bursitis      Post-Procedure diagnosis: same      Sedation: None    PROCEDURE: LEFT KNEE Pes Anserine Bursa INJECTION     Patient in the seated position with right knee bending 90 degrees, the area of theLEFT  knee was prepped with chlorhexidine .  After performing time out and palpating the area of maximal tenderness along the inferomedial aspect of the left knee, A 27 Gauge 1.5 inch needle was slowly advance until contact was made with periosteum. The needlewas then slightly withdrawn to the pes bursa location.. After negative aspiration a 3 cc mixture containing 2.5 ml of bupivicaine 0.25% and 0.5 ml of 40 mg/ml kenalog was injected in the Knee joint.      No complications. Patient tolerated procedure well.    Blood Loss: Nill  Specimen: None    Simon Chávez MD      I spent a total of 30 minutes on the day of the visit.  This includes face to face time and non-face to face time preparing to see the patient by reviewing previous labs/imaging, obtaining and/or reviewing separately obtained history, documenting clinical information in the electronic or other health record, independently interpreting results and communicating results to the patient/family/caregiver.    Abdi Del Toro

## 2025-02-26 RX ORDER — TRIAMCINOLONE ACETONIDE 40 MG/ML
40 INJECTION, SUSPENSION INTRA-ARTICULAR; INTRAMUSCULAR
Status: COMPLETED | OUTPATIENT
Start: 2025-02-26 | End: 2025-02-25

## 2025-03-06 ENCOUNTER — PATIENT MESSAGE (OUTPATIENT)
Dept: ADMINISTRATIVE | Facility: OTHER | Age: 80
End: 2025-03-06
Payer: MEDICARE

## 2025-03-13 ENCOUNTER — HOSPITAL ENCOUNTER (OUTPATIENT)
Facility: OTHER | Age: 80
Discharge: HOME | End: 2025-03-13
Attending: ANESTHESIOLOGY | Admitting: ANESTHESIOLOGY
Payer: MEDICARE

## 2025-03-13 VITALS
WEIGHT: 155 LBS | HEART RATE: 83 BPM | OXYGEN SATURATION: 96 % | HEIGHT: 57 IN | RESPIRATION RATE: 18 BRPM | DIASTOLIC BLOOD PRESSURE: 107 MMHG | SYSTOLIC BLOOD PRESSURE: 161 MMHG | TEMPERATURE: 99 F | BODY MASS INDEX: 33.44 KG/M2

## 2025-03-13 DIAGNOSIS — G89.29 CHRONIC PAIN: ICD-10-CM

## 2025-03-13 PROCEDURE — 99152 MOD SED SAME PHYS/QHP 5/>YRS: CPT | Mod: HCNC,,, | Performed by: ANESTHESIOLOGY

## 2025-03-13 PROCEDURE — 99152 MOD SED SAME PHYS/QHP 5/>YRS: CPT | Mod: HCNC | Performed by: ANESTHESIOLOGY

## 2025-03-13 PROCEDURE — A4649 SURGICAL SUPPLIES: HCPCS | Mod: HCNC | Performed by: ANESTHESIOLOGY

## 2025-03-13 PROCEDURE — 63600175 PHARM REV CODE 636 W HCPCS: Mod: HCNC | Performed by: ANESTHESIOLOGY

## 2025-03-13 PROCEDURE — 64624 DSTRJ NULYT AGT GNCLR NRV: CPT | Mod: HCNC,LT | Performed by: ANESTHESIOLOGY

## 2025-03-13 PROCEDURE — 64624 DSTRJ NULYT AGT GNCLR NRV: CPT | Mod: HCNC,LT,, | Performed by: ANESTHESIOLOGY

## 2025-03-13 RX ORDER — LIDOCAINE HYDROCHLORIDE 20 MG/ML
INJECTION, SOLUTION INFILTRATION; PERINEURAL
Status: DISCONTINUED | OUTPATIENT
Start: 2025-03-13 | End: 2025-03-13 | Stop reason: HOSPADM

## 2025-03-13 RX ORDER — TRIAMCINOLONE ACETONIDE 40 MG/ML
INJECTION, SUSPENSION INTRA-ARTICULAR; INTRAMUSCULAR
Status: DISCONTINUED | OUTPATIENT
Start: 2025-03-13 | End: 2025-03-13 | Stop reason: HOSPADM

## 2025-03-13 RX ORDER — FENTANYL CITRATE 50 UG/ML
INJECTION, SOLUTION INTRAMUSCULAR; INTRAVENOUS
Status: DISCONTINUED | OUTPATIENT
Start: 2025-03-13 | End: 2025-03-13 | Stop reason: HOSPADM

## 2025-03-13 RX ORDER — BUPIVACAINE HYDROCHLORIDE 2.5 MG/ML
INJECTION, SOLUTION EPIDURAL; INFILTRATION; INTRACAUDAL; PERINEURAL
Status: DISCONTINUED | OUTPATIENT
Start: 2025-03-13 | End: 2025-03-13 | Stop reason: HOSPADM

## 2025-03-13 RX ORDER — MIDAZOLAM HYDROCHLORIDE 1 MG/ML
INJECTION INTRAMUSCULAR; INTRAVENOUS
Status: DISCONTINUED | OUTPATIENT
Start: 2025-03-13 | End: 2025-03-13 | Stop reason: HOSPADM

## 2025-03-13 RX ORDER — SODIUM CHLORIDE 9 MG/ML
INJECTION, SOLUTION INTRAVENOUS CONTINUOUS
Status: DISCONTINUED | OUTPATIENT
Start: 2025-03-13 | End: 2025-03-13 | Stop reason: HOSPADM

## 2025-03-13 NOTE — DISCHARGE SUMMARY
Discharge Note  Short Stay      SUMMARY     Admit Date: 3/13/2025    Attending Physician: Abdi Del Toro MD    Discharge Physician: Abdi Del Toro MD      Discharge Date: 3/13/2025 2:16 PM    Procedure(s) (LRB):  RADIOFREQUENCY ABLATION LEFT GENICULAR COOLED *ASPIRIN OTC* HOLD 5 DAYS (Left)    Final Diagnosis: Primary osteoarthritis of knee, unspecified laterality [M17.10]    Disposition: Home or self care    Patient Instructions:   Current Discharge Medication List        CONTINUE these medications which have NOT CHANGED    Details   acetaminophen-codeine 300-30mg (TYLENOL #3) 300-30 mg Tab Take 1 tablet by mouth every 6 (six) hours as needed.      albuterol (PROVENTIL/VENTOLIN HFA) 90 mcg/actuation inhaler INHALE 2 PUFFS INTO THE LUNGS EVERY 4 (FOUR) HOURS AS NEEDED FOR WHEEZING  Qty: 18 g, Refills: 3    Associated Diagnoses: Mild intermittent asthma without complication      albuterol-ipratropium (DUO-NEB) 2.5 mg-0.5 mg/3 mL nebulizer solution Take 3 mLs by nebulization every 6 (six) hours as needed for Wheezing or Shortness of Breath. Rescue  Qty: 15 mL, Refills: 5    Associated Diagnoses: COPD exacerbation      amLODIPine (NORVASC) 5 MG tablet TAKE 1 TABLET EVERY DAY  Qty: 90 tablet, Refills: 3    Comments: .  Associated Diagnoses: Essential hypertension      aspirin (ECOTRIN) 81 MG EC tablet Take 81 mg by mouth once daily.      atorvastatin (LIPITOR) 40 MG tablet Take 1 tablet (40 mg total) by mouth once daily.  Qty: 90 tablet, Refills: 3    Associated Diagnoses: Type 2 diabetes mellitus without complication, without long-term current use of insulin      azelastine (ASTELIN) 137 mcg (0.1 %) nasal spray USE 1 SPRAY IN EACH NOSTRIL TWICE DAILY  Qty: 60 mL, Refills: 3    Associated Diagnoses: Allergic rhinitis, unspecified seasonality, unspecified trigger      betamethasone valerate 0.1% (VALISONE) 0.1 % Oint Apply topically once daily.  Qty: 15 g, Refills: 1    Associated Diagnoses: Genital ulcer, female       blood sugar diagnostic (TRUE METRIX GLUCOSE TEST STRIP) Strp Use to test blood glucose two (2) times daily; to be used with insurance-preferred brand of glucometer/supplies  Qty: 200 strip, Refills: 3    Associated Diagnoses: Type 2 diabetes mellitus with other specified complication, unspecified whether long term insulin use      blood-glucose meter kit Please provide with insurance covered meter  Qty: 1 each, Refills: 0    Associated Diagnoses: Type 2 diabetes mellitus with other specified complication, unspecified whether long term insulin use      cholecalciferol, vitamin D3, (VITAMIN D3) 50 mcg (2,000 unit) Cap capsule Take 1 capsule (2,000 Units total) by mouth once daily.  Qty: 90 capsule, Refills: 3    Associated Diagnoses: Fatigue, unspecified type      donepeziL (ARICEPT) 5 MG tablet Take 1 tablet (5 mg total) by mouth every evening.  Qty: 60 tablet, Refills: 5      dulaglutide (TRULICITY) 3 mg/0.5 mL pen injector Inject 3 mg into the skin once a week.  Qty: 12 pen , Refills: 2      DULoxetine (CYMBALTA) 30 MG capsule Take 1 capsule (30 mg total) by mouth once daily.  Qty: 30 capsule, Refills: 0    Associated Diagnoses: Primary osteoarthritis of knee, unspecified laterality      famotidine (PEPCID) 40 MG tablet Take 40 mg by mouth 2 (two) times daily.      ferrous sulfate 325 (65 FE) MG EC tablet TAKE 1 TABLET EVERY OTHER DAY  Qty: 45 tablet, Refills: 3      fluticasone propionate (FLONASE) 50 mcg/actuation nasal spray 16      fluticasone-salmeterol diskus inhaler 250-50 mcg Inhale 1 puff into the lungs 2 (two) times daily. Controller  Qty: 180 each, Refills: 3    Associated Diagnoses: Mild intermittent asthma without complication      gabapentin (NEURONTIN) 300 MG capsule Take 1 capsule (300 mg total) by mouth every evening.  Qty: 30 capsule, Refills: 4      heparin, porcine, PF, (HEPARIN FLUSH 100 UNITS/ML) 100 unit/mL Syrg       hydrOXYzine HCL (ATARAX) 25 MG tablet Take 1 tablet (25 mg total) by  mouth 3 (three) times daily as needed for Itching or Anxiety.  Qty: 30 tablet, Refills: 3    Associated Diagnoses: Anxiety      ketoconazole (NIZORAL) 2 % cream Apply topically once daily. for 14 days  Qty: 60 g, Refills: 0      lancets Misc 1 Device by Misc.(Non-Drug; Combo Route) route 2 (two) times daily.  Qty: 200 each, Refills: 11    Associated Diagnoses: Type 2 diabetes mellitus with other specified complication, unspecified whether long term insulin use      LIDOcaine (LIDODERM) 5 % Place 1 patch onto the skin once daily. Remove & Discard patch within 12 hours or as directed by MD  Qty: 15 patch, Refills: 0      meloxicam (MOBIC) 15 MG tablet       metFORMIN (GLUCOPHAGE-XR) 500 MG ER 24hr tablet TAKE 2 TABLETS EVERY DAY WITH BREAKFAST  Qty: 180 tablet, Refills: 0    Associated Diagnoses: Type 2 diabetes mellitus without complication, without long-term current use of insulin      methocarbamoL (ROBAXIN) 750 MG Tab Take 1 tablet (750 mg total) by mouth 3 (three) times daily.  Qty: 270 tablet, Refills: 0    Associated Diagnoses: Piriformis syndrome of both sides      multivitamin capsule Take 1 capsule by mouth once daily.      ondansetron (ZOFRAN) 4 MG tablet TAKE 1 TABLET EVERY 8 HOURS AS NEEDED FOR NAUSEA  Qty: 30 tablet, Refills: 0    Associated Diagnoses: Nausea      raloxifene (EVISTA) 60 mg tablet TAKE 1 TABLET EVERY DAY  Qty: 90 tablet, Refills: 4      traMADoL (ULTRAM-ER) 100 MG Tb24 Take 1 tablet (100 mg total) by mouth once daily.  Qty: 30 tablet, Refills: 0    Comments: Quantity prescribed more than 7 day supply? Yes, quantity medically necessary  Associated Diagnoses: Neuropathy due to chemotherapeutic drug      triamcinolone acetonide 0.5% (KENALOG) 0.5 % Crea Apply topically 2 (two) times daily.  Qty: 15 g, Refills: 3                 Discharge Diagnosis: Primary osteoarthritis of knee, unspecified laterality [M17.10]  Condition on Discharge: Stable with no complications to procedure   Diet on  Discharge: Same as before.  Activity: as per instruction sheet.  Discharge to: Home with a responsible adult.  Follow up: 2-4 weeks       Please call my office or pager at [phone] if experienced any weakness or loss of sensation, fever > 101.5, pain uncontrolled with oral medications, persistent nausea/vomiting/or diarrhea, redness or drainage from the incisions, or any other worrisome concerns. If physician on call was not reached or could not communicate with our office for any reason please go to the nearest emergency department     Tl Andres M.D.  PGY-5  Interventional Pain Management Fellow  [facility]  Pager: [phone]

## 2025-03-13 NOTE — DISCHARGE INSTRUCTIONS

## 2025-03-13 NOTE — OP NOTE
Therapeutic Genicular Cooled Nerve Radiofrequency Ablation under Fluoroscopy     The procedure, risks, benefits, and options were discussed with the patient. There are no contraindications to the procedure. The patent expressed understanding and agreed to the procedure. Informed written consent was obtained prior to the start of the procedure and can be found in the patient's chart.        PATIENT NAME: Marizol Kevin   MRN: [id number]     DATE OF PROCEDURE: 03/13/2025     PROCEDURE: Therapeutic Left Genicular Cooled Nerve Radiofrequency Ablation under Fluoroscopy    PRE-OP DIAGNOSIS: Primary osteoarthritis of knee, unspecified laterality [M17.10]    POST-OP DIAGNOSIS: Primary osteoarthritis of knee, unspecified laterality [M17.10]    PHYSICIAN: Abdi Del Toro MD    ASSISTANTS: Tl Andres MD  [facility] Fellow      MEDICATIONS INJECTED:  Preservative-free Kenalog 40mg with 9cc of Bupivicaine 0.25%    LOCAL ANESTHETIC INJECTED:   Xylocaine 2%    SEDATION: Versed 2mg and Fentanyl 100mcg                                                                                                                                                                                     Conscious sedation ordered by M.D. Patient re-evaluation prior to administration of conscious sedation. No changes noted in patient's status from initial evaluation. The patient's vital signs were monitored by RN and patient remained hemodynamically stable throughout the procedure.    Event Time In   Sedation Start 1420   Sedation End 1435       ESTIMATED BLOOD LOSS:  None    COMPLICATIONS:  None     INTERVAL HISTORY: Patient has clinical findings of chronic knee pain. Patients has completed 2 previous diagnostic genicular nerve blocks with at least 80% relief for the expected duration of the local anesthetic utilized.     TECHNIQUE: Time-out was performed to identify the patient and procedure to be performed. With the patient laying in a supine position,  the surgical area was prepped and draped in the usual sterile fashion using ChloraPrep and fenestrated drape. Three target sites including the superior lateral genicular nerve where the lateral femoral shaft meets the epicondyle, the superior medial genicular nerve where the medial femoral shaft meets the epicondyle, and the inferior medial genicular nerve where the medial tibial shaft meets the epicondyle, were determined under fluoroscopic guidance. Skin anesthesia was achieved by injecting Lidocaine 2% over the injection sites. A 17 gauge, 50mm, 10mm active tip needle was then advanced under fluoroscopy in the AP and lateral views into the positions of the geniculate nerves at these levels. This was followed by motor testing at each of the nerves to ensure that there was no dorsiflexion of the foot. After negative aspiration for blood was confirmed, 1 mL of the lidocaine 2% listed above was injected slowly at each site. This was followed by cooled thermal lesioning at 80 degrees celsius for 150 seconds at each site. That was followed by slowly injecting 1 mL of the medication mixture listed above at each site. The needles were removed and bleeding was nil. A sterile dressing was applied. No specimens collected. The patient tolerated the procedure well and did not have any procedure related motor deficit at the conclusion of the procedure.      The patient was monitored after the procedure in the recovery area. They were given post-procedure and discharge instructions to follow at home. The patient was discharged in a stable condition.    Tl Andres MD     I reviewed and edited the fellow's note. I conducted my own interview and physical examination. I agree with the findings. I was present and supervising all critical portions of the procedure.    Abdi Del Toro MD

## 2025-03-13 NOTE — H&P
HPI  Patient presenting for Procedure(s) (LRB):  RADIOFREQUENCY ABLATION LEFT GENICULAR COOLED *ASPIRIN OTC* HOLD 5 DAYS (Left)     Patient on Anti-coagulation No    No health changes since previous encounter    Past Medical History:   Diagnosis Date    Allergy     Anxiety     Cancer     colon    Cataract     Colon cancer     Dependence on nicotine from cigarettes 9/3/2020    Diabetes mellitus, type 2     Dry eye syndrome     Hyperlipidemia     Hypertension     Insomnia     Light cigarette smoker (1-9 cigs/day) 6/21/2022    Malignant neoplasm of ascending colon 7/20/2020    Squamous blepharitis     Syncope 4/16/2022    Tobacco use disorder, moderate, in early remission 11/2/2022    Vitamin D deficiency 9/10/2020     Past Surgical History:   Procedure Laterality Date    CATARACT EXTRACTION, BILATERAL  03/2020    CHOLECYSTECTOMY      COLON SURGERY      Colon cancer    COLONOSCOPY N/A 02/01/2021    Procedure: COLONOSCOPY;  Surgeon: Ashwini Duarte MD;  Location: [facility] ([address] [place] [address]);  Service: Endoscopy;  Laterality: N/A;  medical transportation  covid test 1/29 [employer], instructions mailed-KPvt    EYE SURGERY      Cataract    HYSTERECTOMY      INJECTION OF ANESTHETIC AGENT AROUND NERVE Left 7/21/2023    Procedure: BLOCK, NERVE, LEFT SHOULDER ARTICULAR BRANCH keep date rep aware;  Surgeon: Abdi Del Toro MD;  Location: [facility];  Service: Pain Management;  Laterality: Left;    INJECTION OF ANESTHETIC AGENT AROUND NERVE Left 8/1/2024    Procedure: BLOCK, NERVE LEFT DIAGNOSTIC GENICULAR;  Surgeon: Abdi Del Toro MD;  Location: [facility];  Service: Pain Management;  Laterality: Left;  [phone]    INJECTION OF JOINT N/A 4/25/2024    Procedure: INJECTION, JOINT BILATERAL PIRIFORMIS AND LEFT GTB;  Surgeon: Abdi Del Toro MD;  Location: [facility];  Service: Pain Management;  Laterality: N/A;  [phone]  2 WK F/U [facility]    JOINT REPLACEMENT      Knee    KNEE SURGERY      LAPAROSCOPIC LEFT COLON  RESECTION      RADIOFREQUENCY ABLATION Left 8/23/2024    Procedure: RADIOFREQUENCY ABLATION LEFT GENICULAR NERVES COOLED *ASPIRIN OTC* HOLD FOR 5 DAYS;  Surgeon: Abdi Del Toro MD;  Location: [facility];  Service: Pain Management;  Laterality: Left;  [phone]  4 WK F/U [facility]    TRANSFORAMINAL EPIDURAL INJECTION OF STEROID Left 12/22/2023    Procedure: LUMBAR TRANSFORAMINAL LEFT L4/5 AND L5/S1;  Surgeon: Abdi Del Toro MD;  Location: [facility];  Service: Pain Management;  Laterality: Left;  [phone]  2 WK F/U [facility]     Review of patient's allergies indicates:   Allergen Reactions    Grass pollen-june grass standard     Sulfa (sulfonamide antibiotics) Rash     Occurred when she was pregnant with varicose veins resulting in leg swelling and pain with standing      No current facility-administered medications for this encounter.       PMHx, PSHx, Allergies, Medications reviewed in epic    ROS negative except pain complaints in HPI    OBJECTIVE:    There were no vitals taken for this visit.    PHYSICAL EXAMINATION:    GENERAL: Well appearing, in no acute distress, alert and oriented x3.  PSYCH:  Mood and affect appropriate.  SKIN: Skin color, texture, turgor normal, no rashes or lesions which will impact the procedure.  CV: RRR with palpation of the radial artery.  PULM: No evidence of respiratory difficulty, symmetric chest rise. Clear to auscultation.  NEURO: Cranial nerves grossly intact.    Plan:    Proceed with procedure as planned Procedure(s) (LRB):  RADIOFREQUENCY ABLATION LEFT GENICULAR COOLED *ASPIRIN OTC* HOLD 5 DAYS (Left)    Tl Mcleanxochilt  03/13/2025

## 2025-03-14 ENCOUNTER — TELEPHONE (OUTPATIENT)
Dept: PAIN MEDICINE | Facility: CLINIC | Age: 80
End: 2025-03-14
Payer: MEDICARE

## 2025-03-14 LAB — POCT GLUCOSE: 85 MG/DL (ref 70–110)

## 2025-03-14 NOTE — TELEPHONE ENCOUNTER
Is she talking about the imaging results from yesterday? Those are just the images taken during the procedure to check placement.

## 2025-03-14 NOTE — TELEPHONE ENCOUNTER
Copied from CRM #[id number]. Topic: General Inquiry - Test Results  >> Mar 14, 2025 11:41 AM Troy wrote:  Patient would like to have a call back about her test results

## 2025-04-02 ENCOUNTER — TELEPHONE (OUTPATIENT)
Dept: NEUROLOGY | Facility: CLINIC | Age: 80
End: 2025-04-02
Payer: MEDICARE

## 2025-04-02 NOTE — TELEPHONE ENCOUNTER
I spoke with patient, apologized Dr Richter will not be in clinic today, patient said she would call our office later to reschedule.

## 2025-04-08 DIAGNOSIS — G57.03 PIRIFORMIS SYNDROME OF BOTH SIDES: ICD-10-CM

## 2025-04-08 NOTE — TELEPHONE ENCOUNTER
Refill Encounter    PCP Visits: Recent Visits  LOV: 02/25/2025 with RYANN Adorno.    Date Type Provider Dept   11/14/24 Office Visit Pedro Luis Butterfield MD [facility]   09/20/24 Office Visit Pedro Luis Butterfield MD [facility]   07/25/24 Office Visit Pedro Luis Butterfield MD [facility]   04/30/24 Office Visit Pedro Luis Butterfield MD [facility]   Showing recent visits within past 360 days and meeting all other requirements  Future Appointments  No visits were found meeting these conditions.  Showing future appointments within next 720 days and meeting all other requirements     Last 3 Blood Pressure:   BP Readings from Last 3 Encounters:   03/13/25 (!) 161/107   02/25/25 (!) 140/87   02/25/25 94/62     Preferred Pharmacy:   [facility] - [place], [place] - [address]  [address] [address]  Phone: [phone] Fax: [phone]    Requested RX:  Requested Prescriptions     Pending Prescriptions Disp Refills    methocarbamoL (ROBAXIN) 750 MG Tab [Pharmacy Med Name: Methocarbamol Oral Tablet 750 MG] 270 tablet 3     Sig: TAKE 1 TABLET THREE TIMES DAILY      RX Route: Normal

## 2025-04-08 NOTE — TELEPHONE ENCOUNTER
Care Due:                  Date            Visit Type   Department     Provider  --------------------------------------------------------------------------------                                EP -                              PRIMARY      [facility] INTERNAL  Last Visit: 11-      CARE ([facility])   MEDICINE       Pedro Luis Butterfield  Next Visit: None Scheduled  None         None Found                                                            Last  Test          Frequency    Reason                     Performed    Due Date  --------------------------------------------------------------------------------    HBA1C.......  6 months...  dulaglutide, metFORMIN...  07- 01-    Lipid Panel.  12 months..  atorvastatin.............  01- 01-    Vitamin D...  12 months..  cholecalciferol,.........  01- 01-    [facility] Embedded Care Due Messages. Reference number: [id number].   4/08/2025 7:19:25 AM CDT

## 2025-04-09 DIAGNOSIS — J30.9 ALLERGIC RHINITIS, UNSPECIFIED SEASONALITY, UNSPECIFIED TRIGGER: ICD-10-CM

## 2025-04-09 RX ORDER — METHOCARBAMOL 750 MG/1
750 TABLET, FILM COATED ORAL 3 TIMES DAILY
Qty: 270 TABLET | Refills: 1 | Status: SHIPPED | OUTPATIENT
Start: 2025-04-09

## 2025-04-09 RX ORDER — AZELASTINE 1 MG/ML
1 SPRAY, METERED NASAL 2 TIMES DAILY
Qty: 60 ML | Refills: 3 | Status: SHIPPED | OUTPATIENT
Start: 2025-04-09

## 2025-04-10 ENCOUNTER — LAB VISIT (OUTPATIENT)
Dept: LAB | Facility: OTHER | Age: 80
End: 2025-04-10
Attending: INTERNAL MEDICINE
Payer: MEDICARE

## 2025-04-10 ENCOUNTER — OFFICE VISIT (OUTPATIENT)
Dept: INTERNAL MEDICINE | Facility: CLINIC | Age: 80
End: 2025-04-10
Payer: MEDICARE

## 2025-04-10 VITALS
WEIGHT: 155.63 LBS | OXYGEN SATURATION: 94 % | HEART RATE: 91 BPM | BODY MASS INDEX: 33.57 KG/M2 | HEIGHT: 57 IN | DIASTOLIC BLOOD PRESSURE: 82 MMHG | SYSTOLIC BLOOD PRESSURE: 130 MMHG

## 2025-04-10 DIAGNOSIS — N18.31 CHRONIC KIDNEY DISEASE, STAGE 3A: ICD-10-CM

## 2025-04-10 DIAGNOSIS — J45.20 MILD INTERMITTENT ASTHMA WITHOUT COMPLICATION: ICD-10-CM

## 2025-04-10 DIAGNOSIS — R21 SKIN RASH: ICD-10-CM

## 2025-04-10 DIAGNOSIS — R25.2 LEG CRAMPS: Primary | ICD-10-CM

## 2025-04-10 DIAGNOSIS — R25.2 LEG CRAMPS: ICD-10-CM

## 2025-04-10 LAB
25(OH)D3+25(OH)D2 SERPL-MCNC: 42 NG/ML (ref 30–96)
ALBUMIN SERPL BCP-MCNC: 3.7 G/DL (ref 3.5–5.2)
ALP SERPL-CCNC: 102 UNIT/L (ref 40–150)
ALT SERPL W/O P-5'-P-CCNC: 16 UNIT/L (ref 10–44)
ANION GAP (OHS): 7 MMOL/L (ref 8–16)
AST SERPL-CCNC: 18 UNIT/L (ref 11–45)
BILIRUB SERPL-MCNC: 0.3 MG/DL (ref 0.1–1)
BUN SERPL-MCNC: 11 MG/DL (ref 8–23)
CALCIUM SERPL-MCNC: 10.1 MG/DL (ref 8.7–10.5)
CHLORIDE SERPL-SCNC: 107 MMOL/L (ref 95–110)
CO2 SERPL-SCNC: 26 MMOL/L (ref 23–29)
CREAT SERPL-MCNC: 1 MG/DL (ref 0.5–1.4)
GFR SERPLBLD CREATININE-BSD FMLA CKD-EPI: 58 ML/MIN/1.73/M2
GLUCOSE SERPL-MCNC: 76 MG/DL (ref 70–110)
POTASSIUM SERPL-SCNC: 4.3 MMOL/L (ref 3.5–5.1)
PROT SERPL-MCNC: 8 GM/DL (ref 6–8.4)
SODIUM SERPL-SCNC: 140 MMOL/L (ref 136–145)

## 2025-04-10 PROCEDURE — 36415 COLL VENOUS BLD VENIPUNCTURE: CPT | Mod: HCNC

## 2025-04-10 PROCEDURE — 99999 PR PBB SHADOW E&M-EST. PATIENT-LVL IV: CPT | Mod: PBBFAC,HCNC,,

## 2025-04-10 PROCEDURE — 82306 VITAMIN D 25 HYDROXY: CPT | Mod: HCNC

## 2025-04-10 PROCEDURE — 80053 COMPREHEN METABOLIC PANEL: CPT | Mod: HCNC

## 2025-04-10 PROCEDURE — 1160F RVW MEDS BY RX/DR IN RCRD: CPT | Mod: HCNC,CPTII,S$GLB,

## 2025-04-10 PROCEDURE — 1101F PT FALLS ASSESS-DOCD LE1/YR: CPT | Mod: HCNC,CPTII,S$GLB,

## 2025-04-10 PROCEDURE — 3079F DIAST BP 80-89 MM HG: CPT | Mod: HCNC,CPTII,S$GLB,

## 2025-04-10 PROCEDURE — 1159F MED LIST DOCD IN RCRD: CPT | Mod: HCNC,CPTII,S$GLB,

## 2025-04-10 PROCEDURE — 3072F LOW RISK FOR RETINOPATHY: CPT | Mod: HCNC,CPTII,S$GLB,

## 2025-04-10 PROCEDURE — 3288F FALL RISK ASSESSMENT DOCD: CPT | Mod: HCNC,CPTII,S$GLB,

## 2025-04-10 PROCEDURE — 1125F AMNT PAIN NOTED PAIN PRSNT: CPT | Mod: HCNC,CPTII,S$GLB,

## 2025-04-10 PROCEDURE — 3075F SYST BP GE 130 - 139MM HG: CPT | Mod: HCNC,CPTII,S$GLB,

## 2025-04-10 PROCEDURE — 99213 OFFICE O/P EST LOW 20 MIN: CPT | Mod: HCNC,S$GLB,,

## 2025-04-10 RX ORDER — FLUTICASONE PROPIONATE AND SALMETEROL XINAFOATE 115; 21 UG/1; UG/1
2 AEROSOL, METERED RESPIRATORY (INHALATION) EVERY 12 HOURS
Qty: 12 G | Refills: 1 | Status: SHIPPED | OUTPATIENT
Start: 2025-04-10 | End: 2026-04-10

## 2025-04-10 RX ORDER — TRIAMCINOLONE ACETONIDE 0.25 MG/G
CREAM TOPICAL 2 TIMES DAILY
Qty: 80 G | Refills: 1 | Status: SHIPPED | OUTPATIENT
Start: 2025-04-10

## 2025-04-10 NOTE — PROGRESS NOTES
"[facility]  Progress Note    SUBJECTIVE:     Chief Complaint:   Chief Complaint   Patient presents with    Fatigue    leg cramp     Both legs; restless legs    Rash     Rash under R arm     Mass     Bump on R wrist     Excessive Sweating    Headache       History of Present Illness:  79 y.o. female who  has a past medical history of Allergy, Anxiety, Cancer, Cataract, Colon cancer, Dependence on nicotine from cigarettes (9/3/2020), Diabetes mellitus, type 2, Dry eye syndrome, Hyperlipidemia, Hypertension, Insomnia, Light cigarette smoker (1-9 cigs/day) (6/21/2022), Malignant neoplasm of ascending colon (7/20/2020), Squamous blepharitis, Syncope (4/16/2022), Tobacco use disorder, moderate, in early remission (11/2/2022), and Vitamin D deficiency (9/10/2020). presents to clinic today for follow up    #Leg cramps  She experiences severe leg cramps that begin in the feet and toes bilaterally, progressing up the legs. Symptoms occur predominantly at night with severe pain requiring her to get out of bed to stand flat-footed on the floor. During daytime, cramps are less frequent, less forceful, and less painful. Previous trial of Robaxin was unsuccessful. She is currently taking iron supplement every other day without improvement of symptoms.    #Asthma  She experiences episodes of shortness of breath at rest, describing it as "double time breathing." These episodes occur unexpectedly, even while sitting and watching TV, and are not associated with stress or physical exertion. She also experiences breathing difficulties during sleep. No known triggers have been identified. Previous use of albuterol was discontinued due to adverse reactions. A subsequent attempt to use albuterol after an urgent care visit resulted in recurrence of adverse symptoms. She was prior on advair as controller for her asthma. She reported improved in her breathing last time she used this medication. She is interested in " restarting advair today    #Skin rash  She has a skin rash on her arm that has developed. The lesion is currently symptomatic with stinging, burning, pain, and itching. She describes it as inflamed.    #Post-menopausal symptoms  She reports history of excessive sweating or headaches that has been worsening over time. The sweating begins at the scalp and face, described as if water has been poured over her. Symptoms are triggered by brief exposure to sunlight or minimal physical activity, such as walking in the hallway. Previous use of Black Cohosh provided some symptom relief.      Allergies:  Review of patient's allergies indicates:   Allergen Reactions    Grass pollen-june grass standard     Sulfa (sulfonamide antibiotics) Rash     Occurred when she was pregnant with varicose veins resulting in leg swelling and pain with standing       Home Medications:  Current Outpatient Medications on File Prior to Visit   Medication Sig    amLODIPine (NORVASC) 5 MG tablet TAKE 1 TABLET EVERY DAY    aspirin (ECOTRIN) 81 MG EC tablet Take 81 mg by mouth once daily.    atorvastatin (LIPITOR) 40 MG tablet Take 1 tablet (40 mg total) by mouth once daily.    azelastine (ASTELIN) 137 mcg (0.1 %) nasal spray USE 1 SPRAY IN EACH NOSTRIL TWICE DAILY    blood sugar diagnostic (TRUE METRIX GLUCOSE TEST STRIP) Strp Use to test blood glucose two (2) times daily; to be used with insurance-preferred brand of glucometer/supplies    blood-glucose meter kit Please provide with insurance covered meter    cholecalciferol, vitamin D3, (VITAMIN D3) 50 mcg (2,000 unit) Cap capsule Take 1 capsule (2,000 Units total) by mouth once daily.    donepeziL (ARICEPT) 5 MG tablet Take 1 tablet (5 mg total) by mouth every evening.    dulaglutide (TRULICITY) 3 mg/0.5 mL pen injector Inject 3 mg into the skin once a week.    famotidine (PEPCID) 40 MG tablet Take 40 mg by mouth 2 (two) times daily.    ferrous sulfate 325 (65 FE) MG EC tablet TAKE 1 TABLET EVERY  OTHER DAY    fluticasone propionate (FLONASE) 50 mcg/actuation nasal spray 16    gabapentin (NEURONTIN) 300 MG capsule Take 1 capsule (300 mg total) by mouth every evening.    ketoconazole (NIZORAL) 2 % cream Apply topically once daily. for 14 days    lancets Misc 1 Device by Misc.(Non-Drug; Combo Route) route 2 (two) times daily.    LIDOcaine (LIDODERM) 5 % Place 1 patch onto the skin once daily. Remove & Discard patch within 12 hours or as directed by MD    metFORMIN (GLUCOPHAGE-XR) 500 MG ER 24hr tablet TAKE 2 TABLETS EVERY DAY WITH BREAKFAST    methocarbamoL (ROBAXIN) 750 MG Tab TAKE 1 TABLET THREE TIMES DAILY    multivitamin capsule Take 1 capsule by mouth once daily.    ondansetron (ZOFRAN) 4 MG tablet TAKE 1 TABLET EVERY 8 HOURS AS NEEDED FOR NAUSEA    traMADoL (ULTRAM-ER) 100 MG Tb24 Take 1 tablet (100 mg total) by mouth once daily.    acetaminophen-codeine 300-30mg (TYLENOL #3) 300-30 mg Tab Take 1 tablet by mouth every 6 (six) hours as needed. (Patient not taking: Reported on 4/10/2025)    albuterol (PROVENTIL/VENTOLIN HFA) 90 mcg/actuation inhaler INHALE 2 PUFFS INTO THE LUNGS EVERY 4 (FOUR) HOURS AS NEEDED FOR WHEEZING (Patient not taking: Reported on 4/10/2025)    albuterol-ipratropium (DUO-NEB) 2.5 mg-0.5 mg/3 mL nebulizer solution Take 3 mLs by nebulization every 6 (six) hours as needed for Wheezing or Shortness of Breath. Rescue (Patient not taking: Reported on 4/10/2025)    betamethasone valerate 0.1% (VALISONE) 0.1 % Oint Apply topically once daily. (Patient not taking: Reported on 4/10/2025)    DULoxetine (CYMBALTA) 30 MG capsule Take 1 capsule (30 mg total) by mouth once daily. (Patient not taking: Reported on 4/10/2025)    heparin, porcine, PF, (HEPARIN FLUSH 100 UNITS/ML) 100 unit/mL Syrg  (Patient not taking: Reported on 4/10/2025)    hydrOXYzine HCL (ATARAX) 25 MG tablet Take 1 tablet (25 mg total) by mouth 3 (three) times daily as needed for Itching or Anxiety. (Patient not taking: Reported  on 4/10/2025)    meloxicam (MOBIC) 15 MG tablet  (Patient not taking: Reported on 4/10/2025)    raloxifene (EVISTA) 60 mg tablet TAKE 1 TABLET EVERY DAY (Patient not taking: Reported on 4/10/2025)     No current facility-administered medications on file prior to visit.       Past Medical History:   Diagnosis Date    Allergy     Anxiety     Cancer     colon    Cataract     Colon cancer     Dependence on nicotine from cigarettes 9/3/2020    Diabetes mellitus, type 2     Dry eye syndrome     Hyperlipidemia     Hypertension     Insomnia     Light cigarette smoker (1-9 cigs/day) 6/21/2022    Malignant neoplasm of ascending colon 7/20/2020    Squamous blepharitis     Syncope 4/16/2022    Tobacco use disorder, moderate, in early remission 11/2/2022    Vitamin D deficiency 9/10/2020     Past Surgical History:   Procedure Laterality Date    CATARACT EXTRACTION, BILATERAL  03/2020    CHOLECYSTECTOMY      COLON SURGERY      Colon cancer    COLONOSCOPY N/A 02/01/2021    Procedure: COLONOSCOPY;  Surgeon: Ashwini Duarte MD;  Location: [facility] ([address] [place] [address]);  Service: Endoscopy;  Laterality: N/A;  medical transportation  covid test 1/29 [employer], instructions mailed-\[facility] [place] [facility]\""    EYE SURGERY      Cataract    HYSTERECTOMY      INJECTION OF ANESTHETIC AGENT AROUND NERVE Left 7/21/2023    Procedure: BLOCK, NERVE, LEFT SHOULDER ARTICULAR BRANCH keep date rep aware;  Surgeon: Abdi Del Toro MD;  Location: [facility];  Service: Pain Management;  Laterality: Left;    INJECTION OF ANESTHETIC AGENT AROUND NERVE Left 8/1/2024    Procedure: BLOCK, NERVE LEFT DIAGNOSTIC GENICULAR;  Surgeon: Abdi Del Toro MD;  Location: [facility];  Service: Pain Management;  Laterality: Left;  [phone]    INJECTION OF JOINT N/A 4/25/2024    Procedure: INJECTION, JOINT BILATERAL PIRIFORMIS AND LEFT GTB;  Surgeon: Abdi Del Toro MD;  Location: [facility];  Service: Pain Management;  Laterality: N/A;  [phone]  2 WK F/U IRMA    JOINT  REPLACEMENT      Knee    KNEE SURGERY      LAPAROSCOPIC LEFT COLON RESECTION      RADIOFREQUENCY ABLATION Left 8/23/2024    Procedure: RADIOFREQUENCY ABLATION LEFT GENICULAR NERVES COOLED *ASPIRIN OTC* HOLD FOR 5 DAYS;  Surgeon: Abdi Del Toro MD;  Location: [facility];  Service: Pain Management;  Laterality: Left;  [phone]  4 WK F/U [facility]    RADIOFREQUENCY ABLATION Left 3/13/2025    Procedure: RADIOFREQUENCY ABLATION LEFT GENICULAR COOLED *ASPIRIN OTC* HOLD 5 DAYS;  Surgeon: Abdi Del Toro MD;  Location: [facility];  Service: Pain Management;  Laterality: Left;  4 WK F/U [facility]    TRANSFORAMINAL EPIDURAL INJECTION OF STEROID Left 12/22/2023    Procedure: LUMBAR TRANSFORAMINAL LEFT L4/5 AND L5/S1;  Surgeon: Abdi Del Toro MD;  Location: [facility];  Service: Pain Management;  Laterality: Left;  [phone]  2 WK F/U ANTWON     Family History   Problem Relation Name Age of Onset    Heart attack Mother Dora     Heart disease Mother Dora     Colon cancer Father Milo     Diabetes Father Milo     Cancer Sister Denisha         unknown    Hypothyroidism Sister Denisha     Asthma Sister Denisha     Thyroid cancer Neg Hx       Social History[1]       OBJECTIVE:     Vital Signs:  Pulse: 91 (04/10/25 1448)  BP: 130/82 (04/10/25 1448)  SpO2: (!) 94 % (04/10/25 1448)    Physical Exam  Constitutional:       General: She is not in acute distress.     Appearance: Normal appearance. She is not toxic-appearing.   HENT:      Head: Normocephalic and atraumatic.      Right Ear: External ear normal.      Left Ear: External ear normal.      Nose: Nose normal.      Mouth/Throat:      Mouth: Mucous membranes are moist.   Eyes:      Extraocular Movements: Extraocular movements intact.   Cardiovascular:      Rate and Rhythm: Normal rate and regular rhythm.      Pulses: Normal pulses.      Heart sounds: Normal heart sounds.   Pulmonary:      Effort: Pulmonary effort is normal. No respiratory distress.   Abdominal:       General: Abdomen is flat.      Palpations: Abdomen is soft.      Tenderness: There is no abdominal tenderness.   Musculoskeletal:      Cervical back: Normal range of motion and neck supple.   Skin:     General: Skin is warm.      Findings: No bruising or erythema.   Neurological:      General: No focal deficit present.      Mental Status: She is alert.   Psychiatric:         Mood and Affect: Mood normal.         Laboratory:  Hemoglobin A1C   Date Value Ref Range Status   07/23/2024 6.3 (H) 4.0 - 5.6 % Final     Comment:     ADA Screening Guidelines:  5.7-6.4%  Consistent with prediabetes  >or=6.5%  Consistent with diabetes    High levels of fetal hemoglobin interfere with the HbA1C  assay. Heterozygous hemoglobin variants (HbS, HgC, etc)do  not significantly interfere with this assay.   However, presence of multiple variants may affect accuracy.     01/25/2024 6.1 (H) 4.0 - 5.6 % Final     Comment:     ADA Screening Guidelines:  5.7-6.4%  Consistent with prediabetes  >or=6.5%  Consistent with diabetes    High levels of fetal hemoglobin interfere with the HbA1C  assay. Heterozygous hemoglobin variants (HbS, HgC, etc)do  not significantly interfere with this assay.   However, presence of multiple variants may affect accuracy.     07/21/2023 6.4 (H) 4.0 - 5.6 % Final     Comment:     ADA Screening Guidelines:  5.7-6.4%  Consistent with prediabetes  >or=6.5%  Consistent with diabetes    High levels of fetal hemoglobin interfere with the HbA1C  assay. Heterozygous hemoglobin variants (HbS, HgC, etc)do  not significantly interfere with this assay.   However, presence of multiple variants may affect accuracy.         A/P:    Marizol was seen today for fatigue, leg cramp, rash, mass, excessive sweating and headache.    Diagnoses and all orders for this visit:    Leg cramps  -     Comprehensive Metabolic Panel; Future  -     Vitamin D; Future    Mild intermittent asthma without complication  -     fluticasone  propion-salmeterol 115-21 mcg/dose (ADVAIR HFA) 115-21 mcg/actuation HFAA inhaler; Inhale 2 puffs into the lungs every 12 (twelve) hours. Controller    Skin rash  -     triamcinolone acetonide 0.025% (KENALOG) 0.025 % cream; Apply topically 2 (two) times daily.    Chronic kidney disease, stage 3a  -     Vitamin D; Future    Other orders  The following orders have not been finalized:  -     Cancel: vitamin D3-vitamin K2 137.5-200 mcg Tab    Evaluated skin lesion on arm; likely atopic dermatits, will tx with kenalog   Assessed shortness of breath as possible manifestation of uncontrolled asthma, will restart patient's advair as controller  Recommended combination vitamin K2 and magnesium supplementation as potential tx to leg cramps, will also obtain CMP to r/o electrolyte abnormalities  Recommended black cohosh suppliment as potential tx to patient's underlying post-menopausal sx    Follow up in 1-2 with PCP     This note was generated with the assistance of ambient listening technology. Verbal consent was obtained by the patient and accompanying visitor(s) for the recording of patient appointment to facilitate this note. I attest to having reviewed and edited the generated note for accuracy, though some syntax or spelling errors may persist. Please contact the author of this note for any clarification.      Efrem Gannon MD   Family Medicine   [facility]             [1]   Social History  Tobacco Use    Smoking status: Former     Current packs/day: 0.00     Average packs/day: 1 pack/day for 51.0 years (51.0 ttl pk-yrs)     Types: Cigarettes     Start date: 1971     Quit date: 2022     Years since quittin.5    Smokeless tobacco: Never    Tobacco comments:     smoking cessation information given    Substance Use Topics    Alcohol use: Not Currently     Alcohol/week: 1.0 standard drink of alcohol     Types: 1 Shots of liquor per week     Comment: occ    Drug use: Never

## 2025-04-11 ENCOUNTER — RESULTS FOLLOW-UP (OUTPATIENT)
Dept: INTERNAL MEDICINE | Facility: CLINIC | Age: 80
End: 2025-04-11

## 2025-04-16 ENCOUNTER — OFFICE VISIT (OUTPATIENT)
Dept: PAIN MEDICINE | Facility: CLINIC | Age: 80
End: 2025-04-16
Payer: MEDICARE

## 2025-04-16 ENCOUNTER — TELEPHONE (OUTPATIENT)
Dept: PAIN MEDICINE | Facility: CLINIC | Age: 80
End: 2025-04-16
Payer: MEDICARE

## 2025-04-16 ENCOUNTER — HOSPITAL ENCOUNTER (OUTPATIENT)
Dept: RADIOLOGY | Facility: OTHER | Age: 80
Discharge: HOME | End: 2025-04-16
Attending: STUDENT IN AN ORGANIZED HEALTH CARE EDUCATION/TRAINING PROGRAM
Payer: MEDICARE

## 2025-04-16 VITALS
WEIGHT: 156.5 LBS | SYSTOLIC BLOOD PRESSURE: 144 MMHG | HEART RATE: 80 BPM | BODY MASS INDEX: 33.76 KG/M2 | OXYGEN SATURATION: 100 % | DIASTOLIC BLOOD PRESSURE: 91 MMHG | TEMPERATURE: 98 F | RESPIRATION RATE: 18 BRPM | HEIGHT: 57 IN

## 2025-04-16 DIAGNOSIS — M17.12 OSTEOARTHRITIS OF LEFT KNEE, UNSPECIFIED OSTEOARTHRITIS TYPE: ICD-10-CM

## 2025-04-16 DIAGNOSIS — M70.52 PES ANSERINUS BURSITIS OF LEFT KNEE: Primary | ICD-10-CM

## 2025-04-16 DIAGNOSIS — Z96.651 HISTORY OF TOTAL KNEE ARTHROPLASTY, RIGHT: ICD-10-CM

## 2025-04-16 DIAGNOSIS — T45.1X5A NEUROPATHY DUE TO CHEMOTHERAPEUTIC DRUG: ICD-10-CM

## 2025-04-16 DIAGNOSIS — M70.52 PES ANSERINUS BURSITIS OF LEFT KNEE: ICD-10-CM

## 2025-04-16 DIAGNOSIS — G62.0 NEUROPATHY DUE TO CHEMOTHERAPEUTIC DRUG: ICD-10-CM

## 2025-04-16 DIAGNOSIS — R53.83 OTHER FATIGUE: ICD-10-CM

## 2025-04-16 PROCEDURE — 1160F RVW MEDS BY RX/DR IN RCRD: CPT | Mod: HCNC,CPTII,S$GLB, | Performed by: ANESTHESIOLOGY

## 2025-04-16 PROCEDURE — G2211 COMPLEX E/M VISIT ADD ON: HCPCS | Mod: HCNC,S$GLB,, | Performed by: ANESTHESIOLOGY

## 2025-04-16 PROCEDURE — 3080F DIAST BP >= 90 MM HG: CPT | Mod: HCNC,CPTII,S$GLB, | Performed by: ANESTHESIOLOGY

## 2025-04-16 PROCEDURE — 73562 X-RAY EXAM OF KNEE 3: CPT | Mod: 26,50,HCNC, | Performed by: RADIOLOGY

## 2025-04-16 PROCEDURE — 73562 X-RAY EXAM OF KNEE 3: CPT | Mod: TC,50,HCNC,FY

## 2025-04-16 PROCEDURE — 1159F MED LIST DOCD IN RCRD: CPT | Mod: HCNC,CPTII,S$GLB, | Performed by: ANESTHESIOLOGY

## 2025-04-16 PROCEDURE — 1101F PT FALLS ASSESS-DOCD LE1/YR: CPT | Mod: HCNC,CPTII,S$GLB, | Performed by: ANESTHESIOLOGY

## 2025-04-16 PROCEDURE — 3072F LOW RISK FOR RETINOPATHY: CPT | Mod: HCNC,CPTII,S$GLB, | Performed by: ANESTHESIOLOGY

## 2025-04-16 PROCEDURE — 1125F AMNT PAIN NOTED PAIN PRSNT: CPT | Mod: HCNC,CPTII,S$GLB, | Performed by: ANESTHESIOLOGY

## 2025-04-16 PROCEDURE — 3077F SYST BP >= 140 MM HG: CPT | Mod: HCNC,CPTII,S$GLB, | Performed by: ANESTHESIOLOGY

## 2025-04-16 PROCEDURE — 3288F FALL RISK ASSESSMENT DOCD: CPT | Mod: HCNC,CPTII,S$GLB, | Performed by: ANESTHESIOLOGY

## 2025-04-16 PROCEDURE — 99214 OFFICE O/P EST MOD 30 MIN: CPT | Mod: HCNC,GC,S$GLB, | Performed by: ANESTHESIOLOGY

## 2025-04-16 PROCEDURE — 99999 PR PBB SHADOW E&M-EST. PATIENT-LVL V: CPT | Mod: PBBFAC,HCNC,, | Performed by: ANESTHESIOLOGY

## 2025-04-16 RX ORDER — TRAMADOL HYDROCHLORIDE 100 MG/1
100 TABLET, EXTENDED RELEASE ORAL DAILY
Qty: 30 TABLET | Refills: 0 | Status: SHIPPED | OUTPATIENT
Start: 2025-04-16

## 2025-04-16 NOTE — TELEPHONE ENCOUNTER
ABLE TO RESCHEDULE TODAY @ 1430 FOR DR. JAVIER BUT HAS TO REARRANGE TRANSPORTATION. OK TO RUN SLIGHTLY LATE. Patient expressed understanding and gratitude for phone call.  Call ended.

## 2025-04-16 NOTE — PROGRESS NOTES
Chronic Pain - Established        CHIEF COMPLAINT:  Left knee pain    Interval History 4/16/25:  79 year old female returns in follow up. Patient had genicular RFA with limited benefit on 3/16/25. Patient reports left knee and left medial distal knee pain today. She had pes anserinus bursa injection in February 2025 with good benefit for many weeks. She now has persistent left knee pain. Uses a cane for ambulation. Patient denies recent falls, trauma, hospitalizations, or infections. Patient has no new onset numbness, tingling, or weakness. Patient denies new onset bowel or bladder incontinence. Patient denies periectal numbness or saddle anesthesia.   Pain is described as very sore in the affected area, particularly when walking. She reports intense burning and pain that forces her to stop walking and wait before continuing.  She mentions her right knee is now experiencing some loss of benefit from the previous replacement. She reports fatigue and weakness in her right knee. She clarifies that this fatigue is not related to her previous knee replacement.    She also reports back pain on the right side, seemingly connected to her knee issues. She mentions having neuropathy, which she attributes to chemotherapy received during treatment for colon cancer, not diabetes. She reports taking Trulicity once a week.    She denies pain in the whole knee, stating it is localized to a specific area she indicated.    PREVIOUS TREATMENTS:  Patient underwent a left knee genicular nerve RFA about one month ago, but experienced no persistent benefit. She received a left pes anserinus bursa injection in-clinic in February. She has previously undergone a left L4-L5-S1 transforaminal injection.    MEDICATIONS:  Patient is on Trulicity, administered as 1 injection weekly. She is also taking Tramadol 100 mg, 1 tablet daily, with her last refill on February 17th.    SURGICAL HISTORY:  Patient underwent a right knee replacement 20 years  ago. She also had left knee surgery over 20 years ago.    SOCIAL HISTORY:  Patient reports she no longer smokes.          Interval History 2/4/2025:  The patient presents for evaluation of left knee pain following left genicular cooled RFA on 8/23/2024. She describes the pain as throbbing and burning in nature. She has discontinued Lyrica but finds Tylenol and Tramadol effective for symptom relief, though she is currently out of Tramadol.  Additionally, she sought urgent care on 1/27/2025 for lumbar pain with sciatica, where she received a 9 mg dexamethasone injection and was prescribed Meloxicam, which she has not yet taken but has previously found beneficial.    Interval History 9/20/2024:  Marizol Kevin returns to clinic for follow-up for knee pain. She is s/p left genicular cooled RFA on 8/23/2024. She says her L knee pain is throbbing and burning in nature. She has stopped Lyrica. Takes tylenol and tramadol with good relief. Is out of tramadol. She went to an urgent care for Lumbar pain with sciata on 1/27, they performed a dexAMETHasone injection 9 mg and prescribed  Meloxicam which she has not taken. yet but has previously gotten relief from it.  She denies recent falls or trauma. She denies new onset fever/night sweats, urinary incontinence, bowel incontinence, significant weight changes, significant motor weakness or changes, or loss of sensations. Her pain today is 8/10.     Interval History 7/8/2024:  Marizol Kevin returns to clinic s/p left knee synvisc injection. She reports some relief of pain. She continues to experience pain when she goes to stand and walk.  She feels like she may fall at times due to the pain.  She continues PT and HEP daily. The patient denies fever/night sweats, urinary incontinence, bowel incontinence, significant weight changes, significant motor weakness or changes, or loss of sensations. Her pain today is 8/10.     Interval History 5/9/2024:  Marizol Kevin  returns to clinic s/p bialteral piriformis and left GTB on 4/25/2024. She reports 100% relief from this procedure.  She is happy with this last procedure.  Her main pain generator today is from her left knee. She last had a left genicular CSI on 10/17/2023.  She reports 80% relief that lasted 4 months.  She has done some research and would like to try Synvisc to see if she would have more lasting relief. She has started a 15-day course of Tramadol 50 mg BID PRN with good relief until she is able to have a procedure.  She denies any perceived side effects. Her pain today is 7/10.     Interval History 4/9/2024:  Marizol Kevin presents for follow-up of lower back and left knee pain. She also had left L4/5 and L5/S1 TF JT on 12/22/2023.  This has provided 70% pain relief that is on-going. Today, she is complaining of bilateral leg pain into the buttock and posterolateral bilateral thighs. Pain description: electric, aching, shooting, burning, tight. Exacerbating factors: twisting, bending, stooping. She continues Lyrica. She has not taken any muscle relaxants. The patient denies fever/night sweats, urinary incontinence, bowel incontinence, significant weight changes, significant motor weakness or changes, or loss of sensations. Her pain today is 10/10.     Interval History 11/20/2023:  The patient presents today for follow up of lower back and left knee pain. She is s/p left knee steroid injection on 10/17/23 with 80% relief of knee pain. Her knee pain is currently tolerable. She did she neurosurgery since previous encounter and had a referral placed to orthopedics for evaluation. She says that she feels like there is excess fluid in her knee. She does still get intermittent back pain with radiation down the back and side of the left leg and associated numbness. We previously discussed JT and she would like to further discuss at this time. Her pain today is 2/10.    Interval History 10/2/2023:  The patient  presents today to discuss worsened back and left knee pain. She was recently evaluated in the ED for left-sided sciatica. States that her back pain improved with the steroid injection, but she continues to struggle with left knee pain. She denies any back pain today. She did have a lumbar CT in the ED which showed multilevel stenosis, worse at L3-L4 level where there is severe central canal stenosis from concentric disc bulge and hypertrophic facet arthropathy and ligamentum flavum hypertrophy with napkin ring deformity of the traversing thecal sac and cauda equina suggested. She was supposed to be referred to neurosurgery but a referral was not made. She denies bowel or bladder incontinence. Left knee pain is sharp and stabbing in nature. She feels like the knee will give out at time. She has had benefit with Tramadol in the past and is asking for a prescription. Her pain today is 10/10.    Interval History 9/6/2023:  There patient is here for follow up of foot pain and numbness secondary to diabetic neuropathy and previous chemotherapy. She is here today for application of Qutenza patches to bilateral feet. She has burning and numbness to the anterior and posterior feet. She was started on Lyrica 50 mg BID at last OV. She has no side effects from the medication but has not noticed any improvement in symptoms. Her pain today is 9/10.    Interval History 8/22/23:    Ms. Kevin returns for follow-up of her neck pain. Her primary pain today is her neuropathy pain. She is tolerating lyrica 50 mg BID but has noted alopecia since starting. However, it has helped her pain. Given her history of colon cancer and chemo, it is unclear if this is hairloss is from the lyrica.  She also comments on her shoulder pain and how the prior nerve block helped but did not last long.  It provided >80% relief of her shoulder pain.  She is concerned about repeating the injection because she states the last one cost her $300.   Acupuncture  was very helpful for her, but was not covered by her insurance and cost $150 per session.   Regarding her neuropathy, she endorses painful burning in her feet that prevents sleeping. The lyrica and duloxetine have both helped, but she stopped taking duloxetine when she started lyrica. She did not have any known adverse effects from the duloxetine. At her last visit 6/6/23, Qutenza was ordered. Will investigate if steps to approval for that.   We also discussed spinal cord stimulator trial. She has not had spine MRI or psychological clearance.          Initial HPI:  Marizol Kevin has a PMHx of HTN, HLD, DMII, osteoporosis, colon cancer s/p chemothearpy. She presents to the clinic for the evaluation of neuropathic pain along with neck pain that radiates into the left arm. The pain started 5-6 months ago, no specific inciting event. Symptoms have been worsening.The pain is located in the left side of the neck area and radiates to the left periscapular area with further distal radiation to the whole arm.  The pain is described as aching, stabbing, and sqaueezing  and is rated as 9/10. The pain is rated with a score of  5/10 on the BEST day and a score of 9/10 on the WORST day.  Symptoms interfere with daily activity, sleeping, and enjoyment of life. The pain is exacerbated by Laying and Lifting.  The pain is mitigated by nothing.  Pt also endorses severe neuropathic pain in BL hands and feet. She has used gabapentinin the past without any relief. Currently on Cymbalta. Recently started Acupuncture for neuropathy, which she reports has provided significant relief.     Patient denies night fever/night sweats, urinary incontinence, bowel incontinence, significant weight loss, and loss of sensations.    Physical Therapy/Home Exercise: yes and continues to do home exercises for her shoulder and her balance    Pain Disability Index Review:      4/16/2025     3:03 PM 2/25/2025     2:55 PM 2/4/2025     9:56 AM   Last 3  PDI Scores   Pain Disability Index (PDI) 50 70 56       Pain Medications:  - was on duloxetine 30 mg BID  - Gabapentin (dose unknown) but was stopped due to lack of efficacy  - Tramadol  - Tylenol   - meloxicam      report:  Not applicable    Pain Procedures:   SAB injection 3/30/23 --> minimal relief  Left shoulder nerve blocks 7/21/23 --> 80% relief  10/17/23 Left knee steroid injection- 80% relief  12/22/2023 - Left L4/5 and L5/S1 TF JT - 70% relief  4/25/2024 - Bilateral Piriformis and Left GTB - 100% relief  5/21/2024 - LEFT KNEE SYNVISC INJECTION under ultrasound guide - some relief  6/17/2024 - Left knee CSI with sports medicine - limited relief  8/23/2024 - left genicular cooled RFA - 80% relief    LABS:    CMP  Sodium   Date Value Ref Range Status   04/10/2025 140 136 - 145 mmol/L Final   09/20/2024 142 136 - 145 mmol/L Final     Potassium   Date Value Ref Range Status   04/10/2025 4.3 3.5 - 5.1 mmol/L Final   09/20/2024 3.9 3.5 - 5.1 mmol/L Final     Chloride   Date Value Ref Range Status   04/10/2025 107 95 - 110 mmol/L Final   09/20/2024 108 95 - 110 mmol/L Final     CO2   Date Value Ref Range Status   04/10/2025 26 23 - 29 mmol/L Final   09/20/2024 25 23 - 29 mmol/L Final     Glucose   Date Value Ref Range Status   09/20/2024 81 70 - 110 mg/dL Final     BUN   Date Value Ref Range Status   04/10/2025 11 8 - 23 mg/dL Final     Creatinine   Date Value Ref Range Status   04/10/2025 1.0 0.5 - 1.4 mg/dL Final     Calcium   Date Value Ref Range Status   04/10/2025 10.1 8.7 - 10.5 mg/dL Final   09/20/2024 10.4 8.7 - 10.5 mg/dL Final     Total Protein   Date Value Ref Range Status   09/20/2024 8.0 6.0 - 8.4 g/dL Final     Albumin   Date Value Ref Range Status   04/10/2025 3.7 3.5 - 5.2 g/dL Final   09/20/2024 3.8 3.5 - 5.2 g/dL Final     Total Bilirubin   Date Value Ref Range Status   09/20/2024 0.3 0.1 - 1.0 mg/dL Final     Comment:     For infants and newborns, interpretation of results should be based  on  gestational age, weight and in agreement with clinical  observations.    Premature Infant recommended reference ranges:  Up to 24 hours.............<8.0 mg/dL  Up to 48 hours............<12.0 mg/dL  3-5 days..................<15.0 mg/dL  6-29 days.................<15.0 mg/dL       Bilirubin Total   Date Value Ref Range Status   04/10/2025 0.3 0.1 - 1.0 mg/dL Final     Comment:     For infants and newborns, interpretation of results should be based   on gestational age, weight and in agreement with clinical   observations.    Premature Infant recommended reference ranges:   0-24 hours:  <8.0 mg/dL   24-48 hours: <12.0 mg/dL   3-5 days:    <15.0 mg/dL   6-29 days:   <15.0 mg/dL     Alkaline Phosphatase   Date Value Ref Range Status   09/20/2024 116 55 - 135 U/L Final     ALP   Date Value Ref Range Status   04/10/2025 102 40 - 150 unit/L Final     AST   Date Value Ref Range Status   04/10/2025 18 11 - 45 unit/L Final   09/20/2024 18 10 - 40 U/L Final     ALT   Date Value Ref Range Status   04/10/2025 16 10 - 44 unit/L Final   09/20/2024 16 10 - 44 U/L Final     Anion Gap   Date Value Ref Range Status   04/10/2025 7 (L) 8 - 16 mmol/L Final     eGFR   Date Value Ref Range Status   04/10/2025 58 (L) >60 mL/min/1.73/m2 Final     Comment:     Estimated GFR calculated using the CKD-EPI creatinine (2021) equation.   09/20/2024 >60 >60 mL/min/1.73 m^2 Final        Imaging:     XR KNEE 3 VIEW BILATERAL     CLINICAL HISTORY:  Unilateral primary osteoarthritis, unspecified knee     COMPARISON:  None.     FINDINGS:  The alignment is within normal limits.  No displaced fractures identified.  No evidence of lytic or blastic lesions.RIGHT total knee arthroplasty.  Vascular calcifications.  Degenerative changes at the level of the medial femoral condyle and patellofemoral joint on the LEFT.Soft tissues are unremarkable.     Impression:     No evidence of fracture.RIGHT total knee arthroplasty and degenerative changes LEFT  medial/patellofemoral joints.        Electronically signed by:Cricket Medina MD  Date:                                            04/25/2024  Time:                                           14:46    MRI C Spine 4/15/22  FINDINGS:  large amount of image degradation from artifact.  Straightening of the usual cervical lordosis. Degenerative changes. Disc space narrowing C4-C5, C5-C6. Vertebral body heights and alignment appear maintained without acute compression or subluxation evident.  Although no obvious fracture cannot exclude subtle findings including marrow edema with the large amount of image degradation. Further follow-up or evaluation is to be obtained is suggested by CT. There do appear to be small posterior disc bulges multiple levels, C3 through C 6 without appreciable significant spinal canal narrowing     Impression:     Grossly negative study for acute findings but note is made that limited evaluation with the amount artifact and if concern for acute cervical spine trauma correlation with CT is recommended and note is made the patient did have CT 04/15/2022 please refer to that report.  Subtle findings as suggested on that CT scan likely would not be apparent on the MRI with the degree of artifact.     If further evaluation or follow-up is to be obtained it is recommended to be by CT     This report was flagged in Epic as abnormal.     Final read    XR KNEE 3 VIEW BILATERAL     CLINICAL HISTORY:  Unilateral primary osteoarthritis, unspecified knee     COMPARISON:  None.     FINDINGS:  The alignment is within normal limits.  No displaced fractures identified.  No evidence of lytic or blastic lesions.RIGHT total knee arthroplasty.  Vascular calcifications.  Degenerative changes at the level of the medial femoral condyle and patellofemoral joint on the LEFT.Soft tissues are unremarkable.     Impression:     No evidence of fracture.RIGHT total knee arthroplasty and degenerative changes LEFT  medial/patellofemoral joints.        Electronically signed by:Cricket Medina MD  Date:                                            04/25/2024  Time:                                           14:46    CT Lumbar Spine Without Contrast  Order: [id number]  Status: Final result     Visible to patient: Yes (seen)     Next appt: 10/17/2023 at 10:20 AM in Pain Medicine ([facility])     0 Result Notes  Details    Reading Physician Reading Date Result Priority   Neymar Mason MD  [phone] 9/29/2023 STAT     Narrative & Impression  EXAMINATION:  CT LUMBAR SPINE WITHOUT CONTRAST     CLINICAL HISTORY:  Lumbar radiculopathy, symptoms persist with conservative treatment;     TECHNIQUE:  Low-dose axial, sagittal and coronal reformations are obtained through the lumbar spine.  Contrast was not administered.     COMPARISON:  MRI lumbar spine, 06/18/2018     FINDINGS:  Alignment is stable.  Progressive spondylosis with disc space narrowing and vacuum phenomenon increasing at all levels but most severely at L to L3 L3-L4.  The anterolisthesis of L4 on L5 appear stable.     At T12-L1 there is no significant stenosis.     At the L1-L2 level, mild facet arthropathy with no significant stenosis.     At the L2-L3 level, concentric disc bulge and hypertrophic facet ligamentous changes result in moderate central canal stenosis with trefoil deformity of the thecal sac.     At the L3-L4 level, severe central canal stenosis from concentric disc bulge and hypertrophic facet arthropathy and ligamentum flavum hypertrophy with napkin ring deformity of the traversing thecal sac and cauda equina suggested.     At the L4-5 level, the anterolisthesis with uncovering of the L4-5 disc and facet arthropathy and ligamentous hypertrophy causes moderate central canal stenosis.     At the L5-S1 level, vacuum phenomenon is noted with leakage through the annulus into the epidural space but no evidence of central or foraminal stenosis.      Arthrosclerotic disease throughout the aorta and iliac vessels.     Impression:     Multilevel lumbar stenosis with increasing spondylosis and degenerative change since 2018.     Most severe lumbar stenosis at L3-4 and L4-5.     No new fracture or subluxation.     Past Medical History:   Diagnosis Date    Allergy     Anxiety     Cancer     colon    Cataract     Colon cancer     Dependence on nicotine from cigarettes 9/3/2020    Diabetes mellitus, type 2     Dry eye syndrome     Hyperlipidemia     Hypertension     Insomnia     Light cigarette smoker (1-9 cigs/day) 6/21/2022    Malignant neoplasm of ascending colon 7/20/2020    Squamous blepharitis     Syncope 4/16/2022    Tobacco use disorder, moderate, in early remission 11/2/2022    Vitamin D deficiency 9/10/2020     Past Surgical History:   Procedure Laterality Date    CATARACT EXTRACTION, BILATERAL  03/2020    CHOLECYSTECTOMY      COLON SURGERY      Colon cancer    COLONOSCOPY N/A 02/01/2021    Procedure: COLONOSCOPY;  Surgeon: Ashwini Duarte MD;  [facility]  Service: Endoscopy;  Laterality: N/A;  medical transportation  covid test 1/29 [employer], instructions mailed-KPvt    EYE SURGERY      Cataract    HYSTERECTOMY      INJECTION OF ANESTHETIC AGENT AROUND NERVE Left 7/21/2023    Procedure: BLOCK, NERVE, LEFT SHOULDER ARTICULAR BRANCH keep date rep aware;  Surgeon: Abdi Del Toro MD;  Location: [facility] [place] PAIN MGT;  Service: Pain Management;  Laterality: Left;    INJECTION OF ANESTHETIC AGENT AROUND NERVE Left 8/1/2024    Procedure: BLOCK, NERVE LEFT DIAGNOSTIC GENICULAR;  Surgeon: Abdi eDl Toro MD;  Location: [facility] [place] PAIN MGT;  Service: Pain Management;  Laterality: Left;  [phone]    INJECTION OF JOINT N/A 4/25/2024    Procedure: INJECTION, JOINT BILATERAL PIRIFORMIS AND LEFT GTB;  Surgeon: Abdi Del Toro MD;  Location: [facility] [place] PAIN MGT;  Service: Pain Management;  Laterality: N/A;  [phone]  2 WK F/U IRMA    JOINT REPLACEMENT       Knee    KNEE SURGERY      LAPAROSCOPIC LEFT COLON RESECTION      RADIOFREQUENCY ABLATION Left 2024    Procedure: RADIOFREQUENCY ABLATION LEFT GENICULAR NERVES COOLED *ASPIRIN OTC* HOLD FOR 5 DAYS;  Surgeon: Abdi Del Toro MD;  Location: [facility]IN MGT;  Service: Pain Management;  Laterality: Left;  [phone]  4 WK F/U [facility]    RADIOFREQUENCY ABLATION Left 3/13/2025    Procedure: RADIOFREQUENCY ABLATION LEFT GENICULAR COOLED *ASPIRIN OTC* HOLD 5 DAYS;  Surgeon: Abdi Del Toro MD;  Location: [facility]IN MGT;  Service: Pain Management;  Laterality: Left;  4 WK F/U IR[facility]    TRANSFORAMINAL EPIDURAL INJECTION OF STEROID Left 2023    Procedure: LUMBAR TRANSFORAMINAL LEFT L4/5 AND L5/S1;  Surgeon: Abdi Del Toro MD;  Location: [facility] PAIN MGT;  Service: Pain Management;  Laterality: Left;  [phone]  2 WK F/U ANTWON     Social History     Socioeconomic History    Marital status:    Tobacco Use    Smoking status: Former     Current packs/day: 0.00     Average packs/day: 1 pack/day for 51.0 years (51.0 ttl pk-yrs)     Types: Cigarettes     Start date: 1971     Quit date: 2022     Years since quittin.5    Smokeless tobacco: Never    Tobacco comments:     smoking cessation information given    Substance and Sexual Activity    Alcohol use: Not Currently     Alcohol/week: 1.0 standard drink of alcohol     Types: 1 Shots of liquor per week     Comment: occ    Drug use: Never    Sexual activity: Not Currently     Birth control/protection: Abstinence     Social Drivers of Health     Financial Resource Strain: Low Risk  (2024)    Overall Financial Resource Strain (CARDIA)     Difficulty of Paying Living Expenses: Not hard at all   Food Insecurity: No Food Insecurity (2024)    Hunger Vital Sign     Worried About Running Out of Food in the Last Year: Never true     Ran Out of Food in the Last Year: Never true   Transportation Needs: No Transportation Needs (2023)    PRAPARE -  Transportation     Lack of Transportation (Medical): No     Lack of Transportation (Non-Medical): No   Physical Activity: Unknown (6/17/2024)    Exercise Vital Sign     Days of Exercise per Week: 0 days   Stress: Stress Concern Present (6/17/2024)    [employer] [place] [employer] - Occupational Stress Questionnaire     Feeling of Stress : To some extent   Housing Stability: Low Risk  (6/7/2023)    Housing Stability Vital Sign     Unable to Pay for Housing in the Last Year: No     Number of Places Lived in the Last Year: 1     Unstable Housing in the Last Year: No     Family History   Problem Relation Name Age of Onset    Heart attack Mother Dora     Heart disease Mother Dora     Colon cancer Father Milo     Diabetes Father Milo     Cancer Sister Denisha         unknown    Hypothyroidism Sister Denisha     Asthma Sister Denisha     Thyroid cancer Neg Hx         Review of patient's allergies indicates:   Allergen Reactions    Grass pollen-june grass standard     Sulfa (sulfonamide antibiotics) Rash     Occurred when she was pregnant with varicose veins resulting in leg swelling and pain with standing       Current Outpatient Medications   Medication Sig    acetaminophen-codeine 300-30mg (TYLENOL #3) 300-30 mg Tab Take 1 tablet by mouth every 6 (six) hours as needed.    albuterol (PROVENTIL/VENTOLIN HFA) 90 mcg/actuation inhaler INHALE 2 PUFFS INTO THE LUNGS EVERY 4 (FOUR) HOURS AS NEEDED FOR WHEEZING    albuterol-ipratropium (DUO-NEB) 2.5 mg-0.5 mg/3 mL nebulizer solution Take 3 mLs by nebulization every 6 (six) hours as needed for Wheezing or Shortness of Breath. Rescue    amLODIPine (NORVASC) 5 MG tablet TAKE 1 TABLET EVERY DAY    aspirin (ECOTRIN) 81 MG EC tablet Take 81 mg by mouth once daily.    atorvastatin (LIPITOR) 40 MG tablet Take 1 tablet (40 mg total) by mouth once daily.    azelastine (ASTELIN) 137 mcg (0.1 %) nasal spray USE 1 SPRAY IN EACH NOSTRIL TWICE DAILY    betamethasone valerate 0.1%  (VALISONE) 0.1 % Oint Apply topically once daily.    blood sugar diagnostic (TRUE METRIX GLUCOSE TEST STRIP) Strp Use to test blood glucose two (2) times daily; to be used with insurance-preferred brand of glucometer/supplies    blood-glucose meter kit Please provide with insurance covered meter    cholecalciferol, vitamin D3, (VITAMIN D3) 50 mcg (2,000 unit) Cap capsule Take 1 capsule (2,000 Units total) by mouth once daily.    donepeziL (ARICEPT) 5 MG tablet Take 1 tablet (5 mg total) by mouth every evening.    dulaglutide (TRULICITY) 3 mg/0.5 mL pen injector Inject 3 mg into the skin once a week.    DULoxetine (CYMBALTA) 30 MG capsule Take 1 capsule (30 mg total) by mouth once daily.    famotidine (PEPCID) 40 MG tablet Take 40 mg by mouth 2 (two) times daily.    ferrous sulfate 325 (65 FE) MG EC tablet TAKE 1 TABLET EVERY OTHER DAY    fluticasone propion-salmeterol 115-21 mcg/dose (ADVAIR HFA) 115-21 mcg/actuation HFAA inhaler Inhale 2 puffs into the lungs every 12 (twelve) hours. Controller    fluticasone propionate (FLONASE) 50 mcg/actuation nasal spray 16    gabapentin (NEURONTIN) 300 MG capsule Take 1 capsule (300 mg total) by mouth every evening.    heparin, porcine, PF, (HEPARIN FLUSH 100 UNITS/ML) 100 unit/mL Syrg     hydrOXYzine HCL (ATARAX) 25 MG tablet Take 1 tablet (25 mg total) by mouth 3 (three) times daily as needed for Itching or Anxiety.    lancets Misc 1 Device by Misc.(Non-Drug; Combo Route) route 2 (two) times daily.    LIDOcaine (LIDODERM) 5 % Place 1 patch onto the skin once daily. Remove & Discard patch within 12 hours or as directed by MD    meloxicam (MOBIC) 15 MG tablet     metFORMIN (GLUCOPHAGE-XR) 500 MG ER 24hr tablet TAKE 2 TABLETS EVERY DAY WITH BREAKFAST    methocarbamoL (ROBAXIN) 750 MG Tab TAKE 1 TABLET THREE TIMES DAILY    multivitamin capsule Take 1 capsule by mouth once daily.    ondansetron (ZOFRAN) 4 MG tablet TAKE 1 TABLET EVERY 8 HOURS AS NEEDED FOR NAUSEA    raloxifene  "(EVISTA) 60 mg tablet TAKE 1 TABLET EVERY DAY    triamcinolone acetonide 0.025% (KENALOG) 0.025 % cream Apply topically 2 (two) times daily.    ketoconazole (NIZORAL) 2 % cream Apply topically once daily. for 14 days    traMADoL (ULTRAM-ER) 100 MG Tb24 Take 1 tablet (100 mg total) by mouth once daily.     No current facility-administered medications for this visit.       REVIEW OF SYSTEMS:    GENERAL:  No weight loss, malaise or fevers.  HEENT:  Negative for frequent or significant headaches.  NECK:  Negative for lumps, goiter, pain and significant neck swelling.  RESPIRATORY:  Negative for cough, wheezing or shortness of breath.  CARDIOVASCULAR:  Negative for chest pain, leg swelling or palpitations.  GI:  Negative for abdominal discomfort, blood in stools or black stools or change in bowel habits.  MUSCULOSKELETAL:  See HPI.  SKIN:  Negative for lesions, rash, and itching.  PSYCH:  +sleep disturbance, depression .  HEMATOLOGY/LYMPHOLOGY:  Negative for prolonged bleeding, bruising easily or swollen nodes.  NEURO:   No history of headaches, syncope, paralysis, seizures or tremors.  All other reviewed and negative other than HPI.    OBJECTIVE:    BP (!) 144/91 (BP Location: Right arm, Patient Position: Sitting)   Pulse 80   Temp 98 °F (36.7 °C)   Resp 18   Ht 4' 9" (1.448 m)   Wt 71 kg (156 lb 8.4 oz)   SpO2 100%   BMI 33.87 kg/m²       PHYSICAL EXAMINATION:      GENERAL APPEARANCE: Well appearing, in no acute distress.  PSYCH:  Mood and affect appropriate.  SKIN: Skin color, texture, turgor normal, no rashes or lesions to visible areas.  HEAD/FACE:  Normocephalic, atraumatic.  NECK: No pain to palpation over the cervical paraspinous muscles. Spurling Negative. No pain with neck flexion, extension, or lateral flexion.   CARDIO: Rate regular.  No lower extremity edema. Capillary refill <2 seconds.   PULM: Bilateral chest rise. No apparent respiratory distress.   GI:  Non-distended  BACK: Straight leg raising in " the sitting position is negative to radicular pain. No pain to palpation over the spine or costovertebral angles. Limited range of motion with pain on extension and of tightness in bilateral hamstrings with flexion/bending. Positive axial loading test to the left. Positive tenderness over bilateral SIJ left > right. R. OLGA positive to left.  EXTREMITIES: Peripheral joint ROM is full and pain free without obvious instability or laxity in all four extremities. No deformities, edema, or skin discoloration.   MUSCULOSKELETAL: No pain over bilateral piriformis. No pain over bilateral GTB. Left knee: mild TTP over medial joint line and patellofemoral joint.  Full ROM with pain on extension and flexion. No effusion.  Bilateral upper and lower extremity strength is normal and symmetric.   NEURO: Bilateral upper and lower extremity coordination and muscle stretch reflexes are physiologic and symmetric.  Negative Irng's. Plantar response are downgoing. No clonus noted. No loss of sensation is noted.  GAIT: Antalgic- ambulates with straight cane.      ASSESSMENT: 79 y.o. year old female with widespread pain, consistent with the followin. Pes anserinus bursitis of left knee  X-Ray Knee 3 View Bilateral    EMG W/ ULTRASOUND AND NERVE CONDUCTION TEST 1 Extremity    PT evaluate and treat      2. Neuropathy due to chemotherapeutic drug  traMADoL (ULTRAM-ER) 100 MG Tb24      3. Osteoarthritis of left knee, unspecified osteoarthritis type  X-Ray Knee 3 View Bilateral    PT evaluate and treat      4. History of total knee arthroplasty, right  X-Ray Knee 3 View Bilateral    EMG W/ ULTRASOUND AND NERVE CONDUCTION TEST 1 Extremity    PT evaluate and treat      5. Other fatigue  EMG W/ ULTRASOUND AND NERVE CONDUCTION TEST 1 Extremity    Segmental Pressure Lower Extremity            Diagnostics & Review:  Previous imaging reviewed and discussed with the patient today.  Order XR Bilateral Knees  Ordered EMG test  Ordered NETTA  test  Interventions & Procedures:  Schedule in-office knee intraarticular and pes anserine bursa steroid injection  Medications:  Continue Gabapentin 300 mg QHS.  She may take Tylenol up to 1000 mg TID PRN.  Continue Tramadol 100 mg ER q24h PRN for pain relief. (Last filled 2/17/25)  Monitoring & Compliance:  UDS 2/4/25 appropriate  Rehabilitation & Lifestyle:  Continue daily home exercise program (HEP). Perform knee strengthening exercises at home.  Referral placed to physical therapy.  Follow-Up:  Return to clinic in one week for scheduled in-office injections.    The above plan and management options were discussed at length with patient. Patient is in agreement with the above and verbalized understanding.    Tom Ramon MD   04/16/2025      I spent a total of 30 minutes on the day of the visit.  This includes face to face time and non-face to face time preparing to see the patient by reviewing previous labs/imaging, obtaining and/or reviewing separately obtained history, documenting clinical information in the electronic or other health record, independently interpreting results and communicating results to the patient/family/caregiver. This note was generated with the assistance of ambient listening technology. Verbal consent was obtained by the patient and accompanying visitor(s) for the recording of patient appointment to facilitate this note. I attest to having reviewed and edited the generated note for accuracy, though some syntax or spelling errors may persist. Please contact the author of this note for any clarification.       Abdi Del Toro

## 2025-04-22 ENCOUNTER — TELEPHONE (OUTPATIENT)
Dept: INTERNAL MEDICINE | Facility: CLINIC | Age: 80
End: 2025-04-22
Payer: MEDICARE

## 2025-04-22 NOTE — TELEPHONE ENCOUNTER
----- Message from Rosalina sent at 4/22/2025 10:27 AM CDT -----   Name of Who is Calling: What is the request in detail:  patient request call back in reference to schedule enhanced wellness appointment / patient want to schedule for 04/29/25    Please contact to further discuss and advise  Can the clinic reply by [facility]: What Number to Call Back if not in [facility]:   [phone]

## 2025-04-23 ENCOUNTER — PATIENT MESSAGE (OUTPATIENT)
Dept: ADMINISTRATIVE | Facility: HOSPITAL | Age: 80
End: 2025-04-23
Payer: MEDICARE

## 2025-04-25 RX ORDER — GABAPENTIN 300 MG/1
300 CAPSULE ORAL NIGHTLY
Qty: 90 CAPSULE | Refills: 0 | Status: SHIPPED | OUTPATIENT
Start: 2025-04-25

## 2025-04-29 ENCOUNTER — PROCEDURE VISIT (OUTPATIENT)
Dept: PAIN MEDICINE | Facility: CLINIC | Age: 80
End: 2025-04-29
Payer: MEDICARE

## 2025-04-29 ENCOUNTER — TELEPHONE (OUTPATIENT)
Dept: PAIN MEDICINE | Facility: CLINIC | Age: 80
End: 2025-04-29
Payer: MEDICARE

## 2025-04-29 VITALS
WEIGHT: 158.75 LBS | TEMPERATURE: 98 F | OXYGEN SATURATION: 100 % | BODY MASS INDEX: 34.25 KG/M2 | DIASTOLIC BLOOD PRESSURE: 76 MMHG | RESPIRATION RATE: 18 BRPM | SYSTOLIC BLOOD PRESSURE: 127 MMHG | HEART RATE: 87 BPM | HEIGHT: 57 IN

## 2025-04-29 DIAGNOSIS — M70.52 PES ANSERINUS BURSITIS OF LEFT KNEE: Primary | ICD-10-CM

## 2025-04-29 DIAGNOSIS — M17.12 OSTEOARTHRITIS OF LEFT KNEE, UNSPECIFIED OSTEOARTHRITIS TYPE: ICD-10-CM

## 2025-04-29 PROCEDURE — 20611 DRAIN/INJ JOINT/BURSA W/US: CPT | Mod: HCNC,LT,S$GLB, | Performed by: ANESTHESIOLOGY

## 2025-04-29 RX ADMIN — TRIAMCINOLONE ACETONIDE 40 MG: 40 INJECTION, SUSPENSION INTRA-ARTICULAR; INTRAMUSCULAR at 01:04

## 2025-04-29 NOTE — PROCEDURES
Patient Name: Marizol Kevin  MRN: [id number]    INFORMED CONSENT: The procedure, risks, benefits and options were discussed with patient. There are no contraindications to the procedure. The patient expressed understanding and agreed to proceed. The personnel performing the procedure was discussed. I verify that I personally obtained Marizol's consent prior to the start of the procedure and the signed consent can be found on the patient's chart.    Procedure Date: 04/29/2025    Anesthesia: Topical    Pre Procedure diagnosis: No diagnosis found.  Post-Procedure diagnosis: same      Sedation: None    PROCEDURE: Left KNEE INTRAARTICULAR steroid INJECTION under ultrasound guide    Patient in the supine position with right knee bending 45 degrees, the area of the left knee was prepped with chlorhexidine .  After performing time out and palpating the superior-lateral pattellar aspect A high-frequency linear transducer ultrasound probe is placed in the superolateral corner of the patella, directed medially toward the patellofemoral joint space  .  A 27 Gauge 1.5 inch needle was slowly advance Using an in-plane approach, and directed into the suprapatellar joint space towards the knee joint. After negative aspiration 3 cc of bupivacaine 0.25% and 40 mg kenalog was injected in the Knee joint.  Ultrasound was moved medially and inferiorly to have direct visualized of the pes anserinus bursa. Area of maximal tenderness was palpated and area was injection with 0.5cc of bupivacaine/kenalog mixture.      No complications. Patient tolerated procedure well.    Blood Loss: Nill  Specimen: None    Tom Ramon MD

## 2025-04-29 NOTE — TELEPHONE ENCOUNTER
----- Message from Med Assistant Kevin sent at 4/28/2025 10:07 AM CDT -----  Name of Who is Calling:ESTEBAN JIMENEZ [[id number]]  What is the request in detail: Pt is requesting a call back to see if she needs to pay another $50 for the f/u appt tomorrow. Pt was offered to speak to billing but wanted to speak to the office.Please assist.  Can the clinic reply by [facility]: No  [facility]: [phone]

## 2025-04-30 RX ORDER — TRIAMCINOLONE ACETONIDE 40 MG/ML
40 INJECTION, SUSPENSION INTRA-ARTICULAR; INTRAMUSCULAR
Status: COMPLETED | OUTPATIENT
Start: 2025-04-30 | End: 2025-04-29

## 2025-05-07 ENCOUNTER — TELEPHONE (OUTPATIENT)
Dept: NEUROLOGY | Facility: CLINIC | Age: 80
End: 2025-05-07
Payer: MEDICARE

## 2025-05-07 NOTE — TELEPHONE ENCOUNTER
----- Message from Ky Reyes sent at 5/7/2025  4:30 PM CDT -----  Regarding: FW: Appointment  Contact: [phone]    ----- Message -----  From: Newton Rooney  Sent: 5/7/2025   4:27 PM CDT  To: Rober Olivarez Staff  Subject: Appointment                                      Calling to reschedule appointment due to the provider cancelling. Sh said August is to far out to wait. She also would like to know if the provider can give her something for her restless leg and cramps in her leg and feet. She said she think the cramps are coming from the Neuropathy.  Please contact patient as soon as possible.

## 2025-05-07 NOTE — TELEPHONE ENCOUNTER
I spoke with patient, assisted with scheduling neurology appt. with Dr Richter on 6/12/25 @ 11:40 AM.

## 2025-05-27 ENCOUNTER — PATIENT OUTREACH (OUTPATIENT)
Dept: ADMINISTRATIVE | Facility: HOSPITAL | Age: 80
End: 2025-05-27
Payer: MEDICARE

## 2025-05-27 DIAGNOSIS — E11.69 HYPERLIPIDEMIA ASSOCIATED WITH TYPE 2 DIABETES MELLITUS: Primary | ICD-10-CM

## 2025-05-27 DIAGNOSIS — E11.9 DIABETES MELLITUS WITHOUT COMPLICATION: ICD-10-CM

## 2025-05-27 DIAGNOSIS — E78.5 HYPERLIPIDEMIA ASSOCIATED WITH TYPE 2 DIABETES MELLITUS: Primary | ICD-10-CM

## 2025-06-14 NOTE — TELEPHONE ENCOUNTER
Care Due:                  Date            Visit Type   Department     Provider  --------------------------------------------------------------------------------                                EP -                              PRIMARY      [facility] INTERNAL  Last Visit: 11-      CARE ([facility])   MEDICINE       Pedro Luis Butterfield  Next Visit: None Scheduled  None         None Found                                                            Last  Test          Frequency    Reason                     Performed    Due Date  --------------------------------------------------------------------------------    HBA1C.......  6 months...  dulaglutide, metFORMIN...  07- 01-    Lipid Panel.  12 months..  atorvastatin.............  01- 01-    [employer] Embedded Care Due Messages. Reference number: [id number].   6/14/2025 2:35:14 AM CDT

## 2025-06-14 NOTE — TELEPHONE ENCOUNTER
Refill Routing Note   Medication(s) are not appropriate for processing by [facility] for the following reason(s):        Required labs outdated    [facility] action(s):  Defer     Requires labs : Yes             Appointments  past 12m or future 3m with PCP    Date Provider   Last Visit   11/14/2024 Pedro Luis Butterfield MD   Next Visit   Visit date not found Pedro Luis Butterfield MD   ED visits in past 90 days: 0        Note composed:10:05 AM 06/14/2025

## 2025-06-16 RX ORDER — DULAGLUTIDE 3 MG/.5ML
INJECTION, SOLUTION SUBCUTANEOUS
Qty: 12 PEN | Refills: 3 | Status: SHIPPED | OUTPATIENT
Start: 2025-06-16

## 2025-06-27 ENCOUNTER — TELEPHONE (OUTPATIENT)
Dept: PHARMACY | Facility: CLINIC | Age: 80
End: 2025-06-27
Payer: MEDICARE

## 2025-06-27 NOTE — TELEPHONE ENCOUNTER
[facility]/Population Health Chart Review & Patient Outreach Details For Medication Adherence Project    Reason for Outreach Encounter: 3rd Party payor non-compliance report ([employer], [employer], [employer], etc)  2.  Patient Outreach Method: Reviewed patient chart   3.   Medication in question:    Hyperlipidemia Medications              atorvastatin (LIPITOR) 40 MG tablet Take 1 tablet (40 mg total) by mouth once daily.                  atorvastatin  last filled  1/23 for 90 day supply      4.  Reviewed and or Updates Made To: Patient Chart  5. Outreach Outcomes and/or actions taken: Sent inquiry to patient: Waiting for response  Additional Notes:

## 2025-07-09 DIAGNOSIS — E11.9 TYPE 2 DIABETES MELLITUS WITHOUT COMPLICATION, WITHOUT LONG-TERM CURRENT USE OF INSULIN: ICD-10-CM

## 2025-07-09 DIAGNOSIS — J45.20 MILD INTERMITTENT ASTHMA WITHOUT COMPLICATION: ICD-10-CM

## 2025-07-09 RX ORDER — FLUTICASONE PROPIONATE AND SALMETEROL XINAFOATE 115; 21 UG/1; UG/1
AEROSOL, METERED RESPIRATORY (INHALATION)
Qty: 24 G | Refills: 5 | Status: SHIPPED | OUTPATIENT
Start: 2025-07-09

## 2025-07-09 RX ORDER — ATORVASTATIN CALCIUM 40 MG/1
40 TABLET, FILM COATED ORAL
Qty: 90 TABLET | Refills: 3 | Status: SHIPPED | OUTPATIENT
Start: 2025-07-09

## 2025-07-09 NOTE — TELEPHONE ENCOUNTER
Refill Routing Note   Medication(s) are not appropriate for processing by [facility] for the following reason(s):        Required labs outdated    [facility] action(s):  Defer               Appointments  past 12m or future 3m with PCP    Date Provider   Last Visit   11/14/2024 Pedro Luis Butterfield MD   Next Visit   Visit date not found Pedro Luis Butterfield MD   ED visits in past 90 days: 0        Note composed:8:58 AM 07/09/2025

## 2025-07-09 NOTE — TELEPHONE ENCOUNTER
No care due was identified.  [facility] Embedded Care Due Messages. Reference number: [id number].   7/09/2025 2:09:22 AM CDT

## 2025-07-09 NOTE — TELEPHONE ENCOUNTER
Refill Encounter  Allergies: Confirmed    PCP Visits:   Recent Visits  Date Type Provider Dept   11/14/24 Office Visit Pedro Luis Butterfield MD [facility]   09/20/24 Office Visit Pedro Luis Butterfield MD [facility]   07/25/24 Office Visit Pedro Luis Butterfield MD [facility]   Showing recent visits within past 360 days and meeting all other requirements  Future Appointments  No visits were found meeting these conditions.  Showing future appointments within next 720 days and meeting all other requirements     Last 3 Blood Pressure:   BP Readings from Last 3 Encounters:   04/29/25 127/76   04/16/25 (!) 144/91   04/10/25 130/82     Preferred Pharmacy:   [facility] - [place], [place] - [address]  [address] [address]  Phone: [phone] Fax: [phone]    Requested RX:  Requested Prescriptions     Pending Prescriptions Disp Refills    ADVAIR -21 mcg/actuation HFAA inhaler [Pharmacy Med Name: Advair HFA Inhalation Aerosol 115-21 MCG/ACT] 24 g 5     Sig: INHALE 2 PUFFS INTO THE LUNGS EVERY 12 HOURS (CONTROLLER)      RX Route: Normal

## 2025-07-14 ENCOUNTER — TELEPHONE (OUTPATIENT)
Dept: NEUROLOGY | Facility: CLINIC | Age: 80
End: 2025-07-14
Payer: MEDICARE

## 2025-07-14 NOTE — TELEPHONE ENCOUNTER
I spoke with patient, asking for Gabapentin to be changed if possible due to patient's concern of side effect of Dementia, if no alternative would like Gabapentin Rx to be ordered to [facility].  Experiencing lots of pain with neuropathy, can deal with hand neuropathy, feet are severe  unable to touch floor with feet.  Also requesting that Aricept is ordered to [facility], she has no ride to  local Rxs.

## 2025-07-15 RX ORDER — DONEPEZIL HYDROCHLORIDE 5 MG/1
5 TABLET, FILM COATED ORAL NIGHTLY
Qty: 60 TABLET | Refills: 5 | Status: SHIPPED | OUTPATIENT
Start: 2025-07-15 | End: 2026-07-15

## 2025-07-15 RX ORDER — GABAPENTIN 300 MG/1
300 CAPSULE ORAL NIGHTLY
Qty: 90 CAPSULE | Refills: 0 | Status: SHIPPED | OUTPATIENT
Start: 2025-07-15

## 2025-07-15 NOTE — TELEPHONE ENCOUNTER
I spoke with patient, Gabapentin and Aricept ordered to [facility] by Dr. Richter,  patient said due to the wait PCP also ordered same Rxs.  Informed patient that follow up appointment with Dr. Richter is past due, declined virtual appointment, placed on wait list for an appointment.

## 2025-07-17 ENCOUNTER — TELEPHONE (OUTPATIENT)
Dept: NEUROLOGY | Facility: CLINIC | Age: 80
End: 2025-07-17
Payer: MEDICARE

## 2025-07-17 NOTE — TELEPHONE ENCOUNTER
[facility] I scheduled neurology follow up appointment with Dr Richter on 8/07/25 @ 9:00 AM, asked for a call back to [phone] if unable to keep appointment.

## 2025-07-23 ENCOUNTER — TELEPHONE (OUTPATIENT)
Dept: OPTOMETRY | Facility: CLINIC | Age: 80
End: 2025-07-23
Payer: MEDICARE

## 2025-07-23 ENCOUNTER — PATIENT MESSAGE (OUTPATIENT)
Dept: ADMINISTRATIVE | Facility: HOSPITAL | Age: 80
End: 2025-07-23
Payer: MEDICARE

## 2025-07-23 DIAGNOSIS — E11.9 TYPE 2 DIABETES MELLITUS WITHOUT COMPLICATION, UNSPECIFIED WHETHER LONG TERM INSULIN USE: ICD-10-CM

## 2025-07-24 ENCOUNTER — TELEPHONE (OUTPATIENT)
Dept: OPHTHALMOLOGY | Facility: CLINIC | Age: 80
End: 2025-07-24
Payer: MEDICARE

## 2025-07-24 ENCOUNTER — TELEPHONE (OUTPATIENT)
Dept: NEUROLOGY | Facility: CLINIC | Age: 80
End: 2025-07-24
Payer: MEDICARE

## 2025-07-24 NOTE — TELEPHONE ENCOUNTER
Copied from CRM #[id number]. Topic: General Inquiry - Patient Advice  >> Jul 22, 2025 12:37 PM Marlee wrote:  Type:  Sooner Apoointment Request    Caller is requesting a sooner appointment.  Caller declined first available appointment listed below.  Caller will not accept being placed on the waitlist and is requesting a message be sent to doctor.  Name of Caller:Marizol Kevin    When is the first available appointment?September    Symptoms:Itching, burning and watery eyes    Would the patient rather a call back or a response via [facility]? Call back    Best Call Back Number:[phone]    Additional Information: Patient is upset she sent message yesterday and received no call back.

## 2025-07-24 NOTE — TELEPHONE ENCOUNTER
Copied from CRM #[id number]. Topic: General Inquiry - Patient Advice  >> Jul 24, 2025 10:41 AM Umm wrote:  Pt calling in regards dry flaky eye lid , in the morning she has to manually open her eye due to     crust and puss, itchy around eye lids , pt has been calling since Monday , pt had script from last time but didn't help pt at all       [id number] - [place], [place] - [address] AT [facility] [address]  [address]  [place] [address]  Phone: [phone] Fax: [phone]          Confirmed patient's contact info below:  Contact Name: Marizol Kevin  Phone Number: [phone]

## 2025-07-24 NOTE — TELEPHONE ENCOUNTER
I spoke with patient and informed her of appointment that I scheduled with Dr. Richter on 8/07/25 2 9:00 AM.

## 2025-07-28 DIAGNOSIS — G57.03 PIRIFORMIS SYNDROME OF BOTH SIDES: ICD-10-CM

## 2025-07-28 RX ORDER — METHOCARBAMOL 750 MG/1
750 TABLET, FILM COATED ORAL 3 TIMES DAILY
Qty: 300 TABLET | Refills: 3 | Status: SHIPPED | OUTPATIENT
Start: 2025-07-28

## 2025-07-28 NOTE — TELEPHONE ENCOUNTER
No care due was identified.  [facility] Embedded Care Due Messages. Reference number: [id number].   7/28/2025 1:33:13 AM CDT

## 2025-07-28 NOTE — TELEPHONE ENCOUNTER
Medication Pended  Allergies Review  Lov 04/10/2025    Refill Encounter    PCP Visits: Recent Visits  Date Type Provider Dept   04/10/25 Office Visit Efrem Gannon MD [facility]   02/25/25 Office Visit Art Adorno PA-C [facility]   11/14/24 Office Visit Pedro Luis Butterfield MD [facility]   09/20/24 Office Visit Pedro Luis Butterfield MD [facility]   Showing recent visits within past 360 days and meeting all other requirements  Future Appointments  No visits were found meeting these conditions.  Showing future appointments within next 720 days and meeting all other requirements      Last 3 Blood Pressure:   BP Readings from Last 3 Encounters:   04/29/25 127/76   04/16/25 (!) 144/91   04/10/25 130/82     Preferred Pharmacy:   [facility] - [place], [place] - [address]  [address] [address]  Phone: [phone] Fax: [phone]    Requested RX:  Requested Prescriptions     Pending Prescriptions Disp Refills    methocarbamoL (ROBAXIN) 750 MG Tab [Pharmacy Med Name: METHOCARBAMOL 750 MG Oral Tablet] 300 tablet 3     Sig: TAKE 1 TABLET THREE TIMES DAILY      RX Route: Normal

## 2025-07-30 DIAGNOSIS — R21 SKIN RASH: ICD-10-CM

## 2025-07-30 RX ORDER — TRIAMCINOLONE ACETONIDE 0.25 MG/G
CREAM TOPICAL
Qty: 80 G | Refills: 5 | Status: SHIPPED | OUTPATIENT
Start: 2025-07-30

## 2025-07-30 NOTE — TELEPHONE ENCOUNTER
Pharmacy requested:  Dispense: 80 g, Refill: 11      Refill Encounter  Allergies: Confirmed    PCP Visits:   Recent Visits  Date Type Provider Dept   11/14/24 Office Visit Pedro Luis Butterfield MD [facility]   09/20/24 Office Visit Pedro Luis Butterfield MD [facility]   Showing recent visits within past 360 days and meeting all other requirements  Future Appointments  No visits were found meeting these conditions.  Showing future appointments within next 720 days and meeting all other requirements     Last 3 Blood Pressure:   BP Readings from Last 3 Encounters:   04/29/25 127/76   04/16/25 (!) 144/91   04/10/25 130/82     Preferred Pharmacy:   [facility] - [place], [place] - [address]  [address] [facility] [address]  Phone: [phone] Fax: [phone]    Requested RX:  Requested Prescriptions     Pending Prescriptions Disp Refills    triamcinolone acetonide 0.025% (KENALOG) 0.025 % cream [Pharmacy Med Name: TRIAMCINOLONE ACETONIDE 0.025 % External Cream] 80 g 11     Sig: APPLY TO THE AFFECTED AREA(S) TOPICALLY 2 (TWO) TIMES DAILY.      RX Route: Normal   Wheelchair/Stroller

## 2025-08-01 ENCOUNTER — TELEPHONE (OUTPATIENT)
Dept: PAIN MEDICINE | Facility: CLINIC | Age: 80
End: 2025-08-01
Payer: MEDICARE

## 2025-08-01 NOTE — TELEPHONE ENCOUNTER
Staff attempted to contact the patient regarding her refill request for Tramadol. Patient is due for a 3 month f/u. Patient was last seen:04/16/25. Patient did not answer staff could not leave .patient refill request is being denied due to needing an office visit.

## 2025-08-05 ENCOUNTER — OCHSNER VIRTUAL EMERGENCY DEPARTMENT (OUTPATIENT)
Facility: CLINIC | Age: 80
End: 2025-08-05
Payer: MEDICARE

## 2025-08-05 ENCOUNTER — OFFICE VISIT (OUTPATIENT)
Dept: INTERNAL MEDICINE | Facility: CLINIC | Age: 80
End: 2025-08-05
Payer: MEDICARE

## 2025-08-05 ENCOUNTER — NURSE TRIAGE (OUTPATIENT)
Dept: ADMINISTRATIVE | Facility: CLINIC | Age: 80
End: 2025-08-05
Payer: MEDICARE

## 2025-08-05 ENCOUNTER — PATIENT OUTREACH (OUTPATIENT)
Facility: OTHER | Age: 80
End: 2025-08-05
Payer: MEDICARE

## 2025-08-05 VITALS
HEIGHT: 57 IN | BODY MASS INDEX: 34.01 KG/M2 | OXYGEN SATURATION: 94 % | WEIGHT: 157.63 LBS | DIASTOLIC BLOOD PRESSURE: 62 MMHG | SYSTOLIC BLOOD PRESSURE: 110 MMHG | HEART RATE: 95 BPM

## 2025-08-05 DIAGNOSIS — H01.00A BLEPHARITIS OF UPPER AND LOWER EYELIDS OF BOTH EYES, UNSPECIFIED TYPE: Primary | ICD-10-CM

## 2025-08-05 DIAGNOSIS — H57.89 BURNING SENSATION OF EYE: Primary | ICD-10-CM

## 2025-08-05 DIAGNOSIS — H01.00B BLEPHARITIS OF UPPER AND LOWER EYELIDS OF BOTH EYES, UNSPECIFIED TYPE: Primary | ICD-10-CM

## 2025-08-05 PROCEDURE — 99999 PR PBB SHADOW E&M-EST. PATIENT-LVL III: CPT | Mod: PBBFAC,,, | Performed by: COUNSELOR

## 2025-08-05 PROCEDURE — 1125F AMNT PAIN NOTED PAIN PRSNT: CPT | Mod: CPTII,S$GLB,, | Performed by: COUNSELOR

## 2025-08-05 PROCEDURE — 3288F FALL RISK ASSESSMENT DOCD: CPT | Mod: CPTII,S$GLB,, | Performed by: COUNSELOR

## 2025-08-05 PROCEDURE — 3072F LOW RISK FOR RETINOPATHY: CPT | Mod: CPTII,S$GLB,, | Performed by: COUNSELOR

## 2025-08-05 PROCEDURE — 3074F SYST BP LT 130 MM HG: CPT | Mod: CPTII,S$GLB,, | Performed by: COUNSELOR

## 2025-08-05 PROCEDURE — 1101F PT FALLS ASSESS-DOCD LE1/YR: CPT | Mod: CPTII,S$GLB,, | Performed by: COUNSELOR

## 2025-08-05 PROCEDURE — 99214 OFFICE O/P EST MOD 30 MIN: CPT | Mod: S$GLB,,, | Performed by: COUNSELOR

## 2025-08-05 PROCEDURE — 3078F DIAST BP <80 MM HG: CPT | Mod: CPTII,S$GLB,, | Performed by: COUNSELOR

## 2025-08-05 RX ORDER — BACITRACIN ZINC AND POLYMYXIN B SULFATE 500; 10000 [USP'U]/G; [USP'U]/G
OINTMENT OPHTHALMIC EVERY 4 HOURS
Qty: 3.5 G | Refills: 0 | Status: SHIPPED | OUTPATIENT
Start: 2025-08-05

## 2025-08-05 NOTE — PROGRESS NOTES
[facility] RN on call nurse consulted with Cora CHICAS on call, Dr. Karyn Wooten, and disposition is Specialty. Ophthalmology referral placed. High priority in-basket message sent to Lu Mckeon MA and [facility] Ophthalmology Triage to assist with scheduling. Follow up scheduled on 8/7/25 to outreach to assess for any additional needs/concerns.

## 2025-08-05 NOTE — PLAN OF CARE-OVED
"[facility] Plan of Care Note  Referral Source: Nurse On-Call                               Chief Complaint   Patient presents with    Burning Eyes     Patient called requesting help getting scheduled in eye clinic. Reports she has been having stinging and burning sensation in eyes for "some time now". On chart review, opthalmology clinic tried reaching rhiannon several times last month to schedule.        Recommendation: Specialist Referral         Specialty: Opthalmology Referral placed. Patient with PCP appt this afternoon, message sent to clinic to assist in scheduling patient.                   Encounter Diagnosis   Name Primary?    Burning sensation of eye Yes        Orders Placed This Encounter    Ambulatory referral/consult to Ophthalmology     Referral Priority:   Routine     Referral Type:   Consultation     Referral Reason:   Specialty Services Required     Requested Specialty:   Ophthalmology     Number of Visits Requested:   1       "

## 2025-08-05 NOTE — TELEPHONE ENCOUNTER
I have a pt that is having issues with her eyes and she said that they are stinging and burning and get red and has drainage and has bee trying to get an eye appt for some time now and she said that they are getting worse. Pt was triaged and care advice to see MD today and I'm able to get her a PCP appt in 2 hours but would like to see if we are able to get her in the eye clinic for eval. Pt unable to get eye appt and sent to [facility] and they will try to get her into main campus since other clinic has nothing available till DEC. Pt will see PCP today to see if she can get some help and then follow up for eye exam. Pt to call back as needed.                       Reason for Disposition   Eyelid (outer) is very red and painful (or tender to touch)    Additional Information   Negative: SEVERE pain (e.g., excruciating)   Negative: Patient sounds very sick or weak to the triager   Negative: MODERATE eye pain (e.g., interferes with normal activities)   Negative: Cloudy spot or sore seen on the cornea (clear part of the eye)   Negative: Blurred vision   Negative: Eyelids are very swollen (shut or almost)   Negative: Fever > 103 F (39.4 C)   Negative: MILD eye pain or discomfort and wears contacts    Protocols used: Eye - Pus or [id number]

## 2025-08-05 NOTE — PROGRESS NOTES
Subjective:       Patient ID: Marizol Kevin is a 79 y.o. female with history ascending colon cancer s/p chemo and R hemicolectomy, T2DM with CKD 3a, osteoporosis, GERD, hep B, insomnia, hx tobacco use, thyroid nodules, HTN, HLD     Chief Complaint: Blepharitis of upper and lower eyelids of both eyes, unspecified type [H01.00A, H01.00B]    Patient is known to me, PCP is Dr. Pedro Luis Butterfield. Here today for the following:    History of Present Illness    CHIEF COMPLAINT:  Patient presents today for eye irritation and inflammation.    OCULAR (EYE):  She reports eye symptoms initially presenting as an allergic reaction with itching and watering, ongoing for approximately 2 weeks with recent worsening. She describes severe itching and burning at eye corners, accompanied by eye pain. She experiences crusting, secretions, and morning flaking/scaling with blurry vision changes. Symptoms notably worsen in the evening with increased itching, burning, and tearing. She experiences gritty sensation and occasional discharge. Symptoms interfere with daily comfort, causing her to close her eyes and manually rub eye areas for relief.  She does not have any purulent discharge noted. She believes symptoms may be triggered by environmental exposure and denies presence of stye or visible eye masses. She has tried erythromycin with no relief. She has not tried warm compresses or lid massage.     MUSCULOSKELETAL (MSK):  She reports back pain managed with methocarbamol.    FAMILY HISTORY:  Mother had eczema on arms and daughter has eczema on scalp.    SOCIAL HISTORY:  She is a non-smoker.    ROS:  General: -fever, -chills, -fatigue, -weight gain, -weight loss  Eyes: -vision changes, -redness, +discharge, +eye itchiness, +eye pain, +eye swelling, +swelling around eyes, +blurry vision  ENT: -ear pain, -nasal congestion, -sore throat  Cardiovascular: -chest pain, -palpitations, -lower extremity edema  Respiratory: -cough, +shortness of  breath  Gastrointestinal: -abdominal pain, -nausea, -vomiting, -diarrhea, -constipation, -blood in stool  Genitourinary: -dysuria, -hematuria, -frequency  Musculoskeletal: -joint pain, -muscle pain, +back pain  Skin: -rash, -lesion  Neurological: -headache, -dizziness, -numbness, -tingling  Psychiatric: -anxiety, -depression, -sleep difficulty          Current Outpatient Medications   Medication Instructions    acetaminophen-codeine 300-30mg (TYLENOL #3) 300-30 mg Tab 1 tablet, Every 6 hours PRN    ADVAIR -21 mcg/actuation HFAA inhaler INHALE 2 PUFFS INTO THE LUNGS EVERY 12 HOURS (CONTROLLER)    albuterol (PROVENTIL/VENTOLIN HFA) 90 mcg/actuation inhaler 2 puffs, Inhalation, Every 4 hours PRN    albuterol-ipratropium (DUO-NEB) 2.5 mg-0.5 mg/3 mL nebulizer solution 3 mLs, Nebulization, Every 6 hours PRN, Rescue    amLODIPine (NORVASC) 5 MG tablet TAKE 1 TABLET EVERY DAY    aspirin (ECOTRIN) 81 mg, Daily    atorvastatin (LIPITOR) 40 mg, Oral    azelastine (ASTELIN) 137 mcg, 2 times daily    bacitracin (AK-TRACIN) ophthalmic ointment Both Eyes, Every 4 hours    betamethasone valerate 0.1% (VALISONE) 0.1 % Oint Topical (Top), Daily    blood sugar diagnostic (TRUE METRIX GLUCOSE TEST STRIP) Strp Use to test blood glucose two (2) times daily; to be used with insurance-preferred brand of glucometer/supplies    blood-glucose meter kit Please provide with insurance covered meter    cholecalciferol (vitamin D3) (VITAMIN D3) 2,000 Units, Oral, Daily    donepeziL (ARICEPT) 5 mg, Oral, Nightly    dulaglutide (TRULICITY) 3 mg/0.5 mL pen injector INJECT 3MG (1 PEN) UNDER THE SKIN EVERY WEEK    DULoxetine (CYMBALTA) 30 mg, Oral, Daily    famotidine (PEPCID) 40 mg, 2 times daily    ferrous sulfate 325 (65 FE) MG EC tablet Oral, Every other day    fluticasone propionate (FLONASE) 50 mcg/actuation nasal spray 16    gabapentin (NEURONTIN) 300 mg, Oral, Nightly    heparin, porcine, PF, (HEPARIN FLUSH 100 UNITS/ML) 100 unit/mL  "Syrg No dose, route, or frequency recorded.    hydrOXYzine HCL (ATARAX) 25 mg, Oral, 3 times daily PRN    ketoconazole (NIZORAL) 2 % cream Topical (Top), Daily    lancets Misc 1 Device, Misc.(Non-Drug; Combo Route), 2 times daily    LIDOcaine (LIDODERM) 5 % 1 patch, Transdermal, Daily, Remove & Discard patch within 12 hours or as directed by MD    meloxicam (MOBIC) 15 MG tablet     metFORMIN (GLUCOPHAGE-XR) 500 MG ER 24hr tablet TAKE 2 TABLETS EVERY DAY WITH BREAKFAST    methocarbamoL (ROBAXIN) 750 mg, Oral, 3 times daily    multivitamin capsule 1 capsule, Daily    ondansetron (ZOFRAN) 4 mg, Oral, Every 8 hours PRN    raloxifene (EVISTA) 60 mg tablet TAKE 1 TABLET EVERY DAY    traMADoL (ULTRAM-ER) 100 mg, Oral, Daily    triamcinolone acetonide 0.025% (KENALOG) 0.025 % cream APPLY TO THE AFFECTED AREA(S) TOPICALLY 2 (TWO) TIMES DAILY.     Objective:      Vitals:    08/05/25 1245   BP: 110/62   Pulse: 95   SpO2: (!) 94%   Weight: 71.5 kg (157 lb 10.1 oz)   Height: 4' 9" (1.448 m)   PainSc:   7   PainLoc: Eye     Body mass index is 34.11 kg/m².    Physical Exam  Vitals reviewed.   Constitutional:       General: She is not in acute distress.     Appearance: Normal appearance. She is well-developed. She is obese. She is not ill-appearing, toxic-appearing or diaphoretic.   HENT:      Head: Normocephalic and atraumatic.      Nose: Nose normal. No congestion or rhinorrhea.      Mouth/Throat:      Mouth: Mucous membranes are moist.      Pharynx: Oropharynx is clear. No oropharyngeal exudate or posterior oropharyngeal erythema.   Eyes:      General: No scleral icterus.        Right eye: No discharge.         Left eye: No discharge.      Extraocular Movements: Extraocular movements intact.      Conjunctiva/sclera: Conjunctivae normal.      Pupils: Pupils are equal, round, and reactive to light.      Comments: Dry and some flaky skin around upper/lower eyelids, eyebrows. Most concentrated around corners of eyes. No drainage or " discharge noted. Meibomian glands did not seem inflamed or raised.   No pain with EOM, no periorbital swelling. Mild erythema noted under left eye without tenderness. No foreign objects noted.    Neck:      Thyroid: No thyromegaly.      Trachea: No tracheal deviation.   Pulmonary:      Effort: Pulmonary effort is normal.   Musculoskeletal:         General: No deformity.      Right lower leg: No edema.      Left lower leg: No edema.   Skin:     General: Skin is warm and dry.      Findings: No erythema.   Neurological:      Mental Status: She is alert and oriented to person, place, and time. Mental status is at baseline.      Gait: Gait normal.   Psychiatric:         Mood and Affect: Mood normal.         Behavior: Behavior normal.         Thought Content: Thought content normal.         Judgment: Judgment normal.         Assessment:       1. Blepharitis of upper and lower eyelids of both eyes, unspecified type        IMPRESSION:  - Assessed eye symptoms as likely blepharitis with possible surrounding eczema.    Plan:     PLAN SUMMARY:  - Changed topical antibiotic from erythromycin to bacitracin ophthalmic ointment, apply every 4 hours  - Suggested OTC antihistamines (e.g., Claritin, Zyrtec) for itching relief if needed  - Recommend warm compresses to eyes for 5-10 minutes, 2-4 times daily, followed by lid massage and artificial tears  - Advised Vaseline application to skin surrounding eyes (not on eyelids) after cleaning and warm compress  - Follow up with ophthalmology appointment at [facility] when scheduled  - Contact office if experiencing significant vision changes, increased eye pain, or swelling    Blepharitis of upper and lower eyelids of both eyes, unspecified type  - Monitored patient's symptoms of itching, burning, stinging in eye corners with swelling and redness that have worsened over past 2 weeks.  - Patient experiences crusting, matting in the morning, flaking, scaling, and excessive tearing.  - No  foreign object, sty, mass, or bump present.  - Likely blepharitis and possible eczema surrounding the skin.  - Recommend warm compresses to eyes for 5-10 minutes, 2-4 times daily, followed by lid massage and artificial tears to keep eyes moist.  - Changed topical antibiotic from erythromycin to bacitracin ophthalmic ointment, to be applied every 4 hours.  -     bacitracin (AK-TRACIN) ophthalmic ointment; Place into both eyes every 4 (four) hours.  Dispense: 3.5 g; Refill: 0    ## FOLLOW-UP:  - Advised seeing an ophthalmologist for further evaluation and treatment options.  - Instructed to follow up with ophthalmology appointment at [facility] when scheduled.  - Directed the patient to contact the office if experiencing significant vision changes, increased eye pain, or swelling.         34 minutes were spent in chart review, documentation and review of results, and evaluation, treatment, and counseling of patient on the same day of service. This note was generated with the assistance of ambient listening technology. Verbal consent was obtained by the patient and accompanying visitor(s) for the recording of patient appointment to facilitate this note. I attest to having reviewed and edited the generated note for accuracy, though some syntax or spelling errors may persist. Please contact the author of this note for any clarification.    Enrique Adorno PA-C    8/5/2025

## 2025-08-06 ENCOUNTER — TELEPHONE (OUTPATIENT)
Dept: INTERNAL MEDICINE | Facility: CLINIC | Age: 80
End: 2025-08-06
Payer: MEDICARE

## 2025-08-06 NOTE — TELEPHONE ENCOUNTER
Copied from CRM #[id number]. Topic: Medications - New Medication Request  >> Aug 6, 2025  1:11 PM Alton wrote:  Type: Requesting to speak with nurse    Who Called: PT  Regarding:  would like a call back concerning her eyes. Experiencing  the same issues as her brother and he recommend XDEMVY (lotilaner ophthalmic solution) 0.25%   Would the patient rather a call back or a response via [facility]? Call back  Best Call Back Number: [phone] ()  Additional: [id number] - [place], [place] - [address] AT [facility] [address]  [address] [facility] [address]  Phone: [phone] Fax: [phone]  Hours: Not open 24 hours

## 2025-08-07 ENCOUNTER — TELEPHONE (OUTPATIENT)
Dept: INTERNAL MEDICINE | Facility: CLINIC | Age: 80
End: 2025-08-07
Payer: MEDICARE

## 2025-08-07 NOTE — TELEPHONE ENCOUNTER
Copied from CRM #[id number]. Topic: General Inquiry - Patient Advice  >> Aug 7, 2025 12:36 PM Demetrius wrote:  Name Of Caller: Marizol        Provider Name:Pedro Luis Butterfield        Does patient feel the need to be seen today? no        Relationship to the Pt?: patient        Contact Preference?:  [facility]        What is the nature of the call?:  Patient is requesting to speak with someone in the office to see with her appointment with ophthalmology is going to be scheduled.

## 2025-08-10 ENCOUNTER — PATIENT OUTREACH (OUTPATIENT)
Facility: OTHER | Age: 80
End: 2025-08-10
Payer: MEDICARE

## 2025-08-11 ENCOUNTER — TELEPHONE (OUTPATIENT)
Dept: INTERNAL MEDICINE | Facility: CLINIC | Age: 80
End: 2025-08-11
Payer: MEDICARE

## 2025-08-11 ENCOUNTER — TELEPHONE (OUTPATIENT)
Dept: OPHTHALMOLOGY | Facility: CLINIC | Age: 80
End: 2025-08-11
Payer: MEDICARE

## 2025-08-11 DIAGNOSIS — L29.9 PRURITUS: Primary | ICD-10-CM

## 2025-08-11 DIAGNOSIS — F41.9 ANXIETY: ICD-10-CM

## 2025-08-11 RX ORDER — HYDROXYZINE HYDROCHLORIDE 25 MG/1
25 TABLET, FILM COATED ORAL 3 TIMES DAILY PRN
Qty: 30 TABLET | Refills: 3 | Status: CANCELLED | OUTPATIENT
Start: 2025-08-11

## 2025-08-11 RX ORDER — CETIRIZINE HYDROCHLORIDE 10 MG/1
10 TABLET ORAL 2 TIMES DAILY
Qty: 120 TABLET | Refills: 0 | Status: SHIPPED | OUTPATIENT
Start: 2025-08-11 | End: 2025-10-10

## 2025-08-14 ENCOUNTER — PATIENT OUTREACH (OUTPATIENT)
Facility: OTHER | Age: 80
End: 2025-08-14
Payer: MEDICARE

## 2025-08-17 ENCOUNTER — PATIENT OUTREACH (OUTPATIENT)
Facility: OTHER | Age: 80
End: 2025-08-17
Payer: MEDICARE

## 2025-08-19 ENCOUNTER — PATIENT OUTREACH (OUTPATIENT)
Facility: OTHER | Age: 80
End: 2025-08-19
Payer: MEDICARE

## 2025-09-03 DIAGNOSIS — I10 ESSENTIAL HYPERTENSION: ICD-10-CM

## 2025-09-03 RX ORDER — AMLODIPINE BESYLATE 5 MG/1
5 TABLET ORAL
Qty: 90 TABLET | Refills: 0 | Status: SHIPPED | OUTPATIENT
Start: 2025-09-03

## 2025-09-05 ENCOUNTER — OFFICE VISIT (OUTPATIENT)
Dept: OBSTETRICS AND GYNECOLOGY | Facility: CLINIC | Age: 80
End: 2025-09-05
Attending: OBSTETRICS & GYNECOLOGY
Payer: MEDICARE

## 2025-09-05 VITALS
SYSTOLIC BLOOD PRESSURE: 124 MMHG | WEIGHT: 177.5 LBS | DIASTOLIC BLOOD PRESSURE: 76 MMHG | HEIGHT: 57 IN | BODY MASS INDEX: 38.29 KG/M2

## 2025-09-05 DIAGNOSIS — Z78.0 POSTMENOPAUSAL STATUS: ICD-10-CM

## 2025-09-05 DIAGNOSIS — Z90.710 HISTORY OF HYSTERECTOMY: ICD-10-CM

## 2025-09-05 DIAGNOSIS — N76.0 VULVOVAGINITIS: Primary | ICD-10-CM

## 2025-09-05 PROCEDURE — 99999 PR PBB SHADOW E&M-EST. PATIENT-LVL IV: CPT | Mod: PBBFAC,HCNC,, | Performed by: OBSTETRICS & GYNECOLOGY

## 2025-09-05 RX ORDER — NYSTATIN AND TRIAMCINOLONE ACETONIDE 100000; 1 [USP'U]/G; MG/G
CREAM TOPICAL
Qty: 30 G | Refills: 1 | Status: SHIPPED | OUTPATIENT
Start: 2025-09-05 | End: 2026-09-05

## 2025-09-05 RX ORDER — FLUCONAZOLE 150 MG/1
150 TABLET ORAL
Qty: 2 TABLET | Refills: 0 | Status: SHIPPED | OUTPATIENT
Start: 2025-09-05

## (undated) DEVICE — DRESSING LEUKOPLAST FLEX 1X3IN